# Patient Record
Sex: MALE | Race: OTHER | Employment: OTHER | URBAN - METROPOLITAN AREA
[De-identification: names, ages, dates, MRNs, and addresses within clinical notes are randomized per-mention and may not be internally consistent; named-entity substitution may affect disease eponyms.]

---

## 2017-02-08 PROBLEM — F33.2 SEVERE EPISODE OF RECURRENT MAJOR DEPRESSIVE DISORDER, WITHOUT PSYCHOTIC FEATURES (HCC): Chronic | Status: ACTIVE | Noted: 2017-02-08

## 2018-03-02 PROBLEM — S06.5XAA SDH (SUBDURAL HEMATOMA): Status: ACTIVE | Noted: 2018-03-02

## 2018-03-23 DIAGNOSIS — S06.5XAA SDH (SUBDURAL HEMATOMA): Primary | ICD-10-CM

## 2018-03-29 ENCOUNTER — HOSPITAL ENCOUNTER (OUTPATIENT)
Dept: CT IMAGING | Age: 63
Discharge: HOME | End: 2018-03-31
Payer: MEDICARE

## 2018-03-29 DIAGNOSIS — I62.00 SUBDURAL HEMORRHAGE (HCC): ICD-10-CM

## 2018-03-29 PROCEDURE — 70450 CT HEAD/BRAIN W/O DYE: CPT

## 2018-09-27 ENCOUNTER — APPOINTMENT (OUTPATIENT)
Dept: CT IMAGING | Age: 63
DRG: 638 | End: 2018-09-27
Payer: MEDICARE

## 2018-09-27 ENCOUNTER — HOSPITAL ENCOUNTER (INPATIENT)
Age: 63
LOS: 2 days | Discharge: HOME | DRG: 638 | End: 2018-09-30
Attending: EMERGENCY MEDICINE | Admitting: INTERNAL MEDICINE
Payer: MEDICARE

## 2018-09-27 ENCOUNTER — APPOINTMENT (OUTPATIENT)
Dept: GENERAL RADIOLOGY | Age: 63
DRG: 638 | End: 2018-09-27
Payer: MEDICARE

## 2018-09-27 DIAGNOSIS — E11.10 DIABETIC KETOACIDOSIS WITHOUT COMA ASSOCIATED WITH TYPE 2 DIABETES MELLITUS (HCC): Primary | ICD-10-CM

## 2018-09-27 DIAGNOSIS — N17.9 AKI (ACUTE KIDNEY INJURY) (HCC): ICD-10-CM

## 2018-09-27 DIAGNOSIS — E87.5 HYPERKALEMIA: ICD-10-CM

## 2018-09-27 DIAGNOSIS — R10.13 ABDOMINAL PAIN, EPIGASTRIC: ICD-10-CM

## 2018-09-27 DIAGNOSIS — R11.2 NON-INTRACTABLE VOMITING WITH NAUSEA, UNSPECIFIED VOMITING TYPE: ICD-10-CM

## 2018-09-27 DIAGNOSIS — K44.9 HIATAL HERNIA: ICD-10-CM

## 2018-09-27 DIAGNOSIS — E27.8 RIGHT ADRENAL MASS (HCC): ICD-10-CM

## 2018-09-27 LAB
ALBUMIN SERPL-MCNC: 4.1 G/DL (ref 3.5–5.2)
ALP BLD-CCNC: 101 U/L (ref 40–129)
ALT SERPL-CCNC: 24 U/L (ref 0–40)
AMPHETAMINE SCREEN, URINE: NOT DETECTED
ANION GAP SERPL CALCULATED.3IONS-SCNC: 23 MMOL/L (ref 7–16)
AST SERPL-CCNC: 13 U/L (ref 0–39)
BACTERIA: ABNORMAL /HPF
BARBITURATE SCREEN URINE: NOT DETECTED
BASOPHILS ABSOLUTE: 0.04 E9/L (ref 0–0.2)
BASOPHILS RELATIVE PERCENT: 0.2 % (ref 0–2)
BENZODIAZEPINE SCREEN, URINE: NOT DETECTED
BETA-HYDROXYBUTYRATE: 3.66 MMOL/L (ref 0.02–0.27)
BILIRUB SERPL-MCNC: 1.8 MG/DL (ref 0–1.2)
BILIRUBIN URINE: NEGATIVE
BLOOD, URINE: ABNORMAL
BUN BLDV-MCNC: 44 MG/DL (ref 8–23)
CALCIUM SERPL-MCNC: 9.6 MG/DL (ref 8.6–10.2)
CANNABINOID SCREEN URINE: NOT DETECTED
CHLORIDE BLD-SCNC: 78 MMOL/L (ref 98–107)
CHP ED QC CHECK: YES
CLARITY: CLEAR
CO2: 21 MMOL/L (ref 22–29)
COCAINE METABOLITE SCREEN URINE: NOT DETECTED
COLOR: YELLOW
CREAT SERPL-MCNC: 1.8 MG/DL (ref 0.7–1.2)
EOSINOPHILS ABSOLUTE: 0.02 E9/L (ref 0.05–0.5)
EOSINOPHILS RELATIVE PERCENT: 0.1 % (ref 0–6)
GFR AFRICAN AMERICAN: 47
GFR NON-AFRICAN AMERICAN: 39 ML/MIN/1.73
GLUCOSE BLD-MCNC: 834 MG/DL (ref 74–109)
GLUCOSE BLD-MCNC: NORMAL MG/DL
GLUCOSE URINE: >=1000 MG/DL
HCT VFR BLD CALC: 48.8 % (ref 37–54)
HEMOGLOBIN: 17.6 G/DL (ref 12.5–16.5)
IMMATURE GRANULOCYTES #: 0.14 E9/L
IMMATURE GRANULOCYTES %: 0.8 % (ref 0–5)
KETONES, URINE: 15 MG/DL
LACTIC ACID: 4.5 MMOL/L (ref 0.5–2.2)
LEUKOCYTE ESTERASE, URINE: NEGATIVE
LIPASE: 66 U/L (ref 13–60)
LYMPHOCYTES ABSOLUTE: 1.61 E9/L (ref 1.5–4)
LYMPHOCYTES RELATIVE PERCENT: 8.7 % (ref 20–42)
MAGNESIUM: 2.1 MG/DL (ref 1.6–2.6)
MCH RBC QN AUTO: 26.7 PG (ref 26–35)
MCHC RBC AUTO-ENTMCNC: 36.1 % (ref 32–34.5)
MCV RBC AUTO: 74.1 FL (ref 80–99.9)
METER GLUCOSE: >500 MG/DL (ref 70–110)
METHADONE SCREEN, URINE: NOT DETECTED
MONOCYTES ABSOLUTE: 0.79 E9/L (ref 0.1–0.95)
MONOCYTES RELATIVE PERCENT: 4.2 % (ref 2–12)
NEUTROPHILS ABSOLUTE: 16 E9/L (ref 1.8–7.3)
NEUTROPHILS RELATIVE PERCENT: 86 % (ref 43–80)
NITRITE, URINE: NEGATIVE
OPIATE SCREEN URINE: NOT DETECTED
OSMOLALITY: 325 MOSM/KG (ref 285–310)
PDW BLD-RTO: 13.1 FL (ref 11.5–15)
PH UA: 5.5 (ref 5–9)
PH VENOUS: 7.26 (ref 7.3–7.42)
PHENCYCLIDINE SCREEN URINE: NOT DETECTED
PHOSPHORUS: 4.9 MG/DL (ref 2.5–4.5)
PLATELET # BLD: 258 E9/L (ref 130–450)
PMV BLD AUTO: 10.8 FL (ref 7–12)
POTASSIUM SERPL-SCNC: 5.7 MMOL/L (ref 3.5–5)
PROPOXYPHENE SCREEN: NOT DETECTED
PROTEIN UA: 30 MG/DL
RBC # BLD: 6.59 E12/L (ref 3.8–5.8)
RBC UA: ABNORMAL /HPF (ref 0–2)
SODIUM BLD-SCNC: 122 MMOL/L (ref 132–146)
SPECIFIC GRAVITY UA: 1.01 (ref 1–1.03)
TOTAL PROTEIN: 7.2 G/DL (ref 6.4–8.3)
TROPONIN: <0.01 NG/ML (ref 0–0.03)
UROBILINOGEN, URINE: 0.2 E.U./DL
WBC # BLD: 18.6 E9/L (ref 4.5–11.5)
WBC UA: ABNORMAL /HPF (ref 0–5)

## 2018-09-27 PROCEDURE — 71045 X-RAY EXAM CHEST 1 VIEW: CPT

## 2018-09-27 PROCEDURE — G0480 DRUG TEST DEF 1-7 CLASSES: HCPCS

## 2018-09-27 PROCEDURE — 80048 BASIC METABOLIC PNL TOTAL CA: CPT

## 2018-09-27 PROCEDURE — 80307 DRUG TEST PRSMV CHEM ANLYZR: CPT

## 2018-09-27 PROCEDURE — 74177 CT ABD & PELVIS W/CONTRAST: CPT

## 2018-09-27 PROCEDURE — 84484 ASSAY OF TROPONIN QUANT: CPT

## 2018-09-27 PROCEDURE — 82962 GLUCOSE BLOOD TEST: CPT

## 2018-09-27 PROCEDURE — 87040 BLOOD CULTURE FOR BACTERIA: CPT

## 2018-09-27 PROCEDURE — 2580000003 HC RX 258: Performed by: EMERGENCY MEDICINE

## 2018-09-27 PROCEDURE — 2500000003 HC RX 250 WO HCPCS: Performed by: EMERGENCY MEDICINE

## 2018-09-27 PROCEDURE — 81001 URINALYSIS AUTO W/SCOPE: CPT

## 2018-09-27 PROCEDURE — 83690 ASSAY OF LIPASE: CPT

## 2018-09-27 PROCEDURE — 83930 ASSAY OF BLOOD OSMOLALITY: CPT

## 2018-09-27 PROCEDURE — 6370000000 HC RX 637 (ALT 250 FOR IP): Performed by: EMERGENCY MEDICINE

## 2018-09-27 PROCEDURE — C9113 INJ PANTOPRAZOLE SODIUM, VIA: HCPCS | Performed by: EMERGENCY MEDICINE

## 2018-09-27 PROCEDURE — 2580000003 HC RX 258: Performed by: RADIOLOGY

## 2018-09-27 PROCEDURE — 6360000004 HC RX CONTRAST MEDICATION: Performed by: RADIOLOGY

## 2018-09-27 PROCEDURE — 87186 SC STD MICRODIL/AGAR DIL: CPT

## 2018-09-27 PROCEDURE — 6360000002 HC RX W HCPCS: Performed by: EMERGENCY MEDICINE

## 2018-09-27 PROCEDURE — 80053 COMPREHEN METABOLIC PANEL: CPT

## 2018-09-27 PROCEDURE — 82010 KETONE BODYS QUAN: CPT

## 2018-09-27 PROCEDURE — 83735 ASSAY OF MAGNESIUM: CPT

## 2018-09-27 PROCEDURE — 83605 ASSAY OF LACTIC ACID: CPT

## 2018-09-27 PROCEDURE — 96374 THER/PROPH/DIAG INJ IV PUSH: CPT

## 2018-09-27 PROCEDURE — 96375 TX/PRO/DX INJ NEW DRUG ADDON: CPT

## 2018-09-27 PROCEDURE — 85025 COMPLETE CBC W/AUTO DIFF WBC: CPT

## 2018-09-27 PROCEDURE — 82800 BLOOD PH: CPT

## 2018-09-27 PROCEDURE — 84100 ASSAY OF PHOSPHORUS: CPT

## 2018-09-27 PROCEDURE — 87077 CULTURE AEROBIC IDENTIFY: CPT

## 2018-09-27 PROCEDURE — 99285 EMERGENCY DEPT VISIT HI MDM: CPT

## 2018-09-27 PROCEDURE — 2580000003 HC RX 258: Performed by: PHYSICIAN ASSISTANT

## 2018-09-27 PROCEDURE — 36415 COLL VENOUS BLD VENIPUNCTURE: CPT

## 2018-09-27 PROCEDURE — S0028 INJECTION, FAMOTIDINE, 20 MG: HCPCS | Performed by: EMERGENCY MEDICINE

## 2018-09-27 RX ORDER — PANTOPRAZOLE SODIUM 40 MG/10ML
80 INJECTION, POWDER, LYOPHILIZED, FOR SOLUTION INTRAVENOUS ONCE
Status: COMPLETED | OUTPATIENT
Start: 2018-09-27 | End: 2018-09-27

## 2018-09-27 RX ORDER — 0.9 % SODIUM CHLORIDE 0.9 %
1000 INTRAVENOUS SOLUTION INTRAVENOUS ONCE
Status: COMPLETED | OUTPATIENT
Start: 2018-09-27 | End: 2018-09-28

## 2018-09-27 RX ORDER — SODIUM CHLORIDE 0.9 % (FLUSH) 0.9 %
10 SYRINGE (ML) INJECTION PRN
Status: COMPLETED | OUTPATIENT
Start: 2018-09-27 | End: 2018-09-27

## 2018-09-27 RX ORDER — ONDANSETRON 2 MG/ML
4 INJECTION INTRAMUSCULAR; INTRAVENOUS ONCE
Status: COMPLETED | OUTPATIENT
Start: 2018-09-27 | End: 2018-09-27

## 2018-09-27 RX ORDER — DEXTROSE MONOHYDRATE 25 G/50ML
12.5 INJECTION, SOLUTION INTRAVENOUS PRN
Status: DISCONTINUED | OUTPATIENT
Start: 2018-09-27 | End: 2018-09-28 | Stop reason: SDUPTHER

## 2018-09-27 RX ORDER — DEXTROSE MONOHYDRATE 50 MG/ML
100 INJECTION, SOLUTION INTRAVENOUS PRN
Status: DISCONTINUED | OUTPATIENT
Start: 2018-09-27 | End: 2018-09-30 | Stop reason: HOSPADM

## 2018-09-27 RX ORDER — NICOTINE POLACRILEX 4 MG
15 LOZENGE BUCCAL PRN
Status: DISCONTINUED | OUTPATIENT
Start: 2018-09-27 | End: 2018-09-30 | Stop reason: HOSPADM

## 2018-09-27 RX ORDER — 0.9 % SODIUM CHLORIDE 0.9 %
1000 INTRAVENOUS SOLUTION INTRAVENOUS ONCE
Status: COMPLETED | OUTPATIENT
Start: 2018-09-27 | End: 2018-09-27

## 2018-09-27 RX ADMIN — PANTOPRAZOLE SODIUM 80 MG: 40 INJECTION, POWDER, FOR SOLUTION INTRAVENOUS at 22:32

## 2018-09-27 RX ADMIN — SODIUM CHLORIDE 1000 ML: 9 INJECTION, SOLUTION INTRAVENOUS at 22:29

## 2018-09-27 RX ADMIN — SODIUM CHLORIDE 1000 ML: 9 INJECTION, SOLUTION INTRAVENOUS at 19:53

## 2018-09-27 RX ADMIN — FAMOTIDINE 20 MG: 10 INJECTION, SOLUTION INTRAVENOUS at 19:54

## 2018-09-27 RX ADMIN — SODIUM CHLORIDE 0.1 UNITS/KG/HR: 9 INJECTION, SOLUTION INTRAVENOUS at 23:22

## 2018-09-27 RX ADMIN — SODIUM CHLORIDE 1000 ML: 9 INJECTION, SOLUTION INTRAVENOUS at 19:54

## 2018-09-27 RX ADMIN — IOPAMIDOL 110 ML: 755 INJECTION, SOLUTION INTRAVENOUS at 22:59

## 2018-09-27 RX ADMIN — Medication 10 ML: at 22:59

## 2018-09-27 RX ADMIN — ONDANSETRON 4 MG: 2 SOLUTION INTRAMUSCULAR; INTRAVENOUS at 19:54

## 2018-09-27 ASSESSMENT — ENCOUNTER SYMPTOMS
SINUS PRESSURE: 0
SHORTNESS OF BREATH: 0
VOMITING: 1
BACK PAIN: 0
RHINORRHEA: 0
CONSTIPATION: 0
NAUSEA: 1
SORE THROAT: 0
ABDOMINAL PAIN: 1
DIARRHEA: 0
COUGH: 0

## 2018-09-27 ASSESSMENT — PAIN DESCRIPTION - LOCATION: LOCATION: ABDOMEN

## 2018-09-27 ASSESSMENT — PAIN DESCRIPTION - DESCRIPTORS: DESCRIPTORS: BURNING

## 2018-09-27 ASSESSMENT — PAIN DESCRIPTION - ORIENTATION: ORIENTATION: LOWER;UPPER

## 2018-09-27 ASSESSMENT — PAIN DESCRIPTION - PAIN TYPE: TYPE: ACUTE PAIN

## 2018-09-27 ASSESSMENT — PAIN SCALES - GENERAL: PAINLEVEL_OUTOF10: 8

## 2018-09-27 ASSESSMENT — PAIN DESCRIPTION - ONSET: ONSET: ON-GOING

## 2018-09-27 ASSESSMENT — PAIN DESCRIPTION - FREQUENCY: FREQUENCY: INTERMITTENT

## 2018-09-27 NOTE — ED PROVIDER NOTES
Result Value Ref Range    Blood Culture, Routine (A)      Gram stain performed from blood culture bottle media  Gram positive cocci in pairs     Culture Blood #2   Result Value Ref Range    Culture, Blood 2 24 Hours- no growth    CBC Auto Differential   Result Value Ref Range    WBC 18.6 (H) 4.5 - 11.5 E9/L    RBC 6.59 (H) 3.80 - 5.80 E12/L    Hemoglobin 17.6 (H) 12.5 - 16.5 g/dL    Hematocrit 48.8 37.0 - 54.0 %    MCV 74.1 (L) 80.0 - 99.9 fL    MCH 26.7 26.0 - 35.0 pg    MCHC 36.1 (H) 32.0 - 34.5 %    RDW 13.1 11.5 - 15.0 fL    Platelets 788 761 - 398 E9/L    MPV 10.8 7.0 - 12.0 fL    Neutrophils % 86.0 (H) 43.0 - 80.0 %    Immature Granulocytes % 0.8 0.0 - 5.0 %    Lymphocytes % 8.7 (L) 20.0 - 42.0 %    Monocytes % 4.2 2.0 - 12.0 %    Eosinophils % 0.1 0.0 - 6.0 %    Basophils % 0.2 0.0 - 2.0 %    Neutrophils # 16.00 (H) 1.80 - 7.30 E9/L    Immature Granulocytes # 0.14 E9/L    Lymphocytes # 1.61 1.50 - 4.00 E9/L    Monocytes # 0.79 0.10 - 0.95 E9/L    Eosinophils # 0.02 (L) 0.05 - 0.50 E9/L    Basophils # 0.04 0.00 - 0.20 E9/L   Comprehensive Metabolic Panel   Result Value Ref Range    Sodium 122 (L) 132 - 146 mmol/L    Potassium 5.7 (H) 3.5 - 5.0 mmol/L    Chloride 78 (L) 98 - 107 mmol/L    CO2 21 (L) 22 - 29 mmol/L    Anion Gap 23 (H) 7 - 16 mmol/L    Glucose 834 (HH) 74 - 109 mg/dL    BUN 44 (H) 8 - 23 mg/dL    CREATININE 1.8 (H) 0.7 - 1.2 mg/dL    GFR Non-African American 39 >=60 mL/min/1.73    GFR African American 47     Calcium 9.6 8.6 - 10.2 mg/dL    Total Protein 7.2 6.4 - 8.3 g/dL    Alb 4.1 3.5 - 5.2 g/dL    Total Bilirubin 1.8 (H) 0.0 - 1.2 mg/dL    Alkaline Phosphatase 101 40 - 129 U/L    ALT 24 0 - 40 U/L    AST 13 0 - 39 U/L   Lipase   Result Value Ref Range    Lipase 66 (H) 13 - 60 U/L   Beta-Hydroxybutyrate   Result Value Ref Range    Beta-Hydroxybutyrate 3.66 (H) 0.02 - 0.27 mmol/L   Troponin   Result Value Ref Range    Troponin <0.01 0.00 - 0.03 ng/mL   Urinalysis with Microscopic   Result Cocaine Metabolite Screen, Urine NOT DETECTED Negative < 300 ng/mL    Opiate Scrn, Ur NOT DETECTED Negative < 300ng/mL    PCP Screen, Urine NOT DETECTED Negative < 25 ng/mL    Methadone Screen, Urine NOT DETECTED Negative <300 ng/mL    Propoxyphene Scrn, Ur NOT DETECTED Negative <300 ng/mL   GLUCOSE, RANDOM   Result Value Ref Range    Glucose 468 (H) 74 - 109 mg/dL   Basic Metabolic Panel   Result Value Ref Range    Sodium 138 132 - 146 mmol/L    Potassium 4.4 3.5 - 5.0 mmol/L    Chloride 104 98 - 107 mmol/L    CO2 22 22 - 29 mmol/L    Anion Gap 12 7 - 16 mmol/L    Glucose 230 (H) 74 - 109 mg/dL    BUN 31 (H) 8 - 23 mg/dL    CREATININE 1.4 (H) 0.7 - 1.2 mg/dL    GFR Non-African American 52 >=60 mL/min/1.73    GFR African American >60     Calcium 8.7 8.6 - 10.2 mg/dL   Phosphorus   Result Value Ref Range    Phosphorus 2.8 2.5 - 4.5 mg/dL   MAGNESIUM   Result Value Ref Range    Magnesium 2.5 1.6 - 2.6 mg/dL   Phosphorus   Result Value Ref Range    Phosphorus 4.9 (H) 2.5 - 4.5 mg/dL   URINE ELECTROLYTES   Result Value Ref Range    Sodium, Ur 41 Not Established mmol/L    Potassium, Ur 31.9 Not Established mmol/L    Chloride 33 Not Established mmol/L   Creatinine, Random Urine   Result Value Ref Range    Creatinine, Ur 57 40 - 278 mg/dL   PSA, Diagnostic   Result Value Ref Range    PSA 0.86 0.00 - 4.00 ng/mL   Cortisol   Result Value Ref Range    Cortisol 18.27 2.68 - 18.40 mcg/dL   Catecholamines free urine   Result Value Ref Range    Urine Total Volume RANDOM     Hours Collected RANDOM    Hemoglobin A1c   Result Value Ref Range    Hemoglobin A1C 13.6 (H) 4.0 - 5.6 %   Basic Metabolic Panel   Result Value Ref Range    Sodium 134 132 - 146 mmol/L    Potassium 4.4 3.5 - 5.0 mmol/L    Chloride 98 98 - 107 mmol/L    CO2 21 (L) 22 - 29 mmol/L    Anion Gap 15 7 - 16 mmol/L    Glucose 210 (H) 74 - 109 mg/dL    BUN 26 (H) 8 - 23 mg/dL    CREATININE 1.3 (H) 0.7 - 1.2 mg/dL    GFR Non-African American 56 >=60 mL/min/1.73    GFR Meter Glucose 324 (H) 70 - 110 mg/dL   POCT Glucose   Result Value Ref Range    Meter Glucose 282 (H) 70 - 110 mg/dL   POCT Glucose   Result Value Ref Range    Meter Glucose 211 (H) 70 - 110 mg/dL   POCT Glucose   Result Value Ref Range    Meter Glucose 205 (H) 70 - 110 mg/dL   POCT Glucose   Result Value Ref Range    Meter Glucose 207 (H) 70 - 110 mg/dL   POCT Glucose   Result Value Ref Range    Meter Glucose 217 (H) 70 - 110 mg/dL   POCT Glucose   Result Value Ref Range    Meter Glucose 168 (H) 70 - 110 mg/dL   POCT Glucose   Result Value Ref Range    Meter Glucose 248 (H) 70 - 110 mg/dL   POCT Glucose   Result Value Ref Range    Meter Glucose 233 (H) 70 - 110 mg/dL   POCT Glucose   Result Value Ref Range    Meter Glucose 213 (H) 70 - 110 mg/dL       RADIOLOGY:  CT ABDOMEN PELVIS W IV CONTRAST Additional Contrast? None   Final Result   1. Hiatal hernia. There is unequivocal circumferential thickening of the wall    of the lower esophagus. Reflux esophagitis? 2. There are multiple small bilateral renal cysts. No obstructive uropathy. The bladder is distended but otherwise normal.   3.  5.9 cm maximal diameter variably enhancing right suprarenal mass. No    normal right adrenal tissue is visible and is therefore assumed that this    lesion is of adrenal margin. It indents the upper IVC and is inseparable from    the caval wall on 4 contiguous axial images but the mass most likely does not    originate from the caval wall. Any adrenal mass over 4 cm in diameter is    generally considered suspicious and the morphology of this lesion is clearly    suspicious. The left adrenal is normal.   4.  Retroperitoneal lipomatosis. This report has been electronically signed by Adolfo Rinaldi MD.      XR CHEST PORTABLE   Final Result   No acute cardiopulmonary process.       MRI ABDOMEN W WO CONTRAST    (Results Pending)     ------------------------- NURSING NOTES AND VITALS REVIEWED ---------------------------  Date

## 2018-09-28 PROBLEM — E11.10 DIABETIC KETOACIDOSIS WITHOUT COMA ASSOCIATED WITH TYPE 2 DIABETES MELLITUS (HCC): Status: ACTIVE | Noted: 2018-09-28

## 2018-09-28 LAB
ACETAMINOPHEN LEVEL: <5 MCG/ML (ref 10–30)
ANION GAP SERPL CALCULATED.3IONS-SCNC: 12 MMOL/L (ref 7–16)
ANION GAP SERPL CALCULATED.3IONS-SCNC: 15 MMOL/L (ref 7–16)
ANION GAP SERPL CALCULATED.3IONS-SCNC: 18 MMOL/L (ref 7–16)
BUN BLDV-MCNC: 26 MG/DL (ref 8–23)
BUN BLDV-MCNC: 31 MG/DL (ref 8–23)
BUN BLDV-MCNC: 42 MG/DL (ref 8–23)
CALCIUM SERPL-MCNC: 8.2 MG/DL (ref 8.6–10.2)
CALCIUM SERPL-MCNC: 8.3 MG/DL (ref 8.6–10.2)
CALCIUM SERPL-MCNC: 8.7 MG/DL (ref 8.6–10.2)
CHLORIDE BLD-SCNC: 104 MMOL/L (ref 98–107)
CHLORIDE BLD-SCNC: 91 MMOL/L (ref 98–107)
CHLORIDE BLD-SCNC: 98 MMOL/L (ref 98–107)
CHLORIDE URINE RANDOM: 33 MMOL/L
CHP ED QC CHECK: YES
CHP ED QC CHECK: YES
CO2: 19 MMOL/L (ref 22–29)
CO2: 21 MMOL/L (ref 22–29)
CO2: 22 MMOL/L (ref 22–29)
CORTISOL TOTAL: 18.27 MCG/DL (ref 2.68–18.4)
CREAT SERPL-MCNC: 1.3 MG/DL (ref 0.7–1.2)
CREAT SERPL-MCNC: 1.4 MG/DL (ref 0.7–1.2)
CREAT SERPL-MCNC: 1.6 MG/DL (ref 0.7–1.2)
CREATININE URINE: 57 MG/DL (ref 40–278)
ETHANOL: <10 MG/DL (ref 0–0.08)
GFR AFRICAN AMERICAN: 53
GFR AFRICAN AMERICAN: >60
GFR AFRICAN AMERICAN: >60
GFR NON-AFRICAN AMERICAN: 44 ML/MIN/1.73
GFR NON-AFRICAN AMERICAN: 52 ML/MIN/1.73
GFR NON-AFRICAN AMERICAN: 56 ML/MIN/1.73
GLUCOSE BLD-MCNC: 210 MG/DL (ref 74–109)
GLUCOSE BLD-MCNC: 230 MG/DL (ref 74–109)
GLUCOSE BLD-MCNC: 468 MG/DL (ref 74–109)
GLUCOSE BLD-MCNC: 492 MG/DL
GLUCOSE BLD-MCNC: 644 MG/DL (ref 74–109)
GLUCOSE BLD-MCNC: NORMAL MG/DL
HBA1C MFR BLD: 13.6 % (ref 4–5.6)
LACTIC ACID: 3.6 MMOL/L (ref 0.5–2.2)
MAGNESIUM: 2.5 MG/DL (ref 1.6–2.6)
METER GLUCOSE: 168 MG/DL (ref 70–110)
METER GLUCOSE: 205 MG/DL (ref 70–110)
METER GLUCOSE: 207 MG/DL (ref 70–110)
METER GLUCOSE: 211 MG/DL (ref 70–110)
METER GLUCOSE: 217 MG/DL (ref 70–110)
METER GLUCOSE: 282 MG/DL (ref 70–110)
METER GLUCOSE: 324 MG/DL (ref 70–110)
METER GLUCOSE: 335 MG/DL (ref 70–110)
METER GLUCOSE: 492 MG/DL (ref 70–110)
METER GLUCOSE: >500 MG/DL (ref 70–110)
METER GLUCOSE: >500 MG/DL (ref 70–110)
PHOSPHORUS: 2.8 MG/DL (ref 2.5–4.5)
PHOSPHORUS: 4.9 MG/DL (ref 2.5–4.5)
POTASSIUM SERPL-SCNC: 4.4 MMOL/L (ref 3.5–5)
POTASSIUM SERPL-SCNC: 4.4 MMOL/L (ref 3.5–5)
POTASSIUM SERPL-SCNC: 5.9 MMOL/L (ref 3.5–5)
POTASSIUM, UR: 31.9 MMOL/L
PROSTATE SPECIFIC ANTIGEN: 0.86 NG/ML (ref 0–4)
SALICYLATE, SERUM: <0.3 MG/DL (ref 0–30)
SODIUM BLD-SCNC: 128 MMOL/L (ref 132–146)
SODIUM BLD-SCNC: 134 MMOL/L (ref 132–146)
SODIUM BLD-SCNC: 138 MMOL/L (ref 132–146)
SODIUM URINE: 41 MMOL/L
TRICYCLIC ANTIDEPRESSANTS SCREEN SERUM: NEGATIVE NG/ML

## 2018-09-28 PROCEDURE — 82384 ASSAY THREE CATECHOLAMINES: CPT

## 2018-09-28 PROCEDURE — 82436 ASSAY OF URINE CHLORIDE: CPT

## 2018-09-28 PROCEDURE — 84300 ASSAY OF URINE SODIUM: CPT

## 2018-09-28 PROCEDURE — 6360000002 HC RX W HCPCS: Performed by: INTERNAL MEDICINE

## 2018-09-28 PROCEDURE — 82947 ASSAY GLUCOSE BLOOD QUANT: CPT

## 2018-09-28 PROCEDURE — 84133 ASSAY OF URINE POTASSIUM: CPT

## 2018-09-28 PROCEDURE — 84153 ASSAY OF PSA TOTAL: CPT

## 2018-09-28 PROCEDURE — 82570 ASSAY OF URINE CREATININE: CPT

## 2018-09-28 PROCEDURE — 96372 THER/PROPH/DIAG INJ SC/IM: CPT

## 2018-09-28 PROCEDURE — 80048 BASIC METABOLIC PNL TOTAL CA: CPT

## 2018-09-28 PROCEDURE — 6370000000 HC RX 637 (ALT 250 FOR IP): Performed by: INTERNAL MEDICINE

## 2018-09-28 PROCEDURE — 2580000003 HC RX 258: Performed by: INTERNAL MEDICINE

## 2018-09-28 PROCEDURE — 83036 HEMOGLOBIN GLYCOSYLATED A1C: CPT

## 2018-09-28 PROCEDURE — 83735 ASSAY OF MAGNESIUM: CPT

## 2018-09-28 PROCEDURE — 83835 ASSAY OF METANEPHRINES: CPT

## 2018-09-28 PROCEDURE — 84100 ASSAY OF PHOSPHORUS: CPT

## 2018-09-28 PROCEDURE — 82024 ASSAY OF ACTH: CPT

## 2018-09-28 PROCEDURE — 82533 TOTAL CORTISOL: CPT

## 2018-09-28 PROCEDURE — 82088 ASSAY OF ALDOSTERONE: CPT

## 2018-09-28 PROCEDURE — 84244 ASSAY OF RENIN: CPT

## 2018-09-28 PROCEDURE — 36415 COLL VENOUS BLD VENIPUNCTURE: CPT

## 2018-09-28 PROCEDURE — 82962 GLUCOSE BLOOD TEST: CPT

## 2018-09-28 PROCEDURE — 2000000000 HC ICU R&B

## 2018-09-28 RX ORDER — SIMVASTATIN 40 MG
80 TABLET ORAL NIGHTLY
Status: DISCONTINUED | OUTPATIENT
Start: 2018-09-28 | End: 2018-09-30 | Stop reason: HOSPADM

## 2018-09-28 RX ORDER — SODIUM CHLORIDE 9 MG/ML
INJECTION, SOLUTION INTRAVENOUS CONTINUOUS
Status: DISCONTINUED | OUTPATIENT
Start: 2018-09-28 | End: 2018-09-28

## 2018-09-28 RX ORDER — POTASSIUM CHLORIDE 7.45 MG/ML
10 INJECTION INTRAVENOUS PRN
Status: DISCONTINUED | OUTPATIENT
Start: 2018-09-28 | End: 2018-09-28

## 2018-09-28 RX ORDER — LEVETIRACETAM 500 MG/1
500 TABLET ORAL 2 TIMES DAILY
Status: DISCONTINUED | OUTPATIENT
Start: 2018-09-28 | End: 2018-09-28

## 2018-09-28 RX ORDER — DULOXETIN HYDROCHLORIDE 30 MG/1
30 CAPSULE, DELAYED RELEASE ORAL 2 TIMES DAILY
Status: DISCONTINUED | OUTPATIENT
Start: 2018-09-28 | End: 2018-09-30 | Stop reason: HOSPADM

## 2018-09-28 RX ORDER — 0.9 % SODIUM CHLORIDE 0.9 %
15 INTRAVENOUS SOLUTION INTRAVENOUS ONCE
Status: DISCONTINUED | OUTPATIENT
Start: 2018-09-28 | End: 2018-09-28

## 2018-09-28 RX ORDER — DEXTROSE AND SODIUM CHLORIDE 5; .45 G/100ML; G/100ML
INJECTION, SOLUTION INTRAVENOUS CONTINUOUS PRN
Status: DISCONTINUED | OUTPATIENT
Start: 2018-09-28 | End: 2018-09-28

## 2018-09-28 RX ORDER — DEXTROSE MONOHYDRATE 25 G/50ML
12.5 INJECTION, SOLUTION INTRAVENOUS PRN
Status: DISCONTINUED | OUTPATIENT
Start: 2018-09-28 | End: 2018-09-30 | Stop reason: HOSPADM

## 2018-09-28 RX ORDER — PANTOPRAZOLE SODIUM 40 MG/1
40 TABLET, DELAYED RELEASE ORAL
Status: DISCONTINUED | OUTPATIENT
Start: 2018-09-28 | End: 2018-09-30 | Stop reason: HOSPADM

## 2018-09-28 RX ORDER — MAGNESIUM SULFATE 1 G/100ML
1 INJECTION INTRAVENOUS PRN
Status: DISCONTINUED | OUTPATIENT
Start: 2018-09-28 | End: 2018-09-28

## 2018-09-28 RX ADMIN — SIMVASTATIN 80 MG: 40 TABLET, FILM COATED ORAL at 20:50

## 2018-09-28 RX ADMIN — DEXTROSE AND SODIUM CHLORIDE: 5; 450 INJECTION, SOLUTION INTRAVENOUS at 10:11

## 2018-09-28 RX ADMIN — INSULIN HUMAN 15 UNITS: 100 INJECTION, SUSPENSION SUBCUTANEOUS at 20:52

## 2018-09-28 RX ADMIN — INSULIN LISPRO 1 UNITS: 100 INJECTION, SOLUTION INTRAVENOUS; SUBCUTANEOUS at 20:57

## 2018-09-28 RX ADMIN — DULOXETINE HYDROCHLORIDE 30 MG: 30 CAPSULE, DELAYED RELEASE ORAL at 10:12

## 2018-09-28 RX ADMIN — SODIUM CHLORIDE: 9 INJECTION, SOLUTION INTRAVENOUS at 03:00

## 2018-09-28 RX ADMIN — INSULIN LISPRO 4 UNITS: 100 INJECTION, SOLUTION INTRAVENOUS; SUBCUTANEOUS at 16:36

## 2018-09-28 RX ADMIN — INSULIN HUMAN 30 UNITS: 100 INJECTION, SUSPENSION SUBCUTANEOUS at 11:01

## 2018-09-28 RX ADMIN — PANTOPRAZOLE SODIUM 40 MG: 40 TABLET, DELAYED RELEASE ORAL at 10:56

## 2018-09-28 RX ADMIN — SODIUM CHLORIDE: 9 INJECTION, SOLUTION INTRAVENOUS at 07:23

## 2018-09-28 RX ADMIN — INSULIN LISPRO 4 UNITS: 100 INJECTION, SOLUTION INTRAVENOUS; SUBCUTANEOUS at 12:26

## 2018-09-28 RX ADMIN — ENOXAPARIN SODIUM 40 MG: 40 INJECTION SUBCUTANEOUS at 10:12

## 2018-09-28 RX ADMIN — DULOXETINE HYDROCHLORIDE 30 MG: 30 CAPSULE, DELAYED RELEASE ORAL at 20:51

## 2018-09-28 ASSESSMENT — PAIN SCALES - GENERAL
PAINLEVEL_OUTOF10: 0

## 2018-09-28 NOTE — CONSULTS
(36.7 °C) (Temporal)   Resp 16   Ht 5' 9\" (1.753 m)   Wt 196 lb 3.4 oz (89 kg)   SpO2 97%   BMI 28.98 kg/m²     General:  Awake, alert, oriented X 3. Well developed, well nourished, well groomed. No apparent distress. HEENT:  Normocephalic, atraumatic. Pupils equal, round. No scleral icterus. No conjunctival injection. Normal lips, teeth, and gums. No nasal discharge. Neck:  Supple, no masses. Heart:  RRR. Lungs:  No audible wheezing. Respirations symmetric and non-labored. Clear bilaterally. Abdomen:  Soft, nontender, no masses, no organomegaly, no peritoneal signs. Active bowel sounds. No rebound or guarding. No flank or CVA tenderness. Extremities:  No clubbing, cyanosis, or edema. Brisk peripheral pulses. Skin:  Warm and dry, no open lesions or rashes. Neuro:  Cranial nerves 2-12 intact, no focal motor or sensory deficits. Alert and oriented. Rectal: Deferred  Genitalia:  Deferred    LABS:    Lab Results       Component                Value               Date                       WBC                      18.6 (H)            09/27/2018                 HGB                      17.6 (H)            09/27/2018                 HCT                      48.8                09/27/2018                 MCV                      74.1 (L)            09/27/2018                 PLT                      258                 09/27/2018              Lab Results       Component                Value               Date                       BUN                      31 (H)              09/28/2018                 CREATININE               1.4 (H)             09/28/2018              No results found for: PSA      ASSESSMENT / PLAN:    Right adrenal mass  He was admitted with nausea, emesis, malaise from diabetic ketoacidosis. He had quit taking his insulin. His blood sugar is now controlled and he is feeling much better. He is to be transferred out of the medical intensive care unit soon.  He is voiding

## 2018-09-28 NOTE — H&P
Hospital Medicine History & Physical      PCP: Paco Oropeza MD    Date of Admission: 9/27/2018      Chief Complaint:  Nausea, vomiting      History Of Present Illness: This is a 63[facility]year-old [facility] patient with a history of diabetes mellitus,depression prior prior subdural hematoma, who presented to the ER with complaint of nausea, nonbloody vomiting, feeling unwell, epigastric tenderness. Reports that he has not been compliant with his insulin because he had decreased appetite for the past few days. He however denies fever, chills diarrhea, constipation, chest pain, palpitations, LOC,syncope, orthopnea, PND, leg swelling, cough. On arrival to the ER he was found to be in DKA with sodium of 122 potassium of 5.7 on iron gap of 23, found to have a lactic acidosis lactic level of 4.5, CLEMENTE, creatinine of 1.8, leukocytosis, erythrocytosis him a negative UA. His chest x-ray showed no acute pulmonary process. CT of his abdomen and pelvis showed hiatal hernia multiple small bilateral renal cysts of 5.9 cm  suprarenal mass. Past Medical History:          Diagnosis Date    Diabetes mellitus ([address]        Past Surgical History:      History reviewed. No pertinent surgical history. Medications Prior to Admission:      Prior to Admission medications    Medication Sig Start Date End Date Taking?  Authorizing Provider   levETIRAcetam (KEPPRA) 500 MG tablet Take 1 tablet by mouth 2 times daily for 14 days 3/3/18 3/17/18  Kristin Bonilla MD   DULoxetine (CYMBALTA) 30 MG extended release capsule Take 1 capsule by mouth 2 times daily 2/14/17   Debby Frausto MD   traZODone (DESYREL) 50 MG tablet Take 1 tablet by mouth nightly 2/14/17   Debby Frausto MD   insulin NPH (HUMULIN N;NOVOLIN N) 100 UNIT/ML injection vial Inject 15 Units into the skin nightly 2/14/17   Vinicius Miller DO   insulin NPH (HUMULIN N;NOVOLIN N) 100 UNIT/ML injection vial Inject 35 Units into the skin every morning 2/14/17   Alexia Jennings auscultation, bilaterally without Rales/Wheezes/Rhonchi. Cardiovascular: Regular rate and rhythm with normal S1/S2 without murmurs, rubs or gallops. Abdomen: Soft, non-tender, non-distended with normal bowel sounds. Musculoskeletal: No  edema bilaterally. Skin:  No rashes or lesions. Onychomycosis  Neurologic:  Cranial nerves: II-XII intact, grossly non-focal.  Psychiatric: Alert and oriented, thought content appropriate. Labs:     Recent Labs      09/27/18   1830   WBC  18.6*   HGB  17.6*   HCT  48.8   PLT  258     Recent Labs      09/27/18   1830  09/27/18   1945  09/27/18   2346   NA  122*   --   128*   K  5.7*   --   5.9*   CL  78*   --   91*   CO2  21*   --   19*   BUN  44*   --   42*   CREATININE  1.8*   --   1.6*   CALCIUM  9.6   --   8.3*   PHOS   --   4.9*   --      Recent Labs      09/27/18   1830   AST  13   ALT  24   BILITOT  1.8*   ALKPHOS  101     No results for input(s): INR in the last 72 hours. Recent Labs      09/27/18   1915   TROPONINI  <0.01       Urinalysis:      Lab Results   Component Value Date    NITRU Negative 09/27/2018    WBCUA NONE 09/27/2018    BACTERIA RARE 09/27/2018    RBCUA 0-1 09/27/2018    BLOODU TRACE 09/27/2018    SPECGRAV 1.010 09/27/2018    GLUCOSEU >=1000 09/27/2018         ASSESSMENT:    Active Hospital Problems    Diagnosis Date Noted    Diabetic ketoacidosis without coma associated with type 2 diabetes mellitus ([facility] [address] [E11.10] 09/28/2018   DKA: Management per ICU DKA protocol, IV normal saline, IV insulin will need counseling on need for medication compliance. CLEMENTE: Related to prerenal azotemia with a DKA. Follow-up trend, management per ICU  Right Suprarenal mass: This is an incidental finding will need to be followed up. Hyponatremia: Likely related to  DKA, management per ICU   hyperkalemia:  Likely related to  DKA, management per ICU  Leukocytosis: Unclear source of infection at this time.  Blood cultures  Lactic acidosis: Follow-up trend  Onychomycosis

## 2018-09-28 NOTE — PROGRESS NOTES
Nutrition Assessment    Type and Reason for Visit: Initial, Positive Nutrition Screen     Subjective Assessment: pt reports decreased appetite for a couple weeks PTA    Nutrition Recommendations: Continue current diet, Start ONS (Ensure high protein BID per d/w pt)    Malnutrition Assessment:  · Malnutrition Status: Insufficient data  · Findings of the 6 clinical characteristics of malnutrition (Minimum of 2 out of 6 clinical characteristics is required to make the diagnosis of moderate or severe Protein Calorie Malnutrition based on AND/ASPEN Guidelines):  1. Energy Intake-Less than or equal to 50%, greater than 7 days    2. Weight Loss-Unable to assess wt hx/ changes   3. Fat Loss-No significant subcutaneous fat loss  4. Muscle Loss-No significant muscle mass loss  5. Fluid Accumulation-No significant fluid accumulation  6.  Strength-Not measured    Nutrition Diagnosis:   · Problem: Inadequate oral intake  · Etiology: related to Alteration in GI function     Signs and symptoms:  as evidenced by Diet history of poor intake, Nausea, Vomiting    Nutrition Assessment:  · Nutrition-Focused Physical Findings: pt alert, RN at bedside, abd pain w/ N/V PTA resolved, DKA on admit, +I/O's, no edema      · Wound Type: None     · Current Nutrition Therapies:  · Oral Diet Orders: Carb Control 3 Carbs/Meal   · Oral Diet intake: 1-25% (observed tray)     · Anthropometric Measures:  · Ht: 5' 9\" (175.3 cm)   · Current Body Wt: 196 lb (88.9 kg) (9/28 bedscale)  · Usual Body Wt:  No EMR hx on file ~200lb per pt  · % Weight Change: +NS for wt loss. Subjective ~4lb (2%) wt loss over past few weeks.  Unable to confirm   · Ideal Body Wt: 160 lb (72.6 kg), % Ideal Body 123%  · BMI Classification: BMI 25.0 - 29.9 Overweight     · Comparative Standards (Estimated Nutrition Needs):  · Estimated Daily Total Kcal: 3720-3265 (MSJ REE 1695 x 1.1 SF)  · Estimated Daily Protein (g):     Nutrition Risk Level: Low    Nutrition Interventions: Continued Inpatient Monitoring, Coordination of Care, Education Initiated (supplement options for poor appetite discussed)    Nutrition Evaluation:   · Evaluation: Goals set   · Goals: Pt to consume >50% meals and ONS     · Monitoring: Weight, Comparative Standards, Pertinent Labs, Skin Integrity, Fluid Balance, Ascites/Edema, Meal Intake, Supplement Intake, Nausea or Vomiting    See Adult Nutrition Doc Flowsheet for more detail.      Electronically signed by Mary Betancourt RD, LD on 9/28/18 at 2:47 PM    Contact Number: Ext [phone] 2.1

## 2018-09-28 NOTE — PROGRESS NOTES
Pt refills and obtains prescriptions from the [facility] here in [place], however unable to find it in the list of pharmacies. [facility] pharmacy selected for now.

## 2018-09-29 ENCOUNTER — APPOINTMENT (OUTPATIENT)
Dept: MRI IMAGING | Age: 63
DRG: 638 | End: 2018-09-29
Payer: MEDICARE

## 2018-09-29 PROBLEM — E27.8 RIGHT ADRENAL MASS (HCC): Status: ACTIVE | Noted: 2018-09-29

## 2018-09-29 PROBLEM — E87.5 HYPERKALEMIA: Status: ACTIVE | Noted: 2018-09-29

## 2018-09-29 PROBLEM — E87.1 HYPONATREMIA: Status: ACTIVE | Noted: 2018-09-29

## 2018-09-29 PROBLEM — N17.9 AKI (ACUTE KIDNEY INJURY) (HCC): Status: ACTIVE | Noted: 2018-09-29

## 2018-09-29 LAB
ANION GAP SERPL CALCULATED.3IONS-SCNC: 19 MMOL/L (ref 7–16)
BASOPHILS ABSOLUTE: 0.05 E9/L (ref 0–0.2)
BASOPHILS RELATIVE PERCENT: 0.3 % (ref 0–2)
BETA-HYDROXYBUTYRATE: 0.66 MMOL/L (ref 0.02–0.27)
BUN BLDV-MCNC: 18 MG/DL (ref 8–23)
CALCIUM SERPL-MCNC: 8.6 MG/DL (ref 8.6–10.2)
CHLORIDE BLD-SCNC: 96 MMOL/L (ref 98–107)
CO2: 21 MMOL/L (ref 22–29)
CREAT SERPL-MCNC: 1.1 MG/DL (ref 0.7–1.2)
CREATININE URINE: 124 MG/DL (ref 40–278)
EOSINOPHILS ABSOLUTE: 0.2 E9/L (ref 0.05–0.5)
EOSINOPHILS RELATIVE PERCENT: 1.3 % (ref 0–6)
GFR AFRICAN AMERICAN: >60
GFR NON-AFRICAN AMERICAN: >60 ML/MIN/1.73
GLUCOSE BLD-MCNC: 215 MG/DL (ref 74–109)
HCT VFR BLD CALC: 42.9 % (ref 37–54)
HEMOGLOBIN: 15.4 G/DL (ref 12.5–16.5)
IMMATURE GRANULOCYTES #: 0.1 E9/L
IMMATURE GRANULOCYTES %: 0.7 % (ref 0–5)
LACTIC ACID: 1.3 MMOL/L (ref 0.5–2.2)
LYMPHOCYTES ABSOLUTE: 2.28 E9/L (ref 1.5–4)
LYMPHOCYTES RELATIVE PERCENT: 15 % (ref 20–42)
MCH RBC QN AUTO: 26.7 PG (ref 26–35)
MCHC RBC AUTO-ENTMCNC: 35.9 % (ref 32–34.5)
MCV RBC AUTO: 74.5 FL (ref 80–99.9)
METER GLUCOSE: 102 MG/DL (ref 70–110)
METER GLUCOSE: 113 MG/DL (ref 70–110)
METER GLUCOSE: 213 MG/DL (ref 70–110)
METER GLUCOSE: 233 MG/DL (ref 70–110)
METER GLUCOSE: 248 MG/DL (ref 70–110)
MICROALBUMIN UR-MCNC: 1163 MG/L
MICROALBUMIN/CREAT UR-RTO: 937.9 (ref 0–30)
MONOCYTES ABSOLUTE: 1.06 E9/L (ref 0.1–0.95)
MONOCYTES RELATIVE PERCENT: 7 % (ref 2–12)
NEUTROPHILS ABSOLUTE: 11.49 E9/L (ref 1.8–7.3)
NEUTROPHILS RELATIVE PERCENT: 75.7 % (ref 43–80)
PDW BLD-RTO: 12.9 FL (ref 11.5–15)
PLATELET # BLD: 194 E9/L (ref 130–450)
PMV BLD AUTO: 10.3 FL (ref 7–12)
POTASSIUM SERPL-SCNC: 4.3 MMOL/L (ref 3.5–5)
PROCALCITONIN: 0.1 NG/ML (ref 0–0.08)
RBC # BLD: 5.76 E12/L (ref 3.8–5.8)
SODIUM BLD-SCNC: 136 MMOL/L (ref 132–146)
WBC # BLD: 15.2 E9/L (ref 4.5–11.5)

## 2018-09-29 PROCEDURE — 74183 MRI ABD W/O CNTR FLWD CNTR: CPT

## 2018-09-29 PROCEDURE — 36415 COLL VENOUS BLD VENIPUNCTURE: CPT

## 2018-09-29 PROCEDURE — 2580000003 HC RX 258: Performed by: INTERNAL MEDICINE

## 2018-09-29 PROCEDURE — 6370000000 HC RX 637 (ALT 250 FOR IP): Performed by: INTERNAL MEDICINE

## 2018-09-29 PROCEDURE — A9577 INJ MULTIHANCE: HCPCS | Performed by: RADIOLOGY

## 2018-09-29 PROCEDURE — 1200000000 HC SEMI PRIVATE

## 2018-09-29 PROCEDURE — 84145 PROCALCITONIN (PCT): CPT

## 2018-09-29 PROCEDURE — 6360000004 HC RX CONTRAST MEDICATION: Performed by: RADIOLOGY

## 2018-09-29 PROCEDURE — 82962 GLUCOSE BLOOD TEST: CPT

## 2018-09-29 PROCEDURE — 87450 HC DIRECT STREP B ANTIGEN: CPT

## 2018-09-29 PROCEDURE — 6360000002 HC RX W HCPCS: Performed by: INTERNAL MEDICINE

## 2018-09-29 PROCEDURE — 82044 UR ALBUMIN SEMIQUANTITATIVE: CPT

## 2018-09-29 PROCEDURE — 80048 BASIC METABOLIC PNL TOTAL CA: CPT

## 2018-09-29 PROCEDURE — 85025 COMPLETE CBC W/AUTO DIFF WBC: CPT

## 2018-09-29 PROCEDURE — 82010 KETONE BODYS QUAN: CPT

## 2018-09-29 PROCEDURE — 82570 ASSAY OF URINE CREATININE: CPT

## 2018-09-29 PROCEDURE — 83605 ASSAY OF LACTIC ACID: CPT

## 2018-09-29 RX ORDER — HYDRALAZINE HYDROCHLORIDE 20 MG/ML
10 INJECTION INTRAMUSCULAR; INTRAVENOUS
Status: DISCONTINUED | OUTPATIENT
Start: 2018-09-29 | End: 2018-09-30 | Stop reason: HOSPADM

## 2018-09-29 RX ADMIN — DULOXETINE HYDROCHLORIDE 30 MG: 30 CAPSULE, DELAYED RELEASE ORAL at 21:52

## 2018-09-29 RX ADMIN — CEFTRIAXONE SODIUM 1 G: 1 INJECTION, POWDER, FOR SOLUTION INTRAMUSCULAR; INTRAVENOUS at 14:35

## 2018-09-29 RX ADMIN — SIMVASTATIN 80 MG: 40 TABLET, FILM COATED ORAL at 21:52

## 2018-09-29 RX ADMIN — ENOXAPARIN SODIUM 40 MG: 40 INJECTION SUBCUTANEOUS at 08:37

## 2018-09-29 RX ADMIN — INSULIN LISPRO 4 UNITS: 100 INJECTION, SOLUTION INTRAVENOUS; SUBCUTANEOUS at 11:47

## 2018-09-29 RX ADMIN — GADOBENATE DIMEGLUMINE 18 ML: 529 INJECTION, SOLUTION INTRAVENOUS at 11:29

## 2018-09-29 RX ADMIN — INSULIN LISPRO 4 UNITS: 100 INJECTION, SOLUTION INTRAVENOUS; SUBCUTANEOUS at 08:37

## 2018-09-29 RX ADMIN — PANTOPRAZOLE SODIUM 40 MG: 40 TABLET, DELAYED RELEASE ORAL at 06:28

## 2018-09-29 RX ADMIN — INSULIN HUMAN 35 UNITS: 100 INJECTION, SUSPENSION SUBCUTANEOUS at 08:39

## 2018-09-29 RX ADMIN — DULOXETINE HYDROCHLORIDE 30 MG: 30 CAPSULE, DELAYED RELEASE ORAL at 08:37

## 2018-09-29 ASSESSMENT — PAIN SCALES - GENERAL
PAINLEVEL_OUTOF10: 0

## 2018-09-29 NOTE — PROGRESS NOTES
[facility]   Department of Internal Medicine   Internal Medicine Residency  MICU Progress Note    Patient:  Trent Nichole 63 y.o. male   MRN: [id number]       Date of Service: 2018    Allergy: Patient has no known allergies. Subjective     Patient was seen and examined in AM. Patient states feels better. Denies fever, CP, SOB, chills, and abd pain, N/V. No complaints. 24 hour change: Stable overnight. No acute overnight issues reported. Objective     TEMPERATURE:  Current - Temp: 98.1 °F (36.7 °C); Max - Temp  Av °F (36.7 °C)  Min: 97.8 °F (36.6 °C)  Max: 98.2 °F (36.8 °C)  RESPIRATIONS RANGE: Resp  Av.3  Min: 12  Max: 18  PULSE RANGE: Pulse  Av.1  Min: 81  Max: 98  BLOOD PRESSURE RANGE:  Systolic (02FIR), NLK:491 , Min:124 , XRJ:383   ; Diastolic (24JTP), XG, Min:75, Max:105    PULSE OXIMETRY RANGE: SpO2  Av.3 %  Min: 96 %  Max: 97 %    I & O - 24hr:    Intake/Output Summary (Last 24 hours) at 18 1352  Last data filed at 18 0600   Gross per 24 hour   Intake              380 ml   Output              925 ml   Net             -545 ml     I/O last 3 completed shifts: In: 2382 [P.O.:1080; I.V.:1302]  Out: 1825 [Urine:1825] No intake/output data recorded. Weight change: 7 lb 12 oz (3.517 kg)    Physical Exam:  General Appearance:    Alert, cooperative, no acute distress. HEENT:    NC/AT, mucous membranes are moist   Neck:   Supple, no jugular venous distention. Resp:     CTAB, No wheezes, No rhonchi, no use of accessory muscles   Heart:    RRR, S1 and S2 normal, no murmur, rub or gallop.     Abdomen:     Soft, non-tender, non-distended with normal bowel sounds   Extremities:   Atraumatic, no cyanosis or edema   Pulses:   Radial and pedal pulses are intact bilaterally   Neurologic:   AAOx3, No focal motor deficit       Medications     Continuous Infusions:   dextrose       Scheduled Meds:   cefTRIAXone (ROCEPHIN) IV  1 g Intravenous Q24H    insulin NPH  17 Units Subcutaneous Nightly    [START ON 9/30/2018] insulin NPH  37 Units Subcutaneous QAM    enoxaparin  40 mg Subcutaneous Daily    DULoxetine  30 mg Oral BID    simvastatin  80 mg Oral Nightly    pantoprazole  40 mg Oral QAM AC    insulin lispro  0-12 Units Subcutaneous TID WC    insulin lispro  0-6 Units Subcutaneous Nightly     PRN Meds: hydrALAZINE, dextrose, glucose, glucagon (rDNA), dextrose  Nutrition:   Carb diet    Labs and Imaging Studies     CBC:   Recent Labs      09/27/18   1830  09/29/18   0515   WBC  18.6*  15.2*   HGB  17.6*  15.4   HCT  48.8  42.9   MCV  74.1*  74.5*   PLT  258  194       BMP:    Recent Labs      09/28/18   0900  09/28/18   1200  09/29/18   0515   NA  138  134  136   K  4.4  4.4  4.3   CL  104  98  96*   CO2  22  21*  21*   BUN  31*  26*  18   CREATININE  1.4*  1.3*  1.1   GLUCOSE  230*  210*  215*       LIVER PROFILE:   Recent Labs      09/27/18   1830   AST  13   ALT  24   LIPASE  66*   BILITOT  1.8*   ALKPHOS  101       Cardiac Enzymes:    Lab Results   Component Value Date    TROPONINI <0.01 09/27/2018       Notable Cultures:      Blood cultures   Blood Culture, Routine   Date Value Ref Range Status   09/27/2018 (A)  Preliminary    Gram stain performed from blood culture bottle media  Gram positive cocci in pairs       Respiratory cultures No results found for: RESPCULTURE No results found for: LABGRAM  Urine No results found for: LABURIN  Legionella No results found for: LABLEGI  C Diff PCR No results found for: CDIFPCR  Wound culture/abscess: No results for input(s): WNDABS in the last 72 hours. Tip culture:No results for input(s): CXCATHTIP in the last 72 hours. Antibiotic  Days  Day started   rocephin  9/29                           Oxygen:       Additional Respiratory  Assessments  Pulse: 90  Resp: 13  SpO2: 96 %     Nasal cannula L/min     Face mask %     Reservoirs mask %       ABG   Vent Settings     PH   Mode      PCO2   TV      PO2   RR      HCO3

## 2018-09-29 NOTE — PROGRESS NOTES
equal and round. No injections noted. Neck: Supple. No lesions, scars or masses. Trachea midline. Respiratory:  Normal respiratory effort. Clear to auscultation, bilaterally without Rales/Wheezes/Rhonchi. Cardiovascular: Regular rate and rhythm with normal S1/S2 without murmurs, rubs or gallops. Abdomen: Soft, non-tender, non-distended with normal bowel sounds. Musculoskeletal: No clubbing, cyanosis or edema bilaterally. Brisk capillary refill. 2+ lower extremity pulses (dorsalis pedis). Skin:  No rashes    Neurologic: awake, alert and following commands       Labs:   Recent Labs      09/27/18   1830  09/29/18   0515   WBC  18.6*  15.2*   HGB  17.6*  15.4   HCT  48.8  42.9   PLT  258  194     Recent Labs      09/27/18   1945   09/28/18   0530  09/28/18   0900  09/28/18   1200  09/29/18   0515   NA   --    < >   --   138  134  136   K   --    < >   --   4.4  4.4  4.3   CL   --    < >   --   104  98  96*   CO2   --    < >   --   22  21*  21*   BUN   --    < >   --   31*  26*  18   CREATININE   --    < >   --   1.4*  1.3*  1.1   CALCIUM   --    < >   --   8.7  8.2*  8.6   PHOS  4.9*   --   4.9*  2.8   --    --     < > = values in this interval not displayed. Recent Labs      09/27/18   1830   AST  13   ALT  24   BILITOT  1.8*   ALKPHOS  101     No results for input(s): INR in the last 72 hours. Recent Labs      09/27/18   1915   TROPONINI  <0.01       Imaging:  MRI ABDOMEN W WO CONTRAST   Final Result   Mass in the right adrenal gland, which does not meet the   criteria for an adenoma indeterminate for malignancy    ALERT:  THIS IS AN ABNORMAL REPORT               CT ABDOMEN PELVIS W IV CONTRAST Additional Contrast? None   Final Result   1. Hiatal hernia. There is unequivocal circumferential thickening of the wall    of the lower esophagus. Reflux esophagitis? 2. There are multiple small bilateral renal cysts. No obstructive uropathy.      The bladder is distended but otherwise normal.   3.  5.9 cm

## 2018-09-29 NOTE — PROGRESS NOTES
09/29/2018       Lab Results   Component Value Date    PSA 0.86 09/28/2018       No results for input(s): LABURIN in the last 72 hours. No results for input(s): BC in the last 72 hours. Recent Labs      09/27/18   2346   BLOODCULT2  24 Hours- no growth       PHYSICAL EXAMINATION:  Skin dry, without rashes  Respirations non-labored, intact  Abdomen soft, non-tender, non-distended  Alert and oriented x 3      ASSESSMENT AND PLAN:  1. 5.5 cm right adrenal lesion. MRI ordered per Dr. Kianna Mims and is to be performed today. Will discuss results with Leno Azevedo tomorrow. This lesion will need to be surgically removed during another admission in the future. Patient will need to be more healthy from a medical / endocrine standpoint to undergo major surgery. Continue ICU care.       Anette Brunner M.D.  9/29/2018  7:01 AM

## 2018-09-30 VITALS
HEART RATE: 82 BPM | TEMPERATURE: 98.2 F | OXYGEN SATURATION: 97 % | HEIGHT: 69 IN | DIASTOLIC BLOOD PRESSURE: 101 MMHG | BODY MASS INDEX: 29.29 KG/M2 | WEIGHT: 197.75 LBS | SYSTOLIC BLOOD PRESSURE: 157 MMHG | RESPIRATION RATE: 23 BRPM

## 2018-09-30 LAB
ANION GAP SERPL CALCULATED.3IONS-SCNC: 14 MMOL/L (ref 7–16)
BASOPHILS ABSOLUTE: 0.05 E9/L (ref 0–0.2)
BASOPHILS RELATIVE PERCENT: 0.5 % (ref 0–2)
BUN BLDV-MCNC: 16 MG/DL (ref 8–23)
CALCIUM SERPL-MCNC: 8.6 MG/DL (ref 8.6–10.2)
CHLORIDE BLD-SCNC: 96 MMOL/L (ref 98–107)
CO2: 22 MMOL/L (ref 22–29)
CREAT SERPL-MCNC: 1.1 MG/DL (ref 0.7–1.2)
EOSINOPHILS ABSOLUTE: 0.31 E9/L (ref 0.05–0.5)
EOSINOPHILS RELATIVE PERCENT: 2.9 % (ref 0–6)
GFR AFRICAN AMERICAN: >60
GFR NON-AFRICAN AMERICAN: >60 ML/MIN/1.73
GLUCOSE BLD-MCNC: 140 MG/DL (ref 74–109)
HCT VFR BLD CALC: 41.4 % (ref 37–54)
HEMOGLOBIN: 14.9 G/DL (ref 12.5–16.5)
IMMATURE GRANULOCYTES #: 0.07 E9/L
IMMATURE GRANULOCYTES %: 0.7 % (ref 0–5)
LYMPHOCYTES ABSOLUTE: 2.37 E9/L (ref 1.5–4)
LYMPHOCYTES RELATIVE PERCENT: 22.5 % (ref 20–42)
MCH RBC QN AUTO: 26.6 PG (ref 26–35)
MCHC RBC AUTO-ENTMCNC: 36 % (ref 32–34.5)
MCV RBC AUTO: 73.8 FL (ref 80–99.9)
METER GLUCOSE: 168 MG/DL (ref 70–110)
METER GLUCOSE: 187 MG/DL (ref 70–110)
MONOCYTES ABSOLUTE: 0.7 E9/L (ref 0.1–0.95)
MONOCYTES RELATIVE PERCENT: 6.7 % (ref 2–12)
NEUTROPHILS ABSOLUTE: 7.01 E9/L (ref 1.8–7.3)
NEUTROPHILS RELATIVE PERCENT: 66.7 % (ref 43–80)
PDW BLD-RTO: 12.7 FL (ref 11.5–15)
PLATELET # BLD: 187 E9/L (ref 130–450)
PMV BLD AUTO: 9.9 FL (ref 7–12)
POTASSIUM SERPL-SCNC: 4 MMOL/L (ref 3.5–5)
RBC # BLD: 5.61 E12/L (ref 3.8–5.8)
SODIUM BLD-SCNC: 132 MMOL/L (ref 132–146)
STREP PNEUMONIAE ANTIGEN, URINE: NORMAL
WBC # BLD: 10.5 E9/L (ref 4.5–11.5)

## 2018-09-30 PROCEDURE — 85025 COMPLETE CBC W/AUTO DIFF WBC: CPT

## 2018-09-30 PROCEDURE — 36415 COLL VENOUS BLD VENIPUNCTURE: CPT

## 2018-09-30 PROCEDURE — 6360000002 HC RX W HCPCS: Performed by: INTERNAL MEDICINE

## 2018-09-30 PROCEDURE — 80048 BASIC METABOLIC PNL TOTAL CA: CPT

## 2018-09-30 PROCEDURE — 87040 BLOOD CULTURE FOR BACTERIA: CPT

## 2018-09-30 PROCEDURE — 6370000000 HC RX 637 (ALT 250 FOR IP): Performed by: INTERNAL MEDICINE

## 2018-09-30 PROCEDURE — 82962 GLUCOSE BLOOD TEST: CPT

## 2018-09-30 RX ORDER — PANTOPRAZOLE SODIUM 40 MG/1
40 TABLET, DELAYED RELEASE ORAL
Qty: 30 TABLET | Refills: 0 | Status: ON HOLD | OUTPATIENT
Start: 2018-10-01 | End: 2019-03-15

## 2018-09-30 RX ADMIN — INSULIN LISPRO 1 UNITS: 100 INJECTION, SOLUTION INTRAVENOUS; SUBCUTANEOUS at 09:38

## 2018-09-30 RX ADMIN — INSULIN LISPRO 1 UNITS: 100 INJECTION, SOLUTION INTRAVENOUS; SUBCUTANEOUS at 12:37

## 2018-09-30 RX ADMIN — INSULIN HUMAN 37 UNITS: 100 INJECTION, SUSPENSION SUBCUTANEOUS at 09:10

## 2018-09-30 RX ADMIN — ENOXAPARIN SODIUM 40 MG: 40 INJECTION SUBCUTANEOUS at 09:10

## 2018-09-30 RX ADMIN — PANTOPRAZOLE SODIUM 40 MG: 40 TABLET, DELAYED RELEASE ORAL at 06:37

## 2018-09-30 RX ADMIN — DULOXETINE HYDROCHLORIDE 30 MG: 30 CAPSULE, DELAYED RELEASE ORAL at 09:09

## 2018-09-30 ASSESSMENT — PAIN SCALES - GENERAL
PAINLEVEL_OUTOF10: 0

## 2018-09-30 NOTE — PLAN OF CARE
Problem: Falls - Risk of:  Goal: Will remain free from falls  Will remain free from falls   Outcome: Met This Shift      Problem: Serum Glucose Level - Abnormal:  Goal: Ability to maintain appropriate glucose levels will improve  Ability to maintain appropriate glucose levels will improve   Outcome: Met This Shift      Problem: Sensory Perception - Impaired:  Goal: Ability to maintain a stable neurologic state will improve  Ability to maintain a stable neurologic state will improve   Outcome: Met This Shift

## 2018-09-30 NOTE — DISCHARGE SUMMARY
(CYMBALTA) 30 MG extended release capsule Take 1 capsule by mouth 2 times daily, Disp-60 capsule, R-0      traZODone (DESYREL) 50 MG tablet Take 1 tablet by mouth nightly, Disp-30 tablet, R-0      !! insulin NPH (HUMULIN N;NOVOLIN N) 100 UNIT/ML injection vial Inject 15 Units into the skin nightly, Disp-1 vial, R-3      Omega-3 Fatty Acids (FISH OIL) 1000 MG CAPS Take 2,000 mg by mouth nightly      clotrimazole (LOTRIMIN) 1 % external solution Apply topically 2 times daily Apply topically 2 times daily. , Topical, 2 TIMES DAILY, Until Discontinued, Historical Med      Dextrose, Diabetic Use, (GLUCOSE) 1 G CHEW Take 4 g by mouth as needed (Take one For low blood sugar recheck blood sugar in 15 min take another one if blood sugar is still low)      lisinopril (PRINIVIL;ZESTRIL) 20 MG tablet Take 20 mg by mouth 2 times daily      Multiple Vitamins-Minerals (THERAPEUTIC MULTIVITAMIN-MINERALS) tablet Take 1 tablet by mouth daily      simvastatin (ZOCOR) 80 MG tablet Take 80 mg by mouth nightly       !! - Potential duplicate medications found. Please discuss with provider. Time Spent on discharge is more than 31 min in the examination, evaluation, counseling and review of medications and discharge plan. Signed:    Сергей Ferrell DO   9/30/2018      Thank you Bridgett Sanderson MD for the opportunity to be involved in this patient's care. If you have any questions or concerns please feel free to contact me. NOTE: This report was transcribed using voice recognition software. Every effort was made to ensure accuracy; however, inadvertent computerized transcription errors may be present.

## 2018-09-30 NOTE — PROGRESS NOTES
PEEP      FIO2   FIO2        P/F          Lines:  Site  Day  Date inserted     TLC              PICC              Arterial line              Peripheral line              HD cath                 Imaging Studies:    CT Abd  1.  Hiatal hernia.  There is unequivocal circumferential thickening of the wall    of the lower esophagus.  Reflux esophagitis?    2. Gaudencio Venu are multiple small bilateral renal cysts.  No obstructive uropathy.     The bladder is distended but otherwise normal.   3.  5.9 cm maximal diameter variably enhancing right suprarenal mass.  No    normal right adrenal tissue is visible and is therefore assumed that this    lesion is of adrenal margin.  It indents the upper IVC and is inseparable from    the caval wall on 4 contiguous axial images but the mass most likely does not    originate from the caval wall.  Any adrenal mass over 4 cm in diameter is    generally considered suspicious and the morphology of this lesion is clearly    suspicious.  The left adrenal is normal.   4.  Retroperitoneal lipomatosis.       This report has been electronically signed by Samantha De La Fuente MD.           Resident's Assessment and Plan      Lucio Avila is a 63 y.o. male with PMHx of T2DM, HTN, MDD, subdural hematoma presented to ED due to abdominal pain and high blood sugar levels.     Diabetic ketoacidosis, severe hyperglycemia, translocational hyponatremia and elevated anion gap metabolic acidosis in the setting of insulin-dependent type 2 diabetes mellitus   2/2 non-compliance  Blood glucose 834 on presentation  Na 122 corrected 134, K 5.7, CO2 21 on presentation  HbA1c 13.6  Anion gap 23 on presentation, AG down to 14  on carb diet  Consult diabetic educator  Hypoglycemia protocol  Back on home dose insulin regimen, 37 NPH AM, 15 NPH nightly, MD S/S  -200, monitor     CLEMENTE, resolving  2/2 to Dehydration, poor oral intake   Baseline crea 1.0  Crea on admission 1.1  Good UOP   Hold lisinopril  FeNa 0.7%

## 2018-10-01 LAB
ADRENOCORTICOTROPIC HORMONE: 8 PG/ML (ref 7–69)
ALDOSTERONE: 3.7 NG/DL

## 2018-10-02 LAB
CATE U INT: NORMAL
CREATININE 24 HOUR URINE: NORMAL MG/D (ref 800–2100)
CREATININE URINE: 68 MG/DL
DOPAMINE (G CRT): 69 UG/G CRT (ref 0–250)
DOPAMINE 24 HOUR URINE: NORMAL UG/D (ref 77–324)
DOPAMINE, (UG/L): 47 UG/L
EPINEPHRINE (G CRT): 6 UG/G CRT (ref 0–20)
EPINEPHRINE 24 HOUR URINE: NORMAL UG/D (ref 1–7)
EPINEPHRINE, (UG/L): 4 UG/L
HOURS COLLECTED: NORMAL HR
METANEPHRINE INTREP URINE: NORMAL
METANEPHRINE UG/G CRE: 115 UG/G CRT (ref 0–300)
METANEPHRINE, UR-PER VOL: 78 UG/L
METANEPHRINES URINE: NORMAL UG/D (ref 62–207)
NOREPINEPH (G CRT): 24 UG/G CRT (ref 0–45)
NOREPINEPHRINE 24 HOUR URINE: NORMAL UG/D (ref 16–71)
NOREPINEPHRINE, (UG/L): 16 UG/L
NORMETANEPHRINE 24 HOUR URINE: NORMAL UG/D (ref 125–510)
NORMETANEPHRINE, (G/CRT): 244 UG/G CRT (ref 0–400)
NORMETANEPHRINES, NMOL/L: 166 UG/L
RENIN ACTIVITY: 12 NG/ML/HR
URINE TOTAL VOLUME: NORMAL ML

## 2018-10-03 LAB
BLOOD CULTURE, ROUTINE: ABNORMAL
BLOOD CULTURE, ROUTINE: ABNORMAL
CATECHOLAMINE INTERPRETATION, PLASMA: NORMAL
CULTURE, BLOOD 2: NORMAL
DOPAMINE PLASMA: <20 PG/ML (ref 0–20)
EPINEPHRINE PLASMA: 63 PG/ML (ref 10–200)
METANEPH/PLASMA INTERP: NORMAL
METANEPHRINE FREE PLASMA: 0.23 NMOL/L (ref 0–0.49)
NOREPINEPHRINE: 378 PG/ML (ref 80–520)
NORMETANEPHRINE FREE PLASMA: 0.57 NMOL/L (ref 0–0.89)
ORGANISM: ABNORMAL

## 2018-10-05 LAB — BLOOD CULTURE, ROUTINE: NORMAL

## 2018-12-30 ENCOUNTER — APPOINTMENT (OUTPATIENT)
Dept: CT IMAGING | Age: 63
End: 2018-12-30
Payer: MEDICARE

## 2018-12-30 ENCOUNTER — HOSPITAL ENCOUNTER (EMERGENCY)
Age: 63
Discharge: HOME | End: 2018-12-30
Attending: EMERGENCY MEDICINE
Payer: MEDICARE

## 2018-12-30 VITALS
BODY MASS INDEX: 29.62 KG/M2 | HEART RATE: 93 BPM | TEMPERATURE: 97.1 F | DIASTOLIC BLOOD PRESSURE: 101 MMHG | WEIGHT: 200 LBS | SYSTOLIC BLOOD PRESSURE: 127 MMHG | RESPIRATION RATE: 18 BRPM | HEIGHT: 69 IN | OXYGEN SATURATION: 96 %

## 2018-12-30 DIAGNOSIS — R73.9 HYPERGLYCEMIA: Primary | ICD-10-CM

## 2018-12-30 DIAGNOSIS — K42.9 UMBILICAL HERNIA WITHOUT OBSTRUCTION AND WITHOUT GANGRENE: ICD-10-CM

## 2018-12-30 DIAGNOSIS — F32.A DEPRESSION, UNSPECIFIED DEPRESSION TYPE: ICD-10-CM

## 2018-12-30 LAB
ACETAMINOPHEN LEVEL: <5 MCG/ML (ref 10–30)
ALBUMIN SERPL-MCNC: 4.2 G/DL (ref 3.5–5.2)
ALP BLD-CCNC: 77 U/L (ref 40–129)
ALT SERPL-CCNC: 13 U/L (ref 0–40)
AMPHETAMINE SCREEN, URINE: NOT DETECTED
ANION GAP SERPL CALCULATED.3IONS-SCNC: 13 MMOL/L (ref 7–16)
AST SERPL-CCNC: 10 U/L (ref 0–39)
BACTERIA: NORMAL /HPF
BARBITURATE SCREEN URINE: NOT DETECTED
BASOPHILS ABSOLUTE: 0.04 E9/L (ref 0–0.2)
BASOPHILS RELATIVE PERCENT: 0.4 % (ref 0–2)
BENZODIAZEPINE SCREEN, URINE: NOT DETECTED
BILIRUB SERPL-MCNC: 1.9 MG/DL (ref 0–1.2)
BILIRUBIN URINE: NEGATIVE
BLOOD, URINE: ABNORMAL
BUN BLDV-MCNC: 16 MG/DL (ref 8–23)
CALCIUM SERPL-MCNC: 9.4 MG/DL (ref 8.6–10.2)
CANNABINOID SCREEN URINE: NOT DETECTED
CHLORIDE BLD-SCNC: 93 MMOL/L (ref 98–107)
CLARITY: CLEAR
CO2: 26 MMOL/L (ref 22–29)
COCAINE METABOLITE SCREEN URINE: NOT DETECTED
COLOR: YELLOW
CREAT SERPL-MCNC: 1.4 MG/DL (ref 0.7–1.2)
EOSINOPHILS ABSOLUTE: 0.22 E9/L (ref 0.05–0.5)
EOSINOPHILS RELATIVE PERCENT: 2.3 % (ref 0–6)
ETHANOL: <10 MG/DL (ref 0–0.08)
GFR AFRICAN AMERICAN: >60
GFR NON-AFRICAN AMERICAN: 51 ML/MIN/1.73
GLUCOSE BLD-MCNC: 344 MG/DL (ref 74–99)
GLUCOSE URINE: >=1000 MG/DL
HCT VFR BLD CALC: 45.2 % (ref 37–54)
HEMOGLOBIN: 15.9 G/DL (ref 12.5–16.5)
IMMATURE GRANULOCYTES #: 0.02 E9/L
IMMATURE GRANULOCYTES %: 0.2 % (ref 0–5)
KETONES, URINE: NEGATIVE MG/DL
LACTIC ACID: 1.3 MMOL/L (ref 0.5–2.2)
LEUKOCYTE ESTERASE, URINE: NEGATIVE
LYMPHOCYTES ABSOLUTE: 2.42 E9/L (ref 1.5–4)
LYMPHOCYTES RELATIVE PERCENT: 25.6 % (ref 20–42)
MCH RBC QN AUTO: 26.6 PG (ref 26–35)
MCHC RBC AUTO-ENTMCNC: 35.2 % (ref 32–34.5)
MCV RBC AUTO: 75.7 FL (ref 80–99.9)
METHADONE SCREEN, URINE: NOT DETECTED
MONOCYTES ABSOLUTE: 0.63 E9/L (ref 0.1–0.95)
MONOCYTES RELATIVE PERCENT: 6.7 % (ref 2–12)
NEUTROPHILS ABSOLUTE: 6.14 E9/L (ref 1.8–7.3)
NEUTROPHILS RELATIVE PERCENT: 64.8 % (ref 43–80)
NITRITE, URINE: NEGATIVE
OPIATE SCREEN URINE: NOT DETECTED
PDW BLD-RTO: 12.4 FL (ref 11.5–15)
PH UA: 6 (ref 5–9)
PHENCYCLIDINE SCREEN URINE: NOT DETECTED
PLATELET # BLD: 279 E9/L (ref 130–450)
PMV BLD AUTO: 10.5 FL (ref 7–12)
POTASSIUM SERPL-SCNC: 4.3 MMOL/L (ref 3.5–5)
PROPOXYPHENE SCREEN: NOT DETECTED
PROTEIN UA: 100 MG/DL
RBC # BLD: 5.97 E12/L (ref 3.8–5.8)
RBC UA: NORMAL /HPF (ref 0–2)
SALICYLATE, SERUM: <0.3 MG/DL (ref 0–30)
SODIUM BLD-SCNC: 132 MMOL/L (ref 132–146)
SPECIFIC GRAVITY UA: 1.02 (ref 1–1.03)
TOTAL PROTEIN: 6.9 G/DL (ref 6.4–8.3)
TRICYCLIC ANTIDEPRESSANTS SCREEN SERUM: NEGATIVE NG/ML
UROBILINOGEN, URINE: 0.2 E.U./DL
WBC # BLD: 9.5 E9/L (ref 4.5–11.5)
WBC UA: NORMAL /HPF (ref 0–5)

## 2018-12-30 PROCEDURE — 80053 COMPREHEN METABOLIC PANEL: CPT

## 2018-12-30 PROCEDURE — 96374 THER/PROPH/DIAG INJ IV PUSH: CPT

## 2018-12-30 PROCEDURE — 36415 COLL VENOUS BLD VENIPUNCTURE: CPT

## 2018-12-30 PROCEDURE — 96375 TX/PRO/DX INJ NEW DRUG ADDON: CPT

## 2018-12-30 PROCEDURE — C9113 INJ PANTOPRAZOLE SODIUM, VIA: HCPCS | Performed by: EMERGENCY MEDICINE

## 2018-12-30 PROCEDURE — 6360000002 HC RX W HCPCS: Performed by: EMERGENCY MEDICINE

## 2018-12-30 PROCEDURE — 6370000000 HC RX 637 (ALT 250 FOR IP): Performed by: EMERGENCY MEDICINE

## 2018-12-30 PROCEDURE — 80307 DRUG TEST PRSMV CHEM ANLYZR: CPT

## 2018-12-30 PROCEDURE — G0480 DRUG TEST DEF 1-7 CLASSES: HCPCS

## 2018-12-30 PROCEDURE — 99285 EMERGENCY DEPT VISIT HI MDM: CPT

## 2018-12-30 PROCEDURE — 74176 CT ABD & PELVIS W/O CONTRAST: CPT

## 2018-12-30 PROCEDURE — 83605 ASSAY OF LACTIC ACID: CPT

## 2018-12-30 PROCEDURE — 85025 COMPLETE CBC W/AUTO DIFF WBC: CPT

## 2018-12-30 PROCEDURE — 81001 URINALYSIS AUTO W/SCOPE: CPT

## 2018-12-30 PROCEDURE — 2580000003 HC RX 258: Performed by: EMERGENCY MEDICINE

## 2018-12-30 RX ORDER — ONDANSETRON 2 MG/ML
4 INJECTION INTRAMUSCULAR; INTRAVENOUS ONCE
Status: COMPLETED | OUTPATIENT
Start: 2018-12-30 | End: 2018-12-30

## 2018-12-30 RX ORDER — CHLORPROMAZINE HYDROCHLORIDE 25 MG/1
25 TABLET, FILM COATED ORAL ONCE
Status: DISCONTINUED | OUTPATIENT
Start: 2018-12-30 | End: 2018-12-31 | Stop reason: HOSPADM

## 2018-12-30 RX ORDER — 0.9 % SODIUM CHLORIDE 0.9 %
1000 INTRAVENOUS SOLUTION INTRAVENOUS ONCE
Status: COMPLETED | OUTPATIENT
Start: 2018-12-30 | End: 2018-12-30

## 2018-12-30 RX ORDER — PANTOPRAZOLE SODIUM 40 MG/10ML
40 INJECTION, POWDER, LYOPHILIZED, FOR SOLUTION INTRAVENOUS ONCE
Status: COMPLETED | OUTPATIENT
Start: 2018-12-30 | End: 2018-12-30

## 2018-12-30 RX ADMIN — PANTOPRAZOLE SODIUM 40 MG: 40 INJECTION, POWDER, FOR SOLUTION INTRAVENOUS at 17:34

## 2018-12-30 RX ADMIN — ONDANSETRON 4 MG: 2 INJECTION INTRAMUSCULAR; INTRAVENOUS at 17:36

## 2018-12-30 RX ADMIN — LIDOCAINE HYDROCHLORIDE: 20 SOLUTION ORAL; TOPICAL at 17:37

## 2018-12-30 RX ADMIN — SODIUM CHLORIDE 1000 ML: 9 INJECTION, SOLUTION INTRAVENOUS at 17:33

## 2018-12-30 ASSESSMENT — PAIN DESCRIPTION - LOCATION: LOCATION: ABDOMEN

## 2018-12-30 ASSESSMENT — PAIN SCALES - GENERAL: PAINLEVEL_OUTOF10: 5

## 2018-12-30 ASSESSMENT — PATIENT HEALTH QUESTIONNAIRE - PHQ9: SUM OF ALL RESPONSES TO PHQ QUESTIONS 1-9: 7

## 2018-12-30 ASSESSMENT — PAIN DESCRIPTION - PAIN TYPE: TYPE: ACUTE PAIN

## 2019-01-04 ENCOUNTER — HOSPITAL ENCOUNTER (EMERGENCY)
Age: 64
Discharge: ANOTHER HOSPITAL | End: 2019-01-05
Attending: EMERGENCY MEDICINE
Payer: MEDICARE

## 2019-01-04 DIAGNOSIS — R45.851 SUICIDAL IDEATION: ICD-10-CM

## 2019-01-04 DIAGNOSIS — F32.2 SEVERE DEPRESSION (HCC): Primary | ICD-10-CM

## 2019-01-04 DIAGNOSIS — Z91.14 DRUG NONCOMPLIANCE: ICD-10-CM

## 2019-01-04 LAB
ALBUMIN SERPL-MCNC: 4 G/DL (ref 3.5–5.2)
ALP BLD-CCNC: 68 U/L (ref 40–129)
ALT SERPL-CCNC: 13 U/L (ref 0–40)
ANION GAP SERPL CALCULATED.3IONS-SCNC: 9 MMOL/L (ref 7–16)
AST SERPL-CCNC: 12 U/L (ref 0–39)
BASOPHILS ABSOLUTE: 0.06 E9/L (ref 0–0.2)
BASOPHILS RELATIVE PERCENT: 0.5 % (ref 0–2)
BETA-HYDROXYBUTYRATE: 0.24 MMOL/L (ref 0.02–0.27)
BILIRUB SERPL-MCNC: 1.5 MG/DL (ref 0–1.2)
BUN BLDV-MCNC: 21 MG/DL (ref 8–23)
CALCIUM SERPL-MCNC: 9.3 MG/DL (ref 8.6–10.2)
CHLORIDE BLD-SCNC: 99 MMOL/L (ref 98–107)
CHP ED QC CHECK: NORMAL
CO2: 26 MMOL/L (ref 22–29)
CREAT SERPL-MCNC: 1.2 MG/DL (ref 0.7–1.2)
EOSINOPHILS ABSOLUTE: 0.22 E9/L (ref 0.05–0.5)
EOSINOPHILS RELATIVE PERCENT: 1.9 % (ref 0–6)
GFR AFRICAN AMERICAN: >60
GFR NON-AFRICAN AMERICAN: >60 ML/MIN/1.73
GLUCOSE BLD-MCNC: 262 MG/DL (ref 74–99)
GLUCOSE BLD-MCNC: 287 MG/DL
HCT VFR BLD CALC: 41.3 % (ref 37–54)
HEMOGLOBIN: 14.8 G/DL (ref 12.5–16.5)
IMMATURE GRANULOCYTES #: 0.04 E9/L
IMMATURE GRANULOCYTES %: 0.3 % (ref 0–5)
LIPASE: 67 U/L (ref 13–60)
LYMPHOCYTES ABSOLUTE: 2.66 E9/L (ref 1.5–4)
LYMPHOCYTES RELATIVE PERCENT: 23.1 % (ref 20–42)
MCH RBC QN AUTO: 27.3 PG (ref 26–35)
MCHC RBC AUTO-ENTMCNC: 35.8 % (ref 32–34.5)
MCV RBC AUTO: 76.2 FL (ref 80–99.9)
METER GLUCOSE: 287 MG/DL (ref 74–99)
MONOCYTES ABSOLUTE: 0.74 E9/L (ref 0.1–0.95)
MONOCYTES RELATIVE PERCENT: 6.4 % (ref 2–12)
NEUTROPHILS ABSOLUTE: 7.81 E9/L (ref 1.8–7.3)
NEUTROPHILS RELATIVE PERCENT: 67.8 % (ref 43–80)
PDW BLD-RTO: 12.6 FL (ref 11.5–15)
PH VENOUS: 7.37 (ref 7.3–7.42)
PLATELET # BLD: 208 E9/L (ref 130–450)
PMV BLD AUTO: 11.3 FL (ref 7–12)
POTASSIUM SERPL-SCNC: 4.2 MMOL/L (ref 3.5–5)
RBC # BLD: 5.42 E12/L (ref 3.8–5.8)
SODIUM BLD-SCNC: 134 MMOL/L (ref 132–146)
TOTAL PROTEIN: 6.3 G/DL (ref 6.4–8.3)
WBC # BLD: 11.5 E9/L (ref 4.5–11.5)

## 2019-01-04 PROCEDURE — G0480 DRUG TEST DEF 1-7 CLASSES: HCPCS

## 2019-01-04 PROCEDURE — 99284 EMERGENCY DEPT VISIT MOD MDM: CPT

## 2019-01-04 PROCEDURE — 82962 GLUCOSE BLOOD TEST: CPT

## 2019-01-04 PROCEDURE — 2580000003 HC RX 258: Performed by: EMERGENCY MEDICINE

## 2019-01-04 PROCEDURE — 82800 BLOOD PH: CPT

## 2019-01-04 PROCEDURE — 83690 ASSAY OF LIPASE: CPT

## 2019-01-04 PROCEDURE — 80053 COMPREHEN METABOLIC PANEL: CPT

## 2019-01-04 PROCEDURE — 85025 COMPLETE CBC W/AUTO DIFF WBC: CPT

## 2019-01-04 PROCEDURE — 80307 DRUG TEST PRSMV CHEM ANLYZR: CPT

## 2019-01-04 PROCEDURE — 82010 KETONE BODYS QUAN: CPT

## 2019-01-04 PROCEDURE — 36415 COLL VENOUS BLD VENIPUNCTURE: CPT

## 2019-01-04 RX ORDER — SODIUM CHLORIDE 0.9 % (FLUSH) 0.9 %
10 SYRINGE (ML) INJECTION PRN
Status: DISCONTINUED | OUTPATIENT
Start: 2019-01-04 | End: 2019-01-05 | Stop reason: HOSPADM

## 2019-01-04 RX ORDER — 0.9 % SODIUM CHLORIDE 0.9 %
1000 INTRAVENOUS SOLUTION INTRAVENOUS ONCE
Status: COMPLETED | OUTPATIENT
Start: 2019-01-04 | End: 2019-01-05

## 2019-01-04 RX ADMIN — SODIUM CHLORIDE 1000 ML: 9 INJECTION, SOLUTION INTRAVENOUS at 21:27

## 2019-01-04 ASSESSMENT — ENCOUNTER SYMPTOMS
SHORTNESS OF BREATH: 0
DIARRHEA: 0
BACK PAIN: 0
VOMITING: 0
BLOOD IN STOOL: 0
COUGH: 0
ABDOMINAL PAIN: 0
WHEEZING: 0
CONSTIPATION: 0
NAUSEA: 0

## 2019-01-05 ENCOUNTER — HOSPITAL ENCOUNTER (OUTPATIENT)
Age: 64
Discharge: HOME | End: 2019-01-05
Payer: MEDICARE

## 2019-01-05 ENCOUNTER — HOSPITAL ENCOUNTER (INPATIENT)
Age: 64
LOS: 6 days | Discharge: HOME | DRG: 885 | End: 2019-01-11
Attending: PSYCHIATRY & NEUROLOGY | Admitting: PSYCHIATRY & NEUROLOGY
Payer: MEDICARE

## 2019-01-05 VITALS
HEART RATE: 77 BPM | SYSTOLIC BLOOD PRESSURE: 155 MMHG | OXYGEN SATURATION: 98 % | RESPIRATION RATE: 16 BRPM | HEIGHT: 69 IN | DIASTOLIC BLOOD PRESSURE: 96 MMHG | BODY MASS INDEX: 25.92 KG/M2 | WEIGHT: 175 LBS | TEMPERATURE: 98.2 F

## 2019-01-05 PROBLEM — I10 HTN (HYPERTENSION): Status: ACTIVE | Noted: 2019-01-05

## 2019-01-05 PROBLEM — F33.9 MAJOR DEPRESSION, RECURRENT (HCC): Status: ACTIVE | Noted: 2019-01-05

## 2019-01-05 PROBLEM — Z79.4 TYPE 2 DIABETES MELLITUS WITH COMPLICATION, WITH LONG-TERM CURRENT USE OF INSULIN (HCC): Status: ACTIVE | Noted: 2019-01-05

## 2019-01-05 PROBLEM — E11.8 TYPE 2 DIABETES MELLITUS WITH COMPLICATION, WITH LONG-TERM CURRENT USE OF INSULIN (HCC): Status: ACTIVE | Noted: 2019-01-05

## 2019-01-05 PROBLEM — E78.5 HLD (HYPERLIPIDEMIA): Status: ACTIVE | Noted: 2019-01-05

## 2019-01-05 LAB
ACETAMINOPHEN LEVEL: <5 MCG/ML (ref 10–30)
AMPHETAMINE SCREEN, URINE: NOT DETECTED
BACTERIA: NORMAL /HPF
BARBITURATE SCREEN URINE: NOT DETECTED
BENZODIAZEPINE SCREEN, URINE: NOT DETECTED
BILIRUBIN URINE: NEGATIVE
BLOOD, URINE: ABNORMAL
CANNABINOID SCREEN URINE: NOT DETECTED
CASTS: NORMAL /LPF
CLARITY: CLEAR
COCAINE METABOLITE SCREEN URINE: NOT DETECTED
COLOR: YELLOW
ETHANOL: <10 MG/DL (ref 0–0.08)
GLUCOSE URINE: >=1000 MG/DL
KETONES, URINE: 15 MG/DL
LEUKOCYTE ESTERASE, URINE: NEGATIVE
METER GLUCOSE: 219 MG/DL (ref 74–99)
METER GLUCOSE: 225 MG/DL (ref 74–99)
METER GLUCOSE: 238 MG/DL (ref 74–99)
METER GLUCOSE: 240 MG/DL (ref 74–99)
METHADONE SCREEN, URINE: NOT DETECTED
NITRITE, URINE: NEGATIVE
OPIATE SCREEN URINE: NOT DETECTED
PH UA: 6 (ref 5–9)
PHENCYCLIDINE SCREEN URINE: NOT DETECTED
PROPOXYPHENE SCREEN: NOT DETECTED
PROTEIN UA: 100 MG/DL
RBC UA: NORMAL /HPF (ref 0–2)
SALICYLATE, SERUM: <0.3 MG/DL (ref 0–30)
SPECIFIC GRAVITY UA: 1.02 (ref 1–1.03)
TRICYCLIC ANTIDEPRESSANTS SCREEN SERUM: NEGATIVE NG/ML
UROBILINOGEN, URINE: 0.2 E.U./DL
WBC UA: NORMAL /HPF (ref 0–5)

## 2019-01-05 PROCEDURE — 80307 DRUG TEST PRSMV CHEM ANLYZR: CPT

## 2019-01-05 PROCEDURE — A0428 BLS: HCPCS

## 2019-01-05 PROCEDURE — 6370000000 HC RX 637 (ALT 250 FOR IP): Performed by: CLINICAL NURSE SPECIALIST

## 2019-01-05 PROCEDURE — 99221 1ST HOSP IP/OBS SF/LOW 40: CPT | Performed by: NURSE PRACTITIONER

## 2019-01-05 PROCEDURE — 81001 URINALYSIS AUTO W/SCOPE: CPT

## 2019-01-05 PROCEDURE — 82962 GLUCOSE BLOOD TEST: CPT

## 2019-01-05 PROCEDURE — 6370000000 HC RX 637 (ALT 250 FOR IP): Performed by: NURSE PRACTITIONER

## 2019-01-05 PROCEDURE — A0425 GROUND MILEAGE: HCPCS

## 2019-01-05 PROCEDURE — 1240000000 HC EMOTIONAL WELLNESS R&B

## 2019-01-05 RX ORDER — DOCUSATE SODIUM 100 MG/1
100 CAPSULE, LIQUID FILLED ORAL DAILY
Status: DISCONTINUED | OUTPATIENT
Start: 2019-01-05 | End: 2019-01-11 | Stop reason: HOSPADM

## 2019-01-05 RX ORDER — DULOXETIN HYDROCHLORIDE 30 MG/1
30 CAPSULE, DELAYED RELEASE ORAL 2 TIMES DAILY
Status: DISCONTINUED | OUTPATIENT
Start: 2019-01-05 | End: 2019-01-07

## 2019-01-05 RX ORDER — BENZTROPINE MESYLATE 1 MG/ML
2 INJECTION INTRAMUSCULAR; INTRAVENOUS 2 TIMES DAILY PRN
Status: DISCONTINUED | OUTPATIENT
Start: 2019-01-05 | End: 2019-01-11 | Stop reason: HOSPADM

## 2019-01-05 RX ORDER — TRAZODONE HYDROCHLORIDE 50 MG/1
50 TABLET ORAL NIGHTLY
Status: DISCONTINUED | OUTPATIENT
Start: 2019-01-05 | End: 2019-01-11 | Stop reason: HOSPADM

## 2019-01-05 RX ORDER — DEXTROSE MONOHYDRATE 25 G/50ML
12.5 INJECTION, SOLUTION INTRAVENOUS PRN
Status: DISCONTINUED | OUTPATIENT
Start: 2019-01-05 | End: 2019-01-11 | Stop reason: HOSPADM

## 2019-01-05 RX ORDER — MAGNESIUM HYDROXIDE/ALUMINUM HYDROXICE/SIMETHICONE 120; 1200; 1200 MG/30ML; MG/30ML; MG/30ML
30 SUSPENSION ORAL PRN
Status: DISCONTINUED | OUTPATIENT
Start: 2019-01-05 | End: 2019-01-11 | Stop reason: HOSPADM

## 2019-01-05 RX ORDER — LORAZEPAM 0.5 MG/1
0.5 TABLET ORAL EVERY 8 HOURS PRN
Status: DISCONTINUED | OUTPATIENT
Start: 2019-01-05 | End: 2019-01-11 | Stop reason: HOSPADM

## 2019-01-05 RX ORDER — SIMVASTATIN 40 MG
80 TABLET ORAL NIGHTLY
Status: DISCONTINUED | OUTPATIENT
Start: 2019-01-05 | End: 2019-01-05

## 2019-01-05 RX ORDER — NICOTINE POLACRILEX 4 MG
15 LOZENGE BUCCAL PRN
Status: DISCONTINUED | OUTPATIENT
Start: 2019-01-05 | End: 2019-01-11 | Stop reason: HOSPADM

## 2019-01-05 RX ORDER — HALOPERIDOL 5 MG/ML
2 INJECTION INTRAMUSCULAR EVERY 4 HOURS PRN
Status: DISCONTINUED | OUTPATIENT
Start: 2019-01-05 | End: 2019-01-11 | Stop reason: HOSPADM

## 2019-01-05 RX ORDER — DEXTROSE MONOHYDRATE 50 MG/ML
100 INJECTION, SOLUTION INTRAVENOUS PRN
Status: DISCONTINUED | OUTPATIENT
Start: 2019-01-05 | End: 2019-01-11 | Stop reason: HOSPADM

## 2019-01-05 RX ORDER — ACETAMINOPHEN 325 MG/1
650 TABLET ORAL EVERY 4 HOURS PRN
Status: DISCONTINUED | OUTPATIENT
Start: 2019-01-05 | End: 2019-01-11 | Stop reason: HOSPADM

## 2019-01-05 RX ORDER — HYDROXYZINE PAMOATE 25 MG/1
25 CAPSULE ORAL 3 TIMES DAILY PRN
Status: DISCONTINUED | OUTPATIENT
Start: 2019-01-05 | End: 2019-01-11 | Stop reason: HOSPADM

## 2019-01-05 RX ORDER — SIMVASTATIN 40 MG
40 TABLET ORAL NIGHTLY
Status: DISCONTINUED | OUTPATIENT
Start: 2019-01-05 | End: 2019-01-06

## 2019-01-05 RX ORDER — LISINOPRIL 20 MG/1
20 TABLET ORAL 2 TIMES DAILY
Status: DISCONTINUED | OUTPATIENT
Start: 2019-01-05 | End: 2019-01-11 | Stop reason: HOSPADM

## 2019-01-05 RX ADMIN — INSULIN LISPRO 1 UNITS: 100 INJECTION, SOLUTION INTRAVENOUS; SUBCUTANEOUS at 21:16

## 2019-01-05 RX ADMIN — DULOXETINE HYDROCHLORIDE 30 MG: 30 CAPSULE, DELAYED RELEASE ORAL at 21:19

## 2019-01-05 RX ADMIN — SIMVASTATIN 40 MG: 40 TABLET, FILM COATED ORAL at 21:19

## 2019-01-05 RX ADMIN — INSULIN LISPRO 2 UNITS: 100 INJECTION, SOLUTION INTRAVENOUS; SUBCUTANEOUS at 17:22

## 2019-01-05 RX ADMIN — TRAZODONE HYDROCHLORIDE 50 MG: 50 TABLET ORAL at 21:19

## 2019-01-05 RX ADMIN — INSULIN HUMAN 15 UNITS: 100 INJECTION, SUSPENSION SUBCUTANEOUS at 22:06

## 2019-01-05 RX ADMIN — HYDROXYZINE PAMOATE 25 MG: 25 CAPSULE ORAL at 14:46

## 2019-01-05 RX ADMIN — DOCUSATE SODIUM 100 MG: 100 CAPSULE, LIQUID FILLED ORAL at 14:46

## 2019-01-05 RX ADMIN — LISINOPRIL 20 MG: 20 TABLET ORAL at 21:23

## 2019-01-05 ASSESSMENT — SLEEP AND FATIGUE QUESTIONNAIRES
SLEEP PATTERN: DIFFICULTY FALLING ASLEEP;INSOMNIA
DO YOU HAVE DIFFICULTY SLEEPING: YES
DIFFICULTY FALLING ASLEEP: YES
DIFFICULTY ARISING: NO
AVERAGE NUMBER OF SLEEP HOURS: 2
DO YOU USE A SLEEP AID: NO
RESTFUL SLEEP: NO
DIFFICULTY STAYING ASLEEP: YES

## 2019-01-05 ASSESSMENT — LIFESTYLE VARIABLES: HISTORY_ALCOHOL_USE: NO

## 2019-01-05 ASSESSMENT — PATIENT HEALTH QUESTIONNAIRE - PHQ9: SUM OF ALL RESPONSES TO PHQ QUESTIONS 1-9: 7

## 2019-01-05 ASSESSMENT — PAIN SCALES - GENERAL
PAINLEVEL_OUTOF10: 0
PAINLEVEL_OUTOF10: 0

## 2019-01-06 LAB
CHOLESTEROL, TOTAL: 163 MG/DL (ref 0–199)
HBA1C MFR BLD: 7.8 % (ref 4–5.6)
HDLC SERPL-MCNC: 36 MG/DL
LDL CHOLESTEROL CALCULATED: 110 MG/DL (ref 0–99)
METER GLUCOSE: 133 MG/DL (ref 74–99)
METER GLUCOSE: 146 MG/DL (ref 74–99)
METER GLUCOSE: 150 MG/DL (ref 74–99)
METER GLUCOSE: 217 MG/DL (ref 74–99)
TRIGL SERPL-MCNC: 87 MG/DL (ref 0–149)
VLDLC SERPL CALC-MCNC: 17 MG/DL

## 2019-01-06 PROCEDURE — 82962 GLUCOSE BLOOD TEST: CPT

## 2019-01-06 PROCEDURE — 80061 LIPID PANEL: CPT

## 2019-01-06 PROCEDURE — 36415 COLL VENOUS BLD VENIPUNCTURE: CPT

## 2019-01-06 PROCEDURE — 99231 SBSQ HOSP IP/OBS SF/LOW 25: CPT | Performed by: NURSE PRACTITIONER

## 2019-01-06 PROCEDURE — 1240000000 HC EMOTIONAL WELLNESS R&B

## 2019-01-06 PROCEDURE — 6370000000 HC RX 637 (ALT 250 FOR IP): Performed by: CLINICAL NURSE SPECIALIST

## 2019-01-06 PROCEDURE — 6370000000 HC RX 637 (ALT 250 FOR IP): Performed by: NURSE PRACTITIONER

## 2019-01-06 PROCEDURE — 83036 HEMOGLOBIN GLYCOSYLATED A1C: CPT

## 2019-01-06 RX ORDER — ATORVASTATIN CALCIUM 40 MG/1
40 TABLET, FILM COATED ORAL NIGHTLY
Status: DISCONTINUED | OUTPATIENT
Start: 2019-01-06 | End: 2019-01-11 | Stop reason: HOSPADM

## 2019-01-06 RX ADMIN — DOCUSATE SODIUM 100 MG: 100 CAPSULE, LIQUID FILLED ORAL at 09:44

## 2019-01-06 RX ADMIN — INSULIN HUMAN 37 UNITS: 100 INJECTION, SUSPENSION SUBCUTANEOUS at 09:40

## 2019-01-06 RX ADMIN — LISINOPRIL 20 MG: 20 TABLET ORAL at 09:44

## 2019-01-06 RX ADMIN — ATORVASTATIN CALCIUM 40 MG: 40 TABLET, FILM COATED ORAL at 20:14

## 2019-01-06 RX ADMIN — LISINOPRIL 20 MG: 20 TABLET ORAL at 20:14

## 2019-01-06 RX ADMIN — DULOXETINE HYDROCHLORIDE 30 MG: 30 CAPSULE, DELAYED RELEASE ORAL at 20:14

## 2019-01-06 RX ADMIN — INSULIN LISPRO 1 UNITS: 100 INJECTION, SOLUTION INTRAVENOUS; SUBCUTANEOUS at 09:34

## 2019-01-06 RX ADMIN — DULOXETINE HYDROCHLORIDE 30 MG: 30 CAPSULE, DELAYED RELEASE ORAL at 09:44

## 2019-01-06 RX ADMIN — TRAZODONE HYDROCHLORIDE 50 MG: 50 TABLET ORAL at 20:14

## 2019-01-06 RX ADMIN — INSULIN LISPRO 2 UNITS: 100 INJECTION, SOLUTION INTRAVENOUS; SUBCUTANEOUS at 12:52

## 2019-01-06 ASSESSMENT — SLEEP AND FATIGUE QUESTIONNAIRES
RESTFUL SLEEP: NO
DIFFICULTY ARISING: NO
DO YOU USE A SLEEP AID: YES
SLEEP PATTERN: DIFFICULTY FALLING ASLEEP
DIFFICULTY STAYING ASLEEP: YES

## 2019-01-06 ASSESSMENT — PAIN SCALES - GENERAL: PAINLEVEL_OUTOF10: 0

## 2019-01-07 LAB
METER GLUCOSE: 161 MG/DL (ref 74–99)
METER GLUCOSE: 164 MG/DL (ref 74–99)
METER GLUCOSE: 192 MG/DL (ref 74–99)
METER GLUCOSE: 93 MG/DL (ref 74–99)

## 2019-01-07 PROCEDURE — 82962 GLUCOSE BLOOD TEST: CPT

## 2019-01-07 PROCEDURE — 6370000000 HC RX 637 (ALT 250 FOR IP): Performed by: NURSE PRACTITIONER

## 2019-01-07 PROCEDURE — 97161 PT EVAL LOW COMPLEX 20 MIN: CPT | Performed by: PHYSICAL THERAPIST

## 2019-01-07 PROCEDURE — 6370000000 HC RX 637 (ALT 250 FOR IP): Performed by: CLINICAL NURSE SPECIALIST

## 2019-01-07 PROCEDURE — 97165 OT EVAL LOW COMPLEX 30 MIN: CPT

## 2019-01-07 PROCEDURE — 1240000000 HC EMOTIONAL WELLNESS R&B

## 2019-01-07 PROCEDURE — 99232 SBSQ HOSP IP/OBS MODERATE 35: CPT | Performed by: NURSE PRACTITIONER

## 2019-01-07 RX ORDER — DULOXETIN HYDROCHLORIDE 30 MG/1
30 CAPSULE, DELAYED RELEASE ORAL DAILY
Status: DISCONTINUED | OUTPATIENT
Start: 2019-01-08 | End: 2019-01-08

## 2019-01-07 RX ORDER — VENLAFAXINE 75 MG/1
37.5 TABLET ORAL
Status: DISCONTINUED | OUTPATIENT
Start: 2019-01-07 | End: 2019-01-09

## 2019-01-07 RX ORDER — ARIPIPRAZOLE 2 MG/1
2 TABLET ORAL NIGHTLY
Status: DISCONTINUED | OUTPATIENT
Start: 2019-01-07 | End: 2019-01-08

## 2019-01-07 RX ADMIN — DULOXETINE HYDROCHLORIDE 30 MG: 30 CAPSULE, DELAYED RELEASE ORAL at 10:40

## 2019-01-07 RX ADMIN — ATORVASTATIN CALCIUM 40 MG: 40 TABLET, FILM COATED ORAL at 21:43

## 2019-01-07 RX ADMIN — INSULIN LISPRO 1 UNITS: 100 INJECTION, SOLUTION INTRAVENOUS; SUBCUTANEOUS at 21:41

## 2019-01-07 RX ADMIN — INSULIN LISPRO 1 UNITS: 100 INJECTION, SOLUTION INTRAVENOUS; SUBCUTANEOUS at 17:50

## 2019-01-07 RX ADMIN — INSULIN HUMAN 15 UNITS: 100 INJECTION, SUSPENSION SUBCUTANEOUS at 21:40

## 2019-01-07 RX ADMIN — VENLAFAXINE 37.5 MG: 75 TABLET ORAL at 15:08

## 2019-01-07 RX ADMIN — LISINOPRIL 20 MG: 20 TABLET ORAL at 21:43

## 2019-01-07 RX ADMIN — DOCUSATE SODIUM 100 MG: 100 CAPSULE, LIQUID FILLED ORAL at 10:33

## 2019-01-07 RX ADMIN — TRAZODONE HYDROCHLORIDE 50 MG: 50 TABLET ORAL at 21:44

## 2019-01-07 RX ADMIN — LISINOPRIL 20 MG: 20 TABLET ORAL at 10:34

## 2019-01-07 RX ADMIN — ARIPIPRAZOLE 2 MG: 2 TABLET ORAL at 21:43

## 2019-01-07 RX ADMIN — INSULIN LISPRO 1 UNITS: 100 INJECTION, SOLUTION INTRAVENOUS; SUBCUTANEOUS at 13:08

## 2019-01-07 ASSESSMENT — PAIN SCALES - GENERAL: PAINLEVEL_OUTOF10: 0

## 2019-01-08 LAB
METER GLUCOSE: 113 MG/DL (ref 74–99)
METER GLUCOSE: 171 MG/DL (ref 74–99)
METER GLUCOSE: 190 MG/DL (ref 74–99)
METER GLUCOSE: 91 MG/DL (ref 74–99)

## 2019-01-08 PROCEDURE — 6370000000 HC RX 637 (ALT 250 FOR IP): Performed by: CLINICAL NURSE SPECIALIST

## 2019-01-08 PROCEDURE — 1240000000 HC EMOTIONAL WELLNESS R&B

## 2019-01-08 PROCEDURE — 6370000000 HC RX 637 (ALT 250 FOR IP): Performed by: NURSE PRACTITIONER

## 2019-01-08 PROCEDURE — 82962 GLUCOSE BLOOD TEST: CPT

## 2019-01-08 PROCEDURE — 99232 SBSQ HOSP IP/OBS MODERATE 35: CPT | Performed by: NURSE PRACTITIONER

## 2019-01-08 RX ORDER — ARIPIPRAZOLE 5 MG/1
5 TABLET ORAL NIGHTLY
Status: DISCONTINUED | OUTPATIENT
Start: 2019-01-08 | End: 2019-01-09

## 2019-01-08 RX ADMIN — VENLAFAXINE 37.5 MG: 75 TABLET ORAL at 08:55

## 2019-01-08 RX ADMIN — INSULIN LISPRO 1 UNITS: 100 INJECTION, SOLUTION INTRAVENOUS; SUBCUTANEOUS at 18:14

## 2019-01-08 RX ADMIN — INSULIN HUMAN 37 UNITS: 100 INJECTION, SUSPENSION SUBCUTANEOUS at 08:59

## 2019-01-08 RX ADMIN — ARIPIPRAZOLE 5 MG: 5 TABLET ORAL at 21:43

## 2019-01-08 RX ADMIN — ATORVASTATIN CALCIUM 40 MG: 40 TABLET, FILM COATED ORAL at 21:44

## 2019-01-08 RX ADMIN — INSULIN LISPRO 1 UNITS: 100 INJECTION, SOLUTION INTRAVENOUS; SUBCUTANEOUS at 12:46

## 2019-01-08 RX ADMIN — DULOXETINE HYDROCHLORIDE 30 MG: 30 CAPSULE, DELAYED RELEASE ORAL at 08:55

## 2019-01-08 RX ADMIN — TRAZODONE HYDROCHLORIDE 50 MG: 50 TABLET ORAL at 21:44

## 2019-01-08 RX ADMIN — LISINOPRIL 20 MG: 20 TABLET ORAL at 21:44

## 2019-01-08 RX ADMIN — DOCUSATE SODIUM 100 MG: 100 CAPSULE, LIQUID FILLED ORAL at 08:54

## 2019-01-08 RX ADMIN — LISINOPRIL 20 MG: 20 TABLET ORAL at 08:56

## 2019-01-08 ASSESSMENT — PAIN SCALES - GENERAL
PAINLEVEL_OUTOF10: 0
PAINLEVEL_OUTOF10: 0

## 2019-01-09 LAB
METER GLUCOSE: 192 MG/DL (ref 74–99)
METER GLUCOSE: 70 MG/DL (ref 74–99)
METER GLUCOSE: 77 MG/DL (ref 74–99)
METER GLUCOSE: 89 MG/DL (ref 74–99)

## 2019-01-09 PROCEDURE — 6370000000 HC RX 637 (ALT 250 FOR IP): Performed by: CLINICAL NURSE SPECIALIST

## 2019-01-09 PROCEDURE — 6370000000 HC RX 637 (ALT 250 FOR IP): Performed by: NURSE PRACTITIONER

## 2019-01-09 PROCEDURE — 82962 GLUCOSE BLOOD TEST: CPT

## 2019-01-09 PROCEDURE — 99232 SBSQ HOSP IP/OBS MODERATE 35: CPT | Performed by: NURSE PRACTITIONER

## 2019-01-09 PROCEDURE — 1240000000 HC EMOTIONAL WELLNESS R&B

## 2019-01-09 RX ORDER — VENLAFAXINE HYDROCHLORIDE 75 MG/1
75 CAPSULE, EXTENDED RELEASE ORAL
Status: DISCONTINUED | OUTPATIENT
Start: 2019-01-10 | End: 2019-01-11 | Stop reason: HOSPADM

## 2019-01-09 RX ORDER — ARIPIPRAZOLE 10 MG/1
10 TABLET ORAL NIGHTLY
Status: DISCONTINUED | OUTPATIENT
Start: 2019-01-09 | End: 2019-01-11 | Stop reason: HOSPADM

## 2019-01-09 RX ADMIN — VENLAFAXINE 37.5 MG: 75 TABLET ORAL at 08:47

## 2019-01-09 RX ADMIN — INSULIN LISPRO 1 UNITS: 100 INJECTION, SOLUTION INTRAVENOUS; SUBCUTANEOUS at 12:47

## 2019-01-09 RX ADMIN — DOCUSATE SODIUM 100 MG: 100 CAPSULE, LIQUID FILLED ORAL at 08:48

## 2019-01-09 RX ADMIN — INSULIN HUMAN 37 UNITS: 100 INJECTION, SUSPENSION SUBCUTANEOUS at 08:45

## 2019-01-09 RX ADMIN — LISINOPRIL 20 MG: 20 TABLET ORAL at 08:48

## 2019-01-09 ASSESSMENT — PAIN SCALES - GENERAL
PAINLEVEL_OUTOF10: 0
PAINLEVEL_OUTOF10: 0

## 2019-01-10 LAB
METER GLUCOSE: 114 MG/DL (ref 74–99)
METER GLUCOSE: 166 MG/DL (ref 74–99)
METER GLUCOSE: 173 MG/DL (ref 74–99)
METER GLUCOSE: 175 MG/DL (ref 74–99)

## 2019-01-10 PROCEDURE — 1240000000 HC EMOTIONAL WELLNESS R&B

## 2019-01-10 PROCEDURE — 99231 SBSQ HOSP IP/OBS SF/LOW 25: CPT | Performed by: PSYCHIATRY & NEUROLOGY

## 2019-01-10 PROCEDURE — 6370000000 HC RX 637 (ALT 250 FOR IP): Performed by: NURSE PRACTITIONER

## 2019-01-10 PROCEDURE — 82962 GLUCOSE BLOOD TEST: CPT

## 2019-01-10 PROCEDURE — 6370000000 HC RX 637 (ALT 250 FOR IP): Performed by: CLINICAL NURSE SPECIALIST

## 2019-01-10 RX ADMIN — INSULIN HUMAN 15 UNITS: 100 INJECTION, SUSPENSION SUBCUTANEOUS at 20:30

## 2019-01-10 RX ADMIN — DOCUSATE SODIUM 100 MG: 100 CAPSULE, LIQUID FILLED ORAL at 09:02

## 2019-01-10 RX ADMIN — LISINOPRIL 20 MG: 20 TABLET ORAL at 20:25

## 2019-01-10 RX ADMIN — INSULIN LISPRO 1 UNITS: 100 INJECTION, SOLUTION INTRAVENOUS; SUBCUTANEOUS at 20:37

## 2019-01-10 RX ADMIN — INSULIN LISPRO 1 UNITS: 100 INJECTION, SOLUTION INTRAVENOUS; SUBCUTANEOUS at 18:16

## 2019-01-10 RX ADMIN — ATORVASTATIN CALCIUM 40 MG: 40 TABLET, FILM COATED ORAL at 20:25

## 2019-01-10 RX ADMIN — INSULIN LISPRO 1 UNITS: 100 INJECTION, SOLUTION INTRAVENOUS; SUBCUTANEOUS at 13:11

## 2019-01-10 RX ADMIN — VENLAFAXINE HYDROCHLORIDE 75 MG: 75 CAPSULE, EXTENDED RELEASE ORAL at 09:02

## 2019-01-10 RX ADMIN — ARIPIPRAZOLE 10 MG: 10 TABLET ORAL at 20:49

## 2019-01-10 RX ADMIN — TRAZODONE HYDROCHLORIDE 50 MG: 50 TABLET ORAL at 20:25

## 2019-01-10 RX ADMIN — LISINOPRIL 20 MG: 20 TABLET ORAL at 09:02

## 2019-01-10 ASSESSMENT — PAIN SCALES - GENERAL
PAINLEVEL_OUTOF10: 0
PAINLEVEL_OUTOF10: 0

## 2019-01-11 VITALS
SYSTOLIC BLOOD PRESSURE: 109 MMHG | DIASTOLIC BLOOD PRESSURE: 66 MMHG | BODY MASS INDEX: 25.36 KG/M2 | TEMPERATURE: 97.3 F | OXYGEN SATURATION: 97 % | HEIGHT: 69 IN | HEART RATE: 86 BPM | RESPIRATION RATE: 16 BRPM | WEIGHT: 171.2 LBS

## 2019-01-11 LAB
METER GLUCOSE: 109 MG/DL (ref 74–99)
METER GLUCOSE: 210 MG/DL (ref 74–99)

## 2019-01-11 PROCEDURE — 6370000000 HC RX 637 (ALT 250 FOR IP): Performed by: NURSE PRACTITIONER

## 2019-01-11 PROCEDURE — 6370000000 HC RX 637 (ALT 250 FOR IP): Performed by: CLINICAL NURSE SPECIALIST

## 2019-01-11 PROCEDURE — 99238 HOSP IP/OBS DSCHRG MGMT 30/<: CPT | Performed by: NURSE PRACTITIONER

## 2019-01-11 PROCEDURE — 82962 GLUCOSE BLOOD TEST: CPT

## 2019-01-11 RX ORDER — PSEUDOEPHEDRINE HCL 30 MG
100 TABLET ORAL DAILY
Qty: 30 CAPSULE | Refills: 0 | Status: ON HOLD | OUTPATIENT
Start: 2019-01-12 | End: 2019-03-15

## 2019-01-11 RX ORDER — VENLAFAXINE HYDROCHLORIDE 75 MG/1
75 CAPSULE, EXTENDED RELEASE ORAL
Qty: 30 CAPSULE | Refills: 0 | Status: ON HOLD | OUTPATIENT
Start: 2019-01-12 | End: 2020-10-13 | Stop reason: HOSPADM

## 2019-01-11 RX ORDER — ARIPIPRAZOLE 10 MG/1
10 TABLET ORAL NIGHTLY
Qty: 30 TABLET | Refills: 0 | Status: ON HOLD | OUTPATIENT
Start: 2019-01-11 | End: 2019-02-22 | Stop reason: HOSPADM

## 2019-01-11 RX ORDER — SENNA PLUS 8.6 MG/1
1 TABLET ORAL NIGHTLY
Status: DISCONTINUED | OUTPATIENT
Start: 2019-01-11 | End: 2019-01-11 | Stop reason: HOSPADM

## 2019-01-11 RX ADMIN — DOCUSATE SODIUM 100 MG: 100 CAPSULE, LIQUID FILLED ORAL at 09:47

## 2019-01-11 RX ADMIN — VENLAFAXINE HYDROCHLORIDE 75 MG: 75 CAPSULE, EXTENDED RELEASE ORAL at 09:47

## 2019-01-11 RX ADMIN — LISINOPRIL 20 MG: 20 TABLET ORAL at 09:46

## 2019-01-11 ASSESSMENT — PAIN SCALES - GENERAL: PAINLEVEL_OUTOF10: 0

## 2019-02-06 ENCOUNTER — APPOINTMENT (OUTPATIENT)
Dept: GENERAL RADIOLOGY | Age: 64
DRG: 638 | End: 2019-02-06
Payer: MEDICARE

## 2019-02-06 ENCOUNTER — HOSPITAL ENCOUNTER (INPATIENT)
Age: 64
LOS: 5 days | Discharge: PSYCHIATRIC OR SUBSTANCE USE FACILITY | DRG: 638 | End: 2019-02-11
Attending: EMERGENCY MEDICINE | Admitting: FAMILY MEDICINE
Payer: MEDICARE

## 2019-02-06 DIAGNOSIS — N17.9 AKI (ACUTE KIDNEY INJURY) (HCC): ICD-10-CM

## 2019-02-06 DIAGNOSIS — E13.10 DIABETIC KETOACIDOSIS WITHOUT COMA ASSOCIATED WITH OTHER SPECIFIED DIABETES MELLITUS (HCC): Primary | ICD-10-CM

## 2019-02-06 DIAGNOSIS — E86.0 DEHYDRATION: ICD-10-CM

## 2019-02-06 PROBLEM — E11.10 DKA, TYPE 2, NOT AT GOAL (HCC): Status: ACTIVE | Noted: 2019-02-06

## 2019-02-06 LAB
ALBUMIN SERPL-MCNC: 3.6 G/DL (ref 3.5–5.2)
ALP BLD-CCNC: 104 U/L (ref 40–129)
ALT SERPL-CCNC: 20 U/L (ref 0–40)
ANION GAP SERPL CALCULATED.3IONS-SCNC: 23 MMOL/L (ref 7–16)
AST SERPL-CCNC: 13 U/L (ref 0–39)
BACTERIA: NORMAL /HPF
BASOPHILS ABSOLUTE: 0.02 E9/L (ref 0–0.2)
BASOPHILS RELATIVE PERCENT: 0.1 % (ref 0–2)
BETA-HYDROXYBUTYRATE: >4.5 MMOL/L (ref 0.02–0.27)
BILIRUB SERPL-MCNC: 1.5 MG/DL (ref 0–1.2)
BILIRUBIN URINE: NEGATIVE
BLOOD, URINE: ABNORMAL
BUN BLDV-MCNC: 56 MG/DL (ref 8–23)
CALCIUM SERPL-MCNC: 9.7 MG/DL (ref 8.6–10.2)
CASTS: NORMAL /LPF
CHLORIDE BLD-SCNC: 85 MMOL/L (ref 98–107)
CLARITY: CLEAR
CO2: 19 MMOL/L (ref 22–29)
COLOR: YELLOW
CREAT SERPL-MCNC: 1.7 MG/DL (ref 0.7–1.2)
EOSINOPHILS ABSOLUTE: 0 E9/L (ref 0.05–0.5)
EOSINOPHILS RELATIVE PERCENT: 0 % (ref 0–6)
GFR AFRICAN AMERICAN: 50
GFR NON-AFRICAN AMERICAN: 41 ML/MIN/1.73
GLUCOSE BLD-MCNC: 1029 MG/DL (ref 74–99)
GLUCOSE URINE: >=1000 MG/DL
HCT VFR BLD CALC: 44 % (ref 37–54)
HEMOGLOBIN: 15.7 G/DL (ref 12.5–16.5)
IMMATURE GRANULOCYTES #: 0.15 E9/L
IMMATURE GRANULOCYTES %: 0.7 % (ref 0–5)
KETONES, URINE: 40 MG/DL
LACTIC ACID: 3.4 MMOL/L (ref 0.5–2.2)
LEUKOCYTE ESTERASE, URINE: NEGATIVE
LIPASE: 35 U/L (ref 13–60)
LYMPHOCYTES ABSOLUTE: 0.73 E9/L (ref 1.5–4)
LYMPHOCYTES RELATIVE PERCENT: 3.6 % (ref 20–42)
MCH RBC QN AUTO: 26.7 PG (ref 26–35)
MCHC RBC AUTO-ENTMCNC: 35.7 % (ref 32–34.5)
MCV RBC AUTO: 74.7 FL (ref 80–99.9)
METER GLUCOSE: >500 MG/DL (ref 74–99)
MONOCYTES ABSOLUTE: 0.84 E9/L (ref 0.1–0.95)
MONOCYTES RELATIVE PERCENT: 4.2 % (ref 2–12)
NEUTROPHILS ABSOLUTE: 18.31 E9/L (ref 1.8–7.3)
NEUTROPHILS RELATIVE PERCENT: 91.4 % (ref 43–80)
NITRITE, URINE: NEGATIVE
PDW BLD-RTO: 12.8 FL (ref 11.5–15)
PH UA: 5.5 (ref 5–9)
PLATELET # BLD: 300 E9/L (ref 130–450)
PMV BLD AUTO: 12.3 FL (ref 7–12)
POTASSIUM SERPL-SCNC: 5.2 MMOL/L (ref 3.5–5)
PROTEIN UA: NEGATIVE MG/DL
RBC # BLD: 5.89 E12/L (ref 3.8–5.8)
RBC UA: NORMAL /HPF (ref 0–2)
SODIUM BLD-SCNC: 127 MMOL/L (ref 132–146)
SPECIFIC GRAVITY UA: <=1.005 (ref 1–1.03)
TOTAL PROTEIN: 5.8 G/DL (ref 6.4–8.3)
UROBILINOGEN, URINE: 0.2 E.U./DL
WBC # BLD: 20.1 E9/L (ref 4.5–11.5)
WBC UA: NORMAL /HPF (ref 0–5)

## 2019-02-06 PROCEDURE — 82962 GLUCOSE BLOOD TEST: CPT

## 2019-02-06 PROCEDURE — 85025 COMPLETE CBC W/AUTO DIFF WBC: CPT

## 2019-02-06 PROCEDURE — 6370000000 HC RX 637 (ALT 250 FOR IP): Performed by: EMERGENCY MEDICINE

## 2019-02-06 PROCEDURE — 81001 URINALYSIS AUTO W/SCOPE: CPT

## 2019-02-06 PROCEDURE — 83605 ASSAY OF LACTIC ACID: CPT

## 2019-02-06 PROCEDURE — 99291 CRITICAL CARE FIRST HOUR: CPT

## 2019-02-06 PROCEDURE — 87088 URINE BACTERIA CULTURE: CPT

## 2019-02-06 PROCEDURE — 2060000000 HC ICU INTERMEDIATE R&B

## 2019-02-06 PROCEDURE — 87040 BLOOD CULTURE FOR BACTERIA: CPT

## 2019-02-06 PROCEDURE — 6360000002 HC RX W HCPCS: Performed by: EMERGENCY MEDICINE

## 2019-02-06 PROCEDURE — 82010 KETONE BODYS QUAN: CPT

## 2019-02-06 PROCEDURE — 2580000003 HC RX 258: Performed by: EMERGENCY MEDICINE

## 2019-02-06 PROCEDURE — 80053 COMPREHEN METABOLIC PANEL: CPT

## 2019-02-06 PROCEDURE — 71045 X-RAY EXAM CHEST 1 VIEW: CPT

## 2019-02-06 PROCEDURE — 6360000002 HC RX W HCPCS

## 2019-02-06 PROCEDURE — 83690 ASSAY OF LIPASE: CPT

## 2019-02-06 PROCEDURE — 36415 COLL VENOUS BLD VENIPUNCTURE: CPT

## 2019-02-06 RX ORDER — SODIUM CHLORIDE 9 MG/ML
INJECTION, SOLUTION INTRAVENOUS CONTINUOUS
Status: DISCONTINUED | OUTPATIENT
Start: 2019-02-06 | End: 2019-02-07

## 2019-02-06 RX ORDER — 0.9 % SODIUM CHLORIDE 0.9 %
1000 INTRAVENOUS SOLUTION INTRAVENOUS ONCE
Status: DISCONTINUED | OUTPATIENT
Start: 2019-02-06 | End: 2019-02-06

## 2019-02-06 RX ORDER — POTASSIUM CHLORIDE 7.45 MG/ML
10 INJECTION INTRAVENOUS PRN
Status: DISCONTINUED | OUTPATIENT
Start: 2019-02-06 | End: 2019-02-07

## 2019-02-06 RX ORDER — 0.9 % SODIUM CHLORIDE 0.9 %
1000 INTRAVENOUS SOLUTION INTRAVENOUS ONCE
Status: COMPLETED | OUTPATIENT
Start: 2019-02-06 | End: 2019-02-07

## 2019-02-06 RX ORDER — SODIUM CHLORIDE 9 MG/ML
INJECTION, SOLUTION INTRAVENOUS CONTINUOUS
Status: DISCONTINUED | OUTPATIENT
Start: 2019-02-06 | End: 2019-02-06

## 2019-02-06 RX ORDER — LORAZEPAM 2 MG/ML
2 INJECTION INTRAMUSCULAR ONCE
Status: COMPLETED | OUTPATIENT
Start: 2019-02-06 | End: 2019-02-06

## 2019-02-06 RX ORDER — DEXTROSE MONOHYDRATE 25 G/50ML
12.5 INJECTION, SOLUTION INTRAVENOUS PRN
Status: DISCONTINUED | OUTPATIENT
Start: 2019-02-06 | End: 2019-02-11 | Stop reason: HOSPADM

## 2019-02-06 RX ORDER — ONDANSETRON 2 MG/ML
4 INJECTION INTRAMUSCULAR; INTRAVENOUS ONCE
Status: COMPLETED | OUTPATIENT
Start: 2019-02-06 | End: 2019-02-06

## 2019-02-06 RX ORDER — DEXTROSE AND SODIUM CHLORIDE 5; .45 G/100ML; G/100ML
INJECTION, SOLUTION INTRAVENOUS CONTINUOUS PRN
Status: DISCONTINUED | OUTPATIENT
Start: 2019-02-06 | End: 2019-02-07

## 2019-02-06 RX ORDER — ONDANSETRON 2 MG/ML
INJECTION INTRAMUSCULAR; INTRAVENOUS
Status: COMPLETED
Start: 2019-02-06 | End: 2019-02-06

## 2019-02-06 RX ORDER — MAGNESIUM SULFATE 1 G/100ML
1 INJECTION INTRAVENOUS PRN
Status: DISCONTINUED | OUTPATIENT
Start: 2019-02-06 | End: 2019-02-07

## 2019-02-06 RX ORDER — 0.9 % SODIUM CHLORIDE 0.9 %
15 INTRAVENOUS SOLUTION INTRAVENOUS ONCE
Status: COMPLETED | OUTPATIENT
Start: 2019-02-06 | End: 2019-02-07

## 2019-02-06 RX ADMIN — SODIUM CHLORIDE: 9 INJECTION, SOLUTION INTRAVENOUS at 22:10

## 2019-02-06 RX ADMIN — ONDANSETRON 4 MG: 2 INJECTION INTRAMUSCULAR; INTRAVENOUS at 23:09

## 2019-02-06 RX ADMIN — LORAZEPAM 2 MG: 2 INJECTION INTRAMUSCULAR; INTRAVENOUS at 23:50

## 2019-02-06 RX ADMIN — SODIUM CHLORIDE 1164 ML: 9 INJECTION, SOLUTION INTRAVENOUS at 23:09

## 2019-02-06 RX ADMIN — SODIUM CHLORIDE 0.1 UNITS/KG/HR: 9 INJECTION, SOLUTION INTRAVENOUS at 23:09

## 2019-02-06 RX ADMIN — SODIUM CHLORIDE 1000 ML: 9 INJECTION, SOLUTION INTRAVENOUS at 21:43

## 2019-02-06 RX ADMIN — ONDANSETRON HYDROCHLORIDE 4 MG: 2 SOLUTION INTRAMUSCULAR; INTRAVENOUS at 23:09

## 2019-02-06 ASSESSMENT — PAIN SCALES - GENERAL: PAINLEVEL_OUTOF10: 10

## 2019-02-06 ASSESSMENT — PAIN DESCRIPTION - FREQUENCY: FREQUENCY: INTERMITTENT

## 2019-02-06 ASSESSMENT — PAIN DESCRIPTION - LOCATION: LOCATION: ABDOMEN

## 2019-02-06 ASSESSMENT — PAIN DESCRIPTION - DESCRIPTORS: DESCRIPTORS: ACHING

## 2019-02-06 ASSESSMENT — PAIN DESCRIPTION - PAIN TYPE: TYPE: ACUTE PAIN

## 2019-02-06 ASSESSMENT — PAIN DESCRIPTION - PROGRESSION: CLINICAL_PROGRESSION: GRADUALLY WORSENING

## 2019-02-07 ENCOUNTER — APPOINTMENT (OUTPATIENT)
Dept: GENERAL RADIOLOGY | Age: 64
DRG: 638 | End: 2019-02-07
Payer: MEDICARE

## 2019-02-07 PROBLEM — E87.20 LACTIC ACIDOSIS: Status: ACTIVE | Noted: 2019-02-07

## 2019-02-07 PROBLEM — R10.84 GENERALIZED ABDOMINAL PAIN: Status: ACTIVE | Noted: 2019-02-07

## 2019-02-07 PROBLEM — R65.10 SIRS (SYSTEMIC INFLAMMATORY RESPONSE SYNDROME) (HCC): Status: ACTIVE | Noted: 2019-02-07

## 2019-02-07 PROBLEM — Z91.14 NON COMPLIANCE W MEDICATION REGIMEN: Status: ACTIVE | Noted: 2019-02-07

## 2019-02-07 PROBLEM — K59.00 CONSTIPATION: Status: ACTIVE | Noted: 2019-02-07

## 2019-02-07 PROBLEM — R53.81 PHYSICAL DECONDITIONING: Status: ACTIVE | Noted: 2019-02-07

## 2019-02-07 PROBLEM — Z91.148 NON COMPLIANCE W MEDICATION REGIMEN: Status: ACTIVE | Noted: 2019-02-07

## 2019-02-07 PROBLEM — E87.29 HIGH ANION GAP METABOLIC ACIDOSIS: Status: ACTIVE | Noted: 2019-02-07

## 2019-02-07 PROBLEM — R63.4 WEIGHT LOSS: Status: ACTIVE | Noted: 2019-02-07

## 2019-02-07 LAB
ANION GAP SERPL CALCULATED.3IONS-SCNC: 10 MMOL/L (ref 7–16)
ANION GAP SERPL CALCULATED.3IONS-SCNC: 16 MMOL/L (ref 7–16)
ANION GAP SERPL CALCULATED.3IONS-SCNC: 6 MMOL/L (ref 7–16)
ANION GAP SERPL CALCULATED.3IONS-SCNC: 7 MMOL/L (ref 7–16)
ANION GAP SERPL CALCULATED.3IONS-SCNC: 8 MMOL/L (ref 7–16)
ANION GAP SERPL CALCULATED.3IONS-SCNC: 9 MMOL/L (ref 7–16)
BASOPHILS ABSOLUTE: 0.03 E9/L (ref 0–0.2)
BASOPHILS RELATIVE PERCENT: 0.2 % (ref 0–2)
BETA-HYDROXYBUTYRATE: 1.2 MMOL/L (ref 0.02–0.27)
BUN BLDV-MCNC: 27 MG/DL (ref 8–23)
BUN BLDV-MCNC: 32 MG/DL (ref 8–23)
BUN BLDV-MCNC: 34 MG/DL (ref 8–23)
BUN BLDV-MCNC: 41 MG/DL (ref 8–23)
BUN BLDV-MCNC: 48 MG/DL (ref 8–23)
BUN BLDV-MCNC: 55 MG/DL (ref 8–23)
CALCIUM SERPL-MCNC: 7.8 MG/DL (ref 8.6–10.2)
CALCIUM SERPL-MCNC: 8.2 MG/DL (ref 8.6–10.2)
CALCIUM SERPL-MCNC: 8.5 MG/DL (ref 8.6–10.2)
CALCIUM SERPL-MCNC: 8.6 MG/DL (ref 8.6–10.2)
CALCIUM SERPL-MCNC: 8.8 MG/DL (ref 8.6–10.2)
CALCIUM SERPL-MCNC: 9.2 MG/DL (ref 8.6–10.2)
CHLORIDE BLD-SCNC: 101 MMOL/L (ref 98–107)
CHLORIDE BLD-SCNC: 104 MMOL/L (ref 98–107)
CHLORIDE BLD-SCNC: 107 MMOL/L (ref 98–107)
CHLORIDE BLD-SCNC: 108 MMOL/L (ref 98–107)
CHLORIDE BLD-SCNC: 108 MMOL/L (ref 98–107)
CHLORIDE BLD-SCNC: 99 MMOL/L (ref 98–107)
CO2: 23 MMOL/L (ref 22–29)
CO2: 25 MMOL/L (ref 22–29)
CO2: 26 MMOL/L (ref 22–29)
CO2: 26 MMOL/L (ref 22–29)
CO2: 27 MMOL/L (ref 22–29)
CO2: 29 MMOL/L (ref 22–29)
CREAT SERPL-MCNC: 1 MG/DL (ref 0.7–1.2)
CREAT SERPL-MCNC: 1.1 MG/DL (ref 0.7–1.2)
CREAT SERPL-MCNC: 1.1 MG/DL (ref 0.7–1.2)
CREAT SERPL-MCNC: 1.2 MG/DL (ref 0.7–1.2)
CREAT SERPL-MCNC: 1.4 MG/DL (ref 0.7–1.2)
CREAT SERPL-MCNC: 1.6 MG/DL (ref 0.7–1.2)
EOSINOPHILS ABSOLUTE: 0 E9/L (ref 0.05–0.5)
EOSINOPHILS RELATIVE PERCENT: 0 % (ref 0–6)
GFR AFRICAN AMERICAN: 53
GFR AFRICAN AMERICAN: 53
GFR AFRICAN AMERICAN: >60
GFR NON-AFRICAN AMERICAN: 44 ML/MIN/1.73
GFR NON-AFRICAN AMERICAN: 44 ML/MIN/1.73
GFR NON-AFRICAN AMERICAN: 51 ML/MIN/1.73
GFR NON-AFRICAN AMERICAN: >60 ML/MIN/1.73
GLUCOSE BLD-MCNC: 142 MG/DL (ref 74–99)
GLUCOSE BLD-MCNC: 154 MG/DL (ref 74–99)
GLUCOSE BLD-MCNC: 256 MG/DL (ref 74–99)
GLUCOSE BLD-MCNC: 358 MG/DL (ref 74–99)
GLUCOSE BLD-MCNC: 546 MG/DL (ref 74–99)
GLUCOSE BLD-MCNC: 688 MG/DL (ref 74–99)
GLUCOSE BLD-MCNC: >700 MG/DL (ref 74–99)
HCT VFR BLD CALC: 39 % (ref 37–54)
HEMOGLOBIN: 14.1 G/DL (ref 12.5–16.5)
IMMATURE GRANULOCYTES #: 0.1 E9/L
IMMATURE GRANULOCYTES %: 0.5 % (ref 0–5)
LACTIC ACID: 0.8 MMOL/L (ref 0.5–2.2)
LYMPHOCYTES ABSOLUTE: 1.55 E9/L (ref 1.5–4)
LYMPHOCYTES RELATIVE PERCENT: 8.2 % (ref 20–42)
MAGNESIUM: 1.9 MG/DL (ref 1.6–2.6)
MAGNESIUM: 2 MG/DL (ref 1.6–2.6)
MAGNESIUM: 2 MG/DL (ref 1.6–2.6)
MCH RBC QN AUTO: 26.9 PG (ref 26–35)
MCHC RBC AUTO-ENTMCNC: 36.2 % (ref 32–34.5)
MCV RBC AUTO: 74.4 FL (ref 80–99.9)
METER GLUCOSE: 137 MG/DL (ref 74–99)
METER GLUCOSE: 140 MG/DL (ref 74–99)
METER GLUCOSE: 145 MG/DL (ref 74–99)
METER GLUCOSE: 150 MG/DL (ref 74–99)
METER GLUCOSE: 157 MG/DL (ref 74–99)
METER GLUCOSE: 158 MG/DL (ref 74–99)
METER GLUCOSE: 158 MG/DL (ref 74–99)
METER GLUCOSE: 186 MG/DL (ref 74–99)
METER GLUCOSE: 204 MG/DL (ref 74–99)
METER GLUCOSE: 207 MG/DL (ref 74–99)
METER GLUCOSE: 233 MG/DL (ref 74–99)
METER GLUCOSE: 236 MG/DL (ref 74–99)
METER GLUCOSE: 295 MG/DL (ref 74–99)
METER GLUCOSE: 303 MG/DL (ref 74–99)
METER GLUCOSE: 356 MG/DL (ref 74–99)
METER GLUCOSE: 369 MG/DL (ref 74–99)
METER GLUCOSE: 411 MG/DL (ref 74–99)
METER GLUCOSE: >500 MG/DL (ref 74–99)
METER GLUCOSE: >500 MG/DL (ref 74–99)
MONOCYTES ABSOLUTE: 1.47 E9/L (ref 0.1–0.95)
MONOCYTES RELATIVE PERCENT: 7.7 % (ref 2–12)
NEUTROPHILS ABSOLUTE: 15.85 E9/L (ref 1.8–7.3)
NEUTROPHILS RELATIVE PERCENT: 83.4 % (ref 43–80)
PDW BLD-RTO: 12.6 FL (ref 11.5–15)
PHOSPHORUS: 2.2 MG/DL (ref 2.5–4.5)
PHOSPHORUS: 2.3 MG/DL (ref 2.5–4.5)
PHOSPHORUS: 2.4 MG/DL (ref 2.5–4.5)
PHOSPHORUS: 3 MG/DL (ref 2.5–4.5)
PHOSPHORUS: 3.3 MG/DL (ref 2.5–4.5)
PLATELET # BLD: 233 E9/L (ref 130–450)
PMV BLD AUTO: 11.4 FL (ref 7–12)
POC CHLORIDE: 102 MMOL/L (ref 100–108)
POC CREATININE: 1.6 MG/DL (ref 0.7–1.2)
POC POTASSIUM: 4 MMOL/L (ref 3.5–5)
POC SODIUM: 133 MMOL/L (ref 132–146)
POTASSIUM REFLEX MAGNESIUM: 4.8 MMOL/L (ref 3.5–5)
POTASSIUM SERPL-SCNC: 3.8 MMOL/L (ref 3.5–5)
POTASSIUM SERPL-SCNC: 4 MMOL/L (ref 3.5–5)
POTASSIUM SERPL-SCNC: 4.3 MMOL/L (ref 3.5–5)
POTASSIUM SERPL-SCNC: 4.3 MMOL/L (ref 3.5–5)
POTASSIUM SERPL-SCNC: 4.6 MMOL/L (ref 3.5–5)
PROCALCITONIN: 0.2 NG/ML (ref 0–0.08)
RBC # BLD: 5.24 E12/L (ref 3.8–5.8)
SODIUM BLD-SCNC: 136 MMOL/L (ref 132–146)
SODIUM BLD-SCNC: 138 MMOL/L (ref 132–146)
SODIUM BLD-SCNC: 139 MMOL/L (ref 132–146)
SODIUM BLD-SCNC: 142 MMOL/L (ref 132–146)
WBC # BLD: 19 E9/L (ref 4.5–11.5)

## 2019-02-07 PROCEDURE — 85025 COMPLETE CBC W/AUTO DIFF WBC: CPT

## 2019-02-07 PROCEDURE — 80048 BASIC METABOLIC PNL TOTAL CA: CPT

## 2019-02-07 PROCEDURE — 99221 1ST HOSP IP/OBS SF/LOW 40: CPT | Performed by: PSYCHIATRY & NEUROLOGY

## 2019-02-07 PROCEDURE — 2060000000 HC ICU INTERMEDIATE R&B

## 2019-02-07 PROCEDURE — 6360000002 HC RX W HCPCS: Performed by: EMERGENCY MEDICINE

## 2019-02-07 PROCEDURE — 82947 ASSAY GLUCOSE BLOOD QUANT: CPT

## 2019-02-07 PROCEDURE — 6370000000 HC RX 637 (ALT 250 FOR IP): Performed by: EMERGENCY MEDICINE

## 2019-02-07 PROCEDURE — 36415 COLL VENOUS BLD VENIPUNCTURE: CPT

## 2019-02-07 PROCEDURE — 84100 ASSAY OF PHOSPHORUS: CPT

## 2019-02-07 PROCEDURE — 84145 PROCALCITONIN (PCT): CPT

## 2019-02-07 PROCEDURE — 82962 GLUCOSE BLOOD TEST: CPT

## 2019-02-07 PROCEDURE — 36592 COLLECT BLOOD FROM PICC: CPT

## 2019-02-07 PROCEDURE — 6370000000 HC RX 637 (ALT 250 FOR IP): Performed by: FAMILY MEDICINE

## 2019-02-07 PROCEDURE — 84295 ASSAY OF SERUM SODIUM: CPT

## 2019-02-07 PROCEDURE — 02HV33Z INSERTION OF INFUSION DEVICE INTO SUPERIOR VENA CAVA, PERCUTANEOUS APPROACH: ICD-10-PCS | Performed by: EMERGENCY MEDICINE

## 2019-02-07 PROCEDURE — 6370000000 HC RX 637 (ALT 250 FOR IP): Performed by: HOSPITALIST

## 2019-02-07 PROCEDURE — 83735 ASSAY OF MAGNESIUM: CPT

## 2019-02-07 PROCEDURE — 2580000003 HC RX 258: Performed by: EMERGENCY MEDICINE

## 2019-02-07 PROCEDURE — 71045 X-RAY EXAM CHEST 1 VIEW: CPT

## 2019-02-07 PROCEDURE — 74019 RADEX ABDOMEN 2 VIEWS: CPT

## 2019-02-07 PROCEDURE — 83605 ASSAY OF LACTIC ACID: CPT

## 2019-02-07 PROCEDURE — 82565 ASSAY OF CREATININE: CPT

## 2019-02-07 PROCEDURE — 84132 ASSAY OF SERUM POTASSIUM: CPT

## 2019-02-07 PROCEDURE — 82010 KETONE BODYS QUAN: CPT

## 2019-02-07 PROCEDURE — 6360000002 HC RX W HCPCS: Performed by: FAMILY MEDICINE

## 2019-02-07 PROCEDURE — 2500000003 HC RX 250 WO HCPCS: Performed by: EMERGENCY MEDICINE

## 2019-02-07 PROCEDURE — 82435 ASSAY OF BLOOD CHLORIDE: CPT

## 2019-02-07 RX ORDER — PANTOPRAZOLE SODIUM 40 MG/1
40 TABLET, DELAYED RELEASE ORAL
Status: DISCONTINUED | OUTPATIENT
Start: 2019-02-07 | End: 2019-02-11 | Stop reason: HOSPADM

## 2019-02-07 RX ORDER — SODIUM CHLORIDE AND POTASSIUM CHLORIDE .9; .15 G/100ML; G/100ML
SOLUTION INTRAVENOUS CONTINUOUS
Status: DISCONTINUED | OUTPATIENT
Start: 2019-02-07 | End: 2019-02-07

## 2019-02-07 RX ORDER — TRAZODONE HYDROCHLORIDE 50 MG/1
50 TABLET ORAL NIGHTLY PRN
Status: DISCONTINUED | OUTPATIENT
Start: 2019-02-07 | End: 2019-02-11 | Stop reason: HOSPADM

## 2019-02-07 RX ORDER — HEPARIN SODIUM 10000 [USP'U]/ML
5000 INJECTION, SOLUTION INTRAVENOUS; SUBCUTANEOUS EVERY 8 HOURS
Status: DISCONTINUED | OUTPATIENT
Start: 2019-02-07 | End: 2019-02-11 | Stop reason: HOSPADM

## 2019-02-07 RX ORDER — ONDANSETRON 2 MG/ML
4 INJECTION INTRAMUSCULAR; INTRAVENOUS EVERY 6 HOURS PRN
Status: DISCONTINUED | OUTPATIENT
Start: 2019-02-07 | End: 2019-02-11 | Stop reason: HOSPADM

## 2019-02-07 RX ORDER — POTASSIUM CHLORIDE 7.45 MG/ML
10 INJECTION INTRAVENOUS PRN
Status: DISCONTINUED | OUTPATIENT
Start: 2019-02-07 | End: 2019-02-11 | Stop reason: HOSPADM

## 2019-02-07 RX ORDER — M-VIT,TX,IRON,MINS/CALC/FOLIC 27MG-0.4MG
1 TABLET ORAL DAILY
Status: DISCONTINUED | OUTPATIENT
Start: 2019-02-07 | End: 2019-02-11 | Stop reason: HOSPADM

## 2019-02-07 RX ORDER — VENLAFAXINE HYDROCHLORIDE 75 MG/1
75 CAPSULE, EXTENDED RELEASE ORAL
Status: DISCONTINUED | OUTPATIENT
Start: 2019-02-07 | End: 2019-02-11 | Stop reason: HOSPADM

## 2019-02-07 RX ORDER — POTASSIUM CHLORIDE 20 MEQ/1
40 TABLET, EXTENDED RELEASE ORAL PRN
Status: DISCONTINUED | OUTPATIENT
Start: 2019-02-07 | End: 2019-02-11 | Stop reason: HOSPADM

## 2019-02-07 RX ORDER — POTASSIUM CHLORIDE 20MEQ/15ML
40 LIQUID (ML) ORAL PRN
Status: DISCONTINUED | OUTPATIENT
Start: 2019-02-07 | End: 2019-02-11 | Stop reason: HOSPADM

## 2019-02-07 RX ORDER — POLYETHYLENE GLYCOL 3350 17 G/17G
17 POWDER, FOR SOLUTION ORAL DAILY
Status: DISCONTINUED | OUTPATIENT
Start: 2019-02-07 | End: 2019-02-11 | Stop reason: HOSPADM

## 2019-02-07 RX ORDER — ARIPIPRAZOLE 10 MG/1
10 TABLET ORAL NIGHTLY
Status: DISCONTINUED | OUTPATIENT
Start: 2019-02-07 | End: 2019-02-11 | Stop reason: HOSPADM

## 2019-02-07 RX ORDER — DOCUSATE SODIUM 100 MG/1
100 CAPSULE, LIQUID FILLED ORAL DAILY
Status: DISCONTINUED | OUTPATIENT
Start: 2019-02-07 | End: 2019-02-11 | Stop reason: HOSPADM

## 2019-02-07 RX ORDER — SIMVASTATIN 40 MG
80 TABLET ORAL NIGHTLY
Status: DISCONTINUED | OUTPATIENT
Start: 2019-02-07 | End: 2019-02-11 | Stop reason: HOSPADM

## 2019-02-07 RX ADMIN — MULTIPLE VITAMINS W/ MINERALS TAB 1 TABLET: TAB at 08:36

## 2019-02-07 RX ADMIN — POTASSIUM CHLORIDE 10 MEQ: 7.46 INJECTION, SOLUTION INTRAVENOUS at 02:52

## 2019-02-07 RX ADMIN — ARIPIPRAZOLE 10 MG: 10 TABLET ORAL at 20:55

## 2019-02-07 RX ADMIN — DEXTROSE AND SODIUM CHLORIDE: 5; 450 INJECTION, SOLUTION INTRAVENOUS at 18:10

## 2019-02-07 RX ADMIN — SODIUM CHLORIDE 1000 ML: 9 INJECTION, SOLUTION INTRAVENOUS at 04:14

## 2019-02-07 RX ADMIN — HEPARIN SODIUM 5000 UNITS: 10000 INJECTION INTRAVENOUS; SUBCUTANEOUS at 20:55

## 2019-02-07 RX ADMIN — SODIUM CHLORIDE: 9 INJECTION, SOLUTION INTRAVENOUS at 01:37

## 2019-02-07 RX ADMIN — INSULIN LISPRO 1 UNITS: 100 INJECTION, SOLUTION INTRAVENOUS; SUBCUTANEOUS at 21:26

## 2019-02-07 RX ADMIN — DOCUSATE SODIUM 100 MG: 100 CAPSULE, LIQUID FILLED ORAL at 08:36

## 2019-02-07 RX ADMIN — SIMVASTATIN 80 MG: 40 TABLET, FILM COATED ORAL at 20:55

## 2019-02-07 RX ADMIN — HEPARIN SODIUM 5000 UNITS: 10000 INJECTION INTRAVENOUS; SUBCUTANEOUS at 08:44

## 2019-02-07 RX ADMIN — DEXTROSE AND SODIUM CHLORIDE: 5; 450 INJECTION, SOLUTION INTRAVENOUS at 11:06

## 2019-02-07 RX ADMIN — SODIUM CHLORIDE 3.2 UNITS/HR: 9 INJECTION, SOLUTION INTRAVENOUS at 18:08

## 2019-02-07 RX ADMIN — POLYETHYLENE GLYCOL 3350 17 G: 17 POWDER, FOR SOLUTION ORAL at 08:36

## 2019-02-07 RX ADMIN — PANTOPRAZOLE SODIUM 40 MG: 40 TABLET, DELAYED RELEASE ORAL at 06:16

## 2019-02-07 RX ADMIN — HEPARIN SODIUM 5000 UNITS: 10000 INJECTION INTRAVENOUS; SUBCUTANEOUS at 15:06

## 2019-02-07 RX ADMIN — POTASSIUM CHLORIDE 10 MEQ: 7.46 INJECTION, SOLUTION INTRAVENOUS at 01:43

## 2019-02-07 RX ADMIN — SODIUM CHLORIDE AND POTASSIUM CHLORIDE: .9; .15 SOLUTION INTRAVENOUS at 06:25

## 2019-02-07 RX ADMIN — SODIUM PHOSPHATE, MONOBASIC, MONOHYDRATE 15 MMOL: 276; 142 INJECTION, SOLUTION INTRAVENOUS at 20:54

## 2019-02-07 RX ADMIN — VENLAFAXINE HYDROCHLORIDE 75 MG: 75 CAPSULE, EXTENDED RELEASE ORAL at 08:36

## 2019-02-07 RX ADMIN — SODIUM PHOSPHATE, MONOBASIC, MONOHYDRATE 10 MMOL: 276; 142 INJECTION, SOLUTION INTRAVENOUS at 13:22

## 2019-02-07 ASSESSMENT — PAIN SCALES - GENERAL
PAINLEVEL_OUTOF10: 0

## 2019-02-08 LAB
ANION GAP SERPL CALCULATED.3IONS-SCNC: 8 MMOL/L (ref 7–16)
BASOPHILS ABSOLUTE: 0.03 E9/L (ref 0–0.2)
BASOPHILS RELATIVE PERCENT: 0.2 % (ref 0–2)
BUN BLDV-MCNC: 21 MG/DL (ref 8–23)
CALCIUM SERPL-MCNC: 8.5 MG/DL (ref 8.6–10.2)
CHLORIDE BLD-SCNC: 105 MMOL/L (ref 98–107)
CO2: 24 MMOL/L (ref 22–29)
CREAT SERPL-MCNC: 1 MG/DL (ref 0.7–1.2)
EOSINOPHILS ABSOLUTE: 0.03 E9/L (ref 0.05–0.5)
EOSINOPHILS RELATIVE PERCENT: 0.2 % (ref 0–6)
GFR AFRICAN AMERICAN: >60
GFR NON-AFRICAN AMERICAN: >60 ML/MIN/1.73
GLUCOSE BLD-MCNC: 245 MG/DL (ref 74–99)
HCT VFR BLD CALC: 36.7 % (ref 37–54)
HEMOGLOBIN: 13 G/DL (ref 12.5–16.5)
IMMATURE GRANULOCYTES #: 0.09 E9/L
IMMATURE GRANULOCYTES %: 0.6 % (ref 0–5)
LYMPHOCYTES ABSOLUTE: 1.74 E9/L (ref 1.5–4)
LYMPHOCYTES RELATIVE PERCENT: 11.7 % (ref 20–42)
MAGNESIUM: 1.7 MG/DL (ref 1.6–2.6)
MCH RBC QN AUTO: 26.9 PG (ref 26–35)
MCHC RBC AUTO-ENTMCNC: 35.4 % (ref 32–34.5)
MCV RBC AUTO: 75.8 FL (ref 80–99.9)
METER GLUCOSE: 224 MG/DL (ref 74–99)
METER GLUCOSE: 239 MG/DL (ref 74–99)
METER GLUCOSE: 243 MG/DL (ref 74–99)
METER GLUCOSE: 245 MG/DL (ref 74–99)
METER GLUCOSE: 293 MG/DL (ref 74–99)
MONOCYTES ABSOLUTE: 0.81 E9/L (ref 0.1–0.95)
MONOCYTES RELATIVE PERCENT: 5.5 % (ref 2–12)
NEUTROPHILS ABSOLUTE: 12.11 E9/L (ref 1.8–7.3)
NEUTROPHILS RELATIVE PERCENT: 81.8 % (ref 43–80)
PDW BLD-RTO: 12.6 FL (ref 11.5–15)
PHOSPHORUS: 2.5 MG/DL (ref 2.5–4.5)
PLATELET # BLD: 207 E9/L (ref 130–450)
PMV BLD AUTO: 11.5 FL (ref 7–12)
POTASSIUM SERPL-SCNC: 4.2 MMOL/L (ref 3.5–5)
RBC # BLD: 4.84 E12/L (ref 3.8–5.8)
SODIUM BLD-SCNC: 137 MMOL/L (ref 132–146)
WBC # BLD: 14.8 E9/L (ref 4.5–11.5)

## 2019-02-08 PROCEDURE — 83735 ASSAY OF MAGNESIUM: CPT

## 2019-02-08 PROCEDURE — 6360000002 HC RX W HCPCS: Performed by: STUDENT IN AN ORGANIZED HEALTH CARE EDUCATION/TRAINING PROGRAM

## 2019-02-08 PROCEDURE — 84100 ASSAY OF PHOSPHORUS: CPT

## 2019-02-08 PROCEDURE — 36415 COLL VENOUS BLD VENIPUNCTURE: CPT

## 2019-02-08 PROCEDURE — 2060000000 HC ICU INTERMEDIATE R&B

## 2019-02-08 PROCEDURE — 6370000000 HC RX 637 (ALT 250 FOR IP): Performed by: STUDENT IN AN ORGANIZED HEALTH CARE EDUCATION/TRAINING PROGRAM

## 2019-02-08 PROCEDURE — 6370000000 HC RX 637 (ALT 250 FOR IP): Performed by: HOSPITALIST

## 2019-02-08 PROCEDURE — 85025 COMPLETE CBC W/AUTO DIFF WBC: CPT

## 2019-02-08 PROCEDURE — 36592 COLLECT BLOOD FROM PICC: CPT

## 2019-02-08 PROCEDURE — 82962 GLUCOSE BLOOD TEST: CPT

## 2019-02-08 PROCEDURE — 6370000000 HC RX 637 (ALT 250 FOR IP): Performed by: FAMILY MEDICINE

## 2019-02-08 PROCEDURE — 6360000002 HC RX W HCPCS: Performed by: FAMILY MEDICINE

## 2019-02-08 PROCEDURE — 80048 BASIC METABOLIC PNL TOTAL CA: CPT

## 2019-02-08 RX ORDER — TRAZODONE HYDROCHLORIDE 50 MG/1
50 TABLET ORAL NIGHTLY PRN
Status: CANCELLED | OUTPATIENT
Start: 2019-02-08

## 2019-02-08 RX ORDER — DEXTROSE MONOHYDRATE 25 G/50ML
12.5 INJECTION, SOLUTION INTRAVENOUS PRN
Status: DISCONTINUED | OUTPATIENT
Start: 2019-02-08 | End: 2019-02-11 | Stop reason: HOSPADM

## 2019-02-08 RX ORDER — HEPARIN SODIUM 10000 [USP'U]/ML
5000 INJECTION, SOLUTION INTRAVENOUS; SUBCUTANEOUS EVERY 8 HOURS
Status: CANCELLED | OUTPATIENT
Start: 2019-02-08

## 2019-02-08 RX ORDER — ARIPIPRAZOLE 10 MG/1
10 TABLET ORAL NIGHTLY
Status: CANCELLED | OUTPATIENT
Start: 2019-02-08

## 2019-02-08 RX ORDER — NICOTINE POLACRILEX 4 MG
15 LOZENGE BUCCAL PRN
Status: DISCONTINUED | OUTPATIENT
Start: 2019-02-08 | End: 2019-02-11 | Stop reason: HOSPADM

## 2019-02-08 RX ORDER — POTASSIUM CHLORIDE 7.45 MG/ML
10 INJECTION INTRAVENOUS PRN
Status: CANCELLED | OUTPATIENT
Start: 2019-02-08

## 2019-02-08 RX ORDER — SIMVASTATIN 40 MG
80 TABLET ORAL NIGHTLY
Status: CANCELLED | OUTPATIENT
Start: 2019-02-08

## 2019-02-08 RX ORDER — DEXTROSE MONOHYDRATE 50 MG/ML
100 INJECTION, SOLUTION INTRAVENOUS PRN
Status: CANCELLED | OUTPATIENT
Start: 2019-02-08

## 2019-02-08 RX ORDER — POTASSIUM CHLORIDE 20 MEQ/1
40 TABLET, EXTENDED RELEASE ORAL PRN
Status: CANCELLED | OUTPATIENT
Start: 2019-02-08

## 2019-02-08 RX ORDER — POTASSIUM CHLORIDE 20MEQ/15ML
40 LIQUID (ML) ORAL PRN
Status: CANCELLED | OUTPATIENT
Start: 2019-02-08

## 2019-02-08 RX ORDER — POLYETHYLENE GLYCOL 3350 17 G/17G
17 POWDER, FOR SOLUTION ORAL DAILY
Status: CANCELLED | OUTPATIENT
Start: 2019-02-09

## 2019-02-08 RX ORDER — VENLAFAXINE HYDROCHLORIDE 75 MG/1
75 CAPSULE, EXTENDED RELEASE ORAL
Status: CANCELLED | OUTPATIENT
Start: 2019-02-09

## 2019-02-08 RX ORDER — ONDANSETRON 2 MG/ML
4 INJECTION INTRAMUSCULAR; INTRAVENOUS EVERY 6 HOURS PRN
Status: CANCELLED | OUTPATIENT
Start: 2019-02-08

## 2019-02-08 RX ORDER — NICOTINE POLACRILEX 4 MG
15 LOZENGE BUCCAL PRN
Status: CANCELLED | OUTPATIENT
Start: 2019-02-08

## 2019-02-08 RX ORDER — M-VIT,TX,IRON,MINS/CALC/FOLIC 27MG-0.4MG
1 TABLET ORAL DAILY
Status: CANCELLED | OUTPATIENT
Start: 2019-02-09

## 2019-02-08 RX ORDER — DEXTROSE MONOHYDRATE 50 MG/ML
100 INJECTION, SOLUTION INTRAVENOUS PRN
Status: DISCONTINUED | OUTPATIENT
Start: 2019-02-08 | End: 2019-02-11 | Stop reason: HOSPADM

## 2019-02-08 RX ORDER — PANTOPRAZOLE SODIUM 40 MG/1
40 TABLET, DELAYED RELEASE ORAL
Status: CANCELLED | OUTPATIENT
Start: 2019-02-09

## 2019-02-08 RX ORDER — MAGNESIUM SULFATE IN WATER 40 MG/ML
2 INJECTION, SOLUTION INTRAVENOUS ONCE
Status: COMPLETED | OUTPATIENT
Start: 2019-02-08 | End: 2019-02-08

## 2019-02-08 RX ORDER — DOCUSATE SODIUM 100 MG/1
100 CAPSULE, LIQUID FILLED ORAL DAILY
Status: CANCELLED | OUTPATIENT
Start: 2019-02-09

## 2019-02-08 RX ADMIN — DOCUSATE SODIUM 100 MG: 100 CAPSULE, LIQUID FILLED ORAL at 08:41

## 2019-02-08 RX ADMIN — INSULIN LISPRO 6 UNITS: 100 INJECTION, SOLUTION INTRAVENOUS; SUBCUTANEOUS at 06:21

## 2019-02-08 RX ADMIN — INSULIN LISPRO 2 UNITS: 100 INJECTION, SOLUTION INTRAVENOUS; SUBCUTANEOUS at 21:16

## 2019-02-08 RX ADMIN — PANTOPRAZOLE SODIUM 40 MG: 40 TABLET, DELAYED RELEASE ORAL at 06:09

## 2019-02-08 RX ADMIN — HEPARIN SODIUM 5000 UNITS: 10000 INJECTION INTRAVENOUS; SUBCUTANEOUS at 21:15

## 2019-02-08 RX ADMIN — MAGNESIUM SULFATE HEPTAHYDRATE 2 G: 40 INJECTION, SOLUTION INTRAVENOUS at 11:11

## 2019-02-08 RX ADMIN — HEPARIN SODIUM 5000 UNITS: 10000 INJECTION INTRAVENOUS; SUBCUTANEOUS at 06:09

## 2019-02-08 RX ADMIN — POLYETHYLENE GLYCOL 3350 17 G: 17 POWDER, FOR SOLUTION ORAL at 08:41

## 2019-02-08 RX ADMIN — SIMVASTATIN 80 MG: 40 TABLET, FILM COATED ORAL at 21:15

## 2019-02-08 RX ADMIN — VENLAFAXINE HYDROCHLORIDE 75 MG: 75 CAPSULE, EXTENDED RELEASE ORAL at 08:41

## 2019-02-08 RX ADMIN — MULTIPLE VITAMINS W/ MINERALS TAB 1 TABLET: TAB at 08:41

## 2019-02-08 RX ADMIN — HEPARIN SODIUM 5000 UNITS: 10000 INJECTION INTRAVENOUS; SUBCUTANEOUS at 14:09

## 2019-02-08 RX ADMIN — INSULIN LISPRO 4 UNITS: 100 INJECTION, SOLUTION INTRAVENOUS; SUBCUTANEOUS at 19:26

## 2019-02-08 RX ADMIN — METFORMIN HYDROCHLORIDE 1000 MG: 1000 TABLET ORAL at 19:26

## 2019-02-08 RX ADMIN — ARIPIPRAZOLE 10 MG: 10 TABLET ORAL at 21:15

## 2019-02-08 RX ADMIN — INSULIN HUMAN 45 UNITS: 100 INJECTION, SUSPENSION SUBCUTANEOUS at 19:25

## 2019-02-08 RX ADMIN — INSULIN LISPRO 4 UNITS: 100 INJECTION, SOLUTION INTRAVENOUS; SUBCUTANEOUS at 11:11

## 2019-02-08 ASSESSMENT — PAIN SCALES - GENERAL
PAINLEVEL_OUTOF10: 0

## 2019-02-09 LAB
ANION GAP SERPL CALCULATED.3IONS-SCNC: 10 MMOL/L (ref 7–16)
BASOPHILS ABSOLUTE: 0.02 E9/L (ref 0–0.2)
BASOPHILS RELATIVE PERCENT: 0.2 % (ref 0–2)
BUN BLDV-MCNC: 16 MG/DL (ref 8–23)
CALCIUM SERPL-MCNC: 8.9 MG/DL (ref 8.6–10.2)
CHLORIDE BLD-SCNC: 102 MMOL/L (ref 98–107)
CO2: 26 MMOL/L (ref 22–29)
CREAT SERPL-MCNC: 1 MG/DL (ref 0.7–1.2)
EOSINOPHILS ABSOLUTE: 0.12 E9/L (ref 0.05–0.5)
EOSINOPHILS RELATIVE PERCENT: 1.3 % (ref 0–6)
GFR AFRICAN AMERICAN: >60
GFR NON-AFRICAN AMERICAN: >60 ML/MIN/1.73
GLUCOSE BLD-MCNC: 61 MG/DL (ref 74–99)
HCT VFR BLD CALC: 37 % (ref 37–54)
HEMOGLOBIN: 13.3 G/DL (ref 12.5–16.5)
IMMATURE GRANULOCYTES #: 0.03 E9/L
IMMATURE GRANULOCYTES %: 0.3 % (ref 0–5)
LYMPHOCYTES ABSOLUTE: 2.33 E9/L (ref 1.5–4)
LYMPHOCYTES RELATIVE PERCENT: 26 % (ref 20–42)
MAGNESIUM: 2 MG/DL (ref 1.6–2.6)
MCH RBC QN AUTO: 26.8 PG (ref 26–35)
MCHC RBC AUTO-ENTMCNC: 35.9 % (ref 32–34.5)
MCV RBC AUTO: 74.6 FL (ref 80–99.9)
METER GLUCOSE: 117 MG/DL (ref 74–99)
METER GLUCOSE: 124 MG/DL (ref 74–99)
METER GLUCOSE: 163 MG/DL (ref 74–99)
METER GLUCOSE: 190 MG/DL (ref 74–99)
METER GLUCOSE: 59 MG/DL (ref 74–99)
MONOCYTES ABSOLUTE: 0.62 E9/L (ref 0.1–0.95)
MONOCYTES RELATIVE PERCENT: 6.9 % (ref 2–12)
NEUTROPHILS ABSOLUTE: 5.83 E9/L (ref 1.8–7.3)
NEUTROPHILS RELATIVE PERCENT: 65.3 % (ref 43–80)
PDW BLD-RTO: 12.7 FL (ref 11.5–15)
PHOSPHORUS: 2.3 MG/DL (ref 2.5–4.5)
PLATELET # BLD: 220 E9/L (ref 130–450)
PMV BLD AUTO: 10.7 FL (ref 7–12)
POTASSIUM SERPL-SCNC: 3.3 MMOL/L (ref 3.5–5)
RBC # BLD: 4.96 E12/L (ref 3.8–5.8)
SODIUM BLD-SCNC: 138 MMOL/L (ref 132–146)
URINE CULTURE, ROUTINE: NORMAL
WBC # BLD: 9 E9/L (ref 4.5–11.5)

## 2019-02-09 PROCEDURE — 6370000000 HC RX 637 (ALT 250 FOR IP): Performed by: STUDENT IN AN ORGANIZED HEALTH CARE EDUCATION/TRAINING PROGRAM

## 2019-02-09 PROCEDURE — 84100 ASSAY OF PHOSPHORUS: CPT

## 2019-02-09 PROCEDURE — 2060000000 HC ICU INTERMEDIATE R&B

## 2019-02-09 PROCEDURE — 83735 ASSAY OF MAGNESIUM: CPT

## 2019-02-09 PROCEDURE — 85025 COMPLETE CBC W/AUTO DIFF WBC: CPT

## 2019-02-09 PROCEDURE — 6370000000 HC RX 637 (ALT 250 FOR IP): Performed by: FAMILY MEDICINE

## 2019-02-09 PROCEDURE — 36415 COLL VENOUS BLD VENIPUNCTURE: CPT

## 2019-02-09 PROCEDURE — 6360000002 HC RX W HCPCS: Performed by: FAMILY MEDICINE

## 2019-02-09 PROCEDURE — 80048 BASIC METABOLIC PNL TOTAL CA: CPT

## 2019-02-09 PROCEDURE — 82962 GLUCOSE BLOOD TEST: CPT

## 2019-02-09 RX ADMIN — POTASSIUM CHLORIDE 40 MEQ: 20 TABLET, EXTENDED RELEASE ORAL at 08:46

## 2019-02-09 RX ADMIN — PANTOPRAZOLE SODIUM 40 MG: 40 TABLET, DELAYED RELEASE ORAL at 06:58

## 2019-02-09 RX ADMIN — HEPARIN SODIUM 5000 UNITS: 10000 INJECTION INTRAVENOUS; SUBCUTANEOUS at 05:21

## 2019-02-09 RX ADMIN — MULTIPLE VITAMINS W/ MINERALS TAB 1 TABLET: TAB at 08:46

## 2019-02-09 RX ADMIN — HEPARIN SODIUM 5000 UNITS: 10000 INJECTION INTRAVENOUS; SUBCUTANEOUS at 21:18

## 2019-02-09 RX ADMIN — POLYETHYLENE GLYCOL 3350 17 G: 17 POWDER, FOR SOLUTION ORAL at 08:46

## 2019-02-09 RX ADMIN — ARIPIPRAZOLE 10 MG: 10 TABLET ORAL at 21:18

## 2019-02-09 RX ADMIN — VENLAFAXINE HYDROCHLORIDE 75 MG: 75 CAPSULE, EXTENDED RELEASE ORAL at 08:46

## 2019-02-09 RX ADMIN — DOCUSATE SODIUM 100 MG: 100 CAPSULE, LIQUID FILLED ORAL at 08:46

## 2019-02-09 RX ADMIN — METFORMIN HYDROCHLORIDE 1000 MG: 1000 TABLET ORAL at 08:46

## 2019-02-09 RX ADMIN — HEPARIN SODIUM 5000 UNITS: 10000 INJECTION INTRAVENOUS; SUBCUTANEOUS at 13:53

## 2019-02-09 ASSESSMENT — PAIN SCALES - GENERAL
PAINLEVEL_OUTOF10: 0

## 2019-02-10 LAB
ANION GAP SERPL CALCULATED.3IONS-SCNC: 13 MMOL/L (ref 7–16)
BASOPHILS ABSOLUTE: 0.03 E9/L (ref 0–0.2)
BASOPHILS RELATIVE PERCENT: 0.4 % (ref 0–2)
BUN BLDV-MCNC: 14 MG/DL (ref 8–23)
CALCIUM SERPL-MCNC: 8.7 MG/DL (ref 8.6–10.2)
CHLORIDE BLD-SCNC: 100 MMOL/L (ref 98–107)
CO2: 23 MMOL/L (ref 22–29)
CREAT SERPL-MCNC: 0.9 MG/DL (ref 0.7–1.2)
EOSINOPHILS ABSOLUTE: 0.15 E9/L (ref 0.05–0.5)
EOSINOPHILS RELATIVE PERCENT: 2.1 % (ref 0–6)
GFR AFRICAN AMERICAN: >60
GFR NON-AFRICAN AMERICAN: >60 ML/MIN/1.73
GLUCOSE BLD-MCNC: 143 MG/DL (ref 74–99)
HCT VFR BLD CALC: 36.9 % (ref 37–54)
HEMOGLOBIN: 13.1 G/DL (ref 12.5–16.5)
IMMATURE GRANULOCYTES #: 0.02 E9/L
IMMATURE GRANULOCYTES %: 0.3 % (ref 0–5)
LYMPHOCYTES ABSOLUTE: 1.98 E9/L (ref 1.5–4)
LYMPHOCYTES RELATIVE PERCENT: 28 % (ref 20–42)
MAGNESIUM: 1.8 MG/DL (ref 1.6–2.6)
MCH RBC QN AUTO: 26.8 PG (ref 26–35)
MCHC RBC AUTO-ENTMCNC: 35.5 % (ref 32–34.5)
MCV RBC AUTO: 75.6 FL (ref 80–99.9)
METER GLUCOSE: 118 MG/DL (ref 74–99)
METER GLUCOSE: 135 MG/DL (ref 74–99)
METER GLUCOSE: 197 MG/DL (ref 74–99)
METER GLUCOSE: 67 MG/DL (ref 74–99)
METER GLUCOSE: 98 MG/DL (ref 74–99)
MONOCYTES ABSOLUTE: 0.43 E9/L (ref 0.1–0.95)
MONOCYTES RELATIVE PERCENT: 6.1 % (ref 2–12)
NEUTROPHILS ABSOLUTE: 4.47 E9/L (ref 1.8–7.3)
NEUTROPHILS RELATIVE PERCENT: 63.1 % (ref 43–80)
PDW BLD-RTO: 12.6 FL (ref 11.5–15)
PHOSPHORUS: 2.6 MG/DL (ref 2.5–4.5)
PLATELET # BLD: 196 E9/L (ref 130–450)
PMV BLD AUTO: 10.8 FL (ref 7–12)
POTASSIUM SERPL-SCNC: 4.3 MMOL/L (ref 3.5–5)
RBC # BLD: 4.88 E12/L (ref 3.8–5.8)
SODIUM BLD-SCNC: 136 MMOL/L (ref 132–146)
WBC # BLD: 7.1 E9/L (ref 4.5–11.5)

## 2019-02-10 PROCEDURE — 80048 BASIC METABOLIC PNL TOTAL CA: CPT

## 2019-02-10 PROCEDURE — 36592 COLLECT BLOOD FROM PICC: CPT

## 2019-02-10 PROCEDURE — 2060000000 HC ICU INTERMEDIATE R&B

## 2019-02-10 PROCEDURE — 6370000000 HC RX 637 (ALT 250 FOR IP): Performed by: HOSPITALIST

## 2019-02-10 PROCEDURE — 6370000000 HC RX 637 (ALT 250 FOR IP): Performed by: STUDENT IN AN ORGANIZED HEALTH CARE EDUCATION/TRAINING PROGRAM

## 2019-02-10 PROCEDURE — 36415 COLL VENOUS BLD VENIPUNCTURE: CPT

## 2019-02-10 PROCEDURE — 84100 ASSAY OF PHOSPHORUS: CPT

## 2019-02-10 PROCEDURE — 6370000000 HC RX 637 (ALT 250 FOR IP): Performed by: FAMILY MEDICINE

## 2019-02-10 PROCEDURE — 6360000002 HC RX W HCPCS: Performed by: FAMILY MEDICINE

## 2019-02-10 PROCEDURE — 82962 GLUCOSE BLOOD TEST: CPT

## 2019-02-10 PROCEDURE — 85025 COMPLETE CBC W/AUTO DIFF WBC: CPT

## 2019-02-10 PROCEDURE — 83735 ASSAY OF MAGNESIUM: CPT

## 2019-02-10 RX ADMIN — MULTIPLE VITAMINS W/ MINERALS TAB 1 TABLET: TAB at 09:32

## 2019-02-10 RX ADMIN — METFORMIN HYDROCHLORIDE 1000 MG: 1000 TABLET ORAL at 09:32

## 2019-02-10 RX ADMIN — HEPARIN SODIUM 5000 UNITS: 10000 INJECTION INTRAVENOUS; SUBCUTANEOUS at 14:04

## 2019-02-10 RX ADMIN — PANTOPRAZOLE SODIUM 40 MG: 40 TABLET, DELAYED RELEASE ORAL at 07:09

## 2019-02-10 RX ADMIN — VENLAFAXINE HYDROCHLORIDE 75 MG: 75 CAPSULE, EXTENDED RELEASE ORAL at 09:32

## 2019-02-10 RX ADMIN — DOCUSATE SODIUM 100 MG: 100 CAPSULE, LIQUID FILLED ORAL at 09:32

## 2019-02-10 RX ADMIN — INSULIN LISPRO 2 UNITS: 100 INJECTION, SOLUTION INTRAVENOUS; SUBCUTANEOUS at 12:03

## 2019-02-10 RX ADMIN — INSULIN HUMAN 30 UNITS: 100 INJECTION, SUSPENSION SUBCUTANEOUS at 09:34

## 2019-02-10 RX ADMIN — HEPARIN SODIUM 5000 UNITS: 10000 INJECTION INTRAVENOUS; SUBCUTANEOUS at 06:24

## 2019-02-10 RX ADMIN — HEPARIN SODIUM 5000 UNITS: 10000 INJECTION INTRAVENOUS; SUBCUTANEOUS at 20:49

## 2019-02-10 RX ADMIN — ARIPIPRAZOLE 10 MG: 10 TABLET ORAL at 20:44

## 2019-02-10 RX ADMIN — SIMVASTATIN 80 MG: 40 TABLET, FILM COATED ORAL at 20:44

## 2019-02-10 RX ADMIN — POLYETHYLENE GLYCOL 3350 17 G: 17 POWDER, FOR SOLUTION ORAL at 09:32

## 2019-02-10 ASSESSMENT — PAIN SCALES - GENERAL
PAINLEVEL_OUTOF10: 0

## 2019-02-11 ENCOUNTER — HOSPITAL ENCOUNTER (INPATIENT)
Age: 64
LOS: 11 days | Discharge: HOME HEALTH | DRG: 885 | End: 2019-02-22
Attending: PSYCHIATRY & NEUROLOGY | Admitting: PSYCHIATRY & NEUROLOGY
Payer: MEDICARE

## 2019-02-11 VITALS
DIASTOLIC BLOOD PRESSURE: 82 MMHG | TEMPERATURE: 97.9 F | RESPIRATION RATE: 18 BRPM | BODY MASS INDEX: 23.54 KG/M2 | HEIGHT: 69 IN | SYSTOLIC BLOOD PRESSURE: 121 MMHG | OXYGEN SATURATION: 97 % | HEART RATE: 82 BPM | WEIGHT: 158.9 LBS

## 2019-02-11 DIAGNOSIS — N17.9 AKI (ACUTE KIDNEY INJURY) (HCC): Primary | ICD-10-CM

## 2019-02-11 LAB
ANION GAP SERPL CALCULATED.3IONS-SCNC: 9 MMOL/L (ref 7–16)
BLOOD CULTURE, ROUTINE: NORMAL
BUN BLDV-MCNC: 14 MG/DL (ref 8–23)
CALCIUM SERPL-MCNC: 8.6 MG/DL (ref 8.6–10.2)
CHLORIDE BLD-SCNC: 100 MMOL/L (ref 98–107)
CO2: 25 MMOL/L (ref 22–29)
CREAT SERPL-MCNC: 1 MG/DL (ref 0.7–1.2)
CULTURE, BLOOD 2: NORMAL
GFR AFRICAN AMERICAN: >60
GFR NON-AFRICAN AMERICAN: >60 ML/MIN/1.73
GLUCOSE BLD-MCNC: 125 MG/DL (ref 74–99)
MAGNESIUM: 1.7 MG/DL (ref 1.6–2.6)
METER GLUCOSE: 111 MG/DL (ref 74–99)
METER GLUCOSE: 124 MG/DL (ref 74–99)
METER GLUCOSE: 166 MG/DL (ref 74–99)
METER GLUCOSE: 226 MG/DL (ref 74–99)
PHOSPHORUS: 2.6 MG/DL (ref 2.5–4.5)
POTASSIUM SERPL-SCNC: 4.1 MMOL/L (ref 3.5–5)
SODIUM BLD-SCNC: 134 MMOL/L (ref 132–146)

## 2019-02-11 PROCEDURE — 84100 ASSAY OF PHOSPHORUS: CPT

## 2019-02-11 PROCEDURE — 6370000000 HC RX 637 (ALT 250 FOR IP): Performed by: HOSPITALIST

## 2019-02-11 PROCEDURE — 6360000002 HC RX W HCPCS: Performed by: STUDENT IN AN ORGANIZED HEALTH CARE EDUCATION/TRAINING PROGRAM

## 2019-02-11 PROCEDURE — 83735 ASSAY OF MAGNESIUM: CPT

## 2019-02-11 PROCEDURE — 1240000000 HC EMOTIONAL WELLNESS R&B

## 2019-02-11 PROCEDURE — 80048 BASIC METABOLIC PNL TOTAL CA: CPT

## 2019-02-11 PROCEDURE — 6370000000 HC RX 637 (ALT 250 FOR IP): Performed by: FAMILY MEDICINE

## 2019-02-11 PROCEDURE — 82962 GLUCOSE BLOOD TEST: CPT

## 2019-02-11 PROCEDURE — 6370000000 HC RX 637 (ALT 250 FOR IP): Performed by: PSYCHIATRY & NEUROLOGY

## 2019-02-11 PROCEDURE — 6370000000 HC RX 637 (ALT 250 FOR IP): Performed by: STUDENT IN AN ORGANIZED HEALTH CARE EDUCATION/TRAINING PROGRAM

## 2019-02-11 PROCEDURE — 6360000002 HC RX W HCPCS: Performed by: FAMILY MEDICINE

## 2019-02-11 PROCEDURE — 36415 COLL VENOUS BLD VENIPUNCTURE: CPT

## 2019-02-11 RX ORDER — POLYETHYLENE GLYCOL 3350 17 G/17G
17 POWDER, FOR SOLUTION ORAL DAILY
Status: DISCONTINUED | OUTPATIENT
Start: 2019-02-11 | End: 2019-02-22 | Stop reason: HOSPADM

## 2019-02-11 RX ORDER — NICOTINE POLACRILEX 4 MG
15 LOZENGE BUCCAL PRN
Status: DISCONTINUED | OUTPATIENT
Start: 2019-02-11 | End: 2019-02-22 | Stop reason: HOSPADM

## 2019-02-11 RX ORDER — PANTOPRAZOLE SODIUM 40 MG/1
40 TABLET, DELAYED RELEASE ORAL
Status: DISCONTINUED | OUTPATIENT
Start: 2019-02-12 | End: 2019-02-22 | Stop reason: HOSPADM

## 2019-02-11 RX ORDER — ARIPIPRAZOLE 10 MG/1
10 TABLET ORAL NIGHTLY
Status: DISCONTINUED | OUTPATIENT
Start: 2019-02-11 | End: 2019-02-20

## 2019-02-11 RX ORDER — MAGNESIUM HYDROXIDE/ALUMINUM HYDROXICE/SIMETHICONE 120; 1200; 1200 MG/30ML; MG/30ML; MG/30ML
30 SUSPENSION ORAL PRN
Status: DISCONTINUED | OUTPATIENT
Start: 2019-02-11 | End: 2019-02-22 | Stop reason: HOSPADM

## 2019-02-11 RX ORDER — MAGNESIUM SULFATE IN WATER 40 MG/ML
2 INJECTION, SOLUTION INTRAVENOUS ONCE
Status: COMPLETED | OUTPATIENT
Start: 2019-02-11 | End: 2019-02-11

## 2019-02-11 RX ORDER — POTASSIUM CHLORIDE 20 MEQ/1
40 TABLET, EXTENDED RELEASE ORAL PRN
Status: DISCONTINUED | OUTPATIENT
Start: 2019-02-11 | End: 2019-02-22 | Stop reason: HOSPADM

## 2019-02-11 RX ORDER — POTASSIUM CHLORIDE 7.45 MG/ML
10 INJECTION INTRAVENOUS PRN
Status: DISCONTINUED | OUTPATIENT
Start: 2019-02-11 | End: 2019-02-22 | Stop reason: HOSPADM

## 2019-02-11 RX ORDER — VENLAFAXINE HYDROCHLORIDE 75 MG/1
75 CAPSULE, EXTENDED RELEASE ORAL
Status: DISCONTINUED | OUTPATIENT
Start: 2019-02-12 | End: 2019-02-22 | Stop reason: HOSPADM

## 2019-02-11 RX ORDER — POTASSIUM CHLORIDE 1.5 G/1.77G
40 POWDER, FOR SOLUTION ORAL PRN
Status: DISCONTINUED | OUTPATIENT
Start: 2019-02-11 | End: 2019-02-22 | Stop reason: HOSPADM

## 2019-02-11 RX ORDER — DEXTROSE MONOHYDRATE 50 MG/ML
100 INJECTION, SOLUTION INTRAVENOUS PRN
Status: DISCONTINUED | OUTPATIENT
Start: 2019-02-11 | End: 2019-02-22 | Stop reason: HOSPADM

## 2019-02-11 RX ORDER — HALOPERIDOL 5 MG/ML
2 INJECTION INTRAMUSCULAR EVERY 4 HOURS PRN
Status: DISCONTINUED | OUTPATIENT
Start: 2019-02-11 | End: 2019-02-22 | Stop reason: HOSPADM

## 2019-02-11 RX ORDER — DOCUSATE SODIUM 100 MG/1
100 CAPSULE, LIQUID FILLED ORAL DAILY
Status: DISCONTINUED | OUTPATIENT
Start: 2019-02-11 | End: 2019-02-22 | Stop reason: HOSPADM

## 2019-02-11 RX ORDER — SIMVASTATIN 40 MG
80 TABLET ORAL NIGHTLY
Status: DISCONTINUED | OUTPATIENT
Start: 2019-02-11 | End: 2019-02-22 | Stop reason: HOSPADM

## 2019-02-11 RX ORDER — LORAZEPAM 0.5 MG/1
0.5 TABLET ORAL EVERY 8 HOURS PRN
Status: DISCONTINUED | OUTPATIENT
Start: 2019-02-11 | End: 2019-02-22 | Stop reason: HOSPADM

## 2019-02-11 RX ORDER — BENZTROPINE MESYLATE 1 MG/ML
2 INJECTION INTRAMUSCULAR; INTRAVENOUS 2 TIMES DAILY PRN
Status: DISCONTINUED | OUTPATIENT
Start: 2019-02-11 | End: 2019-02-22 | Stop reason: HOSPADM

## 2019-02-11 RX ORDER — TRAZODONE HYDROCHLORIDE 50 MG/1
50 TABLET ORAL NIGHTLY PRN
Status: DISCONTINUED | OUTPATIENT
Start: 2019-02-11 | End: 2019-02-22 | Stop reason: HOSPADM

## 2019-02-11 RX ORDER — ACETAMINOPHEN 325 MG/1
650 TABLET ORAL EVERY 4 HOURS PRN
Status: DISCONTINUED | OUTPATIENT
Start: 2019-02-11 | End: 2019-02-22 | Stop reason: HOSPADM

## 2019-02-11 RX ORDER — M-VIT,TX,IRON,MINS/CALC/FOLIC 27MG-0.4MG
1 TABLET ORAL DAILY
Status: DISCONTINUED | OUTPATIENT
Start: 2019-02-12 | End: 2019-02-22 | Stop reason: HOSPADM

## 2019-02-11 RX ORDER — ONDANSETRON 2 MG/ML
4 INJECTION INTRAMUSCULAR; INTRAVENOUS EVERY 6 HOURS PRN
Status: DISCONTINUED | OUTPATIENT
Start: 2019-02-11 | End: 2019-02-22 | Stop reason: HOSPADM

## 2019-02-11 RX ADMIN — INSULIN HUMAN 30 UNITS: 100 INJECTION, SUSPENSION SUBCUTANEOUS at 16:29

## 2019-02-11 RX ADMIN — METFORMIN HYDROCHLORIDE 1000 MG: 1000 TABLET ORAL at 08:13

## 2019-02-11 RX ADMIN — INSULIN LISPRO 2 UNITS: 100 INJECTION, SOLUTION INTRAVENOUS; SUBCUTANEOUS at 16:30

## 2019-02-11 RX ADMIN — HEPARIN SODIUM 5000 UNITS: 10000 INJECTION INTRAVENOUS; SUBCUTANEOUS at 14:03

## 2019-02-11 RX ADMIN — MAGNESIUM SULFATE HEPTAHYDRATE 2 G: 40 INJECTION, SOLUTION INTRAVENOUS at 08:12

## 2019-02-11 RX ADMIN — VENLAFAXINE HYDROCHLORIDE 75 MG: 75 CAPSULE, EXTENDED RELEASE ORAL at 08:13

## 2019-02-11 RX ADMIN — MULTIPLE VITAMINS W/ MINERALS TAB 1 TABLET: TAB at 08:13

## 2019-02-11 RX ADMIN — POLYETHYLENE GLYCOL 3350 17 G: 17 POWDER, FOR SOLUTION ORAL at 08:13

## 2019-02-11 RX ADMIN — INSULIN LISPRO 4 UNITS: 100 INJECTION, SOLUTION INTRAVENOUS; SUBCUTANEOUS at 11:31

## 2019-02-11 RX ADMIN — HEPARIN SODIUM 5000 UNITS: 10000 INJECTION INTRAVENOUS; SUBCUTANEOUS at 05:42

## 2019-02-11 RX ADMIN — METFORMIN HYDROCHLORIDE 1000 MG: 1000 TABLET ORAL at 16:29

## 2019-02-11 RX ADMIN — PANTOPRAZOLE SODIUM 40 MG: 40 TABLET, DELAYED RELEASE ORAL at 05:42

## 2019-02-11 RX ADMIN — DOCUSATE SODIUM 100 MG: 100 CAPSULE, LIQUID FILLED ORAL at 08:12

## 2019-02-11 ASSESSMENT — PAIN SCALES - GENERAL
PAINLEVEL_OUTOF10: 0

## 2019-02-12 LAB
EKG ATRIAL RATE: 110 BPM
EKG P AXIS: 79 DEGREES
EKG P-R INTERVAL: 136 MS
EKG Q-T INTERVAL: 318 MS
EKG QRS DURATION: 74 MS
EKG QTC CALCULATION (BAZETT): 430 MS
EKG R AXIS: 66 DEGREES
EKG T AXIS: 80 DEGREES
EKG VENTRICULAR RATE: 110 BPM
LITHIUM DOSE AMOUNT: ABNORMAL
LITHIUM LEVEL: <0.1 MMOL/L (ref 0.5–1.5)
METER GLUCOSE: 121 MG/DL (ref 74–99)
METER GLUCOSE: 122 MG/DL (ref 74–99)
METER GLUCOSE: 168 MG/DL (ref 74–99)
METER GLUCOSE: 83 MG/DL (ref 74–99)
VALPROIC ACID LEVEL: <3 MCG/ML (ref 50–100)

## 2019-02-12 PROCEDURE — 97530 THERAPEUTIC ACTIVITIES: CPT

## 2019-02-12 PROCEDURE — 97165 OT EVAL LOW COMPLEX 30 MIN: CPT

## 2019-02-12 PROCEDURE — 36415 COLL VENOUS BLD VENIPUNCTURE: CPT

## 2019-02-12 PROCEDURE — 6370000000 HC RX 637 (ALT 250 FOR IP): Performed by: NURSE PRACTITIONER

## 2019-02-12 PROCEDURE — 6370000000 HC RX 637 (ALT 250 FOR IP): Performed by: STUDENT IN AN ORGANIZED HEALTH CARE EDUCATION/TRAINING PROGRAM

## 2019-02-12 PROCEDURE — 80164 ASSAY DIPROPYLACETIC ACD TOT: CPT

## 2019-02-12 PROCEDURE — 1240000000 HC EMOTIONAL WELLNESS R&B

## 2019-02-12 PROCEDURE — 80178 ASSAY OF LITHIUM: CPT

## 2019-02-12 PROCEDURE — 99221 1ST HOSP IP/OBS SF/LOW 40: CPT | Performed by: NURSE PRACTITIONER

## 2019-02-12 PROCEDURE — 82962 GLUCOSE BLOOD TEST: CPT

## 2019-02-12 RX ORDER — VENLAFAXINE HYDROCHLORIDE 37.5 MG/1
37.5 CAPSULE, EXTENDED RELEASE ORAL
Status: DISCONTINUED | OUTPATIENT
Start: 2019-02-12 | End: 2019-02-22

## 2019-02-12 RX ADMIN — ARIPIPRAZOLE 10 MG: 10 TABLET ORAL at 20:40

## 2019-02-12 RX ADMIN — METFORMIN HYDROCHLORIDE 1000 MG: 1000 TABLET ORAL at 10:18

## 2019-02-12 RX ADMIN — INSULIN HUMAN 30 UNITS: 100 INJECTION, SUSPENSION SUBCUTANEOUS at 10:19

## 2019-02-12 RX ADMIN — SIMVASTATIN 80 MG: 40 TABLET, FILM COATED ORAL at 20:40

## 2019-02-12 RX ADMIN — DOCUSATE SODIUM 100 MG: 100 CAPSULE, LIQUID FILLED ORAL at 10:18

## 2019-02-12 RX ADMIN — MULTIPLE VITAMINS W/ MINERALS TAB 1 TABLET: TAB at 10:18

## 2019-02-12 RX ADMIN — METFORMIN HYDROCHLORIDE 1000 MG: 1000 TABLET ORAL at 18:10

## 2019-02-12 RX ADMIN — PANTOPRAZOLE SODIUM 40 MG: 40 TABLET, DELAYED RELEASE ORAL at 06:56

## 2019-02-12 RX ADMIN — INSULIN HUMAN 30 UNITS: 100 INJECTION, SUSPENSION SUBCUTANEOUS at 18:11

## 2019-02-12 RX ADMIN — VENLAFAXINE HYDROCHLORIDE 75 MG: 75 CAPSULE, EXTENDED RELEASE ORAL at 10:18

## 2019-02-12 RX ADMIN — VENLAFAXINE HYDROCHLORIDE 37.5 MG: 37.5 CAPSULE, EXTENDED RELEASE ORAL at 11:32

## 2019-02-12 RX ADMIN — INSULIN LISPRO 2 UNITS: 100 INJECTION, SOLUTION INTRAVENOUS; SUBCUTANEOUS at 13:05

## 2019-02-12 RX ADMIN — TRAZODONE HYDROCHLORIDE 50 MG: 50 TABLET ORAL at 20:41

## 2019-02-12 ASSESSMENT — PATIENT HEALTH QUESTIONNAIRE - PHQ9: SUM OF ALL RESPONSES TO PHQ QUESTIONS 1-9: 19

## 2019-02-12 ASSESSMENT — PAIN SCALES - GENERAL
PAINLEVEL_OUTOF10: 0
PAINLEVEL_OUTOF10: 0

## 2019-02-12 ASSESSMENT — SLEEP AND FATIGUE QUESTIONNAIRES: DO YOU HAVE DIFFICULTY SLEEPING: YES

## 2019-02-12 ASSESSMENT — LIFESTYLE VARIABLES: HISTORY_ALCOHOL_USE: NO

## 2019-02-13 LAB
METER GLUCOSE: 130 MG/DL (ref 74–99)
METER GLUCOSE: 137 MG/DL (ref 74–99)
METER GLUCOSE: 152 MG/DL (ref 74–99)
METER GLUCOSE: 198 MG/DL (ref 74–99)
METER GLUCOSE: 56 MG/DL (ref 74–99)

## 2019-02-13 PROCEDURE — 6370000000 HC RX 637 (ALT 250 FOR IP): Performed by: STUDENT IN AN ORGANIZED HEALTH CARE EDUCATION/TRAINING PROGRAM

## 2019-02-13 PROCEDURE — 99231 SBSQ HOSP IP/OBS SF/LOW 25: CPT | Performed by: NURSE PRACTITIONER

## 2019-02-13 PROCEDURE — 1240000000 HC EMOTIONAL WELLNESS R&B

## 2019-02-13 PROCEDURE — 82962 GLUCOSE BLOOD TEST: CPT

## 2019-02-13 PROCEDURE — 6370000000 HC RX 637 (ALT 250 FOR IP): Performed by: NURSE PRACTITIONER

## 2019-02-13 RX ADMIN — METFORMIN HYDROCHLORIDE 1000 MG: 1000 TABLET ORAL at 18:12

## 2019-02-13 RX ADMIN — INSULIN HUMAN 30 UNITS: 100 INJECTION, SUSPENSION SUBCUTANEOUS at 17:27

## 2019-02-13 RX ADMIN — VENLAFAXINE HYDROCHLORIDE 75 MG: 75 CAPSULE, EXTENDED RELEASE ORAL at 10:19

## 2019-02-13 RX ADMIN — DEXTROSE 15 G: 15 GEL ORAL at 08:15

## 2019-02-13 RX ADMIN — DOCUSATE SODIUM 100 MG: 100 CAPSULE, LIQUID FILLED ORAL at 08:17

## 2019-02-13 RX ADMIN — POLYETHYLENE GLYCOL 3350 17 G: 17 POWDER, FOR SOLUTION ORAL at 08:17

## 2019-02-13 RX ADMIN — VENLAFAXINE HYDROCHLORIDE 37.5 MG: 37.5 CAPSULE, EXTENDED RELEASE ORAL at 12:56

## 2019-02-13 RX ADMIN — SIMVASTATIN 80 MG: 40 TABLET, FILM COATED ORAL at 21:25

## 2019-02-13 RX ADMIN — PANTOPRAZOLE SODIUM 40 MG: 40 TABLET, DELAYED RELEASE ORAL at 06:08

## 2019-02-13 RX ADMIN — METFORMIN HYDROCHLORIDE 1000 MG: 1000 TABLET ORAL at 08:16

## 2019-02-13 RX ADMIN — INSULIN LISPRO 2 UNITS: 100 INJECTION, SOLUTION INTRAVENOUS; SUBCUTANEOUS at 13:03

## 2019-02-13 RX ADMIN — ARIPIPRAZOLE 10 MG: 10 TABLET ORAL at 21:25

## 2019-02-13 RX ADMIN — MULTIPLE VITAMINS W/ MINERALS TAB 1 TABLET: TAB at 08:17

## 2019-02-13 ASSESSMENT — PAIN SCALES - GENERAL: PAINLEVEL_OUTOF10: 0

## 2019-02-14 LAB
METER GLUCOSE: 156 MG/DL (ref 74–99)
METER GLUCOSE: 173 MG/DL (ref 74–99)
METER GLUCOSE: 71 MG/DL (ref 74–99)
METER GLUCOSE: 95 MG/DL (ref 74–99)

## 2019-02-14 PROCEDURE — 6370000000 HC RX 637 (ALT 250 FOR IP): Performed by: STUDENT IN AN ORGANIZED HEALTH CARE EDUCATION/TRAINING PROGRAM

## 2019-02-14 PROCEDURE — 1240000000 HC EMOTIONAL WELLNESS R&B

## 2019-02-14 PROCEDURE — 82962 GLUCOSE BLOOD TEST: CPT

## 2019-02-14 PROCEDURE — 99231 SBSQ HOSP IP/OBS SF/LOW 25: CPT | Performed by: NURSE PRACTITIONER

## 2019-02-14 PROCEDURE — 6370000000 HC RX 637 (ALT 250 FOR IP): Performed by: NURSE PRACTITIONER

## 2019-02-14 RX ADMIN — ARIPIPRAZOLE 10 MG: 10 TABLET ORAL at 20:44

## 2019-02-14 RX ADMIN — METFORMIN HYDROCHLORIDE 1000 MG: 1000 TABLET ORAL at 09:28

## 2019-02-14 RX ADMIN — SIMVASTATIN 80 MG: 40 TABLET, FILM COATED ORAL at 20:44

## 2019-02-14 RX ADMIN — VENLAFAXINE HYDROCHLORIDE 75 MG: 75 CAPSULE, EXTENDED RELEASE ORAL at 09:28

## 2019-02-14 RX ADMIN — PANTOPRAZOLE SODIUM 40 MG: 40 TABLET, DELAYED RELEASE ORAL at 06:54

## 2019-02-14 RX ADMIN — DOCUSATE SODIUM 100 MG: 100 CAPSULE, LIQUID FILLED ORAL at 09:28

## 2019-02-14 RX ADMIN — MULTIPLE VITAMINS W/ MINERALS TAB 1 TABLET: TAB at 09:28

## 2019-02-14 RX ADMIN — VENLAFAXINE HYDROCHLORIDE 37.5 MG: 37.5 CAPSULE, EXTENDED RELEASE ORAL at 12:57

## 2019-02-14 RX ADMIN — METFORMIN HYDROCHLORIDE 1000 MG: 1000 TABLET ORAL at 17:27

## 2019-02-14 RX ADMIN — INSULIN LISPRO 2 UNITS: 100 INJECTION, SOLUTION INTRAVENOUS; SUBCUTANEOUS at 12:56

## 2019-02-14 ASSESSMENT — PAIN SCALES - GENERAL
PAINLEVEL_OUTOF10: 0
PAINLEVEL_OUTOF10: 8

## 2019-02-15 LAB
METER GLUCOSE: 124 MG/DL (ref 74–99)
METER GLUCOSE: 148 MG/DL (ref 74–99)
METER GLUCOSE: 82 MG/DL (ref 74–99)
METER GLUCOSE: 98 MG/DL (ref 74–99)

## 2019-02-15 PROCEDURE — 99231 SBSQ HOSP IP/OBS SF/LOW 25: CPT | Performed by: NURSE PRACTITIONER

## 2019-02-15 PROCEDURE — 6370000000 HC RX 637 (ALT 250 FOR IP): Performed by: STUDENT IN AN ORGANIZED HEALTH CARE EDUCATION/TRAINING PROGRAM

## 2019-02-15 PROCEDURE — 82962 GLUCOSE BLOOD TEST: CPT

## 2019-02-15 PROCEDURE — 1240000000 HC EMOTIONAL WELLNESS R&B

## 2019-02-15 PROCEDURE — 6370000000 HC RX 637 (ALT 250 FOR IP): Performed by: NURSE PRACTITIONER

## 2019-02-15 RX ADMIN — PANTOPRAZOLE SODIUM 40 MG: 40 TABLET, DELAYED RELEASE ORAL at 05:57

## 2019-02-15 RX ADMIN — INSULIN LISPRO 2 UNITS: 100 INJECTION, SOLUTION INTRAVENOUS; SUBCUTANEOUS at 12:41

## 2019-02-15 RX ADMIN — ARIPIPRAZOLE 10 MG: 10 TABLET ORAL at 21:22

## 2019-02-15 RX ADMIN — VENLAFAXINE HYDROCHLORIDE 75 MG: 75 CAPSULE, EXTENDED RELEASE ORAL at 08:41

## 2019-02-15 RX ADMIN — SIMVASTATIN 80 MG: 40 TABLET, FILM COATED ORAL at 21:22

## 2019-02-15 RX ADMIN — DOCUSATE SODIUM 100 MG: 100 CAPSULE, LIQUID FILLED ORAL at 08:41

## 2019-02-15 RX ADMIN — MULTIPLE VITAMINS W/ MINERALS TAB 1 TABLET: TAB at 08:41

## 2019-02-15 RX ADMIN — VENLAFAXINE HYDROCHLORIDE 37.5 MG: 37.5 CAPSULE, EXTENDED RELEASE ORAL at 12:43

## 2019-02-15 RX ADMIN — METFORMIN HYDROCHLORIDE 1000 MG: 1000 TABLET ORAL at 08:41

## 2019-02-15 RX ADMIN — TRAZODONE HYDROCHLORIDE 50 MG: 50 TABLET ORAL at 21:22

## 2019-02-15 RX ADMIN — METFORMIN HYDROCHLORIDE 1000 MG: 1000 TABLET ORAL at 17:32

## 2019-02-15 ASSESSMENT — PAIN SCALES - GENERAL
PAINLEVEL_OUTOF10: 0

## 2019-02-16 LAB
METER GLUCOSE: 134 MG/DL (ref 74–99)
METER GLUCOSE: 136 MG/DL (ref 74–99)
METER GLUCOSE: 160 MG/DL (ref 74–99)
METER GLUCOSE: 192 MG/DL (ref 74–99)
METER GLUCOSE: 62 MG/DL (ref 74–99)

## 2019-02-16 PROCEDURE — 6370000000 HC RX 637 (ALT 250 FOR IP): Performed by: NURSE PRACTITIONER

## 2019-02-16 PROCEDURE — 1240000000 HC EMOTIONAL WELLNESS R&B

## 2019-02-16 PROCEDURE — 99231 SBSQ HOSP IP/OBS SF/LOW 25: CPT | Performed by: NURSE PRACTITIONER

## 2019-02-16 PROCEDURE — 6370000000 HC RX 637 (ALT 250 FOR IP): Performed by: STUDENT IN AN ORGANIZED HEALTH CARE EDUCATION/TRAINING PROGRAM

## 2019-02-16 PROCEDURE — 82962 GLUCOSE BLOOD TEST: CPT

## 2019-02-16 RX ADMIN — DOCUSATE SODIUM 100 MG: 100 CAPSULE, LIQUID FILLED ORAL at 08:54

## 2019-02-16 RX ADMIN — INSULIN LISPRO 2 UNITS: 100 INJECTION, SOLUTION INTRAVENOUS; SUBCUTANEOUS at 12:42

## 2019-02-16 RX ADMIN — POLYETHYLENE GLYCOL 3350 17 G: 17 POWDER, FOR SOLUTION ORAL at 08:55

## 2019-02-16 RX ADMIN — DEXTROSE 15 G: 15 GEL ORAL at 08:30

## 2019-02-16 RX ADMIN — PANTOPRAZOLE SODIUM 40 MG: 40 TABLET, DELAYED RELEASE ORAL at 08:54

## 2019-02-16 RX ADMIN — ARIPIPRAZOLE 10 MG: 10 TABLET ORAL at 20:30

## 2019-02-16 RX ADMIN — METFORMIN HYDROCHLORIDE 1000 MG: 1000 TABLET ORAL at 18:09

## 2019-02-16 RX ADMIN — SIMVASTATIN 80 MG: 40 TABLET, FILM COATED ORAL at 20:30

## 2019-02-16 RX ADMIN — TRAZODONE HYDROCHLORIDE 50 MG: 50 TABLET ORAL at 20:30

## 2019-02-16 RX ADMIN — METFORMIN HYDROCHLORIDE 1000 MG: 1000 TABLET ORAL at 08:55

## 2019-02-16 RX ADMIN — MULTIPLE VITAMINS W/ MINERALS TAB 1 TABLET: TAB at 08:54

## 2019-02-16 RX ADMIN — VENLAFAXINE HYDROCHLORIDE 75 MG: 75 CAPSULE, EXTENDED RELEASE ORAL at 08:55

## 2019-02-16 RX ADMIN — VENLAFAXINE HYDROCHLORIDE 37.5 MG: 37.5 CAPSULE, EXTENDED RELEASE ORAL at 12:41

## 2019-02-16 ASSESSMENT — PAIN SCALES - GENERAL
PAINLEVEL_OUTOF10: 0

## 2019-02-17 LAB
METER GLUCOSE: 124 MG/DL (ref 74–99)
METER GLUCOSE: 171 MG/DL (ref 74–99)
METER GLUCOSE: 172 MG/DL (ref 74–99)
METER GLUCOSE: 186 MG/DL (ref 74–99)

## 2019-02-17 PROCEDURE — 99231 SBSQ HOSP IP/OBS SF/LOW 25: CPT | Performed by: NURSE PRACTITIONER

## 2019-02-17 PROCEDURE — 6370000000 HC RX 637 (ALT 250 FOR IP): Performed by: NURSE PRACTITIONER

## 2019-02-17 PROCEDURE — 82962 GLUCOSE BLOOD TEST: CPT

## 2019-02-17 PROCEDURE — 1240000000 HC EMOTIONAL WELLNESS R&B

## 2019-02-17 PROCEDURE — 6370000000 HC RX 637 (ALT 250 FOR IP): Performed by: STUDENT IN AN ORGANIZED HEALTH CARE EDUCATION/TRAINING PROGRAM

## 2019-02-17 PROCEDURE — 6370000000 HC RX 637 (ALT 250 FOR IP): Performed by: PSYCHIATRY & NEUROLOGY

## 2019-02-17 RX ADMIN — VENLAFAXINE HYDROCHLORIDE 75 MG: 75 CAPSULE, EXTENDED RELEASE ORAL at 08:46

## 2019-02-17 RX ADMIN — PANTOPRAZOLE SODIUM 40 MG: 40 TABLET, DELAYED RELEASE ORAL at 08:52

## 2019-02-17 RX ADMIN — INSULIN LISPRO 2 UNITS: 100 INJECTION, SOLUTION INTRAVENOUS; SUBCUTANEOUS at 18:13

## 2019-02-17 RX ADMIN — METFORMIN HYDROCHLORIDE 1000 MG: 1000 TABLET ORAL at 18:13

## 2019-02-17 RX ADMIN — MAGNESIUM HYDROXIDE 30 ML: 400 SUSPENSION ORAL at 08:52

## 2019-02-17 RX ADMIN — INSULIN LISPRO 1 UNITS: 100 INJECTION, SOLUTION INTRAVENOUS; SUBCUTANEOUS at 20:46

## 2019-02-17 RX ADMIN — MULTIPLE VITAMINS W/ MINERALS TAB 1 TABLET: TAB at 08:46

## 2019-02-17 RX ADMIN — POLYETHYLENE GLYCOL 3350 17 G: 17 POWDER, FOR SOLUTION ORAL at 08:46

## 2019-02-17 RX ADMIN — DOCUSATE SODIUM 100 MG: 100 CAPSULE, LIQUID FILLED ORAL at 08:46

## 2019-02-17 RX ADMIN — INSULIN LISPRO 2 UNITS: 100 INJECTION, SOLUTION INTRAVENOUS; SUBCUTANEOUS at 13:36

## 2019-02-17 RX ADMIN — TRAZODONE HYDROCHLORIDE 50 MG: 50 TABLET ORAL at 20:24

## 2019-02-17 RX ADMIN — METFORMIN HYDROCHLORIDE 1000 MG: 1000 TABLET ORAL at 08:46

## 2019-02-17 RX ADMIN — SIMVASTATIN 80 MG: 40 TABLET, FILM COATED ORAL at 20:24

## 2019-02-17 RX ADMIN — VENLAFAXINE HYDROCHLORIDE 37.5 MG: 37.5 CAPSULE, EXTENDED RELEASE ORAL at 13:35

## 2019-02-17 RX ADMIN — ARIPIPRAZOLE 10 MG: 10 TABLET ORAL at 20:24

## 2019-02-17 ASSESSMENT — PAIN SCALES - GENERAL
PAINLEVEL_OUTOF10: 0
PAINLEVEL_OUTOF10: 0

## 2019-02-18 LAB
METER GLUCOSE: 101 MG/DL (ref 74–99)
METER GLUCOSE: 134 MG/DL (ref 74–99)
METER GLUCOSE: 134 MG/DL (ref 74–99)
METER GLUCOSE: 204 MG/DL (ref 74–99)

## 2019-02-18 PROCEDURE — 1240000000 HC EMOTIONAL WELLNESS R&B

## 2019-02-18 PROCEDURE — 6370000000 HC RX 637 (ALT 250 FOR IP): Performed by: NURSE PRACTITIONER

## 2019-02-18 PROCEDURE — 99231 SBSQ HOSP IP/OBS SF/LOW 25: CPT | Performed by: NURSE PRACTITIONER

## 2019-02-18 PROCEDURE — 6370000000 HC RX 637 (ALT 250 FOR IP): Performed by: STUDENT IN AN ORGANIZED HEALTH CARE EDUCATION/TRAINING PROGRAM

## 2019-02-18 PROCEDURE — 82962 GLUCOSE BLOOD TEST: CPT

## 2019-02-18 RX ADMIN — VENLAFAXINE HYDROCHLORIDE 75 MG: 75 CAPSULE, EXTENDED RELEASE ORAL at 09:03

## 2019-02-18 RX ADMIN — METFORMIN HYDROCHLORIDE 1000 MG: 1000 TABLET ORAL at 09:03

## 2019-02-18 RX ADMIN — SIMVASTATIN 80 MG: 40 TABLET, FILM COATED ORAL at 20:44

## 2019-02-18 RX ADMIN — PANTOPRAZOLE SODIUM 40 MG: 40 TABLET, DELAYED RELEASE ORAL at 09:02

## 2019-02-18 RX ADMIN — DOCUSATE SODIUM 100 MG: 100 CAPSULE, LIQUID FILLED ORAL at 09:02

## 2019-02-18 RX ADMIN — METFORMIN HYDROCHLORIDE 1000 MG: 1000 TABLET ORAL at 17:21

## 2019-02-18 RX ADMIN — ARIPIPRAZOLE 10 MG: 10 TABLET ORAL at 20:44

## 2019-02-18 RX ADMIN — MULTIPLE VITAMINS W/ MINERALS TAB 1 TABLET: TAB at 09:03

## 2019-02-18 RX ADMIN — TRAZODONE HYDROCHLORIDE 50 MG: 50 TABLET ORAL at 20:44

## 2019-02-18 RX ADMIN — VENLAFAXINE HYDROCHLORIDE 37.5 MG: 37.5 CAPSULE, EXTENDED RELEASE ORAL at 12:34

## 2019-02-18 RX ADMIN — INSULIN LISPRO 4 UNITS: 100 INJECTION, SOLUTION INTRAVENOUS; SUBCUTANEOUS at 12:34

## 2019-02-18 ASSESSMENT — PAIN SCALES - GENERAL
PAINLEVEL_OUTOF10: 0
PAINLEVEL_OUTOF10: 0

## 2019-02-19 PROBLEM — F01.50 DEMENTIA, VASCULAR (HCC): Status: ACTIVE | Noted: 2019-02-19

## 2019-02-19 LAB
ANION GAP SERPL CALCULATED.3IONS-SCNC: 9 MMOL/L (ref 7–16)
BUN BLDV-MCNC: 20 MG/DL (ref 8–23)
CALCIUM SERPL-MCNC: 9 MG/DL (ref 8.6–10.2)
CHLORIDE BLD-SCNC: 98 MMOL/L (ref 98–107)
CO2: 28 MMOL/L (ref 22–29)
CREAT SERPL-MCNC: 1.2 MG/DL (ref 0.7–1.2)
GFR AFRICAN AMERICAN: >60
GFR NON-AFRICAN AMERICAN: >60 ML/MIN/1.73
GLUCOSE BLD-MCNC: 104 MG/DL (ref 74–99)
METER GLUCOSE: 115 MG/DL (ref 74–99)
METER GLUCOSE: 136 MG/DL (ref 74–99)
METER GLUCOSE: 217 MG/DL (ref 74–99)
METER GLUCOSE: 99 MG/DL (ref 74–99)
POTASSIUM SERPL-SCNC: 5.2 MMOL/L (ref 3.5–5)
SODIUM BLD-SCNC: 135 MMOL/L (ref 132–146)

## 2019-02-19 PROCEDURE — 6370000000 HC RX 637 (ALT 250 FOR IP): Performed by: NURSE PRACTITIONER

## 2019-02-19 PROCEDURE — 6370000000 HC RX 637 (ALT 250 FOR IP): Performed by: STUDENT IN AN ORGANIZED HEALTH CARE EDUCATION/TRAINING PROGRAM

## 2019-02-19 PROCEDURE — 82962 GLUCOSE BLOOD TEST: CPT

## 2019-02-19 PROCEDURE — 36415 COLL VENOUS BLD VENIPUNCTURE: CPT

## 2019-02-19 PROCEDURE — 80048 BASIC METABOLIC PNL TOTAL CA: CPT

## 2019-02-19 PROCEDURE — 1240000000 HC EMOTIONAL WELLNESS R&B

## 2019-02-19 PROCEDURE — 99231 SBSQ HOSP IP/OBS SF/LOW 25: CPT | Performed by: NURSE PRACTITIONER

## 2019-02-19 RX ADMIN — POLYETHYLENE GLYCOL 3350 17 G: 17 POWDER, FOR SOLUTION ORAL at 08:49

## 2019-02-19 RX ADMIN — DOCUSATE SODIUM 100 MG: 100 CAPSULE, LIQUID FILLED ORAL at 08:48

## 2019-02-19 RX ADMIN — METFORMIN HYDROCHLORIDE 1000 MG: 1000 TABLET ORAL at 18:04

## 2019-02-19 RX ADMIN — METFORMIN HYDROCHLORIDE 1000 MG: 1000 TABLET ORAL at 08:48

## 2019-02-19 RX ADMIN — VENLAFAXINE HYDROCHLORIDE 37.5 MG: 37.5 CAPSULE, EXTENDED RELEASE ORAL at 12:06

## 2019-02-19 RX ADMIN — VENLAFAXINE HYDROCHLORIDE 75 MG: 75 CAPSULE, EXTENDED RELEASE ORAL at 08:48

## 2019-02-19 RX ADMIN — ARIPIPRAZOLE 10 MG: 10 TABLET ORAL at 20:53

## 2019-02-19 RX ADMIN — INSULIN LISPRO 4 UNITS: 100 INJECTION, SOLUTION INTRAVENOUS; SUBCUTANEOUS at 12:44

## 2019-02-19 RX ADMIN — PANTOPRAZOLE SODIUM 40 MG: 40 TABLET, DELAYED RELEASE ORAL at 06:40

## 2019-02-19 RX ADMIN — SIMVASTATIN 80 MG: 40 TABLET, FILM COATED ORAL at 20:53

## 2019-02-19 ASSESSMENT — PAIN SCALES - GENERAL
PAINLEVEL_OUTOF10: 0

## 2019-02-20 PROBLEM — E44.0 MODERATE PROTEIN-CALORIE MALNUTRITION (HCC): Chronic | Status: ACTIVE | Noted: 2019-02-20

## 2019-02-20 LAB
ANION GAP SERPL CALCULATED.3IONS-SCNC: 10 MMOL/L (ref 7–16)
BUN BLDV-MCNC: 21 MG/DL (ref 8–23)
CALCIUM SERPL-MCNC: 9.3 MG/DL (ref 8.6–10.2)
CHLORIDE BLD-SCNC: 96 MMOL/L (ref 98–107)
CO2: 26 MMOL/L (ref 22–29)
CREAT SERPL-MCNC: 1.3 MG/DL (ref 0.7–1.2)
GFR AFRICAN AMERICAN: >60
GFR NON-AFRICAN AMERICAN: 56 ML/MIN/1.73
GLUCOSE BLD-MCNC: 119 MG/DL (ref 74–99)
MAGNESIUM: 1.5 MG/DL (ref 1.6–2.6)
METER GLUCOSE: 129 MG/DL (ref 74–99)
METER GLUCOSE: 130 MG/DL (ref 74–99)
METER GLUCOSE: 148 MG/DL (ref 74–99)
METER GLUCOSE: 186 MG/DL (ref 74–99)
POTASSIUM SERPL-SCNC: 5.2 MMOL/L (ref 3.5–5)
SODIUM BLD-SCNC: 132 MMOL/L (ref 132–146)

## 2019-02-20 PROCEDURE — 80048 BASIC METABOLIC PNL TOTAL CA: CPT

## 2019-02-20 PROCEDURE — 83735 ASSAY OF MAGNESIUM: CPT

## 2019-02-20 PROCEDURE — 6370000000 HC RX 637 (ALT 250 FOR IP): Performed by: STUDENT IN AN ORGANIZED HEALTH CARE EDUCATION/TRAINING PROGRAM

## 2019-02-20 PROCEDURE — 99231 SBSQ HOSP IP/OBS SF/LOW 25: CPT | Performed by: NURSE PRACTITIONER

## 2019-02-20 PROCEDURE — 6370000000 HC RX 637 (ALT 250 FOR IP): Performed by: NURSE PRACTITIONER

## 2019-02-20 PROCEDURE — 36415 COLL VENOUS BLD VENIPUNCTURE: CPT

## 2019-02-20 PROCEDURE — 1240000000 HC EMOTIONAL WELLNESS R&B

## 2019-02-20 PROCEDURE — 82962 GLUCOSE BLOOD TEST: CPT

## 2019-02-20 RX ORDER — MIRTAZAPINE 15 MG/1
15 TABLET, FILM COATED ORAL NIGHTLY
Status: DISCONTINUED | OUTPATIENT
Start: 2019-02-20 | End: 2019-02-22 | Stop reason: HOSPADM

## 2019-02-20 RX ORDER — OLANZAPINE 5 MG/1
5 TABLET ORAL NIGHTLY
Status: DISCONTINUED | OUTPATIENT
Start: 2019-02-20 | End: 2019-02-22 | Stop reason: HOSPADM

## 2019-02-20 RX ADMIN — METFORMIN HYDROCHLORIDE 1000 MG: 1000 TABLET ORAL at 08:50

## 2019-02-20 RX ADMIN — VENLAFAXINE HYDROCHLORIDE 37.5 MG: 37.5 CAPSULE, EXTENDED RELEASE ORAL at 12:52

## 2019-02-20 RX ADMIN — INSULIN LISPRO 2 UNITS: 100 INJECTION, SOLUTION INTRAVENOUS; SUBCUTANEOUS at 13:05

## 2019-02-20 RX ADMIN — MULTIPLE VITAMINS W/ MINERALS TAB 1 TABLET: TAB at 08:50

## 2019-02-20 RX ADMIN — INSULIN LISPRO 1 UNITS: 100 INJECTION, SOLUTION INTRAVENOUS; SUBCUTANEOUS at 20:40

## 2019-02-20 RX ADMIN — VENLAFAXINE HYDROCHLORIDE 75 MG: 75 CAPSULE, EXTENDED RELEASE ORAL at 08:50

## 2019-02-20 RX ADMIN — SIMVASTATIN 80 MG: 40 TABLET, FILM COATED ORAL at 20:45

## 2019-02-20 RX ADMIN — METFORMIN HYDROCHLORIDE 1000 MG: 1000 TABLET ORAL at 18:26

## 2019-02-20 RX ADMIN — PANTOPRAZOLE SODIUM 40 MG: 40 TABLET, DELAYED RELEASE ORAL at 06:51

## 2019-02-20 RX ADMIN — OLANZAPINE 5 MG: 5 TABLET, FILM COATED ORAL at 20:45

## 2019-02-20 RX ADMIN — POLYETHYLENE GLYCOL 3350 17 G: 17 POWDER, FOR SOLUTION ORAL at 08:50

## 2019-02-20 RX ADMIN — MIRTAZAPINE 15 MG: 15 TABLET, FILM COATED ORAL at 20:45

## 2019-02-20 ASSESSMENT — PAIN SCALES - GENERAL
PAINLEVEL_OUTOF10: 0

## 2019-02-21 LAB
ANION GAP SERPL CALCULATED.3IONS-SCNC: 13 MMOL/L (ref 7–16)
BUN BLDV-MCNC: 24 MG/DL (ref 8–23)
CALCIUM SERPL-MCNC: 9.7 MG/DL (ref 8.6–10.2)
CHLORIDE BLD-SCNC: 93 MMOL/L (ref 98–107)
CO2: 26 MMOL/L (ref 22–29)
CREAT SERPL-MCNC: 1.6 MG/DL (ref 0.7–1.2)
GFR AFRICAN AMERICAN: 53
GFR NON-AFRICAN AMERICAN: 44 ML/MIN/1.73
GLUCOSE BLD-MCNC: 287 MG/DL (ref 74–99)
METER GLUCOSE: 106 MG/DL (ref 74–99)
METER GLUCOSE: 142 MG/DL (ref 74–99)
METER GLUCOSE: 179 MG/DL (ref 74–99)
METER GLUCOSE: 242 MG/DL (ref 74–99)
METER GLUCOSE: 94 MG/DL (ref 74–99)
POTASSIUM REFLEX MAGNESIUM: 4.5 MMOL/L (ref 3.5–5)
SODIUM BLD-SCNC: 132 MMOL/L (ref 132–146)

## 2019-02-21 PROCEDURE — 6370000000 HC RX 637 (ALT 250 FOR IP): Performed by: CLINICAL NURSE SPECIALIST

## 2019-02-21 PROCEDURE — 6370000000 HC RX 637 (ALT 250 FOR IP): Performed by: NURSE PRACTITIONER

## 2019-02-21 PROCEDURE — 80048 BASIC METABOLIC PNL TOTAL CA: CPT

## 2019-02-21 PROCEDURE — 82962 GLUCOSE BLOOD TEST: CPT

## 2019-02-21 PROCEDURE — 6370000000 HC RX 637 (ALT 250 FOR IP): Performed by: STUDENT IN AN ORGANIZED HEALTH CARE EDUCATION/TRAINING PROGRAM

## 2019-02-21 PROCEDURE — 1240000000 HC EMOTIONAL WELLNESS R&B

## 2019-02-21 PROCEDURE — 36415 COLL VENOUS BLD VENIPUNCTURE: CPT

## 2019-02-21 PROCEDURE — 99231 SBSQ HOSP IP/OBS SF/LOW 25: CPT | Performed by: NURSE PRACTITIONER

## 2019-02-21 RX ADMIN — PANTOPRAZOLE SODIUM 40 MG: 40 TABLET, DELAYED RELEASE ORAL at 06:53

## 2019-02-21 RX ADMIN — VENLAFAXINE HYDROCHLORIDE 37.5 MG: 37.5 CAPSULE, EXTENDED RELEASE ORAL at 12:22

## 2019-02-21 RX ADMIN — MULTIPLE VITAMINS W/ MINERALS TAB 1 TABLET: TAB at 09:48

## 2019-02-21 RX ADMIN — INSULIN LISPRO 1 UNITS: 100 INJECTION, SOLUTION INTRAVENOUS; SUBCUTANEOUS at 20:55

## 2019-02-21 RX ADMIN — OLANZAPINE 5 MG: 5 TABLET, FILM COATED ORAL at 20:58

## 2019-02-21 RX ADMIN — DOCUSATE SODIUM 100 MG: 100 CAPSULE, LIQUID FILLED ORAL at 09:47

## 2019-02-21 RX ADMIN — SIMVASTATIN 80 MG: 40 TABLET, FILM COATED ORAL at 20:58

## 2019-02-21 RX ADMIN — MIRTAZAPINE 15 MG: 15 TABLET, FILM COATED ORAL at 20:58

## 2019-02-21 RX ADMIN — METFORMIN HYDROCHLORIDE 1000 MG: 1000 TABLET ORAL at 09:48

## 2019-02-21 RX ADMIN — INSULIN LISPRO 4 UNITS: 100 INJECTION, SOLUTION INTRAVENOUS; SUBCUTANEOUS at 12:22

## 2019-02-21 RX ADMIN — INSULIN LISPRO 1 UNITS: 100 INJECTION, SOLUTION INTRAVENOUS; SUBCUTANEOUS at 17:26

## 2019-02-21 RX ADMIN — VENLAFAXINE HYDROCHLORIDE 75 MG: 75 CAPSULE, EXTENDED RELEASE ORAL at 09:48

## 2019-02-21 ASSESSMENT — PAIN SCALES - GENERAL
PAINLEVEL_OUTOF10: 0

## 2019-02-22 VITALS
DIASTOLIC BLOOD PRESSURE: 79 MMHG | BODY MASS INDEX: 23.75 KG/M2 | SYSTOLIC BLOOD PRESSURE: 113 MMHG | OXYGEN SATURATION: 97 % | HEART RATE: 110 BPM | HEIGHT: 68 IN | TEMPERATURE: 97.9 F | RESPIRATION RATE: 18 BRPM | WEIGHT: 156.7 LBS

## 2019-02-22 LAB
ANION GAP SERPL CALCULATED.3IONS-SCNC: 14 MMOL/L (ref 7–16)
BUN BLDV-MCNC: 27 MG/DL (ref 8–23)
CALCIUM SERPL-MCNC: 9.4 MG/DL (ref 8.6–10.2)
CHLORIDE BLD-SCNC: 94 MMOL/L (ref 98–107)
CO2: 27 MMOL/L (ref 22–29)
CREAT SERPL-MCNC: 1.6 MG/DL (ref 0.7–1.2)
GFR AFRICAN AMERICAN: 53
GFR NON-AFRICAN AMERICAN: 44 ML/MIN/1.73
GLUCOSE BLD-MCNC: 304 MG/DL (ref 74–99)
METER GLUCOSE: 144 MG/DL (ref 74–99)
METER GLUCOSE: 268 MG/DL (ref 74–99)
POTASSIUM SERPL-SCNC: 5.1 MMOL/L (ref 3.5–5)
SODIUM BLD-SCNC: 135 MMOL/L (ref 132–146)

## 2019-02-22 PROCEDURE — 6370000000 HC RX 637 (ALT 250 FOR IP): Performed by: CLINICAL NURSE SPECIALIST

## 2019-02-22 PROCEDURE — 36415 COLL VENOUS BLD VENIPUNCTURE: CPT

## 2019-02-22 PROCEDURE — 82962 GLUCOSE BLOOD TEST: CPT

## 2019-02-22 PROCEDURE — 6370000000 HC RX 637 (ALT 250 FOR IP): Performed by: STUDENT IN AN ORGANIZED HEALTH CARE EDUCATION/TRAINING PROGRAM

## 2019-02-22 PROCEDURE — 80048 BASIC METABOLIC PNL TOTAL CA: CPT

## 2019-02-22 PROCEDURE — 97530 THERAPEUTIC ACTIVITIES: CPT

## 2019-02-22 PROCEDURE — 99238 HOSP IP/OBS DSCHRG MGMT 30/<: CPT | Performed by: NURSE PRACTITIONER

## 2019-02-22 PROCEDURE — 2580000003 HC RX 258

## 2019-02-22 PROCEDURE — 2580000003 HC RX 258: Performed by: CLINICAL NURSE SPECIALIST

## 2019-02-22 RX ORDER — GLIPIZIDE 5 MG/1
5 TABLET ORAL 2 TIMES DAILY
Qty: 60 TABLET | Refills: 0 | Status: ON HOLD | OUTPATIENT
Start: 2019-02-22 | End: 2020-10-08

## 2019-02-22 RX ORDER — SODIUM CHLORIDE 9 MG/ML
INJECTION, SOLUTION INTRAVENOUS ONCE
Status: COMPLETED | OUTPATIENT
Start: 2019-02-22 | End: 2019-02-22

## 2019-02-22 RX ORDER — MIRTAZAPINE 15 MG/1
15 TABLET, FILM COATED ORAL NIGHTLY
Qty: 30 TABLET | Refills: 0 | Status: ON HOLD | OUTPATIENT
Start: 2019-02-22 | End: 2019-03-15

## 2019-02-22 RX ORDER — OLANZAPINE 5 MG/1
5 TABLET ORAL NIGHTLY
Qty: 30 TABLET | Refills: 0 | Status: ON HOLD | OUTPATIENT
Start: 2019-02-22 | End: 2019-03-15 | Stop reason: HOSPADM

## 2019-02-22 RX ORDER — SODIUM CHLORIDE 0.9 % (FLUSH) 0.9 %
SYRINGE (ML) INJECTION
Status: COMPLETED
Start: 2019-02-22 | End: 2019-02-22

## 2019-02-22 RX ADMIN — VENLAFAXINE HYDROCHLORIDE 75 MG: 75 CAPSULE, EXTENDED RELEASE ORAL at 08:56

## 2019-02-22 RX ADMIN — INSULIN LISPRO 1 UNITS: 100 INJECTION, SOLUTION INTRAVENOUS; SUBCUTANEOUS at 09:19

## 2019-02-22 RX ADMIN — SODIUM CHLORIDE 1000 ML: 9 INJECTION, SOLUTION INTRAVENOUS at 11:15

## 2019-02-22 RX ADMIN — INSULIN LISPRO 3 UNITS: 100 INJECTION, SOLUTION INTRAVENOUS; SUBCUTANEOUS at 13:26

## 2019-02-22 RX ADMIN — PANTOPRAZOLE SODIUM 40 MG: 40 TABLET, DELAYED RELEASE ORAL at 06:56

## 2019-02-22 RX ADMIN — MULTIPLE VITAMINS W/ MINERALS TAB 1 TABLET: TAB at 08:56

## 2019-02-22 RX ADMIN — Medication: at 11:58

## 2019-02-22 ASSESSMENT — PAIN SCALES - GENERAL
PAINLEVEL_OUTOF10: 0
PAINLEVEL_OUTOF10: 0

## 2019-03-14 ENCOUNTER — HOSPITAL ENCOUNTER (OUTPATIENT)
Age: 64
Setting detail: OBSERVATION
Discharge: HOME | End: 2019-03-15
Attending: EMERGENCY MEDICINE | Admitting: HOSPITALIST
Payer: MEDICARE

## 2019-03-14 ENCOUNTER — APPOINTMENT (OUTPATIENT)
Dept: CT IMAGING | Age: 64
End: 2019-03-14
Payer: MEDICARE

## 2019-03-14 DIAGNOSIS — G45.9 TIA (TRANSIENT ISCHEMIC ATTACK): Primary | ICD-10-CM

## 2019-03-14 LAB
ALBUMIN SERPL-MCNC: 3.1 G/DL (ref 3.5–5.2)
ALP BLD-CCNC: 63 U/L (ref 40–129)
ALT SERPL-CCNC: 27 U/L (ref 0–40)
ANION GAP SERPL CALCULATED.3IONS-SCNC: 9 MMOL/L (ref 7–16)
APTT: 29.9 SEC (ref 24.5–35.1)
AST SERPL-CCNC: 22 U/L (ref 0–39)
BETA-HYDROXYBUTYRATE: 0.09 MMOL/L (ref 0.02–0.27)
BILIRUB SERPL-MCNC: 0.2 MG/DL (ref 0–1.2)
BUN BLDV-MCNC: 15 MG/DL (ref 8–23)
CALCIUM SERPL-MCNC: 8.8 MG/DL (ref 8.6–10.2)
CHLORIDE BLD-SCNC: 102 MMOL/L (ref 98–107)
CHP ED QC CHECK: NORMAL
CO2: 28 MMOL/L (ref 22–29)
CREAT SERPL-MCNC: 0.9 MG/DL (ref 0.7–1.2)
GFR AFRICAN AMERICAN: >60
GFR NON-AFRICAN AMERICAN: >60 ML/MIN/1.73
GLUCOSE BLD-MCNC: 103 MG/DL
GLUCOSE BLD-MCNC: 110 MG/DL (ref 74–99)
HCT VFR BLD CALC: 33.6 % (ref 37–54)
HEMOGLOBIN: 11.7 G/DL (ref 12.5–16.5)
INR BLD: 1
MCH RBC QN AUTO: 26.8 PG (ref 26–35)
MCHC RBC AUTO-ENTMCNC: 34.8 % (ref 32–34.5)
MCV RBC AUTO: 77.1 FL (ref 80–99.9)
METER GLUCOSE: 103 MG/DL (ref 74–99)
PDW BLD-RTO: 13.8 FL (ref 11.5–15)
PLATELET # BLD: 299 E9/L (ref 130–450)
PMV BLD AUTO: 10 FL (ref 7–12)
POTASSIUM SERPL-SCNC: 4.8 MMOL/L (ref 3.5–5)
PROTHROMBIN TIME: 11.9 SEC (ref 9.3–12.4)
RBC # BLD: 4.36 E12/L (ref 3.8–5.8)
SODIUM BLD-SCNC: 139 MMOL/L (ref 132–146)
TOTAL PROTEIN: 5.8 G/DL (ref 6.4–8.3)
WBC # BLD: 7.4 E9/L (ref 4.5–11.5)

## 2019-03-14 PROCEDURE — 99285 EMERGENCY DEPT VISIT HI MDM: CPT

## 2019-03-14 PROCEDURE — G0378 HOSPITAL OBSERVATION PER HR: HCPCS

## 2019-03-14 PROCEDURE — 85610 PROTHROMBIN TIME: CPT

## 2019-03-14 PROCEDURE — 85027 COMPLETE CBC AUTOMATED: CPT

## 2019-03-14 PROCEDURE — 82962 GLUCOSE BLOOD TEST: CPT

## 2019-03-14 PROCEDURE — 96372 THER/PROPH/DIAG INJ SC/IM: CPT

## 2019-03-14 PROCEDURE — 2580000003 HC RX 258: Performed by: HOSPITALIST

## 2019-03-14 PROCEDURE — 6370000000 HC RX 637 (ALT 250 FOR IP): Performed by: HOSPITALIST

## 2019-03-14 PROCEDURE — 6360000002 HC RX W HCPCS: Performed by: HOSPITALIST

## 2019-03-14 PROCEDURE — 96374 THER/PROPH/DIAG INJ IV PUSH: CPT

## 2019-03-14 PROCEDURE — 70450 CT HEAD/BRAIN W/O DYE: CPT

## 2019-03-14 PROCEDURE — 82010 KETONE BODYS QUAN: CPT

## 2019-03-14 PROCEDURE — 80053 COMPREHEN METABOLIC PANEL: CPT

## 2019-03-14 PROCEDURE — 85730 THROMBOPLASTIN TIME PARTIAL: CPT

## 2019-03-14 PROCEDURE — 36415 COLL VENOUS BLD VENIPUNCTURE: CPT

## 2019-03-14 PROCEDURE — 6360000002 HC RX W HCPCS: Performed by: NURSE PRACTITIONER

## 2019-03-14 RX ORDER — LISINOPRIL 10 MG/1
20 TABLET ORAL 2 TIMES DAILY
Status: DISCONTINUED | OUTPATIENT
Start: 2019-03-14 | End: 2019-03-16 | Stop reason: HOSPADM

## 2019-03-14 RX ORDER — SIMVASTATIN 40 MG
80 TABLET ORAL NIGHTLY
Status: DISCONTINUED | OUTPATIENT
Start: 2019-03-14 | End: 2019-03-16 | Stop reason: HOSPADM

## 2019-03-14 RX ORDER — PANTOPRAZOLE SODIUM 40 MG/1
40 TABLET, DELAYED RELEASE ORAL
Status: DISCONTINUED | OUTPATIENT
Start: 2019-03-15 | End: 2019-03-16 | Stop reason: HOSPADM

## 2019-03-14 RX ORDER — SODIUM CHLORIDE 0.9 % (FLUSH) 0.9 %
10 SYRINGE (ML) INJECTION PRN
Status: DISCONTINUED | OUTPATIENT
Start: 2019-03-14 | End: 2019-03-16 | Stop reason: HOSPADM

## 2019-03-14 RX ORDER — VENLAFAXINE HYDROCHLORIDE 75 MG/1
75 CAPSULE, EXTENDED RELEASE ORAL
Status: DISCONTINUED | OUTPATIENT
Start: 2019-03-15 | End: 2019-03-16 | Stop reason: HOSPADM

## 2019-03-14 RX ORDER — M-VIT,TX,IRON,MINS/CALC/FOLIC 27MG-0.4MG
1 TABLET ORAL DAILY
Status: DISCONTINUED | OUTPATIENT
Start: 2019-03-14 | End: 2019-03-16 | Stop reason: HOSPADM

## 2019-03-14 RX ORDER — DOCUSATE SODIUM 100 MG/1
100 CAPSULE, LIQUID FILLED ORAL DAILY
Status: DISCONTINUED | OUTPATIENT
Start: 2019-03-14 | End: 2019-03-16 | Stop reason: HOSPADM

## 2019-03-14 RX ORDER — ONDANSETRON 2 MG/ML
4 INJECTION INTRAMUSCULAR; INTRAVENOUS EVERY 6 HOURS PRN
Status: DISCONTINUED | OUTPATIENT
Start: 2019-03-14 | End: 2019-03-16 | Stop reason: HOSPADM

## 2019-03-14 RX ORDER — DIPHENHYDRAMINE HYDROCHLORIDE 50 MG/ML
25 INJECTION INTRAMUSCULAR; INTRAVENOUS ONCE
Status: COMPLETED | OUTPATIENT
Start: 2019-03-14 | End: 2019-03-14

## 2019-03-14 RX ORDER — MIRTAZAPINE 15 MG/1
15 TABLET, FILM COATED ORAL NIGHTLY
Status: DISCONTINUED | OUTPATIENT
Start: 2019-03-14 | End: 2019-03-16 | Stop reason: HOSPADM

## 2019-03-14 RX ORDER — TRAZODONE HYDROCHLORIDE 50 MG/1
50 TABLET ORAL NIGHTLY
Status: DISCONTINUED | OUTPATIENT
Start: 2019-03-14 | End: 2019-03-16 | Stop reason: HOSPADM

## 2019-03-14 RX ORDER — SODIUM CHLORIDE 0.9 % (FLUSH) 0.9 %
10 SYRINGE (ML) INJECTION EVERY 12 HOURS SCHEDULED
Status: DISCONTINUED | OUTPATIENT
Start: 2019-03-14 | End: 2019-03-16 | Stop reason: HOSPADM

## 2019-03-14 RX ADMIN — Medication 10 ML: at 22:52

## 2019-03-14 RX ADMIN — LISINOPRIL 20 MG: 10 TABLET ORAL at 23:08

## 2019-03-14 RX ADMIN — TRAZODONE HYDROCHLORIDE 50 MG: 50 TABLET ORAL at 23:08

## 2019-03-14 RX ADMIN — DIPHENHYDRAMINE HYDROCHLORIDE 25 MG: 50 INJECTION, SOLUTION INTRAMUSCULAR; INTRAVENOUS at 18:06

## 2019-03-14 RX ADMIN — SIMVASTATIN 80 MG: 40 TABLET, FILM COATED ORAL at 23:08

## 2019-03-14 RX ADMIN — MIRTAZAPINE 15 MG: 15 TABLET, FILM COATED ORAL at 23:08

## 2019-03-14 RX ADMIN — ENOXAPARIN SODIUM 40 MG: 40 INJECTION SUBCUTANEOUS at 22:50

## 2019-03-14 ASSESSMENT — PAIN SCALES - GENERAL
PAINLEVEL_OUTOF10: 0
PAINLEVEL_OUTOF10: 0

## 2019-03-15 ENCOUNTER — APPOINTMENT (OUTPATIENT)
Dept: MRI IMAGING | Age: 64
End: 2019-03-15
Payer: MEDICARE

## 2019-03-15 VITALS
BODY MASS INDEX: 25.3 KG/M2 | DIASTOLIC BLOOD PRESSURE: 78 MMHG | OXYGEN SATURATION: 96 % | HEART RATE: 77 BPM | RESPIRATION RATE: 14 BRPM | TEMPERATURE: 97.9 F | HEIGHT: 69 IN | WEIGHT: 170.8 LBS | SYSTOLIC BLOOD PRESSURE: 116 MMHG

## 2019-03-15 LAB
ANION GAP SERPL CALCULATED.3IONS-SCNC: 7 MMOL/L (ref 7–16)
BUN BLDV-MCNC: 12 MG/DL (ref 8–23)
CALCIUM SERPL-MCNC: 8.7 MG/DL (ref 8.6–10.2)
CHLORIDE BLD-SCNC: 100 MMOL/L (ref 98–107)
CO2: 29 MMOL/L (ref 22–29)
CREAT SERPL-MCNC: 0.9 MG/DL (ref 0.7–1.2)
GFR AFRICAN AMERICAN: >60
GFR NON-AFRICAN AMERICAN: >60 ML/MIN/1.73
GLUCOSE BLD-MCNC: 60 MG/DL (ref 74–99)
HBA1C MFR BLD: 9.1 % (ref 4–5.6)
HCT VFR BLD CALC: 33.8 % (ref 37–54)
HEMOGLOBIN: 11.5 G/DL (ref 12.5–16.5)
MAGNESIUM: 1.8 MG/DL (ref 1.6–2.6)
MCH RBC QN AUTO: 26.3 PG (ref 26–35)
MCHC RBC AUTO-ENTMCNC: 34 % (ref 32–34.5)
MCV RBC AUTO: 77.2 FL (ref 80–99.9)
METER GLUCOSE: 178 MG/DL (ref 74–99)
METER GLUCOSE: 80 MG/DL (ref 74–99)
METER GLUCOSE: 92 MG/DL (ref 74–99)
PDW BLD-RTO: 13.5 FL (ref 11.5–15)
PHOSPHORUS: 2.5 MG/DL (ref 2.5–4.5)
PLATELET # BLD: 246 E9/L (ref 130–450)
PMV BLD AUTO: 9.6 FL (ref 7–12)
POTASSIUM SERPL-SCNC: 4.4 MMOL/L (ref 3.5–5)
RBC # BLD: 4.38 E12/L (ref 3.8–5.8)
SODIUM BLD-SCNC: 136 MMOL/L (ref 132–146)
WBC # BLD: 7.8 E9/L (ref 4.5–11.5)

## 2019-03-15 PROCEDURE — 70553 MRI BRAIN STEM W/O & W/DYE: CPT

## 2019-03-15 PROCEDURE — 6360000002 HC RX W HCPCS: Performed by: HOSPITALIST

## 2019-03-15 PROCEDURE — 84100 ASSAY OF PHOSPHORUS: CPT

## 2019-03-15 PROCEDURE — 99218 PR INITIAL OBSERVATION CARE/DAY 30 MINUTES: CPT | Performed by: PSYCHIATRY & NEUROLOGY

## 2019-03-15 PROCEDURE — 82962 GLUCOSE BLOOD TEST: CPT

## 2019-03-15 PROCEDURE — 99221 1ST HOSP IP/OBS SF/LOW 40: CPT | Performed by: PSYCHIATRY & NEUROLOGY

## 2019-03-15 PROCEDURE — 96372 THER/PROPH/DIAG INJ SC/IM: CPT

## 2019-03-15 PROCEDURE — 2580000003 HC RX 258: Performed by: HOSPITALIST

## 2019-03-15 PROCEDURE — 6370000000 HC RX 637 (ALT 250 FOR IP): Performed by: HOSPITALIST

## 2019-03-15 PROCEDURE — G0378 HOSPITAL OBSERVATION PER HR: HCPCS

## 2019-03-15 PROCEDURE — A9579 GAD-BASE MR CONTRAST NOS,1ML: HCPCS | Performed by: RADIOLOGY

## 2019-03-15 PROCEDURE — 83036 HEMOGLOBIN GLYCOSYLATED A1C: CPT

## 2019-03-15 PROCEDURE — 36415 COLL VENOUS BLD VENIPUNCTURE: CPT

## 2019-03-15 PROCEDURE — 6370000000 HC RX 637 (ALT 250 FOR IP): Performed by: PSYCHIATRY & NEUROLOGY

## 2019-03-15 PROCEDURE — 80048 BASIC METABOLIC PNL TOTAL CA: CPT

## 2019-03-15 PROCEDURE — 83735 ASSAY OF MAGNESIUM: CPT

## 2019-03-15 PROCEDURE — 6360000004 HC RX CONTRAST MEDICATION: Performed by: RADIOLOGY

## 2019-03-15 PROCEDURE — 85027 COMPLETE CBC AUTOMATED: CPT

## 2019-03-15 RX ORDER — LAMOTRIGINE 25 MG/1
25 TABLET ORAL NIGHTLY
Status: DISCONTINUED | OUTPATIENT
Start: 2019-03-15 | End: 2019-03-16 | Stop reason: HOSPADM

## 2019-03-15 RX ORDER — AMMONIUM LACTATE 12 G/100G
LOTION TOPICAL PRN
Status: ON HOLD | COMMUNITY
End: 2020-10-07

## 2019-03-15 RX ORDER — FLUTICASONE PROPIONATE 50 MCG
1 SPRAY, SUSPENSION (ML) NASAL DAILY
Status: DISCONTINUED | OUTPATIENT
Start: 2019-03-15 | End: 2019-03-16 | Stop reason: HOSPADM

## 2019-03-15 RX ORDER — AMMONIUM LACTATE 12 G/100G
LOTION TOPICAL PRN
Status: DISCONTINUED | OUTPATIENT
Start: 2019-03-15 | End: 2019-03-16 | Stop reason: HOSPADM

## 2019-03-15 RX ORDER — DEXTROSE MONOHYDRATE 25 G/50ML
12.5 INJECTION, SOLUTION INTRAVENOUS PRN
Status: DISCONTINUED | OUTPATIENT
Start: 2019-03-15 | End: 2019-03-16 | Stop reason: HOSPADM

## 2019-03-15 RX ORDER — FLUTICASONE PROPIONATE 50 MCG
1 SPRAY, SUSPENSION (ML) NASAL DAILY
Status: ON HOLD | COMMUNITY
End: 2020-08-21

## 2019-03-15 RX ORDER — DEXTROSE MONOHYDRATE 50 MG/ML
100 INJECTION, SOLUTION INTRAVENOUS PRN
Status: DISCONTINUED | OUTPATIENT
Start: 2019-03-15 | End: 2019-03-16 | Stop reason: HOSPADM

## 2019-03-15 RX ORDER — LAMOTRIGINE 25 MG/1
25 TABLET ORAL NIGHTLY
Status: ON HOLD | COMMUNITY
End: 2020-08-21 | Stop reason: ALTCHOICE

## 2019-03-15 RX ORDER — PANTOPRAZOLE SODIUM 40 MG/1
40 TABLET, DELAYED RELEASE ORAL
Status: DISCONTINUED | OUTPATIENT
Start: 2019-03-15 | End: 2019-03-15 | Stop reason: SDUPTHER

## 2019-03-15 RX ORDER — NICOTINE POLACRILEX 4 MG
15 LOZENGE BUCCAL PRN
Status: DISCONTINUED | OUTPATIENT
Start: 2019-03-15 | End: 2019-03-16 | Stop reason: HOSPADM

## 2019-03-15 RX ORDER — LANOLIN ALCOHOL/MO/W.PET/CERES
3 CREAM (GRAM) TOPICAL NIGHTLY PRN
Status: ON HOLD | COMMUNITY
End: 2020-08-21 | Stop reason: ALTCHOICE

## 2019-03-15 RX ORDER — OMEPRAZOLE 20 MG/1
40 CAPSULE, DELAYED RELEASE ORAL DAILY
Status: ON HOLD | COMMUNITY
End: 2020-12-09 | Stop reason: DRUGHIGH

## 2019-03-15 RX ORDER — ASPIRIN 81 MG/1
81 TABLET ORAL DAILY
Qty: 30 TABLET | Refills: 3 | Status: ON HOLD | OUTPATIENT
Start: 2019-03-15 | End: 2020-08-21

## 2019-03-15 RX ORDER — ASPIRIN 81 MG/1
81 TABLET ORAL DAILY
Status: DISCONTINUED | OUTPATIENT
Start: 2019-03-15 | End: 2019-03-16 | Stop reason: HOSPADM

## 2019-03-15 RX ORDER — LANOLIN ALCOHOL/MO/W.PET/CERES
3 CREAM (GRAM) TOPICAL NIGHTLY PRN
Status: DISCONTINUED | OUTPATIENT
Start: 2019-03-15 | End: 2019-03-16 | Stop reason: HOSPADM

## 2019-03-15 RX ADMIN — MULTIPLE VITAMINS W/ MINERALS TAB 1 TABLET: TAB at 10:24

## 2019-03-15 RX ADMIN — VENLAFAXINE HYDROCHLORIDE 75 MG: 75 CAPSULE, EXTENDED RELEASE ORAL at 10:24

## 2019-03-15 RX ADMIN — PANTOPRAZOLE SODIUM 40 MG: 40 TABLET, DELAYED RELEASE ORAL at 06:39

## 2019-03-15 RX ADMIN — FLUTICASONE PROPIONATE 1 SPRAY: 50 SPRAY, METERED NASAL at 10:24

## 2019-03-15 RX ADMIN — INSULIN HUMAN 25 UNITS: 100 INJECTION, SUSPENSION SUBCUTANEOUS at 18:03

## 2019-03-15 RX ADMIN — INSULIN HUMAN 25 UNITS: 100 INJECTION, SUSPENSION SUBCUTANEOUS at 11:13

## 2019-03-15 RX ADMIN — Medication 10 ML: at 10:25

## 2019-03-15 RX ADMIN — ENOXAPARIN SODIUM 40 MG: 40 INJECTION SUBCUTANEOUS at 10:23

## 2019-03-15 RX ADMIN — LISINOPRIL 20 MG: 10 TABLET ORAL at 10:24

## 2019-03-15 RX ADMIN — GADOTERIDOL 16 ML: 279.3 INJECTION, SOLUTION INTRAVENOUS at 08:37

## 2019-03-15 RX ADMIN — DOCUSATE SODIUM 100 MG: 100 CAPSULE, LIQUID FILLED ORAL at 10:24

## 2019-03-15 RX ADMIN — ASPIRIN 81 MG: 81 TABLET ORAL at 18:03

## 2019-03-15 RX ADMIN — INSULIN LISPRO 2 UNITS: 100 INJECTION, SOLUTION INTRAVENOUS; SUBCUTANEOUS at 11:13

## 2019-03-15 ASSESSMENT — ENCOUNTER SYMPTOMS
PHOTOPHOBIA: 0
SHORTNESS OF BREATH: 0
BACK PAIN: 0
ABDOMINAL PAIN: 0

## 2019-03-15 ASSESSMENT — PAIN SCALES - GENERAL: PAINLEVEL_OUTOF10: 0

## 2019-03-15 ASSESSMENT — VISUAL ACUITY: VA_NORMAL: 1

## 2020-05-04 ENCOUNTER — HOSPITAL ENCOUNTER (INPATIENT)
Age: 65
LOS: 2 days | Discharge: HOME | DRG: 388 | End: 2020-05-06
Attending: EMERGENCY MEDICINE | Admitting: FAMILY MEDICINE
Payer: MEDICARE

## 2020-05-04 ENCOUNTER — APPOINTMENT (OUTPATIENT)
Dept: CT IMAGING | Age: 65
DRG: 388 | End: 2020-05-04
Payer: MEDICARE

## 2020-05-04 ENCOUNTER — APPOINTMENT (OUTPATIENT)
Dept: GENERAL RADIOLOGY | Age: 65
DRG: 388 | End: 2020-05-04
Payer: MEDICARE

## 2020-05-04 PROBLEM — E10.10 DKA, TYPE 1, NOT AT GOAL (HCC): Status: ACTIVE | Noted: 2020-05-04

## 2020-05-04 PROBLEM — I16.0 HYPERTENSIVE URGENCY: Status: ACTIVE | Noted: 2020-05-04

## 2020-05-04 PROBLEM — K56.7 ILEUS (HCC): Status: ACTIVE | Noted: 2020-05-04

## 2020-05-04 PROBLEM — R06.6 HICCUPS: Status: ACTIVE | Noted: 2020-05-04

## 2020-05-04 PROBLEM — D35.01 ADRENAL ADENOMA, RIGHT: Status: ACTIVE | Noted: 2020-05-04

## 2020-05-04 LAB
ALBUMIN SERPL-MCNC: 3.8 G/DL (ref 3.5–5.2)
ALBUMIN SERPL-MCNC: 3.9 G/DL (ref 3.5–5.2)
ALP BLD-CCNC: 88 U/L (ref 40–129)
ALP BLD-CCNC: 92 U/L (ref 40–129)
ALT SERPL-CCNC: 30 U/L (ref 0–40)
ALT SERPL-CCNC: 31 U/L (ref 0–40)
ANION GAP SERPL CALCULATED.3IONS-SCNC: 16 MMOL/L (ref 7–16)
ANION GAP SERPL CALCULATED.3IONS-SCNC: 22 MMOL/L (ref 7–16)
ANION GAP SERPL CALCULATED.3IONS-SCNC: 24 MMOL/L (ref 7–16)
AST SERPL-CCNC: 15 U/L (ref 0–39)
AST SERPL-CCNC: 22 U/L (ref 0–39)
BACTERIA: NORMAL /HPF
BASOPHILS ABSOLUTE: 0.06 E9/L (ref 0–0.2)
BASOPHILS RELATIVE PERCENT: 0.4 % (ref 0–2)
BETA-HYDROXYBUTYRATE: >4.5 MMOL/L (ref 0.02–0.27)
BILIRUB SERPL-MCNC: 1.1 MG/DL (ref 0–1.2)
BILIRUB SERPL-MCNC: 1.1 MG/DL (ref 0–1.2)
BILIRUBIN URINE: NEGATIVE
BLOOD, URINE: ABNORMAL
BUN BLDV-MCNC: 30 MG/DL (ref 8–23)
BUN BLDV-MCNC: 32 MG/DL (ref 8–23)
BUN BLDV-MCNC: 33 MG/DL (ref 8–23)
CALCIUM SERPL-MCNC: 8.8 MG/DL (ref 8.6–10.2)
CALCIUM SERPL-MCNC: 8.9 MG/DL (ref 8.6–10.2)
CALCIUM SERPL-MCNC: 8.9 MG/DL (ref 8.6–10.2)
CHLORIDE BLD-SCNC: 103 MMOL/L (ref 98–107)
CHLORIDE BLD-SCNC: 94 MMOL/L (ref 98–107)
CHLORIDE BLD-SCNC: 98 MMOL/L (ref 98–107)
CHP ED QC CHECK: NORMAL
CLARITY: CLEAR
CO2: 16 MMOL/L (ref 22–29)
CO2: 16 MMOL/L (ref 22–29)
CO2: 17 MMOL/L (ref 22–29)
COLOR: YELLOW
CREAT SERPL-MCNC: 1.4 MG/DL (ref 0.7–1.2)
CREAT SERPL-MCNC: 1.5 MG/DL (ref 0.7–1.2)
CREAT SERPL-MCNC: 1.5 MG/DL (ref 0.7–1.2)
EOSINOPHILS ABSOLUTE: 0 E9/L (ref 0.05–0.5)
EOSINOPHILS RELATIVE PERCENT: 0 % (ref 0–6)
GFR AFRICAN AMERICAN: 44
GFR AFRICAN AMERICAN: 57
GFR AFRICAN AMERICAN: 57
GFR AFRICAN AMERICAN: >60
GFR NON-AFRICAN AMERICAN: 36 ML/MIN/1.73
GFR NON-AFRICAN AMERICAN: 47 ML/MIN/1.73
GFR NON-AFRICAN AMERICAN: 47 ML/MIN/1.73
GFR NON-AFRICAN AMERICAN: 51 ML/MIN/1.73
GLUCOSE BLD-MCNC: 233 MG/DL (ref 74–99)
GLUCOSE BLD-MCNC: 281 MG/DL
GLUCOSE BLD-MCNC: 358 MG/DL (ref 74–99)
GLUCOSE BLD-MCNC: 381 MG/DL (ref 74–99)
GLUCOSE BLD-MCNC: 384 MG/DL (ref 74–99)
GLUCOSE URINE: >=1000 MG/DL
HBA1C MFR BLD: 13.5 % (ref 4–5.6)
HCT VFR BLD CALC: 53.8 % (ref 37–54)
HEMOGLOBIN: 18.2 G/DL (ref 12.5–16.5)
IMMATURE GRANULOCYTES #: 0.12 E9/L
IMMATURE GRANULOCYTES %: 0.7 % (ref 0–5)
KETONES, URINE: 40 MG/DL
LACTIC ACID, SEPSIS: 2.8 MMOL/L (ref 0.5–1.9)
LACTIC ACID: 2.3 MMOL/L (ref 0.5–2.2)
LACTIC ACID: 4.6 MMOL/L (ref 0.5–2.2)
LEUKOCYTE ESTERASE, URINE: NEGATIVE
LIPASE: 44 U/L (ref 13–60)
LYMPHOCYTES ABSOLUTE: 1.42 E9/L (ref 1.5–4)
LYMPHOCYTES RELATIVE PERCENT: 8.4 % (ref 20–42)
MAGNESIUM: 2.3 MG/DL (ref 1.6–2.6)
MAGNESIUM: 2.3 MG/DL (ref 1.6–2.6)
MCH RBC QN AUTO: 23.9 PG (ref 26–35)
MCHC RBC AUTO-ENTMCNC: 33.8 % (ref 32–34.5)
MCV RBC AUTO: 70.5 FL (ref 80–99.9)
METER GLUCOSE: 199 MG/DL (ref 74–99)
METER GLUCOSE: 204 MG/DL (ref 74–99)
METER GLUCOSE: 243 MG/DL (ref 74–99)
METER GLUCOSE: 245 MG/DL (ref 74–99)
METER GLUCOSE: 281 MG/DL (ref 74–99)
METER GLUCOSE: 290 MG/DL (ref 74–99)
METER GLUCOSE: 312 MG/DL (ref 74–99)
MONOCYTES ABSOLUTE: 0.64 E9/L (ref 0.1–0.95)
MONOCYTES RELATIVE PERCENT: 3.8 % (ref 2–12)
NEUTROPHILS ABSOLUTE: 14.7 E9/L (ref 1.8–7.3)
NEUTROPHILS RELATIVE PERCENT: 86.7 % (ref 43–80)
NITRITE, URINE: NEGATIVE
PDW BLD-RTO: 19.5 FL (ref 11.5–15)
PERFORMED ON: ABNORMAL
PH UA: 5.5 (ref 5–9)
PH VENOUS: 7.34 (ref 7.35–7.45)
PHOSPHORUS: 3.2 MG/DL (ref 2.5–4.5)
PHOSPHORUS: 3.2 MG/DL (ref 2.5–4.5)
PLATELET # BLD: 327 E9/L (ref 130–450)
PMV BLD AUTO: 10.3 FL (ref 7–12)
POC CHLORIDE: 107 MMOL/L (ref 100–108)
POC CREATININE: 1.9 MG/DL (ref 0.7–1.2)
POC POTASSIUM: 5 MMOL/L (ref 3.5–5)
POC SODIUM: 134 MMOL/L (ref 132–146)
POTASSIUM REFLEX MAGNESIUM: 5.2 MMOL/L (ref 3.5–5)
POTASSIUM SERPL-SCNC: 4.8 MMOL/L (ref 3.5–5)
POTASSIUM SERPL-SCNC: 5.9 MMOL/L (ref 3.5–5)
PROTEIN UA: 30 MG/DL
RBC # BLD: 7.63 E12/L (ref 3.8–5.8)
RBC UA: NORMAL /HPF (ref 0–2)
REASON FOR REJECTION: NORMAL
REJECTED TEST: NORMAL
SODIUM BLD-SCNC: 134 MMOL/L (ref 132–146)
SODIUM BLD-SCNC: 136 MMOL/L (ref 132–146)
SODIUM BLD-SCNC: 136 MMOL/L (ref 132–146)
SPECIFIC GRAVITY UA: 1.02 (ref 1–1.03)
TOTAL PROTEIN: 6.7 G/DL (ref 6.4–8.3)
TOTAL PROTEIN: 6.8 G/DL (ref 6.4–8.3)
TROPONIN: 0.02 NG/ML (ref 0–0.03)
TROPONIN: <0.01 NG/ML (ref 0–0.03)
UROBILINOGEN, URINE: 0.2 E.U./DL
WBC # BLD: 16.9 E9/L (ref 4.5–11.5)
WBC UA: NORMAL /HPF (ref 0–5)

## 2020-05-04 PROCEDURE — 85025 COMPLETE CBC W/AUTO DIFF WBC: CPT

## 2020-05-04 PROCEDURE — 93005 ELECTROCARDIOGRAM TRACING: CPT | Performed by: STUDENT IN AN ORGANIZED HEALTH CARE EDUCATION/TRAINING PROGRAM

## 2020-05-04 PROCEDURE — 82010 KETONE BODYS QUAN: CPT

## 2020-05-04 PROCEDURE — 36415 COLL VENOUS BLD VENIPUNCTURE: CPT

## 2020-05-04 PROCEDURE — 96375 TX/PRO/DX INJ NEW DRUG ADDON: CPT

## 2020-05-04 PROCEDURE — 96376 TX/PRO/DX INJ SAME DRUG ADON: CPT

## 2020-05-04 PROCEDURE — 6360000002 HC RX W HCPCS: Performed by: INTERNAL MEDICINE

## 2020-05-04 PROCEDURE — 6360000002 HC RX W HCPCS: Performed by: STUDENT IN AN ORGANIZED HEALTH CARE EDUCATION/TRAINING PROGRAM

## 2020-05-04 PROCEDURE — 84295 ASSAY OF SERUM SODIUM: CPT

## 2020-05-04 PROCEDURE — 84132 ASSAY OF SERUM POTASSIUM: CPT

## 2020-05-04 PROCEDURE — 82962 GLUCOSE BLOOD TEST: CPT

## 2020-05-04 PROCEDURE — 71045 X-RAY EXAM CHEST 1 VIEW: CPT

## 2020-05-04 PROCEDURE — 83036 HEMOGLOBIN GLYCOSYLATED A1C: CPT

## 2020-05-04 PROCEDURE — 82800 BLOOD PH: CPT

## 2020-05-04 PROCEDURE — 6360000002 HC RX W HCPCS: Performed by: FAMILY MEDICINE

## 2020-05-04 PROCEDURE — 6360000004 HC RX CONTRAST MEDICATION: Performed by: RADIOLOGY

## 2020-05-04 PROCEDURE — 6370000000 HC RX 637 (ALT 250 FOR IP): Performed by: STUDENT IN AN ORGANIZED HEALTH CARE EDUCATION/TRAINING PROGRAM

## 2020-05-04 PROCEDURE — 99285 EMERGENCY DEPT VISIT HI MDM: CPT

## 2020-05-04 PROCEDURE — 81001 URINALYSIS AUTO W/SCOPE: CPT

## 2020-05-04 PROCEDURE — 2580000003 HC RX 258: Performed by: INTERNAL MEDICINE

## 2020-05-04 PROCEDURE — 83605 ASSAY OF LACTIC ACID: CPT

## 2020-05-04 PROCEDURE — 82435 ASSAY OF BLOOD CHLORIDE: CPT

## 2020-05-04 PROCEDURE — 96365 THER/PROPH/DIAG IV INF INIT: CPT

## 2020-05-04 PROCEDURE — 74177 CT ABD & PELVIS W/CONTRAST: CPT

## 2020-05-04 PROCEDURE — 2500000003 HC RX 250 WO HCPCS: Performed by: STUDENT IN AN ORGANIZED HEALTH CARE EDUCATION/TRAINING PROGRAM

## 2020-05-04 PROCEDURE — 2060000000 HC ICU INTERMEDIATE R&B

## 2020-05-04 PROCEDURE — 80048 BASIC METABOLIC PNL TOTAL CA: CPT

## 2020-05-04 PROCEDURE — 83835 ASSAY OF METANEPHRINES: CPT

## 2020-05-04 PROCEDURE — 84484 ASSAY OF TROPONIN QUANT: CPT

## 2020-05-04 PROCEDURE — 80053 COMPREHEN METABOLIC PANEL: CPT

## 2020-05-04 PROCEDURE — 83690 ASSAY OF LIPASE: CPT

## 2020-05-04 PROCEDURE — 2580000003 HC RX 258: Performed by: STUDENT IN AN ORGANIZED HEALTH CARE EDUCATION/TRAINING PROGRAM

## 2020-05-04 PROCEDURE — 87088 URINE BACTERIA CULTURE: CPT

## 2020-05-04 PROCEDURE — 83735 ASSAY OF MAGNESIUM: CPT

## 2020-05-04 PROCEDURE — 84100 ASSAY OF PHOSPHORUS: CPT

## 2020-05-04 PROCEDURE — C9113 INJ PANTOPRAZOLE SODIUM, VIA: HCPCS | Performed by: STUDENT IN AN ORGANIZED HEALTH CARE EDUCATION/TRAINING PROGRAM

## 2020-05-04 PROCEDURE — 94761 N-INVAS EAR/PLS OXIMETRY MLT: CPT

## 2020-05-04 PROCEDURE — 82565 ASSAY OF CREATININE: CPT

## 2020-05-04 PROCEDURE — 82947 ASSAY GLUCOSE BLOOD QUANT: CPT

## 2020-05-04 RX ORDER — DEXTROSE AND SODIUM CHLORIDE 5; .45 G/100ML; G/100ML
INJECTION, SOLUTION INTRAVENOUS CONTINUOUS PRN
Status: DISCONTINUED | OUTPATIENT
Start: 2020-05-04 | End: 2020-05-04 | Stop reason: SDUPTHER

## 2020-05-04 RX ORDER — ONDANSETRON 2 MG/ML
4 INJECTION INTRAMUSCULAR; INTRAVENOUS EVERY 6 HOURS PRN
Status: DISCONTINUED | OUTPATIENT
Start: 2020-05-04 | End: 2020-05-06 | Stop reason: HOSPADM

## 2020-05-04 RX ORDER — DEXTROSE AND SODIUM CHLORIDE 5; .45 G/100ML; G/100ML
INJECTION, SOLUTION INTRAVENOUS CONTINUOUS PRN
Status: DISCONTINUED | OUTPATIENT
Start: 2020-05-04 | End: 2020-05-05

## 2020-05-04 RX ORDER — DEXTROSE MONOHYDRATE 25 G/50ML
12.5 INJECTION, SOLUTION INTRAVENOUS PRN
Status: DISCONTINUED | OUTPATIENT
Start: 2020-05-04 | End: 2020-05-06 | Stop reason: HOSPADM

## 2020-05-04 RX ORDER — SODIUM CHLORIDE 9 MG/ML
INJECTION, SOLUTION INTRAVENOUS CONTINUOUS
Status: DISCONTINUED | OUTPATIENT
Start: 2020-05-04 | End: 2020-05-05

## 2020-05-04 RX ORDER — SODIUM CHLORIDE 450 MG/100ML
INJECTION, SOLUTION INTRAVENOUS CONTINUOUS
Status: DISCONTINUED | OUTPATIENT
Start: 2020-05-04 | End: 2020-05-04 | Stop reason: SDUPTHER

## 2020-05-04 RX ORDER — LANOLIN ALCOHOL/MO/W.PET/CERES
3 CREAM (GRAM) TOPICAL NIGHTLY PRN
Status: DISCONTINUED | OUTPATIENT
Start: 2020-05-04 | End: 2020-05-06 | Stop reason: HOSPADM

## 2020-05-04 RX ORDER — ONDANSETRON 2 MG/ML
4 INJECTION INTRAMUSCULAR; INTRAVENOUS ONCE
Status: COMPLETED | OUTPATIENT
Start: 2020-05-04 | End: 2020-05-04

## 2020-05-04 RX ORDER — METOCLOPRAMIDE HYDROCHLORIDE 5 MG/ML
10 INJECTION INTRAMUSCULAR; INTRAVENOUS ONCE
Status: COMPLETED | OUTPATIENT
Start: 2020-05-04 | End: 2020-05-04

## 2020-05-04 RX ORDER — PANTOPRAZOLE SODIUM 40 MG/10ML
40 INJECTION, POWDER, LYOPHILIZED, FOR SOLUTION INTRAVENOUS ONCE
Status: COMPLETED | OUTPATIENT
Start: 2020-05-04 | End: 2020-05-04

## 2020-05-04 RX ORDER — MAGNESIUM SULFATE 1 G/100ML
1 INJECTION INTRAVENOUS PRN
Status: DISCONTINUED | OUTPATIENT
Start: 2020-05-04 | End: 2020-05-04 | Stop reason: SDUPTHER

## 2020-05-04 RX ORDER — 0.9 % SODIUM CHLORIDE 0.9 %
1000 INTRAVENOUS SOLUTION INTRAVENOUS ONCE
Status: COMPLETED | OUTPATIENT
Start: 2020-05-04 | End: 2020-05-04

## 2020-05-04 RX ORDER — GLIPIZIDE 5 MG/1
5 TABLET ORAL 2 TIMES DAILY
Status: DISCONTINUED | OUTPATIENT
Start: 2020-05-05 | End: 2020-05-06 | Stop reason: HOSPADM

## 2020-05-04 RX ORDER — POTASSIUM CHLORIDE 7.45 MG/ML
10 INJECTION INTRAVENOUS PRN
Status: DISCONTINUED | OUTPATIENT
Start: 2020-05-04 | End: 2020-05-04 | Stop reason: SDUPTHER

## 2020-05-04 RX ORDER — MAGNESIUM SULFATE 1 G/100ML
1 INJECTION INTRAVENOUS PRN
Status: DISCONTINUED | OUTPATIENT
Start: 2020-05-04 | End: 2020-05-05

## 2020-05-04 RX ORDER — ATORVASTATIN CALCIUM 40 MG/1
40 TABLET, FILM COATED ORAL DAILY
Status: DISCONTINUED | OUTPATIENT
Start: 2020-05-05 | End: 2020-05-06 | Stop reason: HOSPADM

## 2020-05-04 RX ORDER — PANTOPRAZOLE SODIUM 40 MG/1
40 TABLET, DELAYED RELEASE ORAL
Status: DISCONTINUED | OUTPATIENT
Start: 2020-05-05 | End: 2020-05-06 | Stop reason: HOSPADM

## 2020-05-04 RX ORDER — DEXTROSE MONOHYDRATE 25 G/50ML
12.5 INJECTION, SOLUTION INTRAVENOUS PRN
Status: DISCONTINUED | OUTPATIENT
Start: 2020-05-04 | End: 2020-05-04 | Stop reason: SDUPTHER

## 2020-05-04 RX ORDER — 0.9 % SODIUM CHLORIDE 0.9 %
15 INTRAVENOUS SOLUTION INTRAVENOUS ONCE
Status: COMPLETED | OUTPATIENT
Start: 2020-05-04 | End: 2020-05-04

## 2020-05-04 RX ORDER — POTASSIUM CHLORIDE 7.45 MG/ML
10 INJECTION INTRAVENOUS PRN
Status: DISCONTINUED | OUTPATIENT
Start: 2020-05-04 | End: 2020-05-05

## 2020-05-04 RX ADMIN — POTASSIUM CHLORIDE 10 MEQ: 10 INJECTION, SOLUTION INTRAVENOUS at 23:08

## 2020-05-04 RX ADMIN — SODIUM CHLORIDE 1000 ML: 9 INJECTION, SOLUTION INTRAVENOUS at 13:27

## 2020-05-04 RX ADMIN — ONDANSETRON 4 MG: 2 INJECTION INTRAMUSCULAR; INTRAVENOUS at 11:59

## 2020-05-04 RX ADMIN — PANTOPRAZOLE SODIUM 40 MG: 40 INJECTION, POWDER, FOR SOLUTION INTRAVENOUS at 16:45

## 2020-05-04 RX ADMIN — SODIUM CHLORIDE 0.1 UNITS/KG/HR: 9 INJECTION, SOLUTION INTRAVENOUS at 18:06

## 2020-05-04 RX ADMIN — Medication 1 G: at 16:46

## 2020-05-04 RX ADMIN — METOCLOPRAMIDE 10 MG: 5 INJECTION, SOLUTION INTRAMUSCULAR; INTRAVENOUS at 20:45

## 2020-05-04 RX ADMIN — LIDOCAINE HYDROCHLORIDE: 20 SOLUTION ORAL; TOPICAL at 12:59

## 2020-05-04 RX ADMIN — POTASSIUM CHLORIDE 10 MEQ: 10 INJECTION, SOLUTION INTRAVENOUS at 22:09

## 2020-05-04 RX ADMIN — SODIUM CHLORIDE 1157 ML: 9 INJECTION, SOLUTION INTRAVENOUS at 20:44

## 2020-05-04 RX ADMIN — ENOXAPARIN SODIUM 40 MG: 40 INJECTION SUBCUTANEOUS at 20:45

## 2020-05-04 RX ADMIN — SODIUM CHLORIDE 1000 ML: 9 INJECTION, SOLUTION INTRAVENOUS at 16:42

## 2020-05-04 RX ADMIN — ONDANSETRON 4 MG: 2 INJECTION INTRAMUSCULAR; INTRAVENOUS at 15:43

## 2020-05-04 RX ADMIN — SODIUM CHLORIDE 1000 ML: 9 INJECTION, SOLUTION INTRAVENOUS at 12:00

## 2020-05-04 RX ADMIN — IOPAMIDOL 110 ML: 755 INJECTION, SOLUTION INTRAVENOUS at 17:03

## 2020-05-04 RX ADMIN — DEXTROSE AND SODIUM CHLORIDE: 5; 450 INJECTION, SOLUTION INTRAVENOUS at 20:43

## 2020-05-04 ASSESSMENT — PAIN DESCRIPTION - DESCRIPTORS: DESCRIPTORS: ACHING;SHOOTING

## 2020-05-04 ASSESSMENT — ENCOUNTER SYMPTOMS
DIARRHEA: 1
BLOOD IN STOOL: 0
NAUSEA: 1
RHINORRHEA: 0
COUGH: 0
BACK PAIN: 0
SHORTNESS OF BREATH: 0
VOMITING: 0
CONSTIPATION: 0
SORE THROAT: 0
ABDOMINAL PAIN: 1

## 2020-05-04 ASSESSMENT — PAIN DESCRIPTION - PAIN TYPE: TYPE: ACUTE PAIN

## 2020-05-04 ASSESSMENT — PAIN DESCRIPTION - LOCATION: LOCATION: ABDOMEN

## 2020-05-04 ASSESSMENT — PAIN SCALES - GENERAL
PAINLEVEL_OUTOF10: 0
PAINLEVEL_OUTOF10: 8

## 2020-05-04 NOTE — ED NOTES
Called over to CT scan. Will try and get him over asap.      Fantasma Caballero, VITALY  05/04/20 3230

## 2020-05-04 NOTE — ED NOTES
Pt still complaining of being nauseated and feels like he is going to vomit. Oral medications held.       Tremayne Adams RN  05/04/20 6611

## 2020-05-05 PROBLEM — K42.9 UMBILICAL HERNIA WITHOUT OBSTRUCTION AND WITHOUT GANGRENE: Status: ACTIVE | Noted: 2020-05-05

## 2020-05-05 LAB
ANION GAP SERPL CALCULATED.3IONS-SCNC: 10 MMOL/L (ref 7–16)
ANION GAP SERPL CALCULATED.3IONS-SCNC: 11 MMOL/L (ref 7–16)
ANION GAP SERPL CALCULATED.3IONS-SCNC: 12 MMOL/L (ref 7–16)
ANION GAP SERPL CALCULATED.3IONS-SCNC: 13 MMOL/L (ref 7–16)
ANION GAP SERPL CALCULATED.3IONS-SCNC: 14 MMOL/L (ref 7–16)
BUN BLDV-MCNC: 12 MG/DL (ref 8–23)
BUN BLDV-MCNC: 15 MG/DL (ref 8–23)
BUN BLDV-MCNC: 19 MG/DL (ref 8–23)
BUN BLDV-MCNC: 21 MG/DL (ref 8–23)
BUN BLDV-MCNC: 26 MG/DL (ref 8–23)
CALCIUM SERPL-MCNC: 8.4 MG/DL (ref 8.6–10.2)
CALCIUM SERPL-MCNC: 8.5 MG/DL (ref 8.6–10.2)
CALCIUM SERPL-MCNC: 8.6 MG/DL (ref 8.6–10.2)
CALCIUM SERPL-MCNC: 8.7 MG/DL (ref 8.6–10.2)
CALCIUM SERPL-MCNC: 8.8 MG/DL (ref 8.6–10.2)
CHLORIDE BLD-SCNC: 102 MMOL/L (ref 98–107)
CHLORIDE BLD-SCNC: 102 MMOL/L (ref 98–107)
CHLORIDE BLD-SCNC: 103 MMOL/L (ref 98–107)
CHLORIDE BLD-SCNC: 103 MMOL/L (ref 98–107)
CHLORIDE BLD-SCNC: 106 MMOL/L (ref 98–107)
CO2: 18 MMOL/L (ref 22–29)
CO2: 19 MMOL/L (ref 22–29)
CO2: 21 MMOL/L (ref 22–29)
CREAT SERPL-MCNC: 1.1 MG/DL (ref 0.7–1.2)
CREAT SERPL-MCNC: 1.1 MG/DL (ref 0.7–1.2)
CREAT SERPL-MCNC: 1.3 MG/DL (ref 0.7–1.2)
CREAT SERPL-MCNC: 1.4 MG/DL (ref 0.7–1.2)
CREAT SERPL-MCNC: 1.5 MG/DL (ref 0.7–1.2)
EKG ATRIAL RATE: 100 BPM
EKG P AXIS: 73 DEGREES
EKG P-R INTERVAL: 136 MS
EKG Q-T INTERVAL: 338 MS
EKG QRS DURATION: 76 MS
EKG QTC CALCULATION (BAZETT): 436 MS
EKG R AXIS: 39 DEGREES
EKG T AXIS: 66 DEGREES
EKG VENTRICULAR RATE: 100 BPM
GFR AFRICAN AMERICAN: 57
GFR AFRICAN AMERICAN: >60
GFR NON-AFRICAN AMERICAN: 47 ML/MIN/1.73
GFR NON-AFRICAN AMERICAN: 51 ML/MIN/1.73
GFR NON-AFRICAN AMERICAN: 56 ML/MIN/1.73
GFR NON-AFRICAN AMERICAN: >60 ML/MIN/1.73
GFR NON-AFRICAN AMERICAN: >60 ML/MIN/1.73
GLUCOSE BLD-MCNC: 134 MG/DL (ref 74–99)
GLUCOSE BLD-MCNC: 157 MG/DL (ref 74–99)
GLUCOSE BLD-MCNC: 162 MG/DL (ref 74–99)
GLUCOSE BLD-MCNC: 166 MG/DL (ref 74–99)
GLUCOSE BLD-MCNC: 199 MG/DL (ref 74–99)
MAGNESIUM: 1.7 MG/DL (ref 1.6–2.6)
MAGNESIUM: 1.7 MG/DL (ref 1.6–2.6)
MAGNESIUM: 2 MG/DL (ref 1.6–2.6)
MAGNESIUM: 2 MG/DL (ref 1.6–2.6)
MAGNESIUM: 2.1 MG/DL (ref 1.6–2.6)
METER GLUCOSE: 125 MG/DL (ref 74–99)
METER GLUCOSE: 130 MG/DL (ref 74–99)
METER GLUCOSE: 135 MG/DL (ref 74–99)
METER GLUCOSE: 142 MG/DL (ref 74–99)
METER GLUCOSE: 143 MG/DL (ref 74–99)
METER GLUCOSE: 143 MG/DL (ref 74–99)
METER GLUCOSE: 145 MG/DL (ref 74–99)
METER GLUCOSE: 153 MG/DL (ref 74–99)
METER GLUCOSE: 156 MG/DL (ref 74–99)
METER GLUCOSE: 157 MG/DL (ref 74–99)
METER GLUCOSE: 163 MG/DL (ref 74–99)
METER GLUCOSE: 170 MG/DL (ref 74–99)
METER GLUCOSE: 173 MG/DL (ref 74–99)
METER GLUCOSE: 176 MG/DL (ref 74–99)
METER GLUCOSE: 178 MG/DL (ref 74–99)
METER GLUCOSE: 180 MG/DL (ref 74–99)
METER GLUCOSE: 185 MG/DL (ref 74–99)
METER GLUCOSE: 188 MG/DL (ref 74–99)
METER GLUCOSE: 190 MG/DL (ref 74–99)
PHOSPHORUS: 2.1 MG/DL (ref 2.5–4.5)
PHOSPHORUS: 2.4 MG/DL (ref 2.5–4.5)
PHOSPHORUS: 2.4 MG/DL (ref 2.5–4.5)
PHOSPHORUS: 2.6 MG/DL (ref 2.5–4.5)
PHOSPHORUS: 3 MG/DL (ref 2.5–4.5)
POTASSIUM SERPL-SCNC: 4 MMOL/L (ref 3.5–5)
POTASSIUM SERPL-SCNC: 4.4 MMOL/L (ref 3.5–5)
POTASSIUM SERPL-SCNC: 4.6 MMOL/L (ref 3.5–5)
POTASSIUM SERPL-SCNC: 4.7 MMOL/L (ref 3.5–5)
POTASSIUM SERPL-SCNC: 5.2 MMOL/L (ref 3.5–5)
SODIUM BLD-SCNC: 131 MMOL/L (ref 132–146)
SODIUM BLD-SCNC: 133 MMOL/L (ref 132–146)
SODIUM BLD-SCNC: 134 MMOL/L (ref 132–146)
SODIUM BLD-SCNC: 136 MMOL/L (ref 132–146)
SODIUM BLD-SCNC: 138 MMOL/L (ref 132–146)

## 2020-05-05 PROCEDURE — 6360000002 HC RX W HCPCS: Performed by: INTERNAL MEDICINE

## 2020-05-05 PROCEDURE — 2580000003 HC RX 258: Performed by: INTERNAL MEDICINE

## 2020-05-05 PROCEDURE — 93010 ELECTROCARDIOGRAM REPORT: CPT | Performed by: INTERNAL MEDICINE

## 2020-05-05 PROCEDURE — 6370000000 HC RX 637 (ALT 250 FOR IP): Performed by: HOSPITALIST

## 2020-05-05 PROCEDURE — 82962 GLUCOSE BLOOD TEST: CPT

## 2020-05-05 PROCEDURE — 84100 ASSAY OF PHOSPHORUS: CPT

## 2020-05-05 PROCEDURE — 83735 ASSAY OF MAGNESIUM: CPT

## 2020-05-05 PROCEDURE — 2060000000 HC ICU INTERMEDIATE R&B

## 2020-05-05 PROCEDURE — 2500000003 HC RX 250 WO HCPCS: Performed by: INTERNAL MEDICINE

## 2020-05-05 PROCEDURE — 36415 COLL VENOUS BLD VENIPUNCTURE: CPT

## 2020-05-05 PROCEDURE — 80048 BASIC METABOLIC PNL TOTAL CA: CPT

## 2020-05-05 PROCEDURE — 6370000000 HC RX 637 (ALT 250 FOR IP): Performed by: FAMILY MEDICINE

## 2020-05-05 PROCEDURE — 99221 1ST HOSP IP/OBS SF/LOW 40: CPT | Performed by: SURGERY

## 2020-05-05 RX ADMIN — SODIUM PHOSPHATE, MONOBASIC, MONOHYDRATE 10 MMOL: 276; 142 INJECTION, SOLUTION INTRAVENOUS at 09:50

## 2020-05-05 RX ADMIN — POTASSIUM CHLORIDE 10 MEQ: 10 INJECTION, SOLUTION INTRAVENOUS at 14:10

## 2020-05-05 RX ADMIN — POTASSIUM CHLORIDE 10 MEQ: 10 INJECTION, SOLUTION INTRAVENOUS at 02:03

## 2020-05-05 RX ADMIN — GLIPIZIDE 5 MG: 5 TABLET ORAL at 08:55

## 2020-05-05 RX ADMIN — SODIUM PHOSPHATE, MONOBASIC, MONOHYDRATE 15 MMOL: 276; 142 INJECTION, SOLUTION INTRAVENOUS at 13:50

## 2020-05-05 RX ADMIN — DEXTROSE AND SODIUM CHLORIDE: 5; 450 INJECTION, SOLUTION INTRAVENOUS at 03:05

## 2020-05-05 RX ADMIN — POTASSIUM CHLORIDE 10 MEQ: 10 INJECTION, SOLUTION INTRAVENOUS at 15:07

## 2020-05-05 RX ADMIN — POTASSIUM CHLORIDE 10 MEQ: 10 INJECTION, SOLUTION INTRAVENOUS at 19:01

## 2020-05-05 RX ADMIN — POTASSIUM CHLORIDE 10 MEQ: 10 INJECTION, SOLUTION INTRAVENOUS at 13:06

## 2020-05-05 RX ADMIN — SODIUM PHOSPHATE, MONOBASIC, MONOHYDRATE 10 MMOL: 276; 142 INJECTION, SOLUTION INTRAVENOUS at 02:16

## 2020-05-05 RX ADMIN — ENOXAPARIN SODIUM 40 MG: 40 INJECTION SUBCUTANEOUS at 08:55

## 2020-05-05 RX ADMIN — INSULIN LISPRO 1 UNITS: 100 INJECTION, SOLUTION INTRAVENOUS; SUBCUTANEOUS at 19:57

## 2020-05-05 RX ADMIN — PANTOPRAZOLE SODIUM 40 MG: 40 TABLET, DELAYED RELEASE ORAL at 06:01

## 2020-05-05 RX ADMIN — DEXTROSE AND SODIUM CHLORIDE: 5; 450 INJECTION, SOLUTION INTRAVENOUS at 09:50

## 2020-05-05 RX ADMIN — ATORVASTATIN CALCIUM 40 MG: 40 TABLET, FILM COATED ORAL at 08:55

## 2020-05-05 RX ADMIN — GLIPIZIDE 5 MG: 5 TABLET ORAL at 19:57

## 2020-05-05 RX ADMIN — POTASSIUM CHLORIDE 10 MEQ: 10 INJECTION, SOLUTION INTRAVENOUS at 01:10

## 2020-05-05 RX ADMIN — POTASSIUM CHLORIDE 10 MEQ: 10 INJECTION, SOLUTION INTRAVENOUS at 07:23

## 2020-05-05 RX ADMIN — POTASSIUM CHLORIDE 10 MEQ: 10 INJECTION, SOLUTION INTRAVENOUS at 17:31

## 2020-05-05 RX ADMIN — POTASSIUM CHLORIDE 10 MEQ: 10 INJECTION, SOLUTION INTRAVENOUS at 06:17

## 2020-05-05 RX ADMIN — INSULIN LISPRO 2 UNITS: 100 INJECTION, SOLUTION INTRAVENOUS; SUBCUTANEOUS at 16:40

## 2020-05-05 ASSESSMENT — PAIN SCALES - GENERAL
PAINLEVEL_OUTOF10: 0

## 2020-05-05 NOTE — PROGRESS NOTES
Patient has  through insurance. Yary Ley called for update on case, [phone], and would like called with any updates for home discharge and needs.  Electronically signed by Richie Freeman RN on 5/5/2020 at 8:52 AM

## 2020-05-05 NOTE — H&P
Hospital Medicine History & Physical      PCP: Lisa Clark MD    Date of Admission: 5/4/2020    Date of Service: Pt seen/examined on 5/4/2020 and Admitted to Inpatient with expected LOS greater than two midnights due to medical therapy. Chief Complaint: Abdominal pain      History Of Present Illness:      60 y.o. male who presented to [facility] with past medical history of diabetes, hypertension, subdural hematoma, vascular dementia, TIA, constipation, moderate protein energy malnutrition, depression with suicide ideation and adrenal mass. Patient complains of 2-month history of abdominal pain, this is generalized in the abdomen, described as throbbing, moderate in intensity and constant, associated with diarrhea, vomiting on and off, decreased appetite, decreased urinary output and hiccups. He denies any cough or shortness of breath. No fever. No chest pain. He has periumbilical hernia that hurts at times. He reports episodes of noncompliance with his medications. Vital signs notable for blood pressure 178/104. Pulse 115, respiratory 20, labs showed white count of 16, hemoglobin 18.2, A1c 13, lactic acid level 4.6-> 2.8, potassium 5.9, creatinine 1.9-> 1.5, baseline 0.9, urinalysis positive for ketones and glucose, serum glucose, CO2 16 and anion gap 24. Chest x-ray is negative. CT scan of the abdomen and pelvis shows dilated fluid-filled small bowel loops, ileus?,  Enlarging right adrenal mass. EKG shows normal sinus rhythm rate of 100. He is being admitted for further management.     Past Medical History:          Diagnosis Date    Anxiety     Bronchitis     Cellulitis     Chronic sinusitis     Diabetes mellitus (HCC)     Diabetic retinopathy ([facility]     Fracture of left foot     High cholesterol     Hypercholesteremia     Hypercholesterolemia     Hypertension     Lumbago     Moderate mood disorder (HCC)     Third nerve palsy        Past Surgical History:  none    History

## 2020-05-05 NOTE — CONSULTS
GENERAL SURGERY  CONSULT NOTE            Date: 5/5/2020        Patient Name: Margot Henson     YOB: 1955      Age:  60 y.o. Consult by: Dr Alfredo Burgos  Consult to: Dr Sheridan Catchgreer  Reason:  Adrenal mass    Chief Complaint     Chief Complaint   Patient presents with    Abdominal Pain     x1 month, diffuse, NVD off and on      History Obtained From   patient    History of Present Illness   Margot Henson is a 60 y.o. male with HTN and DM II who presents for DKA (nausea, vomiting, diarrhea, and generalized abdominal pain for 2 weeks) which happens about once a month per patient. He knew about his adrenal mass since 12/2018 CT scan, and he had follow up CT at the [place] and was told not to be concerned, but on CT scan yesterday the mass appears slightly larger and heterogeneous, so surgery was consulted. Patient endorses decent blood pressure control on lasix, spironolactone, and metoprolol, this hasn't changed. He was diagnosed with DM and HTN many years ago. His last colonoscopy was >10 yrs ago and normal. He denies weight loss. He does endorse +family history of thyroid cancer in brother and breast cancer in sister, but no known genetic defects. Past Medical History     Past Medical History:   Diagnosis Date    Anxiety     Bronchitis     Cellulitis     Chronic sinusitis     Diabetes mellitus ([address]     Diabetic retinopathy ([address]     Fracture of left foot     High cholesterol     Hypercholesteremia     Hypercholesterolemia     Hypertension     Lumbago     Moderate mood disorder (HCC)     Third nerve palsy         Past Surgical History   History reviewed. No pertinent surgical history. Medications - Prior to Admission and Current Meds     Prior to Admission medications    Medication Sig Start Date End Date Taking?  Authorizing Provider   omeprazole (PRILOSEC) 20 MG delayed release capsule Take 40 mg by mouth daily   Yes Historical Provider, MD   insulin NPH (HUMULIN N;NOVOLIN N) 100 UNIT/ML injection vial Inject into the skin 2 times daily (before meals) Indications: 45 units in AM, 50 units PM   Yes Historical Provider, MD   metFORMIN (GLUCOPHAGE) 1000 MG tablet Take 1,000 mg by mouth 2 times daily (with meals)   Yes Historical Provider, MD   insulin aspart (NOVOLOG FLEXPEN) 100 UNIT/ML injection pen Inject into the skin 3 times daily (before meals) Indications: 6 units with breakfast, 3 units with lunch, 6 units with dinner   Yes Historical Provider, MD   melatonin 3 MG TABS tablet Take 3 mg by mouth nightly as needed   Yes Historical Provider, MD   glipiZIDE (GLUCOTROL) 5 MG tablet Take 1 tablet by mouth 2 times daily 2/22/19  Yes MIRA Loya - CNS   Dextrose, Diabetic Use, (GLUCOSE) 1 G CHEW Take 4 g by mouth as needed (Take one For low blood sugar recheck blood sugar in 15 min take another one if blood sugar is still low)   Yes Historical Provider, MD   lisinopril (PRINIVIL;ZESTRIL) 20 MG tablet Take 20 mg by mouth 2 times daily   Yes Historical Provider, MD   simvastatin (ZOCOR) 80 MG tablet Take 80 mg by mouth nightly   Yes Historical Provider, MD   lamoTRIgine (LAMICTAL) 25 MG tablet Take 25 mg by mouth nightly    Historical Provider, MD   ammonium lactate (LAC-HYDRIN) 12 % lotion Apply topically as needed for Dry Skin (Bilateral Feet) Apply topically as needed. Historical Provider, MD   fluticasone (FLONASE) 50 MCG/ACT nasal spray 1 spray by Each Nare route daily    Historical Provider, MD   aspirin 81 MG EC tablet Take 1 tablet by mouth daily 3/15/19   Saroj Tyson DO   venlafaxine (EFFEXOR XR) 75 MG extended release capsule Take 1 capsule by mouth daily (with breakfast) 1/12/19   MIRA Martinez - CNP   traZODone (DESYREL) 50 MG tablet Take 1 tablet by mouth nightly 2/14/17   Gabriella Wagner MD   clotrimazole (LOTRIMIN) 1 % external solution Apply topically 2 times daily Apply topically 2 times daily.     Historical Provider, MD        Inpatient:  insulin regular (HUMULIN R;NOVOLIN R) 100 Units in sodium chloride 0.9 % 100 mL infusion, Continuous  enoxaparin (LOVENOX) injection 40 mg, Daily  dextrose 50 % IV solution, PRN  potassium chloride 10 mEq/100 mL IVPB (Peripheral Line), PRN  magnesium sulfate 1 g in dextrose 5% 100 mL IVPB, PRN  sodium phosphate 10 mmol in dextrose 5 % 250 mL IVPB, PRN    Or  sodium phosphate 15 mmol in dextrose 5 % 250 mL IVPB, PRN    Or  sodium phosphate 20 mmol in dextrose 5 % 500 mL IVPB, PRN  0.9 % sodium chloride infusion, Continuous  dextrose 5 % and 0.45 % sodium chloride infusion, Continuous PRN  glipiZIDE (GLUCOTROL) tablet 5 mg, BID  melatonin tablet 3 mg, Nightly PRN  pantoprazole (PROTONIX) tablet 40 mg, QAM AC  atorvastatin (LIPITOR) tablet 40 mg, Daily  ondansetron (ZOFRAN) injection 4 mg, Q6H PRN        Allergies   Patient has no known allergies. Social History     Social History     Tobacco History     Smoking Status  Never Smoker    Smokeless Tobacco Use  Never Used          Alcohol History     Alcohol Use Status  No          Drug Use     Drug Use Status  No          Sexual Activity     Sexually Active  Not Asked                Family History   History reviewed. No pertinent family history. Review of Systems   Pertinent ROS listed in HPI, all others negative    Physical Exam   /84   Pulse 115   Temp 98.9 °F (37.2 °C) (Temporal)   Resp 20   Wt 187 lb 4.8 oz (85 kg)   SpO2 99%   BMI 27.66 kg/m²     GENERAL:  No acute distress. Alert and conversational.   HEAD:  Normocephalic, atraumatic. EYES:  No scleral icterus. Conjugate gaze. ENT/NECK:  Trachea midline, mucous membranes dry. LUNGS:  No cough. Nonlabored breathing on room air. CARDIOVASC:  Normal rate, no cyanosis, regular rhythm. ABDOMEN:  Soft, non-distended, non-tender. No guarding / rigidity / rebound. LIMBS:  No deformities, no edema. NEURO:  Face symmetric, moves all extremities  SKIN:  Warm, dry.      Labs    CBC  Recent Labs     05/04/20  1158 is normal.     Dilated fluid-filled small bowel loops likely ileus. Slightly increased size of Heterogeneous right adrenal mass. Both benign and malignant etiologies are considered including complex myelolipoma, pheochromocytoma or metastasis. Consider further assessment either by MRI or PET/CT scan. Xr Chest Portable    Result Date: 2020  Patient MRN:  [id number] : 1955 Age: 60 years Gender: Male Order Date:  2020 11:45 AM EXAM: XR CHEST PORTABLE INDICATION:  abdominal pain abdominal pain COMPARISON: 2019 FINDINGS:  Heart size is normal. There are no infiltrates or effusions. Normal chest       Assessment      Hospital Problems           Last Modified POA    Severe episode of recurrent major depressive disorder, without psychotic features ([address] (Chronic) 2020 Yes    CLEMENTE (acute kidney injury) ([address] 2020 Yes    Hyperkalemia 2020 Yes    HLD (hyperlipidemia) 2020 Yes    SIRS (systemic inflammatory response syndrome) ([address] 2020 Yes    Lactic acidosis 2020 Yes    High anion gap metabolic acidosis 9429 Yes    Non compliance w medication regimen 2020 Yes    Moderate protein-calorie malnutrition ([address] (Chronic) 2020 Yes    DKA, type 1, not at goal [facility]) 2020 Yes    Ileus ([address] 2020 Yes    Adrenal adenoma, right 2020 Yes    Hypertensive urgency 2020 Yes    Hiccups 2020 Yes          60 y.o. male with DKA and incidental adrenal mass, slightly enlarged since 1.5 years ago, now 6 x 4.9 x 4.4 cm    Plan   - recommend completing workup for possible functional tumor before considering surgery  - please call surgery again when completed, thank you for the consult    Plan was discussed with Dr. Shahid Vallejo.     Electronically signed by Silas Dye MD on 20 at 7:08 AM EDT

## 2020-05-06 VITALS
BODY MASS INDEX: 27.73 KG/M2 | SYSTOLIC BLOOD PRESSURE: 106 MMHG | WEIGHT: 187.8 LBS | OXYGEN SATURATION: 98 % | RESPIRATION RATE: 16 BRPM | TEMPERATURE: 98.5 F | HEART RATE: 85 BPM | DIASTOLIC BLOOD PRESSURE: 62 MMHG

## 2020-05-06 LAB
ANION GAP SERPL CALCULATED.3IONS-SCNC: 12 MMOL/L (ref 7–16)
BUN BLDV-MCNC: 11 MG/DL (ref 8–23)
CALCIUM SERPL-MCNC: 8.5 MG/DL (ref 8.6–10.2)
CHLORIDE BLD-SCNC: 100 MMOL/L (ref 98–107)
CO2: 18 MMOL/L (ref 22–29)
CREAT SERPL-MCNC: 1.1 MG/DL (ref 0.7–1.2)
GFR AFRICAN AMERICAN: >60
GFR NON-AFRICAN AMERICAN: >60 ML/MIN/1.73
GLUCOSE BLD-MCNC: 120 MG/DL (ref 74–99)
MAGNESIUM: 1.7 MG/DL (ref 1.6–2.6)
METER GLUCOSE: 114 MG/DL (ref 74–99)
METER GLUCOSE: 130 MG/DL (ref 74–99)
METER GLUCOSE: 179 MG/DL (ref 74–99)
PHOSPHORUS: 2.2 MG/DL (ref 2.5–4.5)
POTASSIUM SERPL-SCNC: 4.4 MMOL/L (ref 3.5–5)
SODIUM BLD-SCNC: 130 MMOL/L (ref 132–146)
URINE CULTURE, ROUTINE: NORMAL

## 2020-05-06 PROCEDURE — 36415 COLL VENOUS BLD VENIPUNCTURE: CPT

## 2020-05-06 PROCEDURE — 2580000003 HC RX 258: Performed by: INTERNAL MEDICINE

## 2020-05-06 PROCEDURE — 6370000000 HC RX 637 (ALT 250 FOR IP): Performed by: FAMILY MEDICINE

## 2020-05-06 PROCEDURE — 80048 BASIC METABOLIC PNL TOTAL CA: CPT

## 2020-05-06 PROCEDURE — 2500000003 HC RX 250 WO HCPCS: Performed by: INTERNAL MEDICINE

## 2020-05-06 PROCEDURE — 82962 GLUCOSE BLOOD TEST: CPT

## 2020-05-06 PROCEDURE — 84100 ASSAY OF PHOSPHORUS: CPT

## 2020-05-06 PROCEDURE — 83735 ASSAY OF MAGNESIUM: CPT

## 2020-05-06 PROCEDURE — 6370000000 HC RX 637 (ALT 250 FOR IP): Performed by: HOSPITALIST

## 2020-05-06 PROCEDURE — 6360000002 HC RX W HCPCS: Performed by: INTERNAL MEDICINE

## 2020-05-06 RX ADMIN — ENOXAPARIN SODIUM 40 MG: 40 INJECTION SUBCUTANEOUS at 07:55

## 2020-05-06 RX ADMIN — ATORVASTATIN CALCIUM 40 MG: 40 TABLET, FILM COATED ORAL at 07:55

## 2020-05-06 RX ADMIN — PANTOPRAZOLE SODIUM 40 MG: 40 TABLET, DELAYED RELEASE ORAL at 06:23

## 2020-05-06 RX ADMIN — GLIPIZIDE 5 MG: 5 TABLET ORAL at 07:55

## 2020-05-06 RX ADMIN — SODIUM PHOSPHATE, MONOBASIC, MONOHYDRATE 10 MMOL: 276; 142 INJECTION, SOLUTION INTRAVENOUS at 06:31

## 2020-05-06 RX ADMIN — INSULIN LISPRO 2 UNITS: 100 INJECTION, SOLUTION INTRAVENOUS; SUBCUTANEOUS at 11:20

## 2020-05-06 ASSESSMENT — PAIN SCALES - GENERAL
PAINLEVEL_OUTOF10: 0
PAINLEVEL_OUTOF10: 0

## 2020-05-08 LAB
METANEPH/PLASMA INTERP: NORMAL
METANEPHRINE FREE PLASMA: 0.18 NMOL/L (ref 0–0.49)
NORMETANEPHRINE FREE PLASMA: 0.6 NMOL/L (ref 0–0.89)

## 2020-07-01 ENCOUNTER — APPOINTMENT (OUTPATIENT)
Dept: GENERAL RADIOLOGY | Age: 65
End: 2020-07-01
Payer: MEDICARE

## 2020-07-01 ENCOUNTER — HOSPITAL ENCOUNTER (EMERGENCY)
Age: 65
Discharge: HOME | End: 2020-07-01
Attending: EMERGENCY MEDICINE
Payer: MEDICARE

## 2020-07-01 VITALS
SYSTOLIC BLOOD PRESSURE: 125 MMHG | HEIGHT: 65 IN | OXYGEN SATURATION: 97 % | TEMPERATURE: 97.3 F | RESPIRATION RATE: 16 BRPM | WEIGHT: 186 LBS | DIASTOLIC BLOOD PRESSURE: 70 MMHG | HEART RATE: 71 BPM | BODY MASS INDEX: 30.99 KG/M2

## 2020-07-01 LAB
ALBUMIN SERPL-MCNC: 3.9 G/DL (ref 3.5–5.2)
ALP BLD-CCNC: 98 U/L (ref 40–129)
ALT SERPL-CCNC: 31 U/L (ref 0–40)
ANION GAP SERPL CALCULATED.3IONS-SCNC: 10 MMOL/L (ref 7–16)
ANION GAP SERPL CALCULATED.3IONS-SCNC: 8 MMOL/L (ref 7–16)
AST SERPL-CCNC: 25 U/L (ref 0–39)
BACTERIA: NORMAL /HPF
BASOPHILS ABSOLUTE: 0.07 E9/L (ref 0–0.2)
BASOPHILS RELATIVE PERCENT: 0.8 % (ref 0–2)
BILIRUB SERPL-MCNC: 0.8 MG/DL (ref 0–1.2)
BILIRUBIN URINE: NEGATIVE
BLOOD, URINE: ABNORMAL
BUN BLDV-MCNC: 27 MG/DL (ref 8–23)
BUN BLDV-MCNC: 28 MG/DL (ref 8–23)
CALCIUM SERPL-MCNC: 8.8 MG/DL (ref 8.6–10.2)
CALCIUM SERPL-MCNC: 9.3 MG/DL (ref 8.6–10.2)
CHLORIDE BLD-SCNC: 89 MMOL/L (ref 98–107)
CHLORIDE BLD-SCNC: 98 MMOL/L (ref 98–107)
CLARITY: CLEAR
CO2: 24 MMOL/L (ref 22–29)
CO2: 25 MMOL/L (ref 22–29)
COLOR: YELLOW
CREAT SERPL-MCNC: 1.5 MG/DL (ref 0.7–1.2)
CREAT SERPL-MCNC: 1.6 MG/DL (ref 0.7–1.2)
EKG ATRIAL RATE: 81 BPM
EKG P AXIS: 74 DEGREES
EKG P-R INTERVAL: 154 MS
EKG Q-T INTERVAL: 352 MS
EKG QRS DURATION: 78 MS
EKG QTC CALCULATION (BAZETT): 408 MS
EKG R AXIS: 28 DEGREES
EKG T AXIS: 53 DEGREES
EKG VENTRICULAR RATE: 81 BPM
EOSINOPHILS ABSOLUTE: 0.27 E9/L (ref 0.05–0.5)
EOSINOPHILS RELATIVE PERCENT: 3.2 % (ref 0–6)
EPITHELIAL CELLS, UA: NORMAL /HPF
GFR AFRICAN AMERICAN: 53
GFR AFRICAN AMERICAN: 53
GFR AFRICAN AMERICAN: 57
GFR NON-AFRICAN AMERICAN: 44 ML/MIN/1.73
GFR NON-AFRICAN AMERICAN: 44 ML/MIN/1.73
GFR NON-AFRICAN AMERICAN: 47 ML/MIN/1.73
GLUCOSE BLD-MCNC: 427 MG/DL (ref 74–99)
GLUCOSE BLD-MCNC: 696 MG/DL (ref 74–99)
GLUCOSE BLD-MCNC: 701 MG/DL (ref 74–99)
GLUCOSE URINE: >=1000 MG/DL
HCT VFR BLD CALC: 42.8 % (ref 37–54)
HEMOGLOBIN: 14.8 G/DL (ref 12.5–16.5)
IMMATURE GRANULOCYTES #: 0.04 E9/L
IMMATURE GRANULOCYTES %: 0.5 % (ref 0–5)
KETONES, URINE: NEGATIVE MG/DL
LACTIC ACID: 1.1 MMOL/L (ref 0.5–2.2)
LEUKOCYTE ESTERASE, URINE: NEGATIVE
LIPASE: 107 U/L (ref 13–60)
LYMPHOCYTES ABSOLUTE: 2.87 E9/L (ref 1.5–4)
LYMPHOCYTES RELATIVE PERCENT: 33.5 % (ref 20–42)
MCH RBC QN AUTO: 24.9 PG (ref 26–35)
MCHC RBC AUTO-ENTMCNC: 34.6 % (ref 32–34.5)
MCV RBC AUTO: 72.1 FL (ref 80–99.9)
METER GLUCOSE: 282 MG/DL (ref 74–99)
METER GLUCOSE: 376 MG/DL (ref 74–99)
METER GLUCOSE: >500 MG/DL (ref 74–99)
MONOCYTES ABSOLUTE: 0.7 E9/L (ref 0.1–0.95)
MONOCYTES RELATIVE PERCENT: 8.2 % (ref 2–12)
NEUTROPHILS ABSOLUTE: 4.62 E9/L (ref 1.8–7.3)
NEUTROPHILS RELATIVE PERCENT: 53.8 % (ref 43–80)
NITRITE, URINE: NEGATIVE
PDW BLD-RTO: 14.5 FL (ref 11.5–15)
PERFORMED ON: ABNORMAL
PH UA: 7 (ref 5–9)
PLATELET # BLD: 177 E9/L (ref 130–450)
PMV BLD AUTO: 10.9 FL (ref 7–12)
POC CHLORIDE: 91 MMOL/L (ref 100–108)
POC CREATININE: 1.6 MG/DL (ref 0.7–1.2)
POC POTASSIUM: 5 MMOL/L (ref 3.5–5)
POC SODIUM: 125 MMOL/L (ref 132–146)
POTASSIUM SERPL-SCNC: 4.6 MMOL/L (ref 3.5–5)
POTASSIUM SERPL-SCNC: 5.1 MMOL/L (ref 3.5–5)
PROTEIN UA: NEGATIVE MG/DL
RBC # BLD: 5.94 E12/L (ref 3.8–5.8)
RBC UA: NORMAL /HPF (ref 0–2)
SODIUM BLD-SCNC: 124 MMOL/L (ref 132–146)
SODIUM BLD-SCNC: 130 MMOL/L (ref 132–146)
SPECIFIC GRAVITY UA: <=1.005 (ref 1–1.03)
TOTAL PROTEIN: 6.4 G/DL (ref 6.4–8.3)
TROPONIN: <0.01 NG/ML (ref 0–0.03)
UROBILINOGEN, URINE: 0.2 E.U./DL
WBC # BLD: 8.6 E9/L (ref 4.5–11.5)
WBC UA: NORMAL /HPF (ref 0–5)

## 2020-07-01 PROCEDURE — 84132 ASSAY OF SERUM POTASSIUM: CPT

## 2020-07-01 PROCEDURE — 80053 COMPREHEN METABOLIC PANEL: CPT

## 2020-07-01 PROCEDURE — 71045 X-RAY EXAM CHEST 1 VIEW: CPT

## 2020-07-01 PROCEDURE — 96375 TX/PRO/DX INJ NEW DRUG ADDON: CPT

## 2020-07-01 PROCEDURE — 84295 ASSAY OF SERUM SODIUM: CPT

## 2020-07-01 PROCEDURE — 81001 URINALYSIS AUTO W/SCOPE: CPT

## 2020-07-01 PROCEDURE — 2580000003 HC RX 258: Performed by: EMERGENCY MEDICINE

## 2020-07-01 PROCEDURE — 82962 GLUCOSE BLOOD TEST: CPT

## 2020-07-01 PROCEDURE — 6370000000 HC RX 637 (ALT 250 FOR IP): Performed by: EMERGENCY MEDICINE

## 2020-07-01 PROCEDURE — 83605 ASSAY OF LACTIC ACID: CPT

## 2020-07-01 PROCEDURE — 96374 THER/PROPH/DIAG INJ IV PUSH: CPT

## 2020-07-01 PROCEDURE — 80048 BASIC METABOLIC PNL TOTAL CA: CPT

## 2020-07-01 PROCEDURE — 82947 ASSAY GLUCOSE BLOOD QUANT: CPT

## 2020-07-01 PROCEDURE — 93005 ELECTROCARDIOGRAM TRACING: CPT | Performed by: EMERGENCY MEDICINE

## 2020-07-01 PROCEDURE — 96376 TX/PRO/DX INJ SAME DRUG ADON: CPT

## 2020-07-01 PROCEDURE — 6360000002 HC RX W HCPCS: Performed by: PHYSICIAN ASSISTANT

## 2020-07-01 PROCEDURE — 93010 ELECTROCARDIOGRAM REPORT: CPT | Performed by: INTERNAL MEDICINE

## 2020-07-01 PROCEDURE — 85025 COMPLETE CBC W/AUTO DIFF WBC: CPT

## 2020-07-01 PROCEDURE — 99285 EMERGENCY DEPT VISIT HI MDM: CPT

## 2020-07-01 PROCEDURE — 82565 ASSAY OF CREATININE: CPT

## 2020-07-01 PROCEDURE — 84484 ASSAY OF TROPONIN QUANT: CPT

## 2020-07-01 PROCEDURE — 82435 ASSAY OF BLOOD CHLORIDE: CPT

## 2020-07-01 PROCEDURE — 83690 ASSAY OF LIPASE: CPT

## 2020-07-01 RX ORDER — 0.9 % SODIUM CHLORIDE 0.9 %
1000 INTRAVENOUS SOLUTION INTRAVENOUS ONCE
Status: COMPLETED | OUTPATIENT
Start: 2020-07-01 | End: 2020-07-01

## 2020-07-01 RX ORDER — ONDANSETRON 2 MG/ML
4 INJECTION INTRAMUSCULAR; INTRAVENOUS ONCE
Status: COMPLETED | OUTPATIENT
Start: 2020-07-01 | End: 2020-07-01

## 2020-07-01 RX ORDER — 0.9 % SODIUM CHLORIDE 0.9 %
500 INTRAVENOUS SOLUTION INTRAVENOUS ONCE
Status: COMPLETED | OUTPATIENT
Start: 2020-07-01 | End: 2020-07-01

## 2020-07-01 RX ADMIN — SODIUM CHLORIDE 1000 ML: 9 INJECTION, SOLUTION INTRAVENOUS at 03:27

## 2020-07-01 RX ADMIN — ONDANSETRON HYDROCHLORIDE 4 MG: 2 SOLUTION INTRAMUSCULAR; INTRAVENOUS at 01:40

## 2020-07-01 RX ADMIN — INSULIN HUMAN 9 UNITS: 100 INJECTION, SOLUTION PARENTERAL at 02:40

## 2020-07-01 RX ADMIN — INSULIN HUMAN 4 UNITS: 100 INJECTION, SOLUTION PARENTERAL at 05:45

## 2020-07-01 RX ADMIN — SODIUM CHLORIDE 500 ML: 9 INJECTION, SOLUTION INTRAVENOUS at 05:43

## 2020-07-01 RX ADMIN — SODIUM CHLORIDE 1000 ML: 9 INJECTION, SOLUTION INTRAVENOUS at 01:32

## 2020-07-01 ASSESSMENT — PAIN DESCRIPTION - LOCATION: LOCATION: ABDOMEN

## 2020-07-01 ASSESSMENT — PAIN DESCRIPTION - ORIENTATION: ORIENTATION: OTHER (COMMENT)

## 2020-07-01 ASSESSMENT — PAIN DESCRIPTION - PAIN TYPE: TYPE: ACUTE PAIN

## 2020-07-01 NOTE — ED NOTES
Pt alert oriented awakens easily alert oriented skin warm dry resp easy states feels better bgl [address], RN  07/01/20 8473

## 2020-07-01 NOTE — ED NOTES
Pt alert oriented skin warm dry rep easy pt states feeling better  bgl [address] [facility], [address] [facility]  07/01/20 9418

## 2020-07-01 NOTE — ED PROVIDER NOTES
murmurs, gallops, or rubs. 2+ distal pulses  Chest: no chest wall tenderness  Abdomen: Soft. Non tender. Noted umbilical hernia non distended. +BS. No rebound, guarding, or rigidity. No pulsatile masses appreciated. Musculoskeletal: Moves all extremities x 4. Warm and well perfused, no clubbing, cyanosis, or edema. Capillary refill <3 seconds  Skin: warm and dry. No rashes. Neurologic: GCS 15, CN 2-12 grossly intact, no focal deficits, symmetric strength 5/5 in the upper and lower extremities bilaterally  Psych: Normal Affect    -------------------------------------------------- RESULTS -------------------------------------------------  I have personally reviewed all laboratory and imaging results for this patient. Results are listed below.      LABS:  Results for orders placed or performed during the hospital encounter of 07/01/20   CBC auto differential   Result Value Ref Range    WBC 8.6 4.5 - 11.5 E9/L    RBC 5.94 (H) 3.80 - 5.80 E12/L    Hemoglobin 14.8 12.5 - 16.5 g/dL    Hematocrit 42.8 37.0 - 54.0 %    MCV 72.1 (L) 80.0 - 99.9 fL    MCH 24.9 (L) 26.0 - 35.0 pg    MCHC 34.6 (H) 32.0 - 34.5 %    RDW 14.5 11.5 - 15.0 fL    Platelets 011 578 - 748 E9/L    MPV 10.9 7.0 - 12.0 fL    Neutrophils % 53.8 43.0 - 80.0 %    Immature Granulocytes % 0.5 0.0 - 5.0 %    Lymphocytes % 33.5 20.0 - 42.0 %    Monocytes % 8.2 2.0 - 12.0 %    Eosinophils % 3.2 0.0 - 6.0 %    Basophils % 0.8 0.0 - 2.0 %    Neutrophils Absolute 4.62 1.80 - 7.30 E9/L    Immature Granulocytes # 0.04 E9/L    Lymphocytes Absolute 2.87 1.50 - 4.00 E9/L    Monocytes Absolute 0.70 0.10 - 0.95 E9/L    Eosinophils Absolute 0.27 0.05 - 0.50 E9/L    Basophils Absolute 0.07 0.00 - 0.20 E9/L   Comprehensive Metabolic Panel   Result Value Ref Range    Sodium 124 (L) 132 - 146 mmol/L    Potassium 5.1 (H) 3.5 - 5.0 mmol/L    Chloride 89 (L) 98 - 107 mmol/L    CO2 25 22 - 29 mmol/L    Anion Gap 10 7 - 16 mmol/L    Glucose 701 (HH) 74 - 99 mg/dL    BUN 28 (H) 8 - 23 mg/dL    CREATININE 1.6 (H) 0.7 - 1.2 mg/dL    GFR Non-African American 44 >=60 mL/min/1.73    GFR African American 53     Calcium 9.3 8.6 - 10.2 mg/dL    Total Protein 6.4 6.4 - 8.3 g/dL    Alb 3.9 3.5 - 5.2 g/dL    Total Bilirubin 0.8 0.0 - 1.2 mg/dL    Alkaline Phosphatase 98 40 - 129 U/L    ALT 31 0 - 40 U/L    AST 25 0 - 39 U/L   Lipase   Result Value Ref Range    Lipase 107 (H) 13 - 60 U/L   Lactic Acid, Plasma   Result Value Ref Range    Lactic Acid 1.1 0.5 - 2.2 mmol/L   Urinalysis   Result Value Ref Range    Color, UA Yellow Straw/Yellow    Clarity, UA Clear Clear    Glucose, Ur >=1000 (A) Negative mg/dL    Bilirubin Urine Negative Negative    Ketones, Urine Negative Negative mg/dL    Specific Gravity, UA <=1.005 1.005 - 1.030    Blood, Urine TRACE-INTACT Negative    pH, UA 7.0 5.0 - 9.0    Protein, UA Negative Negative mg/dL    Urobilinogen, Urine 0.2 <2.0 E.U./dL    Nitrite, Urine Negative Negative    Leukocyte Esterase, Urine Negative Negative   Troponin   Result Value Ref Range    Troponin <0.01 0.00 - 0.03 ng/mL   Microscopic Urinalysis   Result Value Ref Range    WBC, UA NONE 0 - 5 /HPF    RBC, UA 0-1 0 - 2 /HPF    Epithelial Cells, UA RARE /HPF    Bacteria, UA NONE SEEN None Seen /HPF   Basic Metabolic Panel   Result Value Ref Range    Sodium 130 (L) 132 - 146 mmol/L    Potassium 4.6 3.5 - 5.0 mmol/L    Chloride 98 98 - 107 mmol/L    CO2 24 22 - 29 mmol/L    Anion Gap 8 7 - 16 mmol/L    Glucose 427 (H) 74 - 99 mg/dL    BUN 27 (H) 8 - 23 mg/dL    CREATININE 1.5 (H) 0.7 - 1.2 mg/dL    GFR Non-African American 47 >=60 mL/min/1.73    GFR African American 57     Calcium 8.8 8.6 - 10.2 mg/dL   POCT Glucose   Result Value Ref Range    Meter Glucose >500 (H) 74 - 99 mg/dL   POCT Venous   Result Value Ref Range    POC Sodium 125 (L) 132 - 146 mmol/L    POC Potassium 5.0 3.5 - 5.0 mmol/L    POC Chloride 91 (L) 100 - 108 mmol/L    POC Glucose 696 (HH) 74 - 99 mg/dl    POC Creatinine 1.6 (H) 0.7 - 1.2 mg/dL    GFR Non- 44 >=60 mL/min/1.73    GFR  53     Performed on SEE BELOW    POCT Glucose   Result Value Ref Range    Meter Glucose 376 (H) 74 - 99 mg/dL       RADIOLOGY:  Interpreted by Radiologist.  XR CHEST PORTABLE   Final Result   No acute cardiopulmonary process. EKG:  This EKG is signed and interpreted by me. Rate: 81  Rhythm: Sinus  Interpretation: no acute changes  Comparison: stable as compared to patient's most recent EKG        ------------------------- NURSING NOTES AND VITALS REVIEWED ---------------------------   The nursing notes within the ED encounter and vital signs as below have been reviewed by myself. /82   Pulse 76   Temp 97.9 °F (36.6 °C) (Temporal)   Resp 18   Ht 5' 5\" (1.651 m)   Wt 186 lb (84.4 kg)   SpO2 97%   BMI 30.95 kg/m²   Oxygen Saturation Interpretation: Normal    The patients available past medical records and past encounters were reviewed. ------------------------------ ED COURSE/MEDICAL DECISION MAKING----------------------  Medications   insulin regular (HUMULIN R;NOVOLIN R) injection 4 Units (has no administration in time range)   0.9 % sodium chloride bolus (has no administration in time range)   0.9 % sodium chloride bolus (0 mLs Intravenous Stopped 7/1/20 0417)   ondansetron (ZOFRAN) injection 4 mg (4 mg Intravenous Given 7/1/20 0140)   insulin regular (HUMULIN R;NOVOLIN R) injection 9 Units (9 Units Intravenous Given 7/1/20 0240)   0.9 % sodium chloride bolus (0 mLs Intravenous Stopped 7/1/20 0417)             Medical Decision Making:        Re-Evaluations:             Patient reevaluated and medicated. Patient made aware of findings. Patient not in DKA. Patient given IV fluids as well as insulin. Patient resting comfortably. Patient made aware plan. Patient reporting no chest pain no difficulty breathing no abdominal pain. Vital signs are stable. Rechecked and made aware of findings.   Patient will be remedicated and given IV fluids. Patient is not in DKA. Patient to be discharged. Abdomen is soft and nontender patient reporting no chest pain or difficulty breathing. Consultations:                 Critical Care: This patient's ED course included: a personal history and physicial eaxmination    This patient has been closely monitored during their ED course. Counseling: The emergency provider has spoken with the patient and discussed todays results, in addition to providing specific details for the plan of care and counseling regarding the diagnosis and prognosis. Questions are answered at this time and they are agreeable with the plan.       --------------------------------- IMPRESSION AND DISPOSITION ---------------------------------    IMPRESSION  1. Hyperglycemia    2. Nausea        DISPOSITION  Disposition: To be discharged home  Patient condition is stable        NOTE: This report was transcribed using voice recognition software.  Every effort was made to ensure accuracy; however, inadvertent computerized transcription errors may be present          Zachary Alva MD  07/01/20 [address], [place]  07/01/20 4165

## 2020-07-01 NOTE — ED NOTES
Bed: 13  Expected date:   Expected time:   Means of arrival:   Comments:  triage     Szuy Posey, VITALY  07/01/20 8632

## 2020-08-20 PROCEDURE — 96372 THER/PROPH/DIAG INJ SC/IM: CPT

## 2020-08-20 PROCEDURE — 99285 EMERGENCY DEPT VISIT HI MDM: CPT

## 2020-08-20 ASSESSMENT — PAIN SCALES - GENERAL: PAINLEVEL_OUTOF10: 5

## 2020-08-21 ENCOUNTER — APPOINTMENT (OUTPATIENT)
Dept: GENERAL RADIOLOGY | Age: 65
End: 2020-08-21
Payer: MEDICARE

## 2020-08-21 ENCOUNTER — APPOINTMENT (OUTPATIENT)
Dept: CT IMAGING | Age: 65
End: 2020-08-21
Payer: MEDICARE

## 2020-08-21 ENCOUNTER — HOSPITAL ENCOUNTER (OUTPATIENT)
Age: 65
Setting detail: OBSERVATION
Discharge: HOME HEALTH | End: 2020-08-24
Attending: EMERGENCY MEDICINE | Admitting: FAMILY MEDICINE
Payer: MEDICARE

## 2020-08-21 PROBLEM — K21.9 GERD (GASTROESOPHAGEAL REFLUX DISEASE): Status: ACTIVE | Noted: 2020-08-21

## 2020-08-21 PROBLEM — N18.9 ACUTE KIDNEY INJURY SUPERIMPOSED ON CKD (HCC): Status: ACTIVE | Noted: 2018-09-29

## 2020-08-21 PROBLEM — I95.9 HYPOTENSION: Status: ACTIVE | Noted: 2020-08-21

## 2020-08-21 PROBLEM — F32.A DEPRESSION: Status: ACTIVE | Noted: 2020-08-21

## 2020-08-21 PROBLEM — E11.65 UNCONTROLLED TYPE 2 DIABETES MELLITUS WITH HYPERGLYCEMIA (HCC): Status: ACTIVE | Noted: 2019-02-06

## 2020-08-21 LAB
ALBUMIN SERPL-MCNC: 4.3 G/DL (ref 3.5–5.2)
ALP BLD-CCNC: 71 U/L (ref 40–129)
ALT SERPL-CCNC: 18 U/L (ref 0–40)
ANION GAP SERPL CALCULATED.3IONS-SCNC: 11 MMOL/L (ref 7–16)
ANION GAP SERPL CALCULATED.3IONS-SCNC: 15 MMOL/L (ref 7–16)
AST SERPL-CCNC: 17 U/L (ref 0–39)
BASOPHILS ABSOLUTE: 0.07 E9/L (ref 0–0.2)
BASOPHILS RELATIVE PERCENT: 0.5 % (ref 0–2)
BETA-HYDROXYBUTYRATE: 0.23 MMOL/L (ref 0.02–0.27)
BILIRUB SERPL-MCNC: 1 MG/DL (ref 0–1.2)
BUN BLDV-MCNC: 41 MG/DL (ref 8–23)
BUN BLDV-MCNC: 48 MG/DL (ref 8–23)
CALCIUM SERPL-MCNC: 8.8 MG/DL (ref 8.6–10.2)
CALCIUM SERPL-MCNC: 9.8 MG/DL (ref 8.6–10.2)
CHLORIDE BLD-SCNC: 103 MMOL/L (ref 98–107)
CHLORIDE BLD-SCNC: 97 MMOL/L (ref 98–107)
CHP ED QC CHECK: YES
CO2: 22 MMOL/L (ref 22–29)
CO2: 25 MMOL/L (ref 22–29)
CREAT SERPL-MCNC: 1.7 MG/DL (ref 0.7–1.2)
CREAT SERPL-MCNC: 2.6 MG/DL (ref 0.7–1.2)
EKG ATRIAL RATE: 90 BPM
EKG P AXIS: 79 DEGREES
EKG P-R INTERVAL: 142 MS
EKG Q-T INTERVAL: 336 MS
EKG QRS DURATION: 72 MS
EKG QTC CALCULATION (BAZETT): 411 MS
EKG R AXIS: 47 DEGREES
EKG T AXIS: 69 DEGREES
EKG VENTRICULAR RATE: 90 BPM
EOSINOPHILS ABSOLUTE: 0.25 E9/L (ref 0.05–0.5)
EOSINOPHILS RELATIVE PERCENT: 1.9 % (ref 0–6)
GFR AFRICAN AMERICAN: 30
GFR AFRICAN AMERICAN: 50
GFR NON-AFRICAN AMERICAN: 25 ML/MIN/1.73
GFR NON-AFRICAN AMERICAN: 41 ML/MIN/1.73
GLUCOSE BLD-MCNC: 285 MG/DL (ref 74–99)
GLUCOSE BLD-MCNC: 288 MG/DL
GLUCOSE BLD-MCNC: 78 MG/DL (ref 74–99)
HBA1C MFR BLD: 11.1 % (ref 4–5.6)
HCT VFR BLD CALC: 45 % (ref 37–54)
HEMOGLOBIN: 15.4 G/DL (ref 12.5–16.5)
IMMATURE GRANULOCYTES #: 0.03 E9/L
IMMATURE GRANULOCYTES %: 0.2 % (ref 0–5)
LACTIC ACID: 1.6 MMOL/L (ref 0.5–2.2)
LIPASE: 82 U/L (ref 13–60)
LYMPHOCYTES ABSOLUTE: 1.74 E9/L (ref 1.5–4)
LYMPHOCYTES RELATIVE PERCENT: 13.1 % (ref 20–42)
MAGNESIUM: 1.9 MG/DL (ref 1.6–2.6)
MCH RBC QN AUTO: 25.5 PG (ref 26–35)
MCHC RBC AUTO-ENTMCNC: 34.2 % (ref 32–34.5)
MCV RBC AUTO: 74.4 FL (ref 80–99.9)
METER GLUCOSE: 270 MG/DL (ref 74–99)
METER GLUCOSE: 288 MG/DL (ref 74–99)
METER GLUCOSE: 393 MG/DL (ref 74–99)
METER GLUCOSE: 81 MG/DL (ref 74–99)
METER GLUCOSE: 96 MG/DL (ref 74–99)
MONOCYTES ABSOLUTE: 0.99 E9/L (ref 0.1–0.95)
MONOCYTES RELATIVE PERCENT: 7.4 % (ref 2–12)
NEUTROPHILS ABSOLUTE: 10.25 E9/L (ref 1.8–7.3)
NEUTROPHILS RELATIVE PERCENT: 76.9 % (ref 43–80)
PDW BLD-RTO: 14.6 FL (ref 11.5–15)
PH VENOUS: 7.4 (ref 7.35–7.45)
PLATELET # BLD: 244 E9/L (ref 130–450)
PMV BLD AUTO: 10.7 FL (ref 7–12)
POTASSIUM SERPL-SCNC: 4.4 MMOL/L (ref 3.5–5)
POTASSIUM SERPL-SCNC: 5 MMOL/L (ref 3.5–5)
RBC # BLD: 6.05 E12/L (ref 3.8–5.8)
SODIUM BLD-SCNC: 136 MMOL/L (ref 132–146)
SODIUM BLD-SCNC: 137 MMOL/L (ref 132–146)
TOTAL PROTEIN: 6.8 G/DL (ref 6.4–8.3)
TROPONIN: 0.02 NG/ML (ref 0–0.03)
WBC # BLD: 13.3 E9/L (ref 4.5–11.5)

## 2020-08-21 PROCEDURE — 36415 COLL VENOUS BLD VENIPUNCTURE: CPT

## 2020-08-21 PROCEDURE — 83605 ASSAY OF LACTIC ACID: CPT

## 2020-08-21 PROCEDURE — 80053 COMPREHEN METABOLIC PANEL: CPT

## 2020-08-21 PROCEDURE — 96372 THER/PROPH/DIAG INJ SC/IM: CPT

## 2020-08-21 PROCEDURE — 2580000003 HC RX 258: Performed by: FAMILY MEDICINE

## 2020-08-21 PROCEDURE — 80048 BASIC METABOLIC PNL TOTAL CA: CPT

## 2020-08-21 PROCEDURE — 83036 HEMOGLOBIN GLYCOSYLATED A1C: CPT

## 2020-08-21 PROCEDURE — 82800 BLOOD PH: CPT

## 2020-08-21 PROCEDURE — 84484 ASSAY OF TROPONIN QUANT: CPT

## 2020-08-21 PROCEDURE — 96361 HYDRATE IV INFUSION ADD-ON: CPT

## 2020-08-21 PROCEDURE — G0378 HOSPITAL OBSERVATION PER HR: HCPCS

## 2020-08-21 PROCEDURE — 83690 ASSAY OF LIPASE: CPT

## 2020-08-21 PROCEDURE — 82010 KETONE BODYS QUAN: CPT

## 2020-08-21 PROCEDURE — 6360000002 HC RX W HCPCS: Performed by: FAMILY MEDICINE

## 2020-08-21 PROCEDURE — 6370000000 HC RX 637 (ALT 250 FOR IP): Performed by: FAMILY MEDICINE

## 2020-08-21 PROCEDURE — 93005 ELECTROCARDIOGRAM TRACING: CPT | Performed by: NURSE PRACTITIONER

## 2020-08-21 PROCEDURE — 74176 CT ABD & PELVIS W/O CONTRAST: CPT

## 2020-08-21 PROCEDURE — 85025 COMPLETE CBC W/AUTO DIFF WBC: CPT

## 2020-08-21 PROCEDURE — 82962 GLUCOSE BLOOD TEST: CPT

## 2020-08-21 PROCEDURE — 71046 X-RAY EXAM CHEST 2 VIEWS: CPT

## 2020-08-21 PROCEDURE — 6370000000 HC RX 637 (ALT 250 FOR IP): Performed by: NURSE PRACTITIONER

## 2020-08-21 PROCEDURE — 83735 ASSAY OF MAGNESIUM: CPT

## 2020-08-21 PROCEDURE — 93010 ELECTROCARDIOGRAM REPORT: CPT | Performed by: INTERNAL MEDICINE

## 2020-08-21 PROCEDURE — 80175 DRUG SCREEN QUAN LAMOTRIGINE: CPT

## 2020-08-21 PROCEDURE — 2500000003 HC RX 250 WO HCPCS: Performed by: NURSE PRACTITIONER

## 2020-08-21 RX ORDER — LANOLIN ALCOHOL/MO/W.PET/CERES
3 CREAM (GRAM) TOPICAL NIGHTLY PRN
Status: CANCELLED | OUTPATIENT
Start: 2020-08-21

## 2020-08-21 RX ORDER — DOCUSATE SODIUM 100 MG/1
100 CAPSULE, LIQUID FILLED ORAL DAILY
Status: DISCONTINUED | OUTPATIENT
Start: 2020-08-21 | End: 2020-08-24 | Stop reason: HOSPADM

## 2020-08-21 RX ORDER — HEPARIN SODIUM 10000 [USP'U]/ML
5000 INJECTION, SOLUTION INTRAVENOUS; SUBCUTANEOUS EVERY 8 HOURS
Status: DISCONTINUED | OUTPATIENT
Start: 2020-08-21 | End: 2020-08-24 | Stop reason: HOSPADM

## 2020-08-21 RX ORDER — DEXTROSE MONOHYDRATE 25 G/50ML
12.5 INJECTION, SOLUTION INTRAVENOUS PRN
Status: CANCELLED | OUTPATIENT
Start: 2020-08-21

## 2020-08-21 RX ORDER — POLYETHYLENE GLYCOL 3350 17 G/17G
17 POWDER, FOR SOLUTION ORAL DAILY
Status: DISCONTINUED | OUTPATIENT
Start: 2020-08-21 | End: 2020-08-24 | Stop reason: HOSPADM

## 2020-08-21 RX ORDER — NICOTINE POLACRILEX 4 MG
15 LOZENGE BUCCAL PRN
Status: DISCONTINUED | OUTPATIENT
Start: 2020-08-21 | End: 2020-08-24 | Stop reason: HOSPADM

## 2020-08-21 RX ORDER — FLUTICASONE PROPIONATE 50 MCG
1 SPRAY, SUSPENSION (ML) NASAL DAILY
Status: DISCONTINUED | OUTPATIENT
Start: 2020-08-21 | End: 2020-08-24 | Stop reason: HOSPADM

## 2020-08-21 RX ORDER — ONDANSETRON 2 MG/ML
4 INJECTION INTRAMUSCULAR; INTRAVENOUS EVERY 6 HOURS PRN
Status: CANCELLED | OUTPATIENT
Start: 2020-08-21

## 2020-08-21 RX ORDER — SIMVASTATIN 10 MG
80 TABLET ORAL NIGHTLY
Status: CANCELLED | OUTPATIENT
Start: 2020-08-21

## 2020-08-21 RX ORDER — SODIUM CHLORIDE 0.9 % (FLUSH) 0.9 %
10 SYRINGE (ML) INJECTION PRN
Status: DISCONTINUED | OUTPATIENT
Start: 2020-08-21 | End: 2020-08-24 | Stop reason: HOSPADM

## 2020-08-21 RX ORDER — SODIUM CHLORIDE 0.9 % (FLUSH) 0.9 %
10 SYRINGE (ML) INJECTION PRN
Status: CANCELLED | OUTPATIENT
Start: 2020-08-21

## 2020-08-21 RX ORDER — LAMOTRIGINE 25 MG/1
25 TABLET ORAL NIGHTLY
Status: DISCONTINUED | OUTPATIENT
Start: 2020-08-21 | End: 2020-08-24 | Stop reason: HOSPADM

## 2020-08-21 RX ORDER — PANTOPRAZOLE SODIUM 40 MG/1
40 TABLET, DELAYED RELEASE ORAL
Status: DISCONTINUED | OUTPATIENT
Start: 2020-08-21 | End: 2020-08-24 | Stop reason: HOSPADM

## 2020-08-21 RX ORDER — HYDRALAZINE HYDROCHLORIDE 20 MG/ML
10 INJECTION INTRAMUSCULAR; INTRAVENOUS EVERY 6 HOURS PRN
Status: CANCELLED | OUTPATIENT
Start: 2020-08-21

## 2020-08-21 RX ORDER — SODIUM CHLORIDE 9 MG/ML
INJECTION, SOLUTION INTRAVENOUS CONTINUOUS
Status: CANCELLED | OUTPATIENT
Start: 2020-08-21

## 2020-08-21 RX ORDER — CHLORPROMAZINE HYDROCHLORIDE 25 MG/ML
25 INJECTION INTRAMUSCULAR ONCE
Status: COMPLETED | OUTPATIENT
Start: 2020-08-21 | End: 2020-08-21

## 2020-08-21 RX ORDER — PROMETHAZINE HYDROCHLORIDE 25 MG/1
12.5 TABLET ORAL EVERY 6 HOURS PRN
Status: DISCONTINUED | OUTPATIENT
Start: 2020-08-21 | End: 2020-08-24 | Stop reason: HOSPADM

## 2020-08-21 RX ORDER — NICOTINE POLACRILEX 4 MG
15 LOZENGE BUCCAL PRN
Status: CANCELLED | OUTPATIENT
Start: 2020-08-21

## 2020-08-21 RX ORDER — PROMETHAZINE HYDROCHLORIDE 25 MG/1
12.5 TABLET ORAL EVERY 6 HOURS PRN
Status: CANCELLED | OUTPATIENT
Start: 2020-08-21

## 2020-08-21 RX ORDER — LAMOTRIGINE 25 MG/1
25 TABLET ORAL NIGHTLY
Status: CANCELLED | OUTPATIENT
Start: 2020-08-21

## 2020-08-21 RX ORDER — DEXTROSE MONOHYDRATE 25 G/50ML
12.5 INJECTION, SOLUTION INTRAVENOUS PRN
Status: DISCONTINUED | OUTPATIENT
Start: 2020-08-21 | End: 2020-08-24 | Stop reason: HOSPADM

## 2020-08-21 RX ORDER — SODIUM CHLORIDE 0.9 % (FLUSH) 0.9 %
10 SYRINGE (ML) INJECTION EVERY 12 HOURS SCHEDULED
Status: DISCONTINUED | OUTPATIENT
Start: 2020-08-21 | End: 2020-08-24 | Stop reason: HOSPADM

## 2020-08-21 RX ORDER — ACETAMINOPHEN 650 MG/1
650 SUPPOSITORY RECTAL EVERY 6 HOURS PRN
Status: CANCELLED | OUTPATIENT
Start: 2020-08-21

## 2020-08-21 RX ORDER — OMEPRAZOLE 20 MG/1
40 CAPSULE, DELAYED RELEASE ORAL DAILY
Status: CANCELLED | OUTPATIENT
Start: 2020-08-21

## 2020-08-21 RX ORDER — SODIUM CHLORIDE 9 MG/ML
INJECTION, SOLUTION INTRAVENOUS CONTINUOUS
Status: DISCONTINUED | OUTPATIENT
Start: 2020-08-21 | End: 2020-08-24 | Stop reason: HOSPADM

## 2020-08-21 RX ORDER — POLYETHYLENE GLYCOL 3350 17 G/17G
17 POWDER, FOR SOLUTION ORAL DAILY PRN
Status: CANCELLED | OUTPATIENT
Start: 2020-08-21

## 2020-08-21 RX ORDER — TRAZODONE HYDROCHLORIDE 50 MG/1
50 TABLET ORAL NIGHTLY
Status: DISCONTINUED | OUTPATIENT
Start: 2020-08-21 | End: 2020-08-24 | Stop reason: HOSPADM

## 2020-08-21 RX ORDER — TRAZODONE HYDROCHLORIDE 50 MG/1
50 TABLET ORAL NIGHTLY
Status: CANCELLED | OUTPATIENT
Start: 2020-08-21

## 2020-08-21 RX ORDER — SODIUM CHLORIDE 0.9 % (FLUSH) 0.9 %
10 SYRINGE (ML) INJECTION EVERY 12 HOURS SCHEDULED
Status: CANCELLED | OUTPATIENT
Start: 2020-08-21

## 2020-08-21 RX ORDER — FLUTICASONE PROPIONATE 50 MCG
1 SPRAY, SUSPENSION (ML) NASAL DAILY
Status: CANCELLED | OUTPATIENT
Start: 2020-08-21

## 2020-08-21 RX ORDER — ONDANSETRON 2 MG/ML
4 INJECTION INTRAMUSCULAR; INTRAVENOUS EVERY 6 HOURS PRN
Status: DISCONTINUED | OUTPATIENT
Start: 2020-08-21 | End: 2020-08-24 | Stop reason: HOSPADM

## 2020-08-21 RX ORDER — 0.9 % SODIUM CHLORIDE 0.9 %
1000 INTRAVENOUS SOLUTION INTRAVENOUS ONCE
Status: COMPLETED | OUTPATIENT
Start: 2020-08-21 | End: 2020-08-21

## 2020-08-21 RX ORDER — ACETAMINOPHEN 650 MG/1
650 SUPPOSITORY RECTAL EVERY 6 HOURS PRN
Status: DISCONTINUED | OUTPATIENT
Start: 2020-08-21 | End: 2020-08-24 | Stop reason: HOSPADM

## 2020-08-21 RX ORDER — VENLAFAXINE HYDROCHLORIDE 75 MG/1
75 CAPSULE, EXTENDED RELEASE ORAL
Status: DISCONTINUED | OUTPATIENT
Start: 2020-08-21 | End: 2020-08-24 | Stop reason: HOSPADM

## 2020-08-21 RX ORDER — ACETAMINOPHEN 325 MG/1
650 TABLET ORAL EVERY 6 HOURS PRN
Status: DISCONTINUED | OUTPATIENT
Start: 2020-08-21 | End: 2020-08-24 | Stop reason: HOSPADM

## 2020-08-21 RX ORDER — ASPIRIN 81 MG/1
81 TABLET ORAL DAILY
Status: CANCELLED | OUTPATIENT
Start: 2020-08-21

## 2020-08-21 RX ORDER — VENLAFAXINE HYDROCHLORIDE 75 MG/1
75 CAPSULE, EXTENDED RELEASE ORAL
Status: CANCELLED | OUTPATIENT
Start: 2020-08-21

## 2020-08-21 RX ORDER — DEXTROSE MONOHYDRATE 50 MG/ML
100 INJECTION, SOLUTION INTRAVENOUS PRN
Status: CANCELLED | OUTPATIENT
Start: 2020-08-21

## 2020-08-21 RX ORDER — ACETAMINOPHEN 325 MG/1
650 TABLET ORAL EVERY 6 HOURS PRN
Status: CANCELLED | OUTPATIENT
Start: 2020-08-21

## 2020-08-21 RX ORDER — DEXTROSE MONOHYDRATE 50 MG/ML
100 INJECTION, SOLUTION INTRAVENOUS PRN
Status: DISCONTINUED | OUTPATIENT
Start: 2020-08-21 | End: 2020-08-24 | Stop reason: HOSPADM

## 2020-08-21 RX ORDER — ATORVASTATIN CALCIUM 40 MG/1
40 TABLET, FILM COATED ORAL DAILY
Status: DISCONTINUED | OUTPATIENT
Start: 2020-08-21 | End: 2020-08-24 | Stop reason: HOSPADM

## 2020-08-21 RX ADMIN — SODIUM CHLORIDE: 9 INJECTION, SOLUTION INTRAVENOUS at 07:45

## 2020-08-21 RX ADMIN — LIDOCAINE HYDROCHLORIDE: 20 SOLUTION ORAL; TOPICAL at 02:40

## 2020-08-21 RX ADMIN — TRAZODONE HYDROCHLORIDE 50 MG: 50 TABLET ORAL at 21:01

## 2020-08-21 RX ADMIN — SODIUM CHLORIDE: 9 INJECTION, SOLUTION INTRAVENOUS at 17:20

## 2020-08-21 RX ADMIN — VENLAFAXINE HYDROCHLORIDE 75 MG: 75 CAPSULE, EXTENDED RELEASE ORAL at 08:30

## 2020-08-21 RX ADMIN — DOCUSATE SODIUM 100 MG: 100 CAPSULE, LIQUID FILLED ORAL at 08:29

## 2020-08-21 RX ADMIN — CHLORPROMAZINE HYDROCHLORIDE 25 MG: 25 INJECTION INTRAMUSCULAR at 02:38

## 2020-08-21 RX ADMIN — POLYETHYLENE GLYCOL 3350 17 G: 17 POWDER, FOR SOLUTION ORAL at 08:30

## 2020-08-21 RX ADMIN — HEPARIN SODIUM 5000 UNITS: 10000 INJECTION INTRAVENOUS; SUBCUTANEOUS at 16:27

## 2020-08-21 RX ADMIN — HEPARIN SODIUM 5000 UNITS: 10000 INJECTION INTRAVENOUS; SUBCUTANEOUS at 08:31

## 2020-08-21 RX ADMIN — HEPARIN SODIUM 5000 UNITS: 10000 INJECTION INTRAVENOUS; SUBCUTANEOUS at 23:45

## 2020-08-21 RX ADMIN — LAMOTRIGINE 25 MG: 25 TABLET ORAL at 21:01

## 2020-08-21 RX ADMIN — INSULIN LISPRO 10 UNITS: 100 INJECTION, SOLUTION INTRAVENOUS; SUBCUTANEOUS at 11:16

## 2020-08-21 RX ADMIN — SODIUM CHLORIDE, PRESERVATIVE FREE 10 ML: 5 INJECTION INTRAVENOUS at 07:45

## 2020-08-21 RX ADMIN — SODIUM CHLORIDE 1000 ML: 9 INJECTION, SOLUTION INTRAVENOUS at 04:17

## 2020-08-21 RX ADMIN — PANTOPRAZOLE SODIUM 40 MG: 40 TABLET, DELAYED RELEASE ORAL at 08:30

## 2020-08-21 RX ADMIN — DESMOPRESSIN ACETATE 40 MG: 0.2 TABLET ORAL at 08:30

## 2020-08-21 RX ADMIN — INSULIN LISPRO 6 UNITS: 100 INJECTION, SOLUTION INTRAVENOUS; SUBCUTANEOUS at 08:30

## 2020-08-21 ASSESSMENT — PAIN SCALES - GENERAL
PAINLEVEL_OUTOF10: 0
PAINLEVEL_OUTOF10: 0

## 2020-08-21 ASSESSMENT — PAIN DESCRIPTION - PROGRESSION: CLINICAL_PROGRESSION: RESOLVED

## 2020-08-21 NOTE — CONSULTS
Oral, QAM AC  atorvastatin (LIPITOR) tablet 40 mg, 40 mg, Oral, Daily  traZODone (DESYREL) tablet 50 mg, 50 mg, Oral, Nightly  venlafaxine (EFFEXOR XR) extended release capsule 75 mg, 75 mg, Oral, Daily with breakfast  0.9 % sodium chloride infusion, , Intravenous, Continuous  sodium chloride flush 0.9 % injection 10 mL, 10 mL, Intravenous, 2 times per day  sodium chloride flush 0.9 % injection 10 mL, 10 mL, Intravenous, PRN  acetaminophen (TYLENOL) tablet 650 mg, 650 mg, Oral, Q6H PRN **OR** acetaminophen (TYLENOL) suppository 650 mg, 650 mg, Rectal, Q6H PRN  promethazine (PHENERGAN) tablet 12.5 mg, 12.5 mg, Oral, Q6H PRN **OR** ondansetron (ZOFRAN) injection 4 mg, 4 mg, Intravenous, Q6H PRN  heparin (porcine) injection 5,000 Units, 5,000 Units, Subcutaneous, Q8H  polyethylene glycol (GLYCOLAX) packet 17 g, 17 g, Oral, Daily  docusate sodium (COLACE) capsule 100 mg, 100 mg, Oral, Daily  insulin lispro (HUMALOG) injection vial 0-10 Units, 0-10 Units, Subcutaneous, 4x Daily AC & HS  glucose (GLUTOSE) 40 % oral gel 15 g, 15 g, Oral, PRN  dextrose 50 % IV solution, 12.5 g, Intravenous, PRN  glucagon (rDNA) injection 1 mg, 1 mg, Intramuscular, PRN  dextrose 5 % solution, 100 mL/hr, Intravenous, PRN  Allergies:  Patient has no known allergies. Social History:    TOBACCO:   reports that he has never smoked. He has never used smokeless tobacco.  ETOH:   reports no history of alcohol use. Family History:   No family history on file.   REVIEW OF SYSTEMS:    CONSTITUTIONAL:  negative for  fevers and chills  EYES:  negative for  double vision and blurred vision  HEENT:  negative for  hearing loss and earaches  RESPIRATORY:  negative for  dry cough and dyspnea  CARDIOVASCULAR:  negative for  chest pain, palpitations  GASTROINTESTINAL:  negative for nausea, vomiting and diarrhea  GENITOURINARY:  negative for frequency and urinary incontinence  INTEGUMENT/BREAST:  negative for rash and skin lesion(s)  HEMATOLOGIC/LYMPHATIC: negative for easy bruising and lymphadenopathy  ALLERGIC/IMMUNOLOGIC:  negative for recurrent infections and urticaria  ENDOCRINE:  negative for tremor and weight changes  MUSCULOSKELETAL:  negative for  myalgias and joint swelling  NEUROLOGICAL:  negative for headaches and dizziness    PHYSICAL EXAM:      Vitals:    VITALS:  /74   Pulse 100   Temp 97 °F (36.1 °C) (Temporal)   Resp 18   Ht 5' 9\" (1.753 m)   Wt 180 lb (81.6 kg)   SpO2 96%   BMI 26.58 kg/m²   24HR INTAKE/OUTPUT:    Intake/Output Summary (Last 24 hours) at 8/21/2020 1317  Last data filed at 8/21/2020 0843  Gross per 24 hour   Intake 240 ml   Output --   Net 240 ml       Constitutional:  Alert and oriented, in no distress  HEENT:  Normocephalic, PERRL  Respiratory:  CTA, on room air  Cardiovascular/Edema:  RRR, S1/S2  Gastrointestinal:  Soft, nondistended, nontender, bowel sounds active   Neurologic:  Nonfocal, [place]  Skin:  Warm, dry, no lesions  Other:  No edema    DATA:    CBC with Differential:    Lab Results   Component Value Date    WBC 13.3 08/21/2020    RBC 6.05 08/21/2020    HGB 15.4 08/21/2020    HCT 45.0 08/21/2020     08/21/2020    MCV 74.4 08/21/2020    MCH 25.5 08/21/2020    MCHC 34.2 08/21/2020    RDW 14.6 08/21/2020    LYMPHOPCT 13.1 08/21/2020    MONOPCT 7.4 08/21/2020    BASOPCT 0.5 08/21/2020    MONOSABS 0.99 08/21/2020    LYMPHSABS 1.74 08/21/2020    EOSABS 0.25 08/21/2020    BASOSABS 0.07 08/21/2020     CMP:    Lab Results   Component Value Date     08/21/2020    K 5.0 08/21/2020    K 5.2 05/04/2020    CL 97 08/21/2020    CO2 25 08/21/2020    BUN 41 08/21/2020    CREATININE 2.6 08/21/2020    GFRAA 30 08/21/2020    LABGLOM 25 08/21/2020    GLUCOSE 288 08/21/2020    PROT 6.8 08/21/2020    LABALBU 4.3 08/21/2020    CALCIUM 9.8 08/21/2020    BILITOT 1.0 08/21/2020    ALKPHOS 71 08/21/2020    AST 17 08/21/2020    ALT 18 08/21/2020     Magnesium:    Lab Results   Component Value Date    MG 1.9 08/21/2020 documented in 2018. To obtain urine PCR and microalbumin creatinine ratio. 3. Right adrenal mass, probably adrenal adenoma, work-up in the past unrevealing. 4. Abdominal pain, probably due to constipation, CT abdomen pelvis with no acute findings  5. Hypotension likely 2/2 hypovolemia, improved with IVF   6. Type II DM with insulin dependence, poorly controlled, A1C 11.1, on SSI   7. HTN, holding medications due to hypotension  8. HLD, on atorvastatin  9. Right adrenal mass 4.4 cm     Plan:    · Continue IVF, normal saline @ 100 mL/hr  · Maintain strict I&Os  · Obtain retroperitoneal US  · Obtain protein/creatinine and microalbumin creatinine ratios  · Repeat CBC tomorrow am  · Obtain random free metanephrine levels  · Continue to monitor renal function, BMP daily      Thank you very much Dr. Fatimah Husain for allowing us to participate in the care of Mr. Shavon Brower.         Electronically signed by MIRA Correa CNP on 8/21/2020 at 1:14 PM

## 2020-08-21 NOTE — CARE COORDINATION
8/21/2020 social work transition of care planning  Pt is from home with family and had been independent. Pt sees a pcp and receives meds from the [place] [facility]. Explained sw role in transition of care planning. Pt plan is home, son will transport at discharge.    Electronically signed by INDY Pascual on 8/21/2020 at 10:15 AM

## 2020-08-21 NOTE — ED NOTES
Patient states his pain and hiccups are improved. Patient remined of need for urine specimen. Urinal/specimen cup at bedside. Call light in reach.       Venida Halsted, RN  08/21/20 3420

## 2020-08-21 NOTE — PROGRESS NOTES
Patient is not able to remember his home medications. He gets his medications from the [address] [facility]. [place]. Her stated that he has a list of his medications at home and he will have his daughter bring them up. This information was passed on in report.

## 2020-08-21 NOTE — H&P
Hospital Medicine History & Physical      PCP: Nahid Franz MD    Date of Admission: 8/21/2020    Date of Service: Pt seen/examined on 8/21/2020 and  Placed in Observation. Chief Complaint: Abdominal pain      History Of Present Illness:      60 y.o. male who presented to [facility] with past medical history of diabetes, constipation, moderate protein energy malnutrition, nasal obstruction, subdural hematoma 2 years ago, TIA, dementia, depression with suicidal ideation, adrenal mass and noncompliance. Patient has been having abdominal pain since yesterday evening. He describes this as burning in the periumbilical and epigastric areas, moderate to severe in intensity, constant, associated with nausea but no vomiting, he has not had any bowel movements in 2 weeks, appetite has been markedly diminished with reduction in urinary output. He has also noticed swelling around his periumbilical area which is worse when he gets hiccups. He is passing gas but in small amounts. No leg swelling. He is thirsty and asking for water. His hiccups did improve with chlorpromazine. Vital signs notable for blood pressure 81/59. Pulse 103. Labs showed creatinine of 2.6, baseline usually 1.1-1.5. Glucose 285, troponin 0 0.07 and white count 13.3. Chest x-ray is negative. CT scan of the abdomen showed stable 4.4 cm right adrenal lesion, fluid in the lower esophagus suggestive of reflux, DJD of the spine and moderate stool in the colon. EKG shows sinus rhythm rate of 90. He is being admitted for further management.     Past Medical History:          Diagnosis Date    Anxiety     Bronchitis     Cellulitis     Chronic sinusitis     Diabetes mellitus (HCC)     Diabetic retinopathy ([facility] [address]     Fracture of left foot     High cholesterol     Hypercholesteremia     Hypercholesterolemia     Hypertension     Lumbago     Moderate mood disorder (HCC)     Third nerve palsy        Past Surgical History:      No past surgical history on file. Medications Prior to Admission:      Prior to Admission medications    Medication Sig Start Date End Date Taking? Authorizing Provider   lamoTRIgine (LAMICTAL) 25 MG tablet Take 25 mg by mouth nightly    Historical Provider, MD   omeprazole (PRILOSEC) 20 MG delayed release capsule Take 40 mg by mouth daily    Historical Provider, MD   insulin NPH (HUMULIN N;NOVOLIN N) 100 UNIT/ML injection vial Inject into the skin 2 times daily (before meals) Indications: 45 units in AM, 50 units PM    Historical Provider, MD   metFORMIN (GLUCOPHAGE) 1000 MG tablet Take 1,000 mg by mouth 2 times daily (with meals)    Historical Provider, MD   insulin aspart (NOVOLOG FLEXPEN) 100 UNIT/ML injection pen Inject into the skin 3 times daily (before meals) Indications: 6 units with breakfast, 3 units with lunch, 6 units with dinner    Historical Provider, MD   melatonin 3 MG TABS tablet Take 3 mg by mouth nightly as needed    Historical Provider, MD   ammonium lactate (LAC-HYDRIN) 12 % lotion Apply topically as needed for Dry Skin (Bilateral Feet) Apply topically as needed. Historical Provider, MD   fluticasone (FLONASE) 50 MCG/ACT nasal spray 1 spray by Each Nare route daily    Historical Provider, MD   aspirin 81 MG EC tablet Take 1 tablet by mouth daily 3/15/19   Caleb Lomax DO   glipiZIDE (GLUCOTROL) 5 MG tablet Take 1 tablet by mouth 2 times daily 2/22/19   MIRA Guo - CNS   venlafaxine (EFFEXOR XR) 75 MG extended release capsule Take 1 capsule by mouth daily (with breakfast) 1/12/19   MIRA Prieto - CNP   traZODone (DESYREL) 50 MG tablet Take 1 tablet by mouth nightly 2/14/17   Miya Joyce MD   clotrimazole (LOTRIMIN) 1 % external solution Apply topically 2 times daily Apply topically 2 times daily.     Historical Provider, MD   Dextrose, Diabetic Use, (GLUCOSE) 1 G CHEW Take 4 g by mouth as needed (Take one For low blood sugar recheck blood sugar in 15 min take another one if blood sugar is still low)    Historical Provider, MD   lisinopril (PRINIVIL;ZESTRIL) 20 MG tablet Take 20 mg by mouth 2 times daily    Historical Provider, MD   simvastatin (ZOCOR) 80 MG tablet Take 80 mg by mouth nightly    Historical Provider, MD       Allergies:  Patient has no known allergies. Social History:      The patient currently lives at home. TOBACCO:   reports that he has never smoked. He has never used smokeless tobacco.  ETOH:   reports no history of alcohol use. Family History:     Reviewed in detail Positive as follows: Noncontributory    No family history on file. REVIEW OF SYSTEMS:   Pertinent positives as noted in the HPI. All other systems reviewed and negative. PHYSICAL EXAM:    /69   Pulse 95   Temp 97.3 °F (36.3 °C) (Infrared)   Resp 22   SpO2 97%     General appearance: Middle-age male, mild painful distress, appears stated age and cooperative. Afebrile. HEENT:  Normal cephalic, atraumatic without obvious deformity. Pupils equal, round, and reactive to light. Extra ocular muscles intact. Conjunctivae/corneas clear. Very dry oral cavity  Neck: Supple, with full range of motion. No jugular venous distention. Trachea midline. Respiratory:  Normal respiratory effort. Clear to auscultation, bilaterally without Rales/Wheezes/Rhonchi. Cardiovascular:  Regular rate and rhythm with normal S1/S2 without murmurs, rubs or gallops. Abdomen: Soft, non-tender, mild fullness in the suprapubic area, umbilical hernia, diminished bowel sounds. Musculoskeletal:  No clubbing, cyanosis or edema bilaterally. Full range of motion without deformity. Skin: Skin color, texture, turgor normal.  No rashes or lesions.   Neurologic:  Cranial nerves: II-XII intact, grossly non-focal.  Psychiatric:  Alert and oriented, thought content appropriate, normal insight  Capillary Refill: Brisk,< 3 seconds   Peripheral Pulses: +2 palpable, equal bilaterally       Labs:     Recent

## 2020-08-21 NOTE — ED PROVIDER NOTES
ED Attending  CC: No     HPI:  8/21/20, Time: 1:27 AM EDT         Mariluz Salamanca is a 60 y.o. male presenting to the ED for abdominal pain epigastric and periumbilical pain, beginning earlier today. The complaint has been persistent, moderate in severity, and worsened by nothing. Complains of acidic feeling in the abdomen and abdominal pain over the umbilical area radiating to the midepigastric. Patient states he checked his blood sugar today and it was 323 he then took 60 units of Lantus prior to arrival.  Upon arrival patient does experience the hiccups he states he gets these at times. Patient denies nausea vomiting diarrhea. Denies chest pain and shortness of breath. Patient denies burning or pain upon urination but states that his urine has been brown in color the past couple days. ROS:   Pertinent positives and negatives are stated within HPI, all other systems reviewed and are negative.  --------------------------------------------- PAST HISTORY ---------------------------------------------  Past Medical History:  has a past medical history of Anxiety, Bronchitis, Cellulitis, Chronic sinusitis, Diabetes mellitus ([facility] [address] Diabetic retinopathy ([facility] [address] Fracture of left foot, High cholesterol, Hypercholesteremia, Hypercholesterolemia, Hypertension, Lumbago, Moderate mood disorder ([facility] [address] and Third nerve palsy. Past Surgical History:  has a past surgical history that includes eye surgery. Social History:  reports that he has never smoked. He has never used smokeless tobacco. He reports that he does not drink alcohol or use drugs. Family History: family history is not on file. The patients home medications have been reviewed. Allergies: Patient has no known allergies.     ---------------------------------------------------PHYSICAL EXAM--------------------------------------    Constitutional/General: Alert and oriented x3, well appearing, non toxic in NAD  Head: Normocephalic and atraumatic  Eyes: PERRL, EOMI  Mouth: Oropharynx clear, handling secretions, no trismus  Neck: Supple, full ROM, non tender to palpation in the midline, no stridor, no crepitus, no meningeal signs  Pulmonary: Lungs clear to auscultation bilaterally, no wheezes, rales, or rhonchi. Not in respiratory distress  Cardiovascular:  Regular rate. Regular rhythm. No murmurs, gallops, or rubs. 2+ distal pulses  Chest: no chest wall tenderness  Abdomen: Soft. Flat mildly tender around the umbilicus. non distended. +BS. No rebound, guarding, or rigidity. No pulsatile masses appreciated. Musculoskeletal: Moves all extremities x 4. Warm and well perfused, no clubbing, cyanosis, or edema. Capillary refill <3 seconds  Skin: warm and dry. No rashes. Neurologic: GCS 15, CN 2-12 grossly intact, no focal deficits, symmetric strength 5/5 in the upper and lower extremities bilaterally  Psych: Normal Affect    -------------------------------------------------- RESULTS -------------------------------------------------  I have personally reviewed all laboratory and imaging results for this patient. Results are listed below.      LABS:  Results for orders placed or performed during the hospital encounter of 08/21/20   Troponin   Result Value Ref Range    Troponin 0.02 0.00 - 0.03 ng/mL   CBC Auto Differential   Result Value Ref Range    WBC 13.3 (H) 4.5 - 11.5 E9/L    RBC 6.05 (H) 3.80 - 5.80 E12/L    Hemoglobin 15.4 12.5 - 16.5 g/dL    Hematocrit 45.0 37.0 - 54.0 %    MCV 74.4 (L) 80.0 - 99.9 fL    MCH 25.5 (L) 26.0 - 35.0 pg    MCHC 34.2 32.0 - 34.5 %    RDW 14.6 11.5 - 15.0 fL    Platelets 330 417 - 054 E9/L    MPV 10.7 7.0 - 12.0 fL    Neutrophils % 76.9 43.0 - 80.0 %    Immature Granulocytes % 0.2 0.0 - 5.0 %    Lymphocytes % 13.1 (L) 20.0 - 42.0 %    Monocytes % 7.4 2.0 - 12.0 %    Eosinophils % 1.9 0.0 - 6.0 %    Basophils % 0.5 0.0 - 2.0 %    Neutrophils Absolute 10.25 (H) 1.80 - 7.30 E9/L    Immature Granulocytes # 0.03 E9/L Lymphocytes Absolute 1.74 1.50 - 4.00 E9/L    Monocytes Absolute 0.99 (H) 0.10 - 0.95 E9/L    Eosinophils Absolute 0.25 0.05 - 0.50 E9/L    Basophils Absolute 0.07 0.00 - 0.20 E9/L   Comprehensive Metabolic Panel   Result Value Ref Range    Sodium 137 132 - 146 mmol/L    Potassium 5.0 3.5 - 5.0 mmol/L    Chloride 97 (L) 98 - 107 mmol/L    CO2 25 22 - 29 mmol/L    Anion Gap 15 7 - 16 mmol/L    Glucose 285 (H) 74 - 99 mg/dL    BUN 41 (H) 8 - 23 mg/dL    CREATININE 2.6 (H) 0.7 - 1.2 mg/dL    GFR Non-African American 25 >=60 mL/min/1.73    GFR African American 30     Calcium 9.8 8.6 - 10.2 mg/dL    Total Protein 6.8 6.4 - 8.3 g/dL    Alb 4.3 3.5 - 5.2 g/dL    Total Bilirubin 1.0 0.0 - 1.2 mg/dL    Alkaline Phosphatase 71 40 - 129 U/L    ALT 18 0 - 40 U/L    AST 17 0 - 39 U/L   Beta-Hydroxybutyrate   Result Value Ref Range    Beta-Hydroxybutyrate 0.23 0.02 - 0.27 mmol/L   Magnesium   Result Value Ref Range    Magnesium 1.9 1.6 - 2.6 mg/dL   Lactic Acid, Plasma   Result Value Ref Range    Lactic Acid 1.6 0.5 - 2.2 mmol/L   Lipase   Result Value Ref Range    Lipase 82 (H) 13 - 60 U/L   PH, VENOUS   Result Value Ref Range    pH, Facundo 7.40 7.35 - 7.45   Hemoglobin A1C   Result Value Ref Range    Hemoglobin A1C 11.1 (H) 4.0 - 5.6 %   POCT Glucose   Result Value Ref Range    Glucose 288 mg/dL    QC OK?  yes    POCT Glucose   Result Value Ref Range    Meter Glucose 288 (H) 74 - 99 mg/dL   POCT Glucose   Result Value Ref Range    Meter Glucose 270 (H) 74 - 99 mg/dL   POCT Glucose   Result Value Ref Range    Meter Glucose 393 (H) 74 - 99 mg/dL   EKG 12 Lead   Result Value Ref Range    Ventricular Rate 90 BPM    Atrial Rate 90 BPM    P-R Interval 142 ms    QRS Duration 72 ms    Q-T Interval 336 ms    QTc Calculation (Bazett) 411 ms    P Axis 79 degrees    R Axis 47 degrees    T Axis 69 degrees       RADIOLOGY:  Interpreted by Radiologist.  CT ABDOMEN PELVIS WO CONTRAST Additional Contrast? None   Final Result   1. 4.4 cm heterogenous right adrenal lesion appears similar to the previous    exam.    2. Mild fluid distention of the distal esophagus possibly representing    gastroesophageal reflux. 3. Stable small periumbilical hernia containing fat. 4. Remaining findings as described above. This report has been electronically signed by Sera Cardoso DO. XR CHEST (2 VW)   Final Result   No acute cardiopulmonary process. EKG Interpretation  Interpreted by emergency department physician    Rhythm: normal sinus   Rate: normal  Axis: normal  Conduction: normal  ST Segments: no acute change  T Waves: no acute change    Clinical Impression: no acute changes  Comparison to prior EKG: stable as compared to patient's most recent EKG      ------------------------- NURSING NOTES AND VITALS REVIEWED ---------------------------   The nursing notes within the ED encounter and vital signs as below have been reviewed by myself. /74   Pulse 100   Temp 97 °F (36.1 °C) (Temporal)   Resp 18   Ht 5' 9\" (1.753 m)   Wt 180 lb (81.6 kg)   SpO2 96%   BMI 26.58 kg/m²   Oxygen Saturation Interpretation: Normal    The patients available past medical records and past encounters were reviewed.         ------------------------------ ED COURSE/MEDICAL DECISION MAKING----------------------  Medications   fluticasone (FLONASE) 50 MCG/ACT nasal spray 1 spray (1 spray Each Nostril Not Given 8/21/20 0818)   lamoTRIgine (LAMICTAL) tablet 25 mg (has no administration in time range)   pantoprazole (PROTONIX) tablet 40 mg (40 mg Oral Given 8/21/20 0830)   atorvastatin (LIPITOR) tablet 40 mg (40 mg Oral Given 8/21/20 0830)   traZODone (DESYREL) tablet 50 mg (has no administration in time range)   venlafaxine (EFFEXOR XR) extended release capsule 75 mg (75 mg Oral Given 8/21/20 0830)   0.9 % sodium chloride infusion ( Intravenous New Bag 8/21/20 0745)   sodium chloride flush 0.9 % injection 10 mL (10 mLs Intravenous Given 8/21/20 4860) sodium chloride flush 0.9 % injection 10 mL (has no administration in time range)   acetaminophen (TYLENOL) tablet 650 mg (has no administration in time range)     Or   acetaminophen (TYLENOL) suppository 650 mg (has no administration in time range)   promethazine (PHENERGAN) tablet 12.5 mg (has no administration in time range)     Or   ondansetron (ZOFRAN) injection 4 mg (has no administration in time range)   heparin (porcine) injection 5,000 Units (5,000 Units Subcutaneous Given 8/21/20 0831)   polyethylene glycol (GLYCOLAX) packet 17 g (17 g Oral Given 8/21/20 0830)   docusate sodium (COLACE) capsule 100 mg (100 mg Oral Given 8/21/20 0829)   insulin lispro (HUMALOG) injection vial 0-10 Units (10 Units Subcutaneous Given 8/21/20 1116)   glucose (GLUTOSE) 40 % oral gel 15 g (has no administration in time range)   dextrose 50 % IV solution (has no administration in time range)   glucagon (rDNA) injection 1 mg (has no administration in time range)   dextrose 5 % solution (has no administration in time range)   chlorproMAZINE (THORAZINE) injection 25 mg (25 mg Intramuscular Given 8/21/20 0238)   aluminum & magnesium hydroxide-simethicone (MAALOX) 30 mL, lidocaine viscous hcl (XYLOCAINE) 5 mL (GI COCKTAIL) ( Oral Given 8/21/20 0240)   0.9 % sodium chloride bolus (1,000 mLs Intravenous New Bag 8/21/20 0417)             Medical Decision Making:    Exam by Dr. Ky Coughlin will proceed with CT abdomen pelvis to evaluate for any obstructive uropathy due to the acute renal failure noted on laboratory evaluation. He was given dose of Thorazine for the hiccups. Re-Evaluations:             Re-evaluation. Patients symptoms are improving      Consultations:                 Critical Care:          This patient's ED course included: a personal history and physicial examination, re-evaluation prior to disposition, multiple bedside re-evaluations and a personal history and physicial eaxmination    This patient has remained hemodynamically stable and improved during their ED course. Counseling: The emergency provider has spoken with the patient and discussed todays results, in addition to providing specific details for the plan of care and counseling regarding the diagnosis and prognosis. Questions are answered at this time and they are agreeable with the plan.       --------------------------------- IMPRESSION AND DISPOSITION ---------------------------------    IMPRESSION  1. Acute renal failure, unspecified acute renal failure type ([facility] [address]        DISPOSITION  Disposition: Admit to telemetry  Patient condition is stable        NOTE: This report was transcribed using voice recognition software.  Every effort was made to ensure accuracy; however, inadvertent computerized transcription errors may be present         MIRA Verduzco - CNP  08/21/20 9030

## 2020-08-22 ENCOUNTER — APPOINTMENT (OUTPATIENT)
Dept: ULTRASOUND IMAGING | Age: 65
End: 2020-08-22
Payer: MEDICARE

## 2020-08-22 LAB
ALBUMIN SERPL-MCNC: 3.3 G/DL (ref 3.5–5.2)
ANION GAP SERPL CALCULATED.3IONS-SCNC: 10 MMOL/L (ref 7–16)
BASOPHILS ABSOLUTE: 0.06 E9/L (ref 0–0.2)
BASOPHILS RELATIVE PERCENT: 0.8 % (ref 0–2)
BUN BLDV-MCNC: 36 MG/DL (ref 8–23)
CALCIUM SERPL-MCNC: 8.9 MG/DL (ref 8.6–10.2)
CHLORIDE BLD-SCNC: 106 MMOL/L (ref 98–107)
CHLORIDE URINE RANDOM: 75 MMOL/L
CO2: 23 MMOL/L (ref 22–29)
CREAT SERPL-MCNC: 1.5 MG/DL (ref 0.7–1.2)
CREATININE URINE: 102 MG/DL (ref 40–278)
EOSINOPHILS ABSOLUTE: 0.33 E9/L (ref 0.05–0.5)
EOSINOPHILS RELATIVE PERCENT: 4.3 % (ref 0–6)
GFR AFRICAN AMERICAN: 57
GFR NON-AFRICAN AMERICAN: 47 ML/MIN/1.73
GLUCOSE BLD-MCNC: 62 MG/DL (ref 74–99)
HCT VFR BLD CALC: 36.3 % (ref 37–54)
HEMOGLOBIN: 12.5 G/DL (ref 12.5–16.5)
IMMATURE GRANULOCYTES #: 0.01 E9/L
IMMATURE GRANULOCYTES %: 0.1 % (ref 0–5)
LYMPHOCYTES ABSOLUTE: 2.05 E9/L (ref 1.5–4)
LYMPHOCYTES RELATIVE PERCENT: 26.6 % (ref 20–42)
MCH RBC QN AUTO: 25.6 PG (ref 26–35)
MCHC RBC AUTO-ENTMCNC: 34.4 % (ref 32–34.5)
MCV RBC AUTO: 74.4 FL (ref 80–99.9)
METER GLUCOSE: 122 MG/DL (ref 74–99)
METER GLUCOSE: 167 MG/DL (ref 74–99)
METER GLUCOSE: 167 MG/DL (ref 74–99)
MICROALBUMIN UR-MCNC: 62.1 MG/L
MICROALBUMIN/CREAT UR-RTO: 60.9 (ref 0–30)
MONOCYTES ABSOLUTE: 0.45 E9/L (ref 0.1–0.95)
MONOCYTES RELATIVE PERCENT: 5.8 % (ref 2–12)
NEUTROPHILS ABSOLUTE: 4.82 E9/L (ref 1.8–7.3)
NEUTROPHILS RELATIVE PERCENT: 62.4 % (ref 43–80)
PDW BLD-RTO: 14.6 FL (ref 11.5–15)
PHOSPHORUS: 2.4 MG/DL (ref 2.5–4.5)
PLATELET # BLD: 166 E9/L (ref 130–450)
PMV BLD AUTO: 10.3 FL (ref 7–12)
POTASSIUM SERPL-SCNC: 4.4 MMOL/L (ref 3.5–5)
POTASSIUM, UR: 34.2 MMOL/L
PROTEIN PROTEIN: 13 MG/DL (ref 0–12)
PROTEIN/CREAT RATIO: 0.1
PROTEIN/CREAT RATIO: 0.1 (ref 0–0.2)
RBC # BLD: 4.88 E12/L (ref 3.8–5.8)
SODIUM BLD-SCNC: 139 MMOL/L (ref 132–146)
SODIUM URINE: 76 MMOL/L
UREA NITROGEN, UR: 772 MG/DL (ref 800–1666)
WBC # BLD: 7.7 E9/L (ref 4.5–11.5)

## 2020-08-22 PROCEDURE — 82962 GLUCOSE BLOOD TEST: CPT

## 2020-08-22 PROCEDURE — G0378 HOSPITAL OBSERVATION PER HR: HCPCS

## 2020-08-22 PROCEDURE — 85025 COMPLETE CBC W/AUTO DIFF WBC: CPT

## 2020-08-22 PROCEDURE — 83835 ASSAY OF METANEPHRINES: CPT

## 2020-08-22 PROCEDURE — 84156 ASSAY OF PROTEIN URINE: CPT

## 2020-08-22 PROCEDURE — 96372 THER/PROPH/DIAG INJ SC/IM: CPT

## 2020-08-22 PROCEDURE — 82436 ASSAY OF URINE CHLORIDE: CPT

## 2020-08-22 PROCEDURE — 96361 HYDRATE IV INFUSION ADD-ON: CPT

## 2020-08-22 PROCEDURE — 36415 COLL VENOUS BLD VENIPUNCTURE: CPT

## 2020-08-22 PROCEDURE — 84133 ASSAY OF URINE POTASSIUM: CPT

## 2020-08-22 PROCEDURE — 6370000000 HC RX 637 (ALT 250 FOR IP): Performed by: FAMILY MEDICINE

## 2020-08-22 PROCEDURE — 6360000002 HC RX W HCPCS: Performed by: FAMILY MEDICINE

## 2020-08-22 PROCEDURE — 84300 ASSAY OF URINE SODIUM: CPT

## 2020-08-22 PROCEDURE — 82570 ASSAY OF URINE CREATININE: CPT

## 2020-08-22 PROCEDURE — 84540 ASSAY OF URINE/UREA-N: CPT

## 2020-08-22 PROCEDURE — 76770 US EXAM ABDO BACK WALL COMP: CPT

## 2020-08-22 PROCEDURE — 82044 UR ALBUMIN SEMIQUANTITATIVE: CPT

## 2020-08-22 PROCEDURE — 80069 RENAL FUNCTION PANEL: CPT

## 2020-08-22 RX ADMIN — DOCUSATE SODIUM 100 MG: 100 CAPSULE, LIQUID FILLED ORAL at 10:03

## 2020-08-22 RX ADMIN — FLUTICASONE PROPIONATE 1 SPRAY: 50 SPRAY, METERED NASAL at 10:03

## 2020-08-22 RX ADMIN — HEPARIN SODIUM 5000 UNITS: 10000 INJECTION INTRAVENOUS; SUBCUTANEOUS at 10:03

## 2020-08-22 RX ADMIN — LAMOTRIGINE 25 MG: 25 TABLET ORAL at 20:25

## 2020-08-22 RX ADMIN — VENLAFAXINE HYDROCHLORIDE 75 MG: 75 CAPSULE, EXTENDED RELEASE ORAL at 10:03

## 2020-08-22 RX ADMIN — POLYETHYLENE GLYCOL 3350 17 G: 17 POWDER, FOR SOLUTION ORAL at 10:03

## 2020-08-22 RX ADMIN — TRAZODONE HYDROCHLORIDE 50 MG: 50 TABLET ORAL at 20:25

## 2020-08-22 RX ADMIN — HEPARIN SODIUM 5000 UNITS: 10000 INJECTION INTRAVENOUS; SUBCUTANEOUS at 23:53

## 2020-08-22 RX ADMIN — PANTOPRAZOLE SODIUM 40 MG: 40 TABLET, DELAYED RELEASE ORAL at 10:03

## 2020-08-22 RX ADMIN — DESMOPRESSIN ACETATE 40 MG: 0.2 TABLET ORAL at 10:03

## 2020-08-22 RX ADMIN — HEPARIN SODIUM 5000 UNITS: 10000 INJECTION INTRAVENOUS; SUBCUTANEOUS at 16:39

## 2020-08-22 ASSESSMENT — PAIN SCALES - GENERAL
PAINLEVEL_OUTOF10: 0

## 2020-08-22 NOTE — PROGRESS NOTES
Pt confused during the night. I instructed the pt several times to void in the urinal so we can measure output as well as obtain the ordered specimens. Pt did not follow directions and voided twice in the bathroom and was incontinent 1 time. Will continue to monitor and try to have pt void to obtain urine specimens.

## 2020-08-22 NOTE — PROGRESS NOTES
Hospital Medicine Progress Note      Date of Admission: 8/21/2020  Hospital day # 0    Course: Follow-up on CLEMENTE, CKD, renal mass, abdominal pain, constipation, diabetes mellitus    Subjective    Patient refers no complaints at time of evaluation  Clinically improving. Feeling better. Stable overnight. No other overnight issues reported. Exam:    /79   Pulse 90   Temp 97 °F (36.1 °C) (Temporal)   Resp 18   Ht 5' 9\" (1.753 m)   Wt 179 lb 7 oz (81.4 kg)   SpO2 96%   BMI 26.50 kg/m²     General appearance: No apparent distress, appears stated age and cooperative. HEENT: Pupils equal, round, and reactive to light. Conjunctivae/corneas clear. Neck: Supple. No jugular venous distention. Trachea midline. Respiratory:  Normal respiratory effort. Clear to auscultation, bilaterally without Rales/Wheezes/Rhonchi. Cardiovascular: Regular rate and rhythm with normal S1/S2 without murmurs, rubs or gallops. Abdomen: Soft, non-tender, non-distended with normal bowel sounds. Musculoskeletal: No clubbing, cyanosis or edema bilaterally. Brisk capillary refill. 2+ lower extremity pulses (dorsalis pedis).    Skin:  No rashes    Neurologic: awake, alert and following commands     Medications:  Reviewed    Infusion Medications    sodium chloride 100 mL/hr at 08/21/20 1720    dextrose       Scheduled Medications    fluticasone  1 spray Each Nostril Daily    lamoTRIgine  25 mg Oral Nightly    pantoprazole  40 mg Oral QAM AC    atorvastatin  40 mg Oral Daily    traZODone  50 mg Oral Nightly    venlafaxine  75 mg Oral Daily with breakfast    sodium chloride flush  10 mL Intravenous 2 times per day    heparin (porcine)  5,000 Units Subcutaneous Q8H    polyethylene glycol  17 g Oral Daily    docusate sodium  100 mg Oral Daily    insulin lispro  0-10 Units Subcutaneous 4x Daily AC & HS     PRN Meds: sodium chloride flush, acetaminophen **OR** acetaminophen, promethazine **OR** ondansetron, glucose, dextrose, glucagon (rDNA), dextrose    I/O    Intake/Output Summary (Last 24 hours) at 8/22/2020 1008  Last data filed at 8/22/2020 0617  Gross per 24 hour   Intake 3054 ml   Output --   Net 3054 ml       Labs:   Recent Labs     08/21/20  0040 08/22/20  0653   WBC 13.3* 7.7   HGB 15.4 12.5   HCT 45.0 36.3*    166       Recent Labs     08/21/20  0040 08/21/20  1327 08/22/20  0653    136 139   K 5.0 4.4 4.4   CL 97* 103 106   CO2 25 22 23   BUN 41* 48* 36*   CREATININE 2.6* 1.7* 1.5*   CALCIUM 9.8 8.8 8.9   PHOS  --   --  2.4*       Recent Labs     08/21/20  0040   PROT 6.8   ALKPHOS 71   ALT 18   AST 17   BILITOT 1.0   LIPASE 82*       No results for input(s): INR in the last 72 hours. Recent Labs     08/21/20 0040   TROPONINI 0.02       Other labs:  Lab Results   Component Value Date    CHOL 163 01/06/2019    TRIG 87 01/06/2019    HDL 36 01/06/2019    LDLCALC 110 (H) 01/06/2019    TSH 1.640 02/07/2017    PSA 0.86 09/28/2018    INR 1.0 03/14/2019    LABA1C 11.1 (H) 08/21/2020       Radiology:  Imaging studies reviewed today.     ASSESSMENT:    CLEMENTE  CKD I-II  Dehydration  Hypertension  Diabetes mellitus with hyperglycemia  Hypertension  Hyperlipidemia  Right adrenal mass  Constipation  GERD  Dementia      PLAN:    Follow BUN/creatinine  Follow electrolytes  Continue IV fluids  Creatinine is trending down  Follow I&O  A1c 11.1  TSH 1.64  Nephrology following  Renal ultrasound normal findings 8/22/2020  Monitor blood pressure and adjust medications if needed        Diet: DIET RENAL;  Code Status: Full Code    PT/OT Eval Status: [] Ordered [] Evaluation noted [x] Not applicable    DVT Prophylaxis: [x]Lovenox []Heparin []PCD [] [address] []Encouraged ambulation []N/A    Likely disposition when able:  [x]Home [] Home with [facility] [] SNF/LIDIA [] Acute Rehab [] LTAC []Other    +++++++++++++++++++++++++++++++++++++++++++++++++  June Stovall MD, Hospitalist  +++++++++++++++++++++++++++++++++++++++++++++++++  NOTE: This report was transcribed using voice recognition software.  Every effort was made to ensure accuracy; however, inadvertent computerized transcription errors may be present.

## 2020-08-22 NOTE — PROGRESS NOTES
Department of Internal Medicine  Nephrology Consult Note    Events reviewed. SUBJECTIVE:  We are following Mr. Matti Byrnes for CLEMENTE on CKD. He reports no complaints.     PHYSICAL EXAM:      Vitals:    VITALS:  /79   Pulse 90   Temp 97 °F (36.1 °C) (Temporal)   Resp 18   Ht 5' 9\" (1.753 m)   Wt 179 lb 7 oz (81.4 kg)   SpO2 96%   BMI 26.50 kg/m²   24HR INTAKE/OUTPUT:      Intake/Output Summary (Last 24 hours) at 8/22/2020 1220  Last data filed at 8/22/2020 0800  Gross per 24 hour   Intake 3174 ml   Output 900 ml   Net 2274 ml       Constitutional:  Alert and oriented, in no distress  HEENT:  Normocephalic, PERRL  Respiratory:  CTA, on room air  Cardiovascular/Edema:  RRR, S1/S2  Gastrointestinal:  Soft, nondistended, nontender, bowel sounds active   Neurologic:  Nonfocal, RICHMOND  Skin:  Warm, dry, no lesions  Other:  No edema    Scheduled Meds:   fluticasone  1 spray Each Nostril Daily    lamoTRIgine  25 mg Oral Nightly    pantoprazole  40 mg Oral QAM AC    atorvastatin  40 mg Oral Daily    traZODone  50 mg Oral Nightly    venlafaxine  75 mg Oral Daily with breakfast    sodium chloride flush  10 mL Intravenous 2 times per day    heparin (porcine)  5,000 Units Subcutaneous Q8H    polyethylene glycol  17 g Oral Daily    docusate sodium  100 mg Oral Daily    insulin lispro  0-10 Units Subcutaneous 4x Daily AC & HS     Continuous Infusions:   sodium chloride 100 mL/hr at 08/21/20 1720    dextrose       PRN Meds:.sodium chloride flush, acetaminophen **OR** acetaminophen, promethazine **OR** ondansetron, glucose, dextrose, glucagon (rDNA), dextrose    DATA:    CBC with Differential:    Lab Results   Component Value Date    WBC 7.7 08/22/2020    RBC 4.88 08/22/2020    HGB 12.5 08/22/2020    HCT 36.3 08/22/2020     08/22/2020    MCV 74.4 08/22/2020    MCH 25.6 08/22/2020    MCHC 34.4 08/22/2020    RDW 14.6 08/22/2020    LYMPHOPCT 26.6 08/22/2020    MONOPCT 5.8 08/22/2020    BASOPCT 0.8 08/22/2020    MONOSABS 0.45 08/22/2020    LYMPHSABS 2.05 08/22/2020    EOSABS 0.33 08/22/2020    BASOSABS 0.06 08/22/2020     CMP:    Lab Results   Component Value Date     08/22/2020    K 4.4 08/22/2020    K 5.2 05/04/2020     08/22/2020    CO2 23 08/22/2020    BUN 36 08/22/2020    CREATININE 1.5 08/22/2020    GFRAA 57 08/22/2020    LABGLOM 47 08/22/2020    GLUCOSE 62 08/22/2020    PROT 6.8 08/21/2020    LABALBU 3.3 08/22/2020    CALCIUM 8.9 08/22/2020    BILITOT 1.0 08/21/2020    ALKPHOS 71 08/21/2020    AST 17 08/21/2020    ALT 18 08/21/2020     Magnesium:    Lab Results   Component Value Date    MG 1.9 08/21/2020     Phosphorus:    Lab Results   Component Value Date    PHOS 2.4 08/22/2020     Radiology Review:      CT abdomen pelvis  8/21/2020   1. 4.4 cm heterogenous right adrenal lesion appears similar to the previous    exam.    2. Mild fluid distention of the distal esophagus possibly representing    gastroesophageal reflux. 3. Stable small periumbilical hernia containing fat. 4. Remaining findings as described above. CXR 8/21/2020   No acute cardiopulmonary process. Retroperitoneal US 8/21/2020        NORMAL RENAL SONOGRAM         Both kidneys have cortical cysts seen    in the right kidney has a cyst measuring 1.2 x 0.9 x 0.8 cm seen in    the lower pole. The left kidney has a cyst measuring 2.0 x 1.9 x 2 cm    seen in the upper pole. There is a cyst measuring 1.2 x 1.4 x 1.2 cm    seen in the upper pole          BRIEF SUMMARY OF INITIAL CONSULT:    Briefly, Mr. Festus Luu is a 60year old male with a PMH of IDDM with diabetic retinopathy, HTN, HLD, subdural hematoma (2018), TIA, dementia, anxiety, depression and noncompliance who presented to the ER on 8/20/2020 with complaints of periumbilical abdominal pain that started that day. He reported that he had very little PO intake for the past few days and that he had been constipated and oliguric.  He denied vomiting or diarrhea. He also reported that his urine was brown in color. He was found to be hypotensive and mildly tachycardic. Labs obtained were significant for WBC 13.3, BUN 41, Cr 2.6,  Lipase 82 and Hgb A1C 11.1. A CT of the abdomen and pelvis showed a 4.4 cm heterogenous right adrenal lesion. CXR was negative. He was given 1L of normal saline as well as a GI cocktail and thorazine prior to being admitted for further evaluation and treatment. His baseline creatinine appears to be around 1.1 mg/dL and was up to 2.6 on admission, reason for this consultation. Her medications prior to admission included lisinopril 20 mg twice daily. IMPRESSION/RECOMMENDATIONS:      1. CLEMENTE stage II, volume responsive prerenal CLEMENTE due to intravascular volume depletion, poor PO intake, in the setting of ACE inhibition. Renal function improved, creatinine 1.7 mg/dL today in response to IVF administration. To continue with IVF. 2. CKD stage I-II, probably diabetic nephropathy, baseline creatinine 1.1 mg/dL, with some microalbuminuria documented in 2018. To obtain urine PCR and microalbumin creatinine ratio. 3. Right adrenal mass, probably adrenal adenoma, work-up in the past unrevealing. 4. Abdominal pain, probably due to constipation, CT abdomen pelvis with no acute findings  5. Hypotension likely 2/2 hypovolemia, improved with IVF   6. Type II DM with insulin dependence, poorly controlled, A1C 11.1, on SSI   7. HTN, holding medications due to hypotension  8. HLD, on atorvastatin  9.  Right adrenal mass 4.4 cm     Plan:    · Decrease normal saline infusion to 75 mL/hr  · Maintain strict I&Os  · Pending protein/creatinine and microalbumin creatinine ratios  · Pending random free metanephrine levels  · Continue to monitor renal function      Electronically signed by MIRA Kelly CNP on 8/22/2020 at 12:20 PM

## 2020-08-22 NOTE — PLAN OF CARE
Problem: Falls - Risk of:  Goal: Will remain free from falls  Description: Will remain free from falls  Outcome: Met This Shift     Problem: Confusion - Acute:  Goal: Mental status will be restored to baseline  Description: Mental status will be restored to baseline  Outcome: Ongoing     Problem: Injury - Risk of, Physical Injury:  Goal: Will remain free from falls  Description: Will remain free from falls  Outcome: Met This Shift

## 2020-08-23 PROBLEM — E86.0 DEHYDRATION: Status: ACTIVE | Noted: 2020-08-23

## 2020-08-23 LAB
ALBUMIN SERPL-MCNC: 3.6 G/DL (ref 3.5–5.2)
ANION GAP SERPL CALCULATED.3IONS-SCNC: 10 MMOL/L (ref 7–16)
BUN BLDV-MCNC: 18 MG/DL (ref 8–23)
CALCIUM SERPL-MCNC: 9.2 MG/DL (ref 8.6–10.2)
CHLORIDE BLD-SCNC: 104 MMOL/L (ref 98–107)
CO2: 25 MMOL/L (ref 22–29)
CREAT SERPL-MCNC: 1.2 MG/DL (ref 0.7–1.2)
GFR AFRICAN AMERICAN: >60
GFR NON-AFRICAN AMERICAN: >60 ML/MIN/1.73
GLUCOSE BLD-MCNC: 132 MG/DL (ref 74–99)
METER GLUCOSE: 132 MG/DL (ref 74–99)
METER GLUCOSE: 168 MG/DL (ref 74–99)
METER GLUCOSE: 244 MG/DL (ref 74–99)
PHOSPHORUS: 2.3 MG/DL (ref 2.5–4.5)
POTASSIUM SERPL-SCNC: 4.5 MMOL/L (ref 3.5–5)
SODIUM BLD-SCNC: 139 MMOL/L (ref 132–146)

## 2020-08-23 PROCEDURE — 36415 COLL VENOUS BLD VENIPUNCTURE: CPT

## 2020-08-23 PROCEDURE — G0378 HOSPITAL OBSERVATION PER HR: HCPCS

## 2020-08-23 PROCEDURE — 96366 THER/PROPH/DIAG IV INF ADDON: CPT

## 2020-08-23 PROCEDURE — 96365 THER/PROPH/DIAG IV INF INIT: CPT

## 2020-08-23 PROCEDURE — 80069 RENAL FUNCTION PANEL: CPT

## 2020-08-23 PROCEDURE — 2580000003 HC RX 258: Performed by: INTERNAL MEDICINE

## 2020-08-23 PROCEDURE — 2500000003 HC RX 250 WO HCPCS: Performed by: INTERNAL MEDICINE

## 2020-08-23 PROCEDURE — 82962 GLUCOSE BLOOD TEST: CPT

## 2020-08-23 PROCEDURE — 6360000002 HC RX W HCPCS: Performed by: FAMILY MEDICINE

## 2020-08-23 PROCEDURE — 96372 THER/PROPH/DIAG INJ SC/IM: CPT

## 2020-08-23 PROCEDURE — 6370000000 HC RX 637 (ALT 250 FOR IP): Performed by: FAMILY MEDICINE

## 2020-08-23 RX ADMIN — LAMOTRIGINE 25 MG: 25 TABLET ORAL at 21:18

## 2020-08-23 RX ADMIN — DOCUSATE SODIUM 100 MG: 100 CAPSULE, LIQUID FILLED ORAL at 08:06

## 2020-08-23 RX ADMIN — TRAZODONE HYDROCHLORIDE 50 MG: 50 TABLET ORAL at 21:18

## 2020-08-23 RX ADMIN — HEPARIN SODIUM 5000 UNITS: 10000 INJECTION INTRAVENOUS; SUBCUTANEOUS at 08:07

## 2020-08-23 RX ADMIN — SODIUM PHOSPHATE, MONOBASIC, MONOHYDRATE 15 MMOL: 276; 142 INJECTION, SOLUTION INTRAVENOUS at 11:58

## 2020-08-23 RX ADMIN — INSULIN LISPRO 4 UNITS: 100 INJECTION, SOLUTION INTRAVENOUS; SUBCUTANEOUS at 17:04

## 2020-08-23 RX ADMIN — HEPARIN SODIUM 5000 UNITS: 10000 INJECTION INTRAVENOUS; SUBCUTANEOUS at 23:46

## 2020-08-23 RX ADMIN — HEPARIN SODIUM 5000 UNITS: 10000 INJECTION INTRAVENOUS; SUBCUTANEOUS at 17:04

## 2020-08-23 RX ADMIN — DESMOPRESSIN ACETATE 40 MG: 0.2 TABLET ORAL at 08:06

## 2020-08-23 RX ADMIN — VENLAFAXINE HYDROCHLORIDE 75 MG: 75 CAPSULE, EXTENDED RELEASE ORAL at 08:06

## 2020-08-23 RX ADMIN — FLUTICASONE PROPIONATE 1 SPRAY: 50 SPRAY, METERED NASAL at 08:06

## 2020-08-23 RX ADMIN — POLYETHYLENE GLYCOL 3350 17 G: 17 POWDER, FOR SOLUTION ORAL at 08:06

## 2020-08-23 RX ADMIN — PANTOPRAZOLE SODIUM 40 MG: 40 TABLET, DELAYED RELEASE ORAL at 08:06

## 2020-08-23 ASSESSMENT — PAIN SCALES - GENERAL
PAINLEVEL_OUTOF10: 0

## 2020-08-23 NOTE — PROGRESS NOTES
Hospital Medicine Progress Note      Date of Admission: 8/21/2020  Hospital day # 0  Course: Follow-up on CLEMENTE, CKD, renal mass, abdominal pain, constipation, diabetes mellitus    He initially presented on 8/21 with burning in the periumbilical and epigastric area associated with nausea and no bowel movements for a couple of weeks  He was initially admitted with acute kidney injury superimposed on CKD due to dehydration  He was also hypotensive secondary to hypovolemia  Subjective  Patient refers no complaints at time of evaluation  Clinically improving. Feeling better. Stable overnight. No other overnight issues reported. Interview with patient and staff indicate that his cognition is improved  He is oriented to place and year  He offers no new symptoms at this time and states that he feels well  Labs demonstrate normalization of renal function  Exam:    /79   Pulse 78   Temp 98 °F (36.7 °C) (Temporal)   Resp 18   Ht 5' 9\" (1.753 m)   Wt 179 lb 7 oz (81.4 kg)   SpO2 97%   BMI 26.50 kg/m²     General appearance: No apparent distress, appears stated age and cooperative. HEENT: Pupils equal, round, and reactive to light. Conjunctivae/corneas clear. Neck: Supple. No jugular venous distention. Trachea midline. Respiratory:  Normal respiratory effort. Clear to auscultation, bilaterally without Rales/Wheezes/Rhonchi. Cardiovascular: Regular rate and rhythm with normal S1/S2 without murmurs, rubs or gallops. Abdomen: Soft, non-tender, non-distended with normal bowel sounds. Musculoskeletal: No clubbing, cyanosis or edema bilaterally. Brisk capillary refill. 2+ lower extremity pulses (dorsalis pedis).    Skin:  No rashes    Neurologic: awake, alert and following commands oriented to place somewhat to time, conversant and interactive    Medications:  Reviewed    Infusion Medications    sodium chloride 75 mL/hr at 08/22/20 1300    dextrose       Scheduled Medications    fluticasone  1 spray Each Nostril Daily    lamoTRIgine  25 mg Oral Nightly    pantoprazole  40 mg Oral QAM AC    atorvastatin  40 mg Oral Daily    traZODone  50 mg Oral Nightly    venlafaxine  75 mg Oral Daily with breakfast    sodium chloride flush  10 mL Intravenous 2 times per day    heparin (porcine)  5,000 Units Subcutaneous Q8H    polyethylene glycol  17 g Oral Daily    docusate sodium  100 mg Oral Daily    insulin lispro  0-10 Units Subcutaneous 4x Daily AC & HS     PRN Meds: sodium chloride flush, acetaminophen **OR** acetaminophen, promethazine **OR** ondansetron, glucose, dextrose, glucagon (rDNA), dextrose    I/O    Intake/Output Summary (Last 24 hours) at 8/23/2020 0833  Last data filed at 8/23/2020 0615  Gross per 24 hour   Intake 1723 ml   Output 1400 ml   Net 323 ml       Labs:   Recent Labs     08/21/20  0040 08/22/20  0653   WBC 13.3* 7.7   HGB 15.4 12.5   HCT 45.0 36.3*    166       Recent Labs     08/21/20  1327 08/22/20  0653 08/23/20  0620    139 139   K 4.4 4.4 4.5    106 104   CO2 22 23 25   BUN 48* 36* 18   CREATININE 1.7* 1.5* 1.2   CALCIUM 8.8 8.9 9.2   PHOS  --  2.4* 2.3*       Recent Labs     08/21/20  0040   PROT 6.8   ALKPHOS 71   ALT 18   AST 17   BILITOT 1.0   LIPASE 82*       No results for input(s): INR in the last 72 hours. Recent Labs     08/21/20  0040   TROPONINI 0.02       Other labs:  Lab Results   Component Value Date    CHOL 163 01/06/2019    TRIG 87 01/06/2019    HDL 36 01/06/2019    LDLCALC 110 (H) 01/06/2019    TSH 1.640 02/07/2017    PSA 0.86 09/28/2018    INR 1.0 03/14/2019    LABA1C 11.1 (H) 08/21/2020       Radiology:  Imaging studies reviewed today.     ASSESSMENT:  CLEMENTE-resolved  CKD I-II  Dehydration-resolved  Hypertension  Diabetes mellitus with hyperglycemia  Hypertension  Hyperlipidemia  Right adrenal mass  Constipation  GERD  Dementia-exacerbated but back to baseline      PLAN:  Follow BUN/creatinine  Follow electrolytes  Continue IV fluids  Creatinine is trending down  Follow I&O  A1c 11.1  TSH 1.64  Nephrology following  Renal ultrasound normal findings 8/22/2020  Monitor blood pressure and adjust medications if needed  Consideration for possible discharge today he appears to be improved        Diet: DIET RENAL;  Code Status: Full Code    PT/OT Eval Status: [] Ordered [] Evaluation noted [x] Not applicable    DVT Prophylaxis: [x]Lovenox []Heparin []PCD [] [address] []Encouraged ambulation []N/A    Likely disposition when able:  [x]Home [] Home with [facility] [] SNF/LIDIA [] Acute Rehab [] LTAC []Other    +++++++++++++++++++++++++++++++++++++++++++++++++  Tomas Johnson MD, Hospitalist  +++++++++++++++++++++++++++++++++++++++++++++++++  NOTE: This report was transcribed using voice recognition software.  Every effort was made to ensure accuracy; however, inadvertent computerized transcription errors may be present.

## 2020-08-23 NOTE — PROGRESS NOTES
Department of Internal Medicine  Nephrology Consult Note    Events reviewed. SUBJECTIVE:  We are following Mr. Marylu Reynaga for CLEMENTE on CKD. He reports no complaints.     PHYSICAL EXAM:      Vitals:    VITALS:  /79   Pulse 78   Temp 98 °F (36.7 °C) (Temporal)   Resp 18   Ht 5' 9\" (1.753 m)   Wt 179 lb 7 oz (81.4 kg)   SpO2 97%   BMI 26.50 kg/m²   24HR INTAKE/OUTPUT:      Intake/Output Summary (Last 24 hours) at 8/23/2020 1108  Last data filed at 8/23/2020 0615  Gross per 24 hour   Intake 1723 ml   Output 1400 ml   Net 323 ml       Constitutional:  Alert and oriented, in no distress  HEENT:  Normocephalic, PERRL  Respiratory:  CTA, on room air  Cardiovascular/Edema:  RRR, S1/S2  Gastrointestinal:  Soft, nondistended, nontender, bowel sounds active   Neurologic:  Nonfocal, RICHMOND  Skin:  Warm, dry, no lesions  Other:  No edema    Scheduled Meds:   sodium phosphate IVPB  15 mmol Intravenous Once    fluticasone  1 spray Each Nostril Daily    lamoTRIgine  25 mg Oral Nightly    pantoprazole  40 mg Oral QAM AC    atorvastatin  40 mg Oral Daily    traZODone  50 mg Oral Nightly    venlafaxine  75 mg Oral Daily with breakfast    sodium chloride flush  10 mL Intravenous 2 times per day    heparin (porcine)  5,000 Units Subcutaneous Q8H    polyethylene glycol  17 g Oral Daily    docusate sodium  100 mg Oral Daily    insulin lispro  0-10 Units Subcutaneous 4x Daily AC & HS     Continuous Infusions:   sodium chloride 75 mL/hr at 08/22/20 1300    dextrose       PRN Meds:.sodium chloride flush, acetaminophen **OR** acetaminophen, promethazine **OR** ondansetron, glucose, dextrose, glucagon (rDNA), dextrose    DATA:    CBC with Differential:    Lab Results   Component Value Date    WBC 7.7 08/22/2020    RBC 4.88 08/22/2020    HGB 12.5 08/22/2020    HCT 36.3 08/22/2020     08/22/2020    MCV 74.4 08/22/2020    MCH 25.6 08/22/2020    MCHC 34.4 08/22/2020    RDW 14.6 08/22/2020    LYMPHOPCT 26.6 08/22/2020    MONOPCT 5.8 08/22/2020    BASOPCT 0.8 08/22/2020    MONOSABS 0.45 08/22/2020    LYMPHSABS 2.05 08/22/2020    EOSABS 0.33 08/22/2020    BASOSABS 0.06 08/22/2020     CMP:    Lab Results   Component Value Date     08/23/2020    K 4.5 08/23/2020    K 5.2 05/04/2020     08/23/2020    CO2 25 08/23/2020    BUN 18 08/23/2020    CREATININE 1.2 08/23/2020    GFRAA >60 08/23/2020    LABGLOM >60 08/23/2020    GLUCOSE 132 08/23/2020    PROT 6.8 08/21/2020    LABALBU 3.6 08/23/2020    CALCIUM 9.2 08/23/2020    BILITOT 1.0 08/21/2020    ALKPHOS 71 08/21/2020    AST 17 08/21/2020    ALT 18 08/21/2020     Magnesium:    Lab Results   Component Value Date    MG 1.9 08/21/2020     Phosphorus:    Lab Results   Component Value Date    PHOS 2.3 08/23/2020       Urine albumin/creatinine: 61 mg/grams  Urine protein/creatinine: 0.1 g/grams    Radiology Review:      CT abdomen pelvis  8/21/2020   1. 4.4 cm heterogenous right adrenal lesion appears similar to the previous    exam.    2. Mild fluid distention of the distal esophagus possibly representing    gastroesophageal reflux. 3. Stable small periumbilical hernia containing fat. 4. Remaining findings as described above. CXR 8/21/2020   No acute cardiopulmonary process. Retroperitoneal US 8/21/2020        NORMAL RENAL SONOGRAM         Both kidneys have cortical cysts seen    in the right kidney has a cyst measuring 1.2 x 0.9 x 0.8 cm seen in    the lower pole. The left kidney has a cyst measuring 2.0 x 1.9 x 2 cm    seen in the upper pole. There is a cyst measuring 1.2 x 1.4 x 1.2 cm    seen in the upper pole          BRIEF SUMMARY OF INITIAL CONSULT:    Briefly, Mr. Eliza Patricio is a 60year old male with a PMH of IDDM with diabetic retinopathy, HTN, HLD, subdural hematoma (2018), TIA, dementia, anxiety, depression and noncompliance who presented to the ER on 8/20/2020 with complaints of periumbilical abdominal pain that started that day.  He

## 2020-08-24 VITALS
HEIGHT: 69 IN | DIASTOLIC BLOOD PRESSURE: 68 MMHG | OXYGEN SATURATION: 97 % | RESPIRATION RATE: 18 BRPM | HEART RATE: 75 BPM | TEMPERATURE: 97.7 F | BODY MASS INDEX: 26.58 KG/M2 | SYSTOLIC BLOOD PRESSURE: 110 MMHG | WEIGHT: 179.44 LBS

## 2020-08-24 LAB
ALBUMIN SERPL-MCNC: 3.6 G/DL (ref 3.5–5.2)
ANION GAP SERPL CALCULATED.3IONS-SCNC: 11 MMOL/L (ref 7–16)
BUN BLDV-MCNC: 15 MG/DL (ref 8–23)
CALCIUM SERPL-MCNC: 9.3 MG/DL (ref 8.6–10.2)
CHLORIDE BLD-SCNC: 101 MMOL/L (ref 98–107)
CO2: 25 MMOL/L (ref 22–29)
CREAT SERPL-MCNC: 1.2 MG/DL (ref 0.7–1.2)
GFR AFRICAN AMERICAN: >60
GFR NON-AFRICAN AMERICAN: >60 ML/MIN/1.73
GLUCOSE BLD-MCNC: 176 MG/DL (ref 74–99)
LAMOTRIGINE LEVEL: <0.9 UG/ML (ref 2.5–15)
METER GLUCOSE: 192 MG/DL (ref 74–99)
PHOSPHORUS: 3.1 MG/DL (ref 2.5–4.5)
POTASSIUM SERPL-SCNC: 4.3 MMOL/L (ref 3.5–5)
SODIUM BLD-SCNC: 137 MMOL/L (ref 132–146)

## 2020-08-24 PROCEDURE — 2580000003 HC RX 258: Performed by: FAMILY MEDICINE

## 2020-08-24 PROCEDURE — 6360000002 HC RX W HCPCS: Performed by: FAMILY MEDICINE

## 2020-08-24 PROCEDURE — 6370000000 HC RX 637 (ALT 250 FOR IP): Performed by: FAMILY MEDICINE

## 2020-08-24 PROCEDURE — 36415 COLL VENOUS BLD VENIPUNCTURE: CPT

## 2020-08-24 PROCEDURE — 96372 THER/PROPH/DIAG INJ SC/IM: CPT

## 2020-08-24 PROCEDURE — 80069 RENAL FUNCTION PANEL: CPT

## 2020-08-24 PROCEDURE — G0378 HOSPITAL OBSERVATION PER HR: HCPCS

## 2020-08-24 PROCEDURE — 82962 GLUCOSE BLOOD TEST: CPT

## 2020-08-24 RX ADMIN — PANTOPRAZOLE SODIUM 40 MG: 40 TABLET, DELAYED RELEASE ORAL at 08:15

## 2020-08-24 RX ADMIN — INSULIN LISPRO 2 UNITS: 100 INJECTION, SOLUTION INTRAVENOUS; SUBCUTANEOUS at 08:16

## 2020-08-24 RX ADMIN — SODIUM CHLORIDE, PRESERVATIVE FREE 10 ML: 5 INJECTION INTRAVENOUS at 08:15

## 2020-08-24 RX ADMIN — DESMOPRESSIN ACETATE 40 MG: 0.2 TABLET ORAL at 08:15

## 2020-08-24 RX ADMIN — DOCUSATE SODIUM 100 MG: 100 CAPSULE, LIQUID FILLED ORAL at 09:00

## 2020-08-24 RX ADMIN — FLUTICASONE PROPIONATE 1 SPRAY: 50 SPRAY, METERED NASAL at 08:15

## 2020-08-24 RX ADMIN — HEPARIN SODIUM 5000 UNITS: 10000 INJECTION INTRAVENOUS; SUBCUTANEOUS at 08:16

## 2020-08-24 RX ADMIN — VENLAFAXINE HYDROCHLORIDE 75 MG: 75 CAPSULE, EXTENDED RELEASE ORAL at 08:15

## 2020-08-24 RX ADMIN — POLYETHYLENE GLYCOL 3350 17 G: 17 POWDER, FOR SOLUTION ORAL at 08:15

## 2020-08-24 ASSESSMENT — PAIN SCALES - GENERAL
PAINLEVEL_OUTOF10: 0
PAINLEVEL_OUTOF10: 0

## 2020-08-24 NOTE — PLAN OF CARE
Problem: Falls - Risk of:  Goal: Will remain free from falls  Description: Will remain free from falls  Outcome: Ongoing     Problem: Confusion - Acute:  Goal: Absence of continued neurological deterioration signs and symptoms  Description: Absence of continued neurological deterioration signs and symptoms  Outcome: Ongoing  Goal: Mental status will be restored to baseline  Description: Mental status will be restored to baseline  Outcome: Ongoing     Problem: Injury - Risk of, Physical Injury:  Goal: Will remain free from falls  Description: Will remain free from falls  Outcome: Ongoing

## 2020-08-24 NOTE — DISCHARGE SUMMARY
leukocytosis with WBC of 13.3, BUN 41, creatinine 2.6; hemoglobin A1c at 11.1%. CT abdomen/pelvis showed a 4.4 cm echogenic right adrenal lesion otherwise no other intra-abdominal abnormality. Patient was admitted for CLEMENTE on CKD secondary to prerenal azotemia resulting from limited oral intake; his hypotension also due to hypovolemia. He was started on IV hydration with gradually improved his renal function; constipation was managed with bowel regimen. Patient was evaluated by nephrologist who agreed with IV hydration for management of CLEMENTE; work-up for adrenal mass which is suspected to be adenoma was started which include random free metanephrine levels. She was discharged home in good condition to follow-up with nephrologist to complete work-up of adrenal lesion. Exam:     /68   Pulse 75   Temp 97.7 °F (36.5 °C) (Temporal)   Resp 18   Ht 5' 9\" (1.753 m)   Wt 179 lb 7 oz (81.4 kg)   SpO2 97%   BMI 26.50 kg/m²     General appearance: Sitting comfortably in bed. No apparent distress. Respiratory:  Normal respiratory effort. Clear to auscultation. Cardiovascular: Normal S1/S2. Regular rate and rhythm   Abdomen: Soft, non-tender, non-distended with normal bowel sounds. Musculoskeletal: No clubbing, cyanosis or edema bilaterally. Full range of motion without deformity. Skin: Skin color, texture, turgor normal.  No rashes or lesions. Neurologic:  No focal deficit. Psychiatric: Alert and oriented, thought content appropriate, normal insight      Consults:     IP CONSULT TO HOSPITALIST  IP CONSULT TO NEPHROLOGY      Disposition: Home    Condition at Discharge: Stable    Discharge Instructions/Follow-up:  Nephrologist. PCP    Code Status:  Prior    Activity: activity as tolerated    Diet: diabetic diet    Labs:  For convenience and continuity at follow-up the following most recent labs are provided:      CBC:    Lab Results   Component Value Date    WBC 7.7 08/22/2020    HGB 12.5 08/22/2020 STOP taking these medications       lamoTRIgine (LAMICTAL) 25 MG tablet Comments:   Reason for Stopping:         fluticasone (FLONASE) 50 MCG/ACT nasal spray Comments:   Reason for Stopping:         aspirin 81 MG EC tablet Comments:   Reason for Stopping:         simvastatin (ZOCOR) 80 MG tablet Comments:   Reason for Stopping:               Time Spent on discharge is more than 30 minutes in the examination, evaluation, counseling and review of medications and discharge plan. Signed:    Jenna Salmon MD   8/24/2020      Thank you Reena Evans MD for the opportunity to be involved in this patient's care. If you have any questions or concerns please feel free to contact me at [phone].

## 2020-08-24 NOTE — CARE COORDINATION
8/24/2020 social work transition of care planning  Sw followed up with pt. Plan remains for home, pt stated that he is calling to find a ride for today.   Electronically signed by INDY Garcia on 8/24/2020 at 12:27 PM

## 2020-08-24 NOTE — PROGRESS NOTES
few days and that he had been constipated and oliguric. He denied vomiting or diarrhea. He also reported that his urine was brown in color. He was found to be hypotensive and mildly tachycardic. Labs obtained were significant for WBC 13.3, BUN 41, Cr 2.6,  Lipase 82 and Hgb A1C 11.1. A CT of the abdomen and pelvis showed a 4.4 cm heterogenous right adrenal lesion. CXR was negative. He was given 1L of normal saline as well as a GI cocktail and thorazine prior to being admitted for further evaluation and treatment. His baseline creatinine appears to be around 1.1 mg/dL and was up to 2.6 on admission, reason for this consultation. Her medications prior to admission included lisinopril 20 mg twice daily. Problems resolved:    · Abdominal pain, probably due to constipation, CT abdomen pelvis with no acute findings  · Hypotension likely 2/2 hypovolemia, improved with IVF       IMPRESSION/RECOMMENDATIONS:      1. CLEMENTE stage II, volume responsive prerenal CLEMENTE due to intravascular volume depletion, poor PO intake, in the setting of ACE inhibition. Resolved, creatinine down to 1.2 mg deciliter. 2. CKD stage I-II, probably diabetic nephropathy, baseline creatinine 1.1 mg/dL, with some microalbuminuria     3. Right adrenal mass 4.4 cm, probably adrenal adenoma, work-up in the past unrevealing.   4. HTN, holding medications due to hypotension      Plan:    · Pending random free metanephrine levels, follow as an outpatient  · Okay to discharge from renal point of view  · Follow-up with me in 4 weeks      Electronically signed by Etienne Villarreal MD on 8/24/2020 at 11:05 AM

## 2020-08-26 LAB
METANEPH/PLASMA INTERP: NORMAL
METANEPHRINE FREE PLASMA: 0.18 NMOL/L (ref 0–0.49)
NORMETANEPHRINE FREE PLASMA: 0.18 NMOL/L (ref 0–0.89)

## 2020-09-22 PROBLEM — E86.0 DEHYDRATION: Status: RESOLVED | Noted: 2020-08-23 | Resolved: 2020-09-22

## 2020-10-06 ENCOUNTER — HOSPITAL ENCOUNTER (INPATIENT)
Age: 65
LOS: 6 days | Discharge: OTHER INSTITUTION | DRG: 885 | End: 2020-10-13
Attending: EMERGENCY MEDICINE | Admitting: PSYCHIATRY & NEUROLOGY
Payer: OTHER GOVERNMENT

## 2020-10-06 LAB
ACETAMINOPHEN LEVEL: <5 MCG/ML (ref 10–30)
ALBUMIN SERPL-MCNC: 4.2 G/DL (ref 3.5–5.2)
ALP BLD-CCNC: 77 U/L (ref 40–129)
ALT SERPL-CCNC: 17 U/L (ref 0–40)
ANION GAP SERPL CALCULATED.3IONS-SCNC: 13 MMOL/L (ref 7–16)
AST SERPL-CCNC: 14 U/L (ref 0–39)
BASOPHILS ABSOLUTE: 0.05 E9/L (ref 0–0.2)
BASOPHILS RELATIVE PERCENT: 0.7 % (ref 0–2)
BETA-HYDROXYBUTYRATE: 0.88 MMOL/L (ref 0.02–0.27)
BILIRUB SERPL-MCNC: 1.3 MG/DL (ref 0–1.2)
BUN BLDV-MCNC: 17 MG/DL (ref 8–23)
CALCIUM SERPL-MCNC: 9.5 MG/DL (ref 8.6–10.2)
CHLORIDE BLD-SCNC: 97 MMOL/L (ref 98–107)
CHP ED QC CHECK: NORMAL
CO2: 25 MMOL/L (ref 22–29)
CREAT SERPL-MCNC: 1.5 MG/DL (ref 0.7–1.2)
EOSINOPHILS ABSOLUTE: 0.11 E9/L (ref 0.05–0.5)
EOSINOPHILS RELATIVE PERCENT: 1.6 % (ref 0–6)
ETHANOL: <10 MG/DL (ref 0–0.08)
GFR AFRICAN AMERICAN: 57
GFR NON-AFRICAN AMERICAN: 47 ML/MIN/1.73
GLUCOSE BLD-MCNC: 300 MG/DL (ref 74–99)
GLUCOSE BLD-MCNC: 306 MG/DL
HCT VFR BLD CALC: 40.7 % (ref 37–54)
HEMOGLOBIN: 14 G/DL (ref 12.5–16.5)
IMMATURE GRANULOCYTES #: 0.02 E9/L
IMMATURE GRANULOCYTES %: 0.3 % (ref 0–5)
LYMPHOCYTES ABSOLUTE: 2.1 E9/L (ref 1.5–4)
LYMPHOCYTES RELATIVE PERCENT: 31.3 % (ref 20–42)
MCH RBC QN AUTO: 25.6 PG (ref 26–35)
MCHC RBC AUTO-ENTMCNC: 34.4 % (ref 32–34.5)
MCV RBC AUTO: 74.5 FL (ref 80–99.9)
METER GLUCOSE: 189 MG/DL (ref 74–99)
METER GLUCOSE: 306 MG/DL (ref 74–99)
METER GLUCOSE: 325 MG/DL (ref 74–99)
MONOCYTES ABSOLUTE: 0.47 E9/L (ref 0.1–0.95)
MONOCYTES RELATIVE PERCENT: 7 % (ref 2–12)
NEUTROPHILS ABSOLUTE: 3.96 E9/L (ref 1.8–7.3)
NEUTROPHILS RELATIVE PERCENT: 59.1 % (ref 43–80)
PDW BLD-RTO: 14.7 FL (ref 11.5–15)
PLATELET # BLD: 192 E9/L (ref 130–450)
PMV BLD AUTO: 10.4 FL (ref 7–12)
POTASSIUM SERPL-SCNC: 4.2 MMOL/L (ref 3.5–5)
RBC # BLD: 5.46 E12/L (ref 3.8–5.8)
SALICYLATE, SERUM: <0.3 MG/DL (ref 0–30)
SARS-COV-2, NAAT: NOT DETECTED
SODIUM BLD-SCNC: 135 MMOL/L (ref 132–146)
TOTAL PROTEIN: 6.7 G/DL (ref 6.4–8.3)
TRICYCLIC ANTIDEPRESSANTS SCREEN SERUM: NEGATIVE NG/ML
TSH SERPL DL<=0.05 MIU/L-ACNC: 0.67 UIU/ML (ref 0.27–4.2)
WBC # BLD: 6.7 E9/L (ref 4.5–11.5)

## 2020-10-06 PROCEDURE — 80053 COMPREHEN METABOLIC PANEL: CPT

## 2020-10-06 PROCEDURE — 6370000000 HC RX 637 (ALT 250 FOR IP): Performed by: EMERGENCY MEDICINE

## 2020-10-06 PROCEDURE — 82010 KETONE BODYS QUAN: CPT

## 2020-10-06 PROCEDURE — 82962 GLUCOSE BLOOD TEST: CPT

## 2020-10-06 PROCEDURE — 85025 COMPLETE CBC W/AUTO DIFF WBC: CPT

## 2020-10-06 PROCEDURE — U0002 COVID-19 LAB TEST NON-CDC: HCPCS

## 2020-10-06 PROCEDURE — 99285 EMERGENCY DEPT VISIT HI MDM: CPT

## 2020-10-06 PROCEDURE — G0480 DRUG TEST DEF 1-7 CLASSES: HCPCS

## 2020-10-06 PROCEDURE — 2500000003 HC RX 250 WO HCPCS: Performed by: NURSE PRACTITIONER

## 2020-10-06 PROCEDURE — 84443 ASSAY THYROID STIM HORMONE: CPT

## 2020-10-06 PROCEDURE — 80307 DRUG TEST PRSMV CHEM ANLYZR: CPT

## 2020-10-06 PROCEDURE — 93005 ELECTROCARDIOGRAM TRACING: CPT | Performed by: NURSE PRACTITIONER

## 2020-10-06 RX ORDER — LORAZEPAM 1 MG/1
2 TABLET ORAL
Status: ACTIVE | OUTPATIENT
Start: 2020-10-06 | End: 2020-10-06

## 2020-10-06 RX ORDER — CHLORPROMAZINE HYDROCHLORIDE 25 MG/ML
25 INJECTION INTRAMUSCULAR ONCE
Status: COMPLETED | OUTPATIENT
Start: 2020-10-06 | End: 2020-10-06

## 2020-10-06 RX ADMIN — INSULIN HUMAN 10 UNITS: 100 INJECTION, SOLUTION PARENTERAL at 20:01

## 2020-10-06 RX ADMIN — METFORMIN HYDROCHLORIDE 1000 MG: 1000 TABLET ORAL at 20:02

## 2020-10-06 RX ADMIN — CHLORPROMAZINE HYDROCHLORIDE 25 MG: 25 INJECTION INTRAMUSCULAR at 17:23

## 2020-10-06 NOTE — ED NOTES
Bed: [facility]  Expected date:   Expected time:   Means of arrival:   Comments:  jono Leger RN  10/06/20 5450

## 2020-10-06 NOTE — ED NOTES
Emergency Department [facility] AN AFFILIATE OF [facility] Biopsychosocial Assessment Note    Chief Complaint:     Patient is a 60year old, male presenting to ED via pink slip by [facility] due to suicidal threats to suffocate himself with a pillow. Patient reportedly called his son and expressed these thoughts. [facility] were contacted and an involuntary hold was implemented. Patient reports increased depression & suicidal thoughts that have been on-going for \" a while\". Patient states \"I've been going through a lot,\" but patient is not able to elaborate. Patient endorses SI. Patient denies HI.    MSE: Patient is alert & oriented x 4. Patient mood is depressed/irritable, congruent affect. Patient thought process/speech are clear. Patient denies A/V hallucinations. Clinical Summary/History:     Patient has a mental health hx of depression, not actively in mental health treatment - off psychiatric medications for 1+ year; and patient last psychiatric hospitalization was 2/11/19 on [address] [place] [address] 10. Patient reports poor sleep, poor appetite, & on-going feelings of hopelessness/helplessness. Patient denies any issues with anxiety, racing thoughts, or paranoia. Patient endorses feelings of anhedonia. Patient denies any hx of suicide attempts or self injurious behaviors. Patient denies drug/alcohol use. Gender  [x] Male [] Female [] Transgender  [] Other    Sexual Orientation    [x] Heterosexual [] Homosexual [] Bisexual [] Other    Suicidal Behavioral: CSSR-S Complete. [x] Reports:    [x] Past [x] Present   [] Denies    Homicidal/ Violent Behavior  [] Reports:   [] Past [] Present   [x] Denies     Hallucinations/Delusions   [] Reports:   [x] Denies     Substance Use/Alcohol Use/Addiction: SBIRT Screen Complete.    [] Reports:   [x] Denies     Trauma History  [] Reports:  [x] Denies     Collateral Information:       Level of Care/Disposition Plan  [] Home:   [] Outpatient Provider:   [] Crisis Unit:   [x] Inpatient Psychiatric Unit:  [] Other:     Patient was pink slipped by the [place]. SW will pursue inpatient admission for safety/stabilization, once patient is medically cleared.        LAURA Collins, LSW  10/06/20 8971

## 2020-10-06 NOTE — ED NOTES
Patient agitated, intermittently cursing, requesting to leave. Patient refusing to follow any of the patient procedures. Patient questioning where his son is, because he is unable to drive home. Patient expressed not knowing what he did wrong.      Muriel Le, MSW, LSW  10/06/20 0202

## 2020-10-06 NOTE — ED PROVIDER NOTES
ED Attending  CC: Sia       Department of Emergency Medicine   ED  Provider Note  Admit Date/RoomTime: 10/6/2020  4:25 PM  ED Room: [address] [place] [address]  Chief Complaint   Suicidal (pts son chrystal Cole [address] and told them pt was si plan to place pillow over head [facility] notfied police and brought pt to ed. Pt denies si/ hi/ hallucinations. not taking meds  pink slipped, )    History of Present Illness   Source of history provided by:  patient and EMS personnel. History/Exam Limitations: patient uncooperative. Mariam Weaver is a 60 y.o. old male who has a past medical history of:   Past Medical History:   Diagnosis Date    Anxiety     Bronchitis     Cellulitis     Chronic sinusitis     Diabetes mellitus ([address]     Diabetic retinopathy ([address]     Fracture of left foot     High cholesterol     Hypercholesteremia     Hypercholesterolemia     Hypertension     Lumbago     Moderate mood disorder ([address]     Third nerve palsy     presents to the emergency department by ambulance, for psychiatric evaluation/medical clearance. He comes pink slipped by by a psychiatric nurse practitioner from the [facility] after being contacted by the patient's son. The pink slip says Nitesh Babin spoke with his father today who told him that he put a pillow over his head to try to suffocate himself because he does not want to live like this. The son said his dad also said he was unable to do it and that he is looking for another way to do it. The son says he has no guns in the house. The son states that dad is a diabetic and he has not been taking his insulin or his psych medications, the son feel his blood sugar is probably dangerously out-of-control too. \" He has a prior history of moderate mood disorder and anxiety of which the problem is long-standing. His reported drug use history: denies. He  is currently in counseling with the [facility]. There has been no history of recent trauma.   Patient will not answer when asked about suicidal or homicidal ideation. Quality:      Hopelessness: Will not answer. Terror:  No.     Confusion:   No.     Hallucinations: Will not answer. .     Able to care for self: No.  Able to control self: No.    ROS    Pertinent positives and negatives are stated within HPI, all other systems reviewed and are negative. Past Surgical History:  has a past surgical history that includes eye surgery. Social History:  reports that he has never smoked. He has never used smokeless tobacco. He reports that he does not drink alcohol or use drugs. Family History: family history is not on file. Allergies: Patient has no known allergies. Physical Exam           ED Triage Vitals [10/06/20 1643]   BP Temp Temp Source Pulse Resp SpO2 Height Weight   97/79 97.9 °F (36.6 °C) Infrared 111 18 97 % -- --      Oxygen Saturation Interpretation: Normal.    General Appearance:  well-appearing, street clothes, fair grooming, fair hygiene and pacing in room. Constitutional:   Level of Consciousness: Awake and alert. ETOH: No.          Distress: mild. Cooperativeness: uncooperative. Eyes:  PERRL, EOMI, no discharge or conjunctival injection. Ears:  External ears without lesions. Throat:  Pharynx without injection, exudate, or tonsillar hypertrophy. Airway patient. Neck:  Normal ROM. Supple. Respiratory:  Clear to auscultation and breath sounds equal.  CV:  Tachycardic rate and rhythm, normal heart sounds, without pathological murmurs, ectopy, gallops, or rubs. GI:  Abdomen Soft, nontender, good bowel sounds. No firm or pulsatile mass. Integument:  Normal turgor. Warm, dry, without visible rash, unless noted elsewhere. Lymphatics: No lymphangitis or adenopathy noted. Neurological:  Oriented. Motor functions intact. Psychiatric:        Thought Process:       Coherent:  Yes. Delusions / Paranoia: no evidence of delusions and no evidence of paranoia.         Flight of ideas:  No.         Rambling conversation:  Yes. Affect: anxious and labile. Suicidal ideation:  Will not answer. Homicidal ideation:  Will not answer. Perceptions: Will not answer. Insight: Will not answer. Judgement: Will not answer.     Lab / Imaging Results   (All laboratory and radiology results have been personally reviewed by myself)  Labs:  Results for orders placed or performed during the hospital encounter of 10/06/20   Comprehensive Metabolic Panel   Result Value Ref Range    Sodium 135 132 - 146 mmol/L    Potassium 4.2 3.5 - 5.0 mmol/L    Chloride 97 (L) 98 - 107 mmol/L    CO2 25 22 - 29 mmol/L    Anion Gap 13 7 - 16 mmol/L    Glucose 300 (H) 74 - 99 mg/dL    BUN 17 8 - 23 mg/dL    CREATININE 1.5 (H) 0.7 - 1.2 mg/dL    GFR Non-African American 47 >=60 mL/min/1.73    GFR African American 57     Calcium 9.5 8.6 - 10.2 mg/dL    Total Protein 6.7 6.4 - 8.3 g/dL    Alb 4.2 3.5 - 5.2 g/dL    Total Bilirubin 1.3 (H) 0.0 - 1.2 mg/dL    Alkaline Phosphatase 77 40 - 129 U/L    ALT 17 0 - 40 U/L    AST 14 0 - 39 U/L   CBC Auto Differential   Result Value Ref Range    WBC 6.7 4.5 - 11.5 E9/L    RBC 5.46 3.80 - 5.80 E12/L    Hemoglobin 14.0 12.5 - 16.5 g/dL    Hematocrit 40.7 37.0 - 54.0 %    MCV 74.5 (L) 80.0 - 99.9 fL    MCH 25.6 (L) 26.0 - 35.0 pg    MCHC 34.4 32.0 - 34.5 %    RDW 14.7 11.5 - 15.0 fL    Platelets 893 662 - 565 E9/L    MPV 10.4 7.0 - 12.0 fL    Neutrophils % 59.1 43.0 - 80.0 %    Immature Granulocytes % 0.3 0.0 - 5.0 %    Lymphocytes % 31.3 20.0 - 42.0 %    Monocytes % 7.0 2.0 - 12.0 %    Eosinophils % 1.6 0.0 - 6.0 %    Basophils % 0.7 0.0 - 2.0 %    Neutrophils Absolute 3.96 1.80 - 7.30 E9/L    Immature Granulocytes # 0.02 E9/L    Lymphocytes Absolute 2.10 1.50 - 4.00 E9/L    Monocytes Absolute 0.47 0.10 - 0.95 E9/L    Eosinophils Absolute 0.11 0.05 - 0.50 E9/L    Basophils Absolute 0.05 0.00 - 0.20 E9/L   Serum Drug Screen   Result Value Ref Range    Ethanol Lvl <10 mg/dL Acetaminophen Level <5.0 (L) 10.0 - 95.7 mcg/mL    Salicylate, Serum <3.8 0.0 - 30.0 mg/dL    TCA Scrn NEGATIVE Cutoff:300 ng/mL   TSH without Reflex   Result Value Ref Range    TSH 0.674 0.270 - 4.200 uIU/mL   Beta-Hydroxybutyrate   Result Value Ref Range    Beta-Hydroxybutyrate 0.88 (H) 0.02 - 0.27 mmol/L   COVID-19   Result Value Ref Range    SARS-CoV-2, NAAT Not Detected Not Detected   POCT glucose   Result Value Ref Range    Glucose 306 mg/dL    QC OK? k    POCT Glucose   Result Value Ref Range    Meter Glucose 306 (H) 74 - 99 mg/dL     Imaging: All Radiology results interpreted by Radiologist unless otherwise noted. No orders to display     EKG #1:  Interpreted by emergency department physician unless otherwise noted. Time:  1835    Rate: 84  Rhythm: Sinus. Interpretation: normal EKG, normal sinus rhythm, QTc 449  ED Course / Medical Decision Making     Medications   LORazepam (ATIVAN) tablet 2 mg (has no administration in time range)   insulin regular (HUMULIN R;NOVOLIN R) injection 10 Units (has no administration in time range)   metFORMIN (GLUCOPHAGE) tablet 1,000 mg (has no administration in time range)   chlorproMAZINE (THORAZINE) injection 25 mg (25 mg Intramuscular Given 10/6/20 1723)        Re-examination:  10/6/20       Time: 1910   No change in physical exam, patient eating. Consult(s):   IP CONSULT TO SOCIAL WORK    Procedure(s):   none    MDM:   Patient pink slipped prior to arrival. Patient evaluated by Dr. Amna Barney as well. Labs and diagnostics as resulted above. Patient is not in DKA. He was given his metformin and insulin. His creatinine is at baseline therefore not requiring medicine admission. Patient will be medically cleared once his blood sugar is under 200. Admission arrangements as made by the . Counseling:     The emergency provider has spoken with the patient and discussed todays results, in addition to providing specific details for the plan of care and counseling regarding the diagnosis and prognosis. Questions are answered at this time and they are agreeable with the plan. Assessment      1. Depression with suicidal ideation      Plan   Admit to mental health unit - medically cleared for admission  Patient condition is stable    New Medications     New Prescriptions    No medications on file     Electronically signed by MIRA Arias CNP   DD: 10/6/20  **This report was transcribed using voice recognition software. Every effort was made to ensure accuracy; however, inadvertent computerized transcription errors may be present.   END OF ED PROVIDER NOTE       IMRA Arias CNP  10/06/20 1947

## 2020-10-06 NOTE — ED NOTES
Pt given thorazine for his hiccups at this time cont to refuse to change clothes or do blood work      Elmira Bauer RN  10/06/20 3870

## 2020-10-07 PROBLEM — F33.9 DEPRESSION, MAJOR, RECURRENT (HCC): Status: ACTIVE | Noted: 2020-10-07

## 2020-10-07 LAB
AMPHETAMINE SCREEN, URINE: NOT DETECTED
BACTERIA: ABNORMAL /HPF
BARBITURATE SCREEN URINE: NOT DETECTED
BENZODIAZEPINE SCREEN, URINE: NOT DETECTED
BILIRUBIN URINE: NEGATIVE
BLOOD, URINE: NEGATIVE
CANNABINOID SCREEN URINE: NOT DETECTED
CLARITY: CLEAR
COCAINE METABOLITE SCREEN URINE: NOT DETECTED
COLOR: YELLOW
EKG ATRIAL RATE: 84 BPM
EKG P AXIS: 74 DEGREES
EKG P-R INTERVAL: 132 MS
EKG Q-T INTERVAL: 380 MS
EKG QRS DURATION: 74 MS
EKG QTC CALCULATION (BAZETT): 449 MS
EKG R AXIS: 39 DEGREES
EKG T AXIS: 67 DEGREES
EKG VENTRICULAR RATE: 84 BPM
FENTANYL SCREEN, URINE: NOT DETECTED
GLUCOSE URINE: >=1000 MG/DL
KETONES, URINE: 15 MG/DL
LEUKOCYTE ESTERASE, URINE: NEGATIVE
Lab: NORMAL
METER GLUCOSE: 214 MG/DL (ref 74–99)
METER GLUCOSE: 326 MG/DL (ref 74–99)
METHADONE SCREEN, URINE: NOT DETECTED
NITRITE, URINE: NEGATIVE
OPIATE SCREEN URINE: NOT DETECTED
OXYCODONE URINE: NOT DETECTED
PH UA: 6 (ref 5–9)
PHENCYCLIDINE SCREEN URINE: NOT DETECTED
PROTEIN UA: 30 MG/DL
RBC UA: ABNORMAL /HPF (ref 0–2)
SPECIFIC GRAVITY UA: 1.02 (ref 1–1.03)
UROBILINOGEN, URINE: 1 E.U./DL
WBC UA: ABNORMAL /HPF (ref 0–5)

## 2020-10-07 PROCEDURE — 93010 ELECTROCARDIOGRAM REPORT: CPT | Performed by: INTERNAL MEDICINE

## 2020-10-07 PROCEDURE — 82962 GLUCOSE BLOOD TEST: CPT

## 2020-10-07 PROCEDURE — 6370000000 HC RX 637 (ALT 250 FOR IP): Performed by: HOSPITALIST

## 2020-10-07 PROCEDURE — 80307 DRUG TEST PRSMV CHEM ANLYZR: CPT

## 2020-10-07 PROCEDURE — 1240000000 HC EMOTIONAL WELLNESS R&B

## 2020-10-07 PROCEDURE — 6370000000 HC RX 637 (ALT 250 FOR IP): Performed by: PSYCHIATRY & NEUROLOGY

## 2020-10-07 PROCEDURE — 81001 URINALYSIS AUTO W/SCOPE: CPT

## 2020-10-07 RX ORDER — MAGNESIUM HYDROXIDE/ALUMINUM HYDROXICE/SIMETHICONE 120; 1200; 1200 MG/30ML; MG/30ML; MG/30ML
30 SUSPENSION ORAL PRN
Status: DISCONTINUED | OUTPATIENT
Start: 2020-10-07 | End: 2020-10-13 | Stop reason: HOSPADM

## 2020-10-07 RX ORDER — LISINOPRIL 20 MG/1
20 TABLET ORAL DAILY
Status: DISCONTINUED | OUTPATIENT
Start: 2020-10-07 | End: 2020-10-07

## 2020-10-07 RX ORDER — HALOPERIDOL 2 MG/1
3 TABLET ORAL EVERY 6 HOURS PRN
Status: DISCONTINUED | OUTPATIENT
Start: 2020-10-07 | End: 2020-10-13 | Stop reason: HOSPADM

## 2020-10-07 RX ORDER — PANTOPRAZOLE SODIUM 40 MG/1
40 TABLET, DELAYED RELEASE ORAL
Status: DISCONTINUED | OUTPATIENT
Start: 2020-10-08 | End: 2020-10-13 | Stop reason: HOSPADM

## 2020-10-07 RX ORDER — ACETAMINOPHEN 325 MG/1
650 TABLET ORAL EVERY 4 HOURS PRN
Status: DISCONTINUED | OUTPATIENT
Start: 2020-10-07 | End: 2020-10-13 | Stop reason: HOSPADM

## 2020-10-07 RX ORDER — INSULIN GLARGINE 100 [IU]/ML
60 INJECTION, SOLUTION SUBCUTANEOUS NIGHTLY
Status: DISCONTINUED | OUTPATIENT
Start: 2020-10-07 | End: 2020-10-13 | Stop reason: HOSPADM

## 2020-10-07 RX ORDER — VENLAFAXINE 75 MG/1
75 TABLET ORAL DAILY
Status: DISCONTINUED | OUTPATIENT
Start: 2020-10-07 | End: 2020-10-09 | Stop reason: DRUGHIGH

## 2020-10-07 RX ORDER — NICOTINE POLACRILEX 4 MG
15 LOZENGE BUCCAL PRN
Status: DISCONTINUED | OUTPATIENT
Start: 2020-10-07 | End: 2020-10-13 | Stop reason: HOSPADM

## 2020-10-07 RX ORDER — DEXTROSE MONOHYDRATE 50 MG/ML
100 INJECTION, SOLUTION INTRAVENOUS PRN
Status: DISCONTINUED | OUTPATIENT
Start: 2020-10-07 | End: 2020-10-13 | Stop reason: HOSPADM

## 2020-10-07 RX ORDER — GLIPIZIDE 5 MG/1
5 TABLET ORAL
Status: DISCONTINUED | OUTPATIENT
Start: 2020-10-07 | End: 2020-10-07

## 2020-10-07 RX ORDER — TRAZODONE HYDROCHLORIDE 50 MG/1
25 TABLET ORAL NIGHTLY PRN
Status: DISCONTINUED | OUTPATIENT
Start: 2020-10-07 | End: 2020-10-13 | Stop reason: HOSPADM

## 2020-10-07 RX ORDER — HALOPERIDOL 5 MG/ML
3 INJECTION INTRAMUSCULAR EVERY 6 HOURS PRN
Status: DISCONTINUED | OUTPATIENT
Start: 2020-10-07 | End: 2020-10-13 | Stop reason: HOSPADM

## 2020-10-07 RX ORDER — DEXTROSE MONOHYDRATE 25 G/50ML
12.5 INJECTION, SOLUTION INTRAVENOUS PRN
Status: DISCONTINUED | OUTPATIENT
Start: 2020-10-07 | End: 2020-10-13 | Stop reason: HOSPADM

## 2020-10-07 RX ADMIN — VENLAFAXINE HYDROCHLORIDE 75 MG: 75 TABLET ORAL at 20:39

## 2020-10-07 RX ADMIN — INSULIN LISPRO 12 UNITS: 100 INJECTION, SOLUTION INTRAVENOUS; SUBCUTANEOUS at 18:00

## 2020-10-07 RX ADMIN — TRAZODONE HYDROCHLORIDE 25 MG: 50 TABLET ORAL at 20:40

## 2020-10-07 RX ADMIN — INSULIN GLARGINE 60 UNITS: 100 INJECTION, SOLUTION SUBCUTANEOUS at 20:40

## 2020-10-07 RX ADMIN — INSULIN LISPRO 6 UNITS: 100 INJECTION, SOLUTION INTRAVENOUS; SUBCUTANEOUS at 20:41

## 2020-10-07 ASSESSMENT — PAIN SCALES - GENERAL
PAINLEVEL_OUTOF10: 0
PAINLEVEL_OUTOF10: 0

## 2020-10-07 ASSESSMENT — PAIN - FUNCTIONAL ASSESSMENT: PAIN_FUNCTIONAL_ASSESSMENT: 0-10

## 2020-10-07 ASSESSMENT — SLEEP AND FATIGUE QUESTIONNAIRES
DIFFICULTY ARISING: NO
AVERAGE NUMBER OF SLEEP HOURS: 5
SLEEP PATTERN: DIFFICULTY FALLING ASLEEP
DIFFICULTY FALLING ASLEEP: YES
DO YOU HAVE DIFFICULTY SLEEPING: YES
RESTFUL SLEEP: NO
DIFFICULTY STAYING ASLEEP: YES
DO YOU USE A SLEEP AID: NO

## 2020-10-07 ASSESSMENT — PATIENT HEALTH QUESTIONNAIRE - PHQ9
SUM OF ALL RESPONSES TO PHQ QUESTIONS 1-9: 16
SUM OF ALL RESPONSES TO PHQ QUESTIONS 1-9: 18

## 2020-10-07 ASSESSMENT — LIFESTYLE VARIABLES
HISTORY_ALCOHOL_USE: NO
HISTORY_ALCOHOL_USE: NO

## 2020-10-07 NOTE — BH NOTE
Pt admission has been completed. Pt was cooperative. Pt appears to be depressed and seems to be overloaded by his life stressors. Pt notes that he \"has things to do, and has money concerns\". Pt has been observed making numerous statements on how he is overloaded with things in life. \"I just want to get out of here\" pr pt. Pt noted to this RN that this is his fourth admission, though not sure if all were here. Moderate encouragement was needed at times as pt seemed to be overloaded by simple requests to sign needed paperwork. Pt can have lack of focus at times. Pt verbalized his house is \"all run down\", but did not go into specifics.

## 2020-10-07 NOTE — ED NOTES
Rowan at [address] [facility] intake, bed control, returned this writer call, informed that they do not have a bed available at this time. Informed that it is ok to accept Pt at our facility for inpatient admission.   Adama Qureshi would like Pt's referral to be faxed to her anyways at [address] [facility] [place] LSW  10/07/20 4212

## 2020-10-07 NOTE — BH NOTE
Pt denies suicidal or homicidal ideations. Pt denies hallucinations. Pt denies hallucinations. Pt cooperative, without distress. Will follow and monitor.

## 2020-10-07 NOTE — CONSULTS
except as noted above. Negative except for as listed above. PHYSICAL EXAM:    Vitals:    Vitals:    10/07/20 1302   BP: 116/68   Pulse: 97   Resp: 16   Temp: 97.1 °F (36.2 °C)   SpO2: 97%       No intake or output data in the 24 hours ending 10/07/20 1815    CONSTITUTIONAL:  awake, alert, cooperative, no apparent distress, and appears stated age  NECK:  Supple, symmetrical, trachea midline, no adenopathy, thyroid symmetric, not enlarged and no tenderness, skin normal  LUNGS:  No increased work of breathing, good air exchange, clear to auscultation bilaterally, no crackles or wheezing  CARDIOVASCULAR:  Normal apical impulse, regular rate and rhythm, normal S1 and S2, no S3 or S4, and no murmur noted  ABDOMEN:  No scars, normal bowel sounds, soft, non-distended, non-tender, no masses palpated, no hepatosplenomegally  MUSCULOSKELETAL:  full range of motion noted  SKIN:  normal skin color, texture, turgor      DATA:    Old records have not been requested    IMPRESSION/RECOMMENDATIONS:     1. Insulin-dependent diabetes mellitus-Lantus 60 units at night, diabetic diet, sliding scale high dose to monitor blood glucose levels  2. Hypertension-on patient's home list he is taking lisinopril, but patient states he has been diet controlled for a while will monitor and if need to add antihypertensive will do so  3. GERDS- prontonix daily, patient does admit to having daily acid reflux so we will continue his PPI  4.  Chronic depression with suicidal thoughts- continues to psychiatry we will continue monitoring till stabilization      Patient Active Problem List   Diagnosis    Severe episode of recurrent major depressive disorder, without psychotic features ([address]    Suicidal ideations    SDH (subdural hematoma) (HCC)    Traumatic subdural hemorrhage with loss of consciousness of 30 minutes or less ([address]    Diabetic acidosis without coma ([address]    Acute kidney injury superimposed on CKD ([address]    Hyponatremia    Right adrenal mass (HCC)    Hyperkalemia    Major depression, recurrent (HCC)    Type 2 diabetes mellitus with complication, with long-term current use of insulin (HCC)    HLD (hyperlipidemia)    HTN (hypertension)    Uncontrolled type 2 diabetes mellitus with hyperglycemia (HCC)    SIRS (systemic inflammatory response syndrome) (HCC)    Weight loss    Generalized abdominal pain    Lactic acidosis    Constipation    High anion gap metabolic acidosis    Non compliance w medication regimen    Physical deconditioning    Dementia, vascular (HCC)    Moderate protein-calorie malnutrition (HCC)    TIA (transient ischemic attack)    DKA, type 1, not at goal [facility])    Ileus ([facility]    Adrenal adenoma, right    Hypertensive urgency    Hiccups    Umbilical hernia without obstruction and without gangrene    GERD (gastroesophageal reflux disease)    Depression    Hypotension    Depression, major, recurrent ([facility]     We thank you, for allowing, [facility], the opportunity to participate in the care of your patient!     Electronically signed by Misael Jacobo MD on 10/7/2020 at [place] [id number]

## 2020-10-07 NOTE — CARE COORDINATION
Sw attempted to contact pt's sister, Gloria Acosta, for collateral once again. Gloria Acosta did not answer. Sw left VM.

## 2020-10-07 NOTE — ED NOTES
SW left message for intake bed control at [facility] to return call regarding referral of this Pt.      Emely Sr, MSW, LSW  10/07/20 2659

## 2020-10-07 NOTE — CARE COORDINATION
Pt was given the option of staying at [facility] for treatment or utilizing his VA benefits and transferring to [facility]. Pt elects to use his insurance and remain here. Jax Barr at the [place] was notified of the same, she will fax over the transfer waiver form. Her number is [phone].

## 2020-10-07 NOTE — CARE COORDINATION
Biopsychosocial Assessment Note    Social work met with patient to complete the biopsychosocial assessment and CSSR-S. Mental Status Exam: Pt alert and oriented x 4. Pt was friendly and cooperative throughout assessment. Affect congruent. Pt's thought process and speech appear to be clear. Chief Complaint: \"pink slip by [facility] due to suicidal threats to suffocate himself with a pillow. Patient reportedly called his son and expressed these thoughts. [facility] were contacted and an involuntary hold was implemented. Patient reports increased depression & suicidal thoughts that have been on-going for \" a while\". Patient states \"I've been going through a lot,\" but patient is not able to elaborate. \"    Patient Report: Pt states previous admissions to psychiatric facilities, but states that his last admission was \"a while ago\". Pt admits to an increase in depression, but minimizes. Pt denies suicide history. Pt currently denying SI/ HI/ Hallucinations/ Delusions. Pt denies substance use. Pt exposed to traumatic events during time in the service ([id number]). Gender  [x] Male [] Female [] Transgender  [] Other    Sexual Orientation    [x] Heterosexual [] Homosexual [] Bisexual [] Other    Suicidal Ideation  [] Reports [x] Denies    Homicidal Ideation  [] Reports [x] Denies      Hallucinations/Delusions (Specify type)  [] Reports [x] Denies     Substance Use/Alcohol Use/Addiction  [] Reports [x] Denies     Trauma History  [] Reports [x] Denies     Collateral Contact (CHRISTIAN signed)  Name: Delisa Bañuelos  Relationship: Sister  Number: [phone]     Collateral Information: Left VM    Access to Weapons: Needs verified    Follow up provider:  Kwan Gallego for discharge (where they live can they return): Home to apartment where he resides alone

## 2020-10-08 LAB
CHOLESTEROL, TOTAL: 114 MG/DL (ref 0–199)
HDLC SERPL-MCNC: 41 MG/DL
LDL CHOLESTEROL CALCULATED: 59 MG/DL (ref 0–99)
METER GLUCOSE: 110 MG/DL (ref 74–99)
METER GLUCOSE: 128 MG/DL (ref 74–99)
METER GLUCOSE: 148 MG/DL (ref 74–99)
METER GLUCOSE: 89 MG/DL (ref 74–99)
TRIGL SERPL-MCNC: 71 MG/DL (ref 0–149)
VLDLC SERPL CALC-MCNC: 14 MG/DL

## 2020-10-08 PROCEDURE — 82962 GLUCOSE BLOOD TEST: CPT

## 2020-10-08 PROCEDURE — 6370000000 HC RX 637 (ALT 250 FOR IP): Performed by: HOSPITALIST

## 2020-10-08 PROCEDURE — 36415 COLL VENOUS BLD VENIPUNCTURE: CPT

## 2020-10-08 PROCEDURE — 99222 1ST HOSP IP/OBS MODERATE 55: CPT | Performed by: NURSE PRACTITIONER

## 2020-10-08 PROCEDURE — 80061 LIPID PANEL: CPT

## 2020-10-08 PROCEDURE — 1240000000 HC EMOTIONAL WELLNESS R&B

## 2020-10-08 RX ORDER — DOCUSATE SODIUM 100 MG/1
100 CAPSULE, LIQUID FILLED ORAL 2 TIMES DAILY
COMMUNITY

## 2020-10-08 RX ORDER — ROSUVASTATIN CALCIUM 10 MG/1
10 TABLET, COATED ORAL DAILY
COMMUNITY

## 2020-10-08 RX ORDER — M-VIT,TX,IRON,MINS/CALC/FOLIC 27MG-0.4MG
1 TABLET ORAL EVERY OTHER DAY
Status: ON HOLD | COMMUNITY
End: 2020-12-09 | Stop reason: DRUGHIGH

## 2020-10-08 RX ORDER — OLANZAPINE 5 MG/1
5 TABLET ORAL NIGHTLY
Status: ON HOLD | COMMUNITY
End: 2020-10-13 | Stop reason: HOSPADM

## 2020-10-08 RX ORDER — INSULIN GLARGINE 100 [IU]/ML
60 INJECTION, SOLUTION SUBCUTANEOUS 2 TIMES DAILY
Status: ON HOLD | COMMUNITY
End: 2020-10-13 | Stop reason: SDUPTHER

## 2020-10-08 RX ORDER — GLIMEPIRIDE 4 MG/1
4 TABLET ORAL
COMMUNITY

## 2020-10-08 RX ORDER — LAMOTRIGINE 25 MG/1
25 TABLET ORAL NIGHTLY
COMMUNITY

## 2020-10-08 RX ADMIN — INSULIN LISPRO 3 UNITS: 100 INJECTION, SOLUTION INTRAVENOUS; SUBCUTANEOUS at 11:50

## 2020-10-08 RX ADMIN — INSULIN GLARGINE 60 UNITS: 100 INJECTION, SOLUTION SUBCUTANEOUS at 21:21

## 2020-10-08 RX ADMIN — VENLAFAXINE HYDROCHLORIDE 75 MG: 75 TABLET ORAL at 09:02

## 2020-10-08 RX ADMIN — PANTOPRAZOLE SODIUM 40 MG: 40 TABLET, DELAYED RELEASE ORAL at 06:16

## 2020-10-08 ASSESSMENT — PAIN SCALES - GENERAL
PAINLEVEL_OUTOF10: 0
PAINLEVEL_OUTOF10: 0

## 2020-10-08 NOTE — PROGRESS NOTES
Group Therapy Note    Date: 10/8/2020  Start Time: 2:00  End Time:  2:45  Number of Participants: 9    Type of Group: Psychotherapy    Wellness Binder Information  Module Name:    Session Number:      Patient's Goal:  Gain isight into  nterpersonal relationships by interacting with others.     Notes:  Pt was able to express strong feelings regaring coping with depression and anxiety    Status After Intervention:  Improved    Participation Level: Interactive    Participation Quality: Attentive and Sharing      Speech:  Normal      Thought Process/Content: Logical      Affective Functioning: Congruent      Mood: anxious and depressed      Level of consciousness:  Oriented x4 and Attentive      Response to Learning: Able to verbalize/acknowledge new learning      Endings: None Reported    Modes of Intervention: Support and Socialization      Discipline Responsible: /Counselor      Signature:  FREDERIC Petit

## 2020-10-08 NOTE — PLAN OF CARE
Problem: Altered Mood, Depressive Behavior:  Goal: Able to verbalize acceptance of life and situations over which he or she has no control  Description: Able to verbalize acceptance of life and situations over which he or she has no control  Outcome: Ongoing     Problem: Altered Mood, Depressive Behavior:  Goal: Able to verbalize and/or display a decrease in depressive symptoms  Description: Able to verbalize and/or display a decrease in depressive symptoms  Outcome: Ongoing     Patient has mostly been in his room. When out on the unit, he keeps to himself. Presents with a sad affect/depressed mood. He states that he just wants to \"go home\". Rates his depression 5/10. Reports being anxious about \"different things\", but would not elaborate. Patient was overheard talking on the phone with his son-his tone was irritable and upset. Denies suicidal/homicidal ideations and hallucinations at this time. Purposeful rounding continued.

## 2020-10-08 NOTE — H&P
Department of Psychiatry  History and Physical - Adult     CHIEF COMPLAINT:  \" I started to have a lot of stressors. \"    Patient was seen after discussing with the treatment team and reviewing the chart    CIRCUMSTANCES OF ADMISSION: Patient presented to the ED [facility] to suffocate himself with a pillow    HISTORY OF PRESENT ILLNESS:      The patient is a 60 y.o. male with significant past history of diabetes mellitus, hypercholesteremia, hypertension, dementia, and depression presented to the ED after [facility] to suffocate himself with a pillow. According the chart patient made suicidal threats to his son who called the [facility] who then went on a pink slip the patient stating that patient threatened to put a pillow over his head and try to suffocate himself because he does not want to live like this. \"  According the son patient also said he was unable to complete that and was looking for another way to do it. According to patient's son his father has not been taking his insulin or psychiatric medications. In the ED patient's urine drug screen was negative his blood alcohol level is negative he was medically cleared admitted [facility] psychiatric unit for further psychiatric assessment stabilization treatment. The chart patient became agitated intermittently swearing requesting to leave according to chart patient seem to not understand the circumstances of  his hospitalization. Upon assessment today patient states that he started feeling suicidal because his had a lot of stress in his life. He states he has transportation issues because he does not drive and is dependent on others for transportation. He also states that he has trouble paying his bills. His thoughts seem delayed. He seems confused at times although he is alert oriented to time place and person.   Patient notes that he has had suicidal ideations \"for a dinner  [DISCONTINUED] ammonium lactate (LAC-HYDRIN) 12 % lotion, Apply topically as needed for Dry Skin (Bilateral Feet) Apply topically as needed. glipiZIDE (GLUCOTROL) 5 MG tablet, Take 1 tablet by mouth 2 times daily  venlafaxine (EFFEXOR XR) 75 MG extended release capsule, Take 1 capsule by mouth daily (with breakfast)  traZODone (DESYREL) 50 MG tablet, Take 1 tablet by mouth nightly  clotrimazole (LOTRIMIN) 1 % external solution, Apply topically 2 times daily Apply topically 2 times daily. lisinopril (PRINIVIL;ZESTRIL) 20 MG tablet, Take 20 mg by mouth 2 times daily  [DISCONTINUED] Dextrose, Diabetic Use, (GLUCOSE) 1 G CHEW, Take 4 g by mouth as needed (Take one For low blood sugar recheck blood sugar in 15 min take another one if blood sugar is still low)    Past Surgical History:        Procedure Laterality Date    EYE SURGERY         Allergies:   Patient has no known allergies. Family History  History reviewed. No pertinent family history. EXAMINATION:    REVIEW OF SYSTEMS:    ROS:  [x] All negative/unchanged except if checked.  Explain positive(checked items) below:  [] Constitutional  [] Eyes  [] Ear/Nose/Mouth/Throat  [] Respiratory  [] CV  [] GI  []   [] Musculoskeletal  [] Skin/Breast  [] Neurological  [] Endocrine  [] Heme/Lymph  [] Allergic/Immunologic    Explanation:     Vitals:  /70   Pulse 83   Temp 98.9 °F (37.2 °C) (Temporal)   Resp 16   Ht 5' 8\" (1.727 m)   Wt 170 lb (77.1 kg)   SpO2 98%   BMI 25.85 kg/m²      Physical Examination:   Head: x  Atraumatic: x normocephalic  Skin and Mucosa        Moist x  Dry   Pale  x Normal   Neck:  Thyroid  Palpable   x  Not palpable   venus distention   adenopathy   Chest: x Clear   Rhonchi     Wheezing   CV:  xS1   xS2    xNo murmer   Abdomen:  x  Soft    Tender    Viceromegaly   Extremities:  x No Edema     Edema     Cranial Nerves Examination:   CN II:   xPupils are reactive to light  Pupils are non reactive to light  CN III, IV, VI:  xNo eye deviation    No diplopia or ptosis   CN V:    xFacial Sensation is intact     Facial Sensation is not intact   CN IIIV:   x Hearing is normal to rubbing fingers   CN IX, X:     xNormal gag reflex and phonation   CN XI:   xShoulder shrug and neck rotation is normal  CNXII:    xTongue is midline no deviation or atrophy    Mental Status Examination:    Level of consciousness:  within normal limits   Appearance:  well-appearing  Behavior/Motor:  no abnormalities noted  Attitude toward examiner:  cooperative  Speech:  slow and increased latency   Mood: depressed and sad  Affect:  mood congruent, blunted and flat  Thought processes:  linear   Thought content: Devoid of any auditory visualizations delusions or perceptual normalities.   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 features  Vascular dementia          LABS: REVIEWED TODAY:  Recent Labs     10/06/20  1821   WBC 6.7   HGB 14.0        Recent Labs     10/06/20  1651 10/06/20  1821   NA  --  135   K  --  4.2   CL  --  97*   CO2  --  25   BUN  --  17   CREATININE  --  1.5*   GLUCOSE 306 300*     Recent Labs     10/06/20  1821   BILITOT 1.3*   ALKPHOS 77   AST 14   ALT 17     Lab Results   Component Value Date    LABAMPH NOT DETECTED 10/07/2020    BARBSCNU NOT DETECTED 10/07/2020    LABBENZ NOT DETECTED 10/07/2020    LABMETH NOT DETECTED 10/07/2020    OPIATESCREENURINE NOT DETECTED 10/07/2020    PHENCYCLIDINESCREENURINE NOT DETECTED 10/07/2020    ETOH <10 10/06/2020     Lab Results   Component Value Date    TSH 0.674 10/06/2020     Lab Results   Component Value Date    LITHIUM <0.10 (L) 02/12/2019     Lab Results   Component Value Date    VALPROATE <3 (L) 02/12/2019     Lab Results   Component Value Date    LITHIUM <0.10 02/12/2019    VALPROATE <3 02/12/2019         Radiology No results found. TREATMENT PLAN:    Risk Management: Based on the diagnosis and assessment biopsychosocial treatment model was presented to the patient and was given the opportunity to ask any question. The patient was agreeable to the plan and all the patient's questions were answered to the patient's satisfaction. I discussed with the patient the risk, benefit, alternative and common side effects for the proposed medication treatment. The patient is consenting to this treatment. Collateral Information:  Will obtain collateral information from the family or friends. Will obtain medical records as appropriate from out patient providers  Will consult the hospitalist for a physical exam to rule out any co-morbid physical condition. Home medication Reconciled   Will need an accurate medication list from the [place]  Patient will need a [place] test  Transferred to [address] once a bed is available    New Medications started during this admission :        Prn Haldol 5mg and Vistaril 50mg q6hr for extreme agitation. Trazodone as ordered for insomnia  Vistaril as ordered for anxiety      Psychotherapy:   Encourage participation in milieu and group therapy  Individual therapy as needed        Behavioral Services  Medicare Certification      Admission Day 1  I certify that this patient's inpatient psychiatric hospital admission is medically necessary for:     (1) treatment which could reasonably be expected to improve this patient's condition, or     (2) diagnostic study or its equivalent.        Electronically signed by MIRA Chu CNP on 74/2/7221 at 11:40 AM

## 2020-10-08 NOTE — CARE COORDINATION
Social Work Note:  Intern spoke with pt's son. Pt's son wanted to look into getting a home health aid and have pt stay with son for a while after D/C. Intern spoke with pt, who is unsure and states he would like to speak with his son before SW does anything further. Pt also states he wants to go back home.    Note by LAURA Otoole Student

## 2020-10-09 LAB
METER GLUCOSE: 163 MG/DL (ref 74–99)
METER GLUCOSE: 164 MG/DL (ref 74–99)
METER GLUCOSE: 168 MG/DL (ref 74–99)
METER GLUCOSE: 75 MG/DL (ref 74–99)

## 2020-10-09 PROCEDURE — 1240000000 HC EMOTIONAL WELLNESS R&B

## 2020-10-09 PROCEDURE — 6370000000 HC RX 637 (ALT 250 FOR IP): Performed by: HOSPITALIST

## 2020-10-09 PROCEDURE — 6370000000 HC RX 637 (ALT 250 FOR IP): Performed by: NURSE PRACTITIONER

## 2020-10-09 PROCEDURE — 82962 GLUCOSE BLOOD TEST: CPT

## 2020-10-09 PROCEDURE — 99231 SBSQ HOSP IP/OBS SF/LOW 25: CPT | Performed by: NURSE PRACTITIONER

## 2020-10-09 RX ORDER — OLANZAPINE 5 MG/1
5 TABLET ORAL NIGHTLY
Status: DISCONTINUED | OUTPATIENT
Start: 2020-10-09 | End: 2020-10-11

## 2020-10-09 RX ORDER — VENLAFAXINE HYDROCHLORIDE 150 MG/1
150 CAPSULE, EXTENDED RELEASE ORAL
Status: DISCONTINUED | OUTPATIENT
Start: 2020-10-10 | End: 2020-10-09

## 2020-10-09 RX ORDER — LAMOTRIGINE 25 MG/1
25 TABLET ORAL NIGHTLY
Status: DISCONTINUED | OUTPATIENT
Start: 2020-10-09 | End: 2020-10-13 | Stop reason: HOSPADM

## 2020-10-09 RX ORDER — VENLAFAXINE HYDROCHLORIDE 75 MG/1
75 CAPSULE, EXTENDED RELEASE ORAL
Status: DISCONTINUED | OUTPATIENT
Start: 2020-10-10 | End: 2020-10-13 | Stop reason: HOSPADM

## 2020-10-09 RX ADMIN — INSULIN LISPRO 3 UNITS: 100 INJECTION, SOLUTION INTRAVENOUS; SUBCUTANEOUS at 16:39

## 2020-10-09 RX ADMIN — PANTOPRAZOLE SODIUM 40 MG: 40 TABLET, DELAYED RELEASE ORAL at 06:24

## 2020-10-09 RX ADMIN — VENLAFAXINE HYDROCHLORIDE 75 MG: 75 TABLET ORAL at 09:26

## 2020-10-09 RX ADMIN — INSULIN GLARGINE 60 UNITS: 100 INJECTION, SOLUTION SUBCUTANEOUS at 21:14

## 2020-10-09 RX ADMIN — LAMOTRIGINE 25 MG: 25 TABLET ORAL at 21:14

## 2020-10-09 RX ADMIN — INSULIN LISPRO 3 UNITS: 100 INJECTION, SOLUTION INTRAVENOUS; SUBCUTANEOUS at 13:02

## 2020-10-09 RX ADMIN — INSULIN LISPRO 2 UNITS: 100 INJECTION, SOLUTION INTRAVENOUS; SUBCUTANEOUS at 21:14

## 2020-10-09 RX ADMIN — OLANZAPINE 5 MG: 5 TABLET, FILM COATED ORAL at 21:14

## 2020-10-09 ASSESSMENT — PAIN SCALES - GENERAL
PAINLEVEL_OUTOF10: 0

## 2020-10-09 NOTE — PLAN OF CARE
Problem: Altered Mood, Depressive Behavior:  Goal: Ability to disclose and discuss suicidal ideas will improve  Description: Ability to disclose and discuss suicidal ideas will improve  Outcome: Met This Shift     Problem: Depressive Behavior With or Without Suicide Precautions:  Goal: Absence of self-harm  Description: Absence of self-harm  Outcome: Met This Shift     Problem: Altered Mood, Depressive Behavior:  Goal: Able to verbalize acceptance of life and situations over which he or she has no control  Description: Able to verbalize acceptance of life and situations over which he or she has no control  Outcome: Ongoing     Problem: Altered Mood, Depressive Behavior:  Goal: Able to verbalize and/or display a decrease in depressive symptoms  Description: Able to verbalize and/or display a decrease in depressive symptoms  Outcome: Ongoing     Patient denies SI/HI/Hallucinations. Patient appears flat, sad, and anxious. Patient blunt and evasive during conversation, eye contact poor. Patient isolative and withdrawn to his room. Patient easily distractible during conversation. Patient complaining of low energy level and poor appetite. Patient anxious to be released from the hospital and return home. Patient taking prescribed medications, eating provided meals, and attending groups.

## 2020-10-09 NOTE — GROUP NOTE
Group Therapy Note    Date: 10/9/2020    Group Start Time: 1000  Group End Time: 2610  Group Topic: Psychoeducation    [facility] [address]        Group Therapy Note    Attendees: 8         Notes: Active and engaged during discussion on building new habits. Able to identify skill to apply today. Status After Intervention:  Improved    Participation Level:  Active Listener and Interactive    Participation Quality: Appropriate, Attentive and Sharing      Speech:  normal      Thought Process/Content: Logical      Affective Functioning: Congruent      Mood: euthymic      Level of consciousness:  Alert and Attentive      Response to Learning: Progressing to goal      Endings: None Reported    Modes of Intervention: Education, Support, Socialization and Exploration      Discipline Responsible: Psychoeducational Specialist      Signature:  Jun Contreras [place]

## 2020-10-09 NOTE — PROGRESS NOTES
Patient has been isolative to his room so far this shift. Patient denies all and denies any anxiety/depression. Patient has poor eye contact and is flat and blunt. Patient is pleasant, calm, and cooperative. Patient is encouraged to come out of his room for snack and patient politely declines. Patient is encouraged to continue to work towards discharge goal by complying with medications, attending groups and to seek staff if feelings are overwhelming. Environmental rounds completed per unit policy to maintain safety of everyone on the unit. Staff will offer support and interventions as requested or required.

## 2020-10-09 NOTE — PROGRESS NOTES
BEHAVIORAL HEALTH FOLLOW-UP NOTE     10/9/2020     Patient was seen and examined in person, Chart reviewed   Patient's case discussed with staff/team    Chief Complaint: \" I am doing all right. \"  Interim History: Patient seen in his room he appears very anxious guarded and paranoid. He offers little conversation to states he is doing okay. \"  He is hesitant in answering questions. He is not command of his room not socializing with peers. Very guarded and isolative. Denies SI/HI intent or plan but endorses depression states that he has [place] lot on my mind. \"  Not offering much conversation. Appetite:   [x] Normal/Unchanged  [] Increased  [] Decreased      Sleep:       [x] Normal/Unchanged  [] Fair       [] Poor              Energy:    [x] Normal/Unchanged  [] Increased  [] Decreased        SI [] Present  [x] Absent    HI  []Present  [x] Absent     Aggression:  [] yes  [x] no    Patient is [x] able  [] unable to CONTRACT FOR SAFETY     PAST MEDICAL/PSYCHIATRIC HISTORY:   Past Medical History:   Diagnosis Date    Anxiety     Bronchitis     Cellulitis     Chronic sinusitis     Depression     [facility] [address]     Diabetic retinopathy [facility] [address]     Fracture of left foot     High cholesterol     Hypercholesteremia     Hypercholesterolemia     Hypertension     Lumbago     Moderate mood disorder ([facility]     Third nerve palsy        FAMILY/SOCIAL HISTORY:  History reviewed. No pertinent family history.   Social History     Socioeconomic History    Marital status:      Spouse name: Not on file    Number of children: Not on file    Years of education: Not on file    Highest education level: Not on file   Occupational History    Not on file   Social Needs    Financial resource strain: Not on file    Food insecurity     Worry: Not on file     Inability: Not on file    Transportation needs     Medical: Not on file     Non-medical: Not on file   Tobacco Use    Smoking status: Never Smoker    Smokeless tobacco: Never Used   Substance and Sexual Activity    Alcohol use: No    Drug use: No    Sexual activity: Not Currently   Lifestyle    Physical activity     Days per week: Not on file     Minutes per session: Not on file    Stress: Not on file   Relationships    Social connections     Talks on phone: Not on file     Gets together: Not on file     Attends Gnosticism service: Not on file     Active member of club or organization: Not on file     Attends meetings of clubs or organizations: Not on file     Relationship status: Not on file    Intimate partner violence     Fear of current or ex partner: Not on file     Emotionally abused: Not on file     Physically abused: Not on file     Forced sexual activity: Not on file   Other Topics Concern    Not on file   Social History Narrative    Not on file           ROS:  [x] All negative/unchanged except if checked.  Explain positive(checked items) below:  [] Constitutional  [] Eyes  [] Ear/Nose/Mouth/Throat  [] Respiratory  [] CV  [] GI  []   [] Musculoskeletal  [] Skin/Breast  [] Neurological  [] Endocrine  [] Heme/Lymph  [] Allergic/Immunologic    Explanation:     MEDICATIONS:    Current Facility-Administered Medications:     [START ON 10/10/2020] venlafaxine (EFFEXOR XR) extended release capsule 75 mg, 75 mg, Oral, Daily with breakfast, MIRA Bauer - CNP    lamoTRIgine (LAMICTAL) tablet 25 mg, 25 mg, Oral, Nightly, MIRA Morin - CNP    OLANZapine (ZYPREXA) tablet 5 mg, 5 mg, Oral, Nightly, Kim Fowler APRN - CNP    acetaminophen (TYLENOL) tablet 650 mg, 650 mg, Oral, Q4H PRN, Salma Domingo MD    haloperidol lactate (HALDOL) injection 3 mg, 3 mg, Intramuscular, Q6H PRN **OR** haloperidol (HALDOL) tablet 3 mg, 3 mg, Oral, Q6H PRN, Salma Domingo MD    traZODone (DESYREL) tablet 25 mg, 25 mg, Oral, Nightly PRN, Salma Domingo MD, 25 mg at 10/07/20 2040    magnesium hydroxide (MILK OF MAGNESIA) 400 MG/5ML suspension 30 mL, 30 mL, Oral, Daily PRN, Federico Le MD    aluminum & magnesium hydroxide-simethicone (MAALOX) 200-200-20 MG/5ML suspension 30 mL, 30 mL, Oral, PRN, Federico eL MD    insulin lispro (HUMALOG) injection vial 0-18 Units, 0-18 Units, Subcutaneous, TID WC, Jaye Bragg MD, 3 Units at 10/09/20 1302    insulin lispro (HUMALOG) injection vial 0-9 Units, 0-9 Units, Subcutaneous, Nightly, Jaye Bragg MD, 6 Units at 10/07/20 2041    venlafaxine [facility]) tablet 75 mg, 75 mg, Oral, Daily, Jaye Bragg MD, 75 mg at 10/09/20 0926    glucose (GLUTOSE) 40 % oral gel 15 g, 15 g, Oral, PRN, Jaye Bragg MD    dextrose 50 % IV solution, 12.5 g, Intravenous, PRN, Jaye Bragg MD    glucagon (rDNA) injection 1 mg, 1 mg, Intramuscular, PRN, Jaye Bragg MD    dextrose 5 % solution, 100 mL/hr, Intravenous, PRN, Jaye Bragg MD    insulin glargine (LANTUS) injection vial 60 Units, 60 Units, Subcutaneous, Nightly, Jaye Bragg MD, 60 Units at 10/08/20 2121    pantoprazole (PROTONIX) tablet 40 mg, 40 mg, Oral, QAM AC, Jaye Bragg MD, 40 mg at 10/09/20 7819      Examination:  BP 98/62   Pulse 90   Temp 98.7 °F (37.1 °C) (Temporal)   Resp 17   Ht 5' 8\" (1.727 m)   Wt 170 lb (77.1 kg)   SpO2 99%   BMI 25.85 kg/m²   Gait - steady  Medication side effects(SE): Denies    Mental Status Examination:    Level of consciousness:  within normal limits   Appearance:  fair grooming and fair hygiene  Behavior/Motor:  no abnormalities noted  Attitude toward examiner:  cooperative  Speech:  slow, increased latency and monotone   Mood: euthymic  Affect: Mood incongruent flat and blunted appears anxious  Thought processes:  linear   Thought content: Reports having a lot of his mind denies SI/HI intent or plan denies auditory visualizations appears guarded and paranoid  Cognition:  oriented to person, place, and time   Concentration poor  Insight impaired  Judgement impaired    ASSESSMENT:   Patient symptoms are:  [] Well controlled  [] Improving  [] Worsening  [x] No change      Diagnosis:   Principal Problem:    Depression, major, recurrent ([facility] [address]  Active Problems:    Dementia, vascular ([facility] [address]  Resolved Problems:    * No resolved hospital problems. *      LABS:    Recent Labs     10/06/20  1821   WBC 6.7   HGB 14.0        Recent Labs     10/06/20  1651 10/06/20  1821   NA  --  135   K  --  4.2   CL  --  97*   CO2  --  25   BUN  --  17   CREATININE  --  1.5*   GLUCOSE 306 300*     Recent Labs     10/06/20  1821   BILITOT 1.3*   ALKPHOS 77   AST 14   ALT 17     Lab Results   Component Value Date    LABAMPH NOT DETECTED 10/07/2020    BARBSCNU NOT DETECTED 10/07/2020    LABBENZ NOT DETECTED 10/07/2020    LABMETH NOT DETECTED 10/07/2020    OPIATESCREENURINE NOT DETECTED 10/07/2020    PHENCYCLIDINESCREENURINE NOT DETECTED 10/07/2020    ETOH <10 10/06/2020     Lab Results   Component Value Date    TSH 0.674 10/06/2020     Lab Results   Component Value Date    LITHIUM <0.10 (L) 02/12/2019     Lab Results   Component Value Date    VALPROATE <3 (L) 02/12/2019       Treatment Plan:  Reviewed current Medications with the patient. Risks, benefits, side effects, drug-to-drug interactions and alternatives to treatment were discussed. Collateral information:   CD evaluation  Encourage patient to attend group and other milieu activities.   Discharge planning discussed with the patient and treatment team.    Patient's home meds from the [facility] were confirmed continue on  Lamictal 25 mg at bedtime  Zyprexa 5 mg at bedtime  Decrease Effexor XR 75 mg daily    PSYCHOTHERAPY/COUNSELING:  [x] Therapeutic interview  [x] Supportive  [] CBT  [] Ongoing  [] Other    [x] Patient continues to need, on a daily basis, active treatment furnished directly by or requiring the supervision of inpatient psychiatric personnel      Anticipated Length of stay: 5 to 7 days            Electronically signed by MIRA Guillaume CNP on 63/3/1503 at 3:41 PM

## 2020-10-09 NOTE — PROGRESS NOTES
[facility]  Day 3 Interdisciplinary Treatment Plan NOTE    Review Date & Time: 10/9/20 0830    Patient was in treatment team    Admission Type:   Admission Type: Involuntary    Reason for admission:  Reason for Admission: \"my son called the [facility], and I mentioned to them that I have been deprressed\" pr pt.   Estimated Length of Stay Update:  3-5 days  Estimated Discharge Date Update: 10/11/20    PATIENT STRENGTHS:  Patient Strengths Strengths: Communication, Connection to output provider, Positive Support, Social Skills, Motivated  Patient Strengths and Limitations:Limitations: Multiple barriers to leisure interests  Addictive Behavior:Addictive Behavior  In the past 3 months, have you felt or has someone told you that you have a problem with:  : None  Do you have a history of Chemical Use?: No  Do you have a history of Alcohol Use?: No  Do you have a history of Street Drug Abuse?: No  Histroy of Prescripton Drug Abuse?: No  Medical Problems:  Past Medical History:   Diagnosis Date    Anxiety     Bronchitis     Cellulitis     Chronic sinusitis     Depression     [facility] [address] [facility]     Diabetic retinopathy ([facility] [address] [facility]     Fracture of left foot     High cholesterol     Hypercholesteremia     Hypercholesterolemia     Hypertension     Lumbago     Moderate mood disorder ([facility] [address] [facility]     Third nerve palsy        Risk:  Fall RiskTotal: 67  Michael Scale Michael Scale Score: 22  BVC Total: 0  Change in scores No. Changes to plan of Care No    Status EXAM:   Status and Exam  Normal: No  Facial Expression: Avoids Gaze, Flat, Sad, Worried  Affect: Blunt  Level of Consciousness: Alert  Mood:Normal: No  Mood: Anxious, Sad  Motor Activity:Normal: No  Motor Activity: Decreased  Interview Behavior: Cooperative, Evasive  Preception: [place] to Person, [place] to Time, [place] to Place, [place] to Situation  Attention:Normal: No  Attention: Distractible  Thought Processes: Other(See comment)(Delayed)  Thought Content:Normal: No  Thought Content: Preoccupations  Hallucinations: None  Delusions: No  Memory:Normal: No  Memory: Poor Recent  Insight and Judgment: No  Insight and Judgment: Poor Judgment, Poor Insight  Present Suicidal Ideation: No  Present Homicidal Ideation: No    Daily Assessment Last Entry:   Daily Sleep (WDL): Within Defined Limits         Patient Currently in Pain: Denies  Daily Nutrition (WDL): Exceptions to WDL  Appetite Change: Decreased  Barriers to Nutrition: Blood sugar control, Cognitive impairment  Level of Assistance: Independent/Self    Patient Monitoring:  Frequency of Checks: 4 times per hour, close    Psychiatric Symptoms:   Depression Symptoms  Depression Symptoms: Isolative, Loss of interest, Change in energy level, Impaired concentration  Anxiety Symptoms  Anxiety Symptoms: Generalized  Charley Symptoms  Charley Symptoms: No problems reported or observed. Psychosis Symptoms  Delusion Type: No problems reported or observed. Suicide Risk CSSR-S:  1) Within the past month, have you wished you were dead or wished you could go to sleep and not wake up? : Yes  2) Have you actually had any thoughts of killing yourself? : Yes  3) Have you been thinking about how you might kill yourself? : No  5) Have you started to work out or worked out the details of how to kill yourself? Do you intend to carry out this plan? : No  6) Have you ever done anything, started to do anything, or prepared to do anything to end your life?: No  Change in Result No Change in Plan of care NO      EDUCATION:   Learner Progress Toward Treatment Goals: Reviewed results and recommendations of this team and Reviewed goals and plan of care    Method: Small group    Outcome: Verbalized understanding    PATIENT GOALS: None at this time    PLAN/TREATMENT RECOMMENDATIONS UPDATE: Take prescribed medications, attend/participate in groups. Continue to provide emotional support to patient.     GOALS UPDATE:   Time frame for Short-Term Goals: Prior to discharge      Kalie Melo RN

## 2020-10-10 LAB
METER GLUCOSE: 110 MG/DL (ref 74–99)
METER GLUCOSE: 129 MG/DL (ref 74–99)
METER GLUCOSE: 178 MG/DL (ref 74–99)
METER GLUCOSE: 71 MG/DL (ref 74–99)

## 2020-10-10 PROCEDURE — 6370000000 HC RX 637 (ALT 250 FOR IP): Performed by: NURSE PRACTITIONER

## 2020-10-10 PROCEDURE — 6370000000 HC RX 637 (ALT 250 FOR IP): Performed by: HOSPITALIST

## 2020-10-10 PROCEDURE — 1240000000 HC EMOTIONAL WELLNESS R&B

## 2020-10-10 PROCEDURE — 82962 GLUCOSE BLOOD TEST: CPT

## 2020-10-10 PROCEDURE — 99231 SBSQ HOSP IP/OBS SF/LOW 25: CPT | Performed by: NURSE PRACTITIONER

## 2020-10-10 RX ADMIN — OLANZAPINE 5 MG: 5 TABLET, FILM COATED ORAL at 20:51

## 2020-10-10 RX ADMIN — PANTOPRAZOLE SODIUM 40 MG: 40 TABLET, DELAYED RELEASE ORAL at 06:49

## 2020-10-10 RX ADMIN — LAMOTRIGINE 25 MG: 25 TABLET ORAL at 20:51

## 2020-10-10 RX ADMIN — INSULIN GLARGINE 60 UNITS: 100 INJECTION, SOLUTION SUBCUTANEOUS at 20:54

## 2020-10-10 RX ADMIN — VENLAFAXINE HYDROCHLORIDE 75 MG: 75 CAPSULE, EXTENDED RELEASE ORAL at 08:47

## 2020-10-10 RX ADMIN — INSULIN LISPRO 3 UNITS: 100 INJECTION, SOLUTION INTRAVENOUS; SUBCUTANEOUS at 12:58

## 2020-10-10 ASSESSMENT — PAIN SCALES - GENERAL
PAINLEVEL_OUTOF10: 0
PAINLEVEL_OUTOF10: 0

## 2020-10-10 NOTE — PLAN OF CARE
Problem: Altered Mood, Depressive Behavior:  Goal: Able to verbalize acceptance of life and situations over which he or she has no control  Description: Able to verbalize acceptance of life and situations over which he or she has no control  Outcome: Ongoing  Goal: Able to verbalize and/or display a decrease in depressive symptoms  Description: Able to verbalize and/or display a decrease in depressive symptoms  Outcome: Ongoing  Goal: Ability to disclose and discuss suicidal ideas will improve  Description: Ability to disclose and discuss suicidal ideas will improve  Outcome: Ongoing  Goal: Able to verbalize support systems  Description: Able to verbalize support systems  Outcome: Ongoing     Problem: Depressive Behavior With or Without Suicide Precautions:  Goal: Able to verbalize acceptance of life and situations over which he or she has no control  Description: Able to verbalize acceptance of life and situations over which he or she has no control  Outcome: Ongoing  Goal: Able to verbalize and/or display a decrease in depressive symptoms  Description: Able to verbalize and/or display a decrease in depressive symptoms  Outcome: Ongoing  Goal: Ability to disclose and discuss suicidal ideas will improve  Description: Ability to disclose and discuss suicidal ideas will improve  Outcome: Ongoing  Goal: Able to verbalize support systems  Description: Able to verbalize support systems  Outcome: Ongoing  Goal: Absence of self-harm  Description: Absence of self-harm  Outcome: Ongoing  Goal: Patient specific goal  Description: Patient specific goal  Outcome: Ongoing

## 2020-10-10 NOTE — PLAN OF CARE
Problem: Altered Mood, Depressive Behavior:  Goal: Able to verbalize and/or display a decrease in depressive symptoms  Description: Able to verbalize and/or display a decrease in depressive symptoms  10/9/2020 2153 by Alejandra Hayes RN  Outcome: Met This Shift     Problem: Altered Mood, Depressive Behavior:  Goal: Ability to disclose and discuss suicidal ideas will improve  Description: Ability to disclose and discuss suicidal ideas will improve  10/9/2020 2153 by Alejandra Hayes RN  Outcome: Met This Shift     Problem: Depressive Behavior With or Without Suicide Precautions:  Goal: Able to verbalize acceptance of life and situations over which he or she has no control  Description: Able to verbalize acceptance of life and situations over which he or she has no control  Outcome: Not Met This Shift

## 2020-10-10 NOTE — PROGRESS NOTES
BEHAVIORAL HEALTH FOLLOW-UP NOTE     10/10/2020     Patient was seen and examined in person, Chart reviewed   Patient's case discussed with staff/team    Chief Complaint: \" I am doing all right. \"  Interim History: Patient seen in his room he appears very anxious guarded and paranoid. He offers little conversation to states he is doing okay. \"  He is hesitant in answering questions. He is not command of his room not socializing with peers. Very guarded and isolative. Denies SI/HI intent or plan but endorses depression states that he has [place] lot on my mind. \"  Not offering much conversation. Appetite:   [x] Normal/Unchanged  [] Increased  [] Decreased      Sleep:       [x] Normal/Unchanged  [] Fair       [] Poor              Energy:    [x] Normal/Unchanged  [] Increased  [] Decreased        SI [] Present  [x] Absent    HI  []Present  [x] Absent     Aggression:  [] yes  [x] no    Patient is [x] able  [] unable to CONTRACT FOR SAFETY     PAST MEDICAL/PSYCHIATRIC HISTORY:   Past Medical History:   Diagnosis Date    Anxiety     Bronchitis     Cellulitis     Chronic sinusitis     Depression     [facility] [facility]     Diabetic retinopathy [facility] [facility]     Fracture of left foot     High cholesterol     Hypercholesteremia     Hypercholesterolemia     Hypertension     Lumbago     Moderate mood disorder ([facility] [address] [facility]     Third nerve palsy        FAMILY/SOCIAL HISTORY:  History reviewed. No pertinent family history.   Social History     Socioeconomic History    Marital status:      Spouse name: Not on file    Number of children: Not on file    Years of education: Not on file    Highest education level: Not on file   Occupational History    Not on file   Social Needs    Financial resource strain: Not on file    Food insecurity     Worry: Not on file     Inability: Not on file    Transportation needs     Medical: Not on file     Non-medical: Not on file   Tobacco Use    Smoking status: Never Smoker    hydroxide (MILK OF MAGNESIA) 400 MG/5ML suspension 30 mL, 30 mL, Oral, Daily PRN, Deshaun Delarosa MD    aluminum & magnesium hydroxide-simethicone (MAALOX) 200-200-20 MG/5ML suspension 30 mL, 30 mL, Oral, PRN, Deshaun Delarosa MD    insulin lispro (HUMALOG) injection vial 0-18 Units, 0-18 Units, Subcutaneous, TID WC, Parisa Rosario MD, 3 Units at 10/10/20 1258    insulin lispro (HUMALOG) injection vial 0-9 Units, 0-9 Units, Subcutaneous, Nightly, Parisa Rosario MD, 2 Units at 10/09/20 2114    glucose (GLUTOSE) 40 % oral gel 15 g, 15 g, Oral, PRN, Parisa Rosario MD    dextrose 50 % IV solution, 12.5 g, Intravenous, PRN, Parisa Rosario MD    glucagon (rDNA) injection 1 mg, 1 mg, Intramuscular, PRN, Parisa Rosario MD    dextrose 5 % solution, 100 mL/hr, Intravenous, PRN, Parisa Rosario MD    insulin glargine (LANTUS) injection vial 60 Units, 60 Units, Subcutaneous, Nightly, Parisa Rosario MD, 60 Units at 10/09/20 2114    pantoprazole (PROTONIX) tablet 40 mg, 40 mg, Oral, QAM AC, Parisa Rosario MD, 40 mg at 10/10/20 7221      Examination:  BP 98/67   Pulse 93   Temp 97.5 °F (36.4 °C) (Temporal)   Resp 16   Ht 5' 8\" (1.727 m)   Wt 170 lb (77.1 kg)   SpO2 98%   BMI 25.85 kg/m²   Gait - steady  Medication side effects(SE): Denies    Mental Status Examination:    Level of consciousness:  within normal limits   Appearance:  fair grooming and fair hygiene  Behavior/Motor:  no abnormalities noted  Attitude toward examiner:  cooperative  Speech:  slow, increased latency and monotone   Mood: euthymic  Affect: Mood incongruent flat and blunted appears anxious  Thought processes:  linear   Thought content: Reports having a lot of his mind denies SI/HI intent or plan denies auditory visualizations appears guarded and paranoid  Cognition:  oriented to person, place, and time   Concentration poor  Insight impaired  Judgement impaired    ASSESSMENT:   Patient symptoms are:  [] Well controlled  [] Improving  [] Worsening  [x] No change      Diagnosis:   Principal Problem:    Depression, major, recurrent (HCC)  Active Problems:    Dementia, vascular ([facility] [address]  Resolved Problems:    * No resolved hospital problems. *      LABS:    No results for input(s): WBC, HGB, PLT in the last 72 hours. No results for input(s): NA, K, CL, CO2, BUN, CREATININE, GLUCOSE in the last 72 hours. No results for input(s): BILITOT, ALKPHOS, AST, ALT in the last 72 hours. Lab Results   Component Value Date    LABAMPH NOT DETECTED 10/07/2020    BARBSCNU NOT DETECTED 10/07/2020    LABBENZ NOT DETECTED 10/07/2020    LABMETH NOT DETECTED 10/07/2020    OPIATESCREENURINE NOT DETECTED 10/07/2020    PHENCYCLIDINESCREENURINE NOT DETECTED 10/07/2020    ETOH <10 10/06/2020     Lab Results   Component Value Date    TSH 0.674 10/06/2020     Lab Results   Component Value Date    LITHIUM <0.10 (L) 02/12/2019     Lab Results   Component Value Date    VALPROATE <3 (L) 02/12/2019       Treatment Plan:  Reviewed current Medications with the patient. Risks, benefits, side effects, drug-to-drug interactions and alternatives to treatment were discussed. Collateral information:   CD evaluation  Encourage patient to attend group and other milieu activities.   Discharge planning discussed with the patient and treatment team.    Patient's home meds from the [facility] were confirmed continue on  Lamictal 25 mg at bedtime  Zyprexa 5 mg at bedtime  Decrease Effexor XR 75 mg daily    PSYCHOTHERAPY/COUNSELING:  [x] Therapeutic interview  [x] Supportive  [] CBT  [] Ongoing  [] Other    [x] Patient continues to need, on a daily basis, active treatment furnished directly by or requiring the supervision of inpatient psychiatric personnel      Anticipated Length of stay: 5 to 7 days            Electronically signed by MIRA Jarvis CNP on 87/63/9789 at 1:08 PM

## 2020-10-10 NOTE — PROGRESS NOTES
Patient is flat, sad, and depressed, isolates to his room, was encouraged to attend groups and join milieu. Patient responses are delayed, patient is compliant with medications and appetite is improving. Patient denies thoughts to harm self or others, denies hallucinations.  Emotional support given

## 2020-10-10 NOTE — PROGRESS NOTES
Patient has been isolative to his room so far this shift. Patient denies all and denies any anxiety/depression. Patient states that \"I'm ok. \"  Patient has poor eye contact and his affect is blunt and flat. Patient is encouraged to continue to work towards discharge goal by complying with medications, attending groups and to seek staff if feelings are overwhelming. Environmental rounds completed per unit policy to maintain safety of everyone on the unit. Staff will offer support and interventions as requested or required.

## 2020-10-10 NOTE — PROGRESS NOTES
Nutrition Assessment     Type and Reason for Visit: Initial, Consult    Nutrition Recommendations/Plan: Continue current diet, Start Glucerna TID     Nutrition Assessment:  Pt nutritionally at risk w/ decreased PO intake 2/2 major depression w/ SI. Will provide ONS and follow per consult    Malnutrition Assessment:  Malnutrition Status: At risk for malnutrition    Nutrition Related Findings: pt alert, abd WDL, no noted edema, fluids WNL      Current Nutrition Therapies:    DIET CARB CONTROL; Anthropometric Measures:  · Height: 5' 8\" (172.7 cm)  · Current Body Wt: 170 lb (77.1 kg)(stated)   · BMI: 25.9    Nutrition Diagnosis:   No nutrition diagnosis at this time    Nutrition Interventions:   Food and/or Nutrient Delivery:  Continue Current Diet, Start Oral Nutrition Supplement(Glucerna TID)  Nutrition Education/Counseling:  Education not indicated   Coordination of Nutrition Care:  Continued Inpatient Monitoring    Goals:  Consume >75% meals/ONS       Nutrition Monitoring and Evaluation:   Food/Nutrient Intake Outcomes:  Food and Nutrient Intake, Supplement Intake  Physical Signs/Symptoms Outcomes:  Biochemical Data, GI Status, Fluid Status or Edema, Nutrition Focused Physical Findings, Skin, Weight     Discharge Planning:     Too soon to determine     Electronically signed by Konstantin Estrada, MS, RD, LD on 10/10/20 at 9:31 AM EDT    Contact: [phone]

## 2020-10-11 LAB
METER GLUCOSE: 104 MG/DL (ref 74–99)
METER GLUCOSE: 112 MG/DL (ref 74–99)
METER GLUCOSE: 160 MG/DL (ref 74–99)
METER GLUCOSE: 240 MG/DL (ref 74–99)
METER GLUCOSE: 57 MG/DL (ref 74–99)

## 2020-10-11 PROCEDURE — 6370000000 HC RX 637 (ALT 250 FOR IP): Performed by: HOSPITALIST

## 2020-10-11 PROCEDURE — 99231 SBSQ HOSP IP/OBS SF/LOW 25: CPT | Performed by: NURSE PRACTITIONER

## 2020-10-11 PROCEDURE — 82962 GLUCOSE BLOOD TEST: CPT

## 2020-10-11 PROCEDURE — 1240000000 HC EMOTIONAL WELLNESS R&B

## 2020-10-11 PROCEDURE — 6370000000 HC RX 637 (ALT 250 FOR IP): Performed by: NURSE PRACTITIONER

## 2020-10-11 RX ORDER — OLANZAPINE 2.5 MG/1
2.5 TABLET ORAL DAILY
Status: DISCONTINUED | OUTPATIENT
Start: 2020-10-11 | End: 2020-10-11

## 2020-10-11 RX ADMIN — INSULIN LISPRO 6 UNITS: 100 INJECTION, SOLUTION INTRAVENOUS; SUBCUTANEOUS at 13:09

## 2020-10-11 RX ADMIN — LAMOTRIGINE 25 MG: 25 TABLET ORAL at 20:52

## 2020-10-11 RX ADMIN — VENLAFAXINE HYDROCHLORIDE 75 MG: 75 CAPSULE, EXTENDED RELEASE ORAL at 09:39

## 2020-10-11 RX ADMIN — Medication 15 G: at 08:43

## 2020-10-11 RX ADMIN — OLANZAPINE 7.5 MG: 5 TABLET, FILM COATED ORAL at 20:51

## 2020-10-11 RX ADMIN — PANTOPRAZOLE SODIUM 40 MG: 40 TABLET, DELAYED RELEASE ORAL at 09:39

## 2020-10-11 RX ADMIN — INSULIN LISPRO 3 UNITS: 100 INJECTION, SOLUTION INTRAVENOUS; SUBCUTANEOUS at 16:37

## 2020-10-11 ASSESSMENT — PAIN SCALES - GENERAL
PAINLEVEL_OUTOF10: 0
PAINLEVEL_OUTOF10: 0

## 2020-10-11 NOTE — PLAN OF CARE
Pt denies SI/HI and hallucinations When asked about anxiety and depression pt stated I feel better today. Pt did not offer much conversation during assessment. Pt is flat, anxious and guarded. Pt is out and about unit and socializes with select peers. Pt eating provided meals. Pt taking prescribed medication. Will continue to monitor pt and provided support as needed.           Problem: Altered Mood, Depressive Behavior:  Goal: Able to verbalize and/or display a decrease in depressive symptoms  Description: Able to verbalize and/or display a decrease in depressive symptoms  Outcome: Met This Shift     Problem: Depressive Behavior With or Without Suicide Precautions:  Goal: Ability to disclose and discuss suicidal ideas will improve  Description: Ability to disclose and discuss suicidal ideas will improve  Outcome: Met This Shift     Problem: Depressive Behavior With or Without Suicide Precautions:  Goal: Absence of self-harm  Description: Absence of self-harm  Outcome: Met This Shift

## 2020-10-11 NOTE — PROGRESS NOTES
Pt in bed resting with eyes closed. No prn's administered at this time. No signs or symptoms of discomfort or distress noted. Will continue to monitor and observe.  Q 15 minute checks maintained per unit

## 2020-10-11 NOTE — PROGRESS NOTES
BEHAVIORAL HEALTH FOLLOW-UP NOTE     10/11/2020     Patient was seen and examined in person, Chart reviewed   Patient's case discussed with staff/team    Chief Complaint: \" I am doing all right, I am trying to enjoy the football game. \"    Interim History: Patient up on the unit still appears very guarded and paranoid. He states he is trying to enjoy the football game. He offers little conversation to states he is doing okay. \"  He is hesitant in answering questions. He denies auditory visualizations but he appears internally stimulated. Staff reports he was staring at the wall for over 4 hours in his room yesterday. Although he is out of his room today watching TV he is still not socializing with peers. Very guarded and isolative. Denies SI/HI intent or plan but endorses depression states that he has [place] lot on my mind. \"  Not offering much conversation. Appetite:   [x] Normal/Unchanged  [] Increased  [] Decreased      Sleep:       [x] Normal/Unchanged  [] Fair       [] Poor              Energy:    [x] Normal/Unchanged  [] Increased  [] Decreased        SI [] Present  [x] Absent    HI  []Present  [x] Absent     Aggression:  [] yes  [x] no    Patient is [x] able  [] unable to CONTRACT FOR SAFETY     PAST MEDICAL/PSYCHIATRIC HISTORY:   Past Medical History:   Diagnosis Date    Anxiety     Bronchitis     Cellulitis     Chronic sinusitis     Depression     Diabetes mellitus ([address]     Diabetic retinopathy ([address]     Fracture of left foot     High cholesterol     Hypercholesteremia     Hypercholesterolemia     Hypertension     Lumbago     Moderate mood disorder ([address]     Third nerve palsy        FAMILY/SOCIAL HISTORY:  History reviewed. No pertinent family history.   Social History     Socioeconomic History    Marital status:      Spouse name: Not on file    Number of children: Not on file    Years of education: Not on file    Highest education level: Not on file   Occupational History  Not on file   Social Needs    Financial resource strain: Not on file    Food insecurity     Worry: Not on file     Inability: Not on file    Transportation needs     Medical: Not on file     Non-medical: Not on file   Tobacco Use    Smoking status: Never Smoker    Smokeless tobacco: Never Used   Substance and Sexual Activity    Alcohol use: No    Drug use: No    Sexual activity: Not Currently   Lifestyle    Physical activity     Days per week: Not on file     Minutes per session: Not on file    Stress: Not on file   Relationships    Social connections     Talks on phone: Not on file     Gets together: Not on file     Attends Synagogue service: Not on file     Active member of club or organization: Not on file     Attends meetings of clubs or organizations: Not on file     Relationship status: Not on file    Intimate partner violence     Fear of current or ex partner: Not on file     Emotionally abused: Not on file     Physically abused: Not on file     Forced sexual activity: Not on file   Other Topics Concern    Not on file   Social History Narrative    Not on file           ROS:  [x] All negative/unchanged except if checked.  Explain positive(checked items) below:  [] Constitutional  [] Eyes  [] Ear/Nose/Mouth/Throat  [] Respiratory  [] CV  [] GI  []   [] Musculoskeletal  [] Skin/Breast  [] Neurological  [] Endocrine  [] Heme/Lymph  [] Allergic/Immunologic    Explanation:     MEDICATIONS:    Current Facility-Administered Medications:     OLANZapine (ZYPREXA) tablet 2.5 mg, 2.5 mg, Oral, Daily, MIRA Morin CNP    lamoTRIgine (LAMICTAL) tablet 25 mg, 25 mg, Oral, Nightly, MIRA Villalobos CNP, 25 mg at 10/10/20 2051    OLANZapine (ZYPREXA) tablet 5 mg, 5 mg, Oral, Nightly, MIRA Villalobos CNP, 5 mg at 10/10/20 2051    venlafaxine (EFFEXOR XR) extended release capsule 75 mg, 75 mg, Oral, Daily with breakfast, MIRA Ferreira CNP, 75 mg at 10/11/20 0939   flat and blunted appears anxious  Thought processes:  linear   Thought content: Reports having a lot of his mind denies SI/HI intent or plan denies auditory visualizations appears guarded and paranoid  Cognition:  oriented to person, place, and time   Concentration poor  Insight impaired  Judgement impaired    ASSESSMENT:   Patient symptoms are:  [] Well controlled  [] Improving  [] Worsening  [x] No change      Diagnosis:   Principal Problem:    Depression, major, recurrent ([address]  Active Problems:    Dementia, vascular ([address]  Resolved Problems:    * No resolved hospital problems. *      LABS:    No results for input(s): WBC, HGB, PLT in the last 72 hours. No results for input(s): NA, K, CL, CO2, BUN, CREATININE, GLUCOSE in the last 72 hours. No results for input(s): BILITOT, ALKPHOS, AST, ALT in the last 72 hours. Lab Results   Component Value Date    LABAMPH NOT DETECTED 10/07/2020    BARBSCNU NOT DETECTED 10/07/2020    LABBENZ NOT DETECTED 10/07/2020    LABMETH NOT DETECTED 10/07/2020    OPIATESCREENURINE NOT DETECTED 10/07/2020    PHENCYCLIDINESCREENURINE NOT DETECTED 10/07/2020    ETOH <10 10/06/2020     Lab Results   Component Value Date    TSH 0.674 10/06/2020     Lab Results   Component Value Date    LITHIUM <0.10 (L) 02/12/2019     Lab Results   Component Value Date    VALPROATE <3 (L) 02/12/2019       Treatment Plan:  Reviewed current Medications with the patient. Risks, benefits, side effects, drug-to-drug interactions and alternatives to treatment were discussed. Collateral information:   CD evaluation  Encourage patient to attend group and other milieu activities.   Discharge planning discussed with the patient and treatment team.    Patient's home meds from the [address] were confirmed continue on  Lamictal 25 mg at bedtime  Increase Zyprexa 7.5 mg at bedtime  Decrease Effexor XR 75 mg daily    PSYCHOTHERAPY/COUNSELING:  [x] Therapeutic interview  [x] Supportive  [] CBT  [] Ongoing  [] Other    [x] Patient continues to need, on a daily basis, active treatment furnished directly by or requiring the supervision of inpatient psychiatric personnel      Anticipated Length of stay: 5 to 7 days            Electronically signed by MIRA Hilliard CNP on 26/69/0692 at 5:15 PM

## 2020-10-12 LAB
METER GLUCOSE: 145 MG/DL (ref 74–99)
METER GLUCOSE: 186 MG/DL (ref 74–99)
METER GLUCOSE: 202 MG/DL (ref 74–99)
METER GLUCOSE: 219 MG/DL (ref 74–99)
METER GLUCOSE: 220 MG/DL (ref 74–99)

## 2020-10-12 PROCEDURE — 1240000000 HC EMOTIONAL WELLNESS R&B

## 2020-10-12 PROCEDURE — 82962 GLUCOSE BLOOD TEST: CPT

## 2020-10-12 PROCEDURE — 99231 SBSQ HOSP IP/OBS SF/LOW 25: CPT | Performed by: NURSE PRACTITIONER

## 2020-10-12 PROCEDURE — 6370000000 HC RX 637 (ALT 250 FOR IP): Performed by: NURSE PRACTITIONER

## 2020-10-12 PROCEDURE — 6370000000 HC RX 637 (ALT 250 FOR IP): Performed by: HOSPITALIST

## 2020-10-12 RX ADMIN — INSULIN LISPRO 6 UNITS: 100 INJECTION, SOLUTION INTRAVENOUS; SUBCUTANEOUS at 17:22

## 2020-10-12 RX ADMIN — INSULIN GLARGINE 60 UNITS: 100 INJECTION, SOLUTION SUBCUTANEOUS at 21:19

## 2020-10-12 RX ADMIN — LAMOTRIGINE 25 MG: 25 TABLET ORAL at 20:56

## 2020-10-12 RX ADMIN — INSULIN LISPRO 3 UNITS: 100 INJECTION, SOLUTION INTRAVENOUS; SUBCUTANEOUS at 21:19

## 2020-10-12 RX ADMIN — VENLAFAXINE HYDROCHLORIDE 75 MG: 75 CAPSULE, EXTENDED RELEASE ORAL at 09:41

## 2020-10-12 RX ADMIN — INSULIN LISPRO 6 UNITS: 100 INJECTION, SOLUTION INTRAVENOUS; SUBCUTANEOUS at 12:55

## 2020-10-12 RX ADMIN — OLANZAPINE 7.5 MG: 5 TABLET, FILM COATED ORAL at 20:56

## 2020-10-12 RX ADMIN — PANTOPRAZOLE SODIUM 40 MG: 40 TABLET, DELAYED RELEASE ORAL at 06:39

## 2020-10-12 RX ADMIN — INSULIN LISPRO 3 UNITS: 100 INJECTION, SOLUTION INTRAVENOUS; SUBCUTANEOUS at 09:41

## 2020-10-12 ASSESSMENT — PAIN SCALES - GENERAL
PAINLEVEL_OUTOF10: 0

## 2020-10-12 NOTE — PLAN OF CARE
Problem: Altered Mood, Depressive Behavior:  Goal: Able to verbalize and/or display a decrease in depressive symptoms  Description: Able to verbalize and/or display a decrease in depressive symptoms  10/11/2020 2125 by Marie Rose RN  Outcome: Met This Shift     Problem: Altered Mood, Depressive Behavior:  Goal: Ability to disclose and discuss suicidal ideas will improve  Description: Ability to disclose and discuss suicidal ideas will improve  Outcome: Met This Shift     Problem: Altered Mood, Depressive Behavior:  Goal: Able to verbalize acceptance of life and situations over which he or she has no control  Description: Able to verbalize acceptance of life and situations over which he or she has no control  Outcome: Not Met This Shift

## 2020-10-12 NOTE — PLAN OF CARE
Pt denied SI/HI & AV hallucinations. Pt is soft spoken, cooperative & blunted upon interview. Pt did not really report anxiety, only depression which he did not rate. Pt is med compliant and attending groups. Will continue to monitor.    Problem: Altered Mood, Depressive Behavior:  Goal: Ability to disclose and discuss suicidal ideas will improve  Description: Ability to disclose and discuss suicidal ideas will improve  10/12/2020 1032 by Ariane Donahue RN  Outcome: Met This Shift     Problem: Altered Mood, Depressive Behavior:  Goal: Able to verbalize support systems  Description: Able to verbalize support systems  Outcome: Met This Shift

## 2020-10-12 NOTE — PROGRESS NOTES
Group Therapy Note  Patient attended goals group and stated daily goal as to cont. to get better. Group Therapy Note    Date: 10/12/2020  Start Time:10:00  End Time:  10:30  Number of Participants: 11    Type of Group: Psychoeducation    Wellness Binder Information  Module Name: Self-care  Session Number:  NA    Patient's Goal: To id positive ways to take care of oneself to improve wellness. Notes: Attended group and was able to participate. Status After Intervention:  Unchanged    Participation Level:  Active Listener    Participation Quality: Attentive      Speech:  hesitant      Thought Process/Content: Linear      Affective Functioning: Flat      Mood: depressed      Level of consciousness:  Alert      Response to Learning: Progressing to goal      Endings: None Reported    Modes of Intervention: Education      Discipline Responsible: Psychoeducational Specialist      Signature:  INDY Fraser

## 2020-10-12 NOTE — PLAN OF CARE
Pt resting in bed with eyes closed, no observed abnormalities. Close observations continue.    Problem: Depressive Behavior With or Without Suicide Precautions:  Goal: Absence of self-harm  Description: Absence of self-harm  Outcome: Met This Shift

## 2020-10-12 NOTE — PROGRESS NOTES
BEHAVIORAL HEALTH FOLLOW-UP NOTE     10/12/2020     Patient was seen and examined in person, Chart reviewed   Patient's case discussed with staff/team    Chief Complaint: \" I am ready to go home. \"    Interim History: Patient seen in his room. He appears less guarded. He states he is feeling better and is ready to go home. He denies SI/HI intent or plan denies auditory visualizations and been observed on the milieu more attending groups social slight peers. He states he is eating well and sleeping well no neurovegetative signs of depression. He appears less paranoid. More relaxed. Appetite:   [x] Normal/Unchanged  [] Increased  [] Decreased      Sleep:       [x] Normal/Unchanged  [] Fair       [] Poor              Energy:    [x] Normal/Unchanged  [] Increased  [] Decreased        SI [] Present  [x] Absent    HI  []Present  [x] Absent     Aggression:  [] yes  [x] no    Patient is [x] able  [] unable to CONTRACT FOR SAFETY     PAST MEDICAL/PSYCHIATRIC HISTORY:   Past Medical History:   Diagnosis Date    Anxiety     Bronchitis     Cellulitis     Chronic sinusitis     Depression     [facility]     Diabetic retinopathy ([facility] [facility]     Fracture of left foot     High cholesterol     Hypercholesteremia     Hypercholesterolemia     Hypertension     Lumbago     Moderate mood disorder ([facility]     Third nerve palsy        FAMILY/SOCIAL HISTORY:  History reviewed. No pertinent family history.   Social History     Socioeconomic History    Marital status:      Spouse name: Not on file    Number of children: Not on file    Years of education: Not on file    Highest education level: Not on file   Occupational History    Not on file   Social Needs    Financial resource strain: Not on file    Food insecurity     Worry: Not on file     Inability: Not on file    Transportation needs     Medical: Not on file     Non-medical: Not on file   Tobacco Use    Smoking status: Never Smoker    Smokeless tobacco: Never Used   Substance and Sexual Activity    Alcohol use: No    Drug use: No    Sexual activity: Not Currently   Lifestyle    Physical activity     Days per week: Not on file     Minutes per session: Not on file    Stress: Not on file   Relationships    Social connections     Talks on phone: Not on file     Gets together: Not on file     Attends Scientologist service: Not on file     Active member of club or organization: Not on file     Attends meetings of clubs or organizations: Not on file     Relationship status: Not on file    Intimate partner violence     Fear of current or ex partner: Not on file     Emotionally abused: Not on file     Physically abused: Not on file     Forced sexual activity: Not on file   Other Topics Concern    Not on file   Social History Narrative    Not on file           ROS:  [x] All negative/unchanged except if checked.  Explain positive(checked items) below:  [] Constitutional  [] Eyes  [] Ear/Nose/Mouth/Throat  [] Respiratory  [] CV  [] GI  []   [] Musculoskeletal  [] Skin/Breast  [] Neurological  [] Endocrine  [] Heme/Lymph  [] Allergic/Immunologic    Explanation:     MEDICATIONS:    Current Facility-Administered Medications:     OLANZapine (ZYPREXA) tablet 7.5 mg, 7.5 mg, Oral, Nightly, Sergio Laurel Dellick, APRN - CNP, 7.5 mg at 10/11/20 2051    lamoTRIgine (LAMICTAL) tablet 25 mg, 25 mg, Oral, Nightly, Sergio Laurel Dellick, APRN - CNP, 25 mg at 10/11/20 2052    venlafaxine (EFFEXOR XR) extended release capsule 75 mg, 75 mg, Oral, Daily with breakfast, Sergio Laurel DellicInterfolio, APRN - CNP, 75 mg at 10/12/20 0941    acetaminophen (TYLENOL) tablet 650 mg, 650 mg, Oral, Q4H PRN, Leeanna Whittington MD    haloperidol lactate (HALDOL) injection 3 mg, 3 mg, Intramuscular, Q6H PRN **OR** haloperidol (HALDOL) tablet 3 mg, 3 mg, Oral, Q6H PRN, Leeanna Whittington MD    traZODone (DESYREL) tablet 25 mg, 25 mg, Oral, Nightly PRN, Leeanna Whittington MD, 25 mg at 10/07/20 2040   magnesium hydroxide (MILK OF MAGNESIA) 400 MG/5ML suspension 30 mL, 30 mL, Oral, Daily PRN, Terri Greer MD    aluminum & magnesium hydroxide-simethicone (MAALOX) 200-200-20 MG/5ML suspension 30 mL, 30 mL, Oral, PRN, Terri Greer MD    insulin lispro (HUMALOG) injection vial 0-18 Units, 0-18 Units, Subcutaneous, TID WC, Tracy Fitzpatrick MD, 6 Units at 10/12/20 1255    insulin lispro (HUMALOG) injection vial 0-9 Units, 0-9 Units, Subcutaneous, Nightly, Tracy Fitzpatrick MD, 2 Units at 10/09/20 2114    glucose (GLUTOSE) 40 % oral gel 15 g, 15 g, Oral, PRN, Tracy Fitzpatrick MD, 15 g at 10/11/20 0843    dextrose 50 % IV solution, 12.5 g, Intravenous, PRN, Tracy Fitzpatrick MD    glucagon (rDNA) injection 1 mg, 1 mg, Intramuscular, PRN, Tracy Fitzpatrick MD    dextrose 5 % solution, 100 mL/hr, Intravenous, PRN, Tracy Fitzpatrick MD    insulin glargine (LANTUS) injection vial 60 Units, 60 Units, Subcutaneous, Nightly, Tracy Fitzpatrick MD, 60 Units at 10/10/20 2054    pantoprazole (PROTONIX) tablet 40 mg, 40 mg, Oral, QAM AC, Tracy Fitzpatrick MD, 40 mg at 10/12/20 8131      Examination:  /78   Pulse 78   Temp 97.5 °F (36.4 °C) (Temporal)   Resp 16   Ht 5' 8\" (1.727 m)   Wt 170 lb (77.1 kg)   SpO2 98%   BMI 25.85 kg/m²   Gait - steady  Medication side effects(SE): Denies    Mental Status Examination:    Level of consciousness:  within normal limits   Appearance:  fair grooming and fair hygiene  Behavior/Motor:  no abnormalities noted  Attitude toward examiner:  cooperative  Speech:  slow, increased latency and monotone   Mood: euthymic  Affect: Still flat but brightening  Thought processes:  linear   Thought content: Denies SI/HI intent or plan denies auditory visualizations a delusions or other perceptual abnormalities  Cognition:  oriented to person, place, and time   Concentration poor  Insight impaired  Judgement impaired    ASSESSMENT:   Patient symptoms are:  [] Well controlled  [] Improving  [] Worsening  [x] No change      Diagnosis:   Principal Problem:    Depression, major, recurrent (HCC)  Active Problems:    Dementia, vascular ([facility]  Resolved Problems:    * No resolved hospital problems. *      LABS:    No results for input(s): WBC, HGB, PLT in the last 72 hours. No results for input(s): NA, K, CL, CO2, BUN, CREATININE, GLUCOSE in the last 72 hours. No results for input(s): BILITOT, ALKPHOS, AST, ALT in the last 72 hours. Lab Results   Component Value Date    LABAMPH NOT DETECTED 10/07/2020    BARBSCNU NOT DETECTED 10/07/2020    LABBENZ NOT DETECTED 10/07/2020    LABMETH NOT DETECTED 10/07/2020    OPIATESCREENURINE NOT DETECTED 10/07/2020    PHENCYCLIDINESCREENURINE NOT DETECTED 10/07/2020    ETOH <10 10/06/2020     Lab Results   Component Value Date    TSH 0.674 10/06/2020     Lab Results   Component Value Date    LITHIUM <0.10 (L) 02/12/2019     Lab Results   Component Value Date    VALPROATE <3 (L) 02/12/2019       Treatment Plan:  Reviewed current Medications with the patient. Risks, benefits, side effects, drug-to-drug interactions and alternatives to treatment were discussed. Collateral information:   CD evaluation  Encourage patient to attend group and other milieu activities.   Discharge planning discussed with the patient and treatment team.    Patient's home meds from the [place] were confirmed continue on  Lamictal 25 mg at bedtime  Continue Zyprexa 7.5 mg at bedtime  Decrease Effexor XR 75 mg daily    PSYCHOTHERAPY/COUNSELING:  [x] Therapeutic interview  [x] Supportive  [] CBT  [] Ongoing  [] Other    [x] Patient continues to need, on a daily basis, active treatment furnished directly by or requiring the supervision of inpatient psychiatric personnel      Anticipated Length of stay: 5 to 7 days            Electronically signed by MIRA Coe CNP on 04/86/8862 at 3:44 PM

## 2020-10-13 VITALS
BODY MASS INDEX: 25.76 KG/M2 | RESPIRATION RATE: 16 BRPM | OXYGEN SATURATION: 99 % | HEART RATE: 74 BPM | TEMPERATURE: 97.6 F | HEIGHT: 68 IN | WEIGHT: 170 LBS | SYSTOLIC BLOOD PRESSURE: 144 MMHG | DIASTOLIC BLOOD PRESSURE: 89 MMHG

## 2020-10-13 LAB
METER GLUCOSE: 110 MG/DL (ref 74–99)
METER GLUCOSE: 161 MG/DL (ref 74–99)
METER GLUCOSE: 184 MG/DL (ref 74–99)

## 2020-10-13 PROCEDURE — 6370000000 HC RX 637 (ALT 250 FOR IP): Performed by: HOSPITALIST

## 2020-10-13 PROCEDURE — 99238 HOSP IP/OBS DSCHRG MGMT 30/<: CPT | Performed by: NURSE PRACTITIONER

## 2020-10-13 PROCEDURE — 82962 GLUCOSE BLOOD TEST: CPT

## 2020-10-13 PROCEDURE — 6370000000 HC RX 637 (ALT 250 FOR IP): Performed by: NURSE PRACTITIONER

## 2020-10-13 RX ORDER — VENLAFAXINE HYDROCHLORIDE 75 MG/1
75 CAPSULE, EXTENDED RELEASE ORAL
Qty: 30 CAPSULE | Refills: 0 | Status: SHIPPED | OUTPATIENT
Start: 2020-10-14 | End: 2021-12-30

## 2020-10-13 RX ORDER — OLANZAPINE 7.5 MG/1
7.5 TABLET ORAL NIGHTLY
Qty: 30 TABLET | Refills: 0 | Status: SHIPPED | OUTPATIENT
Start: 2020-10-13 | End: 2021-12-30

## 2020-10-13 RX ORDER — INSULIN GLARGINE 100 [IU]/ML
60 INJECTION, SOLUTION SUBCUTANEOUS NIGHTLY
Qty: 1 VIAL | Refills: 3 | Status: SHIPPED
Start: 2020-10-13

## 2020-10-13 RX ADMIN — INSULIN LISPRO 3 UNITS: 100 INJECTION, SOLUTION INTRAVENOUS; SUBCUTANEOUS at 16:41

## 2020-10-13 RX ADMIN — VENLAFAXINE HYDROCHLORIDE 75 MG: 75 CAPSULE, EXTENDED RELEASE ORAL at 09:44

## 2020-10-13 RX ADMIN — INSULIN LISPRO 3 UNITS: 100 INJECTION, SOLUTION INTRAVENOUS; SUBCUTANEOUS at 12:55

## 2020-10-13 RX ADMIN — PANTOPRAZOLE SODIUM 40 MG: 40 TABLET, DELAYED RELEASE ORAL at 06:50

## 2020-10-13 ASSESSMENT — PAIN SCALES - GENERAL: PAINLEVEL_OUTOF10: 0

## 2020-10-13 NOTE — PROGRESS NOTES
Message sent via [employer] regarding medical med rec needing completed.

## 2020-10-13 NOTE — PROGRESS NOTES
Patient has been out on the unit, pleasant, calm, and cooperative. Patient appears brightened and is walking around the unit, smiling. Patient has been approaching people as if to start a conversation, but just ends up standing there and not saying anything. Patient denies all and denies any depression/anxiety. Patient accepted medications without issue. Patient is encouraged to continue to work towards discharge goal by complying with medications, attending groups and to seek staff if feelings are overwhelming. Environmental rounds completed per unit policy to maintain safety of everyone on the unit. Staff will offer support and interventions as requested or required.

## 2020-10-13 NOTE — GROUP NOTE
Group Therapy Note    Date: 10/12/2020    Group Start Time: 2000  Group End Time: 2130  Group Topic: Relaxation    [facility] 2    Radha Grossman RN        Group Therapy Note    Attendees: 1/8         Signature:  Radha Grossman RN

## 2020-10-13 NOTE — CARE COORDINATION
spoke with patient son Sujata Damian who was  Informed patient would step down to the crisis unit, Patient son was in agreement and he would bring patient some clothes, Walter De Leon report patient will live with  him for  a few weeks upon discharged until  Patient is stable

## 2020-10-13 NOTE — PROGRESS NOTES
CLINICAL PHARMACY NOTE: MEDS TO [address] [place] [address] Drive Select Patient?: No  Total # of Prescriptions Filled: 2   The following medications were delivered to the patient:  · Venlafaxine ER 75 mg  · Olanzapine 7.5 mg  Total # of Interventions Completed: 3  Time Spent (min): 15    Additional Documentation:

## 2020-10-13 NOTE — CARE COORDINATION
In order to ensure appropriate transition and discharge planning is in place, the following documents have been transmitted to the crisis unit , as the new outpatient provider:     The d/c diagnosis was transmitted to the next care provider   The reason for hospitalization was transmitted to the next care provider   The d/c medications (dosage and indication) were transmitted to the next care provider    The continuing care plan was transmitted to the next care provider

## 2020-10-13 NOTE — PROGRESS NOTES
Hospitalist Progress Note      SYNOPSIS: Patient admitted on 10/6/2020 for medical management consultation      SUBJECTIVE:    Patient seen and examined  Records reviewed. Patient initially seen by Dr. Suzy Mack on 10/7/2020    Stable overnight. No other overnight issues reported. Temp (24hrs), Av.7 °F (36.5 °C), Min:97.6 °F (36.4 °C), Max:97.7 °F (36.5 °C)    DIET: DIET CARB CONTROL; Dietary Nutrition Supplements: Diabetic Oral Supplement  CODE: Full Code  No intake or output data in the 24 hours ending 10/13/20 8658    OBJECTIVE:    BP (!) 144/89   Pulse 74   Temp 97.6 °F (36.4 °C) (Temporal)   Resp 16   Ht 5' 8\" (1.727 m)   Wt 170 lb (77.1 kg)   SpO2 99%   BMI 25.85 kg/m²     General appearance: No apparent distress, appears stated age and cooperative. HEENT:  Conjunctivae/corneas clear. Neck: Supple. No jugular venous distention. Respiratory: Clear to auscultation bilaterally, normal respiratory effort  Cardiovascular: Regular rate rhythm, normal S1-S2  Abdomen: Soft, nontender, nondistended  Musculoskeletal: No clubbing, cyanosis, no bilateral lower extremity edema. Brisk capillary refill. Skin:  No rashes  on visible skin  Neurologic: awake, alert and following commands     ASSESSMENT:    Principal Problem:    Depression, major, recurrent ([address]  Active Problems:    Dementia, vascular ([address]  Resolved Problems:    * No resolved hospital problems. *    Diabetes mellitus, insulin-dependent  GERD  Chronic depression  Hypertension  Vascular dementia  TIA         PLAN:    Patient's blood sugar history is been reviewed  Okay to discharge per medical standpoint. Med reconciliation completed    DISPOSITION: Per primary.     Medications:  REVIEWED DAILY    Infusion Medications    dextrose       Scheduled Medications    OLANZapine  7.5 mg Oral Nightly    lamoTRIgine  25 mg Oral Nightly    venlafaxine  75 mg Oral Daily with breakfast    insulin lispro  0-18 Units Subcutaneous TID WC    insulin lispro  0-9 Units Subcutaneous Nightly    insulin glargine  60 Units Subcutaneous Nightly    pantoprazole  40 mg Oral QAM AC     PRN Meds: acetaminophen, haloperidol lactate **OR** haloperidol, traZODone, magnesium hydroxide, aluminum & magnesium hydroxide-simethicone, glucose, dextrose, glucagon (rDNA), dextrose    Labs:     No results for input(s): WBC, HGB, HCT, PLT in the last 72 hours. No results for input(s): NA, K, CL, CO2, BUN, CREATININE, CALCIUM, PHOS in the last 72 hours. Invalid input(s): MAGNES    No results for input(s): PROT, ALB, ALKPHOS, ALT, AST, BILITOT, AMYLASE, LIPASE in the last 72 hours. No results for input(s): INR in the last 72 hours. No results for input(s): Zettie Binet in the last 72 hours. Chronic labs:    Lab Results   Component Value Date    CHOL 114 10/08/2020    TRIG 71 10/08/2020    HDL 41 10/08/2020    LDLCALC 59 10/08/2020    TSH 0.674 10/06/2020    PSA 0.86 09/28/2018    INR 1.0 03/14/2019    LABA1C 11.1 (H) 08/21/2020       Radiology: REVIEWED DAILY    +++++++++++++++++++++++++++++++++++++++++++++++++  Clint [address]  +++++++++++++++++++++++++++++++++++++++++++++++++  NOTE: This report was transcribed using voice recognition software. Every effort was made to ensure accuracy; however, inadvertent computerized transcription errors may be present.

## 2020-10-13 NOTE — PROGRESS NOTES
Spiritual Support Group Note    Number of Participants in Group:                        Time:     Goal: Relief from isolation and loneliness             Analilia Sharing             Self-understanding and gain insight              Acceptance and belonging            Recognize they are not alone                Socialization             Empowerment       Encouragement    Topic:  [x] Spiritual Wellness and Self Care                  [] Hope                     [] Connecting with Divine/Others        [] Thankfulness and Gratitude               []  Meaningfulness and Purpose               [] Forgiveness               [] Peace               [] Connect to [employer]      [] Other    Participation Level:   [] Active Listener   [x] Minimal   [] Monopolizing   [] Interactive   [] No Participation   []  Other:     Attention:   [] Alert   [x] Distractible   [] Drowsy   [] Poor   [] Other:    Manner:   [] Cooperative   [] Suspicious   [x] Withdrawn   [x] Guarded   [] Irritable   [] Inhospitable   [] Other:     Others Comments from Group:

## 2020-10-13 NOTE — SUICIDE SAFETY PLAN
SAFETY PLAN    A suicide Safety Plan is a document that supports someone when they are having thoughts of suicide. Warning Signs that indicate a suicidal crisis may be developing: What (situations, thoughts, feelings, body sensations, behaviors, etc.) do you experience that lets you know you are beginning to think about suicide? 1. Feel anxious/worried  2. Restless    Internal Coping Strategies:  What things can I do (relaxation techniques, hobbies, physical activities, etc.) to take my mind off my problems without contacting another person? 1. Listen to music  2. Dazey/meditate   3. Stay positive    People and social settings that provide distraction: Who can I call or where can I go to distract me? 1. Name: Daniel Love. (son)  2. Name: Family members   3. Place: Car ride              People whom I can ask for help: Who can I call when I need help - for example, friends, family, clergy, someone else? 1. Name: Daniel Love. (son)                  Professionals or [facility] agencies I can contact during a crisis: Who can I call for help - for example, my doctor, my psychiatrist, my psychologist, a mental health provider, a suicide hotline? 1. [facility]      3. Suicide Prevention Lifeline: 8-695-249-TALK (8195)      Making the environment safe: How can I make my environment (house/apartment/living space) safer? For example, can I remove guns, medications, and other items? 1.  Remove old meds from home

## 2020-10-13 NOTE — PROGRESS NOTES
Group Therapy Note    Patient attended goals group and stated daily goal as to get better. Group Therapy Note    Date: 10/13/2020  Start Time:10:00  End Time:  10:30  Number of Participants: 11    Type of Group: Psychoeducation    Wellness Binder Information  Module Name: Coping Skills  Session Number: NA    Patient's Goal: To id positive coping skills to use on a daily basis. Notes: attended group and was able to participate. Status After Intervention:  Unchanged    Participation Level:  Active Listener    Participation Quality: Attentive      Speech:  hesitant      Thought Process/Content: Linear      Affective Functioning: Flat      Mood: depressed      Level of consciousness:  Alert      Response to Learning: Progressing to goal      Endings: None Reported    Modes of Intervention: Education      Discipline Responsible: Psychoeducational Specialist      Signature:  INDY Perez

## 2020-10-13 NOTE — CARE COORDINATION
Nurse informed  that patient had medications  at home and patient reported he had no one to bring medication. Uzair hernandez called patient son and he agreed to bring medications @ 4:15pm to the hospital. Daniel Love was informed patient  Has a $4  copay  Chauncey Challenger agreed  To take patient to the crisis unit.

## 2020-10-13 NOTE — PROGRESS NOTES
[facility] Interdisciplinary Treatment Plan Note     Review Date & Time: 10/13/20 0900    Patient was in treatment team.    Admission Type:   Admission Type: Involuntary    Reason for admission:  Reason for Admission: \"my son called the [facility], and I mentioned to them that I have been deprressed\" pr pt.     Estimated Length of Stay Update:  7-10 days   Estimated Discharge Date Update: 10/13/20    PATIENT STRENGTHS:  Patient Strengths:Strengths: Communication, Connection to output provider, Positive Support, Social Skills, Motivated  Patient Strengths and Limitations:Limitations: Multiple barriers to leisure interests  Addictive Behavior:Addictive Behavior  In the past 3 months, have you felt or has someone told you that you have a problem with:  : None  Do you have a history of Chemical Use?: No  Do you have a history of Alcohol Use?: No  Do you have a history of Street Drug Abuse?: No  Histroy of Prescripton Drug Abuse?: No  Medical Problems:   Past Medical History:   Diagnosis Date    Anxiety     Bronchitis     Cellulitis     Chronic sinusitis     Depression     [facility]     Di[facility]opathy [facility]     Fracture of left foot     High cholesterol     Hypercholesteremia     Hypercholesterolemia     Hypertension     Lumbago     Moderate mood disorder ([facility]     Third nerve palsy        Risk:  Fall RiskTotal: 71  Michael Scale Michael Scale Score: 21  BVC Total: 0  Change in scores No. Changes to plan of Care No    Status EXAM:   Status and Exam  Normal: No  Facial Expression: Flat, Worried  Affect: Blunt  Level of Consciousness: Alert  Mood:Normal: No  Mood: Anxious, Sad  Motor Activity:Normal: Yes  Motor Activity: Decreased  Interview Behavior: Cooperative, Evasive  Preception: [place] to Person, [place] to Time, [place] to Place, [place] to Situation  Attention:Normal: No  Attention: Distractible  Thought Processes: Blocking  Thought Content:Normal: No  Thought Content: Preoccupations  Hallucinations: None  Delusions: No  Memory:Normal: No  Memory: Poor Recent  Insight and Judgment: No  Insight and Judgment: Poor Insight  Present Suicidal Ideation: No  Present Homicidal Ideation: No    Daily Assessment Last Entry:   Daily Sleep (WDL): Exceptions to WDL         Patient Currently in Pain: Denies  Daily Nutrition (WDL): Within Defined Limits  Appetite Change: Decreased  Barriers to Nutrition: Blood sugar control, Cognitive impairment  Level of Assistance: Independent/Self    Patient Monitoring:  Frequency of Checks: 4 times per hour, close    Psychiatric Symptoms:   Depression Symptoms  Depression Symptoms: Isolative, Change in energy level, Impaired concentration, Sleep disturbance  Anxiety Symptoms  Anxiety Symptoms: Generalized  Charley Symptoms  Charley Symptoms: No problems reported or observed. Psychosis Symptoms  Delusion Type: No problems reported or observed. Suicide Risk CSSR-S:  1) Within the past month, have you wished you were dead or wished you could go to sleep and not wake up? : Yes  2) Have you actually had any thoughts of killing yourself? : Yes  3) Have you been thinking about how you might kill yourself? : No  5) Have you started to work out or worked out the details of how to kill yourself? Do you intend to carry out this plan? : No  6) Have you ever done anything, started to do anything, or prepared to do anything to end your life?: No  Change in Result No Change in Plan of care No    EDUCATION:   Learner Progress Toward Treatment Goals: Reviewed results and recommendations of this team and Reviewed goals and plan of care    Method: Small group    Outcome: Verbalized understanding    PATIENT GOALS: \"To get better\"    PLAN/TREATMENT RECOMMENDATIONS UPDATE: Take prescribed medications, attend/participate in groups. Continue to provide emotional support to patient.     GOALS UPDATE:  Time frame for Short-Term Goals: 1-3 days      Cinda Aparicio RN

## 2020-10-13 NOTE — PROGRESS NOTES
[facility]  Discharge Note    Pt discharged with followings belongings:   Dentures: None  Vision - Corrective Lenses: Glasses  Hearing Aid: None  Jewelry: None  Body Piercings Removed: N/A  Clothing: Footwear, Jacket / coat, Pants, Shirt  Were All Patient Medications Collected?: Not Applicable  Other Valuables: None   Valuables sent home with patient. Valuables retrieved from safe, Security envelope number: N/A. Patient education on aftercare instructions: Yes. Nurse to nurse called to Acebhavan[employer] BAUMAN Keith. Patient verbalize understanding of AVS:  Yes.     Status EXAM upon discharge:  Status and Exam  Normal: No  Facial Expression: Flat, Worried  Affect: Blunt  Level of Consciousness: Alert  Mood:Normal: No  Mood: Anxious, Sad  Motor Activity:Normal: Yes  Motor Activity: Decreased  Interview Behavior: Cooperative, Evasive  Preception: [place] to Person, [place]la Oh[place] to Time, [place] to Place, [place] to Situation  Attention:Normal: No  Attention: Distractible  Thought Processes: Blocking  Thought Content:Normal: No  Thought Content: Preoccupations  Hallucinations: None  Delusions: No  Memory:Normal: No  Memory: Poor Recent  Insight and Judgment: No  Insight and Judgment: Poor Insight  Present Suicidal Ideation: No  Present Homicidal Ideation: No      Metabolic Screening:    Lab Results   Component Value Date    LABA1C 11.1 (H) 08/21/2020       Lab Results   Component Value Date    CHOL 114 10/08/2020    CHOL 163 01/06/2019     Lab Results   Component Value Date    TRIG 71 10/08/2020    TRIG 87 01/06/2019     Lab Results   Component Value Date    HDL 41 10/08/2020    HDL 36 01/06/2019     No components found for: [facility]  Lab Results   Component Value Date    LABVLDL 14 10/08/2020    LABVLDL 17 01/06/2019       Alejandrina Giang RN

## 2020-10-13 NOTE — PLAN OF CARE
Problem: Altered Mood, Depressive Behavior:  Goal: Able to verbalize and/or display a decrease in depressive symptoms  Description: Able to verbalize and/or display a decrease in depressive symptoms  Outcome: Met This Shift     Problem: Altered Mood, Depressive Behavior:  Goal: Ability to disclose and discuss suicidal ideas will improve  Description: Ability to disclose and discuss suicidal ideas will improve  10/12/2020 2218 by Rosa Tavarez RN  Outcome: Met This Shift     Problem: Depressive Behavior With or Without Suicide Precautions:  Goal: Absence of self-harm  Description: Absence of self-harm  Outcome: Met This Shift

## 2020-10-13 NOTE — PROGRESS NOTES
Nurse to nurse called to Toni Ridley at the Crisis Unit. Patient information disclosed, all questions answered.

## 2020-10-13 NOTE — PLAN OF CARE
Problem: Altered Mood, Depressive Behavior:  Goal: Ability to disclose and discuss suicidal ideas will improve  Description: Ability to disclose and discuss suicidal ideas will improve  10/13/2020 1127 by Solomon Mckee RN  Outcome: Met This Shift  10/12/2020 2218 by Cyril Nogueira RN  Outcome: Met This Shift     Problem: Altered Mood, Depressive Behavior:  Goal: Able to verbalize support systems  Description: Able to verbalize support systems  Outcome: Met This Shift     Problem: Depressive Behavior With or Without Suicide Precautions:  Goal: Absence of self-harm  Description: Absence of self-harm  10/13/2020 1127 by Solomon Mckee RN  Outcome: Met This Shift  10/12/2020 2218 by Cyril Nogueira RN  Outcome: Met This Shift     Problem: Altered Mood, Depressive Behavior:  Goal: Able to verbalize and/or display a decrease in depressive symptoms  Description: Able to verbalize and/or display a decrease in depressive symptoms  10/13/2020 1127 by Solomon Mckee RN  Outcome: Ongoing  10/12/2020 2218 by Cyril Nogueira RN  Outcome: Met This Shift     Patient denies SI/HI/Hallucinations, anxiety or depression. Patient calm and cooperative, but can be blunt and evasive during conversation. Patient reports that he feels better since admission and is eager for discharge. Patient isolative to his room at times, keeps to himself when out on the unit. Patient taking prescribed medications, eating provided meals, and attending groups.

## 2020-10-13 NOTE — PROGRESS NOTES
Group Therapy Note    Date: 10/13/2020  Start Time: 1:45  End Time:  2:30  Number of Participants:9    Type of Group: Psychotherapy    Wellness Binder Information  Module Name:   Session Number:      Patient's Goal:   Gain insight into interpersonal relationships by interacting with others. Notes:  Pt was  able to express strong feelings regarding loosing controll of their life due to mental illness Pt was given support and feed back.     Status After Intervention:  Improved    Participation Level: Interactive    Participation Quality: Attentive and Sharing      Speech:  [place]      Thought Process/Content: Logical      Affective Functioning: Congruent      Mood: anxious and depressed      Level of consciousness:  Oriented x4 and Attentive      Response to Learning: Able to verbalize/acknowledge new learning      Endings: None Reported    Modes of Intervention: Support and Socialization      Discipline Responsible: /Counselor      Signature:  FREDERIC Roth

## 2020-10-15 NOTE — DISCHARGE SUMMARY
DISCHARGE SUMMARY      Patient ID:  Escobar Figueroa  [id number]  60 y.o.  1955    Admit date: 10/6/2020    Discharge date and time: 10/13/2020    Admitting Physician: Leeanna Whittington MD     Discharge Physician: Dr Heron Rodriguez MD    Admission Diagnoses: Depression, major, recurrent ([facility] [F33.9]    Admission Condition: poor    Discharged Condition: stable    Admission Circumstance: Patient presented to the ED after being pink slipped by the [place] for suicidal threats to suffocate himself with a pillow      PAST MEDICAL/PSYCHIATRIC HISTORY:   Past Medical History:   Diagnosis Date    Anxiety     Bronchitis     Cellulitis     Chronic sinusitis     Depression     Diabetes mellitus ([facility]     Diabetic retinopathy ([facility]     Fracture of left foot     High cholesterol     Hypercholesteremia     Hypercholesterolemia     Hypertension     Lumbago     Moderate mood disorder ([facility]     Third nerve palsy        FAMILY/SOCIAL HISTORY:  History reviewed. No pertinent family history.   Social History     Socioeconomic History    Marital status:      Spouse name: Not on file    Number of children: Not on file    Years of education: Not on file    Highest education level: Not on file   Occupational History    Not on file   Social Needs    Financial resource strain: Not on file    Food insecurity     Worry: Not on file     Inability: Not on file    Transportation needs     Medical: Not on file     Non-medical: Not on file   Tobacco Use    Smoking status: Never Smoker    Smokeless tobacco: Never Used   Substance and Sexual Activity    Alcohol use: No    Drug use: No    Sexual activity: Not Currently   Lifestyle    Physical activity     Days per week: Not on file     Minutes per session: Not on file    Stress: Not on file   Relationships    Social connections     Talks on phone: Not on file     Gets together: Not on file     Attends Religion service: Not on file     Active member of club or organization: Not on file     Attends meetings of clubs or organizations: Not on file     Relationship status: Not on file    Intimate partner violence     Fear of current or ex partner: Not on file     Emotionally abused: Not on file     Physically abused: Not on file     Forced sexual activity: Not on file   Other Topics Concern    Not on file   Social History Narrative    Not on file       MEDICATIONS:  No current facility-administered medications for this encounter. Current Outpatient Medications:     OLANZapine (ZYPREXA) 7.5 MG tablet, Take 1 tablet by mouth nightly, Disp: 30 tablet, Rfl: 0    venlafaxine (EFFEXOR XR) 75 MG extended release capsule, Take 1 capsule by mouth daily (with breakfast), Disp: 30 capsule, Rfl: 0    insulin glargine (LANTUS) 100 UNIT/ML injection vial, Inject 60 Units into the skin nightly, Disp: 1 vial, Rfl: 3    glimepiride (AMARYL) 4 MG tablet, Take 4 mg by mouth every morning (before breakfast), Disp: , Rfl:     glucose 4 g chewable tablet, Take 12 g by mouth as needed for Low blood sugar Chew and swallow 3 tablets by mouth as needed for low sugar.  Check blood sugar in 15 minutes and repeat dose if still low., Disp: , Rfl:     lamoTRIgine (LAMICTAL) 25 MG tablet, Take 25 mg by mouth nightly, Disp: , Rfl:     Multiple Vitamins-Minerals (THERAPEUTIC MULTIVITAMIN-MINERALS) tablet, Take 1 tablet by mouth every other day, Disp: , Rfl:     rosuvastatin (CRESTOR) 10 MG tablet, Take 10 mg by mouth daily, Disp: , Rfl:     docusate sodium (COLACE) 100 MG capsule, Take 100 mg by mouth 2 times daily, Disp: , Rfl:     omeprazole (PRILOSEC) 20 MG delayed release capsule, Take 40 mg by mouth daily, Disp: , Rfl:     Examination:  BP (!) 144/89   Pulse 74   Temp 97.6 °F (36.4 °C) (Temporal)   Resp 16   Ht 5' 8\" (1.727 m)   Wt 170 lb (77.1 kg)   SpO2 99%   BMI 25.85 kg/m²   Gait - steady    HOSPITAL COURSE[de-identified]  Patient is admitted to the unit on 10/6-20 is close to monitor for suicidal ideations. He was evaluated and was treated with Effexor XR 75 mg daily, olanzapine 7.5 mg at bedtime, and continue Lamictal 25 mg at bedtime. Medical insulin significant patient continue to improve on the floor. He is are coming out of his room he was attending groups and socializing with peers. He never made a suicidal statements or suicidal gestures while the unit. His mood was brightening. Social work obtain confirmation patient son who was able voicing concerns that he had. Treatment team felt the patient to the maximum benefit from hospitalization. He was up with outpatient mental health agency for outpatient follow-up services. The time of discharge patient not showed impulsive behavior. He was eating well sleeping well there are no neurovegetative signs or symptoms of depression. Denied any auditory visualizations there were no overt or covert signs psychosis. His appreciate help that he received here. This patient no longer needs criteria for inpatient hospitalization. No AVH or paranoid thoughts  No hopeless or worthless feeling  No active SI/HI  Appetite:  [x] Normal  [] Increased  [] Decreased    Sleep:       [x] Normal  [] Fair       [] Poor            Energy:    [x] Normal  [] Increased  [] Decreased     SI [] Present  [x] Absent  HI  []Present  [x] Absent   Aggression:  [] yes  [x] no  Patient is [x] able  [] unable to CONTRACT FOR SAFETY   Medication side effects(SE):  [x] None(Psych. Meds.) [] Other      Mental Status Examination on discharge:    Level of consciousness:  within normal limits   Appearance:  well-appearing  Behavior/Motor:  no abnormalities noted  Attitude toward examiner:  attentive and good eye contact  Speech:  spontaneous, normal rate and normal volume   Mood: \" I am feeling a lot better. \"  Affect: Appropriate and pleasant  Thought processes: Linear without flight of ideas or loose associations  Thought content: Devoid of any auditory visualizations delusions or perceptual normalities denies SI/HI intent or plan  Cognition:  oriented to person, place, and time   Concentration intact  Memory intact  Insight good   Judgement fair   Fund of Knowledge adequate      ASSESSMENT:  Patient symptoms are:  [x] Well controlled  [x] Improving  [] Worsening  [] No change    Reason for more than one antipsychotic:  [x] N/A  [] 3 Failed Monotherapy attempts (Drugs tried:)  [] Crossover to a new antipsychotic  [] Taper to Monotherapy from Polypharmacy  [] Augmentation of clozapine therapy due to treatment resistance to single therapy    Diagnosis:  Principal Problem:    Depression, major, recurrent ([facility]  Active Problems:    Dementia, vascular ([facility]  Resolved Problems:    * No resolved hospital problems. *      LABS:    No results for input(s): WBC, HGB, PLT in the last 72 hours. No results for input(s): NA, K, CL, CO2, BUN, CREATININE, GLUCOSE in the last 72 hours. No results for input(s): BILITOT, ALKPHOS, AST, ALT in the last 72 hours. Lab Results   Component Value Date    LABAMPH NOT DETECTED 10/07/2020    BARBSCNU NOT DETECTED 10/07/2020    LABBENZ NOT DETECTED 10/07/2020    LABMETH NOT DETECTED 10/07/2020    OPIATESCREENURINE NOT DETECTED 10/07/2020    PHENCYCLIDINESCREENURINE NOT DETECTED 10/07/2020    ETOH <10 10/06/2020     Lab Results   Component Value Date    TSH 0.674 10/06/2020     Lab Results   Component Value Date    LITHIUM <0.10 (L) 02/12/2019     Lab Results   Component Value Date    VALPROATE <3 (L) 02/12/2019       RISK ASSESSMENT AT DISCHARGE: Low risk for suicide and homicide. Treatment Plan:  Reviewed current Medications with the patient. Education provided on the complaince with treatment. Risks, benefits, side effects, drug-to-drug interactions and alternatives to treatment were discussed. Encourage patient to attend outpatient follow up appointment and therapy.     Patient was advised to call the outpatient provider, visit the nearest ED or call 911 if symptoms are not manageable. Patient's family member was contacted prior to the discharge.          Medication List      CHANGE how you take these medications    insulin glargine 100 UNIT/ML injection vial  Commonly known as:  LANTUS  Inject 60 Units into the skin nightly  What changed:  when to take this     OLANZapine 7.5 MG tablet  Commonly known as:  ZYPREXA  Take 1 tablet by mouth nightly  What changed:    · medication strength  · how much to take     venlafaxine 75 MG extended release capsule  Commonly known as:  EFFEXOR XR  Take 1 capsule by mouth daily (with breakfast)  What changed:  how much to take        CONTINUE taking these medications    docusate sodium 100 MG capsule  Commonly known as:  COLACE     glimepiride 4 MG tablet  Commonly known as:  AMARYL     glucose 4 g chewable tablet     lamoTRIgine 25 MG tablet  Commonly known as:  LAMICTAL     omeprazole 20 MG delayed release capsule  Commonly known as:  PRILOSEC     rosuvastatin 10 MG tablet  Commonly known as:  CRESTOR     therapeutic multivitamin-minerals tablet        STOP taking these medications    clotrimazole 1 % external solution  Commonly known as:  LOTRIMIN     glipiZIDE 5 MG tablet  Commonly known as:  GLUCOTROL     insulin  UNIT/ML injection vial  Commonly known as:  HUMULIN N;NOVOLIN N     lisinopril 20 MG tablet  Commonly known as:  PRINIVIL;ZESTRIL     metFORMIN 1000 MG tablet  Commonly known as:  GLUCOPHAGE     NovoLOG FlexPen 100 UNIT/ML injection pen  Generic drug:  insulin aspart     traZODone 50 MG tablet  Commonly known as:  DESYREL           Where to Get Your Medications      These medications were sent to [facility] [address] [address]    Phone:  [phone]   · OLANZapine 7.5 MG tablet  · venlafaxine 75 MG extended release capsule     Information about where to get these medications is not yet available    Ask your nurse or doctor about these medications  · insulin glargine 100 UNIT/ML injection vial     Patient's consumes been compliant with all medications outpatient follow-up appointments    Patient is discharged to the crisis center in stable condition      TIME SPEND - [facility]ne[facility] 1841, DISCHARGE SUMMARY, MEDICATION RECONCILIATION AND FOLLOW UP CARE     Signed:  Niels Aranda  99/29/4700  08:34 AM

## 2020-11-18 ENCOUNTER — APPOINTMENT (OUTPATIENT)
Dept: GENERAL RADIOLOGY | Age: 65
DRG: 522 | End: 2020-11-18
Payer: MEDICARE

## 2020-11-18 ENCOUNTER — ANESTHESIA EVENT (OUTPATIENT)
Dept: OPERATING ROOM | Age: 65
DRG: 522 | End: 2020-11-18
Payer: MEDICARE

## 2020-11-18 ENCOUNTER — APPOINTMENT (OUTPATIENT)
Dept: CT IMAGING | Age: 65
DRG: 522 | End: 2020-11-18
Payer: MEDICARE

## 2020-11-18 ENCOUNTER — HOSPITAL ENCOUNTER (INPATIENT)
Age: 65
LOS: 4 days | Discharge: NURSING, REHAB OR LONG-TERM CARE | DRG: 522 | End: 2020-11-22
Attending: EMERGENCY MEDICINE | Admitting: ORTHOPAEDIC SURGERY
Payer: MEDICARE

## 2020-11-18 PROBLEM — S72.001A CLOSED RIGHT HIP FRACTURE, INITIAL ENCOUNTER (HCC): Status: ACTIVE | Noted: 2020-11-18

## 2020-11-18 LAB
ABO/RH: NORMAL
ALBUMIN SERPL-MCNC: 3.5 G/DL (ref 3.5–5.2)
ALP BLD-CCNC: 85 U/L (ref 40–129)
ALT SERPL-CCNC: 12 U/L (ref 0–40)
ANION GAP SERPL CALCULATED.3IONS-SCNC: 9 MMOL/L (ref 7–16)
ANTIBODY SCREEN: NORMAL
APTT: 27.1 SEC (ref 24.5–35.1)
AST SERPL-CCNC: 15 U/L (ref 0–39)
BASOPHILS ABSOLUTE: 0.06 E9/L (ref 0–0.2)
BASOPHILS RELATIVE PERCENT: 0.6 % (ref 0–2)
BILIRUB SERPL-MCNC: 1.4 MG/DL (ref 0–1.2)
BUN BLDV-MCNC: 35 MG/DL (ref 8–23)
CALCIUM SERPL-MCNC: 9.5 MG/DL (ref 8.6–10.2)
CHLORIDE BLD-SCNC: 96 MMOL/L (ref 98–107)
CHP ED QC CHECK: YES
CO2: 28 MMOL/L (ref 22–29)
CREAT SERPL-MCNC: 1.4 MG/DL (ref 0.7–1.2)
EOSINOPHILS ABSOLUTE: 0.24 E9/L (ref 0.05–0.5)
EOSINOPHILS RELATIVE PERCENT: 2.2 % (ref 0–6)
GFR AFRICAN AMERICAN: >60
GFR NON-AFRICAN AMERICAN: 51 ML/MIN/1.73
GLUCOSE BLD-MCNC: 154 MG/DL
GLUCOSE BLD-MCNC: 159 MG/DL (ref 74–99)
HBA1C MFR BLD: 9 % (ref 4–5.6)
HCT VFR BLD CALC: 39.9 % (ref 37–54)
HEMOGLOBIN: 13.9 G/DL (ref 12.5–16.5)
IMMATURE GRANULOCYTES #: 0.06 E9/L
IMMATURE GRANULOCYTES %: 0.6 % (ref 0–5)
INR BLD: 1.1
LYMPHOCYTES ABSOLUTE: 1.62 E9/L (ref 1.5–4)
LYMPHOCYTES RELATIVE PERCENT: 14.9 % (ref 20–42)
MAGNESIUM: 2 MG/DL (ref 1.6–2.6)
MCH RBC QN AUTO: 25.7 PG (ref 26–35)
MCHC RBC AUTO-ENTMCNC: 34.8 % (ref 32–34.5)
MCV RBC AUTO: 73.8 FL (ref 80–99.9)
METER GLUCOSE: 143 MG/DL (ref 74–99)
METER GLUCOSE: 154 MG/DL (ref 74–99)
MONOCYTES ABSOLUTE: 0.84 E9/L (ref 0.1–0.95)
MONOCYTES RELATIVE PERCENT: 7.7 % (ref 2–12)
NEUTROPHILS ABSOLUTE: 8.08 E9/L (ref 1.8–7.3)
NEUTROPHILS RELATIVE PERCENT: 74 % (ref 43–80)
PDW BLD-RTO: 13.9 FL (ref 11.5–15)
PLATELET # BLD: 203 E9/L (ref 130–450)
PMV BLD AUTO: 10.6 FL (ref 7–12)
POTASSIUM SERPL-SCNC: 3.9 MMOL/L (ref 3.5–5)
PROTHROMBIN TIME: 11.9 SEC (ref 9.3–12.4)
RBC # BLD: 5.41 E12/L (ref 3.8–5.8)
SODIUM BLD-SCNC: 133 MMOL/L (ref 132–146)
TOTAL PROTEIN: 6.3 G/DL (ref 6.4–8.3)
TROPONIN: <0.01 NG/ML (ref 0–0.03)
WBC # BLD: 10.9 E9/L (ref 4.5–11.5)

## 2020-11-18 PROCEDURE — 99285 EMERGENCY DEPT VISIT HI MDM: CPT

## 2020-11-18 PROCEDURE — 80053 COMPREHEN METABOLIC PANEL: CPT

## 2020-11-18 PROCEDURE — 85610 PROTHROMBIN TIME: CPT

## 2020-11-18 PROCEDURE — 86900 BLOOD TYPING SEROLOGIC ABO: CPT

## 2020-11-18 PROCEDURE — 82962 GLUCOSE BLOOD TEST: CPT

## 2020-11-18 PROCEDURE — 70450 CT HEAD/BRAIN W/O DYE: CPT

## 2020-11-18 PROCEDURE — 96374 THER/PROPH/DIAG INJ IV PUSH: CPT

## 2020-11-18 PROCEDURE — 93005 ELECTROCARDIOGRAM TRACING: CPT | Performed by: NURSE PRACTITIONER

## 2020-11-18 PROCEDURE — 36415 COLL VENOUS BLD VENIPUNCTURE: CPT

## 2020-11-18 PROCEDURE — 85025 COMPLETE CBC W/AUTO DIFF WBC: CPT

## 2020-11-18 PROCEDURE — 72125 CT NECK SPINE W/O DYE: CPT

## 2020-11-18 PROCEDURE — 6360000002 HC RX W HCPCS: Performed by: ORTHOPAEDIC SURGERY

## 2020-11-18 PROCEDURE — 86850 RBC ANTIBODY SCREEN: CPT

## 2020-11-18 PROCEDURE — 84484 ASSAY OF TROPONIN QUANT: CPT

## 2020-11-18 PROCEDURE — 83735 ASSAY OF MAGNESIUM: CPT

## 2020-11-18 PROCEDURE — 6370000000 HC RX 637 (ALT 250 FOR IP): Performed by: HOSPITALIST

## 2020-11-18 PROCEDURE — 1200000000 HC SEMI PRIVATE

## 2020-11-18 PROCEDURE — 86901 BLOOD TYPING SEROLOGIC RH(D): CPT

## 2020-11-18 PROCEDURE — 6360000002 HC RX W HCPCS: Performed by: NURSE PRACTITIONER

## 2020-11-18 PROCEDURE — 73502 X-RAY EXAM HIP UNI 2-3 VIEWS: CPT

## 2020-11-18 PROCEDURE — 83036 HEMOGLOBIN GLYCOSYLATED A1C: CPT

## 2020-11-18 PROCEDURE — 85730 THROMBOPLASTIN TIME PARTIAL: CPT

## 2020-11-18 PROCEDURE — 73552 X-RAY EXAM OF FEMUR 2/>: CPT

## 2020-11-18 PROCEDURE — 2580000003 HC RX 258: Performed by: ORTHOPAEDIC SURGERY

## 2020-11-18 PROCEDURE — 71046 X-RAY EXAM CHEST 2 VIEWS: CPT

## 2020-11-18 RX ORDER — SODIUM CHLORIDE 0.9 % (FLUSH) 0.9 %
10 SYRINGE (ML) INJECTION PRN
Status: DISCONTINUED | OUTPATIENT
Start: 2020-11-18 | End: 2020-11-19 | Stop reason: SDUPTHER

## 2020-11-18 RX ORDER — ONDANSETRON 2 MG/ML
4 INJECTION INTRAMUSCULAR; INTRAVENOUS EVERY 6 HOURS PRN
Status: DISCONTINUED | OUTPATIENT
Start: 2020-11-18 | End: 2020-11-19 | Stop reason: SDUPTHER

## 2020-11-18 RX ORDER — ACETAMINOPHEN 325 MG/1
650 TABLET ORAL EVERY 4 HOURS PRN
Status: DISCONTINUED | OUTPATIENT
Start: 2020-11-18 | End: 2020-11-19 | Stop reason: SDUPTHER

## 2020-11-18 RX ORDER — POLYETHYLENE GLYCOL 3350 17 G/17G
17 POWDER, FOR SOLUTION ORAL DAILY PRN
Status: DISCONTINUED | OUTPATIENT
Start: 2020-11-18 | End: 2020-11-22 | Stop reason: HOSPADM

## 2020-11-18 RX ORDER — PROMETHAZINE HYDROCHLORIDE 25 MG/1
12.5 TABLET ORAL EVERY 6 HOURS PRN
Status: DISCONTINUED | OUTPATIENT
Start: 2020-11-18 | End: 2020-11-19 | Stop reason: SDUPTHER

## 2020-11-18 RX ORDER — SODIUM CHLORIDE 0.9 % (FLUSH) 0.9 %
10 SYRINGE (ML) INJECTION EVERY 12 HOURS SCHEDULED
Status: DISCONTINUED | OUTPATIENT
Start: 2020-11-18 | End: 2020-11-19 | Stop reason: SDUPTHER

## 2020-11-18 RX ORDER — MORPHINE SULFATE 4 MG/ML
4 INJECTION, SOLUTION INTRAMUSCULAR; INTRAVENOUS ONCE
Status: COMPLETED | OUTPATIENT
Start: 2020-11-18 | End: 2020-11-18

## 2020-11-18 RX ORDER — M-VIT,TX,IRON,MINS/CALC/FOLIC 27MG-0.4MG
1 TABLET ORAL EVERY OTHER DAY
Status: DISCONTINUED | OUTPATIENT
Start: 2020-11-19 | End: 2020-11-22 | Stop reason: HOSPADM

## 2020-11-18 RX ORDER — PANTOPRAZOLE SODIUM 40 MG/1
40 TABLET, DELAYED RELEASE ORAL
Status: DISCONTINUED | OUTPATIENT
Start: 2020-11-19 | End: 2020-11-22 | Stop reason: HOSPADM

## 2020-11-18 RX ORDER — DEXTROSE MONOHYDRATE 25 G/50ML
12.5 INJECTION, SOLUTION INTRAVENOUS PRN
Status: DISCONTINUED | OUTPATIENT
Start: 2020-11-18 | End: 2020-11-22 | Stop reason: HOSPADM

## 2020-11-18 RX ORDER — MORPHINE SULFATE 4 MG/ML
4 INJECTION, SOLUTION INTRAMUSCULAR; INTRAVENOUS
Status: DISCONTINUED | OUTPATIENT
Start: 2020-11-18 | End: 2020-11-22 | Stop reason: HOSPADM

## 2020-11-18 RX ORDER — MORPHINE SULFATE 4 MG/ML
4 INJECTION, SOLUTION INTRAMUSCULAR; INTRAVENOUS ONCE
Status: DISCONTINUED | OUTPATIENT
Start: 2020-11-18 | End: 2020-11-22 | Stop reason: HOSPADM

## 2020-11-18 RX ORDER — LAMOTRIGINE 25 MG/1
25 TABLET ORAL NIGHTLY
Status: DISCONTINUED | OUTPATIENT
Start: 2020-11-18 | End: 2020-11-22 | Stop reason: HOSPADM

## 2020-11-18 RX ORDER — OXYCODONE HYDROCHLORIDE 5 MG/1
5 TABLET ORAL EVERY 4 HOURS PRN
Status: DISCONTINUED | OUTPATIENT
Start: 2020-11-18 | End: 2020-11-22 | Stop reason: HOSPADM

## 2020-11-18 RX ORDER — DEXTROSE MONOHYDRATE 50 MG/ML
100 INJECTION, SOLUTION INTRAVENOUS PRN
Status: DISCONTINUED | OUTPATIENT
Start: 2020-11-18 | End: 2020-11-22 | Stop reason: HOSPADM

## 2020-11-18 RX ORDER — NICOTINE POLACRILEX 4 MG
15 LOZENGE BUCCAL PRN
Status: DISCONTINUED | OUTPATIENT
Start: 2020-11-18 | End: 2020-11-22 | Stop reason: HOSPADM

## 2020-11-18 RX ORDER — ROSUVASTATIN CALCIUM 10 MG/1
10 TABLET, COATED ORAL DAILY
Status: DISCONTINUED | OUTPATIENT
Start: 2020-11-19 | End: 2020-11-22 | Stop reason: HOSPADM

## 2020-11-18 RX ORDER — INSULIN GLARGINE 100 [IU]/ML
60 INJECTION, SOLUTION SUBCUTANEOUS NIGHTLY
Status: DISCONTINUED | OUTPATIENT
Start: 2020-11-19 | End: 2020-11-20

## 2020-11-18 RX ORDER — MORPHINE SULFATE 2 MG/ML
2 INJECTION, SOLUTION INTRAMUSCULAR; INTRAVENOUS
Status: DISCONTINUED | OUTPATIENT
Start: 2020-11-18 | End: 2020-11-22 | Stop reason: HOSPADM

## 2020-11-18 RX ORDER — OXYCODONE HYDROCHLORIDE 10 MG/1
10 TABLET ORAL EVERY 4 HOURS PRN
Status: DISCONTINUED | OUTPATIENT
Start: 2020-11-18 | End: 2020-11-22 | Stop reason: HOSPADM

## 2020-11-18 RX ADMIN — MORPHINE SULFATE 4 MG: 4 INJECTION, SOLUTION INTRAMUSCULAR; INTRAVENOUS at 22:39

## 2020-11-18 RX ADMIN — INSULIN LISPRO 1 UNITS: 100 INJECTION, SOLUTION INTRAVENOUS; SUBCUTANEOUS at 22:41

## 2020-11-18 RX ADMIN — MORPHINE SULFATE 4 MG: 4 INJECTION, SOLUTION INTRAMUSCULAR; INTRAVENOUS at 15:16

## 2020-11-18 RX ADMIN — Medication 10 ML: at 23:04

## 2020-11-18 ASSESSMENT — PAIN DESCRIPTION - PROGRESSION: CLINICAL_PROGRESSION: NOT CHANGED

## 2020-11-18 ASSESSMENT — PAIN SCALES - GENERAL
PAINLEVEL_OUTOF10: 6
PAINLEVEL_OUTOF10: 10
PAINLEVEL_OUTOF10: 10

## 2020-11-18 ASSESSMENT — PAIN DESCRIPTION - LOCATION
LOCATION: HIP
LOCATION: HIP

## 2020-11-18 ASSESSMENT — PAIN DESCRIPTION - PAIN TYPE
TYPE: ACUTE PAIN
TYPE: ACUTE PAIN

## 2020-11-18 ASSESSMENT — PAIN DESCRIPTION - FREQUENCY: FREQUENCY: CONTINUOUS

## 2020-11-18 ASSESSMENT — PAIN - FUNCTIONAL ASSESSMENT: PAIN_FUNCTIONAL_ASSESSMENT: PREVENTS OR INTERFERES WITH MANY ACTIVE NOT PASSIVE ACTIVITIES

## 2020-11-18 ASSESSMENT — PAIN DESCRIPTION - DESCRIPTORS: DESCRIPTORS: ACHING;SHARP;SHOOTING

## 2020-11-18 ASSESSMENT — PAIN DESCRIPTION - ONSET: ONSET: ON-GOING

## 2020-11-18 ASSESSMENT — PAIN DESCRIPTION - ORIENTATION
ORIENTATION: RIGHT
ORIENTATION: RIGHT

## 2020-11-18 NOTE — CONSULTS
MD [place]   glimepiride (AMARYL) 4 MG tablet Take 4 mg by mouth every morning (before breakfast)    Historical Provider, MD   glucose 4 g chewable tablet Take 12 g by mouth as needed for Low blood sugar Chew and swallow 3 tablets by mouth as needed for low sugar. Check blood sugar in 15 minutes and repeat dose if still low. Historical Provider, MD   lamoTRIgine (LAMICTAL) 25 MG tablet Take 25 mg by mouth nightly    Historical Provider, MD   Multiple Vitamins-Minerals (THERAPEUTIC MULTIVITAMIN-MINERALS) tablet Take 1 tablet by mouth every other day    Historical Provider, MD   rosuvastatin (CRESTOR) 10 MG tablet Take 10 mg by mouth daily    Historical Provider, MD   docusate sodium (COLACE) 100 MG capsule Take 100 mg by mouth 2 times daily    Historical Provider, MD   omeprazole (PRILOSEC) 20 MG delayed release capsule Take 40 mg by mouth daily    Historical Provider, MD       Allergies:  Patient has no known allergies. Social History:     TOBACCO:   reports that he has never smoked. He has never used smokeless tobacco.  ETOH:   reports no history of alcohol use. Family History:     Reviewed in detail with patient. Denies any    REVIEW OF SYSTEMS:   Pertinent positives as noted in the HPI. All other systems reviewed and negative. PHYSICAL EXAM:  /65   Pulse 94   Temp 98 °F (36.7 °C) (Infrared)   Resp 20   Ht 5' 8\" (1.727 m)   Wt 170 lb (77.1 kg)   SpO2 97%   BMI 25.85 kg/m²   General appearance: Lying comfortably in bed. No apparent distress. HEENT: Normal cephalic, atraumatic without obvious deformity. Conjunctivae/corneas clear. Neck: Supple, with full range of motion. No jugular venous distention. Trachea midline. Respiratory:  Normal respiratory effort. Clear to auscultation bilaterally  Cardiovascular: Normal S1/S2. Regular rhythm and rate   Abdomen: Soft, non-tender, non-distended with normal bowel sounds. Musculoskeletal: No clubbing, cyanosis or edema bilaterally.   Skin: Skin color, texture, turgor normal.  No rashes or lesions. Neurologic:  No focal deficit. Psychiatric: Alert and oriented, thought content appropriate, normal insight    CXR:    Xr Chest (2 Vw)    Result Date: 11/18/2020  EXAMINATION: TWO XRAY VIEWS OF THE CHEST 11/18/2020 4:07 pm COMPARISON: 08/21/2020 HISTORY: ORDERING SYSTEM PROVIDED HISTORY: pre op TECHNOLOGIST PROVIDED HISTORY: Reason for exam:->pre op What reading provider will be dictating this exam?->CRC FINDINGS: The lungs are without acute focal process. There is no effusion or pneumothorax. The cardiomediastinal silhouette is without acute process. The osseous structures are without acute process. No acute process. Xr Femur Right (min 2 Views)    Result Date: 11/18/2020  EXAMINATION: 4 XRAY VIEWS OF THE RIGHT FEMUR; ONE XRAY VIEW OF THE PELVIS AND TWO XRAY VIEWS RIGHT HIP 11/18/2020 4:07 pm COMPARISON: None. HISTORY: ORDERING SYSTEM PROVIDED HISTORY: Pain TECHNOLOGIST PROVIDED HISTORY: Reason for exam:->Pain What reading provider will be dictating this exam?->CRC FINDINGS: There is a transcervical fracture through the right femoral neck. The femoral head maintains articulation with the acetabulum. The remainder of the right femur is intact. The pelvic bones are intact, without fracture or focal lesion. The sacroiliac joints and the pubic symphysis are unremarkable. Severe degenerative changes are seen in the right knee. Right femoral neck fracture. Ct Head Wo Contrast    Result Date: 11/18/2020  EXAMINATION: CT OF THE HEAD WITHOUT CONTRAST  11/18/2020 4:08 pm TECHNIQUE: CT of the head was performed without the administration of intravenous contrast. Dose modulation, iterative reconstruction, and/or weight based adjustment of the mA/kV was utilized to reduce the radiation dose to as low as reasonably achievable. COMPARISON: None.  HISTORY: ORDERING SYSTEM PROVIDED HISTORY: Trauma TECHNOLOGIST PROVIDED HISTORY: Has a \"code stroke\" or \"stroke alert\" been called? ->No Reason for exam:->Trauma What reading provider will be dictating this exam?->CRC FINDINGS: BRAIN/VENTRICLES: There is no acute intracranial hemorrhage, mass effect or midline shift. No abnormal extra-axial fluid collection. The gray-white differentiation is maintained without evidence of an acute infarct. There is no evidence of hydrocephalus. Scattered foci of low attenuation involving the periventricular white matter compatible with microvascular ischemic changes. ORBITS: The visualized portion of the orbits demonstrate no acute abnormality. SINUSES: Note is made of a complex mucous retention cyst within the right sphenoid sinus measuring 1.8 x 1.8 cm. This was present on the patient's previous studies. Ebony Rosie SOFT TISSUES/SKULL:  No acute abnormality of the visualized skull or soft tissues. 1. There is no acute intracranial abnormality. Specifically, there is no intracranial hemorrhage. 2. Atrophy and periventricular leukomalacia, 3. Chronic mucous retention cyst seen within the right sphenoid sinus. Ct Cervical Spine Wo Contrast    Result Date: 11/18/2020  EXAMINATION: CT OF THE CERVICAL SPINE WITHOUT CONTRAST 11/18/2020 4:08 pm TECHNIQUE: CT of the cervical spine was performed without the administration of intravenous contrast. Multiplanar reformatted images are provided for review. Dose modulation, iterative reconstruction, and/or weight based adjustment of the mA/kV was utilized to reduce the radiation dose to as low as reasonably achievable. COMPARISON: None. HISTORY: ORDERING SYSTEM PROVIDED HISTORY: Trauma TECHNOLOGIST PROVIDED HISTORY: Reason for exam:->Trauma What reading provider will be dictating this exam?->CRC FINDINGS: The ring of C1 is intact as is the dense. There is no compression fracture of the cervical spine. No jumped or perched facet is noted. Multilevel degenerative disc and degenerative joint disease is noted. The prevertebral soft tissues are unremarkable. The airway is widely patent. Images through the lung apices are negative for a pneumothorax. 1. There is no acute compression fracture or subluxation of the cervical spine. 2. Multilevel degenerative disc and degenerative joint disease. Xr Hip 2-3 Vw W Pelvis Right    Result Date: 11/18/2020  EXAMINATION: 4 XRAY VIEWS OF THE RIGHT FEMUR; ONE XRAY VIEW OF THE PELVIS AND TWO XRAY VIEWS RIGHT HIP 11/18/2020 4:07 pm COMPARISON: None. HISTORY: ORDERING SYSTEM PROVIDED HISTORY: Pain TECHNOLOGIST PROVIDED HISTORY: Reason for exam:->Pain What reading provider will be dictating this exam?->CRC FINDINGS: There is a transcervical fracture through the right femoral neck. The femoral head maintains articulation with the acetabulum. The remainder of the right femur is intact. The pelvic bones are intact, without fracture or focal lesion. The sacroiliac joints and the pubic symphysis are unremarkable. Severe degenerative changes are seen in the right knee. Right femoral neck fracture.       EKG:  I have reviewed the EKG with the following interpretation:  Sinus rhythm, heart rate 85 bpm.  No acute ST/T wave abnormality    Labs:     Recent Labs     11/18/20  1443   WBC 10.9   HGB 13.9   HCT 39.9        Recent Labs     11/18/20  1443      K 3.9   CL 96*   CO2 28   BUN 35*   CREATININE 1.4*   CALCIUM 9.5     Recent Labs     11/18/20  1443   AST 15   ALT 12   BILITOT 1.4*   ALKPHOS 85     Recent Labs     11/18/20  1443   INR 1.1     Recent Labs     11/18/20  1443   TROPONINI <0.01       Urinalysis:    Lab Results   Component Value Date    NITRU Negative 10/07/2020    WBCUA 0-1 10/07/2020    BACTERIA RARE 10/07/2020    RBCUA 0-1 10/07/2020    BLOODU Negative 10/07/2020    SPECGRAV 1.025 10/07/2020    GLUCOSEU >=1000 10/07/2020             Active Hospital Problems    Diagnosis Date Noted    Closed right hip fracture, initial encounter ([facility] [S72.001A] 11/18/2020     ASSESSMENT:  Right femoral neck fracture - secondary to below  Mechanical fall  Type 2 diabetes mellitus - insulin-dependent  Diabetic Neuropathy   Hypertension  Stage II CKD - stable  GERD  Depression  Vascular Dementia    PLAN:  PRN analgesia without over sedating  Surgical management per orthopedic surgeon  Resume home medications  Check hemoglobin A1c  Basal and corrective bolus insulin regimen  Hypoglycemic precautions  PT eval postoperatively    Thank you for the consultation, will follow up as needed    Mary Coronado MD 11/18/2020 6:00 PM

## 2020-11-18 NOTE — ED PROVIDER NOTES
ED Attending  CC: No       [facility]:  11/18/20, Time: 2:23 PM STEFF Busch is a 63 y.o. male presenting to the ED for fall and right hip pain, beginning last night ago. The complaint has been constant, moderate in severity, and worsened by nothing. States he had a mechanical fall last evening fell onto the right hip area. His son did help him off the floor and into bed. He states he continues to have pain to the right hip. He states he thinks he may have had a low episode of blood sugar last evening which prompted the fall. He does have visible shortening and external rotation of the right lower extremity. Denies any head or neck pain. Denies any loss of conscious. Is on no anticoagulation. ROS:   Pertinent positives and negatives are stated within HPI, all other systems reviewed and are negative.  --------------------------------------------- PAST HISTORY ---------------------------------------------  Past Medical History:  has a past medical history of Anxiety, Bronchitis, Cellulitis, Chronic sinusitis, Depression, Diabetes mellitus ([facility] Diabetic retinopathy ([facility] [address] [facility] Fracture of left foot, High cholesterol, Hypercholesteremia, Hypercholesterolemia, Hypertension, Lumbago, Moderate mood disorder ([facility] [address] [facility] and Third nerve palsy. Past Surgical History:  has a past surgical history that includes eye surgery. Social History:  reports that he has never smoked. He has never used smokeless tobacco. He reports that he does not drink alcohol or use drugs. Family History: family history is not on file. The patients home medications have been reviewed. Allergies: Patient has no known allergies.     ---------------------------------------------------PHYSICAL EXAM--------------------------------------    Constitutional/General: Alert and oriented x3, well appearing, non toxic in NAD  Head: Normocephalic and atraumatic  Eyes: PERRL, EOMI  Mouth: Oropharynx clear, handling secretions, no trismus  Neck: Supple, full ROM, non tender to palpation in the midline, no stridor, no crepitus, no meningeal signs  Pulmonary: Lungs clear to auscultation bilaterally, no wheezes, rales, or rhonchi. Not in respiratory distress  Cardiovascular:  Regular rate. Regular rhythm. No murmurs, gallops, or rubs. 2+ distal pulses  Chest: no chest wall tenderness  Abdomen: Soft. Non tender. Non distended. +BS. No rebound, guarding, or rigidity. No pulsatile masses appreciated. Musculoskeletal: Moves all extremities x 3. Warm and well perfused, no clubbing, cyanosis, or edema. Capillary refill <3 seconds, has tenderness on palpation of the right lateral hip area. There is visible shortening and external rotation of the right lower extremity. Pedal pulse are palpable 2+ capillary refill less than 3 seconds. Range of motion deferred at this time. Skin: warm and dry. No rashes. Neurologic: GCS 15, CN 2-12 grossly intact, no focal deficits, symmetric strength 5/5 in the upper and lower extremities bilaterally  Psych: Normal Affect    -------------------------------------------------- RESULTS -------------------------------------------------  I have personally reviewed all laboratory and imaging results for this patient. Results are listed below.      LABS:  Results for orders placed or performed during the hospital encounter of 11/18/20   Troponin   Result Value Ref Range    Troponin <0.01 0.00 - 0.03 ng/mL   CBC Auto Differential   Result Value Ref Range    WBC 10.9 4.5 - 11.5 E9/L    RBC 5.41 3.80 - 5.80 E12/L    Hemoglobin 13.9 12.5 - 16.5 g/dL    Hematocrit 39.9 37.0 - 54.0 %    MCV 73.8 (L) 80.0 - 99.9 fL    MCH 25.7 (L) 26.0 - 35.0 pg    MCHC 34.8 (H) 32.0 - 34.5 %    RDW 13.9 11.5 - 15.0 fL    Platelets 690 208 - 917 E9/L    MPV 10.6 7.0 - 12.0 fL    Neutrophils % 74.0 43.0 - 80.0 %    Immature Granulocytes % 0.6 0.0 - 5.0 %    Lymphocytes % 14.9 (L) 20.0 - 42.0 %    Monocytes % 7.7 2.0 - 12.0 %    Eosinophils % 2.2 0.0 - 6.0 %    Basophils % 0.6 0.0 - 2.0 %    Neutrophils Absolute 8.08 (H) 1.80 - 7.30 E9/L    Immature Granulocytes # 0.06 E9/L    Lymphocytes Absolute 1.62 1.50 - 4.00 E9/L    Monocytes Absolute 0.84 0.10 - 0.95 E9/L    Eosinophils Absolute 0.24 0.05 - 0.50 E9/L    Basophils Absolute 0.06 0.00 - 0.20 E9/L   Comprehensive Metabolic Panel   Result Value Ref Range    Sodium 133 132 - 146 mmol/L    Potassium 3.9 3.5 - 5.0 mmol/L    Chloride 96 (L) 98 - 107 mmol/L    CO2 28 22 - 29 mmol/L    Anion Gap 9 7 - 16 mmol/L    Glucose 159 (H) 74 - 99 mg/dL    BUN 35 (H) 8 - 23 mg/dL    CREATININE 1.4 (H) 0.7 - 1.2 mg/dL    GFR Non-African American 51 >=60 mL/min/1.73    GFR African American >60     Calcium 9.5 8.6 - 10.2 mg/dL    Total Protein 6.3 (L) 6.4 - 8.3 g/dL    Alb 3.5 3.5 - 5.2 g/dL    Total Bilirubin 1.4 (H) 0.0 - 1.2 mg/dL    Alkaline Phosphatase 85 40 - 129 U/L    ALT 12 0 - 40 U/L    AST 15 0 - 39 U/L   Protime-INR   Result Value Ref Range    Protime 11.9 9.3 - 12.4 sec    INR 1.1    APTT   Result Value Ref Range    aPTT 27.1 24.5 - 35.1 sec   Magnesium   Result Value Ref Range    Magnesium 2.0 1.6 - 2.6 mg/dL   POCT Glucose   Result Value Ref Range    Glucose 154 mg/dL    QC OK? yes    POCT Glucose   Result Value Ref Range    Meter Glucose 154 (H) 74 - 99 mg/dL   EKG 12 Lead   Result Value Ref Range    Ventricular Rate 85 BPM    Atrial Rate 85 BPM    P-R Interval 144 ms    QRS Duration 76 ms    Q-T Interval 368 ms    QTc Calculation (Bazett) 437 ms    P Axis 75 degrees    R Axis 25 degrees    T Axis 43 degrees   TYPE AND SCREEN   Result Value Ref Range    ABO/Rh A POS     Antibody Screen NEG        RADIOLOGY:  Interpreted by Radiologist.  CT HEAD WO CONTRAST   Final Result   1. There is no acute intracranial abnormality. Specifically, there is no   intracranial hemorrhage. 2. Atrophy and periventricular leukomalacia,   3. Chronic mucous retention cyst seen within the right sphenoid sinus.       CT CERVICAL SPINE WO CONTRAST   Final Result   1. There is no acute compression fracture or subluxation of the cervical   spine. 2. Multilevel degenerative disc and degenerative joint disease. XR CHEST (2 VW)   Final Result   No acute process. XR FEMUR RIGHT (MIN 2 VIEWS)   Final Result   Right femoral neck fracture. XR HIP 2-3 VW W PELVIS RIGHT   Final Result   Right femoral neck fracture. EKG Interpretation  Interpreted by emergency department physician    Rhythm: normal sinus   Rate: normal  Axis: normal  Conduction: normal  ST Segments: no acute change  T Waves: no acute change    Clinical Impression: no acute changes  Comparison to prior EKG: stable as compared to patient's most recent EKG      ------------------------- NURSING NOTES AND VITALS REVIEWED ---------------------------   The nursing notes within the ED encounter and vital signs as below have been reviewed by myself. /65   Pulse 94   Temp 98 °F (36.7 °C) (Infrared)   Resp 20   Ht 5' 8\" (1.727 m)   Wt 170 lb (77.1 kg)   SpO2 97%   BMI 25.85 kg/m²   Oxygen Saturation Interpretation: Normal    The patients available past medical records and past encounters were reviewed.         ------------------------------ ED COURSE/MEDICAL DECISION MAKING----------------------  Medications   morphine sulfate (PF) injection 4 mg (has no administration in time range)   tranexamic acid (CYKLOKAPRON) 1,000 mg in dextrose 5 % 100 mL IVPB (has no administration in time range)   morphine sulfate (PF) injection 4 mg (4 mg Intravenous Given 20 1516)             Medical Decision Makin- exam by Dr. Kamini Giang, will continue to monitor and re-evaluate   1625- patient was updated on abnormal findings of hte right hip fracture and the plan for admit, I did attempt to contact the patients son, but no answer   36- Dr. Cornelius South with sound was called, requested to check with ortho if they will admit   1710-Dr. Shawn Perez and  [id number] [facility] orthopedic resident at bedside update on patient's abnormal findings they will admit the patient under their service to a medical bed with a diagnosis of right femoral neck fracture with consult to [facility] physician group for medical management. Re-Evaluations:             Re-evaluation. Patients symptoms show no change      Consultations:                 Critical Care: This patient's ED course included: a personal history and physicial examination, re-evaluation prior to disposition, multiple bedside re-evaluations and a personal history and physicial eaxmination    This patient has remained hemodynamically stable and remained unchanged during their ED course. Counseling: The emergency provider has spoken with the patient and discussed todays results, in addition to providing specific details for the plan of care and counseling regarding the diagnosis and prognosis. Questions are answered at this time and they are agreeable with the plan.       --------------------------------- IMPRESSION AND DISPOSITION ---------------------------------    IMPRESSION  1. Closed fracture of neck of right femur, initial encounter ([facility]        DISPOSITION  Disposition: Admit to med/surg floor  Patient condition is stable        NOTE: This report was transcribed using voice recognition software.  Every effort was made to ensure accuracy; however, inadvertent computerized transcription errors may be present         MIRA Brady - CNP  11/18/20 [facility] MIRA - ALESSANDRA  11/18/20 1921

## 2020-11-18 NOTE — H&P
Department of Orthopedic Surgery  Resident H&P Note          CHIEF COMPLAINT: Right hip pain    HISTORY OF PRESENT ILLNESS:                The patient is a 63 y.o. male who presents with right hip pain after a fall from standing height yesterday evening. Patient states he was at home feeling weak while walking and subsequently fell as a result. He fell onto his right side and experienced immediate pain. He was unable to tolerate ambulation as a result of his injury. His son was able to assist him into bed last evening and he decided to sleep with hopes that the pain would subside. He woke up this morning and continued to have pain in the right hip which prompted him to come to the emergency room. Imaging was performed in the ER today and orthopedics was consulted for further evaluation management. Currently, patient is admitting to significant pain in his right groin and thigh. He denies numbness/tingling/paresthesias. Denies pain elsewhere. Patient has a medical history significant for diabetes for which she attributes to feeling overall weakness yesterday. He states he does have trouble with controlling his diabetic condition. Patient is a community ambulator at baseline without assistive device. Patient denies oral anticoagulation use at baseline. Patient is currently not employed as he is on disability for his medical conditions.     Past Medical History:        Diagnosis Date    Anxiety     Bronchitis     Cellulitis     Chronic sinusitis     Depression     Diabetes mellitus (HCC)     Diabetic retinopathy (HCC)     Fracture of left foot     High cholesterol     Hypercholesteremia     Hypercholesterolemia     Hypertension     Lumbago     Moderate mood disorder (HCC)     Third nerve palsy      Past Surgical History:        Procedure Laterality Date    EYE SURGERY       Current Medications:   Current Facility-Administered Medications: morphine sulfate (PF) injection 4 mg, 4 mg, Intravenous, Once  tranexamic acid (CYKLOKAPRON) 1,000 mg in dextrose 5 % 100 mL IVPB, 1,000 mg, Intravenous, Once  Allergies:  Patient has no known allergies. Social History:   TOBACCO:   reports that he has never smoked. He has never used smokeless tobacco.  ETOH:   reports no history of alcohol use. DRUGS:   reports no history of drug use. ACTIVITIES OF DAILY LIVING:    OCCUPATION:    Family History:   No family history on file. REVIEW OF SYSTEMS:  CONSTITUTIONAL:  negative for  fevers, chills  EYES:  negative for blurred vision, visual disturbance  HEENT:  negative for  hearing loss, voice change  RESPIRATORY:  negative for  dyspnea, wheezing  CARDIOVASCULAR:  negative for  chest pain, palpitations  GASTROINTESTINAL:  negative for nausea, vomiting  GENITOURINARY:  negative for frequency, urinary incontinence  HEMATOLOGIC/LYMPHATIC:  negative for bleeding and petechiae  MUSCULOSKELETAL:  positive for right hip pain   NEUROLOGICAL:  negative for headaches, dizziness  BEHAVIOR/PSYCH:  negative for increased agitation and anxiety    PHYSICAL EXAM:    VITALS:  /65   Pulse 94   Temp 98 °F (36.7 °C) (Infrared)   Resp 20   Ht 5' 8\" (1.727 m)   Wt 170 lb (77.1 kg)   SpO2 97%   BMI 25.85 kg/m²   CONSTITUTIONAL:  Awake, alert, answers questions appropriately  EYES:  Lids and lashes normal, pupils equal, round and reactive to light, extra ocular muscles intact  HENT:  Normocephalic, without obvious abnormality, atraumatic, neck supple, symmetric  LUNGS:  No increased work of breathing  CARDIOVASCULAR:  Brisk vascular capillary refill to all extremities  ABDOMEN:  Non-tender, Non-distended  NEUROLOGIC:  Awake, alert, oriented to name, place and time. Cranial nerves II-XII are grossly intact.   MUSCULOSKELETAL:  Right lower Extremity:   Right leg is shortened and externally rotated  Tenderness palpation about the right groin and anterior thigh  Skin is intact circumferentially over the proximal right hip  Compartments soft and compressible  +PF/DF/EHL  +2/4 DP & PT pulses, Brisk Cap refill, Toes warm and perfused  Distal sensation grossly intact to Peroneals, Sural, Saphenous, and tibial nrs  · Positive logroll  · Patient nontender about the knee, ankle, or foot distally        DATA:    CBC:   Lab Results   Component Value Date    WBC 10.9 11/18/2020    RBC 5.41 11/18/2020    HGB 13.9 11/18/2020    HCT 39.9 11/18/2020    MCV 73.8 11/18/2020    MCH 25.7 11/18/2020    MCHC 34.8 11/18/2020    RDW 13.9 11/18/2020     11/18/2020    MPV 10.6 11/18/2020     PT/INR:    Lab Results   Component Value Date    PROTIME 11.9 11/18/2020    INR 1.1 11/18/2020     Radiology Review:    · X-ray right hip/femur: Displaced transcervical femoral neck fracture. The right femur is externally rotated and shortened. No other fractures or dislocations noted.     IMPRESSION:  · Right femoral neck fracture    PLAN:  · NPO after midnight  · Orthopedic admission  · Treatment consent  · Hold anticoagulation  · Medical optimization  · Plan for right hip hemiarthroplasty tomorrow, 11/19  · Preoperative labs and imaging  · Pain control  · Ice as needed  · Discussed with Dr. Ольга Suarez

## 2020-11-19 ENCOUNTER — APPOINTMENT (OUTPATIENT)
Dept: GENERAL RADIOLOGY | Age: 65
DRG: 522 | End: 2020-11-19
Payer: MEDICARE

## 2020-11-19 ENCOUNTER — ANESTHESIA (OUTPATIENT)
Dept: OPERATING ROOM | Age: 65
DRG: 522 | End: 2020-11-19
Payer: MEDICARE

## 2020-11-19 VITALS — OXYGEN SATURATION: 98 % | DIASTOLIC BLOOD PRESSURE: 66 MMHG | TEMPERATURE: 96.8 F | SYSTOLIC BLOOD PRESSURE: 113 MMHG

## 2020-11-19 LAB
ANION GAP SERPL CALCULATED.3IONS-SCNC: 7 MMOL/L (ref 7–16)
BUN BLDV-MCNC: 32 MG/DL (ref 8–23)
CALCIUM SERPL-MCNC: 9.3 MG/DL (ref 8.6–10.2)
CHLORIDE BLD-SCNC: 100 MMOL/L (ref 98–107)
CO2: 30 MMOL/L (ref 22–29)
CREAT SERPL-MCNC: 1.3 MG/DL (ref 0.7–1.2)
EKG ATRIAL RATE: 85 BPM
EKG P AXIS: 75 DEGREES
EKG P-R INTERVAL: 144 MS
EKG Q-T INTERVAL: 368 MS
EKG QRS DURATION: 76 MS
EKG QTC CALCULATION (BAZETT): 437 MS
EKG R AXIS: 25 DEGREES
EKG T AXIS: 43 DEGREES
EKG VENTRICULAR RATE: 85 BPM
GFR AFRICAN AMERICAN: >60
GFR NON-AFRICAN AMERICAN: 56 ML/MIN/1.73
GLUCOSE BLD-MCNC: 83 MG/DL (ref 74–99)
HCT VFR BLD CALC: 37.4 % (ref 37–54)
HEMOGLOBIN: 13.1 G/DL (ref 12.5–16.5)
MCH RBC QN AUTO: 25.8 PG (ref 26–35)
MCHC RBC AUTO-ENTMCNC: 35 % (ref 32–34.5)
MCV RBC AUTO: 73.6 FL (ref 80–99.9)
METER GLUCOSE: 101 MG/DL (ref 74–99)
METER GLUCOSE: 104 MG/DL (ref 74–99)
METER GLUCOSE: 208 MG/DL (ref 74–99)
PDW BLD-RTO: 13.6 FL (ref 11.5–15)
PLATELET # BLD: 207 E9/L (ref 130–450)
PMV BLD AUTO: 10.9 FL (ref 7–12)
POTASSIUM REFLEX MAGNESIUM: 4.2 MMOL/L (ref 3.5–5)
RBC # BLD: 5.08 E12/L (ref 3.8–5.8)
SODIUM BLD-SCNC: 137 MMOL/L (ref 132–146)
WBC # BLD: 10.4 E9/L (ref 4.5–11.5)

## 2020-11-19 PROCEDURE — 6360000002 HC RX W HCPCS

## 2020-11-19 PROCEDURE — 0SRR0JA REPLACEMENT OF RIGHT HIP JOINT, FEMORAL SURFACE WITH SYNTHETIC SUBSTITUTE, UNCEMENTED, OPEN APPROACH: ICD-10-PCS | Performed by: ORTHOPAEDIC SURGERY

## 2020-11-19 PROCEDURE — 7100000001 HC PACU RECOVERY - ADDTL 15 MIN: Performed by: ORTHOPAEDIC SURGERY

## 2020-11-19 PROCEDURE — 3700000000 HC ANESTHESIA ATTENDED CARE: Performed by: ORTHOPAEDIC SURGERY

## 2020-11-19 PROCEDURE — 6370000000 HC RX 637 (ALT 250 FOR IP): Performed by: HOSPITALIST

## 2020-11-19 PROCEDURE — 6370000000 HC RX 637 (ALT 250 FOR IP): Performed by: ORTHOPAEDIC SURGERY

## 2020-11-19 PROCEDURE — 3600000005 HC SURGERY LEVEL 5 BASE: Performed by: ORTHOPAEDIC SURGERY

## 2020-11-19 PROCEDURE — 2500000003 HC RX 250 WO HCPCS: Performed by: ORTHOPAEDIC SURGERY

## 2020-11-19 PROCEDURE — 2580000003 HC RX 258: Performed by: ORTHOPAEDIC SURGERY

## 2020-11-19 PROCEDURE — 3600000015 HC SURGERY LEVEL 5 ADDTL 15MIN: Performed by: ORTHOPAEDIC SURGERY

## 2020-11-19 PROCEDURE — 85027 COMPLETE CBC AUTOMATED: CPT

## 2020-11-19 PROCEDURE — 99223 1ST HOSP IP/OBS HIGH 75: CPT | Performed by: ORTHOPAEDIC SURGERY

## 2020-11-19 PROCEDURE — U0003 INFECTIOUS AGENT DETECTION BY NUCLEIC ACID (DNA OR RNA); SEVERE ACUTE RESPIRATORY SYNDROME CORONAVIRUS 2 (SARS-COV-2) (CORONAVIRUS DISEASE [COVID-19]), AMPLIFIED PROBE TECHNIQUE, MAKING USE OF HIGH THROUGHPUT TECHNOLOGIES AS DESCRIBED BY CMS-2020-01-R: HCPCS

## 2020-11-19 PROCEDURE — 88305 TISSUE EXAM BY PATHOLOGIST: CPT

## 2020-11-19 PROCEDURE — 6360000002 HC RX W HCPCS: Performed by: ORTHOPAEDIC SURGERY

## 2020-11-19 PROCEDURE — 3700000001 HC ADD 15 MINUTES (ANESTHESIA): Performed by: ORTHOPAEDIC SURGERY

## 2020-11-19 PROCEDURE — 73502 X-RAY EXAM HIP UNI 2-3 VIEWS: CPT

## 2020-11-19 PROCEDURE — 72170 X-RAY EXAM OF PELVIS: CPT

## 2020-11-19 PROCEDURE — C1776 JOINT DEVICE (IMPLANTABLE): HCPCS | Performed by: ORTHOPAEDIC SURGERY

## 2020-11-19 PROCEDURE — 80048 BASIC METABOLIC PNL TOTAL CA: CPT

## 2020-11-19 PROCEDURE — 2500000003 HC RX 250 WO HCPCS

## 2020-11-19 PROCEDURE — 27236 TREAT THIGH FRACTURE: CPT | Performed by: ORTHOPAEDIC SURGERY

## 2020-11-19 PROCEDURE — 82962 GLUCOSE BLOOD TEST: CPT

## 2020-11-19 PROCEDURE — 7100000000 HC PACU RECOVERY - FIRST 15 MIN: Performed by: ORTHOPAEDIC SURGERY

## 2020-11-19 PROCEDURE — 88311 DECALCIFY TISSUE: CPT

## 2020-11-19 PROCEDURE — 2580000003 HC RX 258

## 2020-11-19 PROCEDURE — 93010 ELECTROCARDIOGRAM REPORT: CPT | Performed by: INTERNAL MEDICINE

## 2020-11-19 PROCEDURE — 2709999900 HC NON-CHARGEABLE SUPPLY: Performed by: ORTHOPAEDIC SURGERY

## 2020-11-19 PROCEDURE — 1200000000 HC SEMI PRIVATE

## 2020-11-19 PROCEDURE — 36415 COLL VENOUS BLD VENIPUNCTURE: CPT

## 2020-11-19 DEVICE — STEM FEM SZ 4 L125MM NK L35MM +44MM OFFSET 127DEG HIP CO: Type: IMPLANTABLE DEVICE | Site: HIP | Status: FUNCTIONAL

## 2020-11-19 DEVICE — HEAD FEM OD50MM ID26MM HIP UNIV SYS UHR: Type: IMPLANTABLE DEVICE | Site: HIP | Status: FUNCTIONAL

## 2020-11-19 DEVICE — HEAD FEM DIA26MM -3MM OFFSET HIP VIT POLYETH NK V40 TAPR: Type: IMPLANTABLE DEVICE | Site: HIP | Status: FUNCTIONAL

## 2020-11-19 RX ORDER — MORPHINE SULFATE 2 MG/ML
2 INJECTION, SOLUTION INTRAMUSCULAR; INTRAVENOUS
Status: DISCONTINUED | OUTPATIENT
Start: 2020-11-19 | End: 2020-11-19 | Stop reason: SDUPTHER

## 2020-11-19 RX ORDER — SODIUM CHLORIDE 0.9 % (FLUSH) 0.9 %
10 SYRINGE (ML) INJECTION EVERY 12 HOURS SCHEDULED
Status: DISCONTINUED | OUTPATIENT
Start: 2020-11-19 | End: 2020-11-22 | Stop reason: HOSPADM

## 2020-11-19 RX ORDER — NEOSTIGMINE METHYLSULFATE 1 MG/ML
INJECTION, SOLUTION INTRAVENOUS PRN
Status: DISCONTINUED | OUTPATIENT
Start: 2020-11-19 | End: 2020-11-19 | Stop reason: SDUPTHER

## 2020-11-19 RX ORDER — PROPOFOL 10 MG/ML
INJECTION, EMULSION INTRAVENOUS PRN
Status: DISCONTINUED | OUTPATIENT
Start: 2020-11-19 | End: 2020-11-19 | Stop reason: SDUPTHER

## 2020-11-19 RX ORDER — OXYCODONE HYDROCHLORIDE 5 MG/1
5 TABLET ORAL EVERY 4 HOURS PRN
Status: DISCONTINUED | OUTPATIENT
Start: 2020-11-19 | End: 2020-11-19 | Stop reason: SDUPTHER

## 2020-11-19 RX ORDER — SENNA AND DOCUSATE SODIUM 50; 8.6 MG/1; MG/1
1 TABLET, FILM COATED ORAL 2 TIMES DAILY
Status: DISCONTINUED | OUTPATIENT
Start: 2020-11-19 | End: 2020-11-22 | Stop reason: HOSPADM

## 2020-11-19 RX ORDER — ONDANSETRON 2 MG/ML
4 INJECTION INTRAMUSCULAR; INTRAVENOUS
Status: DISCONTINUED | OUTPATIENT
Start: 2020-11-19 | End: 2020-11-19 | Stop reason: HOSPADM

## 2020-11-19 RX ORDER — LIDOCAINE HYDROCHLORIDE 20 MG/ML
INJECTION, SOLUTION INTRAVENOUS PRN
Status: DISCONTINUED | OUTPATIENT
Start: 2020-11-19 | End: 2020-11-19 | Stop reason: SDUPTHER

## 2020-11-19 RX ORDER — ASPIRIN 81 MG/1
81 TABLET ORAL 2 TIMES DAILY
Qty: 56 TABLET | Refills: 0 | Status: SHIPPED | OUTPATIENT
Start: 2020-11-19 | End: 2021-01-11

## 2020-11-19 RX ORDER — ONDANSETRON 2 MG/ML
4 INJECTION INTRAMUSCULAR; INTRAVENOUS EVERY 6 HOURS PRN
Status: DISCONTINUED | OUTPATIENT
Start: 2020-11-19 | End: 2020-11-22 | Stop reason: HOSPADM

## 2020-11-19 RX ORDER — PROMETHAZINE HYDROCHLORIDE 25 MG/1
12.5 TABLET ORAL EVERY 6 HOURS PRN
Status: DISCONTINUED | OUTPATIENT
Start: 2020-11-19 | End: 2020-11-22 | Stop reason: HOSPADM

## 2020-11-19 RX ORDER — SODIUM CHLORIDE 9 MG/ML
INJECTION, SOLUTION INTRAVENOUS CONTINUOUS
Status: ACTIVE | OUTPATIENT
Start: 2020-11-19 | End: 2020-11-20

## 2020-11-19 RX ORDER — PHENYLEPHRINE HCL IN 0.9% NACL 1 MG/10 ML
SYRINGE (ML) INTRAVENOUS PRN
Status: DISCONTINUED | OUTPATIENT
Start: 2020-11-19 | End: 2020-11-19 | Stop reason: SDUPTHER

## 2020-11-19 RX ORDER — ONDANSETRON 2 MG/ML
INJECTION INTRAMUSCULAR; INTRAVENOUS PRN
Status: DISCONTINUED | OUTPATIENT
Start: 2020-11-19 | End: 2020-11-19 | Stop reason: SDUPTHER

## 2020-11-19 RX ORDER — OXYCODONE HYDROCHLORIDE 10 MG/1
10 TABLET ORAL EVERY 4 HOURS PRN
Status: DISCONTINUED | OUTPATIENT
Start: 2020-11-19 | End: 2020-11-19 | Stop reason: SDUPTHER

## 2020-11-19 RX ORDER — SODIUM CHLORIDE 0.9 % (FLUSH) 0.9 %
10 SYRINGE (ML) INJECTION PRN
Status: DISCONTINUED | OUTPATIENT
Start: 2020-11-19 | End: 2020-11-22 | Stop reason: HOSPADM

## 2020-11-19 RX ORDER — ASPIRIN 81 MG/1
81 TABLET ORAL 2 TIMES DAILY
Status: DISCONTINUED | OUTPATIENT
Start: 2020-11-19 | End: 2020-11-20

## 2020-11-19 RX ORDER — ACETAMINOPHEN 325 MG/1
650 TABLET ORAL EVERY 4 HOURS PRN
Status: DISCONTINUED | OUTPATIENT
Start: 2020-11-19 | End: 2020-11-22 | Stop reason: HOSPADM

## 2020-11-19 RX ORDER — DEXAMETHASONE SODIUM PHOSPHATE 10 MG/ML
INJECTION INTRAMUSCULAR; INTRAVENOUS PRN
Status: DISCONTINUED | OUTPATIENT
Start: 2020-11-19 | End: 2020-11-19 | Stop reason: SDUPTHER

## 2020-11-19 RX ORDER — ROCURONIUM BROMIDE 10 MG/ML
INJECTION, SOLUTION INTRAVENOUS PRN
Status: DISCONTINUED | OUTPATIENT
Start: 2020-11-19 | End: 2020-11-19 | Stop reason: SDUPTHER

## 2020-11-19 RX ORDER — VANCOMYCIN HYDROCHLORIDE 1 G/20ML
INJECTION, POWDER, LYOPHILIZED, FOR SOLUTION INTRAVENOUS PRN
Status: DISCONTINUED | OUTPATIENT
Start: 2020-11-19 | End: 2020-11-19 | Stop reason: ALTCHOICE

## 2020-11-19 RX ORDER — OXYCODONE HYDROCHLORIDE 5 MG/1
5 TABLET ORAL EVERY 6 HOURS PRN
Qty: 28 TABLET | Refills: 0 | Status: SHIPPED | OUTPATIENT
Start: 2020-11-19 | End: 2020-11-26

## 2020-11-19 RX ORDER — CEFAZOLIN SODIUM 1 G/3ML
INJECTION, POWDER, FOR SOLUTION INTRAMUSCULAR; INTRAVENOUS PRN
Status: DISCONTINUED | OUTPATIENT
Start: 2020-11-19 | End: 2020-11-19 | Stop reason: SDUPTHER

## 2020-11-19 RX ORDER — MORPHINE SULFATE 4 MG/ML
4 INJECTION, SOLUTION INTRAMUSCULAR; INTRAVENOUS
Status: DISCONTINUED | OUTPATIENT
Start: 2020-11-19 | End: 2020-11-19 | Stop reason: SDUPTHER

## 2020-11-19 RX ORDER — SODIUM CHLORIDE 9 MG/ML
INJECTION, SOLUTION INTRAVENOUS CONTINUOUS PRN
Status: DISCONTINUED | OUTPATIENT
Start: 2020-11-19 | End: 2020-11-19 | Stop reason: SDUPTHER

## 2020-11-19 RX ORDER — PROMETHAZINE HYDROCHLORIDE 25 MG/ML
6.25 INJECTION, SOLUTION INTRAMUSCULAR; INTRAVENOUS
Status: DISCONTINUED | OUTPATIENT
Start: 2020-11-19 | End: 2020-11-19 | Stop reason: HOSPADM

## 2020-11-19 RX ORDER — GLYCOPYRROLATE 1 MG/5 ML
SYRINGE (ML) INTRAVENOUS PRN
Status: DISCONTINUED | OUTPATIENT
Start: 2020-11-19 | End: 2020-11-19 | Stop reason: SDUPTHER

## 2020-11-19 RX ADMIN — TRANEXAMIC ACID 1000 MG: 1 INJECTION, SOLUTION INTRAVENOUS at 15:20

## 2020-11-19 RX ADMIN — TRANEXAMIC ACID 1 G: 100 INJECTION, SOLUTION INTRAVENOUS at 11:52

## 2020-11-19 RX ADMIN — MORPHINE SULFATE 4 MG: 4 INJECTION, SOLUTION INTRAMUSCULAR; INTRAVENOUS at 20:28

## 2020-11-19 RX ADMIN — Medication 0.6 MG: at 13:05

## 2020-11-19 RX ADMIN — SODIUM CHLORIDE: 9 INJECTION, SOLUTION INTRAVENOUS at 11:50

## 2020-11-19 RX ADMIN — LIDOCAINE HYDROCHLORIDE 100 MG: 20 INJECTION, SOLUTION INTRAVENOUS at 11:55

## 2020-11-19 RX ADMIN — INSULIN LISPRO 1 UNITS: 100 INJECTION, SOLUTION INTRAVENOUS; SUBCUTANEOUS at 20:36

## 2020-11-19 RX ADMIN — ASPIRIN 81 MG: 81 TABLET, COATED ORAL at 20:28

## 2020-11-19 RX ADMIN — OXYCODONE HYDROCHLORIDE 10 MG: 10 TABLET ORAL at 18:23

## 2020-11-19 RX ADMIN — Medication 100 MCG: at 12:08

## 2020-11-19 RX ADMIN — SODIUM CHLORIDE: 9 INJECTION, SOLUTION INTRAVENOUS at 16:30

## 2020-11-19 RX ADMIN — ONDANSETRON HYDROCHLORIDE 4 MG: 2 INJECTION, SOLUTION INTRAMUSCULAR; INTRAVENOUS at 13:05

## 2020-11-19 RX ADMIN — Medication 100 MCG: at 12:46

## 2020-11-19 RX ADMIN — CEFAZOLIN 2 MG: 1 INJECTION, POWDER, FOR SOLUTION INTRAMUSCULAR; INTRAVENOUS at 11:57

## 2020-11-19 RX ADMIN — ROCURONIUM BROMIDE 50 MG: 10 INJECTION, SOLUTION INTRAVENOUS at 11:55

## 2020-11-19 RX ADMIN — Medication 2 G: at 20:28

## 2020-11-19 RX ADMIN — DEXAMETHASONE SODIUM PHOSPHATE 10 MG: 10 INJECTION INTRAMUSCULAR; INTRAVENOUS at 11:56

## 2020-11-19 RX ADMIN — HYDROMORPHONE HYDROCHLORIDE 1 MG: 1 INJECTION, SOLUTION INTRAMUSCULAR; INTRAVENOUS; SUBCUTANEOUS at 14:23

## 2020-11-19 RX ADMIN — MORPHINE SULFATE 4 MG: 4 INJECTION, SOLUTION INTRAMUSCULAR; INTRAVENOUS at 05:46

## 2020-11-19 RX ADMIN — Medication 2 G: at 11:30

## 2020-11-19 RX ADMIN — Medication 10 ML: at 05:46

## 2020-11-19 RX ADMIN — SODIUM CHLORIDE: 9 INJECTION, SOLUTION INTRAVENOUS at 13:11

## 2020-11-19 RX ADMIN — Medication 200 MCG: at 12:49

## 2020-11-19 RX ADMIN — Medication 3 MG: at 13:05

## 2020-11-19 RX ADMIN — PROPOFOL 100 MG: 10 INJECTION, EMULSION INTRAVENOUS at 11:55

## 2020-11-19 RX ADMIN — SENNOSIDES AND DOCUSATE SODIUM 1 TABLET: 8.6; 5 TABLET ORAL at 20:28

## 2020-11-19 RX ADMIN — INSULIN GLARGINE 60 UNITS: 100 INJECTION, SOLUTION SUBCUTANEOUS at 20:35

## 2020-11-19 ASSESSMENT — PULMONARY FUNCTION TESTS
PIF_VALUE: 20
PIF_VALUE: 18
PIF_VALUE: 19
PIF_VALUE: 1
PIF_VALUE: 23
PIF_VALUE: 15
PIF_VALUE: 1
PIF_VALUE: 23
PIF_VALUE: 18
PIF_VALUE: 24
PIF_VALUE: 19
PIF_VALUE: 20
PIF_VALUE: 19
PIF_VALUE: 20
PIF_VALUE: 18
PIF_VALUE: 19
PIF_VALUE: 19
PIF_VALUE: 18
PIF_VALUE: 24
PIF_VALUE: 19
PIF_VALUE: 15
PIF_VALUE: 18
PIF_VALUE: 15
PIF_VALUE: 19
PIF_VALUE: 20
PIF_VALUE: 20
PIF_VALUE: 14
PIF_VALUE: 0
PIF_VALUE: 5
PIF_VALUE: 16
PIF_VALUE: 20
PIF_VALUE: 18
PIF_VALUE: 15
PIF_VALUE: 1
PIF_VALUE: 7
PIF_VALUE: 19
PIF_VALUE: 18
PIF_VALUE: 20
PIF_VALUE: 19
PIF_VALUE: 19
PIF_VALUE: 15
PIF_VALUE: 15
PIF_VALUE: 16
PIF_VALUE: 19
PIF_VALUE: 16
PIF_VALUE: 16
PIF_VALUE: 20
PIF_VALUE: 13
PIF_VALUE: 1
PIF_VALUE: 16
PIF_VALUE: 16
PIF_VALUE: 21
PIF_VALUE: 3
PIF_VALUE: 19
PIF_VALUE: 17
PIF_VALUE: 19
PIF_VALUE: 19
PIF_VALUE: 15
PIF_VALUE: 17
PIF_VALUE: 19
PIF_VALUE: 15
PIF_VALUE: 13
PIF_VALUE: 19
PIF_VALUE: 18
PIF_VALUE: 19
PIF_VALUE: 19
PIF_VALUE: 1
PIF_VALUE: 20
PIF_VALUE: 15
PIF_VALUE: 17
PIF_VALUE: 18
PIF_VALUE: 13
PIF_VALUE: 17
PIF_VALUE: 19
PIF_VALUE: 16
PIF_VALUE: 13
PIF_VALUE: 19
PIF_VALUE: 19

## 2020-11-19 ASSESSMENT — PAIN DESCRIPTION - PAIN TYPE
TYPE: SURGICAL PAIN
TYPE: SURGICAL PAIN
TYPE: ACUTE PAIN
TYPE: SURGICAL PAIN

## 2020-11-19 ASSESSMENT — PAIN DESCRIPTION - ORIENTATION
ORIENTATION: RIGHT

## 2020-11-19 ASSESSMENT — PAIN DESCRIPTION - PROGRESSION
CLINICAL_PROGRESSION: NOT CHANGED
CLINICAL_PROGRESSION: NOT CHANGED

## 2020-11-19 ASSESSMENT — PAIN SCALES - GENERAL
PAINLEVEL_OUTOF10: 7
PAINLEVEL_OUTOF10: 7
PAINLEVEL_OUTOF10: 8
PAINLEVEL_OUTOF10: 9
PAINLEVEL_OUTOF10: 3
PAINLEVEL_OUTOF10: 0
PAINLEVEL_OUTOF10: 0
PAINLEVEL_OUTOF10: 7
PAINLEVEL_OUTOF10: 0

## 2020-11-19 ASSESSMENT — PAIN - FUNCTIONAL ASSESSMENT
PAIN_FUNCTIONAL_ASSESSMENT: PREVENTS OR INTERFERES SOME ACTIVE ACTIVITIES AND ADLS
PAIN_FUNCTIONAL_ASSESSMENT: PREVENTS OR INTERFERES SOME ACTIVE ACTIVITIES AND ADLS

## 2020-11-19 ASSESSMENT — PAIN DESCRIPTION - FREQUENCY
FREQUENCY: CONTINUOUS

## 2020-11-19 ASSESSMENT — PAIN DESCRIPTION - LOCATION
LOCATION: HIP

## 2020-11-19 ASSESSMENT — PAIN DESCRIPTION - DESCRIPTORS
DESCRIPTORS: ACHING;BURNING;CONSTANT
DESCRIPTORS: ACHING;CONSTANT;DISCOMFORT
DESCRIPTORS: ACHING;SHARP;SHOOTING
DESCRIPTORS: ACHING

## 2020-11-19 ASSESSMENT — PAIN DESCRIPTION - ONSET
ONSET: ON-GOING
ONSET: ON-GOING

## 2020-11-19 NOTE — PROGRESS NOTES
Hospitalist Progress Note      PCP: Daniela Connor MD    Date of Admission: 11/18/2020    Chief Complaint:     Hospital Course: This is 63 y.o. male with PMH of insulin-dependent diabetes mellitus, hypertension, GERD, depression who presented to the emergency department with worsening severe right hip pain after sustaining a mechanically fall at home. On arrival to the emergency department, CT head, cervical spine were negative for acute pathology but x-ray of the right hip showed right femoral neck fracture. Subjective: Pt was seen and examined at bedside. No acute event overnight; complaining of right pain. Denies any CP, SOB or palpitation. Medications:  Reviewed    Infusion Medications    dextrose       Scheduled Medications    morphine  4 mg Intravenous Once    rosuvastatin  10 mg Oral Daily    pantoprazole  40 mg Oral QAM AC    therapeutic multivitamin-minerals  1 tablet Oral Every Other Day    lamoTRIgine  25 mg Oral Nightly    insulin glargine  60 Units Subcutaneous Nightly    insulin lispro  0-6 Units Subcutaneous TID WC    insulin lispro  0-3 Units Subcutaneous Nightly    sodium chloride flush  10 mL Intravenous 2 times per day    ceFAZolin  2 g Intravenous On Call to OR    tranexamic acid (CYCLOKAPRON) IVPB  1,000 mg Intravenous Once     PRN Meds: glucose, dextrose, glucagon (rDNA), dextrose, sodium chloride flush, polyethylene glycol, promethazine **OR** ondansetron, acetaminophen, oxyCODONE **OR** oxyCODONE, morphine **OR** morphine      Intake/Output Summary (Last 24 hours) at 11/19/2020 0952  Last data filed at 11/19/2020 0631  Gross per 24 hour   Intake --   Output 1000 ml   Net -1000 ml       Exam:    /67   Pulse 81   Temp 97.2 °F (36.2 °C) (Temporal)   Resp 18   Ht 5' 8\" (1.727 m)   Wt 170 lb (77.1 kg)   SpO2 95%   BMI 25.85 kg/m²     General appearance: Lying comfortably in bed. No apparent distress.   Respiratory: Clear to auscultation bilaterally. Cardiovascular: Normal S1/S2. Regular rhythm and rate. Abdomen: Soft, non-tender, non-distended with normal bowel sounds. Musculoskeletal: No clubbing, cyanosis or edema bilaterally. Skin: Skin color, texture, turgor normal.  No rashes or lesions. Neurologic:  No focal deficit. Psychiatric: Alert and oriented, thought content appropriate, normal insight  Peripheral Pulses: +2 palpable, equal bilaterally       Labs:   Recent Labs     11/18/20  1443 11/19/20  0641   WBC 10.9 10.4   HGB 13.9 13.1   HCT 39.9 37.4    207     Recent Labs     11/18/20  1443 11/19/20  0641    137   K 3.9 4.2   CL 96* 100   CO2 28 30*   BUN 35* 32*   CREATININE 1.4* 1.3*   CALCIUM 9.5 9.3     Recent Labs     11/18/20  1443   AST 15   ALT 12   BILITOT 1.4*   ALKPHOS 85     Recent Labs     11/18/20  1443   INR 1.1     Recent Labs     11/18/20  1443   TROPONINI <0.01       Radiology:  CT HEAD WO CONTRAST   Final Result   1. There is no acute intracranial abnormality. Specifically, there is no   intracranial hemorrhage. 2. Atrophy and periventricular leukomalacia,   3. Chronic mucous retention cyst seen within the right sphenoid sinus. CT CERVICAL SPINE WO CONTRAST   Final Result   1. There is no acute compression fracture or subluxation of the cervical   spine. 2. Multilevel degenerative disc and degenerative joint disease. XR CHEST (2 VW)   Final Result   No acute process. XR FEMUR RIGHT (MIN 2 VIEWS)   Final Result   Right femoral neck fracture. XR HIP 2-3 VW W PELVIS RIGHT   Final Result   Right femoral neck fracture.                 Active Hospital Problems    Diagnosis Date Noted    Closed right hip fracture, initial encounter [facility]) [S72.001A] 11/18/2020       Assessment  Right femoral neck fracture - secondary to below  Mechanical fall  Type 2 diabetes mellitus - insulin-dependent  Diabetic Neuropathy   Hypertension  Stage II CKD - stable  GERD  Depression  Vascular Dementia      Plan:  Surgical management per orthopedic surgeon  PRN analgesia without over sedating  On Lamictal  Basal and corrective bolus insulin regimen  Hypoglycemic precautions  PT eval postoperatively      DVT Prophylaxis: SCDs  Diet: Diet NPO, After Midnight  Code Status: Full Code    PT/OT Eval Status:     Dispo - TBD    Raymond Henriquez MD 11/19/2020 9:52 AM

## 2020-11-19 NOTE — PLAN OF CARE
Problem: Falls - Risk of:  Goal: Will remain free from falls  Description: Will remain free from falls  Outcome: Met This Shift     Problem: Skin Integrity:  Goal: Will show no infection signs and symptoms  Description: Will show no infection signs and symptoms  Outcome: Met This Shift     Problem: Pain:  Goal: Pain level will decrease  Description: Pain level will decrease  Outcome: Met This Shift

## 2020-11-19 NOTE — ANESTHESIA POSTPROCEDURE EVALUATION
Department of Anesthesiology  Postprocedure Note    Patient: Escobar Figueroa  MRN: [id number]  YOB: 1955  Date of evaluation: 11/19/2020  Time:  5:54 PM     Procedure Summary     Date:  11/19/20 Room / Location:  [facility] OR 08 / [facility]    Anesthesia Start:  1150 Anesthesia Stop:  9361    Procedure:  HIP HEMIARTHROPLASTY (Right ) Diagnosis:  (FRACTURE)    Surgeon:  Krishan Cuevas MD Responsible Provider:  Cyndy Mcdonough DO    Anesthesia Type:  general ASA Status:  3          Anesthesia Type: general    Orlando Phase I: Orlando Score: 10    [place] Phase II:      Last vitals: Reviewed and per EMR flowsheets.        Anesthesia Post Evaluation    Patient location during evaluation: PACU  Patient participation: complete - patient participated  Level of consciousness: awake and alert  Airway patency: patent  Nausea & Vomiting: no nausea and no vomiting  Complications: no  Cardiovascular status: blood pressure returned to baseline  Respiratory status: acceptable  Hydration status: euvolemic

## 2020-11-19 NOTE — PROGRESS NOTES
Med rec was not able to be done d/t patient not knowing his home medications. He states he does take lantus 60 U nightly but does not know his other medications. He does not have someone that could bring in a list of his medications. Pharmacy on file, he stated that the pharmacy would be the best place to contact regarding his medications.  Pharmacy not currently open, will notify next shift regarding this

## 2020-11-19 NOTE — PROGRESS NOTES
Physical Therapy    Date: 2020       Patient Name: Emaline Patch  : 1955      MRN: [id number]    PT order received. Chart has been reviewed. PT evaluation will be on hold due to planned OR this date 20. Will continue to follow and complete evaluation at later time.      Guera Boyer, PT

## 2020-11-19 NOTE — OP NOTE
the operative table, and placed under the appropriate amount of sedation under the care of the anesthesia team. The patient was placed into a lateral decubitus position. All bony prominences and neurovascular structures were well padded and protected. The operative site was then prepped and draped in a standard sterile fashion. An incision was made over the lateral trochanteric region and carried down to the level of the fascia linwood maintaining appropriate hemostasis with electrocautery. A small rent was placed in the fascia linwood. Martini scissors were used to extend the fascia linwood incision in line with its fibers to the length of the skin incision. A Charnley retractor was then placed in the anterior and posterior margin of the tensor fascia linwood incision, taking care not to violate any neurovascular structures. The anterior one-third of the gluteus medius tendon was identified. In line with its fibers, it was reflected using electrocautery off its trochanteric insertion. The gluteus medius and minimus tendons were reflected anteriorly down to the level of the hip capsule. The hip capsule was T'd up to the acetabulum and around the posterior medial and inferior aspects of the hip. The fracture was visualized. An oscillating saw was then used to make the femoral neck cut at a 45-degree angle, 1 cm proximal to the lesser trochanter. After this was done, a corkscrew was then placed into the femoral head. The femoral head was levered out of the acetabulum, and taken for sizing. At this point, the acetabulum was then inspected, removing all bony fragments and interposed tissue. A Woody retractor was placed under the femoral neck cut. A box osteotome was introduced into the femoral neck which was removed. Then a T-handle canal finder was then introduced and removed. Broaching began at a size 0 and was carried up sequentially to an appropriately sized broach, where we found appropriate stability, axially and rotationally. The broach was then removed. The corresponding sized femoral component was then impacted in the appropriate position, which was found to have appropriate stable fit. An appropriately sized femoral head trial was then placed. The hip was reduced, taken through a range of motion, and was found to be appropriately stable. The hip was then dislocated. The femoral head trial component was then removed. The bipolar component was impacted onto the femoral stem into the appropriate position. The hip was then reduced, taken through a range of motion, and was found to be appropriately stable. Copious irrigation was performed of the joint. Platelet-rich plasma gel was then injected into the joint. Also, #5 Ethibond was used to reapproximate the gluteus medius tendon to its trochanteric insertion. Copious irrigation was performed once more. The tensor fascia linwood was then closed with an #0 Vicryl. Copious irrigation was performed once more. #0 and 2-0 Vicryl were used to close the subcutaneous layer. Staples were used for the skin. A sterile layered dressing was placed over the wound. The patient was awakened from anesthesia, transferred to a hospital bed, and taken to the PACU in stable condition. Disposition: The patient was taken to PACU in stable condition. Once stable, he will be transferred to the floor. Orders have been provided to begin physical therapy, weight bear as tolerated Right lower extremity. Patient received a dose of Ancef preoperatively. We will continue this for 24 hours postoperatively for infection prophylaxis. The patient will also be started on  Lovenox while in the hospital and transition to aspirin orally as an outpatient for DVT prophylaxis.     Electronically signed by Patrice Husain MD on 11/19/2020 at 12:49 PM

## 2020-11-19 NOTE — PROGRESS NOTES
OT BEDSIDE TREATMENT NOTE      Date:2020  Patient Name: Jem Longoria  MRN: [id number]  : 1955  Room: 3865/1894-Z     OT order received & appreciated, chart has been reviewed. OT evaluation will be on hold due to planned OR this date 20. Will continue to follow and complete evaluation at later time. Thank you,  Vesna Ken, OTR/L   License #  [id number]

## 2020-11-19 NOTE — H&P
Department of Orthopedic Surgery  Resident H&P Note          CHIEF COMPLAINT: Right hip pain    HISTORY OF PRESENT ILLNESS:                The patient is a 63 y.o. male who presents with right hip pain after a fall from standing height yesterday evening. Patient states he was at home feeling weak while walking and subsequently fell as a result. He fell onto his right side and experienced immediate pain. He was unable to tolerate ambulation as a result of his injury. His son was able to assist him into bed last evening and he decided to sleep with hopes that the pain would subside. He woke up this morning and continued to have pain in the right hip which prompted him to come to the emergency room. Imaging was performed in the ER today and orthopedics was consulted for further evaluation management. Currently, patient is admitting to significant pain in his right groin and thigh. He denies numbness/tingling/paresthesias. Denies pain elsewhere. Patient has a medical history significant for diabetes for which she attributes to feeling overall weakness yesterday. He states he does have trouble with controlling his diabetic condition. Patient is a community ambulator at baseline without assistive device. Patient denies oral anticoagulation use at baseline. Patient is currently not employed as he is on disability for his medical conditions.     Past Medical History:        Diagnosis Date    Anxiety     Bronchitis     Cellulitis     Chronic sinusitis     Depression     Diabetes mellitus (HCC)     Diabetic retinopathy (HCC)     Fracture of left foot     High cholesterol     Hypercholesteremia     Hypercholesterolemia     Hypertension     Lumbago     Moderate mood disorder (HCC)     Third nerve palsy      Past Surgical History:        Procedure Laterality Date    EYE SURGERY       Current Medications:   Current Facility-Administered Medications: morphine sulfate (PF) injection 4 mg, 4 mg, Intravenous, Once  rosuvastatin (CRESTOR) tablet 10 mg, 10 mg, Oral, Daily  pantoprazole (PROTONIX) tablet 40 mg, 40 mg, Oral, QAM AC  therapeutic multivitamin-minerals 1 tablet, 1 tablet, Oral, Every Other Day  lamoTRIgine (LAMICTAL) tablet 25 mg, 25 mg, Oral, Nightly  insulin glargine (LANTUS) injection vial 60 Units, 60 Units, Subcutaneous, Nightly  glucose (GLUTOSE) 40 % oral gel 15 g, 15 g, Oral, PRN  dextrose 50 % IV solution, 12.5 g, Intravenous, PRN  glucagon (rDNA) injection 1 mg, 1 mg, Intramuscular, PRN  dextrose 5 % solution, 100 mL/hr, Intravenous, PRN  insulin lispro (HUMALOG) injection vial 0-6 Units, 0-6 Units, Subcutaneous, TID WC  insulin lispro (HUMALOG) injection vial 0-3 Units, 0-3 Units, Subcutaneous, Nightly  sodium chloride flush 0.9 % injection 10 mL, 10 mL, Intravenous, 2 times per day  sodium chloride flush 0.9 % injection 10 mL, 10 mL, Intravenous, PRN  polyethylene glycol (GLYCOLAX) packet 17 g, 17 g, Oral, Daily PRN  promethazine (PHENERGAN) tablet 12.5 mg, 12.5 mg, Oral, Q6H PRN **OR** ondansetron (ZOFRAN) injection 4 mg, 4 mg, Intravenous, Q6H PRN  acetaminophen (TYLENOL) tablet 650 mg, 650 mg, Oral, Q4H PRN  oxyCODONE (ROXICODONE) immediate release tablet 5 mg, 5 mg, Oral, Q4H PRN **OR** oxyCODONE HCl (OXY-IR) immediate release tablet 10 mg, 10 mg, Oral, Q4H PRN  morphine (PF) injection 2 mg, 2 mg, Intravenous, Q2H PRN **OR** morphine sulfate (PF) injection 4 mg, 4 mg, Intravenous, Q2H PRN  ceFAZolin (ANCEF) 2 g in sterile water 20 mL IV syringe, 2 g, Intravenous, On Call to OR  tranexamic acid (CYKLOKAPRON) 1,000 mg in dextrose 5 % 100 mL IVPB, 1,000 mg, Intravenous, Once  Allergies:  Patient has no known allergies. Social History:   TOBACCO:   reports that he has never smoked. He has never used smokeless tobacco.  ETOH:   reports no history of alcohol use. DRUGS:   reports no history of drug use.   ACTIVITIES OF DAILY LIVING:    OCCUPATION:    Family History:   No family history on file. REVIEW OF SYSTEMS:  CONSTITUTIONAL:  negative for  fevers, chills  EYES:  negative for blurred vision, visual disturbance  HEENT:  negative for  hearing loss, voice change  RESPIRATORY:  negative for  dyspnea, wheezing  CARDIOVASCULAR:  negative for  chest pain, palpitations  GASTROINTESTINAL:  negative for nausea, vomiting  GENITOURINARY:  negative for frequency, urinary incontinence  HEMATOLOGIC/LYMPHATIC:  negative for bleeding and petechiae  MUSCULOSKELETAL:  positive for right hip pain   NEUROLOGICAL:  negative for headaches, dizziness  BEHAVIOR/PSYCH:  negative for increased agitation and anxiety    PHYSICAL EXAM:    VITALS:  /67   Pulse 81   Temp 97.2 °F (36.2 °C) (Temporal)   Resp 18   Ht 5' 8\" (1.727 m)   Wt 170 lb (77.1 kg)   SpO2 95%   BMI 25.85 kg/m²   CONSTITUTIONAL:  Awake, alert, answers questions appropriately  EYES:  Lids and lashes normal, pupils equal, round and reactive to light, extra ocular muscles intact  HENT:  Normocephalic, without obvious abnormality, atraumatic, neck supple, symmetric  LUNGS:  No increased work of breathing  CARDIOVASCULAR:  Brisk vascular capillary refill to all extremities  ABDOMEN:  Non-tender, Non-distended  NEUROLOGIC:  Awake, alert, oriented to name, place and time. Cranial nerves II-XII are grossly intact.   MUSCULOSKELETAL:  Right lower Extremity:   Right leg is shortened and externally rotated  Tenderness palpation about the right groin and anterior thigh  Skin is intact circumferentially over the proximal right hip  Compartments soft and compressible  +PF/DF/EHL  +2/4 DP & PT pulses, Brisk Cap refill, Toes warm and perfused  Distal sensation grossly intact to Peroneals, Sural, Saphenous, and tibial nrs  · Positive logroll  · Patient nontender about the knee, ankle, or foot distally        DATA:    CBC:   Lab Results   Component Value Date    WBC 10.4 11/19/2020    RBC 5.08 11/19/2020    HGB 13.1 11/19/2020    HCT 37.4 11/19/2020    MCV 73.6 11/19/2020    MCH 25.8 11/19/2020    MCHC 35.0 11/19/2020    RDW 13.6 11/19/2020     11/19/2020    MPV 10.9 11/19/2020     PT/INR:    Lab Results   Component Value Date    PROTIME 11.9 11/18/2020    INR 1.1 11/18/2020     Radiology Review:    · X-ray right hip/femur: Displaced transcervical femoral neck fracture. The right femur is externally rotated and shortened. No other fractures or dislocations noted. IMPRESSION:  · Right femoral neck fracture    PLAN:  · NPO after midnight  · Orthopedic admission  · Treatment consent  · Hold anticoagulation  · Medical optimization  · Plan for right hip hemiarthroplasty tomorrow, 11/19  · Preoperative labs and imaging  · Pain control  · Ice as needed  · Discussed with Dr. Carly Holden    I have seen and evaluated the patient and agree with the above assessment on today's visit. I have performed the key components of the history and physical examination and concur completely with the findings and plans as documented. Agree with ROS, examination, FMH, PMH, PSH, SocHx, and allergies as above. Patient 2 days status post fall onto right hip. Unable to ambulate afterwards. Found to have a right femoral neck fracture displaced. Patient does have diabetes. He does not leave the house much but does walk without assistance. Talked him in detail about right hip hemiarthroplasty for femoral neck fracture. I explained the surgery in detail. I explained the risks and complications of surgery with the patient including but not limited to death from anesthesia, possible neurovascular damage, possible infection,  Possible wound healing issues, possible perioperative fracture, leg length discrepancy, implant failure, possible need for further surgery, etc.  Patient understood this, asked appropriate questions and decided to go forward with the procedure. I was asked to take care of the patient due to availability from Dr. Carly Holden.         Physical Examination:   General appearance: alert, well appearing, and in no distress,  normal appearing weight. No visible signs of trauma   Mental status: alert, oriented to person, place, and time, normal mood, behavior, speech, dress, motor activity, and thought processes  Abdomen: soft, nondistended  Resp:   resp easy and unlabored, no audible wheezes note, normal symmetrical expansion of both hemithoraces  Cardiac: distal pulses palpable, skin and extremities well perfused  Neurological: alert, oriented X3, normal speech, no focal findings or movement disorder noted, motor and sensory grossly normal bilaterally, normal muscle tone, no tremors  HEENT: normochephalic atraumatic, external ears and eyes normal, sclera normal, neck supple, no nasal discharge. Extremities:   peripheral pulses normal, no edema, redness or tenderness in the calves   Skin: normal coloration, no rashes or open wounds, no suspicious skin lesions noted  Psych: Affect euthymic   Musculoskeletal:   Extremity:  Right hip   Skin intact over hip   Compartments soft and compressible   Leg externally rotated   Calves soft and NT   2/4 DP and PT pulses   Sensation intact   Wiggles toes   Fires L4 L5 and S1   Pain with log roll and heel strike          ELECTRONICALLY signed by:    Oxana Wang MD  11/19/20   This is been dictated utilizing voice recognition software. All efforts have been made to make the note accurate although inadvertent errors may be present.

## 2020-11-19 NOTE — ANESTHESIA PRE PROCEDURE
[facility], [address] [place]  Phone: [phone]  Fax [phone]    Physical Therapy Daily Treatment Note  Date:  2018    Patient Name:  La Cadet    :  1981  MRN: [id number]  Restrictions/Precautions:    Medical/Treatment Diagnosis Information:  Diagnosis: M48.219 (ICD-10-CM) - Instability of right ankle joint  Treatment Diagnosis: M25.371 (ICD-10-CM) - Instability of right ankle joint  Insurance/Certification information:  PT Insurance Information: 10/4/18 ANTHEM - $50/25 - $5000DED - $0CP - 20PT - 80/20% - NEEDS AUTH  Physician Information:  Referring Practitioner: Damaris Lai of care signed (Y/N):     Date of Patient follow up with Physician:     G-Code (if applicable):      Date G-Code Applied:  10-       Progress Note: []  Yes  [x]  No  Next due by: Visit #10       Latex Allergy:  [x]NO      []YES  Preferred Language for Healthcare:   [x]English       []other:    Visit # Insurance Allowable Requires auth   7 30    []no        [x]yes:       Pain level:  1/10     SUBJECTIVE:  Improved overall- better balance and awareness of gait deviations.  Walking a lot at the [employer] game over the weekend with no issues in general.    OBJECTIVE:    Observation:   Test measurements:      RESTRICTIONS/PRECAUTIONS: hypermobility noted, boot; wean to brace and shoe for activity    Exercises/Interventions:     Therapeutic Ex Sets/sec Reps Notes   Clamshell- heels elevated OVL H5/ 2 15 hep   Towel scrunch w/ lift   hep   Toe yoga H5  hep   Wobble board: 12,3,6,9,     Nose taps at wall for [employer]  2x10 reps Incline stretches H30 5 reps    [employer] on R      Standing HR w/ ADD  W/ BTB amol/ inv bias 3 10   15 reps ea + to HEP   + to HEP   Standing glute kick/ fire hydrants 30\" 2 B    Bike/Elliptical 5'  With brace   Steamboats with 2' step- BVL: A/P 30\" 3 ea    SB bridges with alt leg lifts H5 2x10 reps    SB bridges tablet 10 mg  10 mg Oral Daily Mary Coronado MD        pantoprazole (PROTONIX) tablet 40 mg  40 mg Oral QAM AC Mary Coronado MD        therapeutic multivitamin-minerals 1 tablet  1 tablet Oral Every Other Day Mary Pollard MD        lamoTRIgine (LAMICTAL) tablet 25 mg  25 mg Oral Nightly Mary KELY Coronado MD        insulin glargine (LANTUS) injection vial 60 Units  60 Units Subcutaneous Nightly Mary KELY Coronado MD        glucose (GLUTOSE) 40 % oral gel 15 g  15 g Oral PRN Mary Coronado MD        dextrose 50 % IV solution  12.5 g Intravenous PRN Mary Coronado MD        glucagon (rDNA) injection 1 mg  1 mg Intramuscular PRN Mary Coronado MD        dextrose 5 % solution  100 mL/hr Intravenous PRN Mary Coronado MD        insulin lispro (HUMALOG) injection vial 0-6 Units  0-6 Units Subcutaneous TID WC Mary Coronado MD        insulin lispro (HUMALOG) injection vial 0-3 Units  0-3 Units Subcutaneous Nightly Mary KELY Coronado MD   1 Units at 11/18/20 2241    sodium chloride flush 0.9 % injection 10 mL  10 mL Intravenous 2 times per day Lakeisha Rubin DO   10 mL at 11/19/20 0546    sodium chloride flush 0.9 % injection 10 mL  10 mL Intravenous PRN Lakeisha Rubin DO        polyethylene glycol (GLYCOLAX) packet 17 g  17 g Oral Daily PRN Lakeisha Rubin DO        promethazine (PHENERGAN) tablet 12.5 mg  12.5 mg Oral Q6H PRN Lakeisha Rubin DO        Or    ondansetron [facility]) injection 4 mg  4 mg Intravenous Q6H PRN Lakeisha Rubin DO        acetaminophen (TYLENOL) tablet 650 mg  650 mg Oral Q4H PRN Lakeisha Rubin DO        oxyCODONE (ROXICODONE) immediate release tablet 5 mg  5 mg Oral Q4H PRN Lakeisha Rubin DO        Or    oxyCODONE HCl (OXY-IR) immediate release tablet 10 mg  10 mg Oral Q4H PRN Lakeisha Rubin DO        morphine (PF) injection 2 mg  2 mg Intravenous Q2H PRN Lakeisha Rubin DO        Or    morphine sulfate (PF) injection 4 mg  4 mg with curls H5 2x10 reps    Lateral plank with clams H30 3 reps each side    Side stepping with BVL at ankles  2 laps    RDL 3# 2x10 reps    Monster walks with BVL  2 lapsManual Intervention                                        NMR re-education      glute re-ed sequence- tania 5 H5 5 ea hep   Prone over pillow- glute set  - SLR ext  -glute kick    SLS  -double YTB H ADD  -GTB ext   H5   10  2 x 10    SLS/ Tandem stance  H15 3    D1/ D2 ankle motion   Perturbations in neutral, PF,DF    2      NMR: HR with inv/ amol bias  H10:10 5' ea        Therapeutic Exercise and NMR EXR  [x] (49288) Provided verbal/tactile cueing for activities related to strengthening, flexibility, endurance, ROM for improvements in LE, proximal hip, and core control with self care, mobility, lifting, ambulation. [x] (72779) Provided verbal/tactile cueing for activities related to improving balance, coordination, kinesthetic sense, posture, motor skill, proprioception  to assist with LE, proximal hip, and core control in self care, mobility, lifting, ambulation and eccentric single leg control.      NMR and Therapeutic Activities:    [x] (45194 or 82800) Provided verbal/tactile cueing for activities related to improving balance, coordination, kinesthetic sense, posture, motor skill, proprioception and motor activation to allow for proper function of core, proximal hip and LE with self care and ADLs  [x] (55405) Gait Re-education- Provided training and instruction to the patient for proper LE, core and proximal hip recruitment and positioning and eccentric body weight control with ambulation re-education including up and down stairs     Home Exercise Program:    [x] (37739) Reviewed/Progressed HEP activities related to strengthening, flexibility, endurance, ROM of core, proximal hip and LE for functional self-care, mobility, lifting and ambulation/stair navigation   [] (23216)Reviewed/Progressed HEP activities related to improving balance, Intravenous Q2H PRN Gal Barrier, DO   4 mg at 11/19/20 0546    ceFAZolin (ANCEF) 2 g in sterile water 20 mL IV syringe  2 g Intravenous On Call to Ji 97, DO        tranexamic acid (CYKLOKAPRON) 1,000 mg in dextrose 5 % 100 mL IVPB  1,000 mg Intravenous Once Gal Barrier, DO           Allergies:  No Known Allergies    Problem List:    Patient Active Problem List   Diagnosis Code    Severe episode of recurrent major depressive disorder, without psychotic features ([facility] F33.2    Suicidal ideations R45.851    SDH (subdural hematoma) ([facility] S06.5X9A    Traumatic subdural hemorrhage with loss of consciousness of 30 minutes or less ([facility] S06. 8X1A    Diabetic acidosis without coma (HCC) E11.10    Acute kidney injury superimposed on CKD (HCC) N17.9, N18.9    Hyponatremia E87.1    Right adrenal mass (HCC) E27.8    Hyperkalemia E87.5    Major depression, recurrent (HCC) F33.9    Type 2 diabetes mellitus with complication, with long-term current use of insulin (HCC) E11.8, Z79.4    HLD (hyperlipidemia) E78.5    HTN (hypertension) I10    Uncontrolled type 2 diabetes mellitus with hyperglycemia (HCC) E11.65    SIRS (systemic inflammatory response syndrome) ([facility]) R65.10    Weight loss R63.4    Generalized abdominal pain R10.84    Lactic acidosis E87.2    Constipation K59.00    High anion gap metabolic acidosis K26.7    Non compliance w medication regimen Z91.14    Physical deconditioning R53.81    Dementia, vascular (HCC) F01.50    Moderate protein-calorie malnutrition (HCC) E44.0    TIA (transient ischemic attack) G45.9    DKA, type 1, not at goal ([facility] E10.10    Ileus (HCC) K56.7    Adrenal adenoma, right D35.01    Hypertensive urgency I16.0    Hiccups B88.2    Umbilical hernia without obstruction and without gangrene K42.9    GERD (gastroesophageal reflux disease) K21.9    Depression F32.9    Hypotension I95.9    Depression, major, recurrent (HCC) F33.9    Closed right hip coordination, kinesthetic sense, posture, motor skill, proprioception of core, proximal hip and LE for self care, mobility, lifting, and ambulation/stair navigation      Manual Treatments:  PROM / STM / Oscillations-Mobs:  G-I, II, III, IV (PA's, Inf., Post.)  [x] (41683) Provided manual therapy to mobilize LE, proximal hip and/or LS spine soft tissue/joints for the purpose of modulating pain, promoting relaxation,  increasing ROM, reducing/eliminating soft tissue swelling/inflammation/restriction, improving soft tissue extensibility and allowing for proper ROM for normal function with self care, mobility, lifting and ambulation. Modalities:  Declined     Charges:  Timed Code Treatment Minutes: 50   Total Treatment Minutes: 80 (ATC)     [x] EVAL (LOW) 27811 (typically 20 minutes face-to-face)  [] EVAL (MOD) 69589 (typically 30 minutes face-to-face)  [] EVAL (HIGH) 51384 (typically 45 minutes face-to-face)  [] RE-EVAL     [x] ZM(37436) x  2   [] IONTO  [x] NMR (84794) x  1   [] VASO  [x] Manual (83327) x       [] Other:  [] TA x       [] Mech Traction (71194)  [] ES(attended) (33792)      [x] ES (un) (11224): NMR    GOALS:  Patient stated goal: return to work     Therapist goals for Patient:   Short Term Goals: To be achieved in: 2 weeks  1. Independent in HEP and progression per patient tolerance, in order to prevent re-injury. 2. Patient will have a decrease in pain to facilitate improvement in movement, function, and ADLs as indicated by Functional Deficits.     Long Term Goals: To be achieved in: 8 weeks  1. Disability index score of 20% or less for the LEFS to assist with reaching prior level of function. 2. Patient will demonstrate increased AROM to [facility] to allow for proper joint functioning as indicated by patients Functional Deficits.    3. Patient will demonstrate an increase in Strength to good proximal hip strength and control, within 5lb HHD in LE to allow for proper functional mobility as indicated fracture, initial encounter ([facility] S72.001A       Past Medical History:        Diagnosis Date    Anxiety     Bronchitis     Cellulitis     Chronic sinusitis     Depression     Diabetes mellitus (HCC)     Diabetic retinopathy ([facility]     Fracture of left foot     High cholesterol     Hypercholesteremia     Hypercholesterolemia     Hypertension     Lumbago     Moderate mood disorder (HCC)     Third nerve palsy        Past Surgical History:        Procedure Laterality Date    EYE SURGERY         Social History:    Social History     Tobacco Use    Smoking status: Never Smoker    Smokeless tobacco: Never Used   Substance Use Topics    Alcohol use: No                                Counseling given: Not Answered      Vital Signs (Current):   Vitals:    11/18/20 2230 11/18/20 2321 11/19/20 0339 11/19/20 0745   BP: 110/71 125/69 117/64 103/67   Pulse: 79 80 77 81   Resp: 18 18 18 18   Temp: 36.9 °C (98.4 °F) 36.7 °C (98.1 °F) 37.1 °C (98.7 °F) 36.2 °C (97.2 °F)   TempSrc: Temporal Temporal Temporal Temporal   SpO2: 98% 97% 97% 95%   Weight:       Height:                                                  BP Readings from Last 3 Encounters:   11/19/20 103/67   08/24/20 110/68   07/01/20 125/70       NPO Status: Time of last liquid consumption: 2359                        Time of last solid consumption: 2359                        Date of last liquid consumption: 11/18/20                        Date of last solid food consumption: 11/18/20    BMI:   Wt Readings from Last 3 Encounters:   11/18/20 170 lb (77.1 kg)   08/22/20 179 lb 7 oz (81.4 kg)   07/01/20 186 lb (84.4 kg)     Body mass index is 25.85 kg/m².     CBC:   Lab Results   Component Value Date    WBC 10.4 11/19/2020    RBC 5.08 11/19/2020    HGB 13.1 11/19/2020    HCT 37.4 11/19/2020    MCV 73.6 11/19/2020    RDW 13.6 11/19/2020     11/19/2020       CMP:   Lab Results   Component Value Date     11/19/2020    K 4.2 11/19/2020     2020    CO2 30 2020    BUN 32 2020    CREATININE 1.3 2020    GFRAA >60 2020    LABGLOM 56 2020    GLUCOSE 83 2020    PROT 6.3 2020    CALCIUM 9.3 2020    BILITOT 1.4 2020    ALKPHOS 85 2020    AST 15 2020    ALT 12 2020       POC Tests: No results for input(s): POCGLU, POCNA, POCK, POCCL, POCBUN, POCHEMO, POCHCT in the last 72 hours.     Coags:   Lab Results   Component Value Date    PROTIME 11.9 2020    INR 1.1 2020    APTT 27.1 2020       HCG (If Applicable): No results found for: PREGTESTUR, PREGSERUM, HCG, HCGQUANT     ABGs: No results found for: PHART, PO2ART, PHG9WAN, UQB9PWH, BEART, F2OPGUZE     Type & Screen (If Applicable):  No results found for: LABABO, LABRH    Drug/Infectious Status (If Applicable):  No results found for: HIV, HEPCAB    COVID-19 Screening (If Applicable):   Lab Results   Component Value Date    COVID19 Not Detected 10/06/2020     Ek2020  5:54 PM - Desean, Mhy Incoming Ekg Results From [facility]     Component  Value  Ref Range & Units  Status  Collected  Lab    Ventricular Rate  85  BPM  Incomplete  2020  2:55 PM  [facility]M    Atrial Rate  85  BPM  Incomplete  2020  2:55 PM  [facility]    P-R Interval  144  ms  Incomplete  2020  2:55 PM  [facility]    QRS Duration  76  ms  Incomplete  2020  2:55 PM  [facility]    Q-T Interval  368  ms  Incomplete  2020  2:55 PM  [facility]    QTc Calculation (Bazett)  437  ms  Incomplete  2020  2:55 PM  [facility]    P Axis  75  degrees  Incomplete  2020  2:55 PM  [facility]    R Axis  25  degrees  Incomplete  2020  2:55 PM  [facility]    T Axis  43  degrees  Incomplete  2020  2:55 PM  [facility]M    Testing Performed By     Lab - Abbreviation  Name  Director  Address  Valid Date Range    360-[facility]  [facility] MUSE  Unknown  Unknown  16 0721-Present    Narrative & Impression     Normal sinus rhythm  Normal ECG  When compared with ECG of 06-OCT-2020 18:35,  No significant change was found         Anesthesia Evaluation  Patient summary reviewed and Nursing notes reviewed  Airway: Mallampati: III  TM distance: >3 FB   Neck ROM: full  Mouth opening: < 3 FB Dental:    (+) upper dentures and lower dentures      Pulmonary: breath sounds clear to auscultation            Patient did not smoke on day of surgery. Cardiovascular:  Exercise tolerance: good (>4 METS),   (+) hypertension:, hyperlipidemia      ECG reviewed  Rhythm: regular  Rate: normal           Beta Blocker:  Not on Beta Blocker         Neuro/Psych:   (+) depression/anxiety  (pt states just your normal depression )            GI/Hepatic/Renal:   (+) GERD: poorly controlled,           Endo/Other:    (+) Diabetes (not always the best controlled at home per the patient )Type II DM, using insulin, . Abdominal:           Vascular:                                        Anesthesia Plan      general     ASA 3       Induction: intravenous. BIS  MIPS: Postoperative opioids intended, Prophylactic antiemetics administered and Postoperative trial extubation. Anesthetic plan and risks discussed with patient. Use of blood products discussed with patient whom consented to blood products. Plan discussed with CRNA and attending.                   Bianca Escobar RN   11/19/2020

## 2020-11-20 PROBLEM — Z96.641 HISTORY OF RIGHT HIP HEMIARTHROPLASTY: Status: ACTIVE | Noted: 2020-11-20

## 2020-11-20 LAB
ANION GAP SERPL CALCULATED.3IONS-SCNC: 4 MMOL/L (ref 7–16)
BUN BLDV-MCNC: 32 MG/DL (ref 8–23)
CALCIUM SERPL-MCNC: 8.2 MG/DL (ref 8.6–10.2)
CHLORIDE BLD-SCNC: 102 MMOL/L (ref 98–107)
CO2: 28 MMOL/L (ref 22–29)
CREAT SERPL-MCNC: 1.5 MG/DL (ref 0.7–1.2)
GFR AFRICAN AMERICAN: 57
GFR NON-AFRICAN AMERICAN: 47 ML/MIN/1.73
GLUCOSE BLD-MCNC: 256 MG/DL (ref 74–99)
HCT VFR BLD CALC: 32.5 % (ref 37–54)
HEMOGLOBIN: 11.4 G/DL (ref 12.5–16.5)
MCH RBC QN AUTO: 25.9 PG (ref 26–35)
MCHC RBC AUTO-ENTMCNC: 35.1 % (ref 32–34.5)
MCV RBC AUTO: 73.9 FL (ref 80–99.9)
METER GLUCOSE: 222 MG/DL (ref 74–99)
METER GLUCOSE: 235 MG/DL (ref 74–99)
METER GLUCOSE: 243 MG/DL (ref 74–99)
METER GLUCOSE: 264 MG/DL (ref 74–99)
PDW BLD-RTO: 13.7 FL (ref 11.5–15)
PLATELET # BLD: 189 E9/L (ref 130–450)
PMV BLD AUTO: 10.9 FL (ref 7–12)
POTASSIUM REFLEX MAGNESIUM: 5.2 MMOL/L (ref 3.5–5)
RBC # BLD: 4.4 E12/L (ref 3.8–5.8)
SARS-COV-2: NOT DETECTED
SODIUM BLD-SCNC: 134 MMOL/L (ref 132–146)
SOURCE: NORMAL
WBC # BLD: 14.2 E9/L (ref 4.5–11.5)

## 2020-11-20 PROCEDURE — 82962 GLUCOSE BLOOD TEST: CPT

## 2020-11-20 PROCEDURE — 36415 COLL VENOUS BLD VENIPUNCTURE: CPT

## 2020-11-20 PROCEDURE — 6370000000 HC RX 637 (ALT 250 FOR IP): Performed by: ORTHOPAEDIC SURGERY

## 2020-11-20 PROCEDURE — 97166 OT EVAL MOD COMPLEX 45 MIN: CPT

## 2020-11-20 PROCEDURE — 97530 THERAPEUTIC ACTIVITIES: CPT

## 2020-11-20 PROCEDURE — 2580000003 HC RX 258: Performed by: ORTHOPAEDIC SURGERY

## 2020-11-20 PROCEDURE — 6370000000 HC RX 637 (ALT 250 FOR IP): Performed by: HOSPITALIST

## 2020-11-20 PROCEDURE — 80048 BASIC METABOLIC PNL TOTAL CA: CPT

## 2020-11-20 PROCEDURE — 1200000000 HC SEMI PRIVATE

## 2020-11-20 PROCEDURE — 85027 COMPLETE CBC AUTOMATED: CPT

## 2020-11-20 PROCEDURE — 6360000002 HC RX W HCPCS: Performed by: ORTHOPAEDIC SURGERY

## 2020-11-20 PROCEDURE — 97162 PT EVAL MOD COMPLEX 30 MIN: CPT

## 2020-11-20 RX ORDER — INSULIN GLARGINE 100 [IU]/ML
70 INJECTION, SOLUTION SUBCUTANEOUS NIGHTLY
Status: DISCONTINUED | OUTPATIENT
Start: 2020-11-21 | End: 2020-11-22 | Stop reason: HOSPADM

## 2020-11-20 RX ORDER — INSULIN GLARGINE 100 [IU]/ML
10 INJECTION, SOLUTION SUBCUTANEOUS ONCE
Status: COMPLETED | OUTPATIENT
Start: 2020-11-20 | End: 2020-11-20

## 2020-11-20 RX ADMIN — INSULIN LISPRO 1 UNITS: 100 INJECTION, SOLUTION INTRAVENOUS; SUBCUTANEOUS at 21:23

## 2020-11-20 RX ADMIN — SODIUM CHLORIDE: 9 INJECTION, SOLUTION INTRAVENOUS at 09:55

## 2020-11-20 RX ADMIN — OXYCODONE HYDROCHLORIDE 10 MG: 10 TABLET ORAL at 20:59

## 2020-11-20 RX ADMIN — MORPHINE SULFATE 4 MG: 4 INJECTION, SOLUTION INTRAMUSCULAR; INTRAVENOUS at 14:56

## 2020-11-20 RX ADMIN — PANTOPRAZOLE SODIUM 40 MG: 40 TABLET, DELAYED RELEASE ORAL at 08:46

## 2020-11-20 RX ADMIN — LAMOTRIGINE 25 MG: 25 TABLET ORAL at 20:59

## 2020-11-20 RX ADMIN — SENNOSIDES AND DOCUSATE SODIUM 1 TABLET: 8.6; 5 TABLET ORAL at 08:46

## 2020-11-20 RX ADMIN — SENNOSIDES AND DOCUSATE SODIUM 1 TABLET: 8.6; 5 TABLET ORAL at 20:59

## 2020-11-20 RX ADMIN — INSULIN LISPRO 5 UNITS: 100 INJECTION, SOLUTION INTRAVENOUS; SUBCUTANEOUS at 16:56

## 2020-11-20 RX ADMIN — INSULIN GLARGINE 10 UNITS: 100 INJECTION, SOLUTION SUBCUTANEOUS at 21:29

## 2020-11-20 RX ADMIN — INSULIN GLARGINE 60 UNITS: 100 INJECTION, SOLUTION SUBCUTANEOUS at 21:37

## 2020-11-20 RX ADMIN — ROSUVASTATIN 10 MG: 10 TABLET, FILM COATED ORAL at 08:46

## 2020-11-20 RX ADMIN — OXYCODONE HYDROCHLORIDE 10 MG: 10 TABLET ORAL at 08:46

## 2020-11-20 RX ADMIN — ENOXAPARIN SODIUM 40 MG: 40 INJECTION SUBCUTANEOUS at 14:48

## 2020-11-20 RX ADMIN — INSULIN LISPRO 3 UNITS: 100 INJECTION, SOLUTION INTRAVENOUS; SUBCUTANEOUS at 11:37

## 2020-11-20 RX ADMIN — Medication 2 G: at 04:56

## 2020-11-20 RX ADMIN — INSULIN LISPRO 2 UNITS: 100 INJECTION, SOLUTION INTRAVENOUS; SUBCUTANEOUS at 08:48

## 2020-11-20 RX ADMIN — SODIUM CHLORIDE, PRESERVATIVE FREE 10 ML: 5 INJECTION INTRAVENOUS at 21:00

## 2020-11-20 RX ADMIN — INSULIN LISPRO 2 UNITS: 100 INJECTION, SOLUTION INTRAVENOUS; SUBCUTANEOUS at 16:54

## 2020-11-20 ASSESSMENT — PAIN DESCRIPTION - PAIN TYPE
TYPE: SURGICAL PAIN

## 2020-11-20 ASSESSMENT — PAIN SCALES - GENERAL
PAINLEVEL_OUTOF10: 0
PAINLEVEL_OUTOF10: 0
PAINLEVEL_OUTOF10: 7
PAINLEVEL_OUTOF10: 9
PAINLEVEL_OUTOF10: 5
PAINLEVEL_OUTOF10: 8
PAINLEVEL_OUTOF10: 9

## 2020-11-20 ASSESSMENT — PAIN DESCRIPTION - PROGRESSION
CLINICAL_PROGRESSION: NOT CHANGED
CLINICAL_PROGRESSION: NOT CHANGED

## 2020-11-20 ASSESSMENT — PAIN DESCRIPTION - ORIENTATION
ORIENTATION: RIGHT

## 2020-11-20 ASSESSMENT — PAIN DESCRIPTION - ONSET
ONSET: ON-GOING

## 2020-11-20 ASSESSMENT — PAIN DESCRIPTION - DESCRIPTORS
DESCRIPTORS: ACHING;CONSTANT;DISCOMFORT
DESCRIPTORS: ACHING;CONSTANT;DISCOMFORT
DESCRIPTORS: ACHING;DISCOMFORT;SORE

## 2020-11-20 ASSESSMENT — PAIN DESCRIPTION - FREQUENCY
FREQUENCY: CONTINUOUS

## 2020-11-20 ASSESSMENT — PAIN DESCRIPTION - LOCATION
LOCATION: HIP

## 2020-11-20 NOTE — PROGRESS NOTES
Department of Orthopedic Surgery  Resident Progress Note    Patient seen and examined, resting in bed. Pain controlled. No new complaints. Denies chest pain, shortness of breath, dizziness/lightheadedness. VITALS:  /74   Pulse 82   Temp 97.4 °F (36.3 °C)   Resp 16   Ht 5' 8\" (1.727 m)   Wt 170 lb (77.1 kg)   SpO2 95%   BMI 25.85 kg/m²     General: alert and oriented to person, place and time, well-developed and well-nourished, in no acute distress    MUSCULOSKELETAL:   right lower extremity:  · Dressing C/D/I  · Compartments soft and compressible  · +PF/DF/EHL  · +2/4 DP & PT pulses, Brisk Cap refill, Toes warm and perfused  · Distal sensation grossly intact to Peroneals, Sural, Saphenous, and tibial nrs    CBC:   Lab Results   Component Value Date    WBC 14.2 11/20/2020    HGB 11.4 11/20/2020    HCT 32.5 11/20/2020     11/20/2020     PT/INR:    Lab Results   Component Value Date    PROTIME 11.9 11/18/2020    INR 1.1 11/18/2020         ASSESSMENT  · S/P Right hip hemiarthroplasty for displaced femoral neck fracture on 11/19    PLAN      · Continue physical therapy and protocol: WBAT - RLE with anterolateral hip precautions  · 24 hour abx coverage  · Deep venous thrombosis prophylaxis - lovenox, early mobilization  · PT/OT  · Pain Control: IV and PO  · Monitor H&H. Hgb 11.4 today  · D/C Planning.   Appreciate PT/OT, SW, medicine recommendations  · Discuss with attending

## 2020-11-20 NOTE — PROGRESS NOTES
Physical Therapy  Physical Therapy Initial Assessment     Name: Mariam Weaver  : 1955  MRN: [id number]    Referring Provider:      Date of Service: 2020    Evaluating PT:  Fermin Santiago PT   Room #:  7111/2684-C  Diagnosis: Closed right hip fracture, initial encounter [facility]) [S72.001A]  Closed right hip fracture, initial encounter ([facility] [place] Cabrera     PMHx/PSHx:   has a past medical history of Anxiety, Bronchitis, Cellulitis, Chronic sinusitis, Depression, Diabetes mellitus ([facility] Diabetic retinopathy ([facility] Fracture of left foot, High cholesterol, Hypercholesteremia, Hypercholesterolemia, Hypertension, Lumbago, Moderate mood disorder ([facility] and Third nerve palsy. has a past surgical history that includes eye surgery and hip surgery (Right, 2020). Procedure/Surgery:  Right hip hemiarthroplasty for displaced femoral neck fracture,   Precautions:  Falls, Anterior hip precautions, WBAT RLE  Equipment Needs:  Wheeled Walker      SUBJECTIVE:    Patient lives alone  in a ranch home  with 3 steps to enter without Rail  Bed is on 1 floor and bath is on 1 floor. Patient ambulated independently  PTA. Equipment owned: None,      OBJECTIVE:   Initial Evaluation  Date: 20 Treatment Short Term/ Long Term   Goals   AM-PAC 6 Clicks      Was pt agreeable to Eval/treatment? yes     Does pt have pain? Yes RLE     Bed Mobility  Rolling: Min  Supine to sit: Min  Sit to supine: Min  Scooting: Min  Rolling: Ind  Supine to sit: Ind  Sit to supine: Ind  Scooting: Ind   Transfers Sit to stand: Min with fww  Stand to sit: Min  Stand pivot: Min with fww cues for sequencing. Sit to stand: Mod Ind  Stand to sit: Mod Ind  Stand pivot: Mod Ind   Ambulation    25 feet with fww Min cues for sequencing.    150 feet with fww Mod Ind   Stair negotiation: ascended and descended  NT  3 steps with 1 rail Mod Ind   ROM BUE:  See OT eval  BLE:  wfl     Strength BUE: See OT eval   RLE:  3+/5  LLE:   4/5  4/5 Balance Sitting EOB:  Ind  Dynamic Standing:  Min with fww with cues for sequencing. Sitting EOB:  Ind  Dynamic Standing: Mod Ind     Patient is Alert & Oriented x person, place, time and situation and follows directions   Sensation:  Pt denies numbness and tingling to extremities  Edema:  none  Therapeutic Exercises:  AROM for LLE. Patient education  Patient educated on role of Physical Therapy, risks of immobility, safety and plan of care, hip precautions. Patient response to education:   Pt verbalized understanding Pt demonstrated skill Pt requires further education in this area   yes yes yes     ASSESSMENT:    Comments:  Patient was seen this date for PT evaluation. . Patient was agreeable to intervention. Results of the functional assessment are noted above. Upon entering the room patient was found supine in bed. Assisted to EOB adhering to precautions. Sat EOB x 10 minutes to increase dynamic sitting balance and activity tolerance. Gait then completed int he room with cues for sequencing. Patient would benefit from continued skilled Physical Therapy to improve functional independence and quality of life. At end of session, patient in chair with  call light and phone within reach,  all lines and tubes intact, nursing notified. This patient can benefit from the continuation of skilled PT  to maximize functional level and return to PLOF.      Treatment:  Patient practiced and was instructed in the following treatment:    · Bed mobility training - pt given verbal and tactile cues to facilitate proper sequencing and safety during rolling and supine>sit as well as provided with physical assistance to complete task    · Assistive device training - pt educated on using [employer] during gait, [employer] approximation/negotiation, and hand placement during sit<>stand to [employer]  · STS and transfer training - educated on hand/foot placement, safety, and sequencing during STS and pivot transfers using assistive device  · Gait training - verbal cues for [employer] approximation/negotiation, upright posture, and safety during 90 and 180 degree turns during gait       Pt's/ family goals   1. Home when ready. Patient and or family understand(s) diagnosis, prognosis, and plan of care. yes    PLAN OF CARE:    PT care will be provided in accordance with the objectives noted above. Exercises and functional mobility practice will be used as well as appropriate assistive devices or modalities to obtain goals. Patient and family education will also be administered as needed. Current Treatment Recommendations     [x] Strengthening     [] ROM   [x] Balance Training   [x] Endurance Training   [x] Transfer Training   [x] Gait Training   [x] Stair Training   [] Positioning   [] Safety and Education Training   [] Patient/Caregiver Education   [] HEP  [] Other     Frequency of treatments: 2-5x/week x 1-2 weeks. Time in  0900  Time out  0930    Total Treatment Time  30 minutes     Evaluation Time includes thorough review of current medical information, gathering information on past medical history/social history and prior level of function, completion of standardized testing/informal observation of tasks, assessment of data and education on plan of care and goals.     CPT codes:  [] Low Complexity PT evaluation 82308  [x] Moderate Complexity PT evaluation 72834  [] High Complexity PT evaluation 05690  [] PT Re-evaluation 87927   [] Gait training 80277 - minutes  [] Manual therapy 58990 - minutes  [x] Therapeutic activities 80268 25 minutes  [] Therapeutic exercises 98183 - minutes  [] Neuromuscular reeducation 53015 - minutes       [facility], [address] [facility]

## 2020-11-20 NOTE — PROGRESS NOTES
OCCUPATIONAL THERAPY INITIAL EVALUATION      Date:2020  Patient Name: Escobar Figueroa  MRN: [id number]  : 1955  Room: [address] [place] [address]    Evaluating OT: Elissa Dominguez OTR/L #[id number]          Referring physician:    Raymond Guo, DO        AM-PAC Daily Activity Raw Score:   Recommended Adaptive Equipment: ww, Elevated commode with HR, AE for LE dressing and bathing prn     Diagnosis: closed R hip fracture s/p fall   Surgery: s/p R [facility]    Pertinent Medical History: anxiety, DM, HTN, depression       Precautions:  Falls, WBAT RLE, anterior lateral precautions,      Home Living: Pt lives alone- son available to assist in a one story home  with 3 step(s) to enter and no rail(s); bed/bath on main floor   Bathroom setup: walk in shower with HR and elevated commode   Equipment owned: none  Prior Level of Function:   Independent with ADLs ,  Independent   with IADLs; using no AD  for ambulation.    Driving: yes                           Medication Management sekf  Occupation: disability  Darnell Belt he has been down with loss of appetite lately    Pain Level: 1010 resting pain with min pain after mobility  Cognition: A&O: 3/3; Follows multi step directions with flat affect and increased response time   Memory:  good   Sequencing:  Fair_+   Problem solving:  Fair+   Judgement/safety:  Good-     Functional Assessment:   Initial Eval Status  Date: 20 Treatment Status  Date: Short Term Goals=LTG  Treatment frequency: PRN 2-4 x/week   Feeding Independent     Grooming Setup sitting  Mod I with ww standing    UB Dressing setup  Independent   LB Dressing Max A unable to don L sock  Mod I with AE prn    Bathing Simulated mod A   Mod I with AE and DME prn    Toileting Mod A   Mod I with www   Bed Mobility  Supine to sit: min A    Sit to supine: n/t  Supine to sit: mod I    Sit to supine: mod I    Functional Transfers Sit to stand: min A with ww  Stand to sit:  Min A with ww  Stand pivot: min A with ww  Mod I with ww   Functional Mobility Min A short distance in room with ww  Mod I with ww   Balance Sitting:     Static:  SBA    Dynamic:SBA  Standing: min A      Activity Tolerance Fair+ limited by pain O2 WFL on room air  good   Visual/  Perceptual Glasses: yes WFL         Safety Fair+                 Good with safety with ADL completion     Hand dominance: R   UE ROM: RUE:  WFL  LUE:  WFL  Strength: RUE:  4-/5 LUE:  4-/5   Strength: R fair - WFL   L fair-WFL   Fine Motor Coordination: R WFL  L WFL    Hearing: WFL  Sensation:  No c/o numbness or tingling  Tone:  WFL  Edema: none noted                            Comments: Nursing approval to work with patient given. Upon arrival, patient supine and agreeable to evaluation. OT evaluation performed with  education on benefits of mobility and safety with ADL completion. Patient cooperative and motivated. At end of session, patient sitting up in chair and  with call light and phone within reach, all lines and tubes intact. Nursing notified. OT treatment: OT for functional assessment of  ADL, Functional Transfer/Mobility Training, Equipment Needs, Energy Conservation Techniques, Pt/Family Education, Ther Ex- deep breathing for edema and pain control., OT role and POC reviewed. Patient  demo good- understanding of anterolateral hip precautions and safety with ADL completion. Skilled occupational therapy services provided include instruction/training on safety and adapted techniques for completion of therapeutic activities, ADLs/IADLs, and therapeutic exercise deep breathing for edema and pain control. Therapist educated pt on anterolateral hip precautions, walker safety and intro to AE . Skilled monitoring of O2 sats, HR, and pt response throughout treatment. OT treatment provided this date includes:  ·  ADL-  Instruction/training on safety and adapted techniques for completion of ADLs: Pt.  Demonstrates fair understanding of precautions & techniques  ·  Functional Mobility-  Instruction/training on safety and improved independence with bed mobility, /functional transfers using ww   ·  Sitting EOB 5 minutes to improve dynamic sitting balance and activity tolerance during ADLs. ·  Activity tolerance- Instruction/training on energy conservation/work simplification for completion of ADLs:. Pt. Demo fair+ tolerance due to pain  this day   ·  Cognitive retraining -  Cues for safety/technique during bed mobility, sitting balance/seated ADLs, no cues for sequencing, problem solving WFL  ·  Skilled positioning/alignment-  Proper Positioning/Alignment for supportive sitting for ADL completion  ·  Skilled monitoring of O2 sats-  refer to activity tolerance reporting               Patient would  benefit from continued skilled OT to increase functional independence and quality of life.     Eval Complexity: mod    Assessment of current deficits   Functional mobility [x]  ADLs [x] Strength [x]  Cognition []  Functional transfers  [x] IADLs [x] Safety Awareness [x]  Endurance [x]  Fine Motor Coordination [] Balance [x] Vision/perception [] Sensation []   Gross Motor Coordination [] ROM [] Delirium []                  Motor Control []    Plan of Care: OT 1-3x/week for 5-7 days PRN   Instruction/training on adapted ADL techniques and AE recommendations to increase functional independence within precautions  Training on energy conservation strategies/techniques to improve independence/tolerance for self-care routine  Functional transfer/mobility training/DME recommendations for increased independence, safety, and fall prevention  Patient/Family education to increase follow through with safety techniques and functional independence  Recommendation of environmental modifications for increased safety with functional transfers/mobility and ADLs    Rehab Potential: Good for established goals    Patient / Family Goal: decrease pain - home with assist     Evaluation time includes thorough review of current medical information, gathering information on past medical & social history & PLOF, completion of standardized testing, informal observation of tasks, consultation with other medical professions/disciplines, assessment of data & development of POC/goals. Patient and/or family were instructed diagnosis, prognosis/goals and plan of care. yes Demonstrated good- understanding. Min Units   OT Eval Low 82892     OT Eval Medium 43047 X    OT Eval High [id number]     OT Re-Eval [id number]     Therapeutic Ex [id number]     Therapeutic Activities 88852 15    ADL/Self Care 23593     Orthotic Management 12032     Neuro Re-Ed 98588     Non-Billable Time              Time In: 9:00           Time Out: 9:30               [] Malnutrition indicators have been identified and nursing has been notified to ensure a dietitian consult is ordered. mod OT Evaluation + 15 treatment minutes    Yas Beatty.  [address], [address]

## 2020-11-20 NOTE — PROGRESS NOTES
kg)   SpO2 95%   BMI 25.85 kg/m²     General appearance: Lying comfortably in bed. No apparent distress. Respiratory: Clear to auscultation bilaterally. Cardiovascular: Normal S1/S2. Regular rhythm and rate. Abdomen: Soft, non-tender, non-distended with normal bowel sounds. Musculoskeletal: No clubbing, cyanosis or edema bilaterally. Skin: Skin color, texture, turgor normal.  No rashes or lesions. Neurologic:  No focal deficit. Psychiatric: Alert and oriented, thought content appropriate, normal insight  Peripheral Pulses: +2 palpable, equal bilaterally       Labs:   Recent Labs     11/18/20  1443 11/19/20  0641 11/20/20  0501   WBC 10.9 10.4 14.2*   HGB 13.9 13.1 11.4*   HCT 39.9 37.4 32.5*    207 189     Recent Labs     11/18/20  1443 11/19/20  0641 11/20/20  0501    137 134   K 3.9 4.2 5.2*   CL 96* 100 102   CO2 28 30* 28   BUN 35* 32* 32*   CREATININE 1.4* 1.3* 1.5*   CALCIUM 9.5 9.3 8.2*     Recent Labs     11/18/20  1443   AST 15   ALT 12   BILITOT 1.4*   ALKPHOS 85     Recent Labs     11/18/20  1443   INR 1.1     Recent Labs     11/18/20  1443   TROPONINI <0.01       Radiology:  XR HIP RIGHT (2-3 VIEWS)   Final Result   Anatomic alignment, status post right hip surgery. Prosthesis in anatomic   alignment. XR PELVIS (1-2 VIEWS)   Final Result   Anatomic alignment post right hip surgery. CT HEAD WO CONTRAST   Final Result   1. There is no acute intracranial abnormality. Specifically, there is no   intracranial hemorrhage. 2. Atrophy and periventricular leukomalacia,   3. Chronic mucous retention cyst seen within the right sphenoid sinus. CT CERVICAL SPINE WO CONTRAST   Final Result   1. There is no acute compression fracture or subluxation of the cervical   spine. 2. Multilevel degenerative disc and degenerative joint disease. XR CHEST (2 VW)   Final Result   No acute process. XR FEMUR RIGHT (MIN 2 VIEWS)   Final Result   Right femoral neck fracture. XR HIP 2-3 VW W PELVIS RIGHT   Final Result   Right femoral neck fracture. Active Hospital Problems    Diagnosis Date Noted    Closed right hip fracture, initial encounter [facility]) [S72.001A] 11/18/2020       Assessment  Right femoral neck fracture - secondary to below, s/p right hemiarthroplasty on 11/19  Mechanical fall  Type 2 diabetes mellitus - insulin-dependent  Diabetic Neuropathy   Hypertension  Stage II CKD - stable  GERD  Depression  Vascular Dementia      Plan:  PRN analgesia without over sedating  On Lamictal  Gentle IV hydration  Basal and corrective bolus insulin regimen; add prandial dose of humalog  Hypoglycemic precautions  PT as tolerated   Discharge planning; patient is stable for discharge from IM POV  Will sign off.  Pls call for any question    DVT Prophylaxis: SCDs  Diet: DIET CARB CONTROL;  Code Status: Full Code    PT/OT Eval Status:     Dispo - Home versus [address] [place], [place] [address]nisha Jackson MD 11/20/2020 8:39 AM

## 2020-11-20 NOTE — CARE COORDINATION
11/20/2020 social work transition of care planning  Pt is from home alone and had been independent. Pt sees a pcp and uses pharmacy at the [place]. Explained sw role in transition of care planning. Pt plan is home, will call son for ride at discharge. Pt has no preference for dme or hhc. Sw made referral to [facility] for PT/OT and [facility] for w/w. Will need orders in epic.   Electronically signed by INDY Diane on 11/20/2020 at 1:56 PM

## 2020-11-21 LAB
ANION GAP SERPL CALCULATED.3IONS-SCNC: 10 MMOL/L (ref 7–16)
BUN BLDV-MCNC: 21 MG/DL (ref 8–23)
CALCIUM SERPL-MCNC: 8.6 MG/DL (ref 8.6–10.2)
CHLORIDE BLD-SCNC: 105 MMOL/L (ref 98–107)
CO2: 24 MMOL/L (ref 22–29)
CREAT SERPL-MCNC: 1.1 MG/DL (ref 0.7–1.2)
GFR AFRICAN AMERICAN: >60
GFR NON-AFRICAN AMERICAN: >60 ML/MIN/1.73
GLUCOSE BLD-MCNC: 140 MG/DL (ref 74–99)
HCT VFR BLD CALC: 30.6 % (ref 37–54)
HEMOGLOBIN: 10.7 G/DL (ref 12.5–16.5)
MCH RBC QN AUTO: 25.8 PG (ref 26–35)
MCHC RBC AUTO-ENTMCNC: 35 % (ref 32–34.5)
MCV RBC AUTO: 73.9 FL (ref 80–99.9)
METER GLUCOSE: 160 MG/DL (ref 74–99)
METER GLUCOSE: 192 MG/DL (ref 74–99)
METER GLUCOSE: 198 MG/DL (ref 74–99)
METER GLUCOSE: 216 MG/DL (ref 74–99)
PDW BLD-RTO: 13.9 FL (ref 11.5–15)
PLATELET # BLD: 158 E9/L (ref 130–450)
PMV BLD AUTO: 10.7 FL (ref 7–12)
POTASSIUM REFLEX MAGNESIUM: 4.3 MMOL/L (ref 3.5–5)
RBC # BLD: 4.14 E12/L (ref 3.8–5.8)
SODIUM BLD-SCNC: 139 MMOL/L (ref 132–146)
WBC # BLD: 9.1 E9/L (ref 4.5–11.5)

## 2020-11-21 PROCEDURE — 85027 COMPLETE CBC AUTOMATED: CPT

## 2020-11-21 PROCEDURE — 82962 GLUCOSE BLOOD TEST: CPT

## 2020-11-21 PROCEDURE — 80048 BASIC METABOLIC PNL TOTAL CA: CPT

## 2020-11-21 PROCEDURE — 2580000003 HC RX 258: Performed by: ORTHOPAEDIC SURGERY

## 2020-11-21 PROCEDURE — 36415 COLL VENOUS BLD VENIPUNCTURE: CPT

## 2020-11-21 PROCEDURE — 6370000000 HC RX 637 (ALT 250 FOR IP): Performed by: HOSPITALIST

## 2020-11-21 PROCEDURE — 6370000000 HC RX 637 (ALT 250 FOR IP): Performed by: ORTHOPAEDIC SURGERY

## 2020-11-21 PROCEDURE — 6360000002 HC RX W HCPCS: Performed by: ORTHOPAEDIC SURGERY

## 2020-11-21 PROCEDURE — 1200000000 HC SEMI PRIVATE

## 2020-11-21 PROCEDURE — 97161 PT EVAL LOW COMPLEX 20 MIN: CPT

## 2020-11-21 RX ADMIN — INSULIN LISPRO 1 UNITS: 100 INJECTION, SOLUTION INTRAVENOUS; SUBCUTANEOUS at 20:01

## 2020-11-21 RX ADMIN — SODIUM CHLORIDE, PRESERVATIVE FREE 10 ML: 5 INJECTION INTRAVENOUS at 09:07

## 2020-11-21 RX ADMIN — LAMOTRIGINE 25 MG: 25 TABLET ORAL at 19:59

## 2020-11-21 RX ADMIN — OXYCODONE HYDROCHLORIDE 10 MG: 10 TABLET ORAL at 09:07

## 2020-11-21 RX ADMIN — INSULIN GLARGINE 70 UNITS: 100 INJECTION, SOLUTION SUBCUTANEOUS at 20:00

## 2020-11-21 RX ADMIN — INSULIN LISPRO 1 UNITS: 100 INJECTION, SOLUTION INTRAVENOUS; SUBCUTANEOUS at 08:30

## 2020-11-21 RX ADMIN — SENNOSIDES AND DOCUSATE SODIUM 1 TABLET: 8.6; 5 TABLET ORAL at 09:06

## 2020-11-21 RX ADMIN — INSULIN LISPRO 5 UNITS: 100 INJECTION, SOLUTION INTRAVENOUS; SUBCUTANEOUS at 09:08

## 2020-11-21 RX ADMIN — ROSUVASTATIN 10 MG: 10 TABLET, FILM COATED ORAL at 09:06

## 2020-11-21 RX ADMIN — INSULIN LISPRO 1 UNITS: 100 INJECTION, SOLUTION INTRAVENOUS; SUBCUTANEOUS at 12:01

## 2020-11-21 RX ADMIN — SODIUM CHLORIDE, PRESERVATIVE FREE 10 ML: 5 INJECTION INTRAVENOUS at 19:59

## 2020-11-21 RX ADMIN — ENOXAPARIN SODIUM 40 MG: 40 INJECTION SUBCUTANEOUS at 09:07

## 2020-11-21 RX ADMIN — SENNOSIDES AND DOCUSATE SODIUM 1 TABLET: 8.6; 5 TABLET ORAL at 19:59

## 2020-11-21 RX ADMIN — PANTOPRAZOLE SODIUM 40 MG: 40 TABLET, DELAYED RELEASE ORAL at 09:07

## 2020-11-21 RX ADMIN — INSULIN LISPRO 5 UNITS: 100 INJECTION, SOLUTION INTRAVENOUS; SUBCUTANEOUS at 16:45

## 2020-11-21 RX ADMIN — INSULIN LISPRO 5 UNITS: 100 INJECTION, SOLUTION INTRAVENOUS; SUBCUTANEOUS at 11:58

## 2020-11-21 RX ADMIN — INSULIN LISPRO 1 UNITS: 100 INJECTION, SOLUTION INTRAVENOUS; SUBCUTANEOUS at 16:48

## 2020-11-21 RX ADMIN — OXYCODONE HYDROCHLORIDE 10 MG: 10 TABLET ORAL at 19:59

## 2020-11-21 RX ADMIN — MULTIPLE VITAMINS W/ MINERALS TAB 1 TABLET: TAB at 09:06

## 2020-11-21 ASSESSMENT — PAIN DESCRIPTION - ONSET: ONSET: ON-GOING

## 2020-11-21 ASSESSMENT — PAIN SCALES - GENERAL
PAINLEVEL_OUTOF10: 10
PAINLEVEL_OUTOF10: 10
PAINLEVEL_OUTOF10: 5
PAINLEVEL_OUTOF10: 0
PAINLEVEL_OUTOF10: 6
PAINLEVEL_OUTOF10: 0

## 2020-11-21 ASSESSMENT — PAIN DESCRIPTION - DESCRIPTORS: DESCRIPTORS: ACHING;DISCOMFORT;SORE

## 2020-11-21 ASSESSMENT — PAIN DESCRIPTION - PROGRESSION: CLINICAL_PROGRESSION: NOT CHANGED

## 2020-11-21 ASSESSMENT — PAIN - FUNCTIONAL ASSESSMENT: PAIN_FUNCTIONAL_ASSESSMENT: PREVENTS OR INTERFERES SOME ACTIVE ACTIVITIES AND ADLS

## 2020-11-21 ASSESSMENT — PAIN DESCRIPTION - PAIN TYPE: TYPE: SURGICAL PAIN

## 2020-11-21 ASSESSMENT — PAIN DESCRIPTION - LOCATION: LOCATION: HIP

## 2020-11-21 ASSESSMENT — PAIN DESCRIPTION - FREQUENCY: FREQUENCY: CONTINUOUS

## 2020-11-21 ASSESSMENT — PAIN DESCRIPTION - ORIENTATION: ORIENTATION: RIGHT

## 2020-11-21 NOTE — PROGRESS NOTES
Department of Orthopedic Surgery  Resident Progress Note    Patient seen and examined, resting in bed. Pain controlled. No new complaints. Denies chest pain, shortness of breath, dizziness/lightheadedness. Ambulated 25 feet with fww yesterday. VITALS:  /68   Pulse 83   Temp 98.7 °F (37.1 °C) (Temporal)   Resp 16   Ht 5' 8\" (1.727 m)   Wt 170 lb (77.1 kg)   SpO2 98%   BMI 25.85 kg/m²     General: alert and oriented to person, place and time, well-developed and well-nourished, in no acute distress    MUSCULOSKELETAL:   right lower extremity:  · Dressing C/D/I  · Compartments soft and compressible  · +PF/DF/EHL  · +2/4 DP & PT pulses, Brisk Cap refill, Toes warm and perfused  · Distal sensation grossly intact to Peroneals, Sural, Saphenous, and tibial nrs    CBC:   Lab Results   Component Value Date    WBC 14.2 11/20/2020    HGB 11.4 11/20/2020    HCT 32.5 11/20/2020     11/20/2020     PT/INR:    Lab Results   Component Value Date    PROTIME 11.9 11/18/2020    INR 1.1 11/18/2020         ASSESSMENT  · S/P Right hip hemiarthroplasty for displaced femoral neck fracture on 11/19    PLAN      · Continue physical therapy and protocol: WBAT - RLE with anterolateral hip precautions  · 24 hour abx coverage completed  · Deep venous thrombosis prophylaxis - lovenox, early mobilization  · PT/OT  · Pain Control: IV and PO  · Monitor H&H  · D/C Planning. Appreciate PT/OT, SW, medicine recommendations  · Likely discharge later today or tomorrow if patient does well with therapy and home arrangements in order  · Discuss with attending      Agree with above. Doing well postoperatively from hemiarthroplasty.

## 2020-11-21 NOTE — PROGRESS NOTES
negotiation: ascended and descended  NT  3 steps with 1 rail Mod Ind   ROM BUE:  See OT eval  BLE:  wfl     Strength BUE: See OT eval   RLE:  3+/5  LLE:   4/5 4/5 4/5   Balance Sitting EOB:  Ind  Dynamic Standing:  Min with fww with cues for sequencing. Sitting indep  Standing SBA with [address] Sitting EOB:  Ind  Dynamic Standing: Mod Ind     Patient is Alert & Oriented x person, place, time and situation and follows directions   Sensation:  Pt denies numbness and tingling to extremities  Edema:  None    Therapeutic Exercises:  RLE LAQs and marching x 10    Patient education  Patient educated on PT POC and sequencing    Patient response to education:   Pt verbalized understanding Pt demonstrated skill Pt requires further education in this area   yes yes yes     ASSESSMENT:    Comments:  Patient was seen this date for PT session  Pt c/o stiffness in RLE and required light assist to bring RLE to bedside during transfer. Completed pre gait stretching and AROM to RLE. Light lift assist needed to stand from bedside and extended time to acclimate to RLE weight bearing. Ambulation completed with very slow, step to pattern. Pt demonstrating externally rotated R foot needing cueing to correct. Continuous verbal cues needed during ambulation. Seated in chair to end session.     Treatment:  Patient practiced and was instructed in the following treatment:    · Bed mobility training - pt given verbal and tactile cues to facilitate proper sequencing and safety during rolling and supine>sit as well as provided with physical assistance to complete task    · STS and pivot transfer training - pt educated on proper hand and foot placement, safety and sequencing, and use of proper technique to safely complete sit<>stand and pivot transfers with hands on assistance to complete task safely    · Gait training- pt was given verbal and tactile cues to facilitate correct pattern to maintain precautions and maximize efficiency during ambulation as well as provided with physical assistance to complete task.      PLAN OF CARE:    Making good progress, continue PT POC    Time in  0915  Time out  0930    Total Treatment Time  15 minutes     CPT codes:  [] Low Complexity PT evaluation 92422  [] Moderate Complexity PT evaluation 48769  [] High Complexity PT evaluation 25048  [] PT Re-evaluation 05957   [] Gait training 98494 - minutes  [] Manual therapy 33487 - minutes  [x] Therapeutic activities 60397 15 minutes  [] Therapeutic exercises 70666 - minutes  [] Neuromuscular reeducation 53480 - minutes       Bernardo Acuna, PT, DPT  [id number]

## 2020-11-22 VITALS
HEART RATE: 80 BPM | SYSTOLIC BLOOD PRESSURE: 119 MMHG | RESPIRATION RATE: 16 BRPM | DIASTOLIC BLOOD PRESSURE: 70 MMHG | HEIGHT: 68 IN | TEMPERATURE: 97.1 F | OXYGEN SATURATION: 98 % | WEIGHT: 170 LBS | BODY MASS INDEX: 25.76 KG/M2

## 2020-11-22 LAB
ANION GAP SERPL CALCULATED.3IONS-SCNC: 8 MMOL/L (ref 7–16)
BUN BLDV-MCNC: 14 MG/DL (ref 8–23)
CALCIUM SERPL-MCNC: 8.6 MG/DL (ref 8.6–10.2)
CHLORIDE BLD-SCNC: 103 MMOL/L (ref 98–107)
CO2: 26 MMOL/L (ref 22–29)
CREAT SERPL-MCNC: 1 MG/DL (ref 0.7–1.2)
GFR AFRICAN AMERICAN: >60
GFR NON-AFRICAN AMERICAN: >60 ML/MIN/1.73
GLUCOSE BLD-MCNC: 141 MG/DL (ref 74–99)
HCT VFR BLD CALC: 30.4 % (ref 37–54)
HEMOGLOBIN: 10.6 G/DL (ref 12.5–16.5)
MCH RBC QN AUTO: 25.7 PG (ref 26–35)
MCHC RBC AUTO-ENTMCNC: 34.9 % (ref 32–34.5)
MCV RBC AUTO: 73.6 FL (ref 80–99.9)
METER GLUCOSE: 135 MG/DL (ref 74–99)
METER GLUCOSE: 196 MG/DL (ref 74–99)
PDW BLD-RTO: 13.9 FL (ref 11.5–15)
PLATELET # BLD: 181 E9/L (ref 130–450)
PMV BLD AUTO: 10.6 FL (ref 7–12)
POTASSIUM REFLEX MAGNESIUM: 4.2 MMOL/L (ref 3.5–5)
RBC # BLD: 4.13 E12/L (ref 3.8–5.8)
SODIUM BLD-SCNC: 137 MMOL/L (ref 132–146)
WBC # BLD: 8.1 E9/L (ref 4.5–11.5)

## 2020-11-22 PROCEDURE — 6360000002 HC RX W HCPCS: Performed by: ORTHOPAEDIC SURGERY

## 2020-11-22 PROCEDURE — 80048 BASIC METABOLIC PNL TOTAL CA: CPT

## 2020-11-22 PROCEDURE — 36415 COLL VENOUS BLD VENIPUNCTURE: CPT

## 2020-11-22 PROCEDURE — 82962 GLUCOSE BLOOD TEST: CPT

## 2020-11-22 PROCEDURE — 85027 COMPLETE CBC AUTOMATED: CPT

## 2020-11-22 PROCEDURE — 6370000000 HC RX 637 (ALT 250 FOR IP): Performed by: HOSPITALIST

## 2020-11-22 PROCEDURE — 6370000000 HC RX 637 (ALT 250 FOR IP): Performed by: ORTHOPAEDIC SURGERY

## 2020-11-22 PROCEDURE — 2580000003 HC RX 258: Performed by: ORTHOPAEDIC SURGERY

## 2020-11-22 RX ADMIN — ENOXAPARIN SODIUM 40 MG: 40 INJECTION SUBCUTANEOUS at 06:42

## 2020-11-22 RX ADMIN — ROSUVASTATIN 10 MG: 10 TABLET, FILM COATED ORAL at 06:41

## 2020-11-22 RX ADMIN — OXYCODONE HYDROCHLORIDE 10 MG: 10 TABLET ORAL at 06:42

## 2020-11-22 RX ADMIN — SODIUM CHLORIDE, PRESERVATIVE FREE 10 ML: 5 INJECTION INTRAVENOUS at 06:43

## 2020-11-22 RX ADMIN — INSULIN LISPRO 5 UNITS: 100 INJECTION, SOLUTION INTRAVENOUS; SUBCUTANEOUS at 11:56

## 2020-11-22 RX ADMIN — PANTOPRAZOLE SODIUM 40 MG: 40 TABLET, DELAYED RELEASE ORAL at 06:42

## 2020-11-22 RX ADMIN — OXYCODONE HYDROCHLORIDE 10 MG: 10 TABLET ORAL at 13:07

## 2020-11-22 RX ADMIN — SENNOSIDES AND DOCUSATE SODIUM 1 TABLET: 8.6; 5 TABLET ORAL at 06:43

## 2020-11-22 RX ADMIN — INSULIN LISPRO 1 UNITS: 100 INJECTION, SOLUTION INTRAVENOUS; SUBCUTANEOUS at 11:55

## 2020-11-22 ASSESSMENT — PAIN DESCRIPTION - PROGRESSION
CLINICAL_PROGRESSION: NOT CHANGED
CLINICAL_PROGRESSION: NOT CHANGED

## 2020-11-22 ASSESSMENT — PAIN DESCRIPTION - PAIN TYPE
TYPE: SURGICAL PAIN
TYPE: SURGICAL PAIN

## 2020-11-22 ASSESSMENT — PAIN SCALES - GENERAL
PAINLEVEL_OUTOF10: 0
PAINLEVEL_OUTOF10: 5
PAINLEVEL_OUTOF10: 3
PAINLEVEL_OUTOF10: 7
PAINLEVEL_OUTOF10: 8

## 2020-11-22 ASSESSMENT — PAIN DESCRIPTION - ONSET
ONSET: ON-GOING
ONSET: ON-GOING

## 2020-11-22 ASSESSMENT — PAIN DESCRIPTION - LOCATION
LOCATION: HIP
LOCATION: HIP

## 2020-11-22 ASSESSMENT — PAIN DESCRIPTION - FREQUENCY
FREQUENCY: CONTINUOUS
FREQUENCY: CONTINUOUS

## 2020-11-22 ASSESSMENT — PAIN DESCRIPTION - ORIENTATION
ORIENTATION: RIGHT
ORIENTATION: RIGHT

## 2020-11-22 ASSESSMENT — PAIN DESCRIPTION - DESCRIPTORS
DESCRIPTORS: ACHING;DISCOMFORT;SORE
DESCRIPTORS: ACHING;DISCOMFORT;SORE

## 2020-11-22 NOTE — PROGRESS NOTES
Department of Orthopedic Surgery  Resident Progress Note    Patient seen and examined, resting in bed. Pain controlled. No new complaints. Denies chest pain, shortness of breath, dizziness/lightheadedness. Patient states he's ready to go home today    VITALS:  /79   Pulse 83   Temp 98 °F (36.7 °C) (Temporal)   Resp 16   Ht 5' 8\" (1.727 m)   Wt 170 lb (77.1 kg)   SpO2 98%   BMI 25.85 kg/m²     General: alert and oriented to person, place and time, well-developed and well-nourished, in no acute distress    MUSCULOSKELETAL:   right lower extremity:  · Dressing C/D/I  · Compartments soft and compressible  · +PF/DF/EHL  · +2/4 DP & PT pulses, Brisk Cap refill, Toes warm and perfused  · Distal sensation grossly intact to Peroneals, Sural, Saphenous, and tibial nrs    CBC:   Lab Results   Component Value Date    WBC 8.1 11/22/2020    HGB 10.6 11/22/2020    HCT 30.4 11/22/2020     11/22/2020     PT/INR:    Lab Results   Component Value Date    PROTIME 11.9 11/18/2020    INR 1.1 11/18/2020         ASSESSMENT  · S/P Right hip hemiarthroplasty for displaced femoral neck fracture on 11/19    PLAN      · Continue physical therapy and protocol: WBAT - RLE with anterolateral hip precautions  · 24 hour abx coverage  · Deep venous thrombosis prophylaxis - lovenox, early mobilization  · PT/OT  · Pain Control: IV and PO  · Monitor H&H. Hgb 10.6 today  · D/C Planning. Appreciate PT/OT, SW, medicine recommendations  · Patient expressed concern about going home and being able to take care of himself appropriately. He would like to speak with case management about other options for discharge.   · [id number] [address] for discharge when arrangements have been coordinated with case management  · Follow up in office with Dr. Carly Holden  · Discuss with attending

## 2020-11-22 NOTE — CARE COORDINATION
Discharge order noted. Patient is POD#3 Right hip hemiarthroplasty for displaced femoral neck fracture. Plan was home with [facility]. Per RN, Patient expressed concern about going home and being able to take care of himself appropriately without any help. I called and spoke to the patient due to working remotely and he said that he would like to go to [facility] because he lives alone and feels he may need help at home. I asked him about his son and he said his son does not live with him and is not able to stay with him because he works. The patient said he does not have a preference for a facility, maybe somewhere close to his home on the [place] [address] side of [facility]. Referral made to Kati at [address] [place] [address] and message left to make referral with [facility] [place]. Await acceptance from one of theses facilities. Cayetano Dan RN, CM      Per [facility] from [place], patient has been accepted and can discharge there today without waiting for precert. Patient notified and is agreeable. Transportation set up via [employer] for 1 pm today. RN, liaison and patient all notified. Last negative Covid-19 test was on Thursday November 19. Hens completed in the system.   Cayetano Dan RN, CM

## 2020-11-22 NOTE — DISCHARGE INSTR - COC
Continuity of Care Form    Patient Name: Young Byrne   :  1955  MRN:  [id number]    Admit date:  2020  Discharge date:  2020    Code Status Order: Full Code   Advance Directives:   [facility] Documentation     Date/Time Healthcare Directive Type of Healthcare Directive Copy in [facility] [address] Agent's Name Healthcare Agent's Phone Number    20 0030  No, patient does not have an advance directive for healthcare treatment -- -- -- -- --    20  No, patient does not have an advance directive for healthcare treatment -- -- -- -- --          Admitting Physician:  Adin Johnson DO  PCP: Larry Ha MD    Discharging Nurse: Chaz Sheth Unit/Room#: 4815/3256-P  Discharging Unit Phone Number: [phone]    Emergency Contact:   Extended Emergency Contact Information  Primary Emergency Contact: [address] [place] [address] Phone: [phone]  Relation: Child    Past Surgical History:  Past Surgical History:   Procedure Laterality Date    EYE SURGERY      HIP SURGERY Right 2020    HIP HEMIARTHROPLASTY performed by Kristeen Claude, MD at [address] [place] [address]       Immunization History: There is no immunization history for the selected administration types on file for this patient. Active Problems:  Patient Active Problem List   Diagnosis Code    Severe episode of recurrent major depressive disorder, without psychotic features ([address] F33.2    Suicidal ideations R45.851    SDH (subdural hematoma) ([facility]) S06.5X9A    Traumatic subdural hemorrhage with loss of consciousness of 30 minutes or less ([address] S06. 8X1A    Diabetic acidosis without coma ([facility]) E11.10    Acute kidney injury superimposed on CKD ([address] N17.9, N18.9    Hyponatremia E87.1    Right adrenal mass ([facility]) E27.8    Hyperkalemia E87.5    Major depression, recurrent ([facility]) F33.9    Type 2 diabetes mellitus with complication, 11/18/20 170 lb (77.1 kg)     Mental Status:  oriented, alert, thought processes intact and able to concentrate and follow conversation    IV Access:  - None    Nursing Mobility/ADLs:  Walking   Assisted  Transfer  Assisted  Bathing  Independent  Dressing  Assisted  Toileting  Independent  Feeding  Independent  Med [address]  Med Delivery   whole    Wound Care Documentation and Therapy:        Elimination:  Continence:   · Bowel: yes  · Bladder:Yes      Urinary Catheter: None   Colostomy/Ileostomy/Ileal Conduit: {YES / LA:54928}       Date of Last BM: ***    Intake/Output Summary (Last 24 hours) at 11/22/2020 1221  Last data filed at 11/22/2020 1130  Gross per 24 hour   Intake 240 ml   Output --   Net 240 ml     No intake/output data recorded. Safety Concerns:     None    Impairments/Disabilities:      None    Nutrition Therapy:  Current Nutrition Therapy:   - Oral Diet:  General    Routes of Feeding: Oral  Liquids: {Slp liquid thickness:65126}  Daily Fluid Restriction: no  Last Modified Barium Swallow with Video (Video Swallowing Test): not done    Treatments at the Time of Hospital Discharge:   Respiratory Treatments: ***  Oxygen Therapy:  is not on home oxygen therapy.   Ventilator:    - No ventilator support    Rehab Therapies: {THERAPEUTIC INTERVENTION:5698021209}  Weight Bearing Status/Restrictions: No weight bearing restirctions  Other Medical Equipment (for information only, NOT a DME order):  {EQUIPMENT:215111830}  Other Treatments: ***    Patient's personal belongings (please select all that are sent with patient):  Jolene HAIDER SIGNATURE:  {Esignature:[id number]}    CASE MANAGEMENT/SOCIAL WORK SECTION    Inpatient Status Date: ***    Readmission Risk Assessment Score:  Readmission Risk              Risk of Unplanned Readmission:        22           Discharging to Facility/ Agency   · Name:   · Address:  · Phone:  · Fax:    Dialysis Facility (if applicable)   · Name:  · Address:  · Dialysis Schedule:  · Phone:  · Fax:    / signature: {Esignature:[id number]}    PHYSICIAN SECTION    Prognosis: {Prognosis:6498214668}    Condition at Discharge: [address]Ray[address] Villafana Patient Condition:700086077}    Rehab Potential (if transferring to Rehab): {Prognosis:6946103127}    Recommended Labs or Other Treatments After Discharge: ***    Physician Certification: I certify the above information and transfer of Kusum Begum  is necessary for the continuing treatment of the diagnosis listed and that he requires {Admit to Appropriate Level of Care:51112} for {GREATER/LESS:170594275} 30 days.      Update Admission H&P: {CHP DME Changes in FQJU}    PHYSICIAN SIGNATURE: Kaushik Hdez RN

## 2020-11-23 NOTE — DISCHARGE SUMMARY
Physician Discharge Summary     Patient ID:  Olya Castillo  [id number]  3 y.o.  1955    Admit date: 11/18/2020    Discharge date and time: 11/22/2020  1:10 PM     Admitting Physician: Jamel Worley MD    Discharge Physician: Jamel Worley MD    Admission Diagnoses: Closed right hip fracture, initial encounter [facility]) [S72.001A]  Closed right hip fracture, initial encounter [facility]) [S72.001A]    Discharge Diagnoses: s/p right hip Hemiarthroplasty    Admission Condition: stable    Discharged Condition: stable    Hospital Course: The patient was admitted from the emergency department after orthopedics was consulted from evaluation and management of rightFemoral neck fracture. After examination, review of radiologic studies, and appropriate pre-operative risk assessment, it was recommended by Jamel Worley MD to undergo hemiarthroplasty of the right hip. The patient underwent an uneventful course of right hip hemiarthroplasty by Jamel Worley MD on 11/19. The patient was subsequently taken to the PACU and the floor in stable condition. The patient was continued on antibiotics for 24 hours post-operatively as they received a dose of antibiotics pre-operatively as well. The patient was started on DVT prophylaxis and physical therapy with instructions to be WBAT. Blood counts were followed daily.  was consulted for d/c planning. On POD 3, after the patient had made appropriate gains in regaining independent function with physical therapy, the patient was discharged to rehab facility in stable condition. Treatments: s/p right darren Hip arthroplasty    Disposition: The patient was provided instructions to continue DVT prophylaxis as instructed and to continue daily dry sterile dressing changes until wound is dry. The pt was allowed to shower on POD 4. The patient was to follow up with Jamel Worley MD in 2 weeks, and to call the office for an appointment.  suture (s) were to be removed on POD 14.       Medication List      START taking these medications    aspirin EC 81 MG EC tablet  Take 1 tablet by mouth 2 times daily for 28 days     oxyCODONE 5 MG immediate release tablet  Commonly known as:  Roxicodone  Take 1 tablet by mouth every 6 hours as needed for Pain for up to 7 days. Intended supply: 7 days.  Take lowest dose possible to manage pain        CONTINUE taking these medications    docusate sodium 100 MG capsule  Commonly known as:  COLACE     glimepiride 4 MG tablet  Commonly known as:  AMARYL     glucose 4 g chewable tablet     insulin glargine 100 UNIT/ML injection vial  Commonly known as:  LANTUS  Inject 60 Units into the skin nightly     lamoTRIgine 25 MG tablet  Commonly known as:  LAMICTAL     OLANZapine 7.5 MG tablet  Commonly known as:  ZYPREXA  Take 1 tablet by mouth nightly     omeprazole 20 MG delayed release capsule  Commonly known as:  PRILOSEC     rosuvastatin 10 MG tablet  Commonly known as:  CRESTOR     therapeutic multivitamin-minerals tablet     venlafaxine 75 MG extended release capsule  Commonly known as:  EFFEXOR XR  Take 1 capsule by mouth daily (with breakfast)           Where to Get Your Medications      You can get these medications from any pharmacy    Bring a paper prescription for each of these medications  · aspirin EC 81 MG EC tablet  · oxyCODONE 5 MG immediate release tablet           Signed:  Emmy Ogden  11/22/2020  7:38 PM

## 2020-12-07 ENCOUNTER — APPOINTMENT (OUTPATIENT)
Dept: GENERAL RADIOLOGY | Age: 65
DRG: 535 | End: 2020-12-07
Payer: MEDICARE

## 2020-12-07 ENCOUNTER — HOSPITAL ENCOUNTER (INPATIENT)
Age: 65
LOS: 1 days | Discharge: ANOTHER HOSPITAL | DRG: 535 | End: 2020-12-08
Attending: EMERGENCY MEDICINE | Admitting: INTERNAL MEDICINE
Payer: MEDICARE

## 2020-12-07 PROBLEM — S72.002A CLOSED LEFT HIP FRACTURE, INITIAL ENCOUNTER (HCC): Status: ACTIVE | Noted: 2020-12-07

## 2020-12-07 LAB
ADENOVIRUS BY PCR: NOT DETECTED
AMORPHOUS: PRESENT
ANION GAP SERPL CALCULATED.3IONS-SCNC: 9 MMOL/L (ref 7–16)
BACTERIA: NORMAL /HPF
BASOPHILS ABSOLUTE: 0.04 E9/L (ref 0–0.2)
BASOPHILS RELATIVE PERCENT: 0.4 % (ref 0–2)
BILIRUBIN URINE: NEGATIVE
BLOOD, URINE: NEGATIVE
BORDETELLA PARAPERTUSSIS BY PCR: NOT DETECTED
BORDETELLA PERTUSSIS BY PCR: NOT DETECTED
BUN BLDV-MCNC: 20 MG/DL (ref 8–23)
CALCIUM SERPL-MCNC: 8.7 MG/DL (ref 8.6–10.2)
CHLAMYDOPHILIA PNEUMONIAE BY PCR: NOT DETECTED
CHLORIDE BLD-SCNC: 98 MMOL/L (ref 98–107)
CLARITY: CLEAR
CO2: 24 MMOL/L (ref 22–29)
COLOR: ABNORMAL
CORONAVIRUS 229E BY PCR: NOT DETECTED
CORONAVIRUS HKU1 BY PCR: NOT DETECTED
CORONAVIRUS NL63 BY PCR: NOT DETECTED
CORONAVIRUS OC43 BY PCR: NOT DETECTED
CREAT SERPL-MCNC: 1.2 MG/DL (ref 0.7–1.2)
EOSINOPHILS ABSOLUTE: 0.02 E9/L (ref 0.05–0.5)
EOSINOPHILS RELATIVE PERCENT: 0.2 % (ref 0–6)
GFR AFRICAN AMERICAN: >60
GFR NON-AFRICAN AMERICAN: >60 ML/MIN/1.73
GLUCOSE BLD-MCNC: 73 MG/DL (ref 74–99)
GLUCOSE URINE: NEGATIVE MG/DL
HCT VFR BLD CALC: 29.6 % (ref 37–54)
HEMOGLOBIN: 10.1 G/DL (ref 12.5–16.5)
HUMAN METAPNEUMOVIRUS BY PCR: NOT DETECTED
HUMAN RHINOVIRUS/ENTEROVIRUS BY PCR: NOT DETECTED
IMMATURE GRANULOCYTES #: 0.03 E9/L
IMMATURE GRANULOCYTES %: 0.3 % (ref 0–5)
INFLUENZA A BY PCR: NOT DETECTED
INFLUENZA B BY PCR: NOT DETECTED
KETONES, URINE: ABNORMAL MG/DL
LEUKOCYTE ESTERASE, URINE: NEGATIVE
LYMPHOCYTES ABSOLUTE: 1.21 E9/L (ref 1.5–4)
LYMPHOCYTES RELATIVE PERCENT: 13.5 % (ref 20–42)
MAGNESIUM: 1.9 MG/DL (ref 1.6–2.6)
MCH RBC QN AUTO: 24.8 PG (ref 26–35)
MCHC RBC AUTO-ENTMCNC: 34.1 % (ref 32–34.5)
MCV RBC AUTO: 72.7 FL (ref 80–99.9)
MONOCYTES ABSOLUTE: 1.04 E9/L (ref 0.1–0.95)
MONOCYTES RELATIVE PERCENT: 11.6 % (ref 2–12)
MYCOPLASMA PNEUMONIAE BY PCR: NOT DETECTED
NEUTROPHILS ABSOLUTE: 6.64 E9/L (ref 1.8–7.3)
NEUTROPHILS RELATIVE PERCENT: 74 % (ref 43–80)
NITRITE, URINE: NEGATIVE
PARAINFLUENZA VIRUS 1 BY PCR: NOT DETECTED
PARAINFLUENZA VIRUS 2 BY PCR: NOT DETECTED
PARAINFLUENZA VIRUS 3 BY PCR: NOT DETECTED
PARAINFLUENZA VIRUS 4 BY PCR: NOT DETECTED
PDW BLD-RTO: 14.3 FL (ref 11.5–15)
PH UA: 6.5 (ref 5–9)
PLATELET # BLD: 307 E9/L (ref 130–450)
PMV BLD AUTO: 9.8 FL (ref 7–12)
POTASSIUM SERPL-SCNC: 4.4 MMOL/L (ref 3.5–5)
PROTEIN UA: 30 MG/DL
RBC # BLD: 4.07 E12/L (ref 3.8–5.8)
RBC UA: NORMAL /HPF (ref 0–2)
RESPIRATORY SYNCYTIAL VIRUS BY PCR: NOT DETECTED
SARS-COV-2, PCR: DETECTED
SODIUM BLD-SCNC: 131 MMOL/L (ref 132–146)
SPECIFIC GRAVITY UA: 1.02 (ref 1–1.03)
UROBILINOGEN, URINE: 4 E.U./DL
WBC # BLD: 9 E9/L (ref 4.5–11.5)
WBC UA: NORMAL /HPF (ref 0–5)

## 2020-12-07 PROCEDURE — 73521 X-RAY EXAM HIPS BI 2 VIEWS: CPT

## 2020-12-07 PROCEDURE — 83735 ASSAY OF MAGNESIUM: CPT

## 2020-12-07 PROCEDURE — 71045 X-RAY EXAM CHEST 1 VIEW: CPT

## 2020-12-07 PROCEDURE — 1200000000 HC SEMI PRIVATE

## 2020-12-07 PROCEDURE — 6370000000 HC RX 637 (ALT 250 FOR IP): Performed by: EMERGENCY MEDICINE

## 2020-12-07 PROCEDURE — 51702 INSERT TEMP BLADDER CATH: CPT

## 2020-12-07 PROCEDURE — 81001 URINALYSIS AUTO W/SCOPE: CPT

## 2020-12-07 PROCEDURE — 0202U NFCT DS 22 TRGT SARS-COV-2: CPT

## 2020-12-07 PROCEDURE — 96374 THER/PROPH/DIAG INJ IV PUSH: CPT

## 2020-12-07 PROCEDURE — 6360000002 HC RX W HCPCS: Performed by: STUDENT IN AN ORGANIZED HEALTH CARE EDUCATION/TRAINING PROGRAM

## 2020-12-07 PROCEDURE — 80048 BASIC METABOLIC PNL TOTAL CA: CPT

## 2020-12-07 PROCEDURE — 73552 X-RAY EXAM OF FEMUR 2/>: CPT

## 2020-12-07 PROCEDURE — 85025 COMPLETE CBC W/AUTO DIFF WBC: CPT

## 2020-12-07 PROCEDURE — 99284 EMERGENCY DEPT VISIT MOD MDM: CPT

## 2020-12-07 RX ORDER — ACETAMINOPHEN 500 MG
1000 TABLET ORAL ONCE
Status: COMPLETED | OUTPATIENT
Start: 2020-12-07 | End: 2020-12-07

## 2020-12-07 RX ORDER — FENTANYL CITRATE 50 UG/ML
50 INJECTION, SOLUTION INTRAMUSCULAR; INTRAVENOUS ONCE
Status: COMPLETED | OUTPATIENT
Start: 2020-12-07 | End: 2020-12-07

## 2020-12-07 RX ADMIN — ACETAMINOPHEN 1000 MG: 500 TABLET ORAL at 20:11

## 2020-12-07 RX ADMIN — FENTANYL CITRATE 50 MCG: 50 INJECTION, SOLUTION INTRAMUSCULAR; INTRAVENOUS at 17:58

## 2020-12-07 ASSESSMENT — PAIN DESCRIPTION - DESCRIPTORS: DESCRIPTORS: ACHING

## 2020-12-07 ASSESSMENT — PAIN DESCRIPTION - ORIENTATION: ORIENTATION: LEFT

## 2020-12-07 ASSESSMENT — PAIN SCALES - GENERAL
PAINLEVEL_OUTOF10: 2
PAINLEVEL_OUTOF10: 5
PAINLEVEL_OUTOF10: 5

## 2020-12-07 ASSESSMENT — PAIN DESCRIPTION - LOCATION: LOCATION: HIP

## 2020-12-07 ASSESSMENT — PAIN DESCRIPTION - FREQUENCY: FREQUENCY: INTERMITTENT

## 2020-12-07 ASSESSMENT — PAIN DESCRIPTION - PAIN TYPE: TYPE: ACUTE PAIN

## 2020-12-07 NOTE — ED NOTES
Pt to room via [facility] from [address] [place] [address]. Had fall on Friday.  Xray shows fx left hip, has recent rt hip fracture with repair     Lei Webster RN  12/07/20 0619

## 2020-12-07 NOTE — ED NOTES
Bed: 25  Expected date:   Expected time:   Means of arrival:   Comments:  georgia Andres RN  12/07/20 [id number]

## 2020-12-07 NOTE — PROGRESS NOTES
Dr. Santiago Carrier requested for patient to be transferred to [facility] the [address] @ [phone]. Patient Diagnosis: Left Hip Fracture.

## 2020-12-08 ENCOUNTER — HOSPITAL ENCOUNTER (INPATIENT)
Age: 65
LOS: 5 days | Discharge: HOME | DRG: 521 | End: 2020-12-13
Attending: FAMILY MEDICINE | Admitting: INTERNAL MEDICINE
Payer: MEDICARE

## 2020-12-08 ENCOUNTER — APPOINTMENT (OUTPATIENT)
Dept: GENERAL RADIOLOGY | Age: 65
DRG: 521 | End: 2020-12-08
Attending: FAMILY MEDICINE
Payer: MEDICARE

## 2020-12-08 VITALS
TEMPERATURE: 99.8 F | DIASTOLIC BLOOD PRESSURE: 62 MMHG | HEIGHT: 68 IN | HEART RATE: 84 BPM | RESPIRATION RATE: 16 BRPM | SYSTOLIC BLOOD PRESSURE: 110 MMHG | OXYGEN SATURATION: 98 % | BODY MASS INDEX: 25.81 KG/M2 | WEIGHT: 170.3 LBS

## 2020-12-08 LAB — METER GLUCOSE: 125 MG/DL (ref 74–99)

## 2020-12-08 PROCEDURE — 99222 1ST HOSP IP/OBS MODERATE 55: CPT | Performed by: ORTHOPAEDIC SURGERY

## 2020-12-08 PROCEDURE — 71045 X-RAY EXAM CHEST 1 VIEW: CPT

## 2020-12-08 PROCEDURE — 6360000002 HC RX W HCPCS: Performed by: EMERGENCY MEDICINE

## 2020-12-08 PROCEDURE — 82962 GLUCOSE BLOOD TEST: CPT

## 2020-12-08 PROCEDURE — 6370000000 HC RX 637 (ALT 250 FOR IP): Performed by: EMERGENCY MEDICINE

## 2020-12-08 PROCEDURE — 1200000000 HC SEMI PRIVATE

## 2020-12-08 PROCEDURE — 6370000000 HC RX 637 (ALT 250 FOR IP): Performed by: INTERNAL MEDICINE

## 2020-12-08 RX ORDER — MORPHINE SULFATE 2 MG/ML
2 INJECTION, SOLUTION INTRAMUSCULAR; INTRAVENOUS EVERY 4 HOURS PRN
Status: DISCONTINUED | OUTPATIENT
Start: 2020-12-08 | End: 2020-12-09

## 2020-12-08 RX ORDER — LAMOTRIGINE 25 MG/1
25 TABLET ORAL NIGHTLY
Status: DISCONTINUED | OUTPATIENT
Start: 2020-12-08 | End: 2020-12-13 | Stop reason: HOSPADM

## 2020-12-08 RX ORDER — DEXTROSE MONOHYDRATE 25 G/50ML
12.5 INJECTION, SOLUTION INTRAVENOUS PRN
Status: DISCONTINUED | OUTPATIENT
Start: 2020-12-08 | End: 2020-12-13 | Stop reason: HOSPADM

## 2020-12-08 RX ORDER — ACETAMINOPHEN 325 MG/1
650 TABLET ORAL EVERY 6 HOURS PRN
Status: DISCONTINUED | OUTPATIENT
Start: 2020-12-08 | End: 2020-12-08 | Stop reason: HOSPADM

## 2020-12-08 RX ORDER — OLANZAPINE 5 MG/1
7.5 TABLET ORAL NIGHTLY
Status: DISCONTINUED | OUTPATIENT
Start: 2020-12-08 | End: 2020-12-13 | Stop reason: HOSPADM

## 2020-12-08 RX ORDER — ACETAMINOPHEN 325 MG/1
650 TABLET ORAL EVERY 4 HOURS PRN
Status: DISCONTINUED | OUTPATIENT
Start: 2020-12-08 | End: 2020-12-13 | Stop reason: HOSPADM

## 2020-12-08 RX ORDER — PANTOPRAZOLE SODIUM 40 MG/1
40 TABLET, DELAYED RELEASE ORAL
Status: DISCONTINUED | OUTPATIENT
Start: 2020-12-09 | End: 2020-12-13 | Stop reason: HOSPADM

## 2020-12-08 RX ORDER — ONDANSETRON 2 MG/ML
4 INJECTION INTRAMUSCULAR; INTRAVENOUS EVERY 6 HOURS PRN
Status: DISCONTINUED | OUTPATIENT
Start: 2020-12-08 | End: 2020-12-13 | Stop reason: HOSPADM

## 2020-12-08 RX ORDER — INSULIN GLARGINE 100 [IU]/ML
60 INJECTION, SOLUTION SUBCUTANEOUS NIGHTLY
Status: DISCONTINUED | OUTPATIENT
Start: 2020-12-08 | End: 2020-12-11

## 2020-12-08 RX ORDER — VENLAFAXINE HYDROCHLORIDE 75 MG/1
75 CAPSULE, EXTENDED RELEASE ORAL
Status: DISCONTINUED | OUTPATIENT
Start: 2020-12-09 | End: 2020-12-13 | Stop reason: HOSPADM

## 2020-12-08 RX ORDER — OXYCODONE HYDROCHLORIDE AND ACETAMINOPHEN 5; 325 MG/1; MG/1
1 TABLET ORAL EVERY 6 HOURS PRN
Status: DISCONTINUED | OUTPATIENT
Start: 2020-12-08 | End: 2020-12-09

## 2020-12-08 RX ORDER — DOCUSATE SODIUM 100 MG/1
100 CAPSULE, LIQUID FILLED ORAL 2 TIMES DAILY
Status: DISCONTINUED | OUTPATIENT
Start: 2020-12-08 | End: 2020-12-13 | Stop reason: HOSPADM

## 2020-12-08 RX ORDER — MORPHINE SULFATE 2 MG/ML
2 INJECTION, SOLUTION INTRAMUSCULAR; INTRAVENOUS EVERY 4 HOURS PRN
Status: DISCONTINUED | OUTPATIENT
Start: 2020-12-08 | End: 2020-12-08 | Stop reason: HOSPADM

## 2020-12-08 RX ORDER — ROSUVASTATIN CALCIUM 10 MG/1
10 TABLET, COATED ORAL DAILY
Status: DISCONTINUED | OUTPATIENT
Start: 2020-12-08 | End: 2020-12-13 | Stop reason: HOSPADM

## 2020-12-08 RX ORDER — GLIMEPIRIDE 4 MG/1
4 TABLET ORAL
Status: DISCONTINUED | OUTPATIENT
Start: 2020-12-09 | End: 2020-12-13 | Stop reason: HOSPADM

## 2020-12-08 RX ORDER — DEXTROSE MONOHYDRATE 50 MG/ML
100 INJECTION, SOLUTION INTRAVENOUS PRN
Status: DISCONTINUED | OUTPATIENT
Start: 2020-12-08 | End: 2020-12-13 | Stop reason: HOSPADM

## 2020-12-08 RX ORDER — M-VIT,TX,IRON,MINS/CALC/FOLIC 27MG-0.4MG
1 TABLET ORAL EVERY OTHER DAY
Status: DISCONTINUED | OUTPATIENT
Start: 2020-12-09 | End: 2020-12-13 | Stop reason: HOSPADM

## 2020-12-08 RX ORDER — NICOTINE POLACRILEX 4 MG
15 LOZENGE BUCCAL PRN
Status: DISCONTINUED | OUTPATIENT
Start: 2020-12-08 | End: 2020-12-13 | Stop reason: HOSPADM

## 2020-12-08 RX ORDER — ASPIRIN 81 MG/1
81 TABLET ORAL 2 TIMES DAILY
Status: DISCONTINUED | OUTPATIENT
Start: 2020-12-08 | End: 2020-12-10

## 2020-12-08 RX ADMIN — ACETAMINOPHEN 650 MG: 325 TABLET ORAL at 22:24

## 2020-12-08 RX ADMIN — MORPHINE SULFATE 2 MG: 2 INJECTION, SOLUTION INTRAMUSCULAR; INTRAVENOUS at 02:58

## 2020-12-08 RX ADMIN — ROSUVASTATIN 10 MG: 10 TABLET, FILM COATED ORAL at 22:00

## 2020-12-08 RX ADMIN — MORPHINE SULFATE 2 MG: 2 INJECTION, SOLUTION INTRAMUSCULAR; INTRAVENOUS at 18:30

## 2020-12-08 RX ADMIN — INSULIN GLARGINE 60 UNITS: 100 INJECTION, SOLUTION SUBCUTANEOUS at 22:18

## 2020-12-08 RX ADMIN — ASPIRIN 81 MG: 81 TABLET, COATED ORAL at 22:00

## 2020-12-08 RX ADMIN — LAMOTRIGINE 25 MG: 25 TABLET ORAL at 22:00

## 2020-12-08 RX ADMIN — ACETAMINOPHEN 650 MG: 325 TABLET ORAL at 02:57

## 2020-12-08 RX ADMIN — OLANZAPINE 7.5 MG: 5 TABLET, FILM COATED ORAL at 22:00

## 2020-12-08 RX ADMIN — MORPHINE SULFATE 2 MG: 2 INJECTION, SOLUTION INTRAMUSCULAR; INTRAVENOUS at 14:10

## 2020-12-08 RX ADMIN — DOCUSATE SODIUM 100 MG: 100 CAPSULE ORAL at 22:00

## 2020-12-08 ASSESSMENT — PAIN SCALES - GENERAL
PAINLEVEL_OUTOF10: 0
PAINLEVEL_OUTOF10: 8
PAINLEVEL_OUTOF10: 7
PAINLEVEL_OUTOF10: 9

## 2020-12-08 ASSESSMENT — PAIN DESCRIPTION - PAIN TYPE: TYPE: ACUTE PAIN

## 2020-12-08 ASSESSMENT — PAIN DESCRIPTION - LOCATION
LOCATION: HIP
LOCATION: HIP

## 2020-12-08 ASSESSMENT — PAIN DESCRIPTION - DESCRIPTORS: DESCRIPTORS: ACHING

## 2020-12-08 ASSESSMENT — PAIN DESCRIPTION - ORIENTATION
ORIENTATION: LEFT
ORIENTATION: LEFT

## 2020-12-08 ASSESSMENT — PAIN DESCRIPTION - FREQUENCY: FREQUENCY: INTERMITTENT

## 2020-12-08 NOTE — ED NOTES
Call from [facility], PAS ETA at [phone] to transfer to  [facility] Josafat BARKER RN  12/08/20 5271

## 2020-12-08 NOTE — ED NOTES
Assume care of pt at this time, VSS except for temp of 100.3 attending notified at this time, pt free of immediate distress.  Will continue to monitor     Curtis Jenkins RN  12/07/20 8124

## 2020-12-08 NOTE — ED NOTES
Pt unable to provide urine at this time, requesting for something to drink.  Attending notified     Sundeep Fiore RN  12/07/20 2024

## 2020-12-08 NOTE — ED NOTES
sandwich box provided to pt at this time, pt understands he is to be NPO after midnight     Wade You RN  12/07/20 7107

## 2020-12-08 NOTE — ED PROVIDER NOTES
HPI:     Alla Rios is a 63 y.o. male presenting to the ED for left hip pain, beginning 3 days ago. The complaint has been persistent, moderate in severity, and worsened by movement. Patient states that he recently fractured his right hip and that he was at rehab center status post surgery. States that he fell last Friday on his left hip and has been having pain ever since. States that he is unable to bear weight secondary to the pain. Denies any headache, dizziness, fever, chest pain, cough, nausea, vomiting, abdominal pain, diarrhea, constipation, urinary symptoms. Review of Systems:   Please see HPI above. All bolded are positive. All un-bolded are negative.     Constitutional Symptoms: fever, chills, fatigue, generalized weakness, diaphoresis, increase in thirst, loss of appetite  Eyes: vision change   Ears, Nose, Mouth, Throat: hearing loss, nasal congestion, sores in the mouth  Cardiovascular: chest pain, chest heaviness, palpitations  Respiratory: shortness of breath, wheezing, coughing  Gastrointestinal: abdominal pain, nausea, vomiting, diarrhea, constipation, melena, hematochezia, hematemesis  Genitourinary: dysuria, hematuria, increased frequency  Musculoskeletal: Left hip pain lower extremity edema, myalgias, arthralgias, back pain  Integumentary: rashes, itching   Neurological: headache, lightheadedness, dizziness, confusion, syncope, numbness, tingling, focal weakness  Psychiatric: depression, suicidal ideation, anxiety  Endocrine: unintentional weight change  Hematologic/Lymphatic: lymphadenopathy, easy bruising, easy bleeding   Allergic/Immunologic: recurrent infections            --------------------------------------------- PAST HISTORY ---------------------------------------------  Past Medical History:  has a past medical history of Anxiety, Bronchitis, Cellulitis, Chronic sinusitis, Depression, Diabetes mellitus ([facility] Diabetic retinopathy ([facility] Fracture of left foot, High cholesterol, Hypercholesteremia, Hypercholesterolemia, Hypertension, Lumbago, Moderate mood disorder ([address] and Third nerve palsy. Past Surgical History:  has a past surgical history that includes eye surgery and hip surgery (Right, 11/19/2020). Social History:  reports that he has never smoked. He has never used smokeless tobacco. He reports that he does not drink alcohol or use drugs. Family History: family history is not on file. The patients home medications have been reviewed. Allergies: Patient has no known allergies.     -------------------------------------------------- RESULTS -------------------------------------------------  All laboratory and radiology results have been personally reviewed by myself   LABS:  Results for orders placed or performed during the hospital encounter of 12/07/20   Respiratory Panel, Molecular, with COVID-19 (Restricted: peds pts or suitable admitted adults)    Specimen: Nasopharyngeal   Result Value Ref Range    Adenovirus by PCR Not Detected Not Detected    Bordetella parapertussis by PCR Not Detected Not Detected    Bordetella pertussis by PCR Not Detected Not Detected    Chlamydophilia pneumoniae by PCR Not Detected Not Detected    Coronavirus 229E by PCR Not Detected Not Detected    Coronavirus HKU1 by PCR Not Detected Not Detected    Coronavirus NL63 by PCR Not Detected Not Detected    Coronavirus OC43 by PCR Not Detected Not Detected    SARS-CoV-2, PCR DETECTED (A) Not Detected    Human Metapneumovirus by PCR Not Detected Not Detected    Human Rhinovirus/Enterovirus by PCR Not Detected Not Detected    Influenza A by PCR Not Detected Not Detected    Influenza B by PCR Not Detected Not Detected    Mycoplasma pneumoniae by PCR Not Detected Not Detected    Parainfluenza Virus 1 by PCR Not Detected Not Detected    Parainfluenza Virus 2 by PCR Not Detected Not Detected    Parainfluenza Virus 3 by PCR Not Detected Not Detected    Parainfluenza Virus 4 by PCR Not Detected Not Detected    Respiratory Syncytial Virus by PCR Not Detected Not Detected   CBC auto differential   Result Value Ref Range    WBC 9.0 4.5 - 11.5 E9/L    RBC 4.07 3.80 - 5.80 E12/L    Hemoglobin 10.1 (L) 12.5 - 16.5 g/dL    Hematocrit 29.6 (L) 37.0 - 54.0 %    MCV 72.7 (L) 80.0 - 99.9 fL    MCH 24.8 (L) 26.0 - 35.0 pg    MCHC 34.1 32.0 - 34.5 %    RDW 14.3 11.5 - 15.0 fL    Platelets 700 550 - 664 E9/L    MPV 9.8 7.0 - 12.0 fL    Neutrophils % 74.0 43.0 - 80.0 %    Immature Granulocytes % 0.3 0.0 - 5.0 %    Lymphocytes % 13.5 (L) 20.0 - 42.0 %    Monocytes % 11.6 2.0 - 12.0 %    Eosinophils % 0.2 0.0 - 6.0 %    Basophils % 0.4 0.0 - 2.0 %    Neutrophils Absolute 6.64 1.80 - 7.30 E9/L    Immature Granulocytes # 0.03 E9/L    Lymphocytes Absolute 1.21 (L) 1.50 - 4.00 E9/L    Monocytes Absolute 1.04 (H) 0.10 - 0.95 E9/L    Eosinophils Absolute 0.02 (L) 0.05 - 0.50 E9/L    Basophils Absolute 0.04 0.00 - 0.20 S1/V   Basic metabolic panel   Result Value Ref Range    Sodium 131 (L) 132 - 146 mmol/L    Potassium 4.4 3.5 - 5.0 mmol/L    Chloride 98 98 - 107 mmol/L    CO2 24 22 - 29 mmol/L    Anion Gap 9 7 - 16 mmol/L    Glucose 73 (L) 74 - 99 mg/dL    BUN 20 8 - 23 mg/dL    CREATININE 1.2 0.7 - 1.2 mg/dL    GFR Non-African American >60 >=60 mL/min/1.73    GFR African American >60     Calcium 8.7 8.6 - 10.2 mg/dL   Magnesium   Result Value Ref Range    Magnesium 1.9 1.6 - 2.6 mg/dL   URINALYSIS   Result Value Ref Range    Color, UA DARK YELLOW (A) Straw/Yellow    Clarity, UA Clear Clear    Glucose, Ur Negative Negative mg/dL    Bilirubin Urine Negative Negative    Ketones, Urine TRACE (A) Negative mg/dL    Specific Gravity, UA 1.020 1.005 - 1.030    Blood, Urine Negative Negative    pH, UA 6.5 5.0 - 9.0    Protein, UA 30 (A) Negative mg/dL    Urobilinogen, Urine 4.0 (A) <2.0 E.U./dL    Nitrite, Urine Negative Negative    Leukocyte Esterase, Urine Negative Negative   Microscopic Urinalysis   Result Value Ref Range WBC, UA NONE 0 - 5 /HPF    RBC, UA NONE 0 - 2 /HPF    Bacteria, UA NONE SEEN None Seen /HPF    Amorphous, UA PRESENT        RADIOLOGY:  Interpreted by Radiologist.  XR CHEST PORTABLE   Final Result   Atherosclerotic disease. No additional evidence of active cardiopulmonary   pathology. XR HIP BILATERAL W AP PELVIS (2 VIEWS)   Final Result   1. Displaced left femoral neck fracture. 2. The right hip replacement appears intact without evidence of dislocation   or loosening. XR FEMUR LEFT (MIN 2 VIEWS)   Final Result   Displaced left femoral neck fracture.          ------------------------- NURSING NOTES AND VITALS REVIEWED ---------------------------   The nursing notes within the ED encounter and vital signs as below have been reviewed. BP 96/70   Pulse 98   Temp 98.4 °F (36.9 °C) (Oral)   Resp 16   Ht 5' 8\" (1.727 m)   Wt 170 lb 4.8 oz (77.2 kg)   SpO2 95%   BMI 25.89 kg/m²   Oxygen Saturation Interpretation: Normal      ---------------------------------------------------PHYSICAL EXAM--------------------------------------      Constitutional/General: Alert and oriented x3, well appearing, non toxic in NAD  Head: Normocephalic and atraumatic  Eyes: PERRL, EOMI  Mouth: Oropharynx clear, handling secretions, no trismus  Neck: Supple, full ROM,   Pulmonary: Lungs clear to auscultation bilaterally, no wheezes, rales, or rhonchi. Not in respiratory distress  Cardiovascular:  Regular rate and rhythm, no murmurs, gallops, or rubs. 2+ distal pulses  Abdomen: Soft, non tender, non distended,   Extremities: Left hip is shortened and externally rotated. Reduced range of motion secondary to tenderness. DP and PT pulses are easily palpable.   Compartments are soft and nontender  Skin: warm and dry without rash  Neurologic: GCS 15,  Psych: Normal Affect      ------------------------------ ED COURSE/MEDICAL DECISION MAKING----------------------  Medications   fentaNYL (SUBLIMAZE) injection 50 mcg (50 mcg Intravenous Given 12/7/20 1758)   acetaminophen (TYLENOL) tablet 1,000 mg (1,000 mg Oral Given 12/7/20 2011)         ED COURSE:       Medical Decision Making:    Patient presented to the ER with chief complaint of left hip pain after fall last Friday. X-ray does confirm that he has a left hip fracture. Did speak to Dr. Ben Parson who repaired his other hip who is agreeable to perform an ORIF at [facility].  Did initially call and speak to Dr. Debrah Cabot who agreed to admit the patient. While patient was still in the emergency department his COVID-19 swab came back and he is positive for COVID-19 though asymptomatic at this time. Did call and update Dr. Debrah Cabot but she is still agreeable to transfer at this time. All questions have been answered. Counseling: The emergency provider has spoken with the patient and discussed todays results, in addition to providing specific details for the plan of care and counseling regarding the diagnosis and prognosis. Questions are answered at this time and they are agreeable with the plan.      --------------------------------- IMPRESSION AND DISPOSITION ---------------------------------    IMPRESSION  1. Closed fracture of left hip, initial encounter ([facility]    2. COVID-19        DISPOSITION  Disposition: Transfer to [facility] to [facility] floor  Patient condition is stable      NOTE: This report was transcribed using voice recognition software.  Every effort was made to ensure accuracy; however, inadvertent computerized transcription errors may be present       Say Rae DO  Resident  12/08/20 0020

## 2020-12-08 NOTE — PROGRESS NOTES
Called the [address] @ [phone] for update on bed assignment. Spoke with Ja Lambert, no bed, discharge dependent. Will call over to Main and call back.

## 2020-12-09 ENCOUNTER — APPOINTMENT (OUTPATIENT)
Dept: MRI IMAGING | Age: 65
DRG: 521 | End: 2020-12-09
Attending: FAMILY MEDICINE
Payer: MEDICARE

## 2020-12-09 ENCOUNTER — ANESTHESIA EVENT (OUTPATIENT)
Dept: INPATIENT UNIT | Age: 65
DRG: 521 | End: 2020-12-09
Payer: MEDICARE

## 2020-12-09 ENCOUNTER — APPOINTMENT (OUTPATIENT)
Dept: CT IMAGING | Age: 65
DRG: 521 | End: 2020-12-09
Attending: FAMILY MEDICINE
Payer: MEDICARE

## 2020-12-09 ENCOUNTER — ANESTHESIA (OUTPATIENT)
Dept: INPATIENT UNIT | Age: 65
DRG: 521 | End: 2020-12-09
Payer: MEDICARE

## 2020-12-09 LAB
ALBUMIN SERPL-MCNC: 3 G/DL (ref 3.5–5.2)
ALP BLD-CCNC: 110 U/L (ref 40–129)
ALT SERPL-CCNC: 21 U/L (ref 0–40)
ANION GAP SERPL CALCULATED.3IONS-SCNC: 15 MMOL/L (ref 7–16)
APTT: 32.7 SEC (ref 24.5–35.1)
AST SERPL-CCNC: 29 U/L (ref 0–39)
B.E.: -1.1 MMOL/L (ref -3–3)
B.E.: -2.3 MMOL/L (ref -3–3)
BACTERIA: ABNORMAL /HPF
BASOPHILS ABSOLUTE: 0.02 E9/L (ref 0–0.2)
BASOPHILS RELATIVE PERCENT: 0.2 % (ref 0–2)
BILIRUB SERPL-MCNC: 0.6 MG/DL (ref 0–1.2)
BILIRUBIN URINE: ABNORMAL
BLOOD, URINE: ABNORMAL
BUN BLDV-MCNC: 25 MG/DL (ref 8–23)
CALCIUM SERPL-MCNC: 8.5 MG/DL (ref 8.6–10.2)
CHLORIDE BLD-SCNC: 97 MMOL/L (ref 98–107)
CLARITY: CLEAR
CO2: 21 MMOL/L (ref 22–29)
COHB: 0.1 % (ref 0–1.5)
COHB: 0.7 % (ref 0–1.5)
COLOR: YELLOW
CREAT SERPL-MCNC: 1.5 MG/DL (ref 0.7–1.2)
CRITICAL: ABNORMAL
CRITICAL: ABNORMAL
DATE ANALYZED: ABNORMAL
DATE ANALYZED: ABNORMAL
DATE OF COLLECTION: ABNORMAL
DATE OF COLLECTION: ABNORMAL
EOSINOPHILS ABSOLUTE: 0 E9/L (ref 0.05–0.5)
EOSINOPHILS RELATIVE PERCENT: 0 % (ref 0–6)
GFR AFRICAN AMERICAN: 57
GFR NON-AFRICAN AMERICAN: 47 ML/MIN/1.73
GLUCOSE BLD-MCNC: 108 MG/DL (ref 74–99)
GLUCOSE URINE: NEGATIVE MG/DL
HCO3: 21.1 MMOL/L (ref 22–26)
HCO3: 22.2 MMOL/L (ref 22–26)
HCT VFR BLD CALC: 28.9 % (ref 37–54)
HEMOGLOBIN: 10 G/DL (ref 12.5–16.5)
HHB: 2.8 % (ref 0–5)
HHB: 3.9 % (ref 0–5)
HYALINE CASTS: ABNORMAL /LPF (ref 0–2)
IMMATURE GRANULOCYTES #: 0.06 E9/L
IMMATURE GRANULOCYTES %: 0.6 % (ref 0–5)
KETONES, URINE: NEGATIVE MG/DL
LAB: ABNORMAL
LAB: ABNORMAL
LACTIC ACID: 1 MMOL/L (ref 0.5–2.2)
LEUKOCYTE ESTERASE, URINE: NEGATIVE
LYMPHOCYTES ABSOLUTE: 0.72 E9/L (ref 1.5–4)
LYMPHOCYTES RELATIVE PERCENT: 7.1 % (ref 20–42)
Lab: ABNORMAL
Lab: ABNORMAL
MAGNESIUM: 1.9 MG/DL (ref 1.6–2.6)
MCH RBC QN AUTO: 24.9 PG (ref 26–35)
MCHC RBC AUTO-ENTMCNC: 34.6 % (ref 32–34.5)
MCV RBC AUTO: 71.9 FL (ref 80–99.9)
METER GLUCOSE: 101 MG/DL (ref 74–99)
METER GLUCOSE: 110 MG/DL (ref 74–99)
METER GLUCOSE: 122 MG/DL (ref 74–99)
METER GLUCOSE: 141 MG/DL (ref 74–99)
METER GLUCOSE: 76 MG/DL (ref 74–99)
METER GLUCOSE: 83 MG/DL (ref 74–99)
METHB: 0.3 % (ref 0–1.5)
METHB: 0.4 % (ref 0–1.5)
MODE: ABNORMAL
MODE: ABNORMAL
MONOCYTES ABSOLUTE: 1.25 E9/L (ref 0.1–0.95)
MONOCYTES RELATIVE PERCENT: 12.4 % (ref 2–12)
NEUTROPHILS ABSOLUTE: 8.05 E9/L (ref 1.8–7.3)
NEUTROPHILS RELATIVE PERCENT: 79.7 % (ref 43–80)
NITRITE, URINE: NEGATIVE
O2 CONTENT: 15.5 ML/DL
O2 CONTENT: 15.6 ML/DL
O2 SATURATION: 96.1 % (ref 92–98.5)
O2 SATURATION: 97.2 % (ref 92–98.5)
O2HB: 95.1 % (ref 94–97)
O2HB: 96.7 % (ref 94–97)
OPERATOR ID: 1023
OPERATOR ID: 2260
PATIENT TEMP: 37 C
PATIENT TEMP: 37 C
PCO2: 31.8 MMHG (ref 35–45)
PCO2: 32.2 MMHG (ref 35–45)
PDW BLD-RTO: 14.6 FL (ref 11.5–15)
PH BLOOD GAS: 7.44 (ref 7.35–7.45)
PH BLOOD GAS: 7.46 (ref 7.35–7.45)
PH UA: 5 (ref 5–9)
PHOSPHORUS: 4.3 MG/DL (ref 2.5–4.5)
PLATELET # BLD: 299 E9/L (ref 130–450)
PMV BLD AUTO: 10.5 FL (ref 7–12)
PO2: 82.6 MMHG (ref 75–100)
PO2: 95.7 MMHG (ref 75–100)
POTASSIUM SERPL-SCNC: 4.22 MMOL/L (ref 3.5–5)
POTASSIUM SERPL-SCNC: 4.4 MMOL/L (ref 3.5–5)
PROTEIN UA: 30 MG/DL
RBC # BLD: 4.02 E12/L (ref 3.8–5.8)
RBC UA: ABNORMAL /HPF (ref 0–2)
REASON FOR REJECTION: NORMAL
REJECTED TEST: NORMAL
SODIUM BLD-SCNC: 133 MMOL/L (ref 132–146)
SOURCE, BLOOD GAS: ABNORMAL
SOURCE, BLOOD GAS: ABNORMAL
SPECIFIC GRAVITY UA: >=1.03 (ref 1–1.03)
THB: 11.3 G/DL (ref 11.5–16.5)
THB: 11.6 G/DL (ref 11.5–16.5)
TIME ANALYZED: 2225
TIME ANALYZED: 25
TOTAL PROTEIN: 6.2 G/DL (ref 6.4–8.3)
TROPONIN: <0.01 NG/ML (ref 0–0.03)
TSH SERPL DL<=0.05 MIU/L-ACNC: 0.71 UIU/ML (ref 0.27–4.2)
UROBILINOGEN, URINE: 1 E.U./DL
WBC # BLD: 10.1 E9/L (ref 4.5–11.5)
WBC UA: ABNORMAL /HPF (ref 0–5)

## 2020-12-09 PROCEDURE — 85025 COMPLETE CBC W/AUTO DIFF WBC: CPT

## 2020-12-09 PROCEDURE — 83605 ASSAY OF LACTIC ACID: CPT

## 2020-12-09 PROCEDURE — 87088 URINE BACTERIA CULTURE: CPT

## 2020-12-09 PROCEDURE — 2060000000 HC ICU INTERMEDIATE R&B

## 2020-12-09 PROCEDURE — 84132 ASSAY OF SERUM POTASSIUM: CPT

## 2020-12-09 PROCEDURE — 6360000002 HC RX W HCPCS: Performed by: INTERNAL MEDICINE

## 2020-12-09 PROCEDURE — 36415 COLL VENOUS BLD VENIPUNCTURE: CPT

## 2020-12-09 PROCEDURE — 83735 ASSAY OF MAGNESIUM: CPT

## 2020-12-09 PROCEDURE — 85730 THROMBOPLASTIN TIME PARTIAL: CPT

## 2020-12-09 PROCEDURE — 6370000000 HC RX 637 (ALT 250 FOR IP): Performed by: INTERNAL MEDICINE

## 2020-12-09 PROCEDURE — 80175 DRUG SCREEN QUAN LAMOTRIGINE: CPT

## 2020-12-09 PROCEDURE — 70450 CT HEAD/BRAIN W/O DYE: CPT

## 2020-12-09 PROCEDURE — 84100 ASSAY OF PHOSPHORUS: CPT

## 2020-12-09 PROCEDURE — 36600 WITHDRAWAL OF ARTERIAL BLOOD: CPT

## 2020-12-09 PROCEDURE — 93005 ELECTROCARDIOGRAM TRACING: CPT | Performed by: FAMILY MEDICINE

## 2020-12-09 PROCEDURE — 80053 COMPREHEN METABOLIC PANEL: CPT

## 2020-12-09 PROCEDURE — 6360000002 HC RX W HCPCS

## 2020-12-09 PROCEDURE — 82962 GLUCOSE BLOOD TEST: CPT

## 2020-12-09 PROCEDURE — 82805 BLOOD GASES W/O2 SATURATION: CPT

## 2020-12-09 PROCEDURE — 84484 ASSAY OF TROPONIN QUANT: CPT

## 2020-12-09 PROCEDURE — 2700000000 HC OXYGEN THERAPY PER DAY

## 2020-12-09 PROCEDURE — 87040 BLOOD CULTURE FOR BACTERIA: CPT

## 2020-12-09 PROCEDURE — 84443 ASSAY THYROID STIM HORMONE: CPT

## 2020-12-09 PROCEDURE — 70551 MRI BRAIN STEM W/O DYE: CPT

## 2020-12-09 PROCEDURE — 81001 URINALYSIS AUTO W/SCOPE: CPT

## 2020-12-09 PROCEDURE — 2580000003 HC RX 258: Performed by: INTERNAL MEDICINE

## 2020-12-09 RX ORDER — NALOXONE HYDROCHLORIDE 0.4 MG/ML
INJECTION, SOLUTION INTRAMUSCULAR; INTRAVENOUS; SUBCUTANEOUS
Status: COMPLETED
Start: 2020-12-09 | End: 2020-12-09

## 2020-12-09 RX ORDER — DEXAMETHASONE SODIUM PHOSPHATE 4 MG/ML
4 INJECTION, SOLUTION INTRA-ARTICULAR; INTRALESIONAL; INTRAMUSCULAR; INTRAVENOUS; SOFT TISSUE EVERY 12 HOURS
Status: DISCONTINUED | OUTPATIENT
Start: 2020-12-09 | End: 2020-12-13 | Stop reason: HOSPADM

## 2020-12-09 RX ORDER — OMEPRAZOLE 40 MG/1
40 CAPSULE, DELAYED RELEASE ORAL DAILY
COMMUNITY
Start: 2020-07-28

## 2020-12-09 RX ORDER — DOCUSATE SODIUM 100 MG/1
100 CAPSULE, LIQUID FILLED ORAL 2 TIMES DAILY
COMMUNITY
Start: 2020-09-28 | End: 2020-12-30

## 2020-12-09 RX ORDER — 0.9 % SODIUM CHLORIDE 0.9 %
500 INTRAVENOUS SOLUTION INTRAVENOUS ONCE
Status: COMPLETED | OUTPATIENT
Start: 2020-12-09 | End: 2020-12-09

## 2020-12-09 RX ADMIN — NALXONE HYDROCHLORIDE 0.4 MG: 0.4 INJECTION INTRAMUSCULAR; INTRAVENOUS; SUBCUTANEOUS at 00:15

## 2020-12-09 RX ADMIN — DEXAMETHASONE SODIUM PHOSPHATE 4 MG: 4 INJECTION, SOLUTION INTRA-ARTICULAR; INTRALESIONAL; INTRAMUSCULAR; INTRAVENOUS; SOFT TISSUE at 06:00

## 2020-12-09 RX ADMIN — PANTOPRAZOLE SODIUM 40 MG: 40 TABLET, DELAYED RELEASE ORAL at 06:23

## 2020-12-09 RX ADMIN — DEXAMETHASONE SODIUM PHOSPHATE 4 MG: 4 INJECTION, SOLUTION INTRA-ARTICULAR; INTRALESIONAL; INTRAMUSCULAR; INTRAVENOUS; SOFT TISSUE at 20:07

## 2020-12-09 RX ADMIN — SODIUM CHLORIDE 500 ML: 9 INJECTION, SOLUTION INTRAVENOUS at 00:45

## 2020-12-09 NOTE — CARE COORDINATION
Pt admitted from [address] [place] [address] from a fall and fx left hip. Pt is COVID+. Spoke with Dorothea(Kennard) pt can return, no precert needed, no COVID test needed. Pt currently on po decadron. Called pt in room, no answer. called son Brandyn Irizarry, he is unsure of where pt will go at discharge, son wants to speak to [address] [place] [address] about what happened. Currently anticipate discharge to [address] [place] [address], will follow up with pt and family. Envelope and ambulance form in soft chart. Brooke Burris \[facility] [place] [facility]\""

## 2020-12-09 NOTE — SIGNIFICANT EVENT
.  Rapid Response Team Note  Date of event: 12/9/2020   Time of event: Sholmo Zapata 63y.o. year old male   YOB: 1955   Admit date:  12/8/2020   Location: 80/0-A   Witnessed? : [x]Yes  [] No  Monitored? : [x]Yes  [] No  Code status: [x] Full  [] DNR-CCA  []DNR-CC  ______________________________________________________________________  Reason for RRT:    [] RR < 8     [] RR > 28   [] SpO2 <90%   [] HR < 40 bpm   [] HR > 130 bpm  [] SBP < 90 mmHg    [] SpO2 <90%   [] LOC   [] Seizures    [] Significant Bleeding Event    [x] Other: AMS    Subjective:   CTSP regarding the above event, on arrival, the patient was quite somnolent and difficult to arouse, Vital signs were however stable. He received 0.4 mg of Narcan and shortly after woke up startled. He was A and O x3, able to move all extremities and without evidence of focal neurologic deficits. ABG was drawn and was unremarkable. Stat labs including CMP, CBC, TSH, ammonia, B12, Lamictal levels were drawn. Also grossly unremarkable. The patient does have a history of prior CVA with seizures, he also had a recent fall with questionable head trauma and no recent head imaging done. We will place a consult to neurology, order for EEG to R/O seizures, obtain CT head without contrast.  We attempted to reach out to family without success, the nurse sent out a page to attending physician.     Objective:   Vital signs: Temp: 99/BP: 109/58/RR: 10 /HR: 98, SPO2 97% on 2 L oxygen by nasal cannula  Initial Condition:  Conscious   [x] Yes  [] No     Breathing [x] Yes  [] No     Pulse  [x] Yes  [] No    Airway:   [x] Open/ Clear     Intervention: [x] None  [] Pooled secretions     [] Suctioned  [] Stridor      [] Intubation    Lungs:   [x] Symmetrical chest rise/ CTABL Intervention: [x] None  [] Use of accessory muscles    [] NIV (CPAP/BiPAP)  [] Cyanosis      [] Nasal Oxygen/Mask  [] Wheezing       [x] ABG             [] CXR  [] Other: Circulation:   Rhythm:  [x] Sinus [] Other:   Intervention: [] None            [] IV Access  [] Peripheral              [] Central            [] EKG            [] Cardioversion            [] Defibrillation     Capillary Refill:  [] > 2 seconds [] < 2 seconds    Neurologic:   [] NIHSS      [] Pupillary Response: 3 mm and sluggishly responsive   Response to pain:   [x] Yes  [] No  Follow commands:  [x] Yes  [] No  Facial asymmetry:  [] Yes  [x] No  Motor strength:  [x] Equal  [] Focal deficit: None observed  Diagnostic Test:  Blood Sugar:  141 mg/dL      ABG:      Lab Results   Component Value Date    PH 7.439 12/09/2020    PCO2 31.8 12/09/2020    PO2 82.6 12/09/2020    HCO3 21.1 12/09/2020    O2SAT 96.1 12/09/2020     Medication(s) Given:  [x]  Narcan (Naloxone)   []  Romazicon (Flumazenil)    []  Breathing Treatment    []  Steroids (Prednisone, Solu-Medrol)  []  Adenosine  [] Cardiac Medicines:     Infusion(s):   [x] Normal Saline    Amount: 500 cc/hr      [] Lactate Ringers      [] Other:     Imaging:   [] CXR:   [] Normal         [] Pneumothorax         [] Pulmonary Edema  [] Infiltrate          [x] CT Head  [x] Normal          [] ICB          [] [facility]          [] Other:    Laboratory Tests:     CBC with Differential:    Lab Results   Component Value Date    WBC 9.0 12/07/2020    RBC 4.07 12/07/2020    HGB 10.1 12/07/2020    HCT 29.6 12/07/2020     12/07/2020    MCV 72.7 12/07/2020    MCH 24.8 12/07/2020    MCHC 34.1 12/07/2020    RDW 14.3 12/07/2020    LYMPHOPCT 13.5 12/07/2020    MONOPCT 11.6 12/07/2020    BASOPCT 0.4 12/07/2020    MONOSABS 1.04 12/07/2020    LYMPHSABS 1.21 12/07/2020    EOSABS 0.02 12/07/2020    BASOSABS 0.04 12/07/2020     CMP:    Lab Results   Component Value Date     12/07/2020    K 4.22 12/09/2020    K 4.2 11/22/2020    CL 98 12/07/2020    CO2 24 12/07/2020    BUN 20 12/07/2020    CREATININE 1.2 12/07/2020    GFRAA >60 12/07/2020    LABGLOM >60 12/07/2020    GLUCOSE 73 12/07/2020    PROT 6.3 11/18/2020    LABALBU 3.5 11/18/2020    CALCIUM 8.7 12/07/2020    BILITOT 1.4 11/18/2020    ALKPHOS 85 11/18/2020    AST 15 11/18/2020    ALT 12 11/18/2020     Magnesium:    Lab Results   Component Value Date    MG 1.9 12/07/2020     Phosphorus:    Lab Results   Component Value Date    PHOS 3.1 08/24/2020     TSH:    Lab Results   Component Value Date    TSH 0.674 10/06/2020     VITAMIN B12: No components found for: B12      Teams Assessment and Plan:  1. Acute encephalopathy, etiology not entirely clear. Concerns for possible partial seizures VS sepsis. · Work-up so far with ABG, CT head without contrast unremarkable. · Follow-up pending labs including CMP, CBC, B12, TSH, ammonia, Lamictal level, UA, urine culture, blood cultures. · Consult to neurology, for EEG in the morning  · Hold off on sedating medication for now  2. Acute hypoxic respiratory failure likely 2/2 Covid pneumonia-stable on 2 L of oxygen  ? In light of patient's stable clinical picture, the decision was made to keep patient on the floor and monitor vital signs. Patient's nurse instructed to follow-up with attending physician : Dr. Jeff Contreras.   ?    ?  Disposition:  [x] No transfer   [] Transfer to monitor floor  [] Transfer to: [] MICU [] NICU [] CCU [] SICU    Patients family updated: [] Yes  [x] No   Discussed with:  [] Critical Care Intensivist: Oneyda Chahal      [] Primary Care Provider: Sara Montalvo MD      [] Other:    Kris Bush MD PGY-3  12/9/2020 1:07 AM  Attending Anastacio Young MD

## 2020-12-09 NOTE — PLAN OF CARE
Problem: Airway Clearance - Ineffective  Goal: Achieve or maintain patent airway  Outcome: Met This Shift     Problem: Gas Exchange - Impaired  Goal: Absence of hypoxia  Outcome: Met This Shift     Problem: Gas Exchange - Impaired  Goal: Promote optimal lung function  Outcome: Met This Shift     Problem: Breathing Pattern - Ineffective  Goal: Ability to achieve and maintain a regular respiratory rate  Outcome: Met This Shift     Problem: Body Temperature -  Risk of, Imbalanced  Goal: Ability to maintain a body temperature within defined limits  Outcome: Met This Shift     Problem:  Body Temperature -  Risk of, Imbalanced  Goal: Will regain or maintain usual level of consciousness  Outcome: Met This Shift     Problem: Falls - Risk of:  Goal: Will remain free from falls  Description: Will remain free from falls  Outcome: Met This Shift     Problem: Falls - Risk of:  Goal: Absence of physical injury  Description: Absence of physical injury  Outcome: Met This Shift     Problem: Skin Integrity:  Goal: Will show no infection signs and symptoms  Description: Will show no infection signs and symptoms  Outcome: Met This Shift     Problem: Skin Integrity:  Goal: Absence of new skin breakdown  Description: Absence of new skin breakdown  Outcome: Met This Shift

## 2020-12-09 NOTE — CONSULTS
Leroy Rivera is a 63 y.o. male    Neurology was consulted for history of SDH, prior stroke and seizure    Initially febrile now resolved with intermittent hypotension noted on O2 via nasal cannula    Exam deferred due to COVID-19 to reduce exposure and preserve PPE. History obtained from chart and patient's nurse. Past Medical History:     Past Medical History:   Diagnosis Date    Anxiety     Bronchitis     Cellulitis     Chronic sinusitis     Depression     Diabetes mellitus ([address]     Diabetic retinopathy ([address]     Fracture of left foot     High cholesterol     Hypercholesteremia     Hypercholesterolemia     Hypertension     Lumbago     Moderate mood disorder (HCC)     Third nerve palsy        Past Surgical History:     Past Surgical History:   Procedure Laterality Date    EYE SURGERY      HIP SURGERY Right 11/19/2020    [place] [facility] [place]       Allergies:     Patient has no known allergies. Medications:     Prior to Admission medications    Medication Sig Start Date End Date Taking?  Authorizing Provider   docusate sodium (COLACE) 100 MG capsule Take 100 mg by mouth 2 times daily 9/28/20  Yes Historical Provider, MD   glucose 4 g chewable tablet Take 4 g by mouth as needed 7/28/20  Yes Historical Provider, MD   Multiple Vitamins-Minerals (MULTIVITAMIN ADULT PO) Take 1 tablet by mouth daily 1/7/20  Yes Historical Provider, MD   omeprazole (PRILOSEC) 40 MG delayed release capsule Take 40 mg by mouth daily 7/28/20  Yes Historical Provider, MD   insulin glargine (LANTUS) 100 UNIT/ML injection vial Inject 60 Units into the skin nightly 10/13/20  Yes Candis Gallagher MD   aspirin EC 81 MG EC tablet Take 1 tablet by mouth 2 times daily for 28 days 11/19/20 12/17/20  Donato Torres DO   OLANZapine (ZYPREXA) 7.5 MG tablet Take 1 tablet by mouth nightly 10/13/20 40/68/04  Roman Fowler APRN - CNP   venlafaxine (EFFEXOR XR) 75 MG extended release capsule Take 1 capsule by mouth daily (with breakfast) 10/14/20 43/57/84  Erin Fowler, APRN - CNP   glimepiride (AMARYL) 4 MG tablet Take 4 mg by mouth every morning (before breakfast)    Historical Provider, MD   glucose 4 g chewable tablet Take 12 g by mouth as needed for Low blood sugar Chew and swallow 3 tablets by mouth as needed for low sugar. Check blood sugar in 15 minutes and repeat dose if still low. Historical Provider, MD   lamoTRIgine (LAMICTAL) 25 MG tablet Take 25 mg by mouth nightly    Historical Provider, MD   rosuvastatin (CRESTOR) 10 MG tablet Take 10 mg by mouth daily    Historical Provider, MD   docusate sodium (COLACE) 100 MG capsule Take 100 mg by mouth 2 times daily    Historical Provider, MD       Social History:     Denies ETOH, tobacco, or illicit drugs    Review of Systems:     No chest pain or palpitations  No SOB  No vertigo, lightheadedness or loss of consciousness  No falls, tripping or stumbling  No incontinence of bowels or bladder  No itching or bruising appreciated  No numbness, tingling or focal arm/leg weakness    ROS is otherwise negative     Family History:     No family history on file. History of Present Illness:     Patient presented [facility] on 12/7 with complaint of left hip pain that began 3 days prior to arrival.  Patient's hip pain has been persistent, moderate in severity and worsened by movement. Patient recently fractured his right hip and has been in rehab center post surgery. Patient states he fell last Friday on his left hip and has been having pain ever since. Patient is unable to bear weight secondary to pain. Since admission, x-ray confirmed left hip fracture and is to have an L ORIF completed so patient was transferred here for further work-up. Patient has since come back with COVID-19 although asymptomatic at this time. On 12/9 around 1 AM patient was rapid responsed after he was found somnolent and difficult to arouse.   Patient had stable vitals at the time, alert and oriented x3 and able to move all extremities at the time of RRT. Patient noted to have no focal deficits. ABGs were also unremarkable. Patient has a history of prior stroke and seizures as well as a fall with questionable head trauma. Neurology was consulted for further evaluation. CT head completed around 12:45 AM on 12/9 showed no acute intracranial abnormality. EEG was also ordered to rule out stroke. Called and spoke to patient's nurse--- patient has been alert, responsive, oriented x4 for her today. Patient has had no additional events today of seizure activity, decreased LOC, unresponsiveness, etc.  Patient appears depressed and asked her earlier to let him die. Per report from staff during RRT, patient was unresponsive but still able to breathe.       Objective:     /71   Pulse 96   Temp 97.4 °F (36.3 °C) (Temporal)   Resp 18   SpO2 98%     General appearance: alert, appears stated age, cooperative and no distress    Mental Status: alert, oriented x4, thought content appropriate    Appropriate attention/concentration    Speech/Language: appropriate     Cranial Nerves: CN 2-12 not examined d/t COVID 19--- Per RN assessment  I: smell    II: visual acuity     II: visual fields    II: pupils PERRL   III,VII: ptosis    III,IV,VI: extraocular muscles   will track RN around the room   V: mastication    V: facial light touch sensation   appears normal   V,VII: corneal reflex     VII: facial muscle function - upper  Normal   VII: facial muscle function - lower  appears normal   VIII: hearing Normal   IX: soft palate elevation     IX,X: gag reflex    XI: trapezius strength     XI: sternocleidomastoid strength    XI: neck extension strength     XII: tongue strength       Motor:  Moves all extremities    except limited ROM due to pain to LLE    Sensory:  Deferred    Coordination:   Deferred    Gait:  Unable to test    DTR:   Deferred due to COVID-19  Right Brachioradialis reflex NOT DONE  Left Brachioradialis reflex NOT DONE  Right Biceps reflex NOT DONE  Left Biceps reflex NOT DONE  Right Triceps reflex NOT DONE  Left Triceps reflex NOT DONE  Right Quadriceps reflex NOT DONE  Left Quadriceps reflex NOT DONE  Right Achilles reflex NOT DONE  Left Achilles reflex NOT DONE    Laboratory/Radiology:     CBC:   Lab Results   Component Value Date    WBC 10.1 12/09/2020    RBC 4.02 12/09/2020    HGB 10.0 12/09/2020    HCT 28.9 12/09/2020    MCV 71.9 12/09/2020    MCH 24.9 12/09/2020    MCHC 34.6 12/09/2020    RDW 14.6 12/09/2020     12/09/2020    MPV 10.5 12/09/2020     CMP:    Lab Results   Component Value Date     12/09/2020    K 4.4 12/09/2020    K 4.2 11/22/2020    CL 97 12/09/2020    CO2 21 12/09/2020    BUN 25 12/09/2020    CREATININE 1.5 12/09/2020    GFRAA 57 12/09/2020    LABGLOM 47 12/09/2020    GLUCOSE 108 12/09/2020    PROT 6.2 12/09/2020    LABALBU 3.0 12/09/2020    CALCIUM 8.5 12/09/2020    BILITOT 0.6 12/09/2020    ALKPHOS 110 12/09/2020    AST 29 12/09/2020    ALT 21 12/09/2020     U/A:    Lab Results   Component Value Date    COLORU Yellow 12/09/2020    PROTEINU 30 12/09/2020    PHUR 5.0 12/09/2020    LABCAST FEW 02/06/2019    WBCUA 0-1 12/09/2020    RBCUA 1-3 12/09/2020    BACTERIA FEW 12/09/2020    CLARITYU Clear 12/09/2020    SPECGRAV >=1.030 12/09/2020    LEUKOCYTESUR Negative 12/09/2020    UROBILINOGEN 1.0 12/09/2020    BILIRUBINUR SMALL 12/09/2020    BLOODU TRACE-INTACT 12/09/2020    GLUCOSEU Negative 12/09/2020    AMORPHOUS PRESENT 12/07/2020     HgBA1c:    Lab Results   Component Value Date    LABA1C 9.0 11/18/2020     FLP:    Lab Results   Component Value Date    TRIG 71 10/08/2020    HDL 41 10/08/2020    LDLCALC 59 10/08/2020    LABVLDL 14 10/08/2020     TSH:    Lab Results   Component Value Date    TSH 0.708 12/09/2020     CT HEAD WO CONTRAST   Final Result   No acute intracranial abnormality.       XR CHEST PORTABLE   Final Result   No change from prior exam.  Atherosclerotic disease. No additional evidence   of active cardiopulmonary pathology. I independently reviewed the labs and imaging studies today    Assessment:     Patient presented status post fall found to have hip fracture   Around 1 AM this morning patient found to be somnolent and hard to arouse   CT head unrevealing   UA suspicious for UTI--- management per PCP, CLEMENTE and anemia noted.    EEG pending    Plan:     Continue supportive care  Neurochecks every 4 hours  EEG pending ordered by PCP   No antiplatelets/anticoagulation until cleared by Ortho  Continue statin  Seizure precautions  SCDs    MIRA Fletcher NP-C  12:18 PM  12/9/2020

## 2020-12-09 NOTE — PROGRESS NOTES
Hospitalist Progress Note      SYNOPSIS: Patient admitted on 2020 for presents to an outside hospital with left hip pain. He said he was getting rehabilitation done for his right hip fracture when he had a mechanical fall and landed on his left hip. Since that time the pain is so severe 10 out of 10 in severity that he cannot ambulate. He presented to the emergency room complaining of such. He denies any head injury, chest pain, palpitations. He states he simply tripped and fell. No syncope no seizures. In the emergency room he was screened for Covid and was positive. SUBJECTIVE:    Patient seen and examined  Records reviewed. RRT noted. Was somnolent and difficult to arouse. Improved with Narcan. CT head unremarkable. Labs reviewed are also fairly unremarkable    Stable overnight. No other overnight issues reported. Temp (24hrs), Av.7 °F (37.6 °C), Min:98.2 °F (36.8 °C), Max:102 °F (38.9 °C)    DIET: Diet NPO, After Midnight  CODE: Full Code    Intake/Output Summary (Last 24 hours) at 2020 0739  Last data filed at 2020 0452  Gross per 24 hour   Intake --   Output 200 ml   Net -200 ml       OBJECTIVE:    BP (!) 107/58   Pulse 90   Temp 98.2 °F (36.8 °C) (Axillary)   Resp 21   SpO2 99%     General appearance: No apparent distress, appears stated age and cooperative. HEENT:  Conjunctivae/corneas clear. Neck: Supple. No jugular venous distention. Respiratory: Clear to auscultation bilaterally, normal respiratory effort  Cardiovascular: Regular rate rhythm, normal S1-S2  Abdomen: Soft, nontender, nondistended  Musculoskeletal: No clubbing, cyanosis, no bilateral lower extremity edema. Brisk capillary refill. Left lower extremity externally rotated and short.   Skin:  No rashes  on visible skin  Neurologic: awake, alert and following commands     ASSESSMENT:    Closed left hip fracture  Recent right hip fracture  Hypertension  Diabetes  Hyperlipidemia  Acute encephalopathy-RRT 12/9/2020       PLAN:    Plans for surgical intervention by Ortho. Remains n.p.o.  Neurology consult placed for RRT last night-not hypoglycemia. CT head was unremarkable. ?  Decadron related  Continue sliding scale insulin with Lantus  We will request RN to repeat medication reconciliation. DISPOSITION: Pending further course. Likely rehab. PT OT when able    Medications:  REVIEWED DAILY    Infusion Medications    dextrose       Scheduled Medications    dexamethasone  4 mg Intravenous Q12H    tranexamic acid (CYCLOKAPRON) IVPB  1,000 mg Intravenous Once    aspirin EC  81 mg Oral BID    docusate sodium  100 mg Oral BID    [Held by provider] glimepiride  4 mg Oral QAM AC    insulin lispro  0-12 Units Subcutaneous TID WC    insulin lispro  0-6 Units Subcutaneous Nightly    insulin glargine  60 Units Subcutaneous Nightly    lamoTRIgine  25 mg Oral Nightly    therapeutic multivitamin-minerals  1 tablet Oral Every Other Day    OLANZapine  7.5 mg Oral Nightly    pantoprazole  40 mg Oral QAM AC    rosuvastatin  10 mg Oral Daily    venlafaxine  75 mg Oral Daily with breakfast     PRN Meds: acetaminophen, ondansetron, glucose, dextrose, glucagon (rDNA), dextrose    Labs:     Recent Labs     12/07/20  1742 12/09/20  0036   WBC 9.0 10.1   HGB 10.1* 10.0*   HCT 29.6* 28.9*    299       Recent Labs     12/07/20  1742 12/09/20  0025 12/09/20  0036   *  --  133   K 4.4 4.22 4.4   CL 98  --  97*   CO2 24  --  21*   BUN 20  --  25*   CREATININE 1.2  --  1.5*   CALCIUM 8.7  --  8.5*   PHOS  --   --  4.3       Recent Labs     12/09/20  0036   PROT 6.2*   ALKPHOS 110   ALT 21   AST 29   BILITOT 0.6       No results for input(s): INR in the last 72 hours. No results for input(s): Bronwyn Belts in the last 72 hours.     Chronic labs:    Lab Results   Component Value Date    CHOL 114 10/08/2020    TRIG 71 10/08/2020    HDL 41 10/08/2020    LDLCALC 59 10/08/2020    TSH 0.708 12/09/2020    PSA 0.86 09/28/2018    INR 1.1 11/18/2020    LABA1C 9.0 (H) 11/18/2020       Radiology: REVIEWED DAILY    +++++++++++++++++++++++++++++++++++++++++++++++++  [address]  +++++++++++++++++++++++++++++++++++++++++++++++++  NOTE: This report was transcribed using voice recognition software. Every effort was made to ensure accuracy; however, inadvertent computerized transcription errors may be present.

## 2020-12-09 NOTE — CONSULTS
Department of Orthopedic Surgery  Resident Consult Note    Reason for Consult: Left hip pain    HISTORY OF PRESENT ILLNESS:       Patient is a 63 y.o. male who presents with complaint of left hip pain. Patient is a transfer from [facility] emergency department. He had sustained a mechanical fall and noted immediate pain to the left hip following. He was not able to ambulate on that extremity following the injury. Patient received imaging of the pelvis and bilateral hips at [facility] and was found to have a left femoral neck fracture. He was also found to be positive for Covid and was transferred to [facility] for further evaluation and treatment. Patient is known to Dr. Ariana Barragan for a recent history of right hip hemiarthroplasty following a fracture. Patient was sleeping in his hospital bed when examined today. He opened his eyes in response to questioning but did not actively answer any questions regarding his history or his injury.     Past Medical History:        Diagnosis Date    Anxiety     Bronchitis     Cellulitis     Chronic sinusitis     Depression     Diabetes mellitus (HCC)     Diabetic retinopathy (HCC)     Fracture of left foot     High cholesterol     Hypercholesteremia     Hypercholesterolemia     Hypertension     Lumbago     Moderate mood disorder (HCC)     Third nerve palsy      Past Surgical History:        Procedure Laterality Date    EYE SURGERY      HIP SURGERY Right 11/19/2020    HIP HEMIARTHROPLASTY performed by Marija Varela MD at [facility] OR     Current Medications:   Current Facility-Administered Medications: tranexamic acid (CYKLOKAPRON) 1,000 mg in dextrose 5 % 100 mL IVPB, 1,000 mg, Intravenous, Once  oxyCODONE-acetaminophen (PERCOCET) 5-325 MG per tablet 1 tablet, 1 tablet, Oral, Q6H PRN  aspirin EC tablet 81 mg, 81 mg, Oral, BID  docusate sodium (COLACE) capsule 100 mg, 100 mg, Oral, BID  [START ON 12/9/2020] glimepiride (AMARYL) tablet 4 mg, 4 mg, Oral, QAM AC  [START ON 12/9/2020] insulin lispro (HUMALOG) injection vial 0-12 Units, 0-12 Units, Subcutaneous, TID WC  insulin lispro (HUMALOG) injection vial 0-6 Units, 0-6 Units, Subcutaneous, Nightly  insulin glargine (LANTUS) injection vial 60 Units, 60 Units, Subcutaneous, Nightly  lamoTRIgine (LAMICTAL) tablet 25 mg, 25 mg, Oral, Nightly  [START ON 12/9/2020] therapeutic multivitamin-minerals 1 tablet, 1 tablet, Oral, Every Other Day  OLANZapine (ZYPREXA) tablet 7.5 mg, 7.5 mg, Oral, Nightly  [START ON 12/9/2020] pantoprazole (PROTONIX) tablet 40 mg, 40 mg, Oral, QAM AC  rosuvastatin (CRESTOR) tablet 10 mg, 10 mg, Oral, Daily  [START ON 12/9/2020] venlafaxine (EFFEXOR XR) extended release capsule 75 mg, 75 mg, Oral, Daily with breakfast  acetaminophen (TYLENOL) tablet 650 mg, 650 mg, Oral, Q4H PRN  morphine (PF) injection 2 mg, 2 mg, Intravenous, Q4H PRN  ondansetron (ZOFRAN) injection 4 mg, 4 mg, Intravenous, Q6H PRN  glucose (GLUTOSE) 40 % oral gel 15 g, 15 g, Oral, PRN  dextrose 50 % IV solution, 12.5 g, Intravenous, PRN  glucagon (rDNA) injection 1 mg, 1 mg, Intramuscular, PRN  dextrose 5 % solution, 100 mL/hr, Intravenous, PRN  Allergies:  Patient has no known allergies. Social History:   TOBACCO:   reports that he has never smoked. He has never used smokeless tobacco.  ETOH:   reports no history of alcohol use. DRUGS:   reports no history of drug use. ACTIVITIES OF DAILY LIVING:    OCCUPATION:    Family History:   No family history on file.     REVIEW OF SYSTEMS:  CONSTITUTIONAL:  negative for  fevers, chills  EYES:  negative for blurred vision, visual disturbance  HEENT:  negative for  hearing loss, voice change  RESPIRATORY:  negative for  dyspnea, wheezing  CARDIOVASCULAR:  negative for  chest pain, palpitations  GASTROINTESTINAL:  negative for nausea, vomiting  GENITOURINARY:  negative for frequency, urinary incontinence  HEMATOLOGIC/LYMPHATIC:  negative for bleeding and petechiae  MUSCULOSKELETAL:  See HPI  NEUROLOGICAL:  negative for headaches, dizziness  BEHAVIOR/PSYCH:  negative for increased agitation and anxiety    PHYSICAL EXAM:    VITALS:  /66   Pulse 121   Temp 101 °F (38.3 °C) (Temporal)   Resp 20   SpO2 95%   CONSTITUTIONAL: Resting, no verbal responses to questions  MUSCULOSKELETAL:  Left lower Extremity:  · Left lower extremity held in an externally rotated position  · Pain apparent with logroll of the extremity  · Dorsalis pedis and posterior tibial pulses are intact +2/4  · Motor function and sensory testing was not conducted as patient did not participate in the examination  · Compartments soft and compressible    Secondary Exam:   · bilateralUE: No obvious signs of trauma. -TTP to fingers, hand, wrist, forearm, elbow, humerus, shoulder or clavicle. -- Patient able to flex/extend fingers, wrist, elbow and shoulder with active and passive ROM without pain, +2/4 Radial pulse, cap refill <3sec, +AIN/PIN/Radial/Ulnar/Median N, distal sensation grossly intact to C4-T1 dermatomes, compartments soft and compressible. · rightLE: No obvious signs of trauma. -TTP to foot, ankle, leg, knee, thigh, hip.-- Patient able to flex/extend toes, ankle, knee and hip with active and passive ROM without pain,+2/4 DP & PT pulses, cap refill <3sec, +5/5 PF/DF/EHL, distal sensation grossly intact to L4-S1 dermatomes, compartments soft and compressible. DATA:    CBC:   Lab Results   Component Value Date    WBC 9.0 12/07/2020    RBC 4.07 12/07/2020    HGB 10.1 12/07/2020    HCT 29.6 12/07/2020    MCV 72.7 12/07/2020    MCH 24.8 12/07/2020    MCHC 34.1 12/07/2020    RDW 14.3 12/07/2020     12/07/2020    MPV 9.8 12/07/2020     PT/INR:    Lab Results   Component Value Date    PROTIME 11.9 11/18/2020    INR 1.1 11/18/2020       Radiology Review:  X-ray imaging of the bilateral hips and pelvis were obtained. There is a left femoral neck fracture, complete and displaced. There is hardware noted related to history of right hip hemiarthroplasty. The hardware is well fixed with no evidence of loosening. X-ray of the femur reveals no further fractures or dislocations distally about the knee. Degenerative changes noted at the knee with osteophyte formation predominantly in the patellofemoral joint.     IMPRESSION:  · Left, closed, femoral neck fracture with complete displacement    PLAN:  · Nonweightbearing to left lower extremity  · NPO pending surgical intervention  · Pain control PO/IV  · Medical optimization  · Plan for surgical intervention with left hip hemiarthroplasty on 12/9/2020  · Discussed with Dr. Raymond Duarte

## 2020-12-09 NOTE — H&P
Hospital Medicine History & Physical      PCP: Shania Birmnigham MD    Date of Admission: 12/8/2020    Date of Service: Pt seen/examined on 12/8/2020 and Admitted to Inpatient with expected LOS greater than two midnights due to medical therapy. Chief Complaint: Left hip pain      History Of Present Illness:    63 y.o. male who presented to [address] [place] [address] with past medical history of a recent right hip fracture, hypertension, diabetes, hyperlipidemia who presents to an outside hospital with left hip pain. He said he was getting rehabilitation done for his right hip fracture when he had a mechanical fall and landed on his left hip. Since that time the pain is so severe 10 out of 10 in severity that he cannot ambulate. He presented to the emergency room complaining of such. He denies any head injury, chest pain, palpitations. He states he simply tripped and fell. No syncope no seizures. In the emergency room he was screened for Covid and was positive. Past Medical History:          Diagnosis Date    Anxiety     Bronchitis     Cellulitis     Chronic sinusitis     Depression     Diabetes mellitus (HCC)     Diabetic retinopathy (HCC)     Fracture of left foot     High cholesterol     Hypercholesteremia     Hypercholesterolemia     Hypertension     Lumbago     Moderate mood disorder (HCC)     Third nerve palsy        Past Surgical History:          Procedure Laterality Date    EYE SURGERY      HIP SURGERY Right 11/19/2020    HIP HEMIARTHROPLASTY performed by Nicolas Plasencia MD at [facility] OR       Medications Prior to Admission:      Prior to Admission medications    Medication Sig Start Date End Date Taking?  Authorizing Provider   insulin glargine (LANTUS) 100 UNIT/ML injection vial Inject 60 Units into the skin nightly 10/13/20  Yes Delvis Giles MD   aspirin EC 81 MG EC tablet Take 1 tablet by mouth 2 times daily for 28 days 11/19/20 12/17/20  Monika Shaver DO OLANZapine (ZYPREXA) 7.5 MG tablet Take 1 tablet by mouth nightly 10/13/20 72/43/81  MIRA Grewal CNP   venlafaxine (EFFEXOR XR) 75 MG extended release capsule Take 1 capsule by mouth daily (with breakfast) 10/14/20 37/87/81  MIRA Grewal CNP   glimepiride (AMARYL) 4 MG tablet Take 4 mg by mouth every morning (before breakfast)    Historical Provider, MD   glucose 4 g chewable tablet Take 12 g by mouth as needed for Low blood sugar Chew and swallow 3 tablets by mouth as needed for low sugar. Check blood sugar in 15 minutes and repeat dose if still low. Historical Provider, MD   lamoTRIgine (LAMICTAL) 25 MG tablet Take 25 mg by mouth nightly    Historical Provider, MD   Multiple Vitamins-Minerals (THERAPEUTIC MULTIVITAMIN-MINERALS) tablet Take 1 tablet by mouth every other day    Historical Provider, MD   rosuvastatin (CRESTOR) 10 MG tablet Take 10 mg by mouth daily    Historical Provider, MD   docusate sodium (COLACE) 100 MG capsule Take 100 mg by mouth 2 times daily    Historical Provider, MD   omeprazole (PRILOSEC) 20 MG delayed release capsule Take 40 mg by mouth daily    Historical Provider, MD       Allergies:  Patient has no known allergies. Social History:      The patient currently lives independently although he was at rehab when he fell    TOBACCO:   reports that he has never smoked. He has never used smokeless tobacco.  ETOH:   reports no history of alcohol use. Family History:     Reviewed in detail and negative for DM, CAD, Cancer, CVA. Positive as follows:    No family history on file. REVIEW OF SYSTEMS:   Pertinent positives as noted in the HPI. All other systems reviewed and negative. PHYSICAL EXAM:    /66   Pulse 121   Temp 101 °F (38.3 °C) (Temporal)   Resp 20   SpO2 95%     General appearance:  No apparent distress, appears stated age and cooperative. HEENT:  Normal cephalic, atraumatic without obvious deformity.  Pupils equal, round, and reactive to light. Extra ocular muscles intact. Conjunctivae/corneas clear. Neck: Supple, with full range of motion. No jugular venous distention. Trachea midline. Respiratory:  Normal respiratory effort. Clear to auscultation, bilaterally without Rales/Wheezes/Rhonchi. Cardiovascular:  Regular rate and rhythm with normal S1/S2 without murmurs, rubs or gallops. Abdomen: Soft, non-tender, non-distended with normal bowel sounds. Musculoskeletal: Movement of the left hip cause significant pain skin: Skin color, texture, turgor normal.  No rashes or lesions. Neurologic:  Neurovascularly intact without any focal sensory/motor deficits. Cranial nerves: II-XII intact, grossly non-focal.  Psychiatric:  Alert and oriented, thought content appropriate, normal insight  Capillary Refill: Brisk,< 3 seconds   Peripheral Pulses: +2 palpable, equal bilaterally       CXR:  I have reviewed the CXR with the following interpretation: Chest x-ray was clear  Labs:     Recent Labs     12/07/20  1742   WBC 9.0   HGB 10.1*   HCT 29.6*        Recent Labs     12/07/20  1742   *   K 4.4   CL 98   CO2 24   BUN 20   CREATININE 1.2   CALCIUM 8.7     No results for input(s): AST, ALT, BILIDIR, BILITOT, ALKPHOS in the last 72 hours. No results for input(s): INR in the last 72 hours. No results for input(s): Juan Favre in the last 72 hours.     Urinalysis:      Lab Results   Component Value Date    NITRU Negative 12/07/2020    WBCUA NONE 12/07/2020    BACTERIA NONE SEEN 12/07/2020    RBCUA NONE 12/07/2020    BLOODU Negative 12/07/2020    SPECGRAV 1.020 12/07/2020    GLUCOSEU Negative 12/07/2020         ASSESSMENT:    Active Hospital Problems    Diagnosis Date Noted    Closed left hip fracture, initial encounter [facility]) [place]phane[place] Si 12/07/2020   Recent right hip fracture  Hypertension  Diabetes  Hyperlipidemia    PLAN:  Orthopedic consultation  Cover sugars with sliding scale  Continue other home medications  Lovenox for DVT prophylaxis    DVT Prophylaxis: Lovenox  Diet: Diet NPO, After Midnight  DIET NO SALT ADDED (3-4 GM); Code Status: Full Code    PT/OT Eval Status: Pending surgery    Dispo -goal rehabilitation       Judie Crump DO    Thank you Lalita Hogan MD for the opportunity to be involved in this patient's care. If you have any questions or concerns please feel free to contact me at [phone].

## 2020-12-09 NOTE — ANESTHESIA PRE PROCEDURE
Department of Anesthesiology  Preprocedure Note       Name:  Ysabel Camejo   Age:  63 y.o.  :  1955                                          MRN:  [id number]         Date:  2020      Surgeon: Apoorva Reilly):  Hang Lira MD    Procedure: Procedure(s):  HIP HEMIARTHROPLASTY -- HERB -- DROPLET +     PT. COMING FROM [place]    Medications prior to admission:   Prior to Admission medications    Medication Sig Start Date End Date Taking? Authorizing Provider   docusate sodium (COLACE) 100 MG capsule Take 100 mg by mouth 2 times daily 20  Yes Historical Provider, MD   glucose 4 g chewable tablet Take 4 g by mouth as needed 20  Yes Historical Provider, MD   Multiple Vitamins-Minerals (MULTIVITAMIN ADULT PO) Take 1 tablet by mouth daily 20  Yes Historical Provider, MD   omeprazole (PRILOSEC) 40 MG delayed release capsule Take 40 mg by mouth daily 20  Yes Historical Provider, MD   insulin glargine (LANTUS) 100 UNIT/ML injection vial Inject 60 Units into the skin nightly 10/13/20  Yes Shannon Huerta MD   aspirin EC 81 MG EC tablet Take 1 tablet by mouth 2 times daily for 28 days 20  Lakeisha Rubin,    OLANZapine (ZYPREXA) 7.5 MG tablet Take 1 tablet by mouth nightly 10/13/20 59/65/60  MIRA Lynn - CNP   venlafaxine (EFFEXOR XR) 75 MG extended release capsule Take 1 capsule by mouth daily (with breakfast) 10/14/20 55/21/76  MIRA Lynn - CNP   glimepiride (AMARYL) 4 MG tablet Take 4 mg by mouth every morning (before breakfast)    Historical Provider, MD   glucose 4 g chewable tablet Take 12 g by mouth as needed for Low blood sugar Chew and swallow 3 tablets by mouth as needed for low sugar. Check blood sugar in 15 minutes and repeat dose if still low.     Historical Provider, MD   lamoTRIgine (LAMICTAL) 25 MG tablet Take 25 mg by mouth nightly    Historical Provider, MD   rosuvastatin (CRESTOR) 10 MG tablet Take 10 mg by mouth daily    Historical Provider, MD   docusate sodium (COLACE) 100 MG capsule Take 100 mg by mouth 2 times daily    Historical Provider, MD       Current medications:    Current Facility-Administered Medications   Medication Dose Route Frequency Provider Last Rate Last Dose    dexamethasone (DECADRON) injection 4 mg  4 mg Intravenous Q12H Bishop Clemente DO   4 mg at 12/09/20 0600    tranexamic acid (CYKLOKAPRON) 1,000 mg in dextrose 5 % 100 mL IVPB  1,000 mg Intravenous Once Dossie DO Jerzy        aspirin EC tablet 81 mg  81 mg Oral BID Bishop Clemente DO   Stopped at 12/09/20 1023    docusate sodium (COLACE) capsule 100 mg  100 mg Oral BID Bishop Clemente DO   Stopped at 12/09/20 1023    [Held by provider] glimepiride (AMARYL) tablet 4 mg  4 mg Oral QAM AC Bishop Clemente DO        insulin lispro (HUMALOG) injection vial 0-12 Units  0-12 Units Subcutaneous TID WC Bishop Clemente DO   Stopped at 12/09/20 1149    insulin lispro (HUMALOG) injection vial 0-6 Units  0-6 Units Subcutaneous Nightly Bishop Clemente DO        insulin glargine (LANTUS) injection vial 60 Units  60 Units Subcutaneous Nightly Bishop Clemente DO   60 Units at 12/08/20 2218    lamoTRIgine (LAMICTAL) tablet 25 mg  25 mg Oral Nightly Bishop Clemente DO   25 mg at 12/08/20 2200    therapeutic multivitamin-minerals 1 tablet  1 tablet Oral Every Other Day Bishop Clemente DO   Stopped at 12/09/20 1024    OLANZapine (ZYPREXA) tablet 7.5 mg  7.5 mg Oral Nightly Bishop Clemente DO   7.5 mg at 12/08/20 2200    pantoprazole (PROTONIX) tablet 40 mg  40 mg Oral QAM AC Bishop Clemente DO   40 mg at 12/09/20 9376    rosuvastatin (CRESTOR) tablet 10 mg  10 mg Oral Daily Bishop Clemente DO   10 mg at 12/08/20 2200    venlafaxine (EFFEXOR XR) extended release capsule 75 mg  75 mg Oral Daily with breakfast Bishop Clemente DO        acetaminophen (TYLENOL) tablet 650 mg  650 mg Oral Q4H PRN Bishop Clemente DO   650 mg at 12/08/20 2224    ondansetron [facility]) injection 4 mg  4 mg Intravenous Q6H PRN Jolinda Faisal, DO        glucose (GLUTOSE) 40 % oral gel 15 g  15 g Oral PRN Jolinda Faisal, DO        dextrose 50 % IV solution  12.5 g Intravenous PRN Jolinda Faisal, DO        glucagon (rDNA) injection 1 mg  1 mg Intramuscular PRN Jolinda Faisal, DO        dextrose 5 % solution  100 mL/hr Intravenous PRN Jolinda Faisal, DO           Allergies:  No Known Allergies    Problem List:    Patient Active Problem List   Diagnosis Code    Severe episode of recurrent major depressive disorder, without psychotic features ([facility] F33.2    Suicidal ideations R45.851    SDH (subdural hematoma) ([facility]) S06.5X9A    Traumatic subdural hemorrhage with loss of consciousness of 30 minutes or less ([facility] S06. 8X1A    Diabetic acidosis without coma ([facility]) E11.10    Acute kidney injury superimposed on CKD ([facility]) N17.9, N18.9    Hyponatremia E87.1    Right adrenal mass ([facility]) E27.8    Hyperkalemia E87.5    Major depression, recurrent ([facility]) F33.9    Type 2 diabetes mellitus with complication, with long-term current use of insulin ([facility]) E11.8, Z79.4    HLD (hyperlipidemia) E78.5    HTN (hypertension) I10    Uncontrolled type 2 diabetes mellitus with hyperglycemia ([facility]) E11.65    SIRS (systemic inflammatory response syndrome) ([facility]) R65.10    Weight loss R63.4    Generalized abdominal pain R10.84    Lactic acidosis E87.2    Constipation K59.00    High anion gap metabolic acidosis N82.3    Non compliance w medication regimen Z91.14    Physical deconditioning R53.81    Dementia, vascular ([facility]) F01.50    Moderate protein-calorie malnutrition ([facility]) E44.0    TIA (transient ischemic attack) G45.9    DKA, type 1, not at goal ([facility] E10.10    Ileus ([facility]) K56.7    Adrenal adenoma, right D35.01    Hypertensive urgency I16.0    Hiccups M92.7    Umbilical hernia without obstruction and without gangrene K42.9    GERD (gastroesophageal reflux disease) K21.9    Depression F32.9    Hypotension I95.9    Depression, major, recurrent (HCC) F33.9    Closed right hip fracture, initial encounter ([facility] S72.001A    History of right hip hemiarthroplasty Z96.641    Closed left hip fracture, initial encounter ([facility] S72.002A       Past Medical History:        Diagnosis Date    Anxiety     Bronchitis     Cellulitis     Chronic sinusitis     Depression     Diabetes mellitus ([facility]     Diabetic retinopathy ([facility]     Fracture of left foot     High cholesterol     Hypercholesteremia     Hypercholesterolemia     Hypertension     Lumbago     Moderate mood disorder ([facility]     Third nerve palsy        Past Surgical History:        Procedure Laterality Date    EYE SURGERY      HIP SURGERY Right 11/19/2020    HIP HEMIARTHROPLASTY performed by Gary Viveros MD at [address] [facility] [employer] History:    Social History     Tobacco Use    Smoking status: Never Smoker    Smokeless tobacco: Never Used   Substance Use Topics    Alcohol use:  No                                Counseling given: Not Answered      Vital Signs (Current):   Vitals:    12/09/20 0300 12/09/20 0308 12/09/20 0730 12/09/20 1530   BP: (!) 98/57 (!) 107/58 111/71 102/82   Pulse: 90  96 84   Resp: 21  18 20   Temp: 36.8 °C (98.2 °F)  36.3 °C (97.4 °F) 36.2 °C (97.2 °F)   TempSrc: Axillary  Temporal Temporal   SpO2: 99%  98% 97%                                              BP Readings from Last 3 Encounters:   12/09/20 102/82   12/08/20 110/62   11/22/20 119/70       NPO Status:                                                                                 BMI:   Wt Readings from Last 3 Encounters:   12/07/20 170 lb 4.8 oz (77.2 kg)   11/18/20 170 lb (77.1 kg)   08/22/20 179 lb 7 oz (81.4 kg)     There is no height or weight on file to calculate BMI.    CBC:   Lab Results   Component Value Date    WBC 10.1 12/09/2020    RBC 4.02 12/09/2020    HGB 10.0 12/09/2020    HCT 28.9 12/09/2020    MCV 71.9 12/09/2020    RDW 14.6 HISTORY: Reason for exam:->hip fx per nursing home FINDINGS: There is a displaced left femoral neck fracture. The bones otherwise appear intact. No evidence of dislocation. There is evidence of calcific atherosclerosis. Mild degenerative changes are present. Displaced left femoral neck fracture. Xr Femur Right (min 2 Views)    Result Date: 11/18/2020  EXAMINATION: 4 XRAY VIEWS OF THE RIGHT FEMUR; ONE XRAY VIEW OF THE PELVIS AND TWO XRAY VIEWS RIGHT HIP 11/18/2020 4:07 pm COMPARISON: None. HISTORY: ORDERING SYSTEM PROVIDED HISTORY: Pain TECHNOLOGIST PROVIDED HISTORY: Reason for exam:->Pain What reading provider will be dictating this exam?->CRC FINDINGS: There is a transcervical fracture through the right femoral neck. The femoral head maintains articulation with the acetabulum. The remainder of the right femur is intact. The pelvic bones are intact, without fracture or focal lesion. The sacroiliac joints and the pubic symphysis are unremarkable. Severe degenerative changes are seen in the right knee. Right femoral neck fracture. Ct Head Wo Contrast    Result Date: 12/9/2020  EXAMINATION: CT OF THE HEAD WITHOUT CONTRAST  12/9/2020 12:46 am TECHNIQUE: CT of the head was performed without the administration of intravenous contrast. Dose modulation, iterative reconstruction, and/or weight based adjustment of the mA/kV was utilized to reduce the radiation dose to as low as reasonably achievable. COMPARISON: 11/18/2020 HISTORY: ORDERING SYSTEM PROVIDED HISTORY: Recent fall; AMS TECHNOLOGIST PROVIDED HISTORY: Reason for exam:->Recent fall; AMS Has a \"code stroke\" or \"stroke alert\" been called? ->No What reading provider will be dictating this exam?->CRC FINDINGS: BRAIN/VENTRICLES: There is no acute intracranial hemorrhage, mass effect or midline shift. No abnormal extra-axial fluid collection. The gray-white differentiation is maintained without evidence of an acute infarct.   There is no evidence of hydrocephalus. White matter disease appears unchanged. ORBITS: The visualized portion of the orbits demonstrate no acute abnormality. SINUSES: Mucosal thickening is noted in the posterior left maxillary sinus. A small polyp or retention cyst is again seen anteriorly in the left maxillary sinus. Large polyp or retention cyst is again seen in the right sphenoid sinus. The mastoid air cells are clear. SOFT TISSUES/SKULL:  No acute abnormality of the visualized skull or soft tissues. No acute intracranial abnormality. Ct Head Wo Contrast    Result Date: 11/18/2020  EXAMINATION: CT OF THE HEAD WITHOUT CONTRAST  11/18/2020 4:08 pm TECHNIQUE: CT of the head was performed without the administration of intravenous contrast. Dose modulation, iterative reconstruction, and/or weight based adjustment of the mA/kV was utilized to reduce the radiation dose to as low as reasonably achievable. COMPARISON: None. HISTORY: ORDERING SYSTEM PROVIDED HISTORY: Trauma TECHNOLOGIST PROVIDED HISTORY: Has a \"code stroke\" or \"stroke alert\" been called? ->No Reason for exam:->Trauma What reading provider will be dictating this exam?->CRC FINDINGS: BRAIN/VENTRICLES: There is no acute intracranial hemorrhage, mass effect or midline shift. No abnormal extra-axial fluid collection. The gray-white differentiation is maintained without evidence of an acute infarct. There is no evidence of hydrocephalus. Scattered foci of low attenuation involving the periventricular white matter compatible with microvascular ischemic changes. ORBITS: The visualized portion of the orbits demonstrate no acute abnormality. SINUSES: Note is made of a complex mucous retention cyst within the right sphenoid sinus measuring 1.8 x 1.8 cm. This was present on the patient's previous studies. Bandar Hazel SOFT TISSUES/SKULL:  No acute abnormality of the visualized skull or soft tissues. 1. There is no acute intracranial abnormality.   Specifically, there is no intracranial hemorrhage. 2. Atrophy and periventricular leukomalacia, 3. Chronic mucous retention cyst seen within the right sphenoid sinus. Ct Cervical Spine Wo Contrast    Result Date: 11/18/2020  EXAMINATION: CT OF THE CERVICAL SPINE WITHOUT CONTRAST 11/18/2020 4:08 pm TECHNIQUE: CT of the cervical spine was performed without the administration of intravenous contrast. Multiplanar reformatted images are provided for review. Dose modulation, iterative reconstruction, and/or weight based adjustment of the mA/kV was utilized to reduce the radiation dose to as low as reasonably achievable. COMPARISON: None. HISTORY: ORDERING SYSTEM PROVIDED HISTORY: Trauma TECHNOLOGIST PROVIDED HISTORY: Reason for exam:->Trauma What reading provider will be dictating this exam?->CRC FINDINGS: The ring of C1 is intact as is the dense. There is no compression fracture of the cervical spine. No jumped or perched facet is noted. Multilevel degenerative disc and degenerative joint disease is noted. The prevertebral soft tissues are unremarkable. The airway is widely patent. Images through the lung apices are negative for a pneumothorax. 1. There is no acute compression fracture or subluxation of the cervical spine. 2. Multilevel degenerative disc and degenerative joint disease. Xr Chest Portable    Result Date: 12/8/2020  EXAMINATION: ONE XRAY VIEW OF THE CHEST 12/8/2020 10:03 pm COMPARISON: Chest series from December 7, 2020 HISTORY: ORDERING SYSTEM PROVIDED HISTORY: cough TECHNOLOGIST PROVIDED HISTORY: Reason for exam:->cough What reading provider will be dictating this exam?->CRC FINDINGS: Coarse interstitial markings bilaterally. Overall there are low lung volumes. No airspace disease, pleural effusions, or pneumothoraces. Atherosclerotic disease in the aortic arch. The remaining cardiomediastinal silhouette and pulmonary vascularity appear within normal limits.   Osseous and thoracic soft tissue structures demonstrate no acute findings. No change from prior exam.  Atherosclerotic disease. No additional evidence of active cardiopulmonary pathology. Xr Chest Portable    Result Date: 2020  EXAMINATION: ONE XRAY VIEW OF THE CHEST 2020 8:00 pm COMPARISON: Chest series from 2020. HISTORY: ORDERING SYSTEM PROVIDED HISTORY: fever, eval for pneumonia TECHNOLOGIST PROVIDED HISTORY: Reason for exam:->fever, eval for pneumonia FINDINGS: Adequate and symmetric aeration of the lungs. There are no formed consolidations, pleural effusions, or pneumothoraces. Trachea and central mainstem bronchi appear clear. Minimal atherosclerotic disease in the aortic arch. The remaining cardiomediastinal silhouette and pulmonary vascularity appear within normal limits. Osseous and thoracic soft tissue structures demonstrate no acute findings. Atherosclerotic disease. No additional evidence of active cardiopulmonary pathology. Xr Hip 2-3 Vw W Pelvis Right    Result Date: 2020  EXAMINATION: 4 XRAY VIEWS OF THE RIGHT FEMUR; ONE XRAY VIEW OF THE PELVIS AND TWO XRAY VIEWS RIGHT HIP 2020 4:07 pm COMPARISON: None. HISTORY: ORDERING SYSTEM PROVIDED HISTORY: Pain TECHNOLOGIST PROVIDED HISTORY: Reason for exam:->Pain What reading provider will be dictating this exam?->CRC FINDINGS: There is a transcervical fracture through the right femoral neck. The femoral head maintains articulation with the acetabulum. The remainder of the right femur is intact. The pelvic bones are intact, without fracture or focal lesion. The sacroiliac joints and the pubic symphysis are unremarkable. Severe degenerative changes are seen in the right knee. Right femoral neck fracture.     EK2020  8:26 AM - [place], Mhy Incoming Ekg Results From [facility]     Component  Value  Ref Range & Units  Status  Collected  Lab    Ventricular Rate  85  BPM  Final  2020  2:55 PM  [facility]    Atrial Rate  85  BPM  Final  2020  2:55 PM  [facility]    P-R Interval  144  ms  Final  11/18/2020  2:55 PM  [facility]    QRS Duration  76  ms  Final  11/18/2020  2:55 PM  [facility]    Q-T Interval  368  ms  Final  11/18/2020  2:55 PM  [facility]    QTc Calculation (Bazett)  437  ms  Final  11/18/2020  2:55 PM  [facility]    P Axis  75  degrees  Final  11/18/2020  2:55 PM  [facility]    R Axis  25  degrees  Final  11/18/2020  2:55 PM  [facility]    T Axis  43  degrees  Final  11/18/2020  2:55 PM  [facility]    Testing Performed By     Lab - [address]  Name  Director  Address  Valid Date Range    360-[facility]  [facility] MUSE  Unknown  Unknown  04/18/16 0721-Present    Narrative & Impression     Normal sinus rhythm  Normal ECG  When compared with ECG of 06-OCT-2020 18:35,  No significant change was found  Confirmed by Davian Padron ([phone]) on 11/19/2020 8:26:28 AM             Anesthesia Evaluation  Patient summary reviewed and Nursing notes reviewed no history of anesthetic complications:   Airway: Mallampati: III  TM distance: >3 FB   Neck ROM: full  Mouth opening: < 3 FB Dental:    (+) edentulous      Pulmonary:                             ROS comment: COVID 19, O2 2L NC   Cardiovascular:  Exercise tolerance: good (>4 METS),   (+) hypertension:, hyperlipidemia      ECG reviewed  Rhythm: regular  Rate: normal           Beta Blocker:  Not on Beta Blocker         Neuro/Psych:   (+) CVA:, TIA, psychiatric history: stable with treatmentdepression/anxiety              ROS comment: Prior SDH    On 12/9 around 1 AM patient was rapid responsed after he was found somnolent and difficult to arouse. Patient had stable vitals at the time, alert and oriented x3 and able to move all extremities at the time of RRT. Patient noted to have no focal deficits. Per report from staff during RRT, patient was unresponsive but still able to breathe.  GI/Hepatic/Renal:   (+) GERD: poorly controlled, renal disease (CLEMENTE):,           Endo/Other:    (+) Diabetes ( )Type II DM, poorly controlled, using insulin, .                 Abdominal:           Vascular:                                          Anesthesia Plan      general     ASA 3       Induction: rapid sequence. BIS  MIPS: Postoperative opioids intended and Prophylactic antiemetics administered. Anesthetic plan and risks discussed with patient. Plan discussed with attending.                   MIRA Lara CRNA   12/9/2020

## 2020-12-10 ENCOUNTER — ANESTHESIA EVENT (OUTPATIENT)
Dept: OPERATING ROOM | Age: 65
DRG: 521 | End: 2020-12-10
Payer: MEDICARE

## 2020-12-10 ENCOUNTER — APPOINTMENT (OUTPATIENT)
Dept: NEUROLOGY | Age: 65
DRG: 521 | End: 2020-12-10
Attending: FAMILY MEDICINE
Payer: MEDICARE

## 2020-12-10 ENCOUNTER — APPOINTMENT (OUTPATIENT)
Dept: GENERAL RADIOLOGY | Age: 65
DRG: 521 | End: 2020-12-10
Attending: FAMILY MEDICINE
Payer: MEDICARE

## 2020-12-10 ENCOUNTER — ANESTHESIA (OUTPATIENT)
Dept: OPERATING ROOM | Age: 65
DRG: 521 | End: 2020-12-10
Payer: MEDICARE

## 2020-12-10 VITALS — SYSTOLIC BLOOD PRESSURE: 152 MMHG | DIASTOLIC BLOOD PRESSURE: 106 MMHG | OXYGEN SATURATION: 96 % | TEMPERATURE: 97.7 F

## 2020-12-10 LAB
ALBUMIN SERPL-MCNC: 2.9 G/DL (ref 3.5–5.2)
ALP BLD-CCNC: 98 U/L (ref 40–129)
ALT SERPL-CCNC: 20 U/L (ref 0–40)
ANION GAP SERPL CALCULATED.3IONS-SCNC: 12 MMOL/L (ref 7–16)
AST SERPL-CCNC: 27 U/L (ref 0–39)
BASOPHILS ABSOLUTE: 0.01 E9/L (ref 0–0.2)
BASOPHILS RELATIVE PERCENT: 0.1 % (ref 0–2)
BILIRUB SERPL-MCNC: 0.5 MG/DL (ref 0–1.2)
BUN BLDV-MCNC: 48 MG/DL (ref 8–23)
CALCIUM SERPL-MCNC: 8.9 MG/DL (ref 8.6–10.2)
CHLORIDE BLD-SCNC: 102 MMOL/L (ref 98–107)
CO2: 21 MMOL/L (ref 22–29)
CREAT SERPL-MCNC: 1.3 MG/DL (ref 0.7–1.2)
EKG ATRIAL RATE: 88 BPM
EKG P AXIS: 77 DEGREES
EKG P-R INTERVAL: 134 MS
EKG Q-T INTERVAL: 352 MS
EKG QRS DURATION: 78 MS
EKG QTC CALCULATION (BAZETT): 425 MS
EKG R AXIS: 22 DEGREES
EKG T AXIS: 49 DEGREES
EKG VENTRICULAR RATE: 88 BPM
EOSINOPHILS ABSOLUTE: 0 E9/L (ref 0.05–0.5)
EOSINOPHILS RELATIVE PERCENT: 0 % (ref 0–6)
GFR AFRICAN AMERICAN: >60
GFR NON-AFRICAN AMERICAN: 56 ML/MIN/1.73
GLUCOSE BLD-MCNC: 173 MG/DL (ref 74–99)
HCT VFR BLD CALC: 31.4 % (ref 37–54)
HEMOGLOBIN: 11.1 G/DL (ref 12.5–16.5)
IMMATURE GRANULOCYTES #: 0.07 E9/L
IMMATURE GRANULOCYTES %: 0.8 % (ref 0–5)
LYMPHOCYTES ABSOLUTE: 1.25 E9/L (ref 1.5–4)
LYMPHOCYTES RELATIVE PERCENT: 15 % (ref 20–42)
MCH RBC QN AUTO: 25 PG (ref 26–35)
MCHC RBC AUTO-ENTMCNC: 35.4 % (ref 32–34.5)
MCV RBC AUTO: 70.7 FL (ref 80–99.9)
METER GLUCOSE: 183 MG/DL (ref 74–99)
METER GLUCOSE: 187 MG/DL (ref 74–99)
METER GLUCOSE: 188 MG/DL (ref 74–99)
METER GLUCOSE: 272 MG/DL (ref 74–99)
MONOCYTES ABSOLUTE: 0.79 E9/L (ref 0.1–0.95)
MONOCYTES RELATIVE PERCENT: 9.5 % (ref 2–12)
NEUTROPHILS ABSOLUTE: 6.19 E9/L (ref 1.8–7.3)
NEUTROPHILS RELATIVE PERCENT: 74.6 % (ref 43–80)
PDW BLD-RTO: 14.5 FL (ref 11.5–15)
PLATELET # BLD: 317 E9/L (ref 130–450)
PMV BLD AUTO: 9.9 FL (ref 7–12)
POTASSIUM REFLEX MAGNESIUM: 5 MMOL/L (ref 3.5–5)
RBC # BLD: 4.44 E12/L (ref 3.8–5.8)
SODIUM BLD-SCNC: 135 MMOL/L (ref 132–146)
TOTAL PROTEIN: 6.4 G/DL (ref 6.4–8.3)
WBC # BLD: 8.3 E9/L (ref 4.5–11.5)

## 2020-12-10 PROCEDURE — 2709999900 HC NON-CHARGEABLE SUPPLY: Performed by: ORTHOPAEDIC SURGERY

## 2020-12-10 PROCEDURE — 27236 TREAT THIGH FRACTURE: CPT | Performed by: ORTHOPAEDIC SURGERY

## 2020-12-10 PROCEDURE — 80053 COMPREHEN METABOLIC PANEL: CPT

## 2020-12-10 PROCEDURE — 2500000003 HC RX 250 WO HCPCS: Performed by: NURSE ANESTHETIST, CERTIFIED REGISTERED

## 2020-12-10 PROCEDURE — 6370000000 HC RX 637 (ALT 250 FOR IP): Performed by: STUDENT IN AN ORGANIZED HEALTH CARE EDUCATION/TRAINING PROGRAM

## 2020-12-10 PROCEDURE — 3700000000 HC ANESTHESIA ATTENDED CARE: Performed by: ORTHOPAEDIC SURGERY

## 2020-12-10 PROCEDURE — 3600000005 HC SURGERY LEVEL 5 BASE: Performed by: ORTHOPAEDIC SURGERY

## 2020-12-10 PROCEDURE — 0SRS0JZ REPLACEMENT OF LEFT HIP JOINT, FEMORAL SURFACE WITH SYNTHETIC SUBSTITUTE, OPEN APPROACH: ICD-10-PCS | Performed by: ORTHOPAEDIC SURGERY

## 2020-12-10 PROCEDURE — C1713 ANCHOR/SCREW BN/BN,TIS/BN: HCPCS | Performed by: ORTHOPAEDIC SURGERY

## 2020-12-10 PROCEDURE — 2580000003 HC RX 258: Performed by: STUDENT IN AN ORGANIZED HEALTH CARE EDUCATION/TRAINING PROGRAM

## 2020-12-10 PROCEDURE — 6360000002 HC RX W HCPCS: Performed by: ORTHOPAEDIC SURGERY

## 2020-12-10 PROCEDURE — 3600000015 HC SURGERY LEVEL 5 ADDTL 15MIN: Performed by: ORTHOPAEDIC SURGERY

## 2020-12-10 PROCEDURE — 3700000001 HC ADD 15 MINUTES (ANESTHESIA): Performed by: ORTHOPAEDIC SURGERY

## 2020-12-10 PROCEDURE — 2580000003 HC RX 258: Performed by: NURSE ANESTHETIST, CERTIFIED REGISTERED

## 2020-12-10 PROCEDURE — 93010 ELECTROCARDIOGRAM REPORT: CPT | Performed by: INTERNAL MEDICINE

## 2020-12-10 PROCEDURE — 85025 COMPLETE CBC W/AUTO DIFF WBC: CPT

## 2020-12-10 PROCEDURE — 6360000002 HC RX W HCPCS: Performed by: INTERNAL MEDICINE

## 2020-12-10 PROCEDURE — 2700000000 HC OXYGEN THERAPY PER DAY

## 2020-12-10 PROCEDURE — 2500000003 HC RX 250 WO HCPCS: Performed by: ORTHOPAEDIC SURGERY

## 2020-12-10 PROCEDURE — 73502 X-RAY EXAM HIP UNI 2-3 VIEWS: CPT

## 2020-12-10 PROCEDURE — 82962 GLUCOSE BLOOD TEST: CPT

## 2020-12-10 PROCEDURE — 2060000000 HC ICU INTERMEDIATE R&B

## 2020-12-10 PROCEDURE — 6360000002 HC RX W HCPCS: Performed by: NURSE ANESTHETIST, CERTIFIED REGISTERED

## 2020-12-10 PROCEDURE — 2580000003 HC RX 258: Performed by: ORTHOPAEDIC SURGERY

## 2020-12-10 PROCEDURE — 95816 EEG AWAKE AND DROWSY: CPT

## 2020-12-10 PROCEDURE — 95819 EEG AWAKE AND ASLEEP: CPT

## 2020-12-10 PROCEDURE — 36415 COLL VENOUS BLD VENIPUNCTURE: CPT

## 2020-12-10 PROCEDURE — C1776 JOINT DEVICE (IMPLANTABLE): HCPCS | Performed by: ORTHOPAEDIC SURGERY

## 2020-12-10 DEVICE — HEAD FEM OD50MM ID26MM HIP UNIV SYS UHR: Type: IMPLANTABLE DEVICE | Site: HIP | Status: FUNCTIONAL

## 2020-12-10 DEVICE — STEM FEM SZ 5 L108MM NK L35MM 44MM OFFSET 127DEG HIP TI: Type: IMPLANTABLE DEVICE | Site: HIP | Status: FUNCTIONAL

## 2020-12-10 DEVICE — HEAD FEM DIA26MM -3MM OFFSET HIP CO CHROM POLYETH TAPR LO: Type: IMPLANTABLE DEVICE | Site: HIP | Status: FUNCTIONAL

## 2020-12-10 RX ORDER — LIDOCAINE HYDROCHLORIDE 20 MG/ML
INJECTION, SOLUTION INTRAVENOUS PRN
Status: DISCONTINUED | OUTPATIENT
Start: 2020-12-10 | End: 2020-12-10 | Stop reason: SDUPTHER

## 2020-12-10 RX ORDER — SODIUM CHLORIDE 0.9 % (FLUSH) 0.9 %
10 SYRINGE (ML) INJECTION EVERY 12 HOURS SCHEDULED
Status: DISCONTINUED | OUTPATIENT
Start: 2020-12-10 | End: 2020-12-13 | Stop reason: HOSPADM

## 2020-12-10 RX ORDER — SODIUM CHLORIDE, SODIUM LACTATE, POTASSIUM CHLORIDE, CALCIUM CHLORIDE 600; 310; 30; 20 MG/100ML; MG/100ML; MG/100ML; MG/100ML
INJECTION, SOLUTION INTRAVENOUS CONTINUOUS PRN
Status: DISCONTINUED | OUTPATIENT
Start: 2020-12-10 | End: 2020-12-10 | Stop reason: SDUPTHER

## 2020-12-10 RX ORDER — LIDOCAINE HYDROCHLORIDE AND EPINEPHRINE 10; 10 MG/ML; UG/ML
INJECTION, SOLUTION INFILTRATION; PERINEURAL PRN
Status: DISCONTINUED | OUTPATIENT
Start: 2020-12-10 | End: 2020-12-10 | Stop reason: HOSPADM

## 2020-12-10 RX ORDER — SUCCINYLCHOLINE/SOD CL,ISO/PF 200MG/10ML
SYRINGE (ML) INTRAVENOUS PRN
Status: DISCONTINUED | OUTPATIENT
Start: 2020-12-10 | End: 2020-12-10 | Stop reason: SDUPTHER

## 2020-12-10 RX ORDER — VANCOMYCIN HYDROCHLORIDE 1 G/20ML
INJECTION, POWDER, LYOPHILIZED, FOR SOLUTION INTRAVENOUS PRN
Status: DISCONTINUED | OUTPATIENT
Start: 2020-12-10 | End: 2020-12-10 | Stop reason: HOSPADM

## 2020-12-10 RX ORDER — GLYCOPYRROLATE 1 MG/5 ML
SYRINGE (ML) INTRAVENOUS PRN
Status: DISCONTINUED | OUTPATIENT
Start: 2020-12-10 | End: 2020-12-10 | Stop reason: SDUPTHER

## 2020-12-10 RX ORDER — DEXAMETHASONE SODIUM PHOSPHATE 10 MG/ML
INJECTION, SOLUTION INTRAMUSCULAR; INTRAVENOUS PRN
Status: DISCONTINUED | OUTPATIENT
Start: 2020-12-10 | End: 2020-12-10 | Stop reason: SDUPTHER

## 2020-12-10 RX ORDER — OXYCODONE HYDROCHLORIDE AND ACETAMINOPHEN 5; 325 MG/1; MG/1
2 TABLET ORAL EVERY 4 HOURS PRN
Status: DISCONTINUED | OUTPATIENT
Start: 2020-12-10 | End: 2020-12-13 | Stop reason: HOSPADM

## 2020-12-10 RX ORDER — ROCURONIUM BROMIDE 10 MG/ML
INJECTION, SOLUTION INTRAVENOUS PRN
Status: DISCONTINUED | OUTPATIENT
Start: 2020-12-10 | End: 2020-12-10 | Stop reason: SDUPTHER

## 2020-12-10 RX ORDER — OXYCODONE HYDROCHLORIDE AND ACETAMINOPHEN 5; 325 MG/1; MG/1
1 TABLET ORAL EVERY 4 HOURS PRN
Status: DISCONTINUED | OUTPATIENT
Start: 2020-12-10 | End: 2020-12-13 | Stop reason: HOSPADM

## 2020-12-10 RX ORDER — SODIUM CHLORIDE 0.9 % (FLUSH) 0.9 %
10 SYRINGE (ML) INJECTION PRN
Status: DISCONTINUED | OUTPATIENT
Start: 2020-12-10 | End: 2020-12-13 | Stop reason: HOSPADM

## 2020-12-10 RX ORDER — NEOSTIGMINE METHYLSULFATE 1 MG/ML
INJECTION, SOLUTION INTRAVENOUS PRN
Status: DISCONTINUED | OUTPATIENT
Start: 2020-12-10 | End: 2020-12-10 | Stop reason: SDUPTHER

## 2020-12-10 RX ORDER — ONDANSETRON 2 MG/ML
INJECTION INTRAMUSCULAR; INTRAVENOUS PRN
Status: DISCONTINUED | OUTPATIENT
Start: 2020-12-10 | End: 2020-12-10 | Stop reason: SDUPTHER

## 2020-12-10 RX ORDER — PROPOFOL 10 MG/ML
INJECTION, EMULSION INTRAVENOUS PRN
Status: DISCONTINUED | OUTPATIENT
Start: 2020-12-10 | End: 2020-12-10 | Stop reason: SDUPTHER

## 2020-12-10 RX ORDER — CEFAZOLIN SODIUM 1 G/3ML
INJECTION, POWDER, FOR SOLUTION INTRAMUSCULAR; INTRAVENOUS PRN
Status: DISCONTINUED | OUTPATIENT
Start: 2020-12-10 | End: 2020-12-10 | Stop reason: HOSPADM

## 2020-12-10 RX ORDER — FENTANYL CITRATE 50 UG/ML
INJECTION, SOLUTION INTRAMUSCULAR; INTRAVENOUS PRN
Status: DISCONTINUED | OUTPATIENT
Start: 2020-12-10 | End: 2020-12-10 | Stop reason: SDUPTHER

## 2020-12-10 RX ADMIN — OXYCODONE AND ACETAMINOPHEN 2 TABLET: 5; 325 TABLET ORAL at 21:27

## 2020-12-10 RX ADMIN — INSULIN LISPRO 3 UNITS: 100 INJECTION, SOLUTION INTRAVENOUS; SUBCUTANEOUS at 22:19

## 2020-12-10 RX ADMIN — LIDOCAINE HYDROCHLORIDE 100 MG: 20 INJECTION, SOLUTION INTRAVENOUS at 19:06

## 2020-12-10 RX ADMIN — FENTANYL CITRATE 50 MCG: 50 INJECTION, SOLUTION INTRAMUSCULAR; INTRAVENOUS at 19:25

## 2020-12-10 RX ADMIN — ROCURONIUM BROMIDE 40 MG: 10 INJECTION, SOLUTION INTRAVENOUS at 19:25

## 2020-12-10 RX ADMIN — FENTANYL CITRATE 100 MCG: 50 INJECTION, SOLUTION INTRAMUSCULAR; INTRAVENOUS at 19:06

## 2020-12-10 RX ADMIN — Medication 3 MG: at 20:09

## 2020-12-10 RX ADMIN — Medication 0.6 MG: at 20:09

## 2020-12-10 RX ADMIN — INSULIN GLARGINE 60 UNITS: 100 INJECTION, SOLUTION SUBCUTANEOUS at 22:20

## 2020-12-10 RX ADMIN — OLANZAPINE 7.5 MG: 5 TABLET, FILM COATED ORAL at 22:10

## 2020-12-10 RX ADMIN — ONDANSETRON HYDROCHLORIDE 4 MG: 2 INJECTION, SOLUTION INTRAMUSCULAR; INTRAVENOUS at 19:25

## 2020-12-10 RX ADMIN — DEXAMETHASONE SODIUM PHOSPHATE 10 MG: 10 INJECTION, SOLUTION INTRAMUSCULAR; INTRAVENOUS at 19:25

## 2020-12-10 RX ADMIN — DEXAMETHASONE SODIUM PHOSPHATE 4 MG: 4 INJECTION, SOLUTION INTRA-ARTICULAR; INTRALESIONAL; INTRAMUSCULAR; INTRAVENOUS; SOFT TISSUE at 05:57

## 2020-12-10 RX ADMIN — ROSUVASTATIN 10 MG: 10 TABLET, FILM COATED ORAL at 22:08

## 2020-12-10 RX ADMIN — LAMOTRIGINE 25 MG: 25 TABLET ORAL at 22:08

## 2020-12-10 RX ADMIN — FENTANYL CITRATE 50 MCG: 50 INJECTION, SOLUTION INTRAMUSCULAR; INTRAVENOUS at 20:17

## 2020-12-10 RX ADMIN — DEXAMETHASONE SODIUM PHOSPHATE 4 MG: 4 INJECTION, SOLUTION INTRA-ARTICULAR; INTRALESIONAL; INTRAMUSCULAR; INTRAVENOUS; SOFT TISSUE at 17:45

## 2020-12-10 RX ADMIN — SODIUM CHLORIDE, POTASSIUM CHLORIDE, SODIUM LACTATE AND CALCIUM CHLORIDE: 600; 310; 30; 20 INJECTION, SOLUTION INTRAVENOUS at 19:03

## 2020-12-10 RX ADMIN — Medication 160 MG: at 19:06

## 2020-12-10 RX ADMIN — ASPIRIN 325 MG: 325 TABLET, COATED ORAL at 22:10

## 2020-12-10 RX ADMIN — PROPOFOL 15 MG: 10 INJECTION, EMULSION INTRAVENOUS at 19:06

## 2020-12-10 RX ADMIN — Medication 10 ML: at 22:19

## 2020-12-10 RX ADMIN — FENTANYL CITRATE 50 MCG: 50 INJECTION, SOLUTION INTRAMUSCULAR; INTRAVENOUS at 19:49

## 2020-12-10 RX ADMIN — Medication 2 G: at 19:11

## 2020-12-10 RX ADMIN — TRANEXAMIC ACID 1000 MG: 1 INJECTION, SOLUTION INTRAVENOUS at 19:09

## 2020-12-10 ASSESSMENT — PULMONARY FUNCTION TESTS
PIF_VALUE: 0
PIF_VALUE: 0
PIF_VALUE: 2
PIF_VALUE: 0
PIF_VALUE: 0
PIF_VALUE: 22
PIF_VALUE: 21
PIF_VALUE: 15
PIF_VALUE: 22
PIF_VALUE: 11
PIF_VALUE: 13
PIF_VALUE: 23
PIF_VALUE: 0
PIF_VALUE: 21
PIF_VALUE: 0
PIF_VALUE: 16
PIF_VALUE: 20
PIF_VALUE: 20
PIF_VALUE: 22
PIF_VALUE: 19
PIF_VALUE: 22
PIF_VALUE: 16
PIF_VALUE: 0
PIF_VALUE: 21
PIF_VALUE: 22
PIF_VALUE: 21
PIF_VALUE: 11
PIF_VALUE: 16
PIF_VALUE: 22
PIF_VALUE: 0
PIF_VALUE: 22
PIF_VALUE: 20
PIF_VALUE: 21
PIF_VALUE: 15
PIF_VALUE: 21
PIF_VALUE: 22
PIF_VALUE: 20
PIF_VALUE: 21
PIF_VALUE: 0
PIF_VALUE: 0
PIF_VALUE: 16
PIF_VALUE: 16
PIF_VALUE: 20
PIF_VALUE: 20
PIF_VALUE: 21
PIF_VALUE: 0
PIF_VALUE: 0
PIF_VALUE: 2
PIF_VALUE: 1
PIF_VALUE: 2
PIF_VALUE: 21
PIF_VALUE: 22
PIF_VALUE: 13
PIF_VALUE: 0
PIF_VALUE: 20
PIF_VALUE: 21
PIF_VALUE: 0
PIF_VALUE: 0
PIF_VALUE: 15
PIF_VALUE: 11
PIF_VALUE: 24
PIF_VALUE: 0
PIF_VALUE: 22
PIF_VALUE: 21
PIF_VALUE: 16
PIF_VALUE: 21
PIF_VALUE: 21
PIF_VALUE: 10
PIF_VALUE: 21
PIF_VALUE: 19
PIF_VALUE: 0
PIF_VALUE: 11
PIF_VALUE: 16
PIF_VALUE: 21
PIF_VALUE: 19
PIF_VALUE: 21
PIF_VALUE: 12
PIF_VALUE: 18
PIF_VALUE: 22
PIF_VALUE: 21
PIF_VALUE: 0
PIF_VALUE: 19
PIF_VALUE: 16
PIF_VALUE: 0
PIF_VALUE: 1
PIF_VALUE: 0
PIF_VALUE: 11
PIF_VALUE: 22
PIF_VALUE: 19
PIF_VALUE: 13
PIF_VALUE: 0
PIF_VALUE: 0
PIF_VALUE: 21

## 2020-12-10 NOTE — PROGRESS NOTES
Attempted to get patient's labs, patient refused. Another nurse attempted as well and he refused again. Patient is alert and oriented.
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
## 2020-12-10 NOTE — PROGRESS NOTES
Hospitalist Progress Note      SYNOPSIS: Patient admitted on 2020 for presents to an outside hospital with left hip pain. He said he was getting rehabilitation done for his right hip fracture when he had a mechanical fall and landed on his left hip. Since that time the pain is so severe 10 out of 10 in severity that he cannot ambulate. He presented to the emergency room complaining of such. He denies any head injury, chest pain, palpitations. He states he simply tripped and fell. No syncope no seizures. In the emergency room he was screened for Covid and was positive. SUBJECTIVE:    Patient seen and examined  Records reviewed. OR postponed due to AMS but patient is oriented / this AM    Noted Dr Helen Cronin note last night  Refused labs  Depressed this AM  MRI brain negative    RN concerned about dysphagia    O/N trop negative / EKG reviewed    Stable overnight. No other overnight issues reported. Temp (24hrs), Av.5 °F (36.4 °C), Min:97.2 °F (36.2 °C), Max:97.8 °F (36.6 °C)    DIET: Diet NPO, After Midnight  CODE: Full Code    Intake/Output Summary (Last 24 hours) at 12/10/2020 0858  Last data filed at 12/10/2020 0442  Gross per 24 hour   Intake 0 ml   Output 1375 ml   Net -1375 ml       OBJECTIVE:    /70   Pulse 92   Temp 97.4 °F (36.3 °C) (Temporal)   Resp 18   SpO2 97%     General appearance: No apparent distress, appears stated age and cooperative. HEENT:  Conjunctivae/corneas clear. Neck: Supple. No jugular venous distention. Respiratory: Clear to auscultation bilaterally, normal respiratory effort  Cardiovascular: Regular rate rhythm, normal S1-S2  Abdomen: Soft, nontender, nondistended  Musculoskeletal: No clubbing, cyanosis, no bilateral lower extremity edema. Brisk capillary refill. Left lower extremity externally rotated and short.   Skin:  No rashes  on visible skin  Neurologic: awake, alert and following commands     ASSESSMENT:    Closed left hip fracture  Recent right hip fracture  Hypertension  Diabetes  Hyperlipidemia  Acute encephalopathy-RRT 12/9/2020  CLEMENTE     PLAN:    MRI brain wo contrast negative for AMS - happening every night now 2 nights in a row. Refusing blood draws this AM - CLEMENTE trend? OR postponed due to AMS but patient is oriented 5/5 this AM - likely OR today  Continue sliding scale insulin with Lantus  Discussed with Neurology - EEG today  ? Depression - will consult Psych    DISPOSITION: Pending further course. Likely rehab. PT OT when able    Medications:  REVIEWED DAILY    Infusion Medications    dextrose       Scheduled Medications    ceFAZolin  2 g Intravenous See Admin Instructions    dexamethasone  4 mg Intravenous Q12H    tranexamic acid (CYCLOKAPRON) IVPB  1,000 mg Intravenous Once    aspirin EC  81 mg Oral BID    docusate sodium  100 mg Oral BID    [Held by provider] glimepiride  4 mg Oral QAM AC    insulin lispro  0-12 Units Subcutaneous TID WC    insulin lispro  0-6 Units Subcutaneous Nightly    insulin glargine  60 Units Subcutaneous Nightly    lamoTRIgine  25 mg Oral Nightly    therapeutic multivitamin-minerals  1 tablet Oral Every Other Day    OLANZapine  7.5 mg Oral Nightly    pantoprazole  40 mg Oral QAM AC    rosuvastatin  10 mg Oral Daily    venlafaxine  75 mg Oral Daily with breakfast     PRN Meds: acetaminophen, ondansetron, glucose, dextrose, glucagon (rDNA), dextrose    Labs:     Recent Labs     12/07/20 1742 12/09/20  0036   WBC 9.0 10.1   HGB 10.1* 10.0*   HCT 29.6* 28.9*    299       Recent Labs     12/07/20  1742 12/09/20  0025 12/09/20  0036   *  --  133   K 4.4 4.22 4.4   CL 98  --  97*   CO2 24  --  21*   BUN 20  --  25*   CREATININE 1.2  --  1.5*   CALCIUM 8.7  --  8.5*   PHOS  --   --  4.3       Recent Labs     12/09/20  0036   PROT 6.2*   ALKPHOS 110   ALT 21   AST 29   BILITOT 0.6       No results for input(s): INR in the last 72 hours.     Recent Labs     12/09/20  7148 TROPONINI <0.01       Chronic labs:    Lab Results   Component Value Date    CHOL 114 10/08/2020    TRIG 71 10/08/2020    HDL 41 10/08/2020    LDLCALC 59 10/08/2020    TSH 0.708 12/09/2020    PSA 0.86 09/28/2018    INR 1.1 11/18/2020    LABA1C 9.0 (H) 11/18/2020       Radiology: REVIEWED DAILY    +++++++++++++++++++++++++++++++++++++++++++++++++  Clint [address] [place] [address]  +++++++++++++++++++++++++++++++++++++++++++++++++  NOTE: This report was transcribed using voice recognition software. Every effort was made to ensure accuracy; however, inadvertent computerized transcription errors may be present.

## 2020-12-10 NOTE — PROCEDURES
EEG Report  Edwinna Lesches is a 63 y.o. male      Appointment Date 12/10/2020 Appointment Time 9:00am   Facility Location [facility] [place] EEG Number [id number]   Type of Study Portable Floor 7415-A     Technical Specifications  Technician C/Donald Child of consciousness Awake   Sleep deprived? No   Hyperventilation tested? No   Photic stim tested? No   EEG recording Standard 10-20 electrode placement    Duration of recording 25   EEG complete? Yes       Clinical History  Patient is a 63 y.o. male who presented to [facility] with past medical history of a recent right hip fracture, hypertension, diabetes, hyperlipidemia who presents to an outside hospital with left hip pain. Neurology was consulted for history of SDH, prior stroke and seizure. Patient recently fractured his right hip and has been in rehab center post surgery. Patient states he fell last Friday on his left hip and has been having pain ever since. Patient is unable to bear weight secondary to pain. Since admission, x-ray confirmed left hip fracture and is to have an L ORIF completed so patient was transferred here for further work-up. Patient has since come back with COVID-19 although asymptomatic at this time. On 12/9 around 1 AM patient was rapid responsed after he was found somnolent and difficult to arouse. Patient had stable vitals at the time, alert and oriented x3 and able to move all extremities at the time of RRT. Patient noted to have no focal deficits. ABGs were also unremarkable. Patient has a history of prior stroke and seizures as well as a fall with questionable head trauma.     Medications    Current Facility-Administered Medications:     ceFAZolin (ANCEF) 2 g in sterile water 20 mL IV syringe, 2 g, Intravenous, See Admin Instructions, Aniyah Rhoades DO    dexamethasone (DECADRON) injection 4 mg, 4 mg, Intravenous, Q12H, Alonzo Garvin DO, 4 mg at 12/10/20 0557    tranexamic acid (CYKLOKAPRON) 1,000 mg in dextrose 5 % 100 mL IVPB, 1,000 mg, Intravenous, Once, Cleatus Boy, DO    aspirin EC tablet 81 mg, 81 mg, Oral, BID, Hannah Desai DO, Stopped at 12/09/20 1023    docusate sodium (COLACE) capsule 100 mg, 100 mg, Oral, BID, Hannah Desai DO, Stopped at 12/09/20 1023    [Held by provider] glimepiride (AMARYL) tablet 4 mg, 4 mg, Oral, QAM AC, Hannah Desai,     insulin lispro (HUMALOG) injection vial 0-12 Units, 0-12 Units, Subcutaneous, TID WC, Hannah Desai DO, Stopped at 12/09/20 1149    insulin lispro (HUMALOG) injection vial 0-6 Units, 0-6 Units, Subcutaneous, Nightly, Hannah Desai,     insulin glargine (LANTUS) injection vial 60 Units, 60 Units, Subcutaneous, Nightly, Hannah Desai DO, Stopped at 12/09/20 2100    lamoTRIgine (LAMICTAL) tablet 25 mg, 25 mg, Oral, Nightly, Hannah Desai DO, Stopped at 12/09/20 2127    therapeutic multivitamin-minerals 1 tablet, 1 tablet, Oral, Every Other Day, Hannah Desai DO, Stopped at 12/09/20 1024    OLANZapine (ZYPREXA) tablet 7.5 mg, 7.5 mg, Oral, Nightly, Hannah Desai DO, Stopped at 12/09/20 2127    pantoprazole (PROTONIX) tablet 40 mg, 40 mg, Oral, QAM AC, Hannah Desai DO, Stopped at 12/10/20 2782    rosuvastatin (CRESTOR) tablet 10 mg, 10 mg, Oral, Daily, Hannah Desai DO, Stopped at 12/09/20 2128    venlafaxine (EFFEXOR XR) extended release capsule 75 mg, 75 mg, Oral, Daily with breakfast, Hannah Desai DO, Stopped at 12/10/20 0429    acetaminophen (TYLENOL) tablet 650 mg, 650 mg, Oral, Q4H PRN, Hannah Desai DO, 650 mg at 12/08/20 2224    ondansetron (ZOFRAN) injection 4 mg, 4 mg, Intravenous, Q6H PRN, Taffy Cocker, DO    glucose (GLUTOSE) 40 % oral gel 15 g, 15 g, Oral, PRN, Taffy Cocker, DO    dextrose 50 % IV solution, 12.5 g, Intravenous, PRN, Taffy Cocker, DO    glucagon (rDNA) injection 1 mg, 1 mg, Intramuscular, PRN, Taffy Aixaer, DO    dextrose 5 % solution, 100 mL/hr, Intravenous, PRN, Abhinav Hinds DO        Physician Interpretation    General EEG Report  EEG study was performed using the 10-20 electrode placement system in patient who was awake and cooperative at time of study. No abnormal behavior or movements noted during the study. No activation procedures were performed. Type of EEG:   Routine inpatient EEG with video    Epileptiform Discharge   None appreciated    Non-Epileptiform Abnormality  Amplitude fast background activity in the waking state without abnormal discharges or epileptiform activity. Patient mainly awake during study with brief periods of drowsiness. Superimposed low amplitude beta activity was noted across all leads. No significant slowing appreciated. Ictal  Not applicable    General Impression  Normal EEG without evidence of abnormal discharges or significant slowing. The absence of epileptiform activity during EEG study does not exclude the possibility of seizures or epilepsy given limited duration of study and lack of epileptic type activity during study. Clinical correlation is indicated.     MD Mario Anand

## 2020-12-10 NOTE — CARE COORDINATION
OR held on 12/9, possible OR 12/10. Pt on RAAlyson coffman consult regarding depression. Speech eval ordered. Called pt in room, discussed discharge plan. Pt not sure if he wants to go back to [facility], he didn't really like it there. Discussed COVID+ LIDIA list, he wants to think about it, will follow up with him in the am for LIDIA choices. Gave COVID+ LIDIA list to pt, he is unable to read them. Called son and discussed options, he would like [place]. Referral to [place], await assessment. Scott PUTNAM

## 2020-12-10 NOTE — PROGRESS NOTES
Due to patient change in mental status and possible Seizure activity we will cancel surgery tonight and plan for OR tomorrow.  Await medical and Neurology recs/ re eval.    Npo after midnight  Pre op abx  Treatment consent      Electronically signed by Trever Gonzalez DO on 12/9/2020 at 7:53 PM

## 2020-12-10 NOTE — ANESTHESIA PRE PROCEDURE
Department of Anesthesiology  Preprocedure Note       Name:  Mendel Montaño   Age:  63 y.o.  :  1955                                          MRN:  [id number]         Date:  12/10/2020      Surgeon: Lisa Ramos):  Caty Turner MD    Procedure: Procedure(s):  HIP HEMIARTHROPLASTY -- HERB -- DROPLET +     PT. COMING FROM [place]    Medications prior to admission:   Prior to Admission medications    Medication Sig Start Date End Date Taking? Authorizing Provider   docusate sodium (COLACE) 100 MG capsule Take 100 mg by mouth 2 times daily 20   Historical Provider, MD   glucose 4 g chewable tablet Take 4 g by mouth as needed 20   Historical Provider, MD   Multiple Vitamins-Minerals (MULTIVITAMIN ADULT PO) Take 1 tablet by mouth daily 20   Historical Provider, MD   omeprazole (PRILOSEC) 40 MG delayed release capsule Take 40 mg by mouth daily 20   Historical Provider, MD   aspirin EC 81 MG EC tablet Take 1 tablet by mouth 2 times daily for 28 days 20  Medhat Powell DO   OLANZapine (ZYPREXA) 7.5 MG tablet Take 1 tablet by mouth nightly 10/13/20 38/65/96  MIRA Jones CNP   venlafaxine (EFFEXOR XR) 75 MG extended release capsule Take 1 capsule by mouth daily (with breakfast) 10/14/20 54/81/59  MIRA Jones CNP   insulin glargine (LANTUS) 100 UNIT/ML injection vial Inject 60 Units into the skin nightly 10/13/20   Cher Menard MD   glimepiride (AMARYL) 4 MG tablet Take 4 mg by mouth every morning (before breakfast)    Historical Provider, MD   glucose 4 g chewable tablet Take 12 g by mouth as needed for Low blood sugar Chew and swallow 3 tablets by mouth as needed for low sugar. Check blood sugar in 15 minutes and repeat dose if still low.     Historical Provider, MD   lamoTRIgine (LAMICTAL) 25 MG tablet Take 25 mg by mouth nightly    Historical Provider, MD   rosuvastatin (CRESTOR) 10 MG tablet Take 10 mg by mouth daily    Historical Provider, MD docusate sodium (COLACE) 100 MG capsule Take 100 mg by mouth 2 times daily    Historical Provider, MD       Current medications:    No current facility-administered medications for this visit. No current outpatient medications on file.      Facility-Administered Medications Ordered in Other Visits   Medication Dose Route Frequency Provider Last Rate Last Dose    ceFAZolin (ANCEF) 2 g in sterile water 20 mL IV syringe  2 g Intravenous See Admin Instructions Indira Ludwig DO        dexamethasone (DECADRON) injection 4 mg  4 mg Intravenous Q12H Lin Barajas DO   4 mg at 12/10/20 0557    tranexamic acid (CYKLOKAPRON) 1,000 mg in dextrose 5 % 100 mL IVPB  1,000 mg Intravenous Once Indira Ludwig DO        aspirin EC tablet 81 mg  81 mg Oral BID Lin Barajas DO   Stopped at 12/09/20 1023    docusate sodium (COLACE) capsule 100 mg  100 mg Oral BID Lin Barajas DO   Stopped at 12/09/20 1023    [Held by provider] glimepiride (AMARYL) tablet 4 mg  4 mg Oral QAM AC Lin Barajas DO        insulin lispro (HUMALOG) injection vial 0-12 Units  0-12 Units Subcutaneous TID WC Lin Barajas DO   Stopped at 12/09/20 1149    insulin lispro (HUMALOG) injection vial 0-6 Units  0-6 Units Subcutaneous Nightly Lin Barajas DO        insulin glargine (LANTUS) injection vial 60 Units  60 Units Subcutaneous Nightly Lin Barajas DO   Stopped at 12/09/20 2100    lamoTRIgine (LAMICTAL) tablet 25 mg  25 mg Oral Nightly Lin Barajas DO   Stopped at 12/09/20 2127    therapeutic multivitamin-minerals 1 tablet  1 tablet Oral Every Other Day Lin Barajas DO   Stopped at 12/09/20 1024    OLANZapine (ZYPREXA) tablet 7.5 mg  7.5 mg Oral Nightly Lin Barajas, DO   Stopped at 12/09/20 2127    pantoprazole (PROTONIX) tablet 40 mg  40 mg Oral QAM AC Lin Barajas, DO   Stopped at 12/10/20 0432    rosuvastatin (CRESTOR) tablet 10 mg  10 mg Oral Daily Lin Barajas DO   Stopped at 12/09/20 2128  venlafaxine (EFFEXOR XR) extended release capsule 75 mg  75 mg Oral Daily with breakfast Jolinda Faisal, DO   Stopped at 12/10/20 7843    acetaminophen (TYLENOL) tablet 650 mg  650 mg Oral Q4H PRN Jolinda Faisal, DO   650 mg at 12/08/20 2224    ondansetron (ZOFRAN) injection 4 mg  4 mg Intravenous Q6H PRN Jolinda Faisal, DO        glucose (GLUTOSE) 40 % oral gel 15 g  15 g Oral PRN Jolinda Faisal, DO        dextrose 50 % IV solution  12.5 g Intravenous PRN Jolinda Faisal, DO        glucagon (rDNA) injection 1 mg  1 mg Intramuscular PRN Jolinda Faisal, DO        dextrose 5 % solution  100 mL/hr Intravenous PRN Jolinda Faisal, DO           Allergies:  No Known Allergies    Problem List:    Patient Active Problem List   Diagnosis Code    Severe episode of recurrent major depressive disorder, without psychotic features ([facility] F33.2    Suicidal ideations R45.851    SDH (subdural hematoma) ([facility]) F09.2E9X    Traumatic subdural hemorrhage with loss of consciousness of 30 minutes or less ([facility] S06. 8X1A    Diabetic acidosis without coma ([facility]) E11.10    Acute kidney injury superimposed on CKD ([facility] N17.9, N18.9    Hyponatremia E87.1    Right adrenal mass (HCC) E27.8    Hyperkalemia E87.5    Major depression, recurrent ([facility]) F33.9    Type 2 diabetes mellitus with complication, with long-term current use of insulin (HCC) E11.8, Z79.4    HLD (hyperlipidemia) E78.5    HTN (hypertension) I10    Uncontrolled type 2 diabetes mellitus with hyperglycemia (HCC) E11.65    SIRS (systemic inflammatory response syndrome) ([facility]) R65.10    Weight loss R63.4    Generalized abdominal pain R10.84    Lactic acidosis E87.2    Constipation K59.00    High anion gap metabolic acidosis I44.0    Non compliance w medication regimen Z91.14    Physical deconditioning R53.81    Dementia, vascular (HCC) F01.50    Moderate protein-calorie malnutrition (HCC) E44.0    TIA (transient ischemic attack) G45.9    DKA, type FINDINGS: Anatomic alignment status post right hip surgery. Prostatic right femoral head appears unremarkable. Soft tissues changes and subcutaneous pockets of gas noted, compatible with recent surgery. Anatomic alignment post right hip surgery. Xr Hip Right (2-3 Views)    Result Date: 11/19/2020  EXAMINATION: TWO XRAY VIEWS OF THE RIGHT HIP 11/19/2020 2:26 pm COMPARISON: None. HISTORY: ORDERING SYSTEM PROVIDED HISTORY: post op TECHNOLOGIST PROVIDED HISTORY: Of operative side while in recovery room. Reason for exam:->post op What reading provider will be dictating this exam?->CRC FINDINGS: There is anatomic alignment of right hip prosthesis. Soft tissue changes noted compatible with recent surgery. Surgical clips noted in the lateral hip. No evidence of prosthesis complications. Anatomic alignment, status post right hip surgery. Prosthesis in anatomic alignment. Xr Hip Bilateral W Ap Pelvis (2 Views)    Result Date: 12/7/2020  EXAMINATION: ONE XRAY VIEW OF THE PELVIS AND TWO XRAY VIEWS OF EACH OF THE BILATERAL HIPS 12/7/2020 2:36 pm COMPARISON: Pelvis x-ray 03/02/2018 HISTORY: ORDERING SYSTEM PROVIDED HISTORY: fall, L hip fx per [facility] TECHNOLOGIST PROVIDED HISTORY: Reason for exam:->fall, L hip fx per [facility] FINDINGS: Displaced left femoral neck fracture. There is interval right hip replacement, which appears intact without evidence of dislocation or loosening. Skin staples overlie the lateral right hip with mild underlying gas, compatible with recent postoperative status. Mild degenerative changes are present. There is evidence of calcific atherosclerosis. 1. Displaced left femoral neck fracture. 2. The right hip replacement appears intact without evidence of dislocation or loosening. Xr Femur Left (min 2 Views)    Result Date: 12/7/2020  EXAMINATION: 2 XRAY VIEWS OF THE LEFT FEMUR 12/7/2020 2:36 pm COMPARISON: None.  HISTORY: ORDERING SYSTEM PROVIDED HISTORY: hip fx per nursing home TECHNOLOGIST PROVIDED HISTORY: Reason for exam:->hip fx per nursing home FINDINGS: There is a displaced left femoral neck fracture. The bones otherwise appear intact. No evidence of dislocation. There is evidence of calcific atherosclerosis. Mild degenerative changes are present. Displaced left femoral neck fracture. Xr Femur Right (min 2 Views)    Result Date: 11/18/2020  EXAMINATION: 4 XRAY VIEWS OF THE RIGHT FEMUR; ONE XRAY VIEW OF THE PELVIS AND TWO XRAY VIEWS RIGHT HIP 11/18/2020 4:07 pm COMPARISON: None. HISTORY: ORDERING SYSTEM PROVIDED HISTORY: Pain TECHNOLOGIST PROVIDED HISTORY: Reason for exam:->Pain What reading provider will be dictating this exam?->CRC FINDINGS: There is a transcervical fracture through the right femoral neck. The femoral head maintains articulation with the acetabulum. The remainder of the right femur is intact. The pelvic bones are intact, without fracture or focal lesion. The sacroiliac joints and the pubic symphysis are unremarkable. Severe degenerative changes are seen in the right knee. Right femoral neck fracture. Ct Head Wo Contrast    Result Date: 12/9/2020  EXAMINATION: CT OF THE HEAD WITHOUT CONTRAST  12/9/2020 12:46 am TECHNIQUE: CT of the head was performed without the administration of intravenous contrast. Dose modulation, iterative reconstruction, and/or weight based adjustment of the mA/kV was utilized to reduce the radiation dose to as low as reasonably achievable. COMPARISON: 11/18/2020 HISTORY: ORDERING SYSTEM PROVIDED HISTORY: Recent fall; AMS TECHNOLOGIST PROVIDED HISTORY: Reason for exam:->Recent fall; AMS Has a \"code stroke\" or \"stroke alert\" been called? ->No What reading provider will be dictating this exam?->CRC FINDINGS: BRAIN/VENTRICLES: There is no acute intracranial hemorrhage, mass effect or midline shift. No abnormal extra-axial fluid collection. The gray-white differentiation is maintained without evidence of an acute infarct.   There is no no acute findings. No change from prior exam.  Atherosclerotic disease. No additional evidence of active cardiopulmonary pathology. Xr Chest Portable    Result Date: 2020  EXAMINATION: ONE XRAY VIEW OF THE CHEST 2020 8:00 pm COMPARISON: Chest series from 2020. HISTORY: ORDERING SYSTEM PROVIDED HISTORY: fever, eval for pneumonia TECHNOLOGIST PROVIDED HISTORY: Reason for exam:->fever, eval for pneumonia FINDINGS: Adequate and symmetric aeration of the lungs. There are no formed consolidations, pleural effusions, or pneumothoraces. Trachea and central mainstem bronchi appear clear. Minimal atherosclerotic disease in the aortic arch. The remaining cardiomediastinal silhouette and pulmonary vascularity appear within normal limits. Osseous and thoracic soft tissue structures demonstrate no acute findings. Atherosclerotic disease. No additional evidence of active cardiopulmonary pathology. Xr Hip 2-3 Vw W Pelvis Right    Result Date: 2020  EXAMINATION: 4 XRAY VIEWS OF THE RIGHT FEMUR; ONE XRAY VIEW OF THE PELVIS AND TWO XRAY VIEWS RIGHT HIP 2020 4:07 pm COMPARISON: None. HISTORY: ORDERING SYSTEM PROVIDED HISTORY: Pain TECHNOLOGIST PROVIDED HISTORY: Reason for exam:->Pain What reading provider will be dictating this exam?->CRC FINDINGS: There is a transcervical fracture through the right femoral neck. The femoral head maintains articulation with the acetabulum. The remainder of the right femur is intact. The pelvic bones are intact, without fracture or focal lesion. The sacroiliac joints and the pubic symphysis are unremarkable. Severe degenerative changes are seen in the right knee. Right femoral neck fracture.     EK2020  8:26 AM - [place], Mhy Incoming Ekg Results From [facility]     Component  Value  Ref Range & Units  Status  Collected  Lab    Ventricular Rate  85  BPM  Final  2020  2:55 PM  [facility]    Atrial Rate  85  BPM  Final  2020  2:55 PM [facility]    P-R Interval  144  ms  Final  11/18/2020  2:55 PM  [facility]    QRS Duration  76  ms  Final  11/18/2020  2:55 PM  [facility]    Q-T Interval  368  ms  Final  11/18/2020  2:55 PM  [facility]    QTc Calculation (Bazett)  437  ms  Final  11/18/2020  2:55 PM  [facility]    P Axis  75  degrees  Final  11/18/2020  2:55 PM  [facility]    R Axis  25  degrees  Final  11/18/2020  2:55 PM  [facility]    T Axis  43  degrees  Final  11/18/2020  2:55 PM  [facility]    Testing Performed By     Lab - [address]  Name  Director  Address  Valid Date Range    360-[facility]  [facility] MUSE  Unknown  Unknown  04/18/16 0721-Present    Narrative & Impression     Normal sinus rhythm  Normal ECG  When compared with ECG of 06-OCT-2020 18:35,  No significant change was found  Confirmed by Minh Celis ([phone]) on 11/19/2020 8:26:28 AM             Anesthesia Evaluation  Patient summary reviewed and Nursing notes reviewed no history of anesthetic complications:   Airway: Mallampati: III  TM distance: >3 FB   Neck ROM: full  Mouth opening: < 3 FB Dental:    (+) edentulous      Pulmonary:   (+) pneumonia (COVID 19):                            ROS comment: O2 2L NC   Cardiovascular:  Exercise tolerance: good (>4 METS),   (+) hypertension:, hyperlipidemia      ECG reviewed  Rhythm: regular  Rate: normal           Beta Blocker:  Not on Beta Blocker         Neuro/Psych:   (+) CVA:, TIA, psychiatric history: stable with treatmentdepression/anxiety              ROS comment: Prior SDH    On 12/9 around 1 AM patient was rapid responsed after he was found somnolent and difficult to arouse. Patient had stable vitals at the time, alert and oriented x3 and able to move all extremities at the time of RRT. Patient noted to have no focal deficits. Per report from staff during RRT, patient was unresponsive but still able to breathe.  GI/Hepatic/Renal:   (+) GERD: poorly controlled, renal disease ([place]):,           Endo/Other:    (+) Diabetes ( )Type II DM, poorly controlled, using insulin, . Abdominal:           Vascular:                                          Anesthesia Plan      general     ASA 4       Induction: rapid sequence and intravenous. MIPS: Postoperative opioids intended and Prophylactic antiemetics administered. Anesthetic plan and risks discussed with patient. Plan discussed with CRNA.                   Kristopher Schuster MD   12/10/2020

## 2020-12-11 LAB
ALBUMIN SERPL-MCNC: 2 G/DL (ref 3.5–5.2)
ALP BLD-CCNC: 97 U/L (ref 40–129)
ALT SERPL-CCNC: 23 U/L (ref 0–40)
ANION GAP SERPL CALCULATED.3IONS-SCNC: 11 MMOL/L (ref 7–16)
ANION GAP SERPL CALCULATED.3IONS-SCNC: 11 MMOL/L (ref 7–16)
AST SERPL-CCNC: 47 U/L (ref 0–39)
BILIRUB SERPL-MCNC: 0.5 MG/DL (ref 0–1.2)
BUN BLDV-MCNC: 43 MG/DL (ref 8–23)
BUN BLDV-MCNC: 48 MG/DL (ref 8–23)
CALCIUM SERPL-MCNC: 8.7 MG/DL (ref 8.6–10.2)
CALCIUM SERPL-MCNC: 8.9 MG/DL (ref 8.6–10.2)
CHLORIDE BLD-SCNC: 95 MMOL/L (ref 98–107)
CHLORIDE BLD-SCNC: 99 MMOL/L (ref 98–107)
CO2: 15 MMOL/L (ref 22–29)
CO2: 22 MMOL/L (ref 22–29)
CREAT SERPL-MCNC: 1.2 MG/DL (ref 0.7–1.2)
CREAT SERPL-MCNC: 1.3 MG/DL (ref 0.7–1.2)
GFR AFRICAN AMERICAN: >60
GFR AFRICAN AMERICAN: >60
GFR NON-AFRICAN AMERICAN: 56 ML/MIN/1.73
GFR NON-AFRICAN AMERICAN: >60 ML/MIN/1.73
GLUCOSE BLD-MCNC: 252 MG/DL (ref 74–99)
GLUCOSE BLD-MCNC: 317 MG/DL (ref 74–99)
HCT VFR BLD CALC: 29.7 % (ref 37–54)
HEMOGLOBIN: 10.4 G/DL (ref 12.5–16.5)
METER GLUCOSE: 249 MG/DL (ref 74–99)
METER GLUCOSE: 251 MG/DL (ref 74–99)
METER GLUCOSE: 293 MG/DL (ref 74–99)
METER GLUCOSE: 358 MG/DL (ref 74–99)
POTASSIUM REFLEX MAGNESIUM: 6.2 MMOL/L (ref 3.5–5)
POTASSIUM SERPL-SCNC: 5.3 MMOL/L (ref 3.5–5)
PROCALCITONIN: 1.35 NG/ML (ref 0–0.08)
SODIUM BLD-SCNC: 125 MMOL/L (ref 132–146)
SODIUM BLD-SCNC: 128 MMOL/L (ref 132–146)
TOTAL PROTEIN: 6.1 G/DL (ref 6.4–8.3)
URINE CULTURE, ROUTINE: NORMAL

## 2020-12-11 PROCEDURE — 6360000002 HC RX W HCPCS: Performed by: STUDENT IN AN ORGANIZED HEALTH CARE EDUCATION/TRAINING PROGRAM

## 2020-12-11 PROCEDURE — 6370000000 HC RX 637 (ALT 250 FOR IP): Performed by: FAMILY MEDICINE

## 2020-12-11 PROCEDURE — 2580000003 HC RX 258: Performed by: STUDENT IN AN ORGANIZED HEALTH CARE EDUCATION/TRAINING PROGRAM

## 2020-12-11 PROCEDURE — 6370000000 HC RX 637 (ALT 250 FOR IP): Performed by: STUDENT IN AN ORGANIZED HEALTH CARE EDUCATION/TRAINING PROGRAM

## 2020-12-11 PROCEDURE — 36415 COLL VENOUS BLD VENIPUNCTURE: CPT

## 2020-12-11 PROCEDURE — 92523 SPEECH SOUND LANG COMPREHEN: CPT

## 2020-12-11 PROCEDURE — 97530 THERAPEUTIC ACTIVITIES: CPT

## 2020-12-11 PROCEDURE — 97165 OT EVAL LOW COMPLEX 30 MIN: CPT

## 2020-12-11 PROCEDURE — 97162 PT EVAL MOD COMPLEX 30 MIN: CPT

## 2020-12-11 PROCEDURE — 85014 HEMATOCRIT: CPT

## 2020-12-11 PROCEDURE — 80053 COMPREHEN METABOLIC PANEL: CPT

## 2020-12-11 PROCEDURE — 2060000000 HC ICU INTERMEDIATE R&B

## 2020-12-11 PROCEDURE — 85018 HEMOGLOBIN: CPT

## 2020-12-11 PROCEDURE — 2500000003 HC RX 250 WO HCPCS: Performed by: STUDENT IN AN ORGANIZED HEALTH CARE EDUCATION/TRAINING PROGRAM

## 2020-12-11 PROCEDURE — 2580000003 HC RX 258: Performed by: FAMILY MEDICINE

## 2020-12-11 PROCEDURE — 80048 BASIC METABOLIC PNL TOTAL CA: CPT

## 2020-12-11 PROCEDURE — 82962 GLUCOSE BLOOD TEST: CPT

## 2020-12-11 PROCEDURE — 84145 PROCALCITONIN (PCT): CPT

## 2020-12-11 RX ORDER — SODIUM CHLORIDE 9 MG/ML
INJECTION, SOLUTION INTRAVENOUS CONTINUOUS
Status: DISCONTINUED | OUTPATIENT
Start: 2020-12-11 | End: 2020-12-13 | Stop reason: HOSPADM

## 2020-12-11 RX ORDER — INSULIN GLARGINE 100 [IU]/ML
40 INJECTION, SOLUTION SUBCUTANEOUS NIGHTLY
Status: DISCONTINUED | OUTPATIENT
Start: 2020-12-11 | End: 2020-12-13 | Stop reason: HOSPADM

## 2020-12-11 RX ORDER — POLYETHYLENE GLYCOL 3350 17 G/17G
17 POWDER, FOR SOLUTION ORAL DAILY
Status: DISCONTINUED | OUTPATIENT
Start: 2020-12-11 | End: 2020-12-13 | Stop reason: HOSPADM

## 2020-12-11 RX ADMIN — WATER 1 G: 1 INJECTION INTRAMUSCULAR; INTRAVENOUS; SUBCUTANEOUS at 03:15

## 2020-12-11 RX ADMIN — INSULIN GLARGINE 40 UNITS: 100 INJECTION, SOLUTION SUBCUTANEOUS at 21:58

## 2020-12-11 RX ADMIN — Medication 10 ML: at 12:17

## 2020-12-11 RX ADMIN — TRANEXAMIC ACID 1000 MG: 1 INJECTION, SOLUTION INTRAVENOUS at 01:02

## 2020-12-11 RX ADMIN — WATER 1 G: 1 INJECTION INTRAMUSCULAR; INTRAVENOUS; SUBCUTANEOUS at 12:18

## 2020-12-11 RX ADMIN — ROSUVASTATIN 10 MG: 10 TABLET, FILM COATED ORAL at 21:48

## 2020-12-11 RX ADMIN — PANTOPRAZOLE SODIUM 40 MG: 40 TABLET, DELAYED RELEASE ORAL at 05:58

## 2020-12-11 RX ADMIN — WATER 1 G: 1 INJECTION INTRAMUSCULAR; INTRAVENOUS; SUBCUTANEOUS at 23:42

## 2020-12-11 RX ADMIN — SODIUM CHLORIDE: 9 INJECTION, SOLUTION INTRAVENOUS at 18:42

## 2020-12-11 RX ADMIN — DOCUSATE SODIUM 100 MG: 100 CAPSULE ORAL at 21:48

## 2020-12-11 RX ADMIN — Medication 1 TABLET: at 12:17

## 2020-12-11 RX ADMIN — DEXAMETHASONE SODIUM PHOSPHATE 4 MG: 4 INJECTION, SOLUTION INTRA-ARTICULAR; INTRALESIONAL; INTRAMUSCULAR; INTRAVENOUS; SOFT TISSUE at 18:42

## 2020-12-11 RX ADMIN — LAMOTRIGINE 25 MG: 25 TABLET ORAL at 21:49

## 2020-12-11 RX ADMIN — INSULIN LISPRO 6 UNITS: 100 INJECTION, SOLUTION INTRAVENOUS; SUBCUTANEOUS at 17:01

## 2020-12-11 RX ADMIN — DEXAMETHASONE SODIUM PHOSPHATE 4 MG: 4 INJECTION, SOLUTION INTRA-ARTICULAR; INTRALESIONAL; INTRAMUSCULAR; INTRAVENOUS; SOFT TISSUE at 05:57

## 2020-12-11 RX ADMIN — VENLAFAXINE HYDROCHLORIDE 75 MG: 75 CAPSULE, EXTENDED RELEASE ORAL at 12:16

## 2020-12-11 RX ADMIN — OLANZAPINE 7.5 MG: 5 TABLET, FILM COATED ORAL at 21:49

## 2020-12-11 RX ADMIN — OXYCODONE AND ACETAMINOPHEN 2 TABLET: 5; 325 TABLET ORAL at 21:48

## 2020-12-11 RX ADMIN — DOCUSATE SODIUM 100 MG: 100 CAPSULE ORAL at 12:17

## 2020-12-11 RX ADMIN — ASPIRIN 325 MG: 325 TABLET, COATED ORAL at 21:49

## 2020-12-11 RX ADMIN — OXYCODONE AND ACETAMINOPHEN 2 TABLET: 5; 325 TABLET ORAL at 04:26

## 2020-12-11 RX ADMIN — INSULIN LISPRO 6 UNITS: 100 INJECTION, SOLUTION INTRAVENOUS; SUBCUTANEOUS at 06:08

## 2020-12-11 RX ADMIN — ASPIRIN 325 MG: 325 TABLET, COATED ORAL at 12:17

## 2020-12-11 NOTE — PROGRESS NOTES
Physical Therapy  Physical Therapy Initial Assessment     Name: Erendira Stubbsllor  : 1955  MRN: [id number]    Referring Provider:  Laura Mauro DO    Date of Service: 2020    Evaluating PT:  Diane Vergara PT   Room #:  0327/7028-S  Diagnosis: Closed left hip fracture, initial encounter [facility]) [S72.002A]  Closed left hip fracture, initial encounter ([facility] [facility] Dillon Fletcherabe     PMHx/PSHx:   has a past medical history of Anxiety, Bronchitis, Cellulitis, Chronic sinusitis, Depression, Diabetes mellitus ([facility] [address] Diabetic retinopathy ([facility] [address] [facility] Fracture of left foot, High cholesterol, Hypercholesteremia, Hypercholesterolemia, Hypertension, Lumbago, Moderate mood disorder ([facility] [address] and Third nerve palsy. has a past surgical history that includes eye surgery; hip surgery (Right, 2020); and hip surgery (Left, 12/10/2020). Procedure/Surgery:  12/10/20 L HIP HEMIARTHROPLASTY, Right hip hemiarthroplasty for displaced femoral neck fracture, 20   Precautions:  Falls, WBAT LLE, WBAT RLE, Anterior hip precautions, COVID +    Equipment Needs: To be determined      SUBJECTIVE:    Patient lives alone  in a ranch home  with 3 steps to enter without Rail  Bed is on 1 floor and bath is on 1 floor. Patient ambulated independently  PTA. Equipment owned: None,    OBJECTIVE:   Initial Evaluation  Date: 20 Treatment Short Term/ Long Term   Goals   AM-PAC 6 Clicks 34/72     Was pt agreeable to Eval/treatment? yes     Does pt have pain? Yes L hip     Bed Mobility  Rolling: NT  Supine to sit: Max manage LLE  Sit to supine: Max  Scooting: Max  Rolling: Ind  Supine to sit: Ind  Sit to supine: Ind  Scooting: Ind   Transfers Sit to stand: Max to fww  Stand to sit: mod  Stand pivot: NT  Sit to stand: Mod Ind  Stand to sit: Mod Ind  Stand pivot: Mod Ind   Ambulation    No steps taken this date.    50 feet with fww MOd Ind   Stair negotiation: ascended and descended  NT  3 steps with 1 rail Min A   ROM BUE:  See OT eval  BLE: wfl     Strength BUE: See OT eval   RLE:  4/5  LLE:   3+/5 discomfort L hip.   4+/5   Balance Sitting EOB:  SBA  Dynamic Standing: Max A  Sitting EOB:  Ind  Dynamic Standing: Mod Ind     Patient is Alert & Oriented x person, place, time and situation and follows directions   Sensation:  Pt denies numbness and tingling to extremities  Edema:  LLe. Vitals:  Blood Pressure at rest - Blood Pressure post session -   Heart Rate at rest 95 Heart Rate post session -   SPO2 at rest 96% RA SPO2 post session -%     Therapeutic Exercises:  AROM completed for BLE prior to functional mobility. Patient education  Patient educated on role of Physical Therapy, risks of immobility, safety and plan of care, energy conservation, importance of positional changes for oxygen exchange and  importance of mobility while in hospital  WB precautions. Patient response to education:   Pt verbalized understanding Pt demonstrated skill Pt requires further education in this area   yes yes reminders     ASSESSMENT:    Comments:  Patient was seen this date for PT evaluation. . Patient was agreeable to intervention. Results of the functional assessment are noted above. Upon entering the room patient was found supine in bed. Sat EOB x 15 minutes to increase dynamic sitting balance and activity tolerance. STS attempted and completed x 2 reps with Max A  At end of session, patient in bed with  call light and phone within reach,  all lines and tubes intact, nursing notified. This patient can benefit from the continuation of skilled PT possibly in a rehab setting to maximize functional level and return to PLOF.      Treatment:  Patient practiced and was instructed in the following treatment:    · Bed mobility training - pt given verbal and tactile cues to facilitate proper sequencing and safety during rolling and supine>sit as well as provided with physical assistance to complete task    · Assistive device training - pt educated on using

## 2020-12-11 NOTE — OP NOTE
Operative Note      Patient: Amanuel Busch  YOB: 1955  MRN: [id number]    Date of Procedure: 12/10/2020    Pre-Op Diagnosis: FRACTURED LEFT HIP femoral neck    Post-Op Diagnosis: Same       Procedure(s):  HIP HEMIARTHROPLASTY -- HERB -- DROPLET +     PT. COMING FROM [place]    Surgeon(s):  Shanita Wisdom MD    Assistant:   Resident: Candida Jain DO; Anthony Bueno DO    Anesthesia: General    Estimated Blood Loss (mL): less than 662     Complications: None    Implants:  Implant Name Type Inv. Item Serial No.  Lot No. LRB No. Used Action   STEM FEM SZ 5 L108MM NK L35MM 44MM OFFSET 127DEG HIP TI  STEM FEM SZ 5 L108MM NK L35MM 44MM OFFSET 127DEG HIP TI  [employer] [id number] Left 1 Implanted   HEAD FEM OD50MM ID26MM HIP UNIV SYS UHR  HEAD FEM OD50MM ID26MM HIP UNIV SYS UHR  [employer] [id number] Left 1 Implanted   HEAD FEM [id number] -3MM OFFSET HIP CO CHROM POLYETH TAPR LO  HEAD FEM [id number] -3MM OFFSET HIP CO CHROM POLYETH TAPR LO  [employer] [id number] Left 1 Implanted         Drains:   Urethral Catheter Straight-tip 16 fr (Active)   Catheter Indications Perioperative use in selected surgeries including but not limited to urologic, pelvic or need for intraoperative monitoring of urinary output due to prolonged surgery, large volume infusion or need for diuretic therapy in surgery 12/07/20 2051   Urine Color Lorna 12/09/20 0730   Urine Appearance Clear 12/09/20 0730   Output (mL) 400 mL 12/10/20 1515         Brief Hospital Course:  Amanuel Busch is a patient admitted through the ER c/o Left hip pain. Xrays revealed a Left femoral neck fx. After examination of the patient, review of the radiologic studies, and appropriate pre-operative risk assessment, Dr. Elias Seo recommended Left hip arthroplasty, which the patient was agreeable towards. Operative Course: Outside the operating suite, the patient was seen and identified.  The operative site was marked and identified as appropriate by the patient, staff, surgeon, and Anesthesia. The patient was taken into the operating room, placed on the operative table, and placed under the appropriate amount of sedation under the care of the anesthesia team. The patient was placed into a lateral decubitus position. All bony prominences and neurovascular structures were well padded and protected. The operative site was then prepped and draped in a standard sterile fashion. An incision was made over the lateral trochanteric region and carried down to the level of the fascia linwood maintaining appropriate hemostasis with electrocautery. A small rent was placed in the fascia linwood. Electrocautery was used to extend the fascia linwood incision in line with its fibers to the length of the skin incision. A Charnley retractor was then placed in the anterior and posterior margin of the tensor fascia linwood incision, taking care not to violate any neurovascular structures. The anterior one-third of the gluteus medius tendon was identified. In line with its fibers, it was reflected using electrocautery off its trochanteric insertion. The gluteus medius and minimus tendons were reflected anteriorly down to the level of the hip capsule. The hip capsule was T'd up to the acetabulum and around the posterior medial and inferior aspects of the hip. The fracture was visualized. An oscillating saw was then used to make the femoral neck cut at a 45-degree angle, 1 cm proximal to the lesser trochanter. After this was done, a corkscrew was then placed into the femoral head. The femoral head was levered out of the acetabulum, and taken for sizing. At this point, the acetabulum was then inspected, removing all bony fragments and interposed tissue. A Woody retractor was placed under the femoral neck cut. A box osteotome was introduced into the femoral neck which was removed. Then a T-handle canal finder was then introduced and removed. It should be noted that Dr. Pete Jimenez was present for the entirety of the procedure.               Electronically signed by Magdi Louise DO on 12/10/2020 at 8:27 PM

## 2020-12-11 NOTE — PLAN OF CARE
Did speak with Dr Reji Oropeza; no new neurological issues  EEG pending    Will await results and follow if needed

## 2020-12-11 NOTE — BRIEF OP NOTE
Brief Postoperative Note      Patient: Abel Mclaughlin  YOB: 1955  MRN: [id number]    Date of Procedure: 12/10/2020    Pre-Op Diagnosis: FRACTURED LEFT HIP    Post-Op Diagnosis: Same       Procedure(s):  HIP HEMIARTHROPLASTY -- HERB -- DROPLET +     PT. COMING FROM [place]    Surgeon(s):  Trisha Everett MD    Assistant:  Resident: Corey Valenzuela DO; Nori Vega DO    Anesthesia: General    Estimated Blood Loss (mL): 031    Complications: None    Specimens:   * No specimens in log *    Implants:  Implant Name Type Inv.  Item Serial No.  Lot No. LRB No. Used Action   STEM FEM SZ 5 L108MM NK L35MM 44MM OFFSET 127DEG HIP TI  STEM FEM SZ 5 L108MM NK L35MM 44MM OFFSET 127DEG HIP TI  [employer] [id number] Left 1 Implanted   HEAD FEM OD50MM ID26MM HIP UNIV SYS UHR  HEAD FEM OD50MM ID26MM HIP UNIV SYS UHR  [employer] [id number] Left 1 Implanted   HEAD FEM [id number] -3MM OFFSET HIP CO CHROM POLYETH TAPR LO  HEAD FEM [id number] -3MM OFFSET HIP CO CHROM POLYETH TAPR LO  [employer] [id number] Left 1 Implanted         Drains:   Urethral Catheter Straight-tip 16 fr (Active)   Catheter Indications Perioperative use in selected surgeries including but not limited to urologic, pelvic or need for intraoperative monitoring of urinary output due to prolonged surgery, large volume infusion or need for diuretic therapy in surgery 12/07/20 2051   Urine Color Lorna 12/09/20 0730   Urine Appearance Clear 12/09/20 0730   Output (mL) 400 mL 12/10/20 1515       Findings: see op note    Electronically signed by Corey Valenzuela DO on 12/10/2020 at 8:26 PM

## 2020-12-11 NOTE — PROGRESS NOTES
Department of Orthopedic Surgery  Resident Progress Note    Patient seen and examined. Pain controlled to left hip. No new complaints. Denies numbness, tingling, paraesthesias.      VITALS:  BP (!) 147/70   Pulse 89   Temp 97 °F (36.1 °C) (Temporal)   Resp 15   SpO2 96%     General: alert and oriented    MUSCULOSKELETAL:   left lower extremity:  · Dressing C/D/I  · Compartments soft and compressible  · +PF/DF/EHL  · +2/4 DP & PT pulses, Brisk Cap refill, Toes warm and perfused  · Distal sensation grossly intact to Peroneals, Sural, Saphenous, and tibial nrs    CBC:   Lab Results   Component Value Date    WBC 8.3 12/10/2020    HGB 11.1 12/10/2020    HCT 31.4 12/10/2020     12/10/2020     PT/INR:    Lab Results   Component Value Date    PROTIME 11.9 11/18/2020    INR 1.1 11/18/2020       ASSESSMENT  · S/P L HHA 12/11    PLAN    · Weight bearing as tolerated LLE  · Deep venous thrombosis prophylaxis - ASA, PCD's, early mobilization  · 24 hour abx  · PT/OT  · Pain Control: PO  · Monitor H&H: awaiting labs, continue to monitor  · D/W Dr. Chau Mirza

## 2020-12-11 NOTE — CONSULTS
Psychiatry attempted to call the patient to complete psych consult for depression and patient did not answer. I then spoke with nurse who reported that patient is confused and able to participate in psychiatric assessment. I reviewed the chart and patient is currently on Effexor XR which is an SNRI for depression he is also on Lamictal and Zyprexa. Patient can continue his current psychiatric medications and can follow-up with his outpatient psychiatric provider after discharge. If there is any acute psychiatric need please reconsult psych.

## 2020-12-11 NOTE — PROGRESS NOTES
Hospitalist Progress Note      SYNOPSIS: Patient admitted on 2020 for presents to an outside hospital with left hip pain. He said he was getting rehabilitation done for his right hip fracture when he had a mechanical fall and landed on his left hip. Since that time the pain is so severe 10 out of 10 in severity that he cannot ambulate. He presented to the emergency room complaining of such. He denies any head injury, chest pain, palpitations. He states he simply tripped and fell. No syncope no seizures. In the emergency room he was screened for Covid and was positive. Patient status post left hip hemiarthroplasty 12/10/2020    SUBJECTIVE:    Patient seen and examined  Records reviewed. Patient status post left hip hemiarthroplasty 12/10/2020  Appears comfortable  Appreciative of dialing his son for him yesterday. Reports he got to talk to him after a long time and has a lot to reconcile. Stable overnight. No other overnight issues reported. Temp (24hrs), Av.5 °F (36.4 °C), Min:97 °F (36.1 °C), Max:98.1 °F (36.7 °C)    DIET: DIET GENERAL;  CODE: Full Code    Intake/Output Summary (Last 24 hours) at 2020 0854  Last data filed at 2020 0068  Gross per 24 hour   Intake 0 ml   Output 1100 ml   Net -1100 ml       OBJECTIVE:    BP (!) 147/70   Pulse 89   Temp 97 °F (36.1 °C) (Temporal)   Resp 15   SpO2 96%     General appearance: No apparent distress, appears stated age and cooperative. HEENT:  Conjunctivae/corneas clear. Neck: Supple. No jugular venous distention. Respiratory: Clear to auscultation bilaterally, normal respiratory effort  Cardiovascular: Regular rate rhythm, normal S1-S2  Abdomen: Soft, nontender, nondistended  Musculoskeletal: No clubbing, cyanosis, no bilateral lower extremity edema. Brisk capillary refill.   Left lower extremity in dressing appears cachectic  Skin:  No rashes  on visible skin  Neurologic: awake, alert and following commands; appears depressed    ASSESSMENT:    Closed left hip fracture status post hemiarthroplasty 12/10/2020  Recent right hip fracture  Hypertension  Diabetes  Hyperlipidemia  Acute encephalopathy-RRT 12/9/2020  CLEMENTE  Acute Depression  Moderate to severe protein calorie malnutrition       PLAN:    MRI brain wo contrast negative for AMS -discussed with neurology  Psych consult  Serum creatinine improving the right direction. Continue sliding scale insulin with Lantus-reduce long-acting  Psych consult pending  Pain control  Bowel regimen  PT OT  Dietary consult    DISPOSITION: Pending further course. Likely rehab.   PT OT when able    Medications:  REVIEWED DAILY    Infusion Medications    dextrose       Scheduled Medications    sodium chloride flush  10 mL Intravenous 2 times per day    ceFAZolin (ANCEF) IVPB  1 g Intravenous Q8H    aspirin  325 mg Oral BID    dexamethasone  4 mg Intravenous Q12H    docusate sodium  100 mg Oral BID    [Held by provider] glimepiride  4 mg Oral QAM AC    insulin lispro  0-12 Units Subcutaneous TID WC    insulin lispro  0-6 Units Subcutaneous Nightly    insulin glargine  60 Units Subcutaneous Nightly    lamoTRIgine  25 mg Oral Nightly    therapeutic multivitamin-minerals  1 tablet Oral Every Other Day    OLANZapine  7.5 mg Oral Nightly    pantoprazole  40 mg Oral QAM AC    rosuvastatin  10 mg Oral Daily    venlafaxine  75 mg Oral Daily with breakfast     PRN Meds: sodium chloride flush, oxyCODONE-acetaminophen **OR** oxyCODONE-acetaminophen, acetaminophen, ondansetron, glucose, dextrose, glucagon (rDNA), dextrose    Labs:     Recent Labs     12/09/20  0036 12/10/20  1333   WBC 10.1 8.3   HGB 10.0* 11.1*   HCT 28.9* 31.4*    317       Recent Labs     12/09/20  0036 12/10/20  1333 12/11/20  0448    135 125*   K 4.4 5.0 6.2*   CL 97* 102 99   CO2 21* 21* 15*   BUN 25* 48* 48*   CREATININE 1.5* 1.3* 1.2   CALCIUM 8.5* 8.9 8.7   PHOS 4.3  --   --        Recent Labs 12/09/20  0036 12/10/20  1333 12/11/20  0448   PROT 6.2* 6.4 6.1*   ALKPHOS 110 98 97   ALT 21 20 23   AST 29 27 47*   BILITOT 0.6 0.5 0.5       No results for input(s): INR in the last 72 hours. Recent Labs     12/09/20  2231   TROPONINI <0.01       Chronic labs:    Lab Results   Component Value Date    CHOL 114 10/08/2020    TRIG 71 10/08/2020    HDL 41 10/08/2020    LDLCALC 59 10/08/2020    TSH 0.708 12/09/2020    PSA 0.86 09/28/2018    INR 1.1 11/18/2020    LABA1C 9.0 (H) 11/18/2020       Radiology: REVIEWED DAILY    +++++++++++++++++++++++++++++++++++++++++++++++++  Clint [address] [place] [address]  +++++++++++++++++++++++++++++++++++++++++++++++++  NOTE: This report was transcribed using voice recognition software. Every effort was made to ensure accuracy; however, inadvertent computerized transcription errors may be present.

## 2020-12-11 NOTE — CARE COORDINATION
Spoke with [facility]) unable to accept due to not 14 days out of original COVID+. Spoke with son who was agreeable to [facility]. Referral to [facility], they are able to accept clinically but can't accept until next week, but can take at their sister facility [facility] and then transfer pt to [facility] next week after 14 days from original COVID+. Spoke with son who is agreeable. Anticipate discharge over the weekend. Notified [facility] of possible weekend discharge. Call [facility] [phone]([email or web address]) if weekend discharge. Envelope and ambulance form in soft chart. Kartik Davis [facility]

## 2020-12-11 NOTE — PROGRESS NOTES
Occupational Therapy  OCCUPATIONAL THERAPY INITIAL EVALUATION      Date:2020  Patient Name: Abel Mclaughlin  MRN: [id number]  : 1955  Room: [address] [place] [address]    Referring Provider: Corey Valenzuela DO      Evaluating OT: Scarlet Schirmer OTR/L [id number]    AM-PAC Daily Activity Raw Score:     Recommended Adaptive Equipment:  TBD     Diagnosis: Closed L hip fx. S/p fall   Surgery:   12/10/20 L HIP HEMIARTHROPLASTY,  Pertinent Medical History: Right hip hemiarthroplasty for displaced femoral neck fracture, 20, DM, diabetic retinopathy, moderate mood disorder, anxiety   Precautions:  Falls, WBAT R LE, WBAT L LE, anterolateral  hip precautions,   DROPLET PLUS,      Patient admitted from [facility] for therapy for recent hip fx. Assist with ADLs, w/walker for mobilty Prior to R hip fx:  Home Living: Pt lived alone,ranch, 3 steps/rail, Independent      Pain Level: c/o L hip pain and stiffness;   Cognition: alert, oriented  and conversing  Following commands   Memory:  fair   Sequencing:  fair   Problem solving:  Fair    Judgement/safety:  Fair      Functional Assessment:   Initial Eval Status  Date: 20 Treatment Status  Date: STGs = LTGs  Time frame: 10-14 days   Feeding Set-up      Grooming Min A,seated  Decrease standing balance/tolerance   Supervision    UB Dressing Min A  Supervision    LB Dressing Max A  Min A    Bathing Max A   Min A   Toileting Dependent      Bed Mobility  Max A  Supine <> sit      Functional Transfers Mod A  Sit-stand from bed (slightly elevated)  SBA    Functional Mobility Mod A,w/walker   approx 3 side steps next to bed  Assist with balance and walker management   SBA with good tolerance    Balance Sitting:     Static:  SBA- EOB   Standing:  Mod A  Supervision   with good tolerance    Activity Tolerance Fair - with light activity   Patient on room air   No complaints of SOB   Good with ADL activity    Visual/  Perceptual Glasses: none by bedside                 Hand Dominance right    Strength ROM Additional Info:    RUE  4-/5  WFL good  and wfl FMC/dexterity noted during ADL tasks       LUE 4-/5  WFL good  and wfl FMC/dexterity noted during ADL tasks       Hearing: WFL   Sensation:  No c/o numbness or tingling   Tone: WFL     Comments: Upon arrival patient lying in bed . At end of session, patient returned to bed  with call light and phone within reach, all lines and tubes intact. ALARM  ON      Overall patient demonstrated  decreased independence and safety during completion of ADL/functional transfer/mobility tasks. Pt would benefit from continued skilled OT to increase safety and independence with completion of ADL/IADL tasks for functional independence and quality of life.        Assessment of current deficits   Functional mobility [x]  ADLs [x] Strength [x]  Cognition []  Functional transfers  [x] IADLs [x] Safety Awareness [x]  Endurance [x]  Fine Motor Coordination [] Balance [x] Vision/perception [] Sensation []   Gross Motor Coordination [] ROM [] Delirium []                  Motor Control []       Plan of Care: 1-3 days/week for 1-2 weeks PRN   [x]ADL retraining/adapted techniques and AE recommendations to increase functional independence within precautions                    [x]Energy conservation techniques to improve tolerance for selfcare routine   [x]Functional transfer/mobility training/DME recommendations for increased independence, safety and fall prevention         [x]Patient/family education to increase safety and functional independence             [x]Environmental modifications for safe mobility and completion of ADLs                             []Cognitive retraining ex's to improve problem solving skills & safe participation in ADLs/IADLs     []Sensory re-education techniques to improve extremity awareness, maintain skin integrity and improve hand function                             []Visual/Perceptual retraining  to improve body awareness and safety during transfers and ADLs  []Splinting/positioning needs to maintain joint/skin integrity and prevent contractures  [x]Therapeutic activity to improve functional performance during ADLs. [x]Therapeutic exercise to improve tolerance and functional strength for ADLs   [x]Balance retraining/tolerance tasks for facilitation of postural control with dynamic challenges during ADLs . []Neuromuscular re-education: facilitation of righting/equilibrium reactions, midline orientation, scapular stability/mobility, Normalization muscle     tone and facilitation active functional movement/Attention                         []Delirium prevention/treatment    [x]Positioning to improve functional independence  []Other:       Rehab Potential:  Good for established goals     Patient / Family Goal: none stated       Patient and/or family were instructed on functional diagnosis, prognosis/goals and OT plan of care. Demonstrated fair  understanding. Eval Complexity: Low      Time In:1117  Time Out: 1140        Min Units   OT Eval Low 97165  x 1   OT Eval Medium 88976      OT Eval High 25538       OT Re-Eval [id number]       Therapeutic Ex 63104       Therapeutic Activities 14431       ADL/Self Care 21315       Orthotic Management 54607       Neuro Re-Ed 14483       Non-Billable Time          Evaluation Time includes thorough review of current medical information, gathering information on past medical history/social history and prior level of function, completion of standardized testing/informal observation of tasks, assessment of data and education on plan of care and goals.             Flavia Eng OTR/L [id number]

## 2020-12-11 NOTE — PROGRESS NOTES
SPEECH/LANGUAGE PATHOLOGY  SPEECH/LANGUAGE/COGNITIVE EVALUATION      PATIENT NAME:  Soni Kline      :  1955          TODAY'S DATE:  2020 [facility]      SPEECH PATHOLOGY DIAGNOSIS:    Mild-moderate cognitive deficits    THERAPY RECOMMENDATIONS:    Speech Pathology intervention is recommended 3-5 times per week for LOS or when goals are met with emphasis on the following: To improve all areas of memory for functional activities  To improve thought organization   To improve orientation  To improve problem solving   To improve insight               MOTOR SPEECH       Oral Peripheral Examination   Oral motor strength and coordination were within functional limits    Parameters of Speech Production  Respiration:  Within normal limits  Articulation:  Disotortions  Resonance:  Within normal limits  Quality:   Within normal limits  Pitch:     Within normal limits  Intensity: Within normal limits  Fluency:  Within normal limits  Prosody Within normal limits      RECEPTIVE LANGUAGE    Comprehension of Yes/No Questions: Inconsistent    Process  Simple Verbal Commands: Within normal limits  Process Intermediate Verbal Commands: Within normal limits  Process Complex Verbal Commands: incomplete     Comprehension of Conversation: Within normal limits       EXPRESSIVE LANGUAGE     Serials: Within normal limits    Imitation:  Words   Within normal limits  Sentences Within normal limits    Naming:  (Modality used: verbal)  Confrontation Naming  Within normal limits  Functional Description  Within normal limits}  Response Naming: Within normal limits    Conversation:   Within normal limits    COGNITION     Attention/Orientation  Attention: Easily Distracted  Orientation:  Oriented to Person, Place, Reason for hospitalization  Memory   Biographical:  recalled Address, Birthdate, Age and Family  Delayed recall:  recalled 0/3 words    Organization/Problem Solving/Reasoning   Verbal Sequencing: Impaired  Problem solving (verbal): Impaired     CLINICAL OBSERVATIONS NOTED DURING THE EVALUATION  Latent responses and Cueing was required                  Prognosis for improvements is fair +  This plan will be re-evaluated and revised in 1 week  if warranted. Patient stated goals: Agreed with above  Treatment goals discussed with Patient   The Patient understand(s) the diagnosis, prognosis and plan of care       CPT code:    80307  eval speech sound lang comprehension      The admitting diagnosis and active problem list, as listed below have been reviewed prior to initiation of this evaluation.      ADMITTING DIAGNOSIS: Closed left hip fracture, initial encounter ([facility] [S72.002A]  Closed left hip fracture, initial encounter ([facility] [S72.002A]     ACTIVE PROBLEM LIST:   Patient Active Problem List   Diagnosis    Severe episode of recurrent major depressive disorder, without psychotic features ([facility]    Suicidal ideations    SDH (subdural hematoma) (HCC)    Traumatic subdural hemorrhage with loss of consciousness of 30 minutes or less ([address]    Diabetic acidosis without coma (HCC)    Acute kidney injury superimposed on CKD ([facility]    Hyponatremia    Right adrenal mass (HCC)    Hyperkalemia    Major depression, recurrent ([address]    Type 2 diabetes mellitus with complication, with long-term current use of insulin ([address]    HLD (hyperlipidemia)    HTN (hypertension)    Uncontrolled type 2 diabetes mellitus with hyperglycemia (HCC)    SIRS (systemic inflammatory response syndrome) (HCC)    Weight loss    Generalized abdominal pain    Lactic acidosis    Constipation    High anion gap metabolic acidosis    Non compliance w medication regimen    Physical deconditioning    Dementia, vascular (HCC)    Moderate protein-calorie malnutrition (HCC)    TIA (transient ischemic attack)    DKA, type 1, not at goal [facility])    Ileus ([facility]    Adrenal adenoma, right    Hypertensive urgency    Hiccups    Umbilical hernia without obstruction and without gangrene    GERD (gastroesophageal reflux disease)    Depression    Hypotension    Depression, major, recurrent (HCC)    Closed right hip fracture, initial encounter ([facility]    History of right hip hemiarthroplasty    Closed left hip fracture, initial encounter ([facility]

## 2020-12-11 NOTE — ANESTHESIA POSTPROCEDURE EVALUATION
Department of Anesthesiology  Postprocedure Note    Patient: Abdoulaye Ann  MRN: [id number]  YOB: 1955  Date of evaluation: 12/10/2020  Time:  9:02 PM     Procedure Summary     Date:  12/10/20 Room / Location:  [facility] OR 12 / [facility]    Anesthesia Start:  4710 Anesthesia Stop:      Procedure:  HIP HEMIARTHROPLASTY -- HERB -- DROPLET +     PT. COMING FROM [facility] (Left ) Diagnosis:  (FRACTURED LEFT HIP)    Surgeon:  Marlen López MD Responsible Provider:  Lynn Gardner MD    Anesthesia Type:  general ASA Status:  4          Anesthesia Type: general    [place] Phase I:      [place] Phase II:      Last vitals: Reviewed and per EMR flowsheets.        Anesthesia Post Evaluation

## 2020-12-11 NOTE — PROGRESS NOTES
Post op imaging of left hip reviewed. Stable post operative findings. Greater trochanter is intact and lucency mentioned in report is secondary to normal surgical changes during implantation.     Ok to continue with WBAT LLE    Formal post op progress note to follow

## 2020-12-11 NOTE — PLAN OF CARE
Problem: Airway Clearance - Ineffective  Goal: Achieve or maintain patent airway  Outcome: Met This Shift     Problem: Breathing Pattern - Ineffective  Goal: Ability to achieve and maintain a regular respiratory rate  Outcome: Met This Shift     Problem:  Body Temperature -  Risk of, Imbalanced  Goal: Ability to maintain a body temperature within defined limits  Outcome: Met This Shift     Problem: Isolation Precautions - Risk of Spread of Infection  Goal: Prevent transmission of infection  Outcome: Met This Shift

## 2020-12-12 PROBLEM — G93.41 ACUTE METABOLIC ENCEPHALOPATHY: Status: ACTIVE | Noted: 2020-12-12

## 2020-12-12 LAB
ALBUMIN SERPL-MCNC: 2.7 G/DL (ref 3.5–5.2)
ALP BLD-CCNC: 84 U/L (ref 40–129)
ALT SERPL-CCNC: 15 U/L (ref 0–40)
ANION GAP SERPL CALCULATED.3IONS-SCNC: 10 MMOL/L (ref 7–16)
AST SERPL-CCNC: 34 U/L (ref 0–39)
BASOPHILS ABSOLUTE: 0.01 E9/L (ref 0–0.2)
BASOPHILS RELATIVE PERCENT: 0.2 % (ref 0–2)
BILIRUB SERPL-MCNC: 0.4 MG/DL (ref 0–1.2)
BUN BLDV-MCNC: 33 MG/DL (ref 8–23)
C-REACTIVE PROTEIN: 8.7 MG/DL (ref 0–0.4)
CALCIUM SERPL-MCNC: 8.7 MG/DL (ref 8.6–10.2)
CHLORIDE BLD-SCNC: 97 MMOL/L (ref 98–107)
CO2: 22 MMOL/L (ref 22–29)
CREAT SERPL-MCNC: 1.1 MG/DL (ref 0.7–1.2)
D DIMER: 902 NG/ML DDU
EOSINOPHILS ABSOLUTE: 0 E9/L (ref 0.05–0.5)
EOSINOPHILS RELATIVE PERCENT: 0 % (ref 0–6)
FERRITIN: 605 NG/ML
FIBRINOGEN: 693 MG/DL (ref 225–540)
GFR AFRICAN AMERICAN: >60
GFR NON-AFRICAN AMERICAN: >60 ML/MIN/1.73
GLUCOSE BLD-MCNC: 208 MG/DL (ref 74–99)
HCT VFR BLD CALC: 27.8 % (ref 37–54)
HEMOGLOBIN: 9.9 G/DL (ref 12.5–16.5)
IMMATURE GRANULOCYTES #: 0.03 E9/L
IMMATURE GRANULOCYTES %: 0.5 % (ref 0–5)
LAMOTRIGINE LEVEL: <0.9 UG/ML (ref 2.5–15)
LYMPHOCYTES ABSOLUTE: 0.69 E9/L (ref 1.5–4)
LYMPHOCYTES RELATIVE PERCENT: 12.5 % (ref 20–42)
MCH RBC QN AUTO: 25.1 PG (ref 26–35)
MCHC RBC AUTO-ENTMCNC: 35.6 % (ref 32–34.5)
MCV RBC AUTO: 70.4 FL (ref 80–99.9)
METER GLUCOSE: 218 MG/DL (ref 74–99)
METER GLUCOSE: 220 MG/DL (ref 74–99)
METER GLUCOSE: 259 MG/DL (ref 74–99)
METER GLUCOSE: 274 MG/DL (ref 74–99)
MONOCYTES ABSOLUTE: 0.47 E9/L (ref 0.1–0.95)
MONOCYTES RELATIVE PERCENT: 8.5 % (ref 2–12)
NEUTROPHILS ABSOLUTE: 4.33 E9/L (ref 1.8–7.3)
NEUTROPHILS RELATIVE PERCENT: 78.3 % (ref 43–80)
PDW BLD-RTO: 14.5 FL (ref 11.5–15)
PLATELET # BLD: 260 E9/L (ref 130–450)
PMV BLD AUTO: 10.5 FL (ref 7–12)
POTASSIUM REFLEX MAGNESIUM: 4.6 MMOL/L (ref 3.5–5)
RBC # BLD: 3.95 E12/L (ref 3.8–5.8)
SODIUM BLD-SCNC: 129 MMOL/L (ref 132–146)
TOTAL PROTEIN: 6 G/DL (ref 6.4–8.3)
WBC # BLD: 5.5 E9/L (ref 4.5–11.5)

## 2020-12-12 PROCEDURE — 86140 C-REACTIVE PROTEIN: CPT

## 2020-12-12 PROCEDURE — 82962 GLUCOSE BLOOD TEST: CPT

## 2020-12-12 PROCEDURE — 6370000000 HC RX 637 (ALT 250 FOR IP): Performed by: FAMILY MEDICINE

## 2020-12-12 PROCEDURE — 6370000000 HC RX 637 (ALT 250 FOR IP): Performed by: STUDENT IN AN ORGANIZED HEALTH CARE EDUCATION/TRAINING PROGRAM

## 2020-12-12 PROCEDURE — 2060000000 HC ICU INTERMEDIATE R&B

## 2020-12-12 PROCEDURE — 6360000002 HC RX W HCPCS: Performed by: STUDENT IN AN ORGANIZED HEALTH CARE EDUCATION/TRAINING PROGRAM

## 2020-12-12 PROCEDURE — 85025 COMPLETE CBC W/AUTO DIFF WBC: CPT

## 2020-12-12 PROCEDURE — 2580000003 HC RX 258: Performed by: FAMILY MEDICINE

## 2020-12-12 PROCEDURE — 85378 FIBRIN DEGRADE SEMIQUANT: CPT

## 2020-12-12 PROCEDURE — 97530 THERAPEUTIC ACTIVITIES: CPT

## 2020-12-12 PROCEDURE — 82728 ASSAY OF FERRITIN: CPT

## 2020-12-12 PROCEDURE — 85384 FIBRINOGEN ACTIVITY: CPT

## 2020-12-12 PROCEDURE — 2580000003 HC RX 258: Performed by: STUDENT IN AN ORGANIZED HEALTH CARE EDUCATION/TRAINING PROGRAM

## 2020-12-12 PROCEDURE — 80053 COMPREHEN METABOLIC PANEL: CPT

## 2020-12-12 RX ORDER — HYDROCODONE BITARTRATE AND ACETAMINOPHEN 5; 325 MG/1; MG/1
1 TABLET ORAL EVERY 4 HOURS PRN
Qty: 40 TABLET | Refills: 0 | Status: SHIPPED | OUTPATIENT
Start: 2020-12-12 | End: 2020-12-19

## 2020-12-12 RX ADMIN — POLYETHYLENE GLYCOL 3350 17 G: 17 POWDER, FOR SOLUTION ORAL at 09:22

## 2020-12-12 RX ADMIN — SODIUM CHLORIDE: 9 INJECTION, SOLUTION INTRAVENOUS at 23:20

## 2020-12-12 RX ADMIN — SODIUM CHLORIDE: 9 INJECTION, SOLUTION INTRAVENOUS at 15:34

## 2020-12-12 RX ADMIN — DEXAMETHASONE SODIUM PHOSPHATE 4 MG: 4 INJECTION, SOLUTION INTRA-ARTICULAR; INTRALESIONAL; INTRAMUSCULAR; INTRAVENOUS; SOFT TISSUE at 06:22

## 2020-12-12 RX ADMIN — WATER 1 G: 1 INJECTION INTRAMUSCULAR; INTRAVENOUS; SUBCUTANEOUS at 17:46

## 2020-12-12 RX ADMIN — ASPIRIN 325 MG: 325 TABLET, COATED ORAL at 09:21

## 2020-12-12 RX ADMIN — DOCUSATE SODIUM 100 MG: 100 CAPSULE ORAL at 21:39

## 2020-12-12 RX ADMIN — OXYCODONE AND ACETAMINOPHEN 2 TABLET: 5; 325 TABLET ORAL at 09:49

## 2020-12-12 RX ADMIN — LAMOTRIGINE 25 MG: 25 TABLET ORAL at 21:39

## 2020-12-12 RX ADMIN — INSULIN GLARGINE 40 UNITS: 100 INJECTION, SOLUTION SUBCUTANEOUS at 21:00

## 2020-12-12 RX ADMIN — INSULIN LISPRO 6 UNITS: 100 INJECTION, SOLUTION INTRAVENOUS; SUBCUTANEOUS at 11:19

## 2020-12-12 RX ADMIN — OLANZAPINE 7.5 MG: 5 TABLET, FILM COATED ORAL at 21:38

## 2020-12-12 RX ADMIN — DEXAMETHASONE SODIUM PHOSPHATE 4 MG: 4 INJECTION, SOLUTION INTRA-ARTICULAR; INTRALESIONAL; INTRAMUSCULAR; INTRAVENOUS; SOFT TISSUE at 17:47

## 2020-12-12 RX ADMIN — WATER 1 G: 1 INJECTION INTRAMUSCULAR; INTRAVENOUS; SUBCUTANEOUS at 11:16

## 2020-12-12 RX ADMIN — PANTOPRAZOLE SODIUM 40 MG: 40 TABLET, DELAYED RELEASE ORAL at 06:57

## 2020-12-12 RX ADMIN — INSULIN LISPRO 4 UNITS: 100 INJECTION, SOLUTION INTRAVENOUS; SUBCUTANEOUS at 17:42

## 2020-12-12 RX ADMIN — ROSUVASTATIN 10 MG: 10 TABLET, FILM COATED ORAL at 21:39

## 2020-12-12 RX ADMIN — DOCUSATE SODIUM 100 MG: 100 CAPSULE ORAL at 09:22

## 2020-12-12 RX ADMIN — VENLAFAXINE HYDROCHLORIDE 75 MG: 75 CAPSULE, EXTENDED RELEASE ORAL at 11:16

## 2020-12-12 RX ADMIN — ASPIRIN 325 MG: 325 TABLET, COATED ORAL at 23:21

## 2020-12-12 RX ADMIN — INSULIN LISPRO 4 UNITS: 100 INJECTION, SOLUTION INTRAVENOUS; SUBCUTANEOUS at 06:49

## 2020-12-12 NOTE — PROGRESS NOTES
Hospitalist Progress Note      SYNOPSIS: Patient admitted on 2020 for presents to an outside hospital with left hip pain. He said he was getting rehabilitation done for his right hip fracture when he had a mechanical fall and landed on his left hip. Since that time the pain is so severe 10 out of 10 in severity that he cannot ambulate. He presented to the emergency room complaining of such. He denies any head injury, chest pain, palpitations. He states he simply tripped and fell. No syncope no seizures. In the emergency room he was screened for Covid and was positive. Patient status post left hip hemiarthroplasty 12/10/2020    SUBJECTIVE:    Patient seen and examined  Records reviewed. Psych consult noted. Patient was not seen. Recommending continuation of the current medication. Labs appear to be stable  Patient continues to be stable from a Covid standpoint. Hyponatremia is noted. Blood sugars are improved but could be a little bit better. Stable overnight. No other overnight issues reported. Temp (24hrs), Av.1 °F (36.7 °C), Min:97.6 °F (36.4 °C), Max:98.6 °F (37 °C)    DIET: DIET GENERAL;  CODE: Full Code    Intake/Output Summary (Last 24 hours) at 2020 0931  Last data filed at 2020 0502  Gross per 24 hour   Intake 816.67 ml   Output 2025 ml   Net -1208.33 ml       OBJECTIVE:    /72   Pulse 88   Temp 97.6 °F (36.4 °C) (Temporal)   Resp 18   SpO2 96%     General appearance: No apparent distress, appears stated age and cooperative. HEENT:  Conjunctivae/corneas clear. Neck: Supple. No jugular venous distention. Respiratory: Clear to auscultation bilaterally, normal respiratory effort  Cardiovascular: Regular rate rhythm, normal S1-S2  Abdomen: Soft, nontender, nondistended  Musculoskeletal: No clubbing, cyanosis, no bilateral lower extremity edema. Brisk capillary refill.   Left lower extremity in dressing appears cachectic  Skin:  No rashes  on visible CL 99 95* 97*   CO2 15* 22 22   BUN 48* 43* 33*   CREATININE 1.2 1.3* 1.1   CALCIUM 8.7 8.9 8.7       Recent Labs     12/10/20  1333 12/11/20  0448 12/12/20  0510   PROT 6.4 6.1* 6.0*   ALKPHOS 98 97 84   ALT 20 23 15   AST 27 47* 34   BILITOT 0.5 0.5 0.4       No results for input(s): INR in the last 72 hours. Recent Labs     12/09/20  2231   TROPONINI <0.01       Chronic labs:    Lab Results   Component Value Date    CHOL 114 10/08/2020    TRIG 71 10/08/2020    HDL 41 10/08/2020    LDLCALC 59 10/08/2020    TSH 0.708 12/09/2020    PSA 0.86 09/28/2018    INR 1.1 11/18/2020    LABA1C 9.0 (H) 11/18/2020       Radiology: REVIEWED DAILY    +++++++++++++++++++++++++++++++++++++++++++++++++  Clint [address] [place] [address]  +++++++++++++++++++++++++++++++++++++++++++++++++  NOTE: This report was transcribed using voice recognition software. Every effort was made to ensure accuracy; however, inadvertent computerized transcription errors may be present.

## 2020-12-12 NOTE — CONSULTS
Comprehensive Nutrition Assessment    Type and Reason for Visit:  Initial, Consult    Nutrition Recommendations/Plan: Recommend changing diet order to carb control d/t hyperglycemia. Will add Bryan/Glucerna BID to promote optimal PO intake &post-op healing     Nutrition Assessment:  Pt admit s/p fall w/ hip fx & noted recent hip fx PTA. Found to be COVID19+. Now s/p hip darren w/ poor po intake post op. Will add ONS to promote optimal PO intake &healing. Malnutrition Assessment:  Malnutrition Status:  Mild malnutrition    Context:  Chronic Illness     Findings of the 6 clinical characteristics of malnutrition:  Energy Intake:  7 - 75% or less estimated energy requirements for 1 month or longer  Weight Loss:  1 - Mild weight loss (specify amount and time period)(9% wt loss x 6 months)     Body Fat Loss:  Unable to assess(D/t COVID19 iso)     Muscle Mass Loss:  Unable to assess    Fluid Accumulation:  No significant fluid accumulation     Strength:  Not Performed    Estimated Daily Nutrient Needs:  Energy (kcal):  6658-4629; Weight Used for Energy Requirements:  Current     Protein (g):  1.5-1.8= 105-115; Weight Used for Protein Requirements:  Ideal        Fluid (ml/day):  1800ml; Method Used for Fluid Requirements:  1 ml/kcal      Nutrition Related Findings:  -I/os, abd WNL, no edmea noted      Wounds:  Surgical Incision       Current Nutrition Therapies:    DIET GENERAL; Anthropometric Measures:  · Height: 5' 8\" (172.7 cm)  · Current Body Weight: 170 lb (77.1 kg)   · Usual Body Weight: 187 lb (84.8 kg)(5/2020 per EMR)     · Ideal Body Weight: 154 lbs; % Ideal Body Weight 110.4 %   · BMI: 25.9  · BMI Categories: Overweight (BMI 25.0-29. 9)       Nutrition Diagnosis:   · Mild malnutrition, In context of acute illness or injury related to increase demand for energy/nutrients as evidenced by weight loss, poor intake prior to admission      Nutrition Interventions:   Food and/or Nutrient Delivery:  Continue Current Diet, Start Oral Nutrition Supplement  Nutrition Education/Counseling:  No recommendation at this time   Coordination of Nutrition Care:  Continue to monitor while inpatient    Goals:  >50 % of meals/ONS       Nutrition Monitoring and Evaluation:   Food/Nutrient Intake Outcomes:  Food and Nutrient Intake, Supplement Intake  Physical Signs/Symptoms Outcomes:  Biochemical Data, GI Status, Fluid Status or Edema, Hemodynamic Status, Nutrition Focused Physical Findings, Skin, Weight     Discharge Planning:     Too soon to determine     Electronically signed by Tami Last RD, LD on 12/12/20 at 11:48 AM EST    Contact: x [phone]

## 2020-12-12 NOTE — PLAN OF CARE
Amber Velasco is a 63 y.o. male    Neurology is following for history of SDH, prior stroke, and seizure    PMH: Diabetes, HLD, HTN, 3rd nerve palsy,     He presented to [facility] with traumatic left hip fx---recently fractured right hip and has been in rehab following surgery. Tested COVID-19 positive. Patient was RRT on 12/9 with somnolence and difficult to arouse. Intermittently hypotensive and required O2 at times. CT of the head unrevealing. Now status post left ORIF 12/10    Patient not examined face to face d/t Covid-19 infection, to reduce the risk of exposure and preserve PPE for direct care staff. Extensive chart review including progress notes, labs, and diagnostic testing was completed and current patient progress discussed with bedside staff. HPI:    Progress notes reviewed in detail. For primary today he was awake and oriented, but appeared depressed. For physical therapy this afternoon, he was documented as alert and oriented x4 and following directions. EEG was normal.  He is still hyponatremic but otherwise medically stable and doing well postoperatively.     Objective:    /76   Pulse 88   Temp 97.3 °F (36.3 °C) (Tympanic)   Resp 18   Ht 5' 8\" (1.727 m)   SpO2 95%   BMI 25.89 kg/m²       Lab Results   Component Value Date     (L) 12/12/2020    K 4.6 12/12/2020    CL 97 (L) 12/12/2020    CO2 22 12/12/2020    BUN 33 (H) 12/12/2020    CREATININE 1.1 12/12/2020    GLUCOSE 208 (H) 12/12/2020    CALCIUM 8.7 12/12/2020    PROT 6.0 (L) 12/12/2020    LABALBU 2.7 (L) 12/12/2020    BILITOT 0.4 12/12/2020    ALKPHOS 84 12/12/2020    AST 34 12/12/2020    ALT 15 12/12/2020    LABGLOM >60 12/12/2020    GFRAA >60 12/12/2020     Lab Results   Component Value Date    WBC 5.5 12/12/2020    HGB 9.9 (L) 12/12/2020    HCT 27.8 (L) 12/12/2020    MCV 70.4 (L) 12/12/2020     12/12/2020    LYMPHOPCT 12.5 (L) 12/12/2020    RBC 3.95 12/12/2020    MCH 25.1 (L) 12/12/2020    MCHC 35.6 (H) 12/12/2020    RDW 14.5 12/12/2020     MRI of the brain: No acute events    EEG: normal     Impression:    Altered mental status: suspect metabolic encephalopathy in the setting L hip fx, CLEMENTE, hypoxia, and severe depression. No vascular events on MRI or seizures on EEG. Per documentation, patient is alert and oriented.     Plan:    Neuro signing off--call with new issues     No need for OP follow up    Meghann MEYERS-ALESSANDRA  5:15 PM

## 2020-12-12 NOTE — PROGRESS NOTES
Department of Orthopedic Surgery  Resident Progress Note    Patient seen and examined. Pain controlled to left hip. No new complaints. Denies numbness, tingling, paraesthesias. States pain has improved about left hip      VITALS:  /77   Pulse 83   Temp 98.6 °F (37 °C) (Temporal)   Resp 18   SpO2 96%     General: alert and oriented    MUSCULOSKELETAL:   left lower extremity:  · Dressing C/D/I  · Compartments soft and compressible  · +PF/DF/EHL  · +2/4 DP & PT pulses, Brisk Cap refill, Toes warm and perfused  · Distal sensation grossly intact to Peroneals, Sural, Saphenous, and tibial nrs    CBC:   Lab Results   Component Value Date    WBC 5.5 12/12/2020    HGB 9.9 12/12/2020    HCT 27.8 12/12/2020     12/12/2020     PT/INR:    Lab Results   Component Value Date    PROTIME 11.9 11/18/2020    INR 1.1 11/18/2020       ASSESSMENT  · S/P L HHA 12/11    PLAN    · Weight bearing as tolerated LLE  · Deep venous thrombosis prophylaxis - ASA, PCD's, early mobilization  · 24 hour abx- completed  · PT/OT- early mobilization  · Pain Control: PO  · Trend H&H  · Monitor vitals  · Remove staples on right hip  · Ortho to follow peripherally at this point.  Please contact with any questions or concerns

## 2020-12-12 NOTE — PROGRESS NOTES
Physical Therapy  Physical Therapy Treatment    Name: Mariam Weaver  : 1955  MRN: [id number]    Referring Provider:  Massimo Mendoza DO    Date of Service: 2020    Evaluating PT:  Fermin Santiago PT   Room #:  6959/8017-X  Diagnosis: Closed left hip fracture, initial encounter [facility]) [S72.002A]  Closed left hip fracture, initial encounter ([facility] [address] Martha Marquez     PMHx/PSHx:   has a past medical history of Anxiety, Bronchitis, Cellulitis, Chronic sinusitis, Depression, Diabetes mellitus ([facility] [address] Diabetic retinopathy ([facility] [address] Fracture of left foot, High cholesterol, Hypercholesteremia, Hypercholesterolemia, Hypertension, Lumbago, Moderate mood disorder ([facility] [address] and Third nerve palsy. has a past surgical history that includes eye surgery; hip surgery (Right, 2020); and hip surgery (Left, 12/10/2020). Procedure/Surgery:  12/10/20 L HIP HEMIARTHROPLASTY, Right hip hemiarthroplasty for displaced femoral neck fracture, 20   Precautions:  Falls, WBAT LLE, WBAT RLE, Anterior hip precautions, COVID +    Equipment Needs: To be determined      SUBJECTIVE:    Patient lives alone  in a ranch home  with 3 steps to enter without Rail  Bed is on 1 floor and bath is on 1 floor. Patient ambulated independently  PTA. Equipment owned: None,    OBJECTIVE:   Initial Evaluation  Date: 20 Treatment  20 Short Term/ Long Term   Goals   AM-PAC 6 Clicks 46/47 87/57    Was pt agreeable to Eval/treatment? yes yes    Does pt have pain? Yes L hip Reports L hip \"stiffness\"    Bed Mobility  Rolling: NT  Supine to sit: Max manage LLE  Sit to supine: Max  Scooting: Max Rolling: NT  Supine to sit: Max A  Sit to supine: Max A  Scooting: Max A Rolling: Ind  Supine to sit: Ind  Sit to supine: Ind  Scooting: Ind   Transfers Sit to stand: Max to fww  Stand to sit: mod  Stand pivot: NT Sit to stand: Max A  Stand to sit: Min A  Stand pivot: Min A with [employer]  Sit to stand: Mod Ind  Stand to sit: Mod Ind  Stand pivot:  Mod Ind Ambulation    No steps taken this date. 25 feet x2 reps with [employer] Min A 50 feet with fww MOd Ind   Stair negotiation: ascended and descended  NT  3 steps with 1 rail Min A   ROM BUE:  See OT eval  BLE:  wfl     Strength BUE: See OT eval   RLE:  4/5  LLE:   3+/5 discomfort L hip.   4+/5   Balance Sitting EOB:  SBA  Dynamic Standing: Max A Sitting EOB:  SBA  Dynamic Standing:  Min A with [employer] Sitting EOB:  Ind  Dynamic Standing: Mod Ind     Patient is Alert & Oriented x person, place, time and situation and follows directions   Sensation:  Pt denies numbness and tingling to extremities  Edema:  LLe. Therapeutic Exercises:  AROM completed for BLE prior to functional mobility. Patient education  Patient educated on following WB precautions, following hip precautions, [employer] usage, and gait training     Patient response to education:   Pt verbalized understanding Pt demonstrated skill Pt requires further education in this area   yes yes reminders     ASSESSMENT:    Comments:  Patient was seen this date for PT treatment. Patient was agreeable to intervention. Pt doing better today with ambulation. Still requires heavy assistance during bed mobility, monstly to assist with LLE. Requires increased assistance during sit>stand but once on feet able to hold himself up using [employer]. Initially very stiff with ambulation and noted L hip/knee flexion. But with ambulation around room pt loosened up and gait smoothened out. Pt still demonstrates slow, antalgic gait but able to ambulate with fair step length and steadiness around bed. Requires hands on assist for safety. Sat at EOB to rest.  Ambulated back around bed. Assisted back to bed. Placed in chair position. Call button in reach and needs met. Bed alarm on.     Treatment:  Patient practiced and was instructed in the following treatment:    · Bed mobility training - pt given verbal and tactile cues to facilitate proper sequencing and safety during rolling and supine>sit as well as provided with physical assistance to complete task    · Assistive device training - pt educated on using [employer] during gait, [employer] approximation/negotiation, and hand placement during sit<>stand to [employer]  · STS and transfer training - educated on hand/foot placement, safety, and sequencing during STS and pivot transfers using assistive device  · Gait training - verbal cues for [employer] approximation/negotiation, upright posture, and safety during 90 and 180 degree turns during gait     PLAN OF CARE:  Pt is making fair progress towards established goals. Continue PT POC.        Time in  0915  Time out  0945    Total Treatment Time  30 minutes         CPT codes:  [] Low Complexity PT evaluation 11834  [] Moderate Complexity PT evaluation 28458  [] High Complexity PT evaluation 49011  [] PT Re-evaluation 20321  [] Gait training 87141 - minutes  [] Manual therapy 06444 - minutes  [x] Therapeutic activities 12300 30 minutes  [] Therapeutic exercises 01455 - minutes  [] Neuromuscular reeducation 74218 - minutes       Kandi Man, PT, DPT  [id number]

## 2020-12-12 NOTE — PLAN OF CARE
Problem: Airway Clearance - Ineffective  Goal: Achieve or maintain patent airway  Outcome: Met This Shift     Problem: Gas Exchange - Impaired  Goal: Absence of hypoxia  Outcome: Met This Shift  Goal: Promote optimal lung function  Outcome: Met This Shift     Problem: Breathing Pattern - Ineffective  Goal: Ability to achieve and maintain a regular respiratory rate  Outcome: Met This Shift     Problem:  Body Temperature -  Risk of, Imbalanced  Goal: Ability to maintain a body temperature within defined limits  Outcome: Met This Shift  Goal: Will regain or maintain usual level of consciousness  Outcome: Met This Shift  Goal: Complications related to the disease process, condition or treatment will be avoided or minimized  Outcome: Met This Shift     Problem: Isolation Precautions - Risk of Spread of Infection  Goal: Prevent transmission of infection  Outcome: Met This Shift     Problem: Nutrition Deficits  Goal: Optimize nutrtional status  Outcome: Met This Shift     Problem: Risk for Fluid Volume Deficit  Goal: Maintain normal heart rhythm  Outcome: Met This Shift  Goal: Maintain absence of muscle cramping  Outcome: Met This Shift  Goal: Maintain normal serum potassium, sodium, calcium, phosphorus, and pH  Outcome: Met This Shift     Problem: Loneliness or Risk for Loneliness  Goal: Demonstrate positive use of time alone when socialization is not possible  Outcome: Met This Shift     Problem: Fatigue  Goal: Verbalize increase energy and improved vitality  Outcome: Met This Shift     Problem: Patient Education: Go to Patient Education Activity  Goal: Patient/Family Education  Outcome: Met This Shift     Problem: Falls - Risk of:  Goal: Will remain free from falls  Description: Will remain free from falls  Outcome: Met This Shift  Goal: Absence of physical injury  Description: Absence of physical injury  Outcome: Met This Shift     Problem: Skin Integrity:  Goal: Will show no infection signs and symptoms  Description: Will show no infection signs and symptoms  Outcome: Met This Shift  Goal: Absence of new skin breakdown  Description: Absence of new skin breakdown  Outcome: Met This Shift     Problem: Pain:  Goal: Pain level will decrease  Description: Pain level will decrease  Outcome: Met This Shift  Goal: Control of acute pain  Description: Control of acute pain  Outcome: Met This Shift  Goal: Control of chronic pain  Description: Control of chronic pain  Outcome: Met This Shift

## 2020-12-12 NOTE — PLAN OF CARE
Problem: Airway Clearance - Ineffective  Goal: Achieve or maintain patent airway  12/12/2020 1011 by Margaret Quinteros RN  Outcome: Met This Shift  12/12/2020 0552 by Bravo Doyle RN  Outcome: Met This Shift     Problem: Gas Exchange - Impaired  Goal: Absence of hypoxia  12/12/2020 0552 by Bravo Doyle RN  Outcome: Met This Shift  Goal: Promote optimal lung function  12/12/2020 0552 by Bravo Doyle RN  Outcome: Met This Shift     Problem: Breathing Pattern - Ineffective  Goal: Ability to achieve and maintain a regular respiratory rate  12/12/2020 1011 by Margaret Quinteros RN  Outcome: Met This Shift  12/12/2020 0552 by Bravo Doyle RN  Outcome: Met This Shift     Problem:  Body Temperature -  Risk of, Imbalanced  Goal: Ability to maintain a body temperature within defined limits  12/12/2020 1011 by Margaret Quinteros RN  Outcome: Met This Shift  12/12/2020 0552 by Bravo Doyle RN  Outcome: Met This Shift  Goal: Will regain or maintain usual level of consciousness  12/12/2020 0552 by Bravo Doyle RN  Outcome: Met This Shift  Goal: Complications related to the disease process, condition or treatment will be avoided or minimized  12/12/2020 0552 by Bravo Doyle RN  Outcome: Met This Shift     Problem: Isolation Precautions - Risk of Spread of Infection  Goal: Prevent transmission of infection  12/12/2020 1011 by Margaret Quinteros RN  Outcome: Met This Shift  12/12/2020 0552 by Bravo Doyle RN  Outcome: Met This Shift     Problem: Nutrition Deficits  Goal: Optimize nutrtional status  12/12/2020 0552 by Bravo Doyle RN  Outcome: Met This Shift     Problem: Risk for Fluid Volume Deficit  Goal: Maintain normal heart rhythm  12/12/2020 0552 by Bravo Doyle RN  Outcome: Met This Shift  Goal: Maintain absence of muscle cramping  12/12/2020 0552 by Bravo Doyle RN  Outcome: Met This Shift  Goal: Maintain normal serum potassium, sodium, calcium, phosphorus, and pH  12/12/2020 0552 by Bravo Doyle RN  Outcome: Met This Shift     Problem: Loneliness or Risk for Loneliness  Goal: Demonstrate positive use of time alone when socialization is not possible  12/12/2020 0552 by Bravo Doyle RN  Outcome: Met This Shift     Problem: Fatigue  Goal: Verbalize increase energy and improved vitality  12/12/2020 0552 by Bravo Doyle RN  Outcome: Met This Shift     Problem: Patient Education: Go to Patient Education Activity  Goal: Patient/Family Education  12/12/2020 0552 by Bravo Doyle RN  Outcome: Met This Shift     Problem: Falls - Risk of:  Goal: Will remain free from falls  Description: Will remain free from falls  12/12/2020 0552 by Bravo Doyle RN  Outcome: Met This Shift  Goal: Absence of physical injury  Description: Absence of physical injury  12/12/2020 0552 by Bravo Doyle RN  Outcome: Met This Shift     Problem: Skin Integrity:  Goal: Will show no infection signs and symptoms  Description: Will show no infection signs and symptoms  12/12/2020 1011 by Margaret Quinteros RN  Outcome: Met This Shift  12/12/2020 0552 by Bravo Doyle RN  Outcome: Met This Shift  Goal: Absence of new skin breakdown  Description: Absence of new skin breakdown  12/12/2020 1011 by Margaret Quinteros RN  Outcome: Met This Shift  12/12/2020 0552 by Bravo Doyle RN  Outcome: Met This Shift     Problem: Pain:  Goal: Pain level will decrease  Description: Pain level will decrease  12/12/2020 1011 by Margaret Quinteros RN  Outcome: Met This Shift  12/12/2020 0552 by Bravo Doyle RN  Outcome: Met This Shift  Goal: Control of acute pain  Description: Control of acute pain  12/12/2020 1011 by Margaret Quinteros RN  Outcome: Met This Shift  12/12/2020 0552 by Bravo Doyle RN  Outcome: Met This Shift  Goal: Control of chronic pain  Description: Control of chronic pain  12/12/2020 1011 by Margaret Quinteros RN  Outcome: Met This Shift  12/12/2020 0552 by Bravo Doyle RN  Outcome: Met This Shift

## 2020-12-13 VITALS
SYSTOLIC BLOOD PRESSURE: 124 MMHG | HEIGHT: 68 IN | BODY MASS INDEX: 25.89 KG/M2 | OXYGEN SATURATION: 94 % | DIASTOLIC BLOOD PRESSURE: 80 MMHG | RESPIRATION RATE: 20 BRPM | TEMPERATURE: 98.8 F | HEART RATE: 88 BPM

## 2020-12-13 LAB
ALBUMIN SERPL-MCNC: 2.4 G/DL (ref 3.5–5.2)
ALP BLD-CCNC: 76 U/L (ref 40–129)
ALT SERPL-CCNC: 13 U/L (ref 0–40)
ANION GAP SERPL CALCULATED.3IONS-SCNC: 9 MMOL/L (ref 7–16)
AST SERPL-CCNC: 30 U/L (ref 0–39)
BASOPHILS ABSOLUTE: 0.01 E9/L (ref 0–0.2)
BASOPHILS RELATIVE PERCENT: 0.2 % (ref 0–2)
BILIRUB SERPL-MCNC: 0.4 MG/DL (ref 0–1.2)
BUN BLDV-MCNC: 20 MG/DL (ref 8–23)
C-REACTIVE PROTEIN: 8.6 MG/DL (ref 0–0.4)
CALCIUM SERPL-MCNC: 8.6 MG/DL (ref 8.6–10.2)
CHLORIDE BLD-SCNC: 100 MMOL/L (ref 98–107)
CO2: 20 MMOL/L (ref 22–29)
CREAT SERPL-MCNC: 1 MG/DL (ref 0.7–1.2)
D DIMER: 733 NG/ML DDU
EOSINOPHILS ABSOLUTE: 0 E9/L (ref 0.05–0.5)
EOSINOPHILS RELATIVE PERCENT: 0 % (ref 0–6)
FERRITIN: 517 NG/ML
FIBRINOGEN: >700 MG/DL (ref 225–540)
GFR AFRICAN AMERICAN: >60
GFR NON-AFRICAN AMERICAN: >60 ML/MIN/1.73
GLUCOSE BLD-MCNC: 168 MG/DL (ref 74–99)
HCT VFR BLD CALC: 28 % (ref 37–54)
HEMOGLOBIN: 9.5 G/DL (ref 12.5–16.5)
IMMATURE GRANULOCYTES #: 0.04 E9/L
IMMATURE GRANULOCYTES %: 0.9 % (ref 0–5)
LYMPHOCYTES ABSOLUTE: 0.72 E9/L (ref 1.5–4)
LYMPHOCYTES RELATIVE PERCENT: 15.3 % (ref 20–42)
MCH RBC QN AUTO: 24.2 PG (ref 26–35)
MCHC RBC AUTO-ENTMCNC: 33.9 % (ref 32–34.5)
MCV RBC AUTO: 71.4 FL (ref 80–99.9)
METER GLUCOSE: 178 MG/DL (ref 74–99)
METER GLUCOSE: 179 MG/DL (ref 74–99)
METER GLUCOSE: 193 MG/DL (ref 74–99)
MONOCYTES ABSOLUTE: 0.32 E9/L (ref 0.1–0.95)
MONOCYTES RELATIVE PERCENT: 6.8 % (ref 2–12)
NEUTROPHILS ABSOLUTE: 3.61 E9/L (ref 1.8–7.3)
NEUTROPHILS RELATIVE PERCENT: 76.8 % (ref 43–80)
PDW BLD-RTO: 14.6 FL (ref 11.5–15)
PLATELET # BLD: 219 E9/L (ref 130–450)
PMV BLD AUTO: 10.5 FL (ref 7–12)
POTASSIUM REFLEX MAGNESIUM: 4.7 MMOL/L (ref 3.5–5)
RBC # BLD: 3.92 E12/L (ref 3.8–5.8)
SODIUM BLD-SCNC: 129 MMOL/L (ref 132–146)
TOTAL PROTEIN: 5.7 G/DL (ref 6.4–8.3)
WBC # BLD: 4.7 E9/L (ref 4.5–11.5)

## 2020-12-13 PROCEDURE — 6370000000 HC RX 637 (ALT 250 FOR IP): Performed by: STUDENT IN AN ORGANIZED HEALTH CARE EDUCATION/TRAINING PROGRAM

## 2020-12-13 PROCEDURE — 80053 COMPREHEN METABOLIC PANEL: CPT

## 2020-12-13 PROCEDURE — 85384 FIBRINOGEN ACTIVITY: CPT

## 2020-12-13 PROCEDURE — 85025 COMPLETE CBC W/AUTO DIFF WBC: CPT

## 2020-12-13 PROCEDURE — 36415 COLL VENOUS BLD VENIPUNCTURE: CPT

## 2020-12-13 PROCEDURE — 6360000002 HC RX W HCPCS: Performed by: STUDENT IN AN ORGANIZED HEALTH CARE EDUCATION/TRAINING PROGRAM

## 2020-12-13 PROCEDURE — 2580000003 HC RX 258: Performed by: STUDENT IN AN ORGANIZED HEALTH CARE EDUCATION/TRAINING PROGRAM

## 2020-12-13 PROCEDURE — 82962 GLUCOSE BLOOD TEST: CPT

## 2020-12-13 PROCEDURE — 85378 FIBRIN DEGRADE SEMIQUANT: CPT

## 2020-12-13 PROCEDURE — 82728 ASSAY OF FERRITIN: CPT

## 2020-12-13 PROCEDURE — 6370000000 HC RX 637 (ALT 250 FOR IP): Performed by: FAMILY MEDICINE

## 2020-12-13 PROCEDURE — 86140 C-REACTIVE PROTEIN: CPT

## 2020-12-13 PROCEDURE — 2580000003 HC RX 258: Performed by: FAMILY MEDICINE

## 2020-12-13 RX ADMIN — WATER 1 G: 1 INJECTION INTRAMUSCULAR; INTRAVENOUS; SUBCUTANEOUS at 12:13

## 2020-12-13 RX ADMIN — PANTOPRAZOLE SODIUM 40 MG: 40 TABLET, DELAYED RELEASE ORAL at 05:09

## 2020-12-13 RX ADMIN — INSULIN LISPRO 2 UNITS: 100 INJECTION, SOLUTION INTRAVENOUS; SUBCUTANEOUS at 06:19

## 2020-12-13 RX ADMIN — DEXAMETHASONE SODIUM PHOSPHATE 4 MG: 4 INJECTION, SOLUTION INTRA-ARTICULAR; INTRALESIONAL; INTRAMUSCULAR; INTRAVENOUS; SOFT TISSUE at 05:09

## 2020-12-13 RX ADMIN — SODIUM CHLORIDE: 9 INJECTION, SOLUTION INTRAVENOUS at 10:49

## 2020-12-13 RX ADMIN — VENLAFAXINE HYDROCHLORIDE 75 MG: 75 CAPSULE, EXTENDED RELEASE ORAL at 10:54

## 2020-12-13 RX ADMIN — WATER 1 G: 1 INJECTION INTRAMUSCULAR; INTRAVENOUS; SUBCUTANEOUS at 05:09

## 2020-12-13 RX ADMIN — ASPIRIN 325 MG: 325 TABLET, COATED ORAL at 10:54

## 2020-12-13 RX ADMIN — DOCUSATE SODIUM 100 MG: 100 CAPSULE ORAL at 10:53

## 2020-12-13 RX ADMIN — POLYETHYLENE GLYCOL 3350 17 G: 17 POWDER, FOR SOLUTION ORAL at 10:54

## 2020-12-13 RX ADMIN — Medication 1 TABLET: at 10:54

## 2020-12-13 NOTE — DISCHARGE INSTR - COC
Continuity of Care Form    Patient Name: Charly Spence   :  1955  MRN:  [id number]    Admit date:  2020  Discharge date:  ***    Code Status Order: Full Code   Advance Directives:     Admitting Physician:  Angel Patterson MD  PCP: King Garzon MD    Discharging Nurse: [facility] Unit/Room#: 4240/6822-J  Discharging Unit Phone Number: ***    Emergency Contact:   Extended Emergency Contact Information  Primary Emergency Contact: [address] [facility] of [address] [place] [address] Phone: [phone]  Relation: Child    Past Surgical History:  Past Surgical History:   Procedure Laterality Date    EYE SURGERY      HIP SURGERY Right 2020    HIP HEMIARTHROPLASTY performed by Joshua Yañez MD at [address] [place] [address] Left 12/10/2020    HIP HEMIARTHROPLASTY -- HERB -- DROPLET +     PT. COMING FROM [place] performed by Roque Hickman MD at [address] [place] [address]       Immunization History: There is no immunization history for the selected administration types on file for this patient. Active Problems:  Patient Active Problem List   Diagnosis Code    Severe episode of recurrent major depressive disorder, without psychotic features ([facility] F33.2    Suicidal ideations R45.851    SDH (subdural hematoma) ([facility]) S06.5X9A    Traumatic subdural hemorrhage with loss of consciousness of 30 minutes or less ([address] S06. 8X1A    Diabetic acidosis without coma (HCC) E11.10    Acute kidney injury superimposed on CKD ([facility] N17.9, N18.9    Hyponatremia E87.1    Right adrenal mass (HCC) E27.8    Hyperkalemia E87.5    Major depression, recurrent (HCC) F33.9    Type 2 diabetes mellitus with complication, with long-term current use of insulin (HCC) E11.8, Z79.4    HLD (hyperlipidemia) E78.5    HTN (hypertension) I10    Uncontrolled type 2 diabetes mellitus with hyperglycemia (HCC) E11.65    SIRS (systemic inflammatory response syndrome) (HCC) R65.10    Weight loss R63.4    Generalized abdominal pain R10.84    Lactic acidosis E87.2    Constipation K59.00    High anion gap metabolic acidosis V24.1    Non compliance w medication regimen Z91.14    Physical deconditioning R53.81    Dementia, vascular (HCC) F01.50    Moderate protein-calorie malnutrition (HCC) E44.0    TIA (transient ischemic attack) G45.9    DKA, type 1, not at goal [facility]) E10.10    Ileus ([facility] K56.7    Adrenal adenoma, right D35.01    Hypertensive urgency I16.0    Hiccups G73.8    Umbilical hernia without obstruction and without gangrene K42.9    GERD (gastroesophageal reflux disease) K21.9    Depression F32.9    Hypotension I95.9    Depression, major, recurrent (HCC) F33.9    Closed right hip fracture, initial encounter ([facility] S72.001A    History of right hip hemiarthroplasty Z96.641    Closed left hip fracture, initial encounter ([facility] S72.002A    Acute metabolic encephalopathy P88.18       Isolation/Infection:   Isolation          Droplet Plus        Patient Infection Status     Infection Onset Added Last Indicated Last Indicated By Review Planned Expiration Resolved Resolved By    COVID-19 12/07/20 12/07/20 12/07/20 Respiratory Panel, Molecular, with COVID-19 (Restricted: peds pts or suitable admitted adults) 12/14/20 12/21/20      Resolved    COVID-19 Rule Out 12/07/20 12/07/20 12/07/20 Respiratory Panel, Molecular, with COVID-19 (Restricted: peds pts or suitable admitted adults) (Ordered)   12/07/20 Rule-Out Test Resulted    COVID-19 Rule Out 11/19/20 11/19/20 11/19/20 Covid-19 Ambulatory (Ordered)   11/20/20 Rule-Out Test Resulted    COVID-19 Rule Out 10/06/20 10/06/20 10/06/20 COVID-19 (Ordered)   10/06/20 Rule-Out Test Resulted    C-diff Rule Out 05/04/20 05/04/20 05/04/20 Clostridium difficile EIA (Ordered)   08/21/20 Duyen Coleman RN          Nurse Assessment:  Last Vital Signs: /80   Pulse 88   Temp 98.8 °F (37.1 °C) (Temporal)   Resp 20   Ht 5' 8\" (1.727 m)   SpO2 94%   BMI 25.89 kg/m² Last documented pain score (0-10 scale): Pain Level: 0  Last Weight:   Wt Readings from Last 1 Encounters:   20 170 lb 4.8 oz (77.2 kg)     Mental Status:  alert    IV Access:  - None    Nursing Mobility/ADLs:  Walking   Assisted  Transfer  Assisted  Bathing  Assisted  Dressing  Assisted  Toileting  Assisted  Feeding  Independent  Med Admin  Dependent  Med Delivery   whole    Wound Care Documentation and Therapy:        Elimination:  Continence:   · Bowel: No  · Bladder: No  Urinary Catheter: Insertion Date: ***   Colostomy/Ileostomy/Ileal Conduit: No       Date of Last BM: ***    Intake/Output Summary (Last 24 hours) at 2020 1704  Last data filed at 2020 1549  Gross per 24 hour   Intake 1380 ml   Output 2625 ml   Net -1245 ml     I/O last 3 completed shifts: In: 9847 [P.O.:420; I.V.:3157]  Out:  [CUEX]    Safety Concerns: At Risk for Falls    Impairments/Disabilities:      None    Nutrition Therapy:  Current Nutrition Therapy:   - Oral Diet:  General    Routes of Feeding: Oral  Liquids: Thin Liquids  Daily Fluid Restriction: no  Last Modified Barium Swallow with Video (Video Swallowing Test): not done    Treatments at the Time of Hospital Discharge:   Respiratory Treatments: ***  Oxygen Therapy:  is not on home oxygen therapy.   Ventilator:    - No ventilator support    Rehab Therapies: Physical Therapy, Occupational Therapy and Speech/Language Therapy  Weight Bearing Status/Restrictions: No weight bearing restirctions  Other Medical Equipment (for information only, NOT a DME order):  wheelchair  Other Treatments: ***    Patient's personal belongings (please select all that are sent with patient):  None    RN SIGNATURE:  Electronically signed by Doe De Oliveira RN on 20 at 5:06 PM EST    CASE MANAGEMENT/SOCIAL WORK SECTION    Inpatient Status Date: ***    Readmission Risk Assessment Score:  Readmission Risk              Risk of Unplanned Readmission:        32

## 2020-12-13 NOTE — DISCHARGE SUMMARY
Hospitalist Discharge Summary    Patient ID: Escobar Figueroa   Patient : 1955  Patient's PCP: Akbar Canada MD    Admit Date: 2020   Admitting Physician: Liam Leiva DO    Discharge Date:  2020   Discharge Physician: Delmar Gunter MD   Discharge Condition: Stable  Discharge Disposition: [facility] course in brief:  (Please refer to daily progress notes for a comprehensive review of the hospitalization by requesting medical records)    Briefly this is a 73-year-old male who presented on 2020 with complaints of left hip pain. Patient was a transfer from outside hospital where he was diagnosed with right hip fracture. Transferred to [facility] for orthopedic intervention. Patient was Covid positive. Admitted for further evaluation and management. On 2020 patient had a adverse event of encephalopathy at 1 AM.  Neurology was consulted for evaluation. Work-up was unrevealing. EEG was a negative. MRI brain was also negative for acute events. Patient eventually had a hip hemiarthroplasty on 12/10/2020 by Dr. Jayme Seth and Vinny Lei supervised by Dr. Jason Rae. Postop course was unremarkable. Patient was discharged to [facility] in stable condition.       Consults:   IP CONSULT TO ORTHOPEDIC SURGERY  IP CONSULT TO NEUROLOGY  IP CONSULT TO PSYCHIATRY  IP CONSULT TO CASE MANAGEMENT  IP CONSULT TO DIETITIAN    Discharge Diagnoses:    Closed left hip fracture status post hemiarthroplasty 12/10/2020  Recent right hip fracture  Hypertension  Diabetes  Hyperlipidemia  Acute encephalopathy-RRT 2020  CLEMENTE  Acute Depression  Moderate to severe protein calorie malnutrition    Discharge Instructions / Follow up:    Future Appointments   Date Time Provider Marlena Mas   2020 12:45 PM SCHEDULE, SE ORTHO [facility]       Continued appropriate risk factor modification of blood pressure, diabetes and serum lipids Reason for exam:->hip fx per nursing home FINDINGS: There is a displaced left femoral neck fracture. The bones otherwise appear intact. No evidence of dislocation. There is evidence of calcific atherosclerosis. Mild degenerative changes are present. Displaced left femoral neck fracture. Xr Femur Right (min 2 Views)    Result Date: 11/18/2020  EXAMINATION: 4 XRAY VIEWS OF THE RIGHT FEMUR; ONE XRAY VIEW OF THE PELVIS AND TWO XRAY VIEWS RIGHT HIP 11/18/2020 4:07 pm COMPARISON: None. HISTORY: ORDERING SYSTEM PROVIDED HISTORY: Pain TECHNOLOGIST PROVIDED HISTORY: Reason for exam:->Pain What reading provider will be dictating this exam?->CRC FINDINGS: There is a transcervical fracture through the right femoral neck. The femoral head maintains articulation with the acetabulum. The remainder of the right femur is intact. The pelvic bones are intact, without fracture or focal lesion. The sacroiliac joints and the pubic symphysis are unremarkable. Severe degenerative changes are seen in the right knee. Right femoral neck fracture. Ct Head Wo Contrast    Result Date: 12/9/2020  EXAMINATION: CT OF THE HEAD WITHOUT CONTRAST  12/9/2020 12:46 am TECHNIQUE: CT of the head was performed without the administration of intravenous contrast. Dose modulation, iterative reconstruction, and/or weight based adjustment of the mA/kV was utilized to reduce the radiation dose to as low as reasonably achievable. COMPARISON: 11/18/2020 HISTORY: ORDERING SYSTEM PROVIDED HISTORY: Recent fall; AMS TECHNOLOGIST PROVIDED HISTORY: Reason for exam:->Recent fall; AMS Has a \"code stroke\" or \"stroke alert\" been called? ->No What reading provider will be dictating this exam?->CRC FINDINGS: BRAIN/VENTRICLES: There is no acute intracranial hemorrhage, mass effect or midline shift. No abnormal extra-axial fluid collection. The gray-white differentiation is maintained without evidence of an acute infarct.   There is no evidence of hydrocephalus. White matter disease appears unchanged. ORBITS: The visualized portion of the orbits demonstrate no acute abnormality. SINUSES: Mucosal thickening is noted in the posterior left maxillary sinus. A small polyp or retention cyst is again seen anteriorly in the left maxillary sinus. Large polyp or retention cyst is again seen in the right sphenoid sinus. The mastoid air cells are clear. SOFT TISSUES/SKULL:  No acute abnormality of the visualized skull or soft tissues. No acute intracranial abnormality. Ct Head Wo Contrast    Result Date: 11/18/2020  EXAMINATION: CT OF THE HEAD WITHOUT CONTRAST  11/18/2020 4:08 pm TECHNIQUE: CT of the head was performed without the administration of intravenous contrast. Dose modulation, iterative reconstruction, and/or weight based adjustment of the mA/kV was utilized to reduce the radiation dose to as low as reasonably achievable. COMPARISON: None. HISTORY: ORDERING SYSTEM PROVIDED HISTORY: Trauma TECHNOLOGIST PROVIDED HISTORY: Has a \"code stroke\" or \"stroke alert\" been called? ->No Reason for exam:->Trauma What reading provider will be dictating this exam?->CRC FINDINGS: BRAIN/VENTRICLES: There is no acute intracranial hemorrhage, mass effect or midline shift. No abnormal extra-axial fluid collection. The gray-white differentiation is maintained without evidence of an acute infarct. There is no evidence of hydrocephalus. Scattered foci of low attenuation involving the periventricular white matter compatible with microvascular ischemic changes. ORBITS: The visualized portion of the orbits demonstrate no acute abnormality. SINUSES: Note is made of a complex mucous retention cyst within the right sphenoid sinus measuring 1.8 x 1.8 cm. This was present on the patient's previous studies. Latanya Aurora SOFT TISSUES/SKULL:  No acute abnormality of the visualized skull or soft tissues. 1. There is no acute intracranial abnormality.   Specifically, there is no intracranial hemorrhage. 2. Atrophy and periventricular leukomalacia, 3. Chronic mucous retention cyst seen within the right sphenoid sinus. Ct Cervical Spine Wo Contrast    Result Date: 11/18/2020  EXAMINATION: CT OF THE CERVICAL SPINE WITHOUT CONTRAST 11/18/2020 4:08 pm TECHNIQUE: CT of the cervical spine was performed without the administration of intravenous contrast. Multiplanar reformatted images are provided for review. Dose modulation, iterative reconstruction, and/or weight based adjustment of the mA/kV was utilized to reduce the radiation dose to as low as reasonably achievable. COMPARISON: None. HISTORY: ORDERING SYSTEM PROVIDED HISTORY: Trauma TECHNOLOGIST PROVIDED HISTORY: Reason for exam:->Trauma What reading provider will be dictating this exam?->CRC FINDINGS: The ring of C1 is intact as is the dense. There is no compression fracture of the cervical spine. No jumped or perched facet is noted. Multilevel degenerative disc and degenerative joint disease is noted. The prevertebral soft tissues are unremarkable. The airway is widely patent. Images through the lung apices are negative for a pneumothorax. 1. There is no acute compression fracture or subluxation of the cervical spine. 2. Multilevel degenerative disc and degenerative joint disease. Xr Chest Portable    Result Date: 12/8/2020  EXAMINATION: ONE XRAY VIEW OF THE CHEST 12/8/2020 10:03 pm COMPARISON: Chest series from December 7, 2020 HISTORY: ORDERING SYSTEM PROVIDED HISTORY: cough TECHNOLOGIST PROVIDED HISTORY: Reason for exam:->cough What reading provider will be dictating this exam?->CRC FINDINGS: Coarse interstitial markings bilaterally. Overall there are low lung volumes. No airspace disease, pleural effusions, or pneumothoraces. Atherosclerotic disease in the aortic arch. The remaining cardiomediastinal silhouette and pulmonary vascularity appear within normal limits.   Osseous and thoracic soft tissue structures demonstrate no acute findings. No change from prior exam.  Atherosclerotic disease. No additional evidence of active cardiopulmonary pathology. Xr Chest Portable    Result Date: 12/7/2020  EXAMINATION: ONE XRAY VIEW OF THE CHEST 12/7/2020 8:00 pm COMPARISON: Chest series from November 18, 2020. HISTORY: ORDERING SYSTEM PROVIDED HISTORY: fever, eval for pneumonia TECHNOLOGIST PROVIDED HISTORY: Reason for exam:->fever, eval for pneumonia FINDINGS: Adequate and symmetric aeration of the lungs. There are no formed consolidations, pleural effusions, or pneumothoraces. Trachea and central mainstem bronchi appear clear. Minimal atherosclerotic disease in the aortic arch. The remaining cardiomediastinal silhouette and pulmonary vascularity appear within normal limits. Osseous and thoracic soft tissue structures demonstrate no acute findings. Atherosclerotic disease. No additional evidence of active cardiopulmonary pathology. Mri Brain Wo Contrast    Result Date: 12/9/2020  EXAMINATION: MRI OF THE BRAIN WITHOUT CONTRAST  12/9/2020 8:34 pm TECHNIQUE: Multiplanar multisequence MRI of the brain was performed without the administration of intravenous contrast. COMPARISON: CT head December 9, 2020, MRI brain March 15, 2019 HISTORY: ORDERING SYSTEM PROVIDED HISTORY: somulence TECHNOLOGIST PROVIDED HISTORY: Reason for exam:->somulence What reading provider will be dictating this exam?->CRC FINDINGS: Motion artifacts. INTRACRANIAL STRUCTURES/VENTRICLES: There is minimal parenchymal volume loss. There is mild T2/FLAIR hyperintensity in the periventricular and subcortical white matter, likely related to mild chronic microvascular disease. There is no acute infarct. No mass effect or midline shift. No evidence of an acute intracranial hemorrhage. There is mild communicating hydrocephalus. The sellar/suprasellar regions appear unremarkable. The normal signal voids within the major intracranial vessels appear maintained.  ORBITS: The visualized portion of the orbits demonstrate no acute abnormality. SINUSES: There is scattered minimal mucosal thickening in the paranasal sinuses. There are mild bilateral mastoid effusions. BONES/SOFT TISSUES: The bone marrow signal intensity appears normal. The soft tissues demonstrate no acute abnormality. No acute intracranial abnormality. Minimal parenchymal volume loss. Mild communicating hydrocephalus, stable Mild chronic microvascular disease. Mild bilateral mastoid effusions. Xr Hip 2-3 Vw W Pelvis Left    Result Date: 12/11/2020  EXAMINATION: ONE XRAY VIEW OF THE PELVIS AND TWO XRAY VIEWS LEFT HIP 12/11/2020 2:08 am COMPARISON: 12/07/2020 HISTORY: ORDERING SYSTEM PROVIDED HISTORY: post op TECHNOLOGIST PROVIDED HISTORY: Reason for exam:->post op What reading provider will be dictating this exam?->CRC FINDINGS: The patient now has a left hip arthroplasty device in normal alignment. There is now a lucency in or projecting over the superior aspect of the left greater trochanter. Right hip arthroplasty is again seen. Joint space alignment is normal.  Soft tissue swelling and gas overlie the left hip. Skin staples are in place. Atherosclerotic calcifications are again seen. Bladder catheter is now seen. Status post left hip arthroplasty. The lucency in the left greater trochanter could represent a fracture or may be artifactual and due to soft tissue gas overlying the bone. Attention on follow-up imaging is recommended. Xr Hip 2-3 Vw W Pelvis Right    Result Date: 11/18/2020  EXAMINATION: 4 XRAY VIEWS OF THE RIGHT FEMUR; ONE XRAY VIEW OF THE PELVIS AND TWO XRAY VIEWS RIGHT HIP 11/18/2020 4:07 pm COMPARISON: None. HISTORY: ORDERING SYSTEM PROVIDED HISTORY: Pain TECHNOLOGIST PROVIDED HISTORY: Reason for exam:->Pain What reading provider will be dictating this exam?->CRC FINDINGS: There is a transcervical fracture through the right femoral neck.   The femoral head maintains articulation with the acetabulum. The remainder of the right femur is intact. The pelvic bones are intact, without fracture or focal lesion. The sacroiliac joints and the pubic symphysis are unremarkable. Severe degenerative changes are seen in the right knee. Right femoral neck fracture. Discharge Medications:      Medication List      CONTINUE taking these medications    aspirin EC 81 MG EC tablet  Take 1 tablet by mouth 2 times daily for 28 days     * docusate sodium 100 MG capsule  Commonly known as: COLACE     * Colace 100 MG capsule  Generic drug: docusate sodium     glimepiride 4 MG tablet  Commonly known as: AMARYL     * glucose 4 g chewable tablet     * glucose 4 g chewable tablet     insulin glargine 100 UNIT/ML injection vial  Commonly known as: LANTUS  Inject 60 Units into the skin nightly     lamoTRIgine 25 MG tablet  Commonly known as: LAMICTAL     MULTIVITAMIN ADULT PO     OLANZapine 7.5 MG tablet  Commonly known as: ZYPREXA  Take 1 tablet by mouth nightly     omeprazole 40 MG delayed release capsule  Commonly known as: PRILOSEC     rosuvastatin 10 MG tablet  Commonly known as: CRESTOR     venlafaxine 75 MG extended release capsule  Commonly known as: EFFEXOR XR  Take 1 capsule by mouth daily (with breakfast)         * This list has 4 medication(s) that are the same as other medications prescribed for you. Read the directions carefully, and ask your doctor or other care provider to review them with you. ASK your doctor about these medications    HYDROcodone-acetaminophen 5-325 MG per tablet  Commonly known as: Norco  Take 1 tablet by mouth every 4 hours as needed for Pain for up to 7 days. Intended supply: 7 days. Take lowest dose possible to manage pain  Ask about: Should I take this medication?            Where to Get Your Medications      You can get these medications from any pharmacy    Bring a paper prescription for each of these medications  · HYDROcodone-acetaminophen 5-325 MG per tablet Time Spent on discharge is more than 45 minutes in the examination, evaluation, counseling and review of medications and discharge plan.    +++++++++++++++++++++++++++++++++++++++++++++++++  Ranjith Haro MD  [address]  +++++++++++++++++++++++++++++++++++++++++++++++++  NOTE: This report was transcribed using voice recognition software. Every effort was made to ensure accuracy; however, inadvertent computerized transcription errors may be present.

## 2020-12-13 NOTE — PLAN OF CARE
Problem: Airway Clearance - Ineffective  Goal: Achieve or maintain patent airway  12/12/2020 2229 by Amy Villegas RN  Outcome: Met This Shift  12/12/2020 1011 by Kiki Sagastume RN  Outcome: Met This Shift     Problem: Gas Exchange - Impaired  Goal: Absence of hypoxia  Outcome: Met This Shift  Goal: Promote optimal lung function  Outcome: Met This Shift     Problem: Breathing Pattern - Ineffective  Goal: Ability to achieve and maintain a regular respiratory rate  12/12/2020 2229 by Amy Villegas RN  Outcome: Met This Shift  12/12/2020 1011 by Kiki Sagastume RN  Outcome: Met This Shift     Problem:  Body Temperature -  Risk of, Imbalanced  Goal: Ability to maintain a body temperature within defined limits  12/12/2020 2229 by Amy Villegas RN  Outcome: Met This Shift  12/12/2020 1011 by Kiki Sagastume RN  Outcome: Met This Shift  Goal: Will regain or maintain usual level of consciousness  Outcome: Met This Shift  Goal: Complications related to the disease process, condition or treatment will be avoided or minimized  Outcome: Met This Shift     Problem: Isolation Precautions - Risk of Spread of Infection  Goal: Prevent transmission of infection  12/12/2020 2229 by Amy Villegas RN  Outcome: Met This Shift  12/12/2020 1011 by Kiki Sagastume RN  Outcome: Met This Shift     Problem: Nutrition Deficits  Goal: Optimize nutrtional status  Outcome: Met This Shift     Problem: Risk for Fluid Volume Deficit  Goal: Maintain normal heart rhythm  Outcome: Met This Shift  Goal: Maintain absence of muscle cramping  Outcome: Met This Shift  Goal: Maintain normal serum potassium, sodium, calcium, phosphorus, and pH  Outcome: Met This Shift     Problem: Loneliness or Risk for Loneliness  Goal: Demonstrate positive use of time alone when socialization is not possible  Outcome: Met This Shift     Problem: Fatigue  Goal: Verbalize increase energy and improved vitality  Outcome: Met This Shift     Problem: Patient Education: Go to Patient Education Activity  Goal: Patient/Family Education  Outcome: Met This Shift     Problem: Falls - Risk of:  Goal: Will remain free from falls  Description: Will remain free from falls  Outcome: Met This Shift  Goal: Absence of physical injury  Description: Absence of physical injury  Outcome: Met This Shift     Problem: Skin Integrity:  Goal: Will show no infection signs and symptoms  Description: Will show no infection signs and symptoms  12/12/2020 2229 by Rosalia Vera RN  Outcome: Met This Shift  12/12/2020 1011 by Noemi Viveros RN  Outcome: Met This Shift  Goal: Absence of new skin breakdown  Description: Absence of new skin breakdown  12/12/2020 2229 by Rosalia Vera RN  Outcome: Met This Shift  12/12/2020 1011 by Noemi Viveros RN  Outcome: Met This Shift     Problem: Pain:  Goal: Pain level will decrease  Description: Pain level will decrease  12/12/2020 2229 by Rosalia Vera RN  Outcome: Met This Shift  12/12/2020 1011 by Noemi Viveros RN  Outcome: Met This Shift  Goal: Control of acute pain  Description: Control of acute pain  12/12/2020 2229 by Rosalia Vera RN  Outcome: Met This Shift  12/12/2020 1011 by Noemi Viveros RN  Outcome: Met This Shift  Goal: Control of chronic pain  Description: Control of chronic pain  12/12/2020 2229 by Rosalia Vera RN  Outcome: Met This Shift  12/12/2020 1011 by Noemi Viveros RN  Outcome: Met This Shift     Problem: Malnutrition  (NI-5.2)  Goal: Food and/or Nutrient Delivery  Description: Individualized approach for food/nutrient provision.   12/12/2020 1149 by Nilam Garcia RD, LD  Outcome: Met This Shift

## 2020-12-13 NOTE — PROGRESS NOTES
Hospitalist Progress Note      SYNOPSIS: Patient admitted on 2020 for presents to an outside hospital with left hip pain. He said he was getting rehabilitation done for his right hip fracture when he had a mechanical fall and landed on his left hip. Since that time the pain is so severe 10 out of 10 in severity that he cannot ambulate. He presented to the emergency room complaining of such. He denies any head injury, chest pain, palpitations. He states he simply tripped and fell. No syncope no seizures. In the emergency room he was screened for Covid and was positive. Patient status post left hip hemiarthroplasty 12/10/2020    SUBJECTIVE:    Patient seen and examined  Records reviewed. Remains stable  No new complaints      Stable overnight. No other overnight issues reported. Temp (24hrs), Av.9 °F (36.6 °C), Min:97.3 °F (36.3 °C), Max:98.9 °F (37.2 °C)    DIET: DIET GENERAL;  Dietary Nutrition Supplements: Diabetic Oral Supplement  Dietary Nutrition Supplements: Wound Healing Oral Supplement  CODE: Full Code    Intake/Output Summary (Last 24 hours) at 2020 0815  Last data filed at 2020 0548  Gross per 24 hour   Intake 3577 ml   Output 2575 ml   Net 1002 ml       OBJECTIVE:    /75   Pulse 94   Temp 98.9 °F (37.2 °C) (Temporal)   Resp 20   Ht 5' 8\" (1.727 m)   SpO2 95%   BMI 25.89 kg/m²     General appearance: No apparent distress, appears stated age and cooperative. HEENT:  Conjunctivae/corneas clear. Neck: Supple. No jugular venous distention. Respiratory: Clear to auscultation bilaterally, normal respiratory effort  Cardiovascular: Regular rate rhythm, normal S1-S2  Abdomen: Soft, nontender, nondistended  Musculoskeletal: No clubbing, cyanosis, no bilateral lower extremity edema. Brisk capillary refill.   Left lower extremity in dressing appears cachectic  Skin:  No rashes  on visible skin  Neurologic: awake, alert and following commands; appears 20*   BUN 43* 33* 20   CREATININE 1.3* 1.1 1.0   CALCIUM 8.9 8.7 8.6       Recent Labs     12/11/20  0448 12/12/20  0510 12/13/20  0522   PROT 6.1* 6.0* 5.7*   ALKPHOS 97 84 76   ALT 23 15 13   AST 47* 34 30   BILITOT 0.5 0.4 0.4       No results for input(s): INR in the last 72 hours. No results for input(s): Meetdank Sanchezy in the last 72 hours. Chronic labs:    Lab Results   Component Value Date    CHOL 114 10/08/2020    TRIG 71 10/08/2020    HDL 41 10/08/2020    LDLCALC 59 10/08/2020    TSH 0.708 12/09/2020    PSA 0.86 09/28/2018    INR 1.1 11/18/2020    LABA1C 9.0 (H) 11/18/2020       Radiology: REVIEWED DAILY    +++++++++++++++++++++++++++++++++++++++++++++++++  Clint [address] [place] [address]  +++++++++++++++++++++++++++++++++++++++++++++++++  NOTE: This report was transcribed using voice recognition software. Every effort was made to ensure accuracy; however, inadvertent computerized transcription errors may be present.

## 2020-12-14 LAB
BLOOD CULTURE, ROUTINE: NORMAL
CULTURE, BLOOD 2: NORMAL

## 2020-12-14 NOTE — PROGRESS NOTES
Patient discharged to [facility] IV and monitor removed. Nurse to nurse called.

## 2020-12-30 ENCOUNTER — HOSPITAL ENCOUNTER (OUTPATIENT)
Dept: GENERAL RADIOLOGY | Age: 65
Discharge: HOME | End: 2021-01-01
Payer: COMMERCIAL

## 2020-12-30 ENCOUNTER — OFFICE VISIT (OUTPATIENT)
Dept: ORTHOPEDIC SURGERY | Age: 65
End: 2020-12-30
Payer: COMMERCIAL

## 2020-12-30 VITALS — TEMPERATURE: 97.2 F

## 2020-12-30 DIAGNOSIS — S72.001A CLOSED RIGHT HIP FRACTURE, INITIAL ENCOUNTER (HCC): ICD-10-CM

## 2020-12-30 DIAGNOSIS — Z96.642 HISTORY OF LEFT HIP HEMIARTHROPLASTY: ICD-10-CM

## 2020-12-30 DIAGNOSIS — Z96.641 HISTORY OF RIGHT HIP HEMIARTHROPLASTY: ICD-10-CM

## 2020-12-30 DIAGNOSIS — Z96.641 HISTORY OF RIGHT HIP HEMIARTHROPLASTY: Primary | ICD-10-CM

## 2020-12-30 PROCEDURE — 73502 X-RAY EXAM HIP UNI 2-3 VIEWS: CPT

## 2020-12-30 PROCEDURE — 99212 OFFICE O/P EST SF 10 MIN: CPT | Performed by: PHYSICIAN ASSISTANT

## 2020-12-30 PROCEDURE — 99024 POSTOP FOLLOW-UP VISIT: CPT | Performed by: PHYSICIAN ASSISTANT

## 2020-12-30 RX ORDER — ZINC SULFATE 50(220)MG
50 CAPSULE ORAL DAILY
COMMUNITY

## 2020-12-30 RX ORDER — OXYCODONE HYDROCHLORIDE AND ACETAMINOPHEN 5; 325 MG/1; MG/1
1 TABLET ORAL EVERY 4 HOURS PRN
COMMUNITY

## 2020-12-30 RX ORDER — CHOLECALCIFEROL (VITAMIN D3) 1250 MCG
1 CAPSULE ORAL DAILY
COMMUNITY

## 2020-12-30 RX ORDER — CALCIUM CARBONATE/VITAMIN D3 500 MG-10
TABLET ORAL
COMMUNITY

## 2020-12-30 RX ORDER — CHOLECALCIFEROL (VITAMIN D3) 125 MCG
5 CAPSULE ORAL DAILY
COMMUNITY

## 2020-12-30 RX ORDER — SOD CHLORID/B6/ZINC/CITRIC ACD
SPRAY, NON-AEROSOL (ML) IRRIGATION
COMMUNITY

## 2020-12-30 NOTE — PATIENT INSTRUCTIONS
Mone Arriola did surgery on right hip, Dr. Jered Daly did surgery on left hip. Since we were following his right we will also assess his left hip today. Removed staples from LEFT hip  Steri strips should fall off in the shower at some point, if they do not fall off in 10 days, remove them  Dressing: Can remove dressing in 1-2 days then open to air  Can shower in a couple days, NO Soaking or submerging incision in water until fully healed & all scabs are gone    WB:  Weight bearing as tolerated on right lower and left lower extremity    Left hip should remain with dislocation precautions until 6 weeks post op    Recommend that patient have pressure reducing measures as much as possible to prevent pressure injury as he states he is in the wheelchair as much as possible.      Therapy: Continue with PT/OT on gait training, ROM, Strengthening, fall prevention     DVT: Patient does not need to be on Aspirin AND Eliquis, recommend DC aspirin  Pain control : Per facility physician    Continue with ice to the injured extremity 2-3 times per day for swelling  If able continue with elevation and compression    Follow up in 6 weeks with XR of the pelvis and bilateral hips    Please contact office with any questions or concerns    Future Appointments   Date Time Provider Marlena Mas   2/10/2021 10:00 AM Gio Francisco  [facility] [place], [address]

## 2020-12-30 NOTE — PROGRESS NOTES
Patient presents today in a wheelchair, he states he is having very bad pain. However he can not put a number to it. Patient claims he tries to stay in Wheelchair as much as possible, as it hurts too much to walk. Patient states he still has staples in. Has asked to have them removed after Xrays.

## 2021-01-06 NOTE — PROGRESS NOTES
Chief Complaint   Patient presents with    Post-Op Check     Post op R hip darren for displaced femoral neck Fracture 11/19/20        OP:SURGEON:Dr. Charlene Sewell MD  DATE OF PROCEDURE: 12/10/2020  PROCEDURE:Left hip hemiarthroplasty for displaced femoral neck fracture     SURGEON: Dr. Les Medellin MD  DATE OF PROCEDURE: 11/19/2020  PROCEDURE:Right hip hemiarthroplasty for displaced femoral neck fracture    Subjective:  Brady Pettit is approximately 2 weeks follow-up from the above most recent surgery. Patient is allowed to be WBAT on BLE. Patient was not seen in office for RLE 2 week post op as had subsequent fall and sustained left hip fracture. Was asked to evaluate LLE for Dr. Mehran Vargas office post op as patient already attending appointment at our office and history of COVID + at discharge from hospital. Patient at SNF, has completed 2 week isolation, asymptomatic. Patient states he continues to have significant pain to LLE, states has pain when getting out of wheelchair. Limited activity with therapy. Patient denies any new falls. Denies chest pain or SOB. Denies fevers. Continues on ASA BID for DVT proph. The patient's facility comprehensive medical history transfer sheets were evaluated, and their history, medications, allergies, treatments were updated in [facility] today.       Review of Systems -    General ROS: negative for - chills, fatigue, fever or night sweats  Respiratory ROS: no cough, shortness of breath, or wheezing  Cardiovascular ROS: no chest pain or dyspnea on exertion  Gastrointestinal ROS: no abdominal pain, nausea, vomiting, diarrhea, constipation,or black or bloody stools  Genitourinary: no hematuria, dysuria, or incontinence   Musculoskeletal ROS: negative for -back or neck pain or stiffness, also see HPI  Neurological ROS: no TIA or stroke symptoms       Objective:    General: Alert and oriented X 3, cachectic, normocephalic atraumatic, external ears and eye normal, sclera clear, no acute distress, respirations easy and unlabored with no audible wheezes, skin warm and dry, speech and dress appropriate for noted age, affect euthymic. Extremity:  Bilateral Lower Extremity  Skin clean dry and intact, without signs of infection  Incisions well approximated on left hip, well healed to right hip without signs of redness, warmth or drainage- staples still intact to left hip  No edema noted  Compartments supple throughout thigh and leg  Calf supple and nontender  Demonstrates active knee flexion/extension, ankle plantar/dorsiflexion/great toe extension. States sensation intact to touch in sural/deep peroneal/superficial peroneal/saphenous/posterior tibial nerve distributions to foot/ankle. Palpable dorsalis pedis and posterior tibialis pulses, cap refill brisk in toes, foot warm/perfused. Temp 97.2 °F (36.2 °C) (Oral)     XR:   AP pelvis and 2V of the right hip demonstrates bilateral hip hemiarthroplasties in stable alignment. No evidence of subsidence of loosening. Leg lengths appear near equal    Assessment:   Diagnosis Orders   1. History of right hip hemiarthroplasty     2. History of left hip hemiarthroplasty         Plan:  Ronel Toledo did surgery on right hip, Dr. Elias Maxwell did surgery on left hip. Since we were following his right we will also assess his left hip today.    Removed staples from LEFT hip  Steri strips should fall off in the shower at some point, if they do not fall off in 10 days, remove them  Dressing: Can remove dressing in 1-2 days then open to air  Can shower in a couple days, NO Soaking or submerging incision in water until fully healed & all scabs are gone    WB:  Weight bearing as tolerated on right lower and left lower extremity    Left hip should remain with dislocation precautions until 6 weeks post op    Recommend that patient have pressure reducing measures as much as possible to prevent pressure injury as he states he is in the wheelchair as much as possible. Therapy: Continue with PT/OT on gait training, ROM, Strengthening, fall prevention     DVT: Patient does not need to be on Aspirin AND Eliquis, recommend DC aspirin  Pain control : Per facility physician    Continue with ice to the injured extremity 2-3 times per day for swelling  If able continue with elevation and compression    Follow up in 6 weeks with XR of the pelvis and bilateral hips    Please contact office with any questions or concerns    Electronically signed by Jomar Mendoza PA-C on 1/5/2021 at 7:48 PM  Note: This report was completed using [employer] voiced recognition software. Every effort has been made to ensure accuracy; however, inadvertent computerized transcription errors may be present.

## 2021-01-13 ENCOUNTER — HOSPITAL ENCOUNTER (EMERGENCY)
Age: 66
Discharge: HOME | End: 2021-01-13
Attending: EMERGENCY MEDICINE
Payer: MEDICARE

## 2021-01-13 VITALS
HEART RATE: 88 BPM | HEIGHT: 68 IN | SYSTOLIC BLOOD PRESSURE: 100 MMHG | BODY MASS INDEX: 25.76 KG/M2 | OXYGEN SATURATION: 99 % | RESPIRATION RATE: 16 BRPM | TEMPERATURE: 97.3 F | WEIGHT: 170 LBS | DIASTOLIC BLOOD PRESSURE: 69 MMHG

## 2021-01-13 DIAGNOSIS — Z46.6 URINARY CATHETER INSERTION/ADJUSTMENT/REMOVAL: Primary | ICD-10-CM

## 2021-01-13 PROCEDURE — 51703 INSERT BLADDER CATH COMPLEX: CPT

## 2021-01-13 PROCEDURE — 99283 EMERGENCY DEPT VISIT LOW MDM: CPT

## 2021-01-13 ASSESSMENT — ENCOUNTER SYMPTOMS
DIARRHEA: 0
SHORTNESS OF BREATH: 0
RHINORRHEA: 0
ABDOMINAL PAIN: 0

## 2021-01-13 NOTE — ED PROVIDER NOTES
Patient is a 79-year-old male who presents via wheelchair Flonnie Pippins from [facility] nursing facility where he currently resides for rehabilitation for complaint of inability to place catheter at the facility. Patient states he had bilateral hip fractures 1 month ago and requires indwelling catheter. Patient catheter was removed electively last night at the facility however they did not have a catheter replaced with. Patient states he has not urinated since last night, but denies any abdominal discomfort, distention or, feeling of urinary retention. Patient per facility note requires a coudé catheter. The history is provided by the patient. No  was used. Male  Problem  Presenting symptoms: no dysuria and no penile pain    Relieved by: straight cath. Worsened by:  Nothing  Associated symptoms: no abdominal pain, no diarrhea, no fever, no flank pain and no urinary frequency    Risk factors: urinary catheter       Review of Systems   Constitutional: Negative for chills and fever. HENT: Negative for congestion and rhinorrhea. Respiratory: Negative for shortness of breath. Cardiovascular: Negative for chest pain. Gastrointestinal: Negative for abdominal pain and diarrhea. Genitourinary: Positive for difficulty urinating. Negative for dysuria, flank pain, frequency and penile pain. Musculoskeletal: Negative for arthralgias and myalgias. Skin: Negative for rash. Neurological: Negative for dizziness, syncope, weakness and light-headedness. Psychiatric/Behavioral: Negative for confusion. Physical Exam  Vitals signs and nursing note reviewed. Constitutional:       General: He is not in acute distress. Appearance: He is not ill-appearing or toxic-appearing. HENT:      Head: Normocephalic and atraumatic. Mouth/Throat:      Mouth: Mucous membranes are moist.      Pharynx: Oropharynx is clear. Eyes:      Extraocular Movements: Extraocular movements intact. Pupils: Pupils are equal, round, and reactive to light. Neck:      Musculoskeletal: Normal range of motion and neck supple. Cardiovascular:      Rate and Rhythm: Normal rate and regular rhythm. Pulses: Normal pulses. Heart sounds: Normal heart sounds. Pulmonary:      Effort: Pulmonary effort is normal.      Breath sounds: Normal breath sounds. Abdominal:      General: Abdomen is flat. There is no distension. Palpations: Abdomen is soft. Tenderness: There is no abdominal tenderness. Musculoskeletal: Normal range of motion. General: No swelling. Skin:     General: Skin is warm and dry. Capillary Refill: Capillary refill takes less than 2 seconds. Neurological:      Mental Status: He is alert and oriented to person, place, and time. GCS: GCS eye subscore is 4. GCS verbal subscore is 5. GCS motor subscore is 6. MDM  Number of Diagnoses or Management Options  Urinary catheter insertion/adjustment/removal  Diagnosis management comments: Patient 65-year-old male who presents via wheelchair Pallavi Decent from the nursing home for placement of a urinary catheter. Patient had bilateral hip fractures 1 month ago and is catheter dependent for urination per notes. Patient had a catheter in place last night that for some reason was electively removed by the nursing facility and another catheter of the appropriate size was unavailable. Patient states he has not urinated since last night and was sent in today for coudé placement. Patient presents awake, alert, following all commands, no apparent distress. Vital signs are all stable. Physical exam is unremarkable. Labs and scans would be noncontributory. Patient denies any bleeding or pain at the urethral meatus. Coudé was placed successfully and clear yellow urine was expressed into the Lamar bag. Patient did complain of mild irritation on placement and mild discomfort with placement.   This will be treated with Tylenol and ibuprofen, and patient is to follow-up with urology. Plan for return to skilled nursing facility was discussed and patient is agreeable. No further changes and patient was discharged in stable condition.       --------------------------------------------- PAST HISTORY ---------------------------------------------  Past Medical History:  has a past medical history of Anxiety, Bronchitis, Cellulitis, Chronic sinusitis, Depression, Diabetes mellitus ([address] Diabetic retinopathy ([address] Fracture of left foot, High cholesterol, Hypercholesteremia, Hypercholesterolemia, Hypertension, Lumbago, Moderate mood disorder ([address] and Third nerve palsy. Past Surgical History:  has a past surgical history that includes eye surgery; hip surgery (Right, 11/19/2020); and hip surgery (Left, 12/10/2020). Social History:  reports that he has never smoked. He has never used smokeless tobacco. He reports that he does not drink alcohol or use drugs. Family History: family history is not on file. The patients home medications have been reviewed. Allergies: Empagliflozin and Liraglutide    -------------------------------------------------- RESULTS -------------------------------------------------  Labs:  No results found for this visit on 01/13/21. Radiology:  No orders to display     ------------------------- NURSING NOTES AND VITALS REVIEWED ---------------------------  Date / Time Roomed:  1/13/2021  5:16 PM  ED Bed Assignment:  30/30    The nursing notes within the ED encounter and vital signs as below have been reviewed.    /69   Pulse 88   Temp 97.3 °F (36.3 °C)   Resp 16   Ht 5' 8\" (1.727 m)   Wt 170 lb (77.1 kg)   SpO2 99%   BMI 25.85 kg/m²   Oxygen Saturation Interpretation: Normal      ------------------------------------------ PROGRESS NOTES ------------------------------------------  6:38 PM EST  I have spoken with the patient and discussed todays results, in addition to providing specific details for the plan of care and counseling regarding the diagnosis and prognosis. Their questions are answered at this time and they are agreeable with the plan. I discussed at length with them reasons for immediate return here for re evaluation. They will followup with their primary care physician by calling their office as needed. --------------------------------- ADDITIONAL PROVIDER NOTES ---------------------------------  At this time the patient is without objective evidence of an acute process requiring hospitalization or inpatient management. They have remained hemodynamically stable throughout their entire ED visit and are stable for discharge with outpatient follow-up. The plan has been discussed in detail and they are aware of the specific conditions for emergent return, as well as the importance of follow-up. Discharge Medication List as of 1/13/2021  8:26 PM          Diagnosis:  1. Urinary catheter insertion/adjustment/removal      Disposition:  Patient's disposition: Discharge to nursing home  Patient's condition is stable. 1/13/21, 6:38 PM EST. This note is prepared by Vincenzo Anderson MD -PGY-1           Vincenzo Anderson MD  Resident  01/13/21 1396      ATTENDING PROVIDER ATTESTATION:     I have personally performed and/or participated in the history, exam, medical decision making, and procedures and agree with all pertinent clinical information unless otherwise noted. I have also reviewed and agree with the past medical, family and social history unless otherwise noted. I have discussed this patient in detail with the resident and provided the instruction and education regarding the evidence-based evaluation and treatment of Other (sent from [facility] coude [facility]ant[facility]    Patient presented to the ED from a local facility after he was some having some abdominal distention and difficulty urinating with urinary retention.   Patient was able to be straight cathed at this facility with about 450 cc. Unfortunately the patient was able to have a Lamar placed and was sent here to have Lamar placement. Patient was in no distress had a soft abdomen without any peritoneal signs. A Lamar was placed on the difficulty and the patient had significant relief. She was appropriate for discharge and was given urology instructions for follow-up as instructions for his Lamar. Patient was given strict return precautions.     Electronically signed by Isabel Mccullough DO on 1/14/21 at 12:02 AM STEFF Mccullough DO  01/14/21 0003

## 2021-02-10 ENCOUNTER — OFFICE VISIT (OUTPATIENT)
Dept: ORTHOPEDIC SURGERY | Age: 66
End: 2021-02-10
Payer: MEDICARE

## 2021-02-10 ENCOUNTER — HOSPITAL ENCOUNTER (OUTPATIENT)
Dept: GENERAL RADIOLOGY | Age: 66
Discharge: HOME | End: 2021-02-12
Payer: MEDICARE

## 2021-02-10 VITALS — TEMPERATURE: 97.7 F

## 2021-02-10 DIAGNOSIS — Z96.641 HISTORY OF RIGHT HIP HEMIARTHROPLASTY: Primary | ICD-10-CM

## 2021-02-10 DIAGNOSIS — Z96.641 HISTORY OF RIGHT HIP HEMIARTHROPLASTY: ICD-10-CM

## 2021-02-10 DIAGNOSIS — Z96.642 HISTORY OF LEFT HIP HEMIARTHROPLASTY: ICD-10-CM

## 2021-02-10 PROCEDURE — 72170 X-RAY EXAM OF PELVIS: CPT

## 2021-02-10 PROCEDURE — 73502 X-RAY EXAM HIP UNI 2-3 VIEWS: CPT

## 2021-02-10 PROCEDURE — 99024 POSTOP FOLLOW-UP VISIT: CPT | Performed by: PHYSICIAN ASSISTANT

## 2021-02-10 PROCEDURE — 99212 OFFICE O/P EST SF 10 MIN: CPT | Performed by: PHYSICIAN ASSISTANT

## 2021-02-10 RX ORDER — SENNA PLUS 8.6 MG/1
1 TABLET ORAL DAILY
COMMUNITY

## 2021-02-10 RX ORDER — OXYBUTYNIN CHLORIDE 5 MG/1
5 TABLET, EXTENDED RELEASE ORAL 2 TIMES DAILY
COMMUNITY

## 2021-02-10 RX ORDER — ACETAMINOPHEN 325 MG/1
650 TABLET ORAL EVERY 6 HOURS PRN
COMMUNITY

## 2021-02-10 NOTE — PROGRESS NOTES
Chief Complaint   Patient presents with    Hip Pain     right HIP HEMIARTHROPLASTY dos: 11/19/20; patient states his hip and leg feel good but his right knee tenses up some times from a previous injury; 0/10 pain; patient states he is still doing therapy at [facility]        OP:SURGEON:Dr. Saleem Barker MD  DATE OF PROCEDURE: 12/10/2020  PROCEDURE:Left hip hemiarthroplasty for displaced femoral neck fracture     SURGEON: Dr. Nelly Seay MD  DATE OF PROCEDURE: 11/19/2020  PROCEDURE:Right hip hemiarthroplasty for displaced femoral neck fracture    Subjective:  Corona Acosta is approximately 2 months follow-up from the above most recent surgery. Patient is allowed to be WBAT on BLE. Patient is 3 months from [facility]. Patient at [facility] states feeling much better than last visit. He has no pain of significance to either hip. States that he is getting up with therapy more, workin bisi standing and weight bearing activity, states he will walk a short distance with therapy using a [employer]. Patient denies any new falls. Denies chest pain or SOB. Denies fevers. . The patient's facility comprehensive medical history transfer sheets were evaluated, and their history, medications, allergies, treatments were updated in [facility] today.       Review of Systems -    General ROS: negative for - chills, fatigue, fever or night sweats  Respiratory ROS: no cough, shortness of breath, or wheezing  Cardiovascular ROS: no chest pain or dyspnea on exertion  Gastrointestinal ROS: no abdominal pain, nausea, vomiting, diarrhea, constipation,or black or bloody stools  Genitourinary: no hematuria, dysuria, or incontinence   Musculoskeletal ROS: negative for -back or neck pain or stiffness, also see HPI  Neurological ROS: no TIA or stroke symptoms       Objective:    General: Alert and oriented X 3, cachectic, normocephalic atraumatic, external ears and eye normal, sclera clear, no acute distress, respirations easy and unlabored with no audible wheezes, skin warm and dry, speech and dress appropriate for noted age, affect euthymic. Extremity:  Bilateral Lower Extremity  Skin clean dry and intact, without signs of infection  Incisions well healed bilaterally without signs of redness, warmth or drainage  No edema noted  No TTP to bilateral hips at greater trochanter or to the proximal femur  Compartments supple throughout thigh and leg  Calf supple and nontender  Demonstrates active knee flexion/extension, ankle plantar/dorsiflexion/great toe extension. Patient has no pain with passive internal and external rotation of bilateral hips. States sensation intact to touch in sural/deep peroneal/superficial peroneal/saphenous/posterior tibial nerve distributions to foot/ankle. Palpable dorsalis pedis and posterior tibialis pulses, cap refill brisk in toes, foot warm/perfused. Temp 97.7 °F (36.5 °C)     XR:   AP pelvis and 2V of the right hip demonstrates bilateral hip hemiarthroplasties in stable alignment. No evidence of subsidence of loosening. Leg lengths appear near equal    Assessment:   Diagnosis Orders   1. History of right hip hemiarthroplasty     2.  History of left hip hemiarthroplasty         Plan:  ORDERS FOR [address] [place] [address] show bilateral hip hemiarthroplasty- well aligned, no concerns on XRAYS     WB:  Weight bearing as tolerated on right and left lower extremities     Recommend that patient have pressure reducing measures as much as possible to prevent pressure injury as he states he is in the wheelchair as much as possible.      Therapy: Continue working with PT/OT for gait training, strengthening, fall prevention, ADLs     From orthopedic perspective no need for DVT prophylaxis  Pain control : Per facility physician     Patient can use ice or heat as needed for comfort    Patient may follow up with orthopedics on an as needed basis    Electronically signed by Kathy Herrera PA-C on 2/10/2021 at 10:35 AM  Note: This report was completed using [employer] voiced recognition software. Every effort has been made to ensure accuracy; however, inadvertent computerized transcription errors may be present.

## 2021-08-06 ENCOUNTER — TELEPHONE (OUTPATIENT)
Dept: SURGERY | Age: 66
End: 2021-08-06

## 2021-08-30 ENCOUNTER — TELEPHONE (OUTPATIENT)
Dept: SURGERY | Age: 66
End: 2021-08-30

## 2021-08-30 NOTE — LETTER
[facility]  [address] [address]  Phone: [phone]  Fax: [phone]    RNVRZ [id number] [place]        August 30, 2021     [place]ne  [address] [place] [address]      Dear Marlo Monroy: We are sorry that you missed your appointment with Dr. Aldo Soler on 8/30/2021. Your health and follow-up medical care are important to us. Please call our office as soon as possible so that we may reschedule your appointment. If you have already rescheduled your appointment, please disregard this letter.     Sincerely,        Pastor Estrellita MA

## 2022-11-11 ENCOUNTER — HOSPITAL ENCOUNTER (INPATIENT)
Age: 67
LOS: 5 days | Discharge: NURSING, REHAB OR LONG-TERM CARE | DRG: 683 | End: 2022-11-17
Attending: EMERGENCY MEDICINE | Admitting: INTERNAL MEDICINE
Payer: MEDICARE

## 2022-11-11 ENCOUNTER — APPOINTMENT (OUTPATIENT)
Dept: CT IMAGING | Age: 67
DRG: 683 | End: 2022-11-11
Payer: MEDICARE

## 2022-11-11 ENCOUNTER — APPOINTMENT (OUTPATIENT)
Dept: GENERAL RADIOLOGY | Age: 67
DRG: 683 | End: 2022-11-11
Payer: MEDICARE

## 2022-11-11 DIAGNOSIS — K52.89 STERCORAL COLITIS: ICD-10-CM

## 2022-11-11 DIAGNOSIS — J18.9 PNEUMONIA DUE TO INFECTIOUS ORGANISM, UNSPECIFIED LATERALITY, UNSPECIFIED PART OF LUNG: ICD-10-CM

## 2022-11-11 DIAGNOSIS — N17.9 AKI (ACUTE KIDNEY INJURY) (HCC): Primary | ICD-10-CM

## 2022-11-11 LAB
ABO/RH: NORMAL
ALBUMIN SERPL-MCNC: 3.3 G/DL (ref 3.5–5.2)
ALP BLD-CCNC: 69 U/L (ref 40–129)
ALT SERPL-CCNC: 14 U/L (ref 0–40)
ANION GAP SERPL CALCULATED.3IONS-SCNC: 22 MMOL/L (ref 7–16)
ANTIBODY SCREEN: NORMAL
APTT: 25.8 SEC (ref 24.5–35.1)
AST SERPL-CCNC: 17 U/L (ref 0–39)
BASOPHILS ABSOLUTE: 0.02 E9/L (ref 0–0.2)
BASOPHILS RELATIVE PERCENT: 0.2 % (ref 0–2)
BILIRUB SERPL-MCNC: 0.8 MG/DL (ref 0–1.2)
BUN BLDV-MCNC: 58 MG/DL (ref 6–23)
CALCIUM SERPL-MCNC: 9.2 MG/DL (ref 8.6–10.2)
CHLORIDE BLD-SCNC: 91 MMOL/L (ref 98–107)
CO2: 18 MMOL/L (ref 22–29)
CREAT SERPL-MCNC: 2.1 MG/DL (ref 0.7–1.2)
EOSINOPHILS ABSOLUTE: 0 E9/L (ref 0.05–0.5)
EOSINOPHILS RELATIVE PERCENT: 0 % (ref 0–6)
GFR SERPL CREATININE-BSD FRML MDRD: 34 ML/MIN/1.73
GLUCOSE BLD-MCNC: 280 MG/DL (ref 74–99)
HCT VFR BLD CALC: 34.6 % (ref 37–54)
HEMOGLOBIN: 12.1 G/DL (ref 12.5–16.5)
IMMATURE GRANULOCYTES #: 0.11 E9/L
IMMATURE GRANULOCYTES %: 1 % (ref 0–5)
INR BLD: 1.5
LACTIC ACID: 4.4 MMOL/L (ref 0.5–2.2)
LYMPHOCYTES ABSOLUTE: 0.72 E9/L (ref 1.5–4)
LYMPHOCYTES RELATIVE PERCENT: 6.5 % (ref 20–42)
MCH RBC QN AUTO: 26.2 PG (ref 26–35)
MCHC RBC AUTO-ENTMCNC: 35 % (ref 32–34.5)
MCV RBC AUTO: 75.1 FL (ref 80–99.9)
MONOCYTES ABSOLUTE: 0.63 E9/L (ref 0.1–0.95)
MONOCYTES RELATIVE PERCENT: 5.7 % (ref 2–12)
NEUTROPHILS ABSOLUTE: 9.57 E9/L (ref 1.8–7.3)
NEUTROPHILS RELATIVE PERCENT: 86.6 % (ref 43–80)
PDW BLD-RTO: 13.3 FL (ref 11.5–15)
PLATELET # BLD: 227 E9/L (ref 130–450)
PMV BLD AUTO: 11.2 FL (ref 7–12)
POTASSIUM SERPL-SCNC: 4.2 MMOL/L (ref 3.5–5)
PROTHROMBIN TIME: 16.1 SEC (ref 9.3–12.4)
RBC # BLD: 4.61 E12/L (ref 3.8–5.8)
SODIUM BLD-SCNC: 131 MMOL/L (ref 132–146)
TOTAL CK: 69 U/L (ref 20–200)
TOTAL PROTEIN: 5.8 G/DL (ref 6.4–8.3)
TROPONIN, HIGH SENSITIVITY: 57 NG/L (ref 0–11)
WBC # BLD: 11.1 E9/L (ref 4.5–11.5)

## 2022-11-11 PROCEDURE — 80053 COMPREHEN METABOLIC PANEL: CPT

## 2022-11-11 PROCEDURE — 93005 ELECTROCARDIOGRAM TRACING: CPT | Performed by: EMERGENCY MEDICINE

## 2022-11-11 PROCEDURE — 87324 CLOSTRIDIUM AG IA: CPT

## 2022-11-11 PROCEDURE — 86901 BLOOD TYPING SEROLOGIC RH(D): CPT

## 2022-11-11 PROCEDURE — 71045 X-RAY EXAM CHEST 1 VIEW: CPT

## 2022-11-11 PROCEDURE — 85610 PROTHROMBIN TIME: CPT

## 2022-11-11 PROCEDURE — 87449 NOS EACH ORGANISM AG IA: CPT

## 2022-11-11 PROCEDURE — 86900 BLOOD TYPING SEROLOGIC ABO: CPT

## 2022-11-11 PROCEDURE — 83605 ASSAY OF LACTIC ACID: CPT

## 2022-11-11 PROCEDURE — 99285 EMERGENCY DEPT VISIT HI MDM: CPT

## 2022-11-11 PROCEDURE — 84484 ASSAY OF TROPONIN QUANT: CPT

## 2022-11-11 PROCEDURE — 70450 CT HEAD/BRAIN W/O DYE: CPT

## 2022-11-11 PROCEDURE — 74176 CT ABD & PELVIS W/O CONTRAST: CPT

## 2022-11-11 PROCEDURE — 86850 RBC ANTIBODY SCREEN: CPT

## 2022-11-11 PROCEDURE — 85730 THROMBOPLASTIN TIME PARTIAL: CPT

## 2022-11-11 PROCEDURE — 82550 ASSAY OF CK (CPK): CPT

## 2022-11-11 PROCEDURE — 85025 COMPLETE CBC W/AUTO DIFF WBC: CPT

## 2022-11-11 PROCEDURE — 72125 CT NECK SPINE W/O DYE: CPT

## 2022-11-11 PROCEDURE — 2580000003 HC RX 258: Performed by: EMERGENCY MEDICINE

## 2022-11-11 RX ORDER — 0.9 % SODIUM CHLORIDE 0.9 %
1000 INTRAVENOUS SOLUTION INTRAVENOUS ONCE
Status: COMPLETED | OUTPATIENT
Start: 2022-11-11 | End: 2022-11-12

## 2022-11-11 RX ORDER — SODIUM CHLORIDE 9 MG/ML
INJECTION, SOLUTION INTRAVENOUS CONTINUOUS
Status: DISCONTINUED | OUTPATIENT
Start: 2022-11-11 | End: 2022-11-12

## 2022-11-11 RX ADMIN — SODIUM CHLORIDE 1000 ML: 9 INJECTION, SOLUTION INTRAVENOUS at 20:58

## 2022-11-11 ASSESSMENT — PAIN - FUNCTIONAL ASSESSMENT: PAIN_FUNCTIONAL_ASSESSMENT: NONE - DENIES PAIN

## 2022-11-11 NOTE — LETTER
[address] Medicaid  CERTIFICATION OF NECESSITY  FOR TRANSPORTATION   BY WHEELCHAIR VAN     Individual Information   1. Name: Josiane Bustos 2. [place] Medicaid Billing Number:    3. Address: [place] [address]      Transportation Provider Information   4. Provider Name:    5. [place] Medicaid Provider Number:  National Provider Identifier (NPI):      Certification  7. Criteria:  By signing this document, the practitioner certifies that two statements are true:  A. This individual must be accompanied by a mobility-related assistive device from the point of pick-up to the point of drop-off. B. Transport of this individual by standard passenger vehicle or common carrier is precluded or contraindicated. 8. Period Beginning Date: 11/16/2022     9. Length  [x] Not more than 5 day(s)  [x] One Year     Additional Information Relevant to Certification   10. Comments or Explanations, If Necessary or Appropriate     Failure to thrive     Certifying Practitioner Information   11. Name of Practitioner: Darlene Hernandez MD   12. [place] Medicaid Provider Number, If Applicable:  [address] Provider Identifier (NPI):      Signature Information   14. Date of Signature: 11/16/2022   15. Name of Person Signing: Jyothi Houser RN     16. Signature and Professional Designation: Electronically signed by Jyothi Houser RN on 11/16/2022 at 11:06 AM       [facility] [address]  Rev. 7/2015    [facility] [address] Encounter Date/Time: 11/11/2022 2010    Hospital Account: [de-identified]    MRN: [id number]    Patient: Josiane Bustos    Contact Serial #: [id number]      ENCOUNTER          Patient Class: I Private Enc?   No Unit  BD: BFEZ 1IA 4413/0717-D   Hospital Service: Med/Surg   Encounter DX: CLEMENTE (acute kidney injury*   ADM Provider: Jesi Owens MD   Procedure:     ATT Provider: Darlene Hernandez MD   REF Provider:        Admission DX: CLEMENTE (acute kidney injury) ([facility] [address] Weakness and DX codes: N17.9, R53.1    PATIENT                 Name: Liz Barba : 1955 (74 yrs)   Address: [address] Sex: Male   [place] [address]         Marital Status:    Employer: DISABLED         Evangelical: Mormonism   Primary Care Provider: Ashwini Lucas MD         Primary Phone: [phone]   EMERGENCY CONTACT   Contact Name Legal Guardian? Relationship to Patient Home Phone Work Phone   1. Arthdenise Marie  2. Cas Santana    No Child  Child [phone]         GUARANTOR            Guarantor: Liz Barba     : 1955   Address: [address] [place] [address] Sex: Male   [place] [address]     Relation to Patient: Self       Home Phone: [phone]   Guarantor ID: [id number]       Work Phone:     Guarantor Employer: DISABLED         Status: DISABLED      COVERAGE        PRIMARY INSURANCE   Payor: MEDICARE Plan: MEDICARE PART A AND B   Payor Address: [facility] [address],  [facility], [facility] [address]       Group Number:   Insurance Type: INDEMNITY   Subscriber Name: Gene Novoa : 1955   Subscriber ID: [id number] [facility]. Rel. to Sub: Self   SECONDARY INSURANCE   Payor: [place]ID [place] Plan: [facility], [facility] DEPT OF*   Payor Address:  [facility] [address], [place], [address]          Group Number:   Insurance Type: INDEMNITY   Subscriber Name: Gene Novoa : 1955   Subscriber ID: [id number] Pat.  Rel. to Sub: SELF         CSN: [id number]

## 2022-11-11 NOTE — LETTER
[address] Medicaid  CERTIFICATION OF NECESSITY  FOR TRANSPORTATION   BY WHEELCHAIR VAN     Individual Information   1. Name: Valentin Chavez 2. [place] Medicaid Billing Number:   3. Address: [place] [address]      Transportation Provider Information   4. Provider Name: marbella   5. [place] Medicaid Provider Number:  National Provider Identifier (NPI):      Certification  7. Criteria:  By signing this document, the practitioner certifies that two statements are true:  A. This individual must be accompanied by a mobility-related assistive device from the point of pick-up to the point of drop-off. B. Transport of this individual by standard passenger vehicle or common carrier is precluded or contraindicated. 8. Period Beginning Date: 11/17/2022     9. Length  [x] Not more than 1 day(s)  [] One Year     Additional Information Relevant to Certification   10. Comments or Explanations, If Necessary or Appropriate     Weakness    Ambulatory dysfunction         Certifying Practitioner Information   11. Name of Practitioner: Gail Dudley MD   12. [place] Medicaid Provider Number, If Applicable:  [address] Provider Identifier (NPI):      Signature Information   14. Date of Signature: 11/17/2022   15. Name of Person Signing: Marcie Mathews RN     16. Signature and Professional Designation: Electronically signed by Marcie Mathews RN on 11/17/2022 at 11:23 AM  DISCHARGE PLANNER     [facility] [address]  Rev. 7/2015       [address] [place] [address] Encounter Date/Time: 11/11/2022 2010    Hospital Account: [de-identified]    MRN: [id number]    Patient: Valentin Chavez    Contact Serial #: [id number]      ENCOUNTER          Patient Class: I Private Enc?   No Unit  BD: NYLZ 2YZ 0786/1069-Z   Hospital Service: Med/Surg   Encounter DX: CLEMENTE (acute kidney injury*   ADM Provider: Emmanuelle Chapa MD   Procedure:     ATT Provider: Therese Dorantes MD   REF Provider:        Admission DX: CLEMENTE (acute kidney injury) ([facility] [address] Weakness and DX codes: N17.9, R53.1      PATIENT  Name: Denia Irizarry : 1955 (74 yrs)   Address: [address] [facility] Sex: Male   [place] [facility] [address]         Marital Status:    Employer: DISABLED         Baptism: Mu-ism   Primary Care Provider: Jelly Cutler MD         Primary Phone: [phone]   EMERGENCY CONTACT   Contact Name Legal Guardian? Relationship to Patient Home Phone Work Phone   1. Tacos Blevins  2. Cas Santana    No Child  Child [phone]         GUARANTOR            Guarantor: Denia Irizarry     : 1955   Address: [address] [place] [address] Sex: Male   [place] [address]     Relation to Patient: Self       Home Phone: [phone]   Guarantor ID: [id number]       Work Phone:     Guarantor Employer: DISABLED         Status: DISABLED      COVERAGE        PRIMARY INSURANCE   Payor: MEDICARE Plan: MEDICARE PART A AND B   Payor Address: [facility] [address],  [facility] [address], [facility] [address]       Group Number:   Insurance Type: INDEMNITY   Subscriber Name: Jensen Ames : 1955   Subscriber ID: [id number] Karly Hodgson. Rel. to Sub: Self   SECONDARY INSURANCE   Payor: [place] Plan: [facility], [facility] DEPT OF*   Payor Address:  [facility] [address], [address]          Group Number:   Insurance Type: INDEMNITY   Subscriber Name: Jensen Ames : 1955   Subscriber ID: [id number] Pat.  Rel. to Sub: SELF         CSN: [id number]

## 2022-11-11 NOTE — Clinical Note
Discharge Plan[de-identified] Other/[facility])   Telemetry/Cardiac Monitoring Required?: Yes

## 2022-11-12 ENCOUNTER — APPOINTMENT (OUTPATIENT)
Dept: ULTRASOUND IMAGING | Age: 67
DRG: 683 | End: 2022-11-12
Payer: MEDICARE

## 2022-11-12 PROBLEM — R53.1 WEAKNESS: Status: ACTIVE | Noted: 2022-11-12

## 2022-11-12 PROBLEM — N17.9 AKI (ACUTE KIDNEY INJURY) (HCC): Status: ACTIVE | Noted: 2022-11-12

## 2022-11-12 LAB
ANION GAP SERPL CALCULATED.3IONS-SCNC: 10 MMOL/L (ref 7–16)
ANION GAP SERPL CALCULATED.3IONS-SCNC: 12 MMOL/L (ref 7–16)
BUN BLDV-MCNC: 42 MG/DL (ref 6–23)
BUN BLDV-MCNC: 44 MG/DL (ref 6–23)
C DIFF TOXIN/ANTIGEN: NORMAL
CALCIUM SERPL-MCNC: 8.3 MG/DL (ref 8.6–10.2)
CALCIUM SERPL-MCNC: 8.4 MG/DL (ref 8.6–10.2)
CHLORIDE BLD-SCNC: 101 MMOL/L (ref 98–107)
CHLORIDE BLD-SCNC: 99 MMOL/L (ref 98–107)
CO2: 21 MMOL/L (ref 22–29)
CO2: 22 MMOL/L (ref 22–29)
CREAT SERPL-MCNC: 1.5 MG/DL (ref 0.7–1.2)
CREAT SERPL-MCNC: 1.5 MG/DL (ref 0.7–1.2)
GFR SERPL CREATININE-BSD FRML MDRD: 51 ML/MIN/1.73
GFR SERPL CREATININE-BSD FRML MDRD: 51 ML/MIN/1.73
GLUCOSE BLD-MCNC: 174 MG/DL (ref 74–99)
GLUCOSE BLD-MCNC: 210 MG/DL (ref 74–99)
HBA1C MFR BLD: 7.1 % (ref 4–5.6)
LACTIC ACID: 0.9 MMOL/L (ref 0.5–2.2)
MAGNESIUM: 1.1 MG/DL (ref 1.6–2.6)
METER GLUCOSE: 145 MG/DL (ref 74–99)
METER GLUCOSE: 156 MG/DL (ref 74–99)
METER GLUCOSE: 187 MG/DL (ref 74–99)
METER GLUCOSE: 220 MG/DL (ref 74–99)
PHOSPHORUS: 2.2 MG/DL (ref 2.5–4.5)
POTASSIUM SERPL-SCNC: 3.5 MMOL/L (ref 3.5–5)
POTASSIUM SERPL-SCNC: 3.6 MMOL/L (ref 3.5–5)
SODIUM BLD-SCNC: 131 MMOL/L (ref 132–146)
SODIUM BLD-SCNC: 134 MMOL/L (ref 132–146)
TOTAL CK: 35 U/L (ref 20–200)
TOTAL CK: 43 U/L (ref 20–200)
TROPONIN, HIGH SENSITIVITY: 41 NG/L (ref 0–11)
TROPONIN, HIGH SENSITIVITY: 46 NG/L (ref 0–11)
TROPONIN, HIGH SENSITIVITY: 55 NG/L (ref 0–11)

## 2022-11-12 PROCEDURE — 6370000000 HC RX 637 (ALT 250 FOR IP)

## 2022-11-12 PROCEDURE — 36415 COLL VENOUS BLD VENIPUNCTURE: CPT

## 2022-11-12 PROCEDURE — 6370000000 HC RX 637 (ALT 250 FOR IP): Performed by: INTERNAL MEDICINE

## 2022-11-12 PROCEDURE — 2580000003 HC RX 258: Performed by: INTERNAL MEDICINE

## 2022-11-12 PROCEDURE — 80048 BASIC METABOLIC PNL TOTAL CA: CPT

## 2022-11-12 PROCEDURE — 2500000003 HC RX 250 WO HCPCS: Performed by: NURSE PRACTITIONER

## 2022-11-12 PROCEDURE — 6360000002 HC RX W HCPCS: Performed by: EMERGENCY MEDICINE

## 2022-11-12 PROCEDURE — 87040 BLOOD CULTURE FOR BACTERIA: CPT

## 2022-11-12 PROCEDURE — 82962 GLUCOSE BLOOD TEST: CPT

## 2022-11-12 PROCEDURE — 84484 ASSAY OF TROPONIN QUANT: CPT

## 2022-11-12 PROCEDURE — 2580000003 HC RX 258: Performed by: EMERGENCY MEDICINE

## 2022-11-12 PROCEDURE — 76770 US EXAM ABDO BACK WALL COMP: CPT

## 2022-11-12 PROCEDURE — 2580000003 HC RX 258: Performed by: NURSE PRACTITIONER

## 2022-11-12 PROCEDURE — 84100 ASSAY OF PHOSPHORUS: CPT

## 2022-11-12 PROCEDURE — 6360000002 HC RX W HCPCS: Performed by: INTERNAL MEDICINE

## 2022-11-12 PROCEDURE — 83735 ASSAY OF MAGNESIUM: CPT

## 2022-11-12 PROCEDURE — 83036 HEMOGLOBIN GLYCOSYLATED A1C: CPT

## 2022-11-12 PROCEDURE — 82550 ASSAY OF CK (CPK): CPT

## 2022-11-12 PROCEDURE — 2500000003 HC RX 250 WO HCPCS: Performed by: INTERNAL MEDICINE

## 2022-11-12 PROCEDURE — 83605 ASSAY OF LACTIC ACID: CPT

## 2022-11-12 PROCEDURE — 2060000000 HC ICU INTERMEDIATE R&B

## 2022-11-12 RX ORDER — ACETAMINOPHEN 325 MG/1
650 TABLET ORAL EVERY 6 HOURS PRN
Status: DISCONTINUED | OUTPATIENT
Start: 2022-11-12 | End: 2022-11-17 | Stop reason: HOSPADM

## 2022-11-12 RX ORDER — ROSUVASTATIN CALCIUM 10 MG/1
10 TABLET, COATED ORAL DAILY
Status: DISCONTINUED | OUTPATIENT
Start: 2022-11-12 | End: 2022-11-17 | Stop reason: HOSPADM

## 2022-11-12 RX ORDER — SODIUM CHLORIDE 9 MG/ML
INJECTION, SOLUTION INTRAVENOUS PRN
Status: DISCONTINUED | OUTPATIENT
Start: 2022-11-12 | End: 2022-11-17 | Stop reason: HOSPADM

## 2022-11-12 RX ORDER — INSULIN LISPRO 100 [IU]/ML
0-4 INJECTION, SOLUTION INTRAVENOUS; SUBCUTANEOUS
Status: DISCONTINUED | OUTPATIENT
Start: 2022-11-12 | End: 2022-11-17 | Stop reason: HOSPADM

## 2022-11-12 RX ORDER — SODIUM CHLORIDE 0.9 % (FLUSH) 0.9 %
10 SYRINGE (ML) INJECTION PRN
Status: DISCONTINUED | OUTPATIENT
Start: 2022-11-12 | End: 2022-11-17 | Stop reason: HOSPADM

## 2022-11-12 RX ORDER — LAMOTRIGINE 25 MG/1
25 TABLET ORAL NIGHTLY
Status: DISCONTINUED | OUTPATIENT
Start: 2022-11-12 | End: 2022-11-17 | Stop reason: HOSPADM

## 2022-11-12 RX ORDER — ACETAMINOPHEN 650 MG/1
650 SUPPOSITORY RECTAL EVERY 6 HOURS PRN
Status: DISCONTINUED | OUTPATIENT
Start: 2022-11-12 | End: 2022-11-17 | Stop reason: HOSPADM

## 2022-11-12 RX ORDER — ASPIRIN 81 MG/1
81 TABLET ORAL 2 TIMES DAILY
Status: DISCONTINUED | OUTPATIENT
Start: 2022-11-12 | End: 2022-11-17 | Stop reason: HOSPADM

## 2022-11-12 RX ORDER — IPRATROPIUM BROMIDE AND ALBUTEROL SULFATE 2.5; .5 MG/3ML; MG/3ML
1 SOLUTION RESPIRATORY (INHALATION) EVERY 4 HOURS PRN
Status: DISCONTINUED | OUTPATIENT
Start: 2022-11-12 | End: 2022-11-17 | Stop reason: HOSPADM

## 2022-11-12 RX ORDER — ONDANSETRON 4 MG/1
4 TABLET, ORALLY DISINTEGRATING ORAL EVERY 8 HOURS PRN
Status: DISCONTINUED | OUTPATIENT
Start: 2022-11-12 | End: 2022-11-17 | Stop reason: HOSPADM

## 2022-11-12 RX ORDER — BISACODYL 10 MG
10 SUPPOSITORY, RECTAL RECTAL DAILY
Status: DISCONTINUED | OUTPATIENT
Start: 2022-11-12 | End: 2022-11-12

## 2022-11-12 RX ORDER — OXYCODONE HYDROCHLORIDE AND ACETAMINOPHEN 5; 325 MG/1; MG/1
1 TABLET ORAL EVERY 4 HOURS PRN
Status: DISCONTINUED | OUTPATIENT
Start: 2022-11-12 | End: 2022-11-15

## 2022-11-12 RX ORDER — BISACODYL 10 MG
10 SUPPOSITORY, RECTAL RECTAL EVERY 6 HOURS
Status: COMPLETED | OUTPATIENT
Start: 2022-11-12 | End: 2022-11-13

## 2022-11-12 RX ORDER — ONDANSETRON 2 MG/ML
4 INJECTION INTRAMUSCULAR; INTRAVENOUS EVERY 6 HOURS PRN
Status: DISCONTINUED | OUTPATIENT
Start: 2022-11-12 | End: 2022-11-17 | Stop reason: HOSPADM

## 2022-11-12 RX ORDER — PANTOPRAZOLE SODIUM 40 MG/1
40 TABLET, DELAYED RELEASE ORAL
Status: DISCONTINUED | OUTPATIENT
Start: 2022-11-12 | End: 2022-11-17 | Stop reason: HOSPADM

## 2022-11-12 RX ORDER — INSULIN LISPRO 100 [IU]/ML
0-4 INJECTION, SOLUTION INTRAVENOUS; SUBCUTANEOUS NIGHTLY
Status: DISCONTINUED | OUTPATIENT
Start: 2022-11-12 | End: 2022-11-17 | Stop reason: HOSPADM

## 2022-11-12 RX ORDER — MECOBALAMIN 5000 MCG
5 TABLET,DISINTEGRATING ORAL DAILY
Status: DISCONTINUED | OUTPATIENT
Start: 2022-11-12 | End: 2022-11-17 | Stop reason: HOSPADM

## 2022-11-12 RX ORDER — SODIUM CHLORIDE 0.9 % (FLUSH) 0.9 %
10 SYRINGE (ML) INJECTION EVERY 12 HOURS SCHEDULED
Status: DISCONTINUED | OUTPATIENT
Start: 2022-11-12 | End: 2022-11-17 | Stop reason: HOSPADM

## 2022-11-12 RX ORDER — DEXTROSE MONOHYDRATE 100 MG/ML
INJECTION, SOLUTION INTRAVENOUS CONTINUOUS PRN
Status: DISCONTINUED | OUTPATIENT
Start: 2022-11-12 | End: 2022-11-17 | Stop reason: HOSPADM

## 2022-11-12 RX ADMIN — PIPERACILLIN AND TAZOBACTAM 3375 MG: 3; .375 INJECTION, POWDER, FOR SOLUTION INTRAVENOUS at 18:00

## 2022-11-12 RX ADMIN — SODIUM CHLORIDE: 9 INJECTION, SOLUTION INTRAVENOUS at 00:14

## 2022-11-12 RX ADMIN — PANTOPRAZOLE SODIUM 40 MG: 40 TABLET, DELAYED RELEASE ORAL at 06:26

## 2022-11-12 RX ADMIN — LAMOTRIGINE 25 MG: 25 TABLET ORAL at 21:03

## 2022-11-12 RX ADMIN — DOXYCYCLINE 100 MG: 100 INJECTION, POWDER, LYOPHILIZED, FOR SOLUTION INTRAVENOUS at 15:33

## 2022-11-12 RX ADMIN — DOXYCYCLINE 100 MG: 100 INJECTION, POWDER, LYOPHILIZED, FOR SOLUTION INTRAVENOUS at 04:18

## 2022-11-12 RX ADMIN — BISACODYL 10 MG: 10 SUPPOSITORY RECTAL at 09:01

## 2022-11-12 RX ADMIN — Medication 5 MG: at 21:03

## 2022-11-12 RX ADMIN — ASPIRIN 81 MG: 81 TABLET, COATED ORAL at 21:03

## 2022-11-12 RX ADMIN — INSULIN LISPRO 1 UNITS: 100 INJECTION, SOLUTION INTRAVENOUS; SUBCUTANEOUS at 11:33

## 2022-11-12 RX ADMIN — ROSUVASTATIN 10 MG: 10 TABLET, FILM COATED ORAL at 08:58

## 2022-11-12 RX ADMIN — APIXABAN 2.5 MG: 2.5 TABLET, FILM COATED ORAL at 08:58

## 2022-11-12 RX ADMIN — BISACODYL 10 MG: 10 SUPPOSITORY RECTAL at 21:03

## 2022-11-12 RX ADMIN — ASPIRIN 81 MG: 81 TABLET, COATED ORAL at 08:58

## 2022-11-12 RX ADMIN — BISACODYL 10 MG: 10 SUPPOSITORY RECTAL at 13:33

## 2022-11-12 RX ADMIN — PIPERACILLIN SODIUM AND TAZOBACTAM SODIUM 4500 MG: 4; .5 INJECTION, POWDER, LYOPHILIZED, FOR SOLUTION INTRAVENOUS at 02:22

## 2022-11-12 RX ADMIN — Medication 10 ML: at 21:03

## 2022-11-12 RX ADMIN — PIPERACILLIN AND TAZOBACTAM 3375 MG: 3; .375 INJECTION, POWDER, FOR SOLUTION INTRAVENOUS at 09:05

## 2022-11-12 RX ADMIN — BISACODYL 10 MG: 10 SUPPOSITORY RECTAL at 04:00

## 2022-11-12 RX ADMIN — BISACODYL 5 MG: 5 TABLET, COATED ORAL at 21:03

## 2022-11-12 RX ADMIN — POLYETHYLENE GLYCOL 3350, SODIUM SULFATE ANHYDROUS, SODIUM BICARBONATE, SODIUM CHLORIDE, POTASSIUM CHLORIDE 4000 ML: 236; 22.74; 6.74; 5.86; 2.97 POWDER, FOR SOLUTION ORAL at 10:28

## 2022-11-12 RX ADMIN — SODIUM BICARBONATE: 84 INJECTION, SOLUTION INTRAVENOUS at 13:20

## 2022-11-12 NOTE — ED PROVIDER NOTES
Department of Emergency Medicine   ED  Provider Note  Admit Date/RoomTime: 11/11/2022  8:10 PM  ED Room: 7405/7405-A          History of Present Illness:  11/12/22, Time: 1:10 AM EST  Chief Complaint   Patient presents with    Failure To Thrive     Pt sent in from home for increased weakness and failure to thrive over past 4 days                Melvi Davis is a 74 y.o. male presenting to the ED for failure to thrive. Came on gradually, nothing makes it better or worse, no associated pain. Patient presents from home. He has been getting weak over the past 4 days. Bearl Clam on the ground, he was too weak to stand up. He was on the ground for about an hour. States he is also been losing weight. He has not been able to eat or drink due to fatigue. He denies any nausea, vomiting, cough, sputum, paresthesias, lethargy, neck pain or stiffness, or any other symptoms or complaints. He also mentions that his been having a significant diarrhea over the past couple of days. Review of Systems:   Pertinent positives and negatives are stated within HPI, all other systems reviewed and are negative.        --------------------------------------------- PAST HISTORY ---------------------------------------------  Past Medical History:  has a past medical history of Anxiety, Bronchitis, Cellulitis, Chronic sinusitis, Depression, Diabetes mellitus ([facility] [address] Diabetic retinopathy ([facility] [address] Fracture of left foot, High cholesterol, Hypercholesteremia, Hypercholesterolemia, Hypertension, Lumbago, Moderate mood disorder ([address] and Third nerve palsy. Past Surgical History:  has a past surgical history that includes eye surgery; hip surgery (Right, 11/19/2020); and hip surgery (Left, 12/10/2020). Social History:  reports that he has never smoked. He has never used smokeless tobacco. He reports that he does not drink alcohol and does not use drugs. Family History: family history is not on file. . Unless otherwise noted, family history is non contributory    The patients home medications have been reviewed. Allergies: Empagliflozin and Liraglutide        ---------------------------------------------------PHYSICAL EXAM--------------------------------------    Constitutional/General: Alert and oriented x3, cachectic  Head: Normocephalic and atraumatic  Eyes: PERRL, EOMI, sclera non icteric  Mouth: Oropharynx clear, handling secretions, no trismus, no asymmetry of the posterior oropharynx or uvular edema  Neck: Supple, full ROM, no stridor, no meningeal signs  Respiratory: Lungs clear to auscultation bilaterally, no wheezes, rales, or rhonchi. Not in respiratory distress  Cardiovascular:  Regular rate. Regular rhythm. 2+ distal pulses. Equal extremity pulses. Chest: No chest wall tenderness  GI:  Abdomen Soft, Non tender, Non distended. No rebound, guarding, or rigidity. No pulsatile masses. Musculoskeletal: Moves all extremities x 4. Warm and well perfused, no clubbing, cyanosis, or edema. Capillary refill <3 seconds  Integument: skin warm and dry. No rashes. Neurologic: GCS 15, no focal deficits, symmetric strength 5/5 in the upper and lower extremities bilaterally  Psychiatric: Normal Affect          -------------------------------------------------- RESULTS -------------------------------------------------  I have personally reviewed all laboratory and imaging results for this patient. Results are listed below.      LABS: (Lab results interpreted by me)  Results for orders placed or performed during the hospital encounter of 11/11/22   CBC with Auto Differential   Result Value Ref Range    WBC 11.1 4.5 - 11.5 E9/L    RBC 4.61 3.80 - 5.80 E12/L    Hemoglobin 12.1 (L) 12.5 - 16.5 g/dL    Hematocrit 34.6 (L) 37.0 - 54.0 %    MCV 75.1 (L) 80.0 - 99.9 fL    MCH 26.2 26.0 - 35.0 pg    MCHC 35.0 (H) 32.0 - 34.5 %    RDW 13.3 11.5 - 15.0 fL    Platelets 498 720 - 431 E9/L    MPV 11.2 7.0 - 12.0 fL    Neutrophils % 86.6 (H) 43.0 - 80.0 % Immature Granulocytes % 1.0 0.0 - 5.0 %    Lymphocytes % 6.5 (L) 20.0 - 42.0 %    Monocytes % 5.7 2.0 - 12.0 %    Eosinophils % 0.0 0.0 - 6.0 %    Basophils % 0.2 0.0 - 2.0 %    Neutrophils Absolute 9.57 (H) 1.80 - 7.30 E9/L    Immature Granulocytes # 0.11 E9/L    Lymphocytes Absolute 0.72 (L) 1.50 - 4.00 E9/L    Monocytes Absolute 0.63 0.10 - 0.95 E9/L    Eosinophils Absolute 0.00 (L) 0.05 - 0.50 E9/L    Basophils Absolute 0.02 0.00 - 0.20 E9/L   Comprehensive Metabolic Panel   Result Value Ref Range    Sodium 131 (L) 132 - 146 mmol/L    Potassium 4.2 3.5 - 5.0 mmol/L    Chloride 91 (L) 98 - 107 mmol/L    CO2 18 (L) 22 - 29 mmol/L    Anion Gap 22 (H) 7 - 16 mmol/L    Glucose 280 (H) 74 - 99 mg/dL    BUN 58 (H) 6 - 23 mg/dL    Creatinine 2.1 (H) 0.7 - 1.2 mg/dL    Est, Glom Filt Rate 34 >=60 mL/min/1.73    Calcium 9.2 8.6 - 10.2 mg/dL    Total Protein 5.8 (L) 6.4 - 8.3 g/dL    Albumin 3.3 (L) 3.5 - 5.2 g/dL    Total Bilirubin 0.8 0.0 - 1.2 mg/dL    Alkaline Phosphatase 69 40 - 129 U/L    ALT 14 0 - 40 U/L    AST 17 0 - 39 U/L   APTT   Result Value Ref Range    aPTT 25.8 24.5 - 35.1 sec   Protime-INR   Result Value Ref Range    Protime 16.1 (H) 9.3 - 12.4 sec    INR 1.5    Troponin   Result Value Ref Range    Troponin, High Sensitivity 57 (H) 0 - 11 ng/L   Lactic Acid   Result Value Ref Range    Lactic Acid 4.4 (HH) 0.5 - 2.2 mmol/L   CK   Result Value Ref Range    Total CK 69 20 - 200 U/L   Troponin   Result Value Ref Range    Troponin, High Sensitivity 55 (H) 0 - 11 ng/L   TYPE AND SCREEN   Result Value Ref Range    ABO/Rh A POS     Antibody Screen NEG    ,       RADIOLOGY:  Interpreted by Radiologist unless otherwise specified  CT HEAD WO CONTRAST   Final Result   No acute intracranial abnormality.       Diffuse volume loss and probable chronic small vessel ischemic white matter   change, but it has progressed significantly compared to the prior exam and   therefore other white matter processes such is demyelinating disease or   vasculitis are not excluded. CT CERVICAL SPINE WO CONTRAST   Final Result   No acute abnormality of the cervical spine. CT ABDOMEN PELVIS WO CONTRAST Additional Contrast? None   Final Result   Large amount of stool within the rectal vault with rectal wall thickening,   suggestive of stercoral colitis. Clinical correlation recommended. Compression deformity of L1, new since the prior examination but chronic in   appearance. Correlate with point tenderness. Patchy left lower lung ground-glass opacities, concerning for pneumonia. Moderate hiatal hernia. Additional findings as above. XR CHEST PORTABLE   Final Result   No acute cardiopulmonary process. EKG Interpretation  Interpreted by emergency department physician, Dr. Suzie Mustafa, rate 108, no STEMI, no ischemic change         ------------------------- NURSING NOTES AND VITALS REVIEWED ---------------------------   The nursing notes within the ED encounter and vital signs as below have been reviewed by myself  /77   Pulse 99   Temp 98 °F (36.7 °C) (Temporal)   Resp 18   Ht 5' 8.11\" (1.73 m)   Wt 166 lb 1.6 oz (75.3 kg)   SpO2 98%   BMI 25.17 kg/m²     Oxygen Saturation Interpretation: Normal    The patients available past medical records and past encounters were reviewed.         ------------------------------ ED COURSE/MEDICAL DECISION MAKING----------------------  Medications   0.9 % sodium chloride infusion ( IntraVENous Rate/Dose Change 11/12/22 4503)   polyethylene glycol (GoLYTELY) solution 4,000 mL (has no administration in time range)   bisacodyl (DULCOLAX) EC tablet 5 mg (has no administration in time range)   bisacodyl (DULCOLAX) suppository 10 mg (10 mg Rectal Given 11/12/22 5960)   piperacillin-tazobactam (ZOSYN) 3,375 mg in sodium chloride 0.9 % 50 mL IVPB ([id number]) (has no administration in time range)   doxycycline (VIBRAMYCIN) 100 mg in dextrose 5 % 100 mL IVPB ([id number]) (100 mg IntraVENous New Bag 11/12/22 3488)   ipratropium-albuterol (DUONEB) nebulizer solution 1 ampule (has no administration in time range)   apixaban (ELIQUIS) tablet 2.5 mg (has no administration in time range)   aspirin EC tablet 81 mg (has no administration in time range)   lamoTRIgine (LAMICTAL) tablet 25 mg (has no administration in time range)   melatonin disintegrating tablet 5 mg (has no administration in time range)   pantoprazole (PROTONIX) tablet 40 mg (has no administration in time range)   oxyCODONE-acetaminophen (PERCOCET) 5-325 MG per tablet 1 tablet (has no administration in time range)   rosuvastatin (CRESTOR) tablet 10 mg (has no administration in time range)   glucose chewable tablet 16 g (has no administration in time range)   dextrose bolus 10% 125 mL (has no administration in time range)     Or   dextrose bolus 10% 250 mL (has no administration in time range)   glucagon (rDNA) injection 1 mg (has no administration in time range)   dextrose 10 % infusion (has no administration in time range)   sodium chloride flush 0.9 % injection 10 mL (has no administration in time range)   sodium chloride flush 0.9 % injection 10 mL (has no administration in time range)   0.9 % sodium chloride infusion (has no administration in time range)   ondansetron (ZOFRAN-ODT) disintegrating tablet 4 mg (has no administration in time range)     Or   ondansetron (ZOFRAN) injection 4 mg (has no administration in time range)   magnesium hydroxide (MILK OF MAGNESIA) 400 MG/5ML suspension 30 mL (has no administration in time range)   acetaminophen (TYLENOL) tablet 650 mg (has no administration in time range)     Or   acetaminophen (TYLENOL) suppository 650 mg (has no administration in time range)   insulin lispro (HUMALOG) injection vial 0-4 Units (has no administration in time range)   insulin lispro (HUMALOG) injection vial 0-4 Units (has no administration in time range)   0.9 % sodium chloride bolus (0 mLs IntraVENous Stopped 11/12/22 0014)   piperacillin-tazobactam (ZOSYN) 4,500 mg in dextrose 5 % 100 mL IVPB ([id number]) (0 mg IntraVENous Stopped 11/12/22 0400)           The cardiac monitor revealed sinus with a heart rate in the 80s as interpreted by me. The cardiac monitor was ordered secondary to the patient's fatigue and to monitor the patient for dysrhythmia. CPT [id number]         Medical Decision Making:       And imaging reviewed. Blood cultures obtained. Patient was starte doxycycline and Zosyn. Surgery was consulted, patient to be admitted. Counseling: The emergency provider has spoken with the patient and discussed todays results, in addition to providing specific details for the plan of care and counseling regarding the diagnosis and prognosis. Questions are answered at this time and they are agreeable with the plan.       --------------------------------- IMPRESSION AND DISPOSITION ---------------------------------    IMPRESSION  1. CLEMENTE (acute kidney injury) ([facility] [address]    2. Pneumonia due to infectious organism, unspecified laterality, unspecified part of lung    3. Stercoral colitis        DISPOSITION  Disposition: Admit to telemetry  Patient condition is stable        NOTE: This report was transcribed using voice recognition software.  Every effort was made to ensure accuracy; however, inadvertent computerized transcription errors may be present        Curry Ohara MD  11/12/22 1174

## 2022-11-12 NOTE — PROGRESS NOTES
Enema given per order at this time. Pt unable to retain any enema, return of small amount of loose dark stool and enema noted at this time.

## 2022-11-12 NOTE — CONSULTS
Department of Internal Medicine  Nephrology Consult Note      Reason for Consult: CLEMENTE  Requesting Physician:  Dr. Bernardo Shahid:  Weakness    History Obtained From:  patient, electronic medical record    HISTORY OF PRESENT ILLNESS:  Briefly, Mr. Calvin Moritz is a 74year old male with a PMH of IDDM with diabetic retinopathy, HTN, HLD, subdural hematoma (2018), TIA, dementia, anxiety, depression and noncompliance, who was admitted on November 11, 2022 after presenting to the ER with weakness. Apparently, he was found on the ground covered in feces. His labs in ER were significant for lactic acid 4.4, sodium 131, chloride 91, bicarbonate 18, BUN 58 mg/dL, and creatinine 2.1 mg/dL, which are reasons for this consultation. A CT of the abdomen and pelvis was obtained which revealed a large amount of retained stool and possible pneumonia. He received a 1L normal saline bolus in ER. He admits to vomiting, diarrhea, and very poor oral intake.      Past Medical History:        Diagnosis Date    Anxiety     Bronchitis     Cellulitis     Chronic sinusitis     Depression     Diabetes mellitus ([address]     Diabetic retinopathy ([address]     Fracture of left foot     High cholesterol     Hypercholesteremia     Hypercholesterolemia     Hypertension     Lumbago     Moderate mood disorder ([address]     Third nerve palsy      Past Surgical History:        Procedure Laterality Date    EYE SURGERY      HIP SURGERY Right 11/19/2020    HIP HEMIARTHROPLASTY performed by Isamar Patel MD at [address] [place] [address] Left 12/10/2020    HIP HEMIARTHROPLASTY -- HERB -- DROPLET +     PT. COMING FROM [place] performed by Chhaya Pak MD at [facility] OR     Current Medications:    Current Facility-Administered Medications: bisacodyl (DULCOLAX) EC tablet 5 mg, 5 mg, Oral, Daily PRN  bisacodyl (DULCOLAX) suppository 10 mg, 10 mg, Rectal, Q6H  piperacillin-tazobactam (ZOSYN) 3,375 mg in sodium chloride 0.9 % 50 mL IVPB ([id number]), 3,375 mg, IntraVENous, Q8H  doxycycline (VIBRAMYCIN) 100 mg in dextrose 5 % 100 mL IVPB ([id number]), 100 mg, IntraVENous, Q12H  ipratropium-albuterol (DUONEB) nebulizer solution 1 ampule, 1 ampule, Inhalation, Q4H PRN  aspirin EC tablet 81 mg, 81 mg, Oral, BID  lamoTRIgine (LAMICTAL) tablet 25 mg, 25 mg, Oral, Nightly  melatonin disintegrating tablet 5 mg, 5 mg, Oral, Daily  pantoprazole (PROTONIX) tablet 40 mg, 40 mg, Oral, QAM AC  oxyCODONE-acetaminophen (PERCOCET) 5-325 MG per tablet 1 tablet, 1 tablet, Oral, Q4H PRN  rosuvastatin (CRESTOR) tablet 10 mg, 10 mg, Oral, Daily  glucose chewable tablet 16 g, 4 tablet, Oral, PRN  dextrose bolus 10% 125 mL, 125 mL, IntraVENous, PRN **OR** dextrose bolus 10% 250 mL, 250 mL, IntraVENous, PRN  glucagon (rDNA) injection 1 mg, 1 mg, SubCUTAneous, PRN  dextrose 10 % infusion, , IntraVENous, Continuous PRN  sodium chloride flush 0.9 % injection 10 mL, 10 mL, IntraVENous, 2 times per day  sodium chloride flush 0.9 % injection 10 mL, 10 mL, IntraVENous, PRN  0.9 % sodium chloride infusion, , IntraVENous, PRN  ondansetron (ZOFRAN-ODT) disintegrating tablet 4 mg, 4 mg, Oral, Q8H PRN **OR** ondansetron (ZOFRAN) injection 4 mg, 4 mg, IntraVENous, Q6H PRN  magnesium hydroxide (MILK OF MAGNESIA) 400 MG/5ML suspension 30 mL, 30 mL, Oral, Daily PRN  acetaminophen (TYLENOL) tablet 650 mg, 650 mg, Oral, Q6H PRN **OR** acetaminophen (TYLENOL) suppository 650 mg, 650 mg, Rectal, Q6H PRN  insulin lispro (HUMALOG) injection vial 0-4 Units, 0-4 Units, SubCUTAneous, TID WC  insulin lispro (HUMALOG) injection vial 0-4 Units, 0-4 Units, SubCUTAneous, Nightly  0.9 % sodium chloride infusion, , IntraVENous, Continuous  Allergies:  Empagliflozin and Liraglutide    Social History:    TOBACCO:   reports that he has never smoked. He has never used smokeless tobacco.    Family History:   History reviewed. No pertinent family history.   REVIEW OF SYSTEMS:    CONSTITUTIONAL:  positive for  fatigue and weight loss  EYES:  negative for  double vision and blurred vision  HEENT:  negative for  epistaxis  RESPIRATORY:  negative for  dyspnea  CARDIOVASCULAR:  negative for  dyspnea, edema  GASTROINTESTINAL:  positive for nausea, vomiting, and diarrhea  GENITOURINARY:  negative  INTEGUMENT/BREAST:  negative  HEMATOLOGIC/LYMPHATIC:  negative  ALLERGIC/IMMUNOLOGIC:  negative  ENDOCRINE:  negative  MUSCULOSKELETAL:  positive for  muscle weakness  NEUROLOGICAL:  positive for weakness  BEHAVIOR/PSYCH:  positive for poor appetite  PHYSICAL EXAM:      Vitals:    VITALS:  BP (!) 158/84   Pulse 99   Temp 97.5 °F (36.4 °C) (Temporal)   Resp 18   Ht 5' 8.11\" (1.73 m)   Wt 166 lb 1.6 oz (75.3 kg)   SpO2 96%   BMI 25.17 kg/m²   24HR INTAKE/OUTPUT:  No intake or output data in the 24 hours ending 11/12/22 1132    Constitutional:  Alert and oriented with intermittent confusion, ill-appearing  HEENT:  Normocephalic, PERRL, dry mucous membranes  Respiratory:  CTA bilaterally  Cardiovascular/Edema:  RRR, S1,S2  Gastrointestinal:  Soft, rounded, nontender, nondistended  Neurologic:  Nonfocal, R[place]  Skin:  Warm, dry, intact  Other:  No edema     DATA:    CBC:   Lab Results   Component Value Date/Time    WBC 11.1 11/11/2022 08:42 PM    RBC 4.61 11/11/2022 08:42 PM    HGB 12.1 11/11/2022 08:42 PM    HCT 34.6 11/11/2022 08:42 PM    MCV 75.1 11/11/2022 08:42 PM    MCH 26.2 11/11/2022 08:42 PM    MCHC 35.0 11/11/2022 08:42 PM    RDW 13.3 11/11/2022 08:42 PM     11/11/2022 08:42 PM    MPV 11.2 11/11/2022 08:42 PM     CMP:    Lab Results   Component Value Date/Time     11/11/2022 08:42 PM    K 4.2 11/11/2022 08:42 PM    K 4.7 12/13/2020 05:22 AM    CL 91 11/11/2022 08:42 PM    CO2 18 11/11/2022 08:42 PM    BUN 58 11/11/2022 08:42 PM    CREATININE 2.1 11/11/2022 08:42 PM    GFRAA >60 12/13/2020 05:22 AM    LABGLOM 34 11/11/2022 08:42 PM    GLUCOSE 280 11/11/2022 08:42 PM    PROT 5.8 11/11/2022 08:42 PM    LABALBU 3.3 11/11/2022 08:42 PM    CALCIUM 9.2 11/11/2022 08:42 PM    BILITOT 0.8 11/11/2022 08:42 PM    ALKPHOS 69 11/11/2022 08:42 PM    AST 17 11/11/2022 08:42 PM    ALT 14 11/11/2022 08:42 PM     Magnesium:    Lab Results   Component Value Date/Time    MG 1.9 12/09/2020 12:36 AM     Phosphorus:    Lab Results   Component Value Date/Time    PHOS 4.3 12/09/2020 12:36 AM     Radiology Review:      CXR 11/11/22   No acute cardiopulmonary process. CT abdomen and pelvis without IV contrast 11/11/22   Large amount of stool within the rectal vault with rectal wall thickening,   suggestive of stercoral colitis. Clinical correlation recommended. Compression deformity of L1, new since the prior examination but chronic in   appearance. Correlate with point tenderness. Patchy left lower lung ground-glass opacities, concerning for pneumonia. Moderate hiatal hernia. Additional findings as above. CT head without IV contrast 11/11/22   No acute intracranial abnormality. Diffuse volume loss and probable chronic small vessel ischemic white matter   change, but it has progressed significantly compared to the prior exam and   therefore other white matter processes such is demyelinating disease or   vasculitis are not excluded. IMPRESSION/RECOMMENDATIONS:      Briefly, Mr. Ashly Tate is a 74year old male with a PMH of IDDM with diabetic retinopathy, HTN, HLD, subdural hematoma (2018), TIA, dementia, anxiety, depression and noncompliance, who was admitted on November 11, 2022 after presenting to the ER with weakness. Apparently, he was found on the ground covered in feces. His labs in ER were significant for lactic acid 4.4, sodium 131, chloride 91, bicarbonate 18, BUN 58 mg/dL, and creatinine 2.1 mg/dL, which are reasons for this consultation. A CT of the abdomen and pelvis was obtained which revealed a large amount of retained stool and possible pneumonia. He received a 1L normal saline bolus in ER.  He admits to vomiting, diarrhea, and very poor oral intake. CLEMENTE stage I, likely volume responsive pre-renal CLEMENTE secondary to vomiting, diarrhea, and poor oral intake. We will continue with IV fluids. Hypotonic hyponatremia, translocational secondary to hyperglycemia. Corrected sodium for hyperglycemia 134. HAGMA, secondary to lactic acidosis. To start on bicarbonate drip. Lactic acidosis, lactic acid 4.4  HTN, not currently on BP medications   ---------------------------------------------------------------  Probable pneumonia, on piperacillin-tazobactam and doxycycline  Large retained stool, on bowel regimen   Type II DM with diabetic retinopathy, on SSI      Plan:    Obtain BMP  Change IV fluids to 1/2 NS + 75 mEq sodium bicarbonate at 100 cc/hr  Strict I&Os  Better glucose control   Monitor renal function, BMP 4 PM  Monitor bicarbonate level   Obtain urine indices  Obtain kidney ultrasound   Obtain magnesium and phosphorus levels    Case and plan discussed with Dr. Hebert Bazan    Thank you so much Dr. Elias Cha for allowing us to participate in the care of Mr. Karlene Montes.      Electronically signed by MIRA Torres CNP on 11/12/2022 at 12:50 PM

## 2022-11-12 NOTE — ED NOTES
Troponin collected and sent to lab with downtime blood requisition form        Nitesh Vitale RN  11/12/22 5211

## 2022-11-12 NOTE — ED NOTES
Pt incontinent of urine and stool x2. Linens and pads changed at this time.             Emmanuelle Bonilla RN  11/12/22 5878

## 2022-11-12 NOTE — CONSULTS
GENERAL SURGERY  CONSULT NOTE  11/12/2022    Physician Consulted: Dr. Farshad Garcia   Reason for Consult: stercoral colitis   Referring Physician: Dr. Jez STONE  Shweta Farmer is a 74 y.o. male hx of DM, depression who presents for evaluation of failure to thrive after being found down on the ground. The patient states he fell out of the bed earlier today and was unable to get up off the ground. Per ED report that patient was brought in deconditioned and covered in feces. General surgery was consulted for concern of stercoral colitis. Patient was examined. He currently denies any abdominal pain but reports that he has not had a bowel movement in four weeks until loose BM today in the ED. He reports decreased appetite with nausea. No emesis. Denies any fevers or chills. He states this is his first episode of severe constipation. Denies any prior abdominal surgical hx. Last colonoscopy > 10 years ago. CTAP showing constipation, fecal impaction with rectal thickening. No evidence of obstruction or perforation     WBC 11.1. LA 4.4. Cr 2.1 Troponin 57    Past Medical History:   Diagnosis Date    Anxiety     Bronchitis     Cellulitis     Chronic sinusitis     Depression     Diabetes mellitus ([address]     Diabetic retinopathy ([address]     Fracture of left foot     High cholesterol     Hypercholesteremia     Hypercholesterolemia     Hypertension     Lumbago     Moderate mood disorder ([address]     Third nerve palsy        Past Surgical History:   Procedure Laterality Date    EYE SURGERY      HIP SURGERY Right 11/19/2020    HIP HEMIARTHROPLASTY performed by Artem Delong MD at [facility] Left 12/10/2020    HIP HEMIARTHROPLASTY -- HERB -- DROPLET +     PT. COMING FROM [place] performed by Carlita Call MD at [facility] OR       Medications Prior to Admission:    Prior to Admission medications    Medication Sig Start Date End Date Taking?  Authorizing Provider   senna (SENOKOT) 8.6 MG tablet Take 1 tablet by mouth daily    Historical Provider, MD   oxybutynin (DITROPAN-XL) 5 MG extended release tablet Take 5 mg by mouth 2 times daily    Historical Provider, MD   acetaminophen (TYLENOL) 325 MG tablet Take 650 mg by mouth every 6 hours as needed for Pain    Historical Provider, MD   Cholecalciferol (VITAMIN D3) 1.25 MG (80657 UT) CAPS Take 1 capsule by mouth daily    Historical Provider, MD   zinc sulfate (ZINCATE) 220 (50 Zn) MG capsule Take 50 mg by mouth daily    Historical Provider, MD   apixaban (ELIQUIS) 2.5 MG TABS tablet Take 2.5 mg by mouth 2 times daily    Historical Provider, MD   Calcium Carb-Cholecalciferol (OYSTERCAL-D) 500-400 MG-UNIT TABS Take by mouth    Historical Provider, MD   ascorbic acid (VITAMIN C) 1000 MG tablet Take 1,000 mg by mouth daily    Historical Provider, MD   melatonin 5 MG TABS tablet Take 5 mg by mouth daily    Historical Provider, MD   Wound Cleanser LIQD Apply topically Apply to coccyx at bedtime every 3 days    Historical Provider, MD   oxyCODONE-acetaminophen (PERCOCET) 5-325 MG per tablet Take 1 tablet by mouth every 4 hours as needed for Pain.     Historical Provider, MD   glucose 4 g chewable tablet Take 4 g by mouth as needed for Low blood sugar  7/28/20   Historical Provider, MD   Multiple Vitamins-Minerals (MULTIVITAMIN ADULT PO) Take 1 tablet by mouth daily 1/7/20   Historical Provider, MD   omeprazole (PRILOSEC) 40 MG delayed release capsule Take 40 mg by mouth daily 7/28/20   Historical Provider, MD   aspirin EC 81 MG EC tablet Take 1 tablet by mouth 2 times daily for 28 days 11/19/20 1/11/21  Sonia Ireland DO   OLANZapine (ZYPREXA) 7.5 MG tablet Take 1 tablet by mouth nightly 10/13/20 01/09/86  MIRA Huntley CNP   venlafaxine (EFFEXOR XR) 75 MG extended release capsule Take 1 capsule by mouth daily (with breakfast) 10/14/20 92/29/47  MIRA Huntley CNP   insulin glargine (LANTUS) 100 UNIT/ML injection vial Inject 60 Units into the skin nightly 10/13/20   Jose E Massey MD   glimepiride (AMARYL) 4 MG tablet Take 4 mg by mouth every morning (before breakfast)    Historical Provider, MD   lamoTRIgine (LAMICTAL) 25 MG tablet Take 25 mg by mouth nightly    Historical Provider, MD   rosuvastatin (CRESTOR) 10 MG tablet Take 10 mg by mouth daily    Historical Provider, MD   docusate sodium (COLACE) 100 MG capsule Take 100 mg by mouth 2 times daily    Historical Provider, MD       Allergies   Allergen Reactions    Empagliflozin Other (See Comments)    Liraglutide Diarrhea       History reviewed. No pertinent family history. Social History     Tobacco Use    Smoking status: Never    Smokeless tobacco: Never   Substance Use Topics    Alcohol use: No    Drug use: No         Review of Systems   General ROS: malaise   Hematological and Lymphatic ROS: negative  Respiratory ROS: no cough, shortness of breath, or wheezing  Cardiovascular ROS: no chest pain or dyspnea on exertion  Gastrointestinal ROS: nausea, constipation x 4 weeks   Genito-Urinary ROS: no dysuria, trouble voiding, or hematuria  Musculoskeletal ROS: negative      PHYSICAL EXAM:    Vitals:    11/12/22 0130   BP: (!) 142/88   Pulse: (!) 106   Resp: 16   Temp:    SpO2: 96%       General Appearance:  appears frail  Skin:  Skin color, texture, turgor normal. No rashes or lesions. Head/face:  NCAT  Eyes:  No gross abnormalities. and PERRL  Lungs:  no increased work of breathing BS clear b/l   Heart:  mild tachycardia no extra heart sounds   Abdomen:  soft nondistended. Non tender   Extremities: pulses present in all extremities  Male Rectal:  loose stool in rectal vault.  Unable to disimpact     LABS:  CBC  Recent Labs     11/11/22 2042   WBC 11.1   HGB 12.1*   HCT 34.6*        BMP  Recent Labs     11/11/22 2042   *   K 4.2   CL 91*   CO2 18*   BUN 58*   CREATININE 2.1*   CALCIUM 9.2     Liver Function  Recent Labs     11/11/22 2042   BILITOT 0.8   AST 17   ALT 14   ALKPHOS 69   PROT 5.8* LABALBU 3.3*     No results for input(s): LACTATE in the last 72 hours. Recent Labs     11/11/22 2042   INR 1.5       RADIOLOGY  CT ABDOMEN PELVIS WO CONTRAST Additional Contrast? None    Result Date: 11/11/2022  EXAMINATION: CT OF THE ABDOMEN AND PELVIS WITHOUT CONTRAST 11/11/2022 10:41 pm TECHNIQUE: CT of the abdomen and pelvis was performed without the administration of intravenous contrast. Multiplanar reformatted images are provided for review. Automated exposure control, iterative reconstruction, and/or weight based adjustment of the mA/kV was utilized to reduce the radiation dose to as low as reasonably achievable. COMPARISON: 08/21/2020 HISTORY: ORDERING SYSTEM PROVIDED HISTORY: ab pain, diarhhea TECHNOLOGIST PROVIDED HISTORY: Reason for exam:->ab pain, diarhhea Additional Contrast?->None Decision Support Exception - unselect if not a suspected or confirmed emergency medical condition->Emergency Medical Condition ([place]) What reading provider will be dictating this exam?->CRC FINDINGS: Lower Chest: There are ground-glass opacities in the left lung base. There is a small right pleural effusion. Moderate hiatal hernia. Organs: The liver, gallbladder, spleen, pancreas are within normal limits. Redemonstration of complex right adrenal nodule, not significantly changed from prior examination. Left adrenal gland is grossly unremarkable. There are bilateral renal cystic lesions, similar to the prior examination. No hydronephrosis. Knee new GI/Bowel: There is no evidence of bowel obstruction. No evidence of abnormal bowel wall thickening or distension. There is a large amount of stool within the rectal vault. There is rectal wall thickening. Pelvis: The urinary bladder is distended. The prostate is not well visualized due to extensive streak artifact. Peritoneum/Retroperitoneum: No evidence of ascites or free air. No evidence of lymphadenopathy. Aorta is normal in caliber.  Bones/Soft Tissues:  No acute abnormality of the visualized osseous structures. Status post bilateral hip arthroplasties. New compression deformity of L1, likely chronic. Large amount of stool within the rectal vault with rectal wall thickening, suggestive of stercoral colitis. Clinical correlation recommended. Compression deformity of L1, new since the prior examination but chronic in appearance. Correlate with point tenderness. Patchy left lower lung ground-glass opacities, concerning for pneumonia. Moderate hiatal hernia. Additional findings as above. CT HEAD WO CONTRAST    Result Date: 11/11/2022  EXAMINATION: CT OF THE HEAD WITHOUT CONTRAST  11/11/2022 8:41 pm TECHNIQUE: CT of the head was performed without the administration of intravenous contrast. Automated exposure control, iterative reconstruction, and/or weight based adjustment of the mA/kV was utilized to reduce the radiation dose to as low as reasonably achievable. COMPARISON: 12/09/2020 HISTORY: ORDERING SYSTEM PROVIDED HISTORY: [facility] TECHNOLOGIST PROVIDED HISTORY: Has a \"code stroke\" or \"stroke alert\" been called? ->No Reason for exam:->[facility] Decision Support Exception - unselect if not a suspected or confirmed emergency medical condition->Emergency Medical Condition ([place]) What reading provider will be dictating this exam?->CRC FINDINGS: BRAIN/VENTRICLES: There is no acute intracranial hemorrhage, mass effect or midline shift. No abnormal extra-axial fluid collection. The gray-white differentiation is maintained without evidence of an acute infarct. There is prominence of the ventricles and sulci due to global parenchymal volume loss. There are nonspecific areas of hypoattenuation within the periventricular and subcortical white matter, which likely represent chronic microvascular ischemic change, but has progressed compared to the prior exam. ORBITS: The visualized portion of the orbits demonstrate no acute abnormality.  SINUSES: The visualized paranasal sinuses and mastoid air cells demonstrate no acute abnormality. SOFT TISSUES/SKULL: No acute abnormality of the visualized skull or soft tissues. No acute intracranial abnormality. Diffuse volume loss and probable chronic small vessel ischemic white matter change, but it has progressed significantly compared to the prior exam and therefore other white matter processes such is demyelinating disease or vasculitis are not excluded. CT CERVICAL SPINE WO CONTRAST    Result Date: 11/11/2022  EXAMINATION: CT OF THE CERVICAL SPINE WITHOUT CONTRAST 11/11/2022 10:41 pm TECHNIQUE: CT of the cervical spine was performed without the administration of intravenous contrast. Multiplanar reformatted images are provided for review. Automated exposure control, iterative reconstruction, and/or weight based adjustment of the mA/kV was utilized to reduce the radiation dose to as low as reasonably achievable. COMPARISON: None. HISTORY: ORDERING SYSTEM PROVIDED HISTORY: trauma TECHNOLOGIST PROVIDED HISTORY: Reason for exam:->trauma Decision Support Exception - unselect if not a suspected or confirmed emergency medical condition->Emergency Medical Condition ([place]) What reading provider will be dictating this exam?->CRC FINDINGS: BONES/ALIGNMENT: There is no acute fracture or traumatic malalignment. DEGENERATIVE CHANGES: Are mild-to-moderate discogenic changes predominantly at the C5-6 and C6-7 levels without evidence of acute fracture or subluxation. SOFT TISSUES: There is no prevertebral soft tissue swelling. No acute abnormality of the cervical spine.      XR CHEST PORTABLE    Result Date: 11/11/2022  EXAMINATION: ONE XRAY VIEW OF THE CHEST 11/11/2022 8:49 pm COMPARISON: 8 December 2020 HISTORY: ORDERING SYSTEM PROVIDED HISTORY: weakness, Possible Stroke TECHNOLOGIST PROVIDED HISTORY: Reason for exam:->weakness, Possible Stroke What reading provider will be dictating this exam?->CRC FINDINGS: Single AP upright portable chest demonstrates suboptimal inspiratory effort with no focal alveolar infiltrates or effusions. The cardiac silhouette appears unremarkable. There is no evidence of a pneumothorax. The soft tissues and osseous structures are unremarkable. No acute cardiopulmonary process. ASSESSMENT:  74 y.o. male with constipation x 4 weeks now with stercoral colitis    PLAN:  - disimpaction attempted  - aggressive bowel regimen; suppository followed by enema, 1 gallon of Golytely  - correct electrolytes   - repeat LA   - IV fluid rehydration       Electronically signed by Cherelle Gonzales MD on 11/12/22 at 2:32 AM EST        Attending Attestation   Patient seen and examined, agree with resident note except for changes made by me, for remaining HP/Consult/progress note details please see resident HP/Consult/progress note.         Yoanna Slade MD

## 2022-11-12 NOTE — PROGRESS NOTES
This is an additional note to the H& P written by my colleague this morning. Please refer to the note for full details. Patient was seen and examined at bedside personally by me. Patient was seen at bedside and appeared to be alert, oriented. Mentions that he has not had a bowel movement in the last 3 to 4 weeks. Mentions that he has been having a decreased appetite as well. Mentions that his daughter occasionally comes and checks on him at home. Stays by himself. Stercoral colitis  Failure to thrive  Acute kidney injury  Lactic acidosis  Non-insulin-dependent diabetes mellitus type 2 with diabetic retinopathy  Mood disorder-currently on lamotrigine  Hyperlipidemia  Hypertension    Continue on antibiotics-Zosyn, doxycycline  Neurosurgery-attempted disimpaction, continue bowel regimen  Hemoglobin A1c reviewed-currently at 7.1. Placed on sliding scale insulin along with hypoglycemia protocol. Sulfonylurea was held at this time. Unsure why the patient is on Eliquis. Not able to find on chart review.  Will DC at this time as PCP's med list from -9/29/22 does not contain Elqiuis as well   consulted- may need placement       Scheduled Meds:   bisacodyl  10 mg Rectal Q6H    piperacillin-tazobactam  3,375 mg IntraVENous Q8H    doxycycline (VIBRAMYCIN) IV  100 mg IntraVENous Q12H    apixaban  2.5 mg Oral BID    aspirin EC  81 mg Oral BID    lamoTRIgine  25 mg Oral Nightly    melatonin  5 mg Oral Daily    pantoprazole  40 mg Oral QAM AC    rosuvastatin  10 mg Oral Daily    sodium chloride flush  10 mL IntraVENous 2 times per day    insulin lispro  0-4 Units SubCUTAneous TID WC    insulin lispro  0-4 Units SubCUTAneous Nightly     Continuous Infusions:   dextrose      sodium chloride      sodium chloride 125 mL/hr at 11/12/22 0233     PRN Meds:.bisacodyl, ipratropium-albuterol, oxyCODONE-acetaminophen, glucose, dextrose bolus **OR** dextrose bolus, glucagon (rDNA), dextrose, sodium chloride flush, sodium chloride, ondansetron **OR** ondansetron, magnesium hydroxide, acetaminophen **OR** acetaminophen          Jatinder Kelly MD

## 2022-11-12 NOTE — H&P
Hospitalist History & Physical      PATIENT NAME:  Anahi Kirk    MRN:  [id number]  SERVICE DATE:  11/12/22    Primary Care Physician: Batool Hammond MD       SUBJECTIVE  CHIEF COMPLAINT:  had concerns including Failure To Thrive (Pt sent in from home for increased weakness and failure to thrive over past 4 days). HPI:  Mr. Anahi Kirk, a 74y.o. year old male  who  has a past medical history of Anxiety, Bronchitis, Cellulitis, Chronic sinusitis, Depression, Diabetes mellitus ([address] Diabetic retinopathy ([address] Fracture of left foot, High cholesterol, Hypercholesteremia, Hypercholesterolemia, Hypertension, Lumbago, Moderate mood disorder ([address] and Third nerve palsy. presents with failure to thrive and found down on the ground. Pt states she feel out of bed earlier today and was unable to get back on bed. Pt brought to ER deconditioned and covered in feces. No fever or chills no cough or SOB. PAST MEDICAL HISTORY:    Past Medical History:   Diagnosis Date    Anxiety     Bronchitis     Cellulitis     Chronic sinusitis     Depression     Diabetes mellitus ([address]     Diabetic retinopathy ([address]     Fracture of left foot     High cholesterol     Hypercholesteremia     Hypercholesterolemia     Hypertension     Lumbago     Moderate mood disorder ([address]     Third nerve palsy      PAST SURGICAL HISTORY:    Past Surgical History:   Procedure Laterality Date    EYE SURGERY      HIP SURGERY Right 11/19/2020    HIP HEMIARTHROPLASTY performed by Danette Wilkes MD at [facility] Left 12/10/2020    HIP HEMIARTHROPLASTY -- HERB -- DROPLET +     PT. COMING FROM [address] performed by Saji Aguilera MD at [address] [place] [address] Blvd:  History reviewed. No pertinent family history.   SOCIAL HISTORY:    Social History     Socioeconomic History    Marital status:      Spouse name: Not on file    Number of children: Not on file    Years of education: Not on file    Highest education level: Not on file   Occupational History    Not on file   Tobacco Use    Smoking status: Never    Smokeless tobacco: Never   Substance and Sexual Activity    Alcohol use: No    Drug use: No    Sexual activity: Not Currently   Other Topics Concern    Not on file   Social History Narrative    Not on file     Social Determinants of Health     Financial Resource Strain: Not on file   Food Insecurity: Not on file   Transportation Needs: Not on file   Physical Activity: Not on file   Stress: Not on file   Social Connections: Not on file   Intimate Partner Violence: Not on file   Housing Stability: Not on file    TOBACCO:   reports that he has never smoked. He has never used smokeless tobacco.  ETOH:   reports no history of alcohol use. MEDICATIONS:   Prior to Admission medications    Medication Sig Start Date End Date Taking?  Authorizing Provider   senna (SENOKOT) 8.6 MG tablet Take 1 tablet by mouth daily    Historical Provider, MD   oxybutynin (DITROPAN-XL) 5 MG extended release tablet Take 5 mg by mouth 2 times daily    Historical Provider, MD   acetaminophen (TYLENOL) 325 MG tablet Take 650 mg by mouth every 6 hours as needed for Pain    Historical Provider, MD   Cholecalciferol (VITAMIN D3) 1.25 MG (13175 UT) CAPS Take 1 capsule by mouth daily    Historical Provider, MD   zinc sulfate (ZINCATE) 220 (50 Zn) MG capsule Take 50 mg by mouth daily    Historical Provider, MD   apixaban (ELIQUIS) 2.5 MG TABS tablet Take 2.5 mg by mouth 2 times daily    Historical Provider, MD   Calcium Carb-Cholecalciferol (OYSTERCAL-D) 500-400 MG-UNIT TABS Take by mouth    Historical Provider, MD   ascorbic acid (VITAMIN C) 1000 MG tablet Take 1,000 mg by mouth daily    Historical Provider, MD   melatonin 5 MG TABS tablet Take 5 mg by mouth daily    Historical Provider, MD   Wound Cleanser LIQD Apply topically Apply to coccyx at bedtime every 3 days    Historical Provider, MD   oxyCODONE-acetaminophen (PERCOCET) 5-325 MG per tablet Take 1 tablet by mouth every 4 hours as needed for Pain. Historical Provider, MD   glucose 4 g chewable tablet Take 4 g by mouth as needed for Low blood sugar  7/28/20   Historical Provider, MD   Multiple Vitamins-Minerals (MULTIVITAMIN ADULT PO) Take 1 tablet by mouth daily 1/7/20   Historical Provider, MD   omeprazole (PRILOSEC) 40 MG delayed release capsule Take 40 mg by mouth daily 7/28/20   Historical Provider, MD   aspirin EC 81 MG EC tablet Take 1 tablet by mouth 2 times daily for 28 days 11/19/20 1/11/21  Delmas Pollen, DO   OLANZapine (ZYPREXA) 7.5 MG tablet Take 1 tablet by mouth nightly 10/13/20 26/65/93  MIRA Young CNP   venlafaxine (EFFEXOR XR) 75 MG extended release capsule Take 1 capsule by mouth daily (with breakfast) 10/14/20 80/95/27  MIRA Young CNP   insulin glargine (LANTUS) 100 UNIT/ML injection vial Inject 60 Units into the skin nightly 10/13/20   Lisandro Alex MD   glimepiride (AMARYL) 4 MG tablet Take 4 mg by mouth every morning (before breakfast)    Historical Provider, MD   lamoTRIgine (LAMICTAL) 25 MG tablet Take 25 mg by mouth nightly    Historical Provider, MD   rosuvastatin (CRESTOR) 10 MG tablet Take 10 mg by mouth daily    Historical Provider, MD   docusate sodium (COLACE) 100 MG capsule Take 100 mg by mouth 2 times daily    Historical Provider, MD        ALLERGIES: Empagliflozin and Liraglutide    REVIEW OF SYSTEM:   ROS as noted in HPI, 12 point ROS reviewed and otherwise negative.     OBJECTIVE  PHYSICAL EXAM:   Vitals:    11/11/22 2330 11/12/22 0015 11/12/22 0100 11/12/22 0114   BP: (!) 148/91 (!) 150/90 (!) 141/86    Pulse: (!) 108 (!) 105 (!) 105    Resp: 16 16 16    Temp:       SpO2: 97% 96% 96%    Weight:    172 lb 2 oz (78.1 kg)       General appearance: alert, appears stated age and cooperative  CONSTITUTIONAL:  no apparent distress  ENT:  normocephalic, without obvious abnormality, atraumatic  NECK:  supple, symmetrical, trachea midline  Heart: regular rate and rhythm, S1, S2 normal   Lungs: clear to auscultation bilaterally  Abdomen: soft lax, not tender, not distended, positive bowel sounds  Extremities: extremities normal, atraumatic, no cyanosis, edema  Skin: Normal skin color. No rashes or lesions. Neurologic:  Neurovascularly intact without any focal sensory/motor deficits. Cranial nerves: II-XII intact, grossly non-focal.  Psychiatric: Alert and oriented, thought content appropriate, normal insight      DATA:     Diagnostic tests reviewed for today's visit:    Most recent labs and imaging results reviewed.    Labs:   Recent Results (from the past 72 hour(s))   CBC with Auto Differential    Collection Time: 11/11/22  8:42 PM   Result Value Ref Range    WBC 11.1 4.5 - 11.5 E9/L    RBC 4.61 3.80 - 5.80 E12/L    Hemoglobin 12.1 (L) 12.5 - 16.5 g/dL    Hematocrit 34.6 (L) 37.0 - 54.0 %    MCV 75.1 (L) 80.0 - 99.9 fL    MCH 26.2 26.0 - 35.0 pg    MCHC 35.0 (H) 32.0 - 34.5 %    RDW 13.3 11.5 - 15.0 fL    Platelets 007 090 - 108 E9/L    MPV 11.2 7.0 - 12.0 fL    Neutrophils % 86.6 (H) 43.0 - 80.0 %    Immature Granulocytes % 1.0 0.0 - 5.0 %    Lymphocytes % 6.5 (L) 20.0 - 42.0 %    Monocytes % 5.7 2.0 - 12.0 %    Eosinophils % 0.0 0.0 - 6.0 %    Basophils % 0.2 0.0 - 2.0 %    Neutrophils Absolute 9.57 (H) 1.80 - 7.30 E9/L    Immature Granulocytes # 0.11 E9/L    Lymphocytes Absolute 0.72 (L) 1.50 - 4.00 E9/L    Monocytes Absolute 0.63 0.10 - 0.95 E9/L    Eosinophils Absolute 0.00 (L) 0.05 - 0.50 E9/L    Basophils Absolute 0.02 0.00 - 0.20 E9/L   Comprehensive Metabolic Panel    Collection Time: 11/11/22  8:42 PM   Result Value Ref Range    Sodium 131 (L) 132 - 146 mmol/L    Potassium 4.2 3.5 - 5.0 mmol/L    Chloride 91 (L) 98 - 107 mmol/L    CO2 18 (L) 22 - 29 mmol/L    Anion Gap 22 (H) 7 - 16 mmol/L    Glucose 280 (H) 74 - 99 mg/dL    BUN 58 (H) 6 - 23 mg/dL    Creatinine 2.1 (H) 0.7 - 1.2 mg/dL    Est, Glom Filt Rate 34 >=60 mL/min/1.73    Calcium 9.2 8.6 - 10.2 mg/dL Total Protein 5.8 (L) 6.4 - 8.3 g/dL    Albumin 3.3 (L) 3.5 - 5.2 g/dL    Total Bilirubin 0.8 0.0 - 1.2 mg/dL    Alkaline Phosphatase 69 40 - 129 U/L    ALT 14 0 - 40 U/L    AST 17 0 - 39 U/L   APTT    Collection Time: 11/11/22  8:42 PM   Result Value Ref Range    aPTT 25.8 24.5 - 35.1 sec   Protime-INR    Collection Time: 11/11/22  8:42 PM   Result Value Ref Range    Protime 16.1 (H) 9.3 - 12.4 sec    INR 1.5    Troponin    Collection Time: 11/11/22  8:42 PM   Result Value Ref Range    Troponin, High Sensitivity 57 (H) 0 - 11 ng/L   Lactic Acid    Collection Time: 11/11/22  8:42 PM   Result Value Ref Range    Lactic Acid 4.4 (HH) 0.5 - 2.2 mmol/L   CK    Collection Time: 11/11/22  8:42 PM   Result Value Ref Range    Total CK 69 20 - 200 U/L   TYPE AND SCREEN    Collection Time: 11/11/22  8:42 PM   Result Value Ref Range    ABO/Rh A POS     Antibody Screen NEG      Oupatient labs:  Lab Results   Component Value Date    CHOL 114 10/08/2020    TRIG 71 10/08/2020    HDL 41 10/08/2020    LDLCALC 59 10/08/2020    TSH 0.708 12/09/2020    PSA 0.86 09/28/2018    INR 1.5 11/11/2022    LABA1C 9.0 (H) 11/18/2020       Urinalysis:    Lab Results   Component Value Date/Time    NITRU Negative 12/09/2020 04:47 AM    WBCUA 0-1 12/09/2020 04:47 AM    BACTERIA FEW 12/09/2020 04:47 AM    RBCUA 1-3 12/09/2020 04:47 AM    BLOODU TRACE-INTACT 12/09/2020 04:47 AM    SPECGRAV >=1.030 12/09/2020 04:47 AM    GLUCOSEU Negative 12/09/2020 04:47 AM       Imaging:  CT ABDOMEN PELVIS WO CONTRAST Additional Contrast? None    Result Date: 11/11/2022  EXAMINATION: CT OF THE ABDOMEN AND PELVIS WITHOUT CONTRAST 11/11/2022 10:41 pm TECHNIQUE: CT of the abdomen and pelvis was performed without the administration of intravenous contrast. Multiplanar reformatted images are provided for review.  Automated exposure control, iterative reconstruction, and/or weight based adjustment of the mA/kV was utilized to reduce the radiation dose to as low as reasonably achievable. COMPARISON: 08/21/2020 HISTORY: ORDERING SYSTEM PROVIDED HISTORY: ab pain, diarhhea TECHNOLOGIST PROVIDED HISTORY: Reason for exam:->ab pain, diarhhea Additional Contrast?->None Decision Support Exception - unselect if not a suspected or confirmed emergency medical condition->Emergency Medical Condition ([place]) What reading provider will be dictating this exam?->CRC FINDINGS: Lower Chest: There are ground-glass opacities in the left lung base. There is a small right pleural effusion. Moderate hiatal hernia. Organs: The liver, gallbladder, spleen, pancreas are within normal limits. Redemonstration of complex right adrenal nodule, not significantly changed from prior examination. Left adrenal gland is grossly unremarkable. There are bilateral renal cystic lesions, similar to the prior examination. No hydronephrosis. Knee new GI/Bowel: There is no evidence of bowel obstruction. No evidence of abnormal bowel wall thickening or distension. There is a large amount of stool within the rectal vault. There is rectal wall thickening. Pelvis: The urinary bladder is distended. The prostate is not well visualized due to extensive streak artifact. Peritoneum/Retroperitoneum: No evidence of ascites or free air. No evidence of lymphadenopathy. Aorta is normal in caliber. Bones/Soft Tissues:  No acute abnormality of the visualized osseous structures. Status post bilateral hip arthroplasties. New compression deformity of L1, likely chronic. Large amount of stool within the rectal vault with rectal wall thickening, suggestive of stercoral colitis. Clinical correlation recommended. Compression deformity of L1, new since the prior examination but chronic in appearance. Correlate with point tenderness. Patchy left lower lung ground-glass opacities, concerning for pneumonia. Moderate hiatal hernia. Additional findings as above.      CT HEAD WO CONTRAST    Result Date: 11/11/2022  EXAMINATION: CT OF THE HEAD WITHOUT CONTRAST  11/11/2022 8:41 pm TECHNIQUE: CT of the head was performed without the administration of intravenous contrast. Automated exposure control, iterative reconstruction, and/or weight based adjustment of the mA/kV was utilized to reduce the radiation dose to as low as reasonably achievable. COMPARISON: 12/09/2020 HISTORY: ORDERING SYSTEM PROVIDED HISTORY: ams TECHNOLOGIST PROVIDED HISTORY: Has a \"code stroke\" or \"stroke alert\" been called? ->No Reason for exam:->ams Decision Support Exception - unselect if not a suspected or confirmed emergency medical condition->Emergency Medical Condition (MA) What reading provider will be dictating this exam?->CRC FINDINGS: BRAIN/VENTRICLES: There is no acute intracranial hemorrhage, mass effect or midline shift. No abnormal extra-axial fluid collection. The gray-white differentiation is maintained without evidence of an acute infarct. There is prominence of the ventricles and sulci due to global parenchymal volume loss. There are nonspecific areas of hypoattenuation within the periventricular and subcortical white matter, which likely represent chronic microvascular ischemic change, but has progressed compared to the prior exam. ORBITS: The visualized portion of the orbits demonstrate no acute abnormality. SINUSES: The visualized paranasal sinuses and mastoid air cells demonstrate no acute abnormality. SOFT TISSUES/SKULL: No acute abnormality of the visualized skull or soft tissues. No acute intracranial abnormality. Diffuse volume loss and probable chronic small vessel ischemic white matter change, but it has progressed significantly compared to the prior exam and therefore other white matter processes such is demyelinating disease or vasculitis are not excluded.      CT CERVICAL SPINE WO CONTRAST    Result Date: 11/11/2022  EXAMINATION: CT OF THE CERVICAL SPINE WITHOUT CONTRAST 11/11/2022 10:41 pm TECHNIQUE: CT of the cervical spine was performed without the administration of intravenous contrast. Multiplanar reformatted images are provided for review. Automated exposure control, iterative reconstruction, and/or weight based adjustment of the mA/kV was utilized to reduce the radiation dose to as low as reasonably achievable. COMPARISON: None. HISTORY: ORDERING SYSTEM PROVIDED HISTORY: trauma TECHNOLOGIST PROVIDED HISTORY: Reason for exam:->trauma Decision Support Exception - unselect if not a suspected or confirmed emergency medical condition->Emergency Medical Condition (MA) What reading provider will be dictating this exam?->CRC FINDINGS: BONES/ALIGNMENT: There is no acute fracture or traumatic malalignment. DEGENERATIVE CHANGES: Are mild-to-moderate discogenic changes predominantly at the C5-6 and C6-7 levels without evidence of acute fracture or subluxation. SOFT TISSUES: There is no prevertebral soft tissue swelling. No acute abnormality of the cervical spine. XR CHEST PORTABLE    Result Date: 11/11/2022  EXAMINATION: ONE XRAY VIEW OF THE CHEST 11/11/2022 8:49 pm COMPARISON: 8 December 2020 HISTORY: ORDERING SYSTEM PROVIDED HISTORY: weakness, Possible Stroke TECHNOLOGIST PROVIDED HISTORY: Reason for exam:->weakness, Possible Stroke What reading provider will be dictating this exam?->CRC FINDINGS: Single AP upright portable chest demonstrates suboptimal inspiratory effort with no focal alveolar infiltrates or effusions. The cardiac silhouette appears unremarkable. There is no evidence of a pneumothorax. The soft tissues and osseous structures are unremarkable. No acute cardiopulmonary process. CT HEAD WO CONTRAST   Final Result   No acute intracranial abnormality. Diffuse volume loss and probable chronic small vessel ischemic white matter   change, but it has progressed significantly compared to the prior exam and   therefore other white matter processes such is demyelinating disease or   vasculitis are not excluded.          CT CERVICAL SPINE WO CONTRAST   Final Result   No acute abnormality of the cervical spine. CT ABDOMEN PELVIS WO CONTRAST Additional Contrast? None   Final Result   Large amount of stool within the rectal vault with rectal wall thickening,   suggestive of stercoral colitis. Clinical correlation recommended. Compression deformity of L1, new since the prior examination but chronic in   appearance. Correlate with point tenderness. Patchy left lower lung ground-glass opacities, concerning for pneumonia. Moderate hiatal hernia. Additional findings as above. XR CHEST PORTABLE   Final Result   No acute cardiopulmonary process. ASSESSMENT AND PLAN  Active Problems:    * No active hospital problems. *  Resolved Problems:    * No resolved hospital problems. *    - Stercoral colitis  - Pneumonia   - CLEMENTE  - Dehydration   - elevated lactic acid      Plan:  - admit to tele monitoring   - fall precautions   - iv fluid hydration, monitor I/o  - repeat Cr check, avoid nephrotoxics  - Consult Nephrology   - Check total CK  - iv zosyn doxycycline started in ER, will continue the same and follow cultures   - trend lactic acid   - oxygen supplement  - Duoneb nebulizers   - general surgery consulted in ER, appreciate recommendations       VTE Prophylaxis: on eliquis     DVT Prophylaxis: []Lovenox []Heparin []PCD [x] Warfarin/NOAC []Encouraged ambulation    Diet: No diet orders on file  Code Status: Prior  Surrogate decision maker confirmed with patient:  Primary Emergency Contact: Gabriela Shine, Home Phone: [phone]      Disposition: [x]Med/Surg [] Intermediate [] ICU/CCU   Admit status: [] Observation [x] Inpatient       Additional work up or/and treatment plan may be added today or thereafter based on clinical progression. I am managing a portion of pt care. Some medical issues are handled by other specialists.  Additional work up and treatment should be done by my colleague hospitalist and at out pt setting by pt PCP and other out pt providers.      SIGNATUREDubarbara Guillermo MD  DATE: November 12, 2022

## 2022-11-13 LAB
ALBUMIN SERPL-MCNC: 2.8 G/DL (ref 3.5–5.2)
ALP BLD-CCNC: 51 U/L (ref 40–129)
ALT SERPL-CCNC: 14 U/L (ref 0–40)
ANION GAP SERPL CALCULATED.3IONS-SCNC: 7 MMOL/L (ref 7–16)
AST SERPL-CCNC: 15 U/L (ref 0–39)
BASOPHILS ABSOLUTE: 0.04 E9/L (ref 0–0.2)
BASOPHILS RELATIVE PERCENT: 0.5 % (ref 0–2)
BILIRUB SERPL-MCNC: 0.7 MG/DL (ref 0–1.2)
BUN BLDV-MCNC: 31 MG/DL (ref 6–23)
CALCIUM SERPL-MCNC: 8.1 MG/DL (ref 8.6–10.2)
CHLORIDE BLD-SCNC: 99 MMOL/L (ref 98–107)
CO2: 27 MMOL/L (ref 22–29)
CREAT SERPL-MCNC: 1.4 MG/DL (ref 0.7–1.2)
EOSINOPHILS ABSOLUTE: 0.17 E9/L (ref 0.05–0.5)
EOSINOPHILS RELATIVE PERCENT: 2.3 % (ref 0–6)
GFR SERPL CREATININE-BSD FRML MDRD: 56 ML/MIN/1.73
GLUCOSE BLD-MCNC: 98 MG/DL (ref 74–99)
HCT VFR BLD CALC: 27.3 % (ref 37–54)
HEMOGLOBIN: 9.9 G/DL (ref 12.5–16.5)
IMMATURE GRANULOCYTES #: 0.03 E9/L
IMMATURE GRANULOCYTES %: 0.4 % (ref 0–5)
LYMPHOCYTES ABSOLUTE: 2.03 E9/L (ref 1.5–4)
LYMPHOCYTES RELATIVE PERCENT: 27.4 % (ref 20–42)
MAGNESIUM: 1.1 MG/DL (ref 1.6–2.6)
MAGNESIUM: 1.8 MG/DL (ref 1.6–2.6)
MCH RBC QN AUTO: 26.7 PG (ref 26–35)
MCHC RBC AUTO-ENTMCNC: 36.3 % (ref 32–34.5)
MCV RBC AUTO: 73.6 FL (ref 80–99.9)
METER GLUCOSE: 109 MG/DL (ref 74–99)
METER GLUCOSE: 177 MG/DL (ref 74–99)
METER GLUCOSE: 225 MG/DL (ref 74–99)
METER GLUCOSE: 238 MG/DL (ref 74–99)
MONOCYTES ABSOLUTE: 0.58 E9/L (ref 0.1–0.95)
MONOCYTES RELATIVE PERCENT: 7.8 % (ref 2–12)
NEUTROPHILS ABSOLUTE: 4.55 E9/L (ref 1.8–7.3)
NEUTROPHILS RELATIVE PERCENT: 61.6 % (ref 43–80)
PDW BLD-RTO: 13.1 FL (ref 11.5–15)
PLATELET # BLD: 202 E9/L (ref 130–450)
PMV BLD AUTO: 10.9 FL (ref 7–12)
POTASSIUM REFLEX MAGNESIUM: 3.2 MMOL/L (ref 3.5–5)
RBC # BLD: 3.71 E12/L (ref 3.8–5.8)
SODIUM BLD-SCNC: 133 MMOL/L (ref 132–146)
TOTAL PROTEIN: 4.8 G/DL (ref 6.4–8.3)
WBC # BLD: 7.4 E9/L (ref 4.5–11.5)

## 2022-11-13 PROCEDURE — 36415 COLL VENOUS BLD VENIPUNCTURE: CPT

## 2022-11-13 PROCEDURE — 83735 ASSAY OF MAGNESIUM: CPT

## 2022-11-13 PROCEDURE — 85025 COMPLETE CBC W/AUTO DIFF WBC: CPT

## 2022-11-13 PROCEDURE — 2500000003 HC RX 250 WO HCPCS: Performed by: NURSE PRACTITIONER

## 2022-11-13 PROCEDURE — 2580000003 HC RX 258: Performed by: INTERNAL MEDICINE

## 2022-11-13 PROCEDURE — 2580000003 HC RX 258: Performed by: NURSE PRACTITIONER

## 2022-11-13 PROCEDURE — 82962 GLUCOSE BLOOD TEST: CPT

## 2022-11-13 PROCEDURE — 80053 COMPREHEN METABOLIC PANEL: CPT

## 2022-11-13 PROCEDURE — 6360000002 HC RX W HCPCS: Performed by: INTERNAL MEDICINE

## 2022-11-13 PROCEDURE — 2500000003 HC RX 250 WO HCPCS: Performed by: INTERNAL MEDICINE

## 2022-11-13 PROCEDURE — 6360000002 HC RX W HCPCS: Performed by: STUDENT IN AN ORGANIZED HEALTH CARE EDUCATION/TRAINING PROGRAM

## 2022-11-13 PROCEDURE — 1200000000 HC SEMI PRIVATE

## 2022-11-13 PROCEDURE — 6370000000 HC RX 637 (ALT 250 FOR IP): Performed by: STUDENT IN AN ORGANIZED HEALTH CARE EDUCATION/TRAINING PROGRAM

## 2022-11-13 PROCEDURE — 6370000000 HC RX 637 (ALT 250 FOR IP): Performed by: INTERNAL MEDICINE

## 2022-11-13 RX ORDER — POTASSIUM CHLORIDE 20 MEQ/1
40 TABLET, EXTENDED RELEASE ORAL PRN
Status: DISCONTINUED | OUTPATIENT
Start: 2022-11-13 | End: 2022-11-17 | Stop reason: HOSPADM

## 2022-11-13 RX ORDER — POTASSIUM CHLORIDE 7.45 MG/ML
10 INJECTION INTRAVENOUS PRN
Status: DISCONTINUED | OUTPATIENT
Start: 2022-11-13 | End: 2022-11-17 | Stop reason: HOSPADM

## 2022-11-13 RX ORDER — MAGNESIUM SULFATE 1 G/100ML
1000 INJECTION INTRAVENOUS PRN
Status: DISCONTINUED | OUTPATIENT
Start: 2022-11-13 | End: 2022-11-17 | Stop reason: HOSPADM

## 2022-11-13 RX ORDER — SODIUM CHLORIDE 9 MG/ML
INJECTION, SOLUTION INTRAVENOUS CONTINUOUS
Status: DISCONTINUED | OUTPATIENT
Start: 2022-11-13 | End: 2022-11-15

## 2022-11-13 RX ORDER — HEPARIN SODIUM 10000 [USP'U]/ML
5000 INJECTION, SOLUTION INTRAVENOUS; SUBCUTANEOUS EVERY 8 HOURS
Status: DISCONTINUED | OUTPATIENT
Start: 2022-11-13 | End: 2022-11-17 | Stop reason: HOSPADM

## 2022-11-13 RX ADMIN — ASPIRIN 81 MG: 81 TABLET, COATED ORAL at 20:10

## 2022-11-13 RX ADMIN — PIPERACILLIN AND TAZOBACTAM 3375 MG: 3; .375 INJECTION, POWDER, FOR SOLUTION INTRAVENOUS at 16:21

## 2022-11-13 RX ADMIN — MAGNESIUM SULFATE HEPTAHYDRATE 1000 MG: 1 INJECTION, SOLUTION INTRAVENOUS at 12:22

## 2022-11-13 RX ADMIN — BISACODYL 10 MG: 10 SUPPOSITORY RECTAL at 13:51

## 2022-11-13 RX ADMIN — ASPIRIN 81 MG: 81 TABLET, COATED ORAL at 08:35

## 2022-11-13 RX ADMIN — BISACODYL 10 MG: 10 SUPPOSITORY RECTAL at 04:06

## 2022-11-13 RX ADMIN — DOXYCYCLINE 100 MG: 100 INJECTION, POWDER, LYOPHILIZED, FOR SOLUTION INTRAVENOUS at 13:50

## 2022-11-13 RX ADMIN — MAGNESIUM SULFATE HEPTAHYDRATE 1000 MG: 1 INJECTION, SOLUTION INTRAVENOUS at 10:03

## 2022-11-13 RX ADMIN — HEPARIN SODIUM 5000 UNITS: 10000 INJECTION INTRAVENOUS; SUBCUTANEOUS at 20:15

## 2022-11-13 RX ADMIN — POTASSIUM PHOSPHATE, MONOBASIC AND POTASSIUM PHOSPHATE, DIBASIC 10 MMOL: 224; 236 INJECTION, SOLUTION, CONCENTRATE INTRAVENOUS at 11:30

## 2022-11-13 RX ADMIN — MAGNESIUM SULFATE HEPTAHYDRATE 1000 MG: 1 INJECTION, SOLUTION INTRAVENOUS at 11:12

## 2022-11-13 RX ADMIN — Medication 5 MG: at 20:10

## 2022-11-13 RX ADMIN — POTASSIUM CHLORIDE 40 MEQ: 1500 TABLET, EXTENDED RELEASE ORAL at 08:35

## 2022-11-13 RX ADMIN — Medication 10 ML: at 20:12

## 2022-11-13 RX ADMIN — PANTOPRAZOLE SODIUM 40 MG: 40 TABLET, DELAYED RELEASE ORAL at 05:17

## 2022-11-13 RX ADMIN — PIPERACILLIN AND TAZOBACTAM 3375 MG: 3; .375 INJECTION, POWDER, FOR SOLUTION INTRAVENOUS at 00:54

## 2022-11-13 RX ADMIN — SODIUM CHLORIDE: 9 INJECTION, SOLUTION INTRAVENOUS at 11:17

## 2022-11-13 RX ADMIN — Medication 10 ML: at 08:39

## 2022-11-13 RX ADMIN — LAMOTRIGINE 25 MG: 25 TABLET ORAL at 20:11

## 2022-11-13 RX ADMIN — DOXYCYCLINE 100 MG: 100 INJECTION, POWDER, LYOPHILIZED, FOR SOLUTION INTRAVENOUS at 04:05

## 2022-11-13 RX ADMIN — PIPERACILLIN AND TAZOBACTAM 3375 MG: 3; .375 INJECTION, POWDER, FOR SOLUTION INTRAVENOUS at 08:48

## 2022-11-13 RX ADMIN — BISACODYL 10 MG: 10 SUPPOSITORY RECTAL at 08:35

## 2022-11-13 RX ADMIN — HEPARIN SODIUM 5000 UNITS: 10000 INJECTION INTRAVENOUS; SUBCUTANEOUS at 13:52

## 2022-11-13 RX ADMIN — ROSUVASTATIN 10 MG: 10 TABLET, FILM COATED ORAL at 08:35

## 2022-11-13 RX ADMIN — BISACODYL 10 MG: 10 SUPPOSITORY RECTAL at 20:11

## 2022-11-13 RX ADMIN — MAGNESIUM SULFATE HEPTAHYDRATE 1000 MG: 1 INJECTION, SOLUTION INTRAVENOUS at 08:55

## 2022-11-13 RX ADMIN — INSULIN LISPRO 1 UNITS: 100 INJECTION, SOLUTION INTRAVENOUS; SUBCUTANEOUS at 16:22

## 2022-11-13 NOTE — PROGRESS NOTES
GENERAL SURGERY  DAILY PROGRESS NOTE  11/13/2022  Chief Complaint   Patient presents with    Failure To Thrive     Pt sent in from home for increased weakness and failure to thrive over past 4 days       Subjective:  No acute events overnight. Patient denies any n/v/d. No flatus, no BM. Denies any CP, SOB. Objective:  /79   Pulse 81   Temp 97.4 °F (36.3 °C) (Temporal)   Resp 18   Ht 5' 8.11\" (1.73 m)   Wt 167 lb 3.2 oz (75.8 kg)   SpO2 96%   BMI 25.34 kg/m²     GENERAL:  Laying in bed, awake, alert, cooperative, no apparent distress  HEAD: Normocephalic, atraumatic  EYES: No sclera icterus, pupils equal, EOMI,   LUNGS:  No increased work of breathing, lungs clear to ascultation b/l  CARDIOVASCULAR:  RRR  ABDOMEN:  Soft, non-tender, non-distended, normoactive bowel sounds  EXTREMITIES: No edema or swelling  SKIN: Warm and dry    Assessment/Plan:  74 y.o. male with constipation x4 wks now with stercoral colitits    Aggressive bowel regimen, duclolax, golytely 4L, soap suds enema  Abx: doxycycline, zosyn    Pain/Analgesia: tylenol supp  Bowel regimen: duclolax, golytely 4L, soap suds enema  DVT ppx: not currently on Eliquis  GI ppx: Protonix   Glucose protocol: not currently indicated     Mouth/eye care: As needed per patient  Lamar:   none  CVC sites:   none  Ancillary consults:  nephrology      Electronically signed by Soraida Mckeon MD on 11/13/2022 at 9:18 AM       Attending Attestation   Patient seen and examined, agree with resident note except for changes made by me, for remaining HP/Consult/progress note details please see resident HP/Consult/progress note. Aggressive bowel regimen.   Jeovany Singh for diet,     Bushra Fan MD

## 2022-11-13 NOTE — PROGRESS NOTES
Hospitalist Progress Note      PCP: Alfreida Hatchet, MD    Date of Admission: 11/11/2022    Chief Complaint: Abdominal pain    Hospital Course:   82-year-old male presents with failure to thrive and found down on the ground. Pt states he feel out of bed earlier today and was unable to get back on bed. Pt brought to ER deconditioned and covered in feces. CT abdomen showed severe constipation-stercoral colitis. General surgery was consulted-attempted disimpaction, bowel regimen was initiated. Started on antibiotics-Zosyn, doxycycline.  was consulted for placement. Nephrology was consulted for CLEMENTE. Also had hypomagnesemia, hypokalemia which was corrected    Subjective: Patient was seen at bedside this morning. Does not complain of any abdominal pain. Has been having small bowel movements with his meals.   Electrolyte correction,BOWEL regimen      Medications:  Reviewed    Infusion Medications    dextrose      sodium chloride      IV infusion builder 100 mL/hr at 11/12/22 1320     Scheduled Medications    polyethylene glycol  4,000 mL Oral Once    bisacodyl  10 mg Rectal Q6H    piperacillin-tazobactam  3,375 mg IntraVENous Q8H    doxycycline (VIBRAMYCIN) IV  100 mg IntraVENous Q12H    aspirin EC  81 mg Oral BID    lamoTRIgine  25 mg Oral Nightly    melatonin  5 mg Oral Daily    pantoprazole  40 mg Oral QAM AC    rosuvastatin  10 mg Oral Daily    sodium chloride flush  10 mL IntraVENous 2 times per day    insulin lispro  0-4 Units SubCUTAneous TID WC    insulin lispro  0-4 Units SubCUTAneous Nightly     PRN Meds: potassium chloride **OR** potassium alternative oral replacement **OR** potassium chloride, magnesium sulfate, bisacodyl, ipratropium-albuterol, oxyCODONE-acetaminophen, glucose, dextrose bolus **OR** dextrose bolus, glucagon (rDNA), dextrose, sodium chloride flush, sodium chloride, ondansetron **OR** ondansetron, magnesium hydroxide, acetaminophen **OR** acetaminophen      Intake/Output Summary (Last 24 hours) at 11/13/2022 0933  Last data filed at 11/13/2022 2888  Gross per 24 hour   Intake 2190.67 ml   Output 350 ml   Net 1840.67 ml       Exam:    /79   Pulse 81   Temp 97.4 °F (36.3 °C) (Temporal)   Resp 18   Ht 5' 8.11\" (1.73 m)   Wt 167 lb 3.2 oz (75.8 kg)   SpO2 96%   BMI 25.34 kg/m²     General appearance: No apparent distress, appears stated age and cooperative. HEENT: Pupils equal, round, and reactive to light. Conjunctivae/corneas clear. Neck: Supple, with full range of motion. No jugular venous distention. Trachea midline. Respiratory:  Normal respiratory effort. Clear to auscultation, bilaterally without Rales/Wheezes/Rhonchi. Cardiovascular: Regular rate and rhythm with normal S1/S2 without murmurs, rubs or gallops. Abdomen: Soft, non-tender, non-distended with normal bowel sounds. Musculoskeletal: No clubbing, cyanosis or edema bilaterally. Full range of motion without deformity. Skin: Skin color, texture, turgor normal.  No rashes or lesions.   Neurologic: No focal deficits  Psychiatric: Alert and oriented, thought content appropriate, normal insight    Labs:   Recent Labs     11/11/22 2042 11/13/22  0527   WBC 11.1 7.4   HGB 12.1* 9.9*   HCT 34.6* 27.3*    202     Recent Labs     11/12/22  1252 11/12/22  1440 11/13/22  0527   * 134 133   K 3.5 3.6 3.2*   CL 99 101 99   CO2 22 21* 27   BUN 44* 42* 31*   CREATININE 1.5* 1.5* 1.4*   CALCIUM 8.3* 8.4* 8.1*   PHOS  --  2.2*  --      Recent Labs     11/11/22 2042 11/13/22  0527   AST 17 15   ALT 14 14   BILITOT 0.8 0.7   ALKPHOS 69 51     Recent Labs     11/11/22 2042   INR 1.5     Recent Labs     11/11/22 2042 11/12/22  0854 11/12/22  1440   CKTOTAL 69 43 35       Assessment/Plan:    Active Hospital Problems    Diagnosis Date Noted    [facility] [N17.9] 11/12/2022     Priority: Medium    Weakness [R53.1] 11/12/2022     Priority: Medium   Stercoral colitis  Hypomagnesemia  Hypokalemia  Failure to thrive  Acute kidney injury  Lactic acidosis  Non-insulin-dependent diabetes mellitus type 2 with diabetic retinopathy  Mood disorder-currently on lamotrigine  Hyperlipidemia  Hypertension     Continue on antibiotics-Zosyn, doxycycline  Neurosurgery-attempted disimpaction, continue bowel regimen  Hemoglobin A1c reviewed-currently at 7.1. Placed on sliding scale insulin along with hypoglycemia protocol. Sulfonylurea was held at this time. Unsure why the patient is on Eliquis. Not able to find on chart review. Will DC at this time as PCP's med list from -9/29/22 does not contain Elqiuis as well  Electrolyte abnormality-replete per protocol  PT/OT Consulted   consulted- may need placement       DVT Prophylaxis: hep SC  Diet: ADULT DIET;  Regular  Code Status: Full Code    PT/OT Eval Status: Ordered    Dispo -ongoing specialist eval, electrolyte correction, placement    Jax Conway MD

## 2022-11-13 NOTE — PROGRESS NOTES
Department of Internal Medicine  Nephrology Progress Note    Events reviewed. SUBJECTIVE:  We are following MrAlyson Foy for CLEMENTE and hyponatremia. He reports no complaints.     PHYSICAL EXAM:      Vitals:    VITALS:  /79   Pulse 81   Temp 97.4 °F (36.3 °C) (Temporal)   Resp 18   Ht 5' 8.11\" (1.73 m)   Wt 167 lb 3.2 oz (75.8 kg)   SpO2 96%   BMI 25.34 kg/m²   24HR INTAKE/OUTPUT:    Intake/Output Summary (Last 24 hours) at 11/13/2022 1129  Last data filed at 11/13/2022 1057  Gross per 24 hour   Intake 2310.67 ml   Output 350 ml   Net 1960.67 ml       Constitutional:  Alert and oriented with intermittent confusion, ill-appearing  HEENT:  Normocephalic, PERRL, dry mucous membranes  Respiratory:  CTA bilaterally  Cardiovascular/Edema:  RRR, S1,S2  Gastrointestinal:  Soft, rounded, nontender, nondistended  Neurologic:  Nonfocal, RICHMOND  Skin:  Warm, dry, intact  Other:  No edema     Scheduled Meds:   polyethylene glycol  4,000 mL Oral Once    heparin (porcine)  5,000 Units SubCUTAneous Q8H    potassium phosphate IVPB  10 mmol IntraVENous Once    bisacodyl  10 mg Rectal Q6H    piperacillin-tazobactam  3,375 mg IntraVENous Q8H    doxycycline (VIBRAMYCIN) IV  100 mg IntraVENous Q12H    aspirin EC  81 mg Oral BID    lamoTRIgine  25 mg Oral Nightly    melatonin  5 mg Oral Daily    pantoprazole  40 mg Oral QAM AC    rosuvastatin  10 mg Oral Daily    sodium chloride flush  10 mL IntraVENous 2 times per day    insulin lispro  0-4 Units SubCUTAneous TID WC    insulin lispro  0-4 Units SubCUTAneous Nightly     Continuous Infusions:   sodium chloride 75 mL/hr at 11/13/22 1117    dextrose      sodium chloride       PRN Meds:.potassium chloride **OR** potassium alternative oral replacement **OR** potassium chloride, magnesium sulfate, bisacodyl, ipratropium-albuterol, oxyCODONE-acetaminophen, glucose, dextrose bolus **OR** dextrose bolus, glucagon (rDNA), dextrose, sodium chloride flush, sodium chloride, ondansetron **OR** ondansetron, magnesium hydroxide, acetaminophen **OR** acetaminophen    DATA:    CBC:   Lab Results   Component Value Date/Time    WBC 7.4 11/13/2022 05:27 AM    RBC 3.71 11/13/2022 05:27 AM    HGB 9.9 11/13/2022 05:27 AM    HCT 27.3 11/13/2022 05:27 AM    MCV 73.6 11/13/2022 05:27 AM    MCH 26.7 11/13/2022 05:27 AM    MCHC 36.3 11/13/2022 05:27 AM    RDW 13.1 11/13/2022 05:27 AM     11/13/2022 05:27 AM    MPV 10.9 11/13/2022 05:27 AM     CMP:    Lab Results   Component Value Date/Time     11/13/2022 05:27 AM    K 3.2 11/13/2022 05:27 AM    CL 99 11/13/2022 05:27 AM    CO2 27 11/13/2022 05:27 AM    BUN 31 11/13/2022 05:27 AM    CREATININE 1.4 11/13/2022 05:27 AM    GFRAA >60 12/13/2020 05:22 AM    LABGLOM 56 11/13/2022 05:27 AM    GLUCOSE 98 11/13/2022 05:27 AM    PROT 4.8 11/13/2022 05:27 AM    LABALBU 2.8 11/13/2022 05:27 AM    CALCIUM 8.1 11/13/2022 05:27 AM    BILITOT 0.7 11/13/2022 05:27 AM    ALKPHOS 51 11/13/2022 05:27 AM    AST 15 11/13/2022 05:27 AM    ALT 14 11/13/2022 05:27 AM     Magnesium:    Lab Results   Component Value Date/Time    MG 1.1 11/13/2022 05:27 AM     Phosphorus:    Lab Results   Component Value Date/Time    PHOS 2.2 11/12/2022 02:40 PM     Radiology Review:      CXR 11/11/22   No acute cardiopulmonary process. CT abdomen and pelvis without IV contrast 11/11/22   Large amount of stool within the rectal vault with rectal wall thickening,   suggestive of stercoral colitis. Clinical correlation recommended. Compression deformity of L1, new since the prior examination but chronic in   appearance. Correlate with point tenderness. Patchy left lower lung ground-glass opacities, concerning for pneumonia. Moderate hiatal hernia. Additional findings as above. CT head without IV contrast 11/11/22   No acute intracranial abnormality.        Diffuse volume loss and probable chronic small vessel ischemic white matter   change, but it has progressed significantly compared to the prior exam and   therefore other white matter processes such is demyelinating disease or   vasculitis are not excluded. Kidney ultrasound complete 11/12/22   No acute abnormality identified. BRIEF SUMMARY OF INITIAL CONSULT:    Briefly, Mr. Karlene Montes is a 74year old male with a PMH of IDDM with diabetic retinopathy, HTN, HLD, subdural hematoma (2018), TIA, dementia, anxiety, depression and noncompliance, who was admitted on November 11, 2022 after presenting to the ER with weakness. Apparently, he was found on the ground covered in feces. His labs in ER were significant for lactic acid 4.4, sodium 131, chloride 91, bicarbonate 18, BUN 58 mg/dL, and creatinine 2.1 mg/dL, which are reasons for this consultation. A CT of the abdomen and pelvis was obtained which revealed a large amount of retained stool and possible pneumonia. He received a 1L normal saline bolus in ER. He admits to vomiting, diarrhea, and very poor oral intake. IMPRESSION/RECOMMENDATIONS:      CLEMENTE stage I, likely volume responsive pre-renal CLEMENTE secondary to vomiting, diarrhea, and poor oral intake. Renal function improving with IV fluid administration. Kidney ultrasound unremarkable. Hypotonic hyponatremia, translocational secondary to hyperglycemia. Corrected sodium for hyperglycemia 134. HAGMA, secondary to lactic acidosis. Bicarbonate level improved with bicarbonate drip. Hypokalemia, secondary to poor oral intake and hypomagnesemia. To replace. Hypomagnesemia, secondary to poor oral intake and hypophosphatemia. To replace. Hypophosphatemia, secondary to poor oral intake. To replace.    Lactic acidosis, lactic acid 4.4  HTN, not currently on BP medications   ---------------------------------------------------------------  Probable pneumonia, on piperacillin-tazobactam and doxycycline  Large retained stool, on bowel regimen   Type II DM with diabetic retinopathy, on SSI      Plan:    Change IV fluids NS at 75 cc/hr  Replace magnesium  Replace potassium  Replace phosphorus   Strict I&Os  Better glucose control   Continue to monitor renal function  Continue to monitor bicarbonate level   Monitor potassium level  Monitor magnesium level  Monitor phosphorus level     Case and plan discussed with Dr. Kandis Dela Cruz      Electronically signed by MIRA Tello CNP on 11/13/2022 at 11:29 AM

## 2022-11-14 PROBLEM — E44.0 MODERATE PROTEIN-CALORIE MALNUTRITION (HCC): Chronic | Status: ACTIVE | Noted: 2022-11-14

## 2022-11-14 LAB
ANION GAP SERPL CALCULATED.3IONS-SCNC: 11 MMOL/L (ref 7–16)
BUN BLDV-MCNC: 18 MG/DL (ref 6–23)
CALCIUM SERPL-MCNC: 8.4 MG/DL (ref 8.6–10.2)
CHLORIDE BLD-SCNC: 102 MMOL/L (ref 98–107)
CHLORIDE URINE RANDOM: 74 MMOL/L
CO2: 23 MMOL/L (ref 22–29)
CREAT SERPL-MCNC: 1.2 MG/DL (ref 0.7–1.2)
CREATININE URINE: 63 MG/DL (ref 40–278)
EKG ATRIAL RATE: 108 BPM
EKG P AXIS: 69 DEGREES
EKG P-R INTERVAL: 134 MS
EKG Q-T INTERVAL: 358 MS
EKG QRS DURATION: 80 MS
EKG QTC CALCULATION (BAZETT): 479 MS
EKG R AXIS: 36 DEGREES
EKG T AXIS: 61 DEGREES
EKG VENTRICULAR RATE: 108 BPM
GFR SERPL CREATININE-BSD FRML MDRD: >60 ML/MIN/1.73
GLUCOSE BLD-MCNC: 135 MG/DL (ref 74–99)
MAGNESIUM: 1.4 MG/DL (ref 1.6–2.6)
METER GLUCOSE: 148 MG/DL (ref 74–99)
METER GLUCOSE: 188 MG/DL (ref 74–99)
METER GLUCOSE: 201 MG/DL (ref 74–99)
METER GLUCOSE: 293 MG/DL (ref 74–99)
MICROALBUMIN UR-MCNC: 75.3 MG/L
MICROALBUMIN/CREAT UR-RTO: 119.5 (ref 0–30)
PHOSPHORUS: 1.9 MG/DL (ref 2.5–4.5)
POTASSIUM REFLEX MAGNESIUM: 3.7 MMOL/L (ref 3.5–5)
POTASSIUM, UR: 21.3 MMOL/L
PROTEIN PROTEIN: 35 MG/DL (ref 0–12)
PROTEIN/CREAT RATIO: 0.6
PROTEIN/CREAT RATIO: 0.6 (ref 0–0.2)
SODIUM BLD-SCNC: 136 MMOL/L (ref 132–146)
SODIUM URINE: 96 MMOL/L

## 2022-11-14 PROCEDURE — 84300 ASSAY OF URINE SODIUM: CPT

## 2022-11-14 PROCEDURE — 83735 ASSAY OF MAGNESIUM: CPT

## 2022-11-14 PROCEDURE — 2580000003 HC RX 258: Performed by: STUDENT IN AN ORGANIZED HEALTH CARE EDUCATION/TRAINING PROGRAM

## 2022-11-14 PROCEDURE — 2580000003 HC RX 258: Performed by: NURSE PRACTITIONER

## 2022-11-14 PROCEDURE — 36415 COLL VENOUS BLD VENIPUNCTURE: CPT

## 2022-11-14 PROCEDURE — 82436 ASSAY OF URINE CHLORIDE: CPT

## 2022-11-14 PROCEDURE — 97161 PT EVAL LOW COMPLEX 20 MIN: CPT

## 2022-11-14 PROCEDURE — 84100 ASSAY OF PHOSPHORUS: CPT

## 2022-11-14 PROCEDURE — 82962 GLUCOSE BLOOD TEST: CPT

## 2022-11-14 PROCEDURE — 2500000003 HC RX 250 WO HCPCS: Performed by: INTERNAL MEDICINE

## 2022-11-14 PROCEDURE — 6360000002 HC RX W HCPCS: Performed by: INTERNAL MEDICINE

## 2022-11-14 PROCEDURE — 97530 THERAPEUTIC ACTIVITIES: CPT

## 2022-11-14 PROCEDURE — 2580000003 HC RX 258: Performed by: INTERNAL MEDICINE

## 2022-11-14 PROCEDURE — 82044 UR ALBUMIN SEMIQUANTITATIVE: CPT

## 2022-11-14 PROCEDURE — 99232 SBSQ HOSP IP/OBS MODERATE 35: CPT | Performed by: SURGERY

## 2022-11-14 PROCEDURE — 97535 SELF CARE MNGMENT TRAINING: CPT

## 2022-11-14 PROCEDURE — 2500000003 HC RX 250 WO HCPCS: Performed by: STUDENT IN AN ORGANIZED HEALTH CARE EDUCATION/TRAINING PROGRAM

## 2022-11-14 PROCEDURE — 97165 OT EVAL LOW COMPLEX 30 MIN: CPT

## 2022-11-14 PROCEDURE — 93010 ELECTROCARDIOGRAM REPORT: CPT | Performed by: INTERNAL MEDICINE

## 2022-11-14 PROCEDURE — 80048 BASIC METABOLIC PNL TOTAL CA: CPT

## 2022-11-14 PROCEDURE — 6360000002 HC RX W HCPCS: Performed by: STUDENT IN AN ORGANIZED HEALTH CARE EDUCATION/TRAINING PROGRAM

## 2022-11-14 PROCEDURE — 6370000000 HC RX 637 (ALT 250 FOR IP): Performed by: INTERNAL MEDICINE

## 2022-11-14 PROCEDURE — 82570 ASSAY OF URINE CREATININE: CPT

## 2022-11-14 PROCEDURE — 84133 ASSAY OF URINE POTASSIUM: CPT

## 2022-11-14 PROCEDURE — 84156 ASSAY OF PROTEIN URINE: CPT

## 2022-11-14 PROCEDURE — 1200000000 HC SEMI PRIVATE

## 2022-11-14 RX ADMIN — Medication 5 MG: at 22:46

## 2022-11-14 RX ADMIN — ASPIRIN 81 MG: 81 TABLET, COATED ORAL at 22:47

## 2022-11-14 RX ADMIN — PIPERACILLIN AND TAZOBACTAM 3375 MG: 3; .375 INJECTION, POWDER, FOR SOLUTION INTRAVENOUS at 17:53

## 2022-11-14 RX ADMIN — ASPIRIN 81 MG: 81 TABLET, COATED ORAL at 09:55

## 2022-11-14 RX ADMIN — HEPARIN SODIUM 5000 UNITS: 10000 INJECTION INTRAVENOUS; SUBCUTANEOUS at 05:10

## 2022-11-14 RX ADMIN — MAGNESIUM SULFATE HEPTAHYDRATE 1000 MG: 1 INJECTION, SOLUTION INTRAVENOUS at 10:11

## 2022-11-14 RX ADMIN — PANTOPRAZOLE SODIUM 40 MG: 40 TABLET, DELAYED RELEASE ORAL at 05:10

## 2022-11-14 RX ADMIN — PIPERACILLIN AND TAZOBACTAM 3375 MG: 3; .375 INJECTION, POWDER, FOR SOLUTION INTRAVENOUS at 10:00

## 2022-11-14 RX ADMIN — Medication 10 ML: at 22:49

## 2022-11-14 RX ADMIN — HEPARIN SODIUM 5000 UNITS: 10000 INJECTION INTRAVENOUS; SUBCUTANEOUS at 15:10

## 2022-11-14 RX ADMIN — PIPERACILLIN AND TAZOBACTAM 3375 MG: 3; .375 INJECTION, POWDER, FOR SOLUTION INTRAVENOUS at 00:43

## 2022-11-14 RX ADMIN — MAGNESIUM SULFATE HEPTAHYDRATE 1000 MG: 1 INJECTION, SOLUTION INTRAVENOUS at 10:12

## 2022-11-14 RX ADMIN — Medication 10 ML: at 09:55

## 2022-11-14 RX ADMIN — DOXYCYCLINE 100 MG: 100 INJECTION, POWDER, LYOPHILIZED, FOR SOLUTION INTRAVENOUS at 15:11

## 2022-11-14 RX ADMIN — SODIUM PHOSPHATE, MONOBASIC, MONOHYDRATE AND SODIUM PHOSPHATE, DIBASIC, ANHYDROUS 12 MMOL: 276; 142 INJECTION, SOLUTION INTRAVENOUS at 10:03

## 2022-11-14 RX ADMIN — HEPARIN SODIUM 5000 UNITS: 10000 INJECTION INTRAVENOUS; SUBCUTANEOUS at 22:47

## 2022-11-14 RX ADMIN — SODIUM CHLORIDE: 9 INJECTION, SOLUTION INTRAVENOUS at 16:35

## 2022-11-14 RX ADMIN — DOXYCYCLINE 100 MG: 100 INJECTION, POWDER, LYOPHILIZED, FOR SOLUTION INTRAVENOUS at 03:05

## 2022-11-14 RX ADMIN — ROSUVASTATIN 10 MG: 10 TABLET, FILM COATED ORAL at 09:55

## 2022-11-14 RX ADMIN — INSULIN LISPRO 2 UNITS: 100 INJECTION, SOLUTION INTRAVENOUS; SUBCUTANEOUS at 11:41

## 2022-11-14 RX ADMIN — LAMOTRIGINE 25 MG: 25 TABLET ORAL at 22:46

## 2022-11-14 RX ADMIN — INSULIN LISPRO 1 UNITS: 100 INJECTION, SOLUTION INTRAVENOUS; SUBCUTANEOUS at 16:36

## 2022-11-14 NOTE — ACP (ADVANCE CARE PLANNING)
Advance Care Planning   Healthcare Decision Maker:    Primary Decision Maker: Noy Rashel Pace - [phone]    Click here to complete Healthcare Decision Makers including selection of the Healthcare Decision Maker Relationship (ie \"Primary\").

## 2022-11-14 NOTE — PROGRESS NOTES
Department of Internal Medicine  Nephrology Progress Note    Events reviewed. SUBJECTIVE:  We are following MrAlyson Vann Mai for CLEMENTE and hyponatremia. He reports no complaints.     PHYSICAL EXAM:      Vitals:    VITALS:  /71   Pulse 78   Temp 97 °F (36.1 °C) (Temporal)   Resp 19   Ht 5' 8.11\" (1.73 m)   Wt 168 lb 3.2 oz (76.3 kg)   SpO2 98%   BMI 25.49 kg/m²   24HR INTAKE/OUTPUT:    Intake/Output Summary (Last 24 hours) at 11/14/2022 0932  Last data filed at 11/14/2022 6842  Gross per 24 hour   Intake 2867.98 ml   Output 750 ml   Net 2117.98 ml         Constitutional:  Alert and oriented with intermittent confusion, ill-appearing  HEENT:  Normocephalic, PERRL, dry mucous membranes  Respiratory:  CTA bilaterally  Cardiovascular/Edema:  RRR, S1,S2  Gastrointestinal:  Soft, rounded, nontender, nondistended  Neurologic:  Nonfocal, RICHMOND  Skin:  Warm, dry, intact  Other:  No edema     Scheduled Meds:   polyethylene glycol  4,000 mL Oral Once    heparin (porcine)  5,000 Units SubCUTAneous Q8H    piperacillin-tazobactam  3,375 mg IntraVENous Q8H    doxycycline (VIBRAMYCIN) IV  100 mg IntraVENous Q12H    aspirin EC  81 mg Oral BID    lamoTRIgine  25 mg Oral Nightly    melatonin  5 mg Oral Daily    pantoprazole  40 mg Oral QAM AC    rosuvastatin  10 mg Oral Daily    sodium chloride flush  10 mL IntraVENous 2 times per day    insulin lispro  0-4 Units SubCUTAneous TID WC    insulin lispro  0-4 Units SubCUTAneous Nightly     Continuous Infusions:   sodium chloride 75 mL/hr at 11/13/22 1117    dextrose      sodium chloride       PRN Meds:.sodium phosphate IVPB **OR** sodium phosphate IVPB, potassium chloride **OR** potassium alternative oral replacement **OR** potassium chloride, magnesium sulfate, bisacodyl, ipratropium-albuterol, oxyCODONE-acetaminophen, glucose, dextrose bolus **OR** dextrose bolus, glucagon (rDNA), dextrose, sodium chloride flush, sodium chloride, ondansetron **OR** ondansetron, magnesium hydroxide, acetaminophen **OR** acetaminophen    DATA:    CBC:   Lab Results   Component Value Date/Time    WBC 7.4 11/13/2022 05:27 AM    RBC 3.71 11/13/2022 05:27 AM    HGB 9.9 11/13/2022 05:27 AM    HCT 27.3 11/13/2022 05:27 AM    MCV 73.6 11/13/2022 05:27 AM    MCH 26.7 11/13/2022 05:27 AM    MCHC 36.3 11/13/2022 05:27 AM    RDW 13.1 11/13/2022 05:27 AM     11/13/2022 05:27 AM    MPV 10.9 11/13/2022 05:27 AM     CMP:    Lab Results   Component Value Date/Time     11/14/2022 06:32 AM    K 3.7 11/14/2022 06:32 AM     11/14/2022 06:32 AM    CO2 23 11/14/2022 06:32 AM    BUN 18 11/14/2022 06:32 AM    CREATININE 1.2 11/14/2022 06:32 AM    GFRAA >60 12/13/2020 05:22 AM    LABGLOM >60 11/14/2022 06:32 AM    GLUCOSE 135 11/14/2022 06:32 AM    PROT 4.8 11/13/2022 05:27 AM    LABALBU 2.8 11/13/2022 05:27 AM    CALCIUM 8.4 11/14/2022 06:32 AM    BILITOT 0.7 11/13/2022 05:27 AM    ALKPHOS 51 11/13/2022 05:27 AM    AST 15 11/13/2022 05:27 AM    ALT 14 11/13/2022 05:27 AM     Magnesium:    Lab Results   Component Value Date/Time    MG 1.4 11/14/2022 06:32 AM     Phosphorus:    Lab Results   Component Value Date/Time    PHOS 1.9 11/14/2022 06:32 AM     Radiology Review:      CXR 11/11/22   No acute cardiopulmonary process. CT abdomen and pelvis without IV contrast 11/11/22   Large amount of stool within the rectal vault with rectal wall thickening,   suggestive of stercoral colitis. Clinical correlation recommended. Compression deformity of L1, new since the prior examination but chronic in   appearance. Correlate with point tenderness. Patchy left lower lung ground-glass opacities, concerning for pneumonia. Moderate hiatal hernia. Additional findings as above. CT head without IV contrast 11/11/22   No acute intracranial abnormality.        Diffuse volume loss and probable chronic small vessel ischemic white matter   change, but it has progressed significantly compared to the prior exam and   therefore other white matter processes such is demyelinating disease or   vasculitis are not excluded. Kidney ultrasound complete 11/12/22   No acute abnormality identified. BRIEF SUMMARY OF INITIAL CONSULT:    Briefly, Mr. Calvin Moritz is a 74year old male with a PMH of IDDM with diabetic retinopathy, HTN, HLD, subdural hematoma (2018), TIA, dementia, anxiety, depression and noncompliance, who was admitted on November 11, 2022 after presenting to the ER with weakness. Apparently, he was found on the ground covered in feces. His labs in ER were significant for lactic acid 4.4, sodium 131, chloride 91, bicarbonate 18, BUN 58 mg/dL, and creatinine 2.1 mg/dL, which are reasons for this consultation. A CT of the abdomen and pelvis was obtained which revealed a large amount of retained stool and possible pneumonia. He received a 1L normal saline bolus in ER. He admits to vomiting, diarrhea, and very poor oral intake. Problems resolved:    Hypotonic hyponatremia, translocational secondary to hyperglycemia. Corrected sodium for hyperglycemia 134. HAGMA, secondary to lactic acidosis. Bicarbonate level improved with bicarbonate drip. Lactic acidosis, lactic acid 4.4    IMPRESSION/RECOMMENDATIONS:      CLEMENTE stage I-II versus CLEMENTE on CKD, volume responsive pre-renal CLEMENTE secondary to vomiting, diarrhea, and poor oral intake. Kidney ultrasound unremarkable. Resolved, creatinine level continues to improve with IV fluid administration. CKD stage II, with mild proteinuria, UACR: 119.5 mg/g, 2/2 diabetic kidney disease. Hypokalemia, with renal potassium wasting (urine K/creatinine: 34 mEq/g, >13), 2/2 hypomagnesemia and no reabsorbable anions administration (piperacillin)  Hypomagnesemia, secondary to poor oral intake, 2/2 PPI administration? Sher Ogren To replace. Hypophosphatemia, secondary to poor oral intake, rule out vitamin D deficiency. To replace.    HTN, not currently on BP medications ---------------------------------------------------------------  Probable pneumonia, on piperacillin-tazobactam and doxycycline  Large retained stool, on bowel regimen   Type II DM with diabetic retinopathy, on SSI  Hyperlipidemia, on rosuvastatin  Microcytic anemia, rule out iron deficiency      Plan:    Discontinue IV fluids  Replace magnesium  Replace phosphorus   Obtain vitamin D 25 level  Obtain iron studies, B12 and folate  Continue to monitor renal function  Continue to monitor potassium level  Continue to monitor magnesium level  Continue to monitor phosphorus level       Electronically signed by Donte Devries MD on 11/14/2022 at 9:32 AM

## 2022-11-14 NOTE — PROGRESS NOTES
[address]        OCBD:                                                  Patient Name: Tommy Ba    MRN: [id number]    : 1955    Room: [address] [place] [address]          Evaluating OT: Milbert Sever OTR/L; [id number]       Referring Provider: Norma Garcia MD    Specific Provider Orders/Date: OT Eval and Treat 22      Diagnosis: CLEMENTE (acute kidney injury); Weakness; Chronic moderate protein-calorie malnutrition; Failure to thrive. Pt found on ground ~1 hour (fell out of bed) unable to stand d/t weakness. Surgery: None this admission     Pertinent Medical History:  has a past medical history of Anxiety, Bronchitis, Cellulitis, Chronic sinusitis, Depression, Diabetes mellitus ([address] Diabetic retinopathy ([address] Fracture of left foot, High cholesterol, Hypercholesteremia, Hypercholesterolemia, Hypertension, Lumbago, Moderate mood disorder ([address] and Third nerve palsy.      Recommended Adaptive Equipment: TBD pending progress     Precautions:  Fall Risk, +alarms, spinal precautions for comfort (L1 compression deformity - chronic in appearance), TAPS, ham     Assessment of current deficits    [x] Functional mobility  [x]ADLs  [x] Strength               [x]Cognition    [x] Functional transfers   [x] IADLs         [x] Safety Awareness   [x]Endurance    [x] Fine Coordination              [x] Balance      [] Vision/perception   []Sensation     []Gross Motor Coordination  [] ROM  [] Delirium                   [] Motor Control     OT PLAN OF CARE   OT POC based on physician orders, patient diagnosis and results of clinical assessment    Frequency/Duration 1-3 days/wk for 2 weeks PRN   Specific OT Treatment Interventions to include:   * Instruction/training on adapted ADL techniques and AE recommendations to increase functional independence within precautions       * Training on energy conservation strategies, correct breathing pattern and techniques to improve independence/tolerance for self-care routine  * Functional transfer/mobility training/DME recommendations for increased independence, safety, and fall prevention  * Patient/Family education to increase follow through with safety techniques and functional independence  * Recommendation of environmental modifications for increased safety with functional transfers/mobility and ADLs  * Cognitive retraining/development of therapeutic activities to improve problem solving, judgement, memory, and attention for increased safety/participation in ADL/IADL tasks  * Therapeutic exercise to improve motor endurance, ROM, and functional strength for ADLs/functional transfers  * Therapeutic activities to facilitate/challenge dynamic balance, stand tolerance for increased safety and independence with ADLs  * Positioning to improve skin integrity, interaction with environment and functional independence    Home Living: Pt lives alone in 1 story home with 4-5 steps & 1 handrail to enter. Laundry in basement. Bathroom setup: Walk-in shower with shower chair   Equipment owned: Shower chair, ww, cane    Prior Level of Function: IND with ADLs , IND with IADLs; engaged in functional mobility with use of  no AD  Driving: No (daughter assists)  Occupation: None reported    Pain Level: Pt with no c/o pain during session  Cognition: A&O: 2-3/4 (pt stating \"2003\" & \"3796\" for year despite cues); Follows 1 step directions. Flat affect.     Memory:  Fair+   Sequencing:  Fair-   Problem solving:  Fair-   Judgement/safety:  Fair-     Functional Assessment:  AM-PAC Daily Activity Raw Score: 14/24   Initial Eval Status  Date: 11/14/22 Treatment Status  Date: STGs = LTGs  Time frame: 10-14 days   Feeding Setup   Independent    Grooming Minimal Assist   Modified [place]    UB Dressing Moderate Assist   Modified [place]    LB Dressing Moderate Assist   Modified [place]    Bathing Moderate Assist  Modified [place]    Toileting Maximal Assist   Modified [place]    Bed Mobility  Supine to sit: Minimal Assist   Sit to supine: Minimal Assist   Supine to sit: Independent   Sit to supine: Independent    Functional Transfers Sit to stand:Minimal Assist   Stand to sit:Minimal Assist  Stand pivot: NT  Commode: NT  Sit to stand:Modified [place]    Stand to sit:Modified [place]   Stand pivot: Modified [place]   Commode: Modified [place]     Functional Mobility Minimal Assist  Use of ww ~3 side steps towards HOB. Modified [place] with use of Appropriate AD   Balance Sitting:     Static - Supervision     Dynamic - SBA  Standing: Minimal Assist  Sitting:     Static: Independent     Dynamic: Independent  Standing: Modified [place]    Activity Tolerance Fair  Good   Visual/  Perceptual Glasses: Yes  Appears WFL         Safety Fair-  Good  during ADL completion     Hand Dominance Right   AROM (PROM) Strength Additional Info:    RUE  Shoulder flexion ~90 degrees, distally WFL. Shoulder 3-/5, distally 4-/5 grossly tested good  and wfl FMC/dexterity noted during ADL tasks     LUE Shoulder flexion ~90 degrees, distally WFL. Shoulder 3-/5, distally 4-/5 grossly tested Good  and wfl FMC/dexterity noted during ADL tasks       Hearing: [place] [facility]  Sensation:  No c/o numbness or tingling BUE  Tone: WFL BUE  Edema: Unremarkable    Comment: Cleared by RN to see pt. Upon arrival patient supine in bed and agreeable to OT session. At end of session, patient supine in bed with call light and phone within reach, +bed alarm, all lines and tubes intact. Overall patient demonstrated decreased independence and safety during completion of ADL/functional transfer/mobility tasks.  Therapist facilitated ADL tasks, functional transfers, functional mobility, bed mobility to address safety awareness, implementation of fall prevention strategies, & engagement throughout functional tasks. Pt c/o 6/10 dizziness seated EOB however reported improved symptoms after ~2 minutes. Pt would benefit from continued skilled OT to increase safety and independence with completion of ADL/IADL tasks for functional independence and quality of life. Treatment: OT treatment provided this date includes: ADL-  Instruction/training on safety and adapted techniques for completion of ADLs: Pt incontinent of BM during session requiring assist for posterior hygiene care while standing with use of ww & further assist to maintain dynamic standing balance. Pt required assist to don/doff socks seated EOB utilizing figure-4 technique with assist to maintain dynamic sitting balance. Pt sat EOB to doff soiled gown & don clean gown with cues for sequencing of commands. Mobility-  Instruction/training on safety and improved independence with bed mobility/functional transfers and functional mobility. Pt utilized ww to maintain dynamic standing balance throughout session. Pt completed x2 sit<>stand transfers with verbal cues for proper hand placement & assist to maintain safe transfer progressions. Pt took ~3 side steps towards [facility] with assist to implement fall prevention strategies & ww management/safety. Sitting EOB ~8 minutes to improve dynamic sitting balance and activity tolerance during ADLs. Skilled positioning/alignment-  Proper Positioning/Alignment. Pt required assist/cues to maintain proper body mechanics throughout session to promote skin/joint integrity, safety awareness, & implementation of fall prevention strategies throughout daily activities. Rehab Potential: Good for established goals     LTG: maximize independence with ADLs to return to PLOF    Patient and/or family were instructed on functional diagnosis, prognosis/goals and OT plan of care. Demonstrated fair- understanding.        Eval Complexity: Low  History: Expanded chart review of medical records and additional review of physical, cognitive, or psychosocial history related to current functional performance  Exam: 3+ performance deficits  Assistance/Modification: Min/mod assistance or modifications required to perform tasks. May have comorbidities that affect occupational performance. Evaluation time includes thorough review of current medical information, gathering information on past medical & social history & PLOF, completion of standardized testing, informal observation of tasks, consultation with other medical professions/disciplines, assessment of data & development of POC/goals. Time In: 10:20a  Time Out: 10:50a  Total Treatment Time: 15 minutes    Min Units   OT Eval Low 07571  x     OT Eval Medium 92437      OT Eval High 59776      OT Re-Eval [id number]       Therapeutic Ex 01479       Therapeutic Activities 23037       ADL/Self Care 82473  15 1    Orthotic Management 29971       Manual 58948     Neuro Re-Ed 54381       Non-Billable Time          Evaluation Time additionally includes thorough review of current medical information, gathering information on past medical history/social history and prior level of function, interpretation of standardized testing/informal observation of tasks, assessment of data and development of plan of care and goals.               [place] OTR/L; [id number]

## 2022-11-14 NOTE — PROGRESS NOTES
25.0-29. 9)    Estimated Daily Nutrient Needs:  Energy Requirements Based On: Formula  Weight Used for Energy Requirements: Current  Energy (kcal/day): 9297-6816 (REE 1523 x 1.2-1.3 SF)  Weight Used for Protein Requirements: Ideal  Protein (g/day):  (1.3-1.5g/kg IBW)  Method Used for Fluid Requirements: 1 ml/kcal  Fluid (ml/day): 4574-3254    Nutrition Diagnosis:   Moderate malnutrition, In context of chronic illness related to cognitive or neurological impairment (hx of dementia and SDH) as evidenced by poor intake prior to admission, moderate loss of subcutaneous fat, moderate muscle loss    Nutrition Interventions:   Food and/or Nutrient Delivery: Continue Current Diet, Start Oral Nutrition Supplement  Nutrition Education/Counseling: Education not indicated  Coordination of Nutrition Care: Continue to monitor while inpatient       Goals:  Previous Goal Met: Progressing toward Goal(s)  Goals: PO intake 75% or greater, by next RD assessment       Nutrition Monitoring and Evaluation:   Behavioral-Environmental Outcomes: None Identified  Food/Nutrient Intake Outcomes: Food and Nutrient Intake, Supplement Intake  Physical Signs/Symptoms Outcomes: Biochemical Data, Chewing or Swallowing, GI Status, Fluid Status or Edema, Hemodynamic Status, Meal Time Behavior, Nausea or Vomiting, Diarrhea, Constipation, Nutrition Focused Physical Findings, Weight, Skin    Discharge Planning:     Too soon to determine     Oralia Guzmán RD, LD  Contact: [phone]

## 2022-11-14 NOTE — PLAN OF CARE
Problem: Discharge Planning  Goal: Discharge to home or other facility with appropriate resources  Outcome: Progressing     Problem: Skin/Tissue Integrity  Goal: Absence of new skin breakdown  Description: 1. Monitor for areas of redness and/or skin breakdown  2. Assess vascular access sites hourly  3. Every 4-6 hours minimum:  Change oxygen saturation probe site  4. Every 4-6 hours:  If on nasal continuous positive airway pressure, respiratory therapy assess nares and determine need for appliance change or resting period.   Outcome: Progressing     Problem: Safety - Adult  Goal: Free from fall injury  Outcome: Progressing     Problem: Chronic Conditions and Co-morbidities  Goal: Patient's chronic conditions and co-morbidity symptoms are monitored and maintained or improved  Outcome: Progressing     Problem: Nutrition Deficit:  Goal: Optimize nutritional status  Outcome: Progressing

## 2022-11-14 NOTE — PROGRESS NOTES
GENERAL SURGERY  DAILY PROGRESS NOTE  11/14/2022  Chief Complaint   Patient presents with    Failure To Thrive     Pt sent in from home for increased weakness and failure to thrive over past 4 days       Subjective:  No acute events overnight. Patient denies any n/v/d. Patient denies any BM however per nursing, patient had 2-3 loose BM overnight and had another 2-3 BM during the day shift. Tolerated regular diet. Objective:  /71   Pulse 78   Temp 97 °F (36.1 °C) (Temporal)   Resp 19   Ht 5' 8.11\" (1.73 m)   Wt 168 lb 3.2 oz (76.3 kg)   SpO2 98%   BMI 25.49 kg/m²     GENERAL:  Laying in bed, awake, alert, cooperative, no apparent distress. Some mild confusion   HEAD: Normocephalic, atraumatic  EYES: No sclera icterus, pupils equal, EOMI,   LUNGS:  No increased work of breathing, lungs clear to ascultation b/l  CARDIOVASCULAR:  RRR  ABDOMEN:  Soft, non-tender, non-distended, normoactive bowel sounds  EXTREMITIES: No edema or swelling  SKIN: Warm and dry    Assessment/Plan:  74 y.o. male with constipation x4 wks now with stercoral colitits    Continue bowel regimen   Continue regular diet   No acute surgical intervention     Electronically signed by Marisol Gage MD on 11/14/2022 at 7:18 AM       I saw and examined the patient. Agree with assessment plan  Constipation x4 weeks now with stercoral colitis. Patient is tolerating diet. He is having bowel function. 3 loose bowel movements. Abdomen remains soft nontender nondistended  Continue aggressive bowel regimen  No plans for surgical intervention  We will sign off    Alie George MD, FACS  11/14/2022  1:38 PM    NOTE: This report was transcribed using voice recognition software. Every effort was made to ensure accuracy; however, inadvertent computerized transcription errors may be present.

## 2022-11-14 NOTE — CARE COORDINATION
This CM received return call from pt's son Brown Camacho whom relayed that this CM would call once therapy notes are in to discuss discharge disposition, as he and his sister have been discussing potential increased needs at home; reports pt does have a walker although does not use, and offered his sister's contact information should he be unavailable; same day pharmacy would be [facility] on [address].

## 2022-11-14 NOTE — PLAN OF CARE
Problem: Discharge Planning  Goal: Discharge to home or other facility with appropriate resources  11/14/2022 1759 by Pedro You RN  Outcome: Progressing  11/14/2022 1351 by Matt Wilkerson RN  Outcome: Progressing     Problem: Skin/Tissue Integrity  Goal: Absence of new skin breakdown  Description: 1. Monitor for areas of redness and/or skin breakdown  2. Assess vascular access sites hourly  3. Every 4-6 hours minimum:  Change oxygen saturation probe site  4. Every 4-6 hours:  If on nasal continuous positive airway pressure, respiratory therapy assess nares and determine need for appliance change or resting period.   11/14/2022 1759 by Pedro You RN  Outcome: Progressing  11/14/2022 1351 by Matt Wilkerson RN  Outcome: Progressing     Problem: Safety - Adult  Goal: Free from fall injury  11/14/2022 1759 by Pedro You RN  Outcome: Progressing  11/14/2022 1351 by Matt Wilkerson RN  Outcome: Progressing     Problem: Chronic Conditions and Co-morbidities  Goal: Patient's chronic conditions and co-morbidity symptoms are monitored and maintained or improved  11/14/2022 1759 by Pedro You RN  Outcome: Progressing  11/14/2022 1351 by Matt Wilkerson RN  Outcome: Progressing     Problem: Nutrition Deficit:  Goal: Optimize nutritional status  11/14/2022 1759 by Pedro You RN  Outcome: Progressing  11/14/2022 1351 by Matt Wilkerson RN  Outcome: Progressing

## 2022-11-14 NOTE — CARE COORDINATION
This CM met with pt at bedside to discuss discharge / transition of care plan. Pt  reports from home alone; 4 steps to enter with one rail; all first floor set-up; DME owned, none, await therapy recommendations; verified PCP and pharmacy; pt reports daughter stops by daily to check on him, relayed no contact information in chart for daughter, pt prefers that; this CM called pt's son Rukhsana Rush and left VM at (p) [phone]; no same day pharmacy provided by pt; discharge plan is to return home with no h/o SNF or HHC.

## 2022-11-14 NOTE — PROGRESS NOTES
Hospitalist Progress Note      PCP: Ebony Kessler MD    Date of Admission: 11/11/2022    Chief Complaint: Abdominal pain    Hospital Course:   79-year-old male presents with failure to thrive and found down on the ground. Pt states he feel out of bed earlier today and was unable to get back on bed. Pt brought to ER deconditioned and covered in feces. CT abdomen showed severe constipation-stercoral colitis. General surgery was consulted-attempted disimpaction, bowel regimen was initiated. Started on antibiotics-Zosyn, doxycycline.  was consulted for placement. Nephrology was consulted for CLEMENTE. Also had hypomagnesemia, hypokalemia which was corrected. Subjective: Patient was seen at bedside this morning. Does not complain of any abdominal pain. Does not mention having any bowel movements nor passing gas.   Bowel sounds heard on exam.  Electrolyte correction per protocol-hypomagnesemia, hypophosphatemia      Medications:  Reviewed    Infusion Medications    sodium chloride 75 mL/hr at 11/14/22 0956    dextrose      sodium chloride       Scheduled Medications    potassium phosphate IVPB  15 mmol IntraVENous Once    polyethylene glycol  4,000 mL Oral Once    heparin (porcine)  5,000 Units SubCUTAneous Q8H    piperacillin-tazobactam  3,375 mg IntraVENous Q8H    doxycycline (VIBRAMYCIN) IV  100 mg IntraVENous Q12H    aspirin EC  81 mg Oral BID    lamoTRIgine  25 mg Oral Nightly    melatonin  5 mg Oral Daily    pantoprazole  40 mg Oral QAM AC    rosuvastatin  10 mg Oral Daily    sodium chloride flush  10 mL IntraVENous 2 times per day    insulin lispro  0-4 Units SubCUTAneous TID WC    insulin lispro  0-4 Units SubCUTAneous Nightly     PRN Meds: sodium phosphate IVPB **OR** sodium phosphate IVPB, potassium chloride **OR** potassium alternative oral replacement **OR** potassium chloride, magnesium sulfate, bisacodyl, ipratropium-albuterol, oxyCODONE-acetaminophen, glucose, dextrose bolus **OR** dextrose bolus, glucagon (rDNA), dextrose, sodium chloride flush, sodium chloride, ondansetron **OR** ondansetron, magnesium hydroxide, acetaminophen **OR** acetaminophen      Intake/Output Summary (Last 24 hours) at 11/14/2022 1133  Last data filed at 11/14/2022 0953  Gross per 24 hour   Intake 2867.98 ml   Output 750 ml   Net 2117.98 ml       Exam:    /60   Pulse 74   Temp 97 °F (36.1 °C) (Temporal)   Resp 17   Ht 5' 8\" (1.727 m)   Wt 168 lb 3.2 oz (76.3 kg)   SpO2 97%   BMI 25.57 kg/m²     General appearance: No apparent distress, appears stated age and cooperative. HEENT: Pupils equal, round, and reactive to light. Conjunctivae/corneas clear. Neck: Supple, with full range of motion. No jugular venous distention. Trachea midline. Respiratory:  Normal respiratory effort. Clear to auscultation, bilaterally without Rales/Wheezes/Rhonchi. Cardiovascular: Regular rate and rhythm with normal S1/S2 without murmurs, rubs or gallops. Abdomen: Soft, non-tender, non-distended with normal bowel sounds. Musculoskeletal: No clubbing, cyanosis or edema bilaterally. Full range of motion without deformity. Skin: Skin color, texture, turgor normal.  No rashes or lesions.   Neurologic: No focal deficits  Psychiatric: Alert and oriented, thought content appropriate, normal insight    Labs:   Recent Labs     11/11/22 2042 11/13/22  0527   WBC 11.1 7.4   HGB 12.1* 9.9*   HCT 34.6* 27.3*    202     Recent Labs     11/12/22  1440 11/13/22  0527 11/14/22  0632    133 136   K 3.6 3.2* 3.7    99 102   CO2 21* 27 23   BUN 42* 31* 18   CREATININE 1.5* 1.4* 1.2   CALCIUM 8.4* 8.1* 8.4*   PHOS 2.2*  --  1.9*     Recent Labs     11/11/22 2042 11/13/22  0527   AST 17 15   ALT 14 14   BILITOT 0.8 0.7   ALKPHOS 69 51     Recent Labs     11/11/22 2042   INR 1.5     Recent Labs     11/11/22  2042 11/12/22  0854 11/12/22  1440   CKTOTAL 69 43 35       Assessment/Plan:    Active Hospital Problems    Diagnosis Date Noted    CLEMENTE (acute kidney injury) ([facility] [N17.9] 11/12/2022     Priority: Medium    Weakness [R53.1] 11/12/2022     Priority: Medium    Moderate protein-calorie malnutrition ([facility] [E44.0] 11/14/2022   Stercoral colitis  Hypomagnesemia  Hypokalemia  Failure to thrive  Acute kidney injury  Lactic acidosis  Non-insulin-dependent diabetes mellitus type 2 with diabetic retinopathy  Mood disorder-currently on lamotrigine  Hyperlipidemia  Hypertension     Continue on antibiotics-Zosyn, doxycycline  General Surgery-attempted disimpaction, continue bowel regimen  Hemoglobin A1c reviewed-currently at 7.1. Placed on sliding scale insulin along with hypoglycemia protocol. Sulfonylurea was held at this time. Unsure why the patient is on Eliquis. Not able to find on chart review. Will DC at this time as PCP's med list from -9/29/22 does not contain Elqiuis as well  Electrolyte abnormality-replete per protocol  PT/OT Consulted   consulted- may need placement       DVT Prophylaxis: hep SC  Diet: ADULT DIET; Regular  ADULT ORAL NUTRITION SUPPLEMENT; Breakfast, Dinner; Diabetic Oral Supplement  ADULT ORAL NUTRITION SUPPLEMENT; Lunch, Dinner;  Other Oral Supplement; Gelatein  Code Status: Full Code    PT/OT Eval Status: Ordered    Dispo -ongoing specialist eval, electrolyte correction, placement    Tawnya Goodwin MD

## 2022-11-15 LAB
ANION GAP SERPL CALCULATED.3IONS-SCNC: 9 MMOL/L (ref 7–16)
BASOPHILS ABSOLUTE: 0.05 E9/L (ref 0–0.2)
BASOPHILS RELATIVE PERCENT: 0.7 % (ref 0–2)
BUN BLDV-MCNC: 11 MG/DL (ref 6–23)
C-REACTIVE PROTEIN: 1.5 MG/DL (ref 0–0.4)
CALCIUM SERPL-MCNC: 8.2 MG/DL (ref 8.6–10.2)
CHLORIDE BLD-SCNC: 102 MMOL/L (ref 98–107)
CO2: 25 MMOL/L (ref 22–29)
CREAT SERPL-MCNC: 1 MG/DL (ref 0.7–1.2)
EOSINOPHILS ABSOLUTE: 0.22 E9/L (ref 0.05–0.5)
EOSINOPHILS RELATIVE PERCENT: 3.3 % (ref 0–6)
FERRITIN: 141 NG/ML
FOLATE: 15.3 NG/ML (ref 4.8–24.2)
GFR SERPL CREATININE-BSD FRML MDRD: >60 ML/MIN/1.73
GLUCOSE BLD-MCNC: 138 MG/DL (ref 74–99)
HCT VFR BLD CALC: 30.3 % (ref 37–54)
HEMOGLOBIN: 10.6 G/DL (ref 12.5–16.5)
IMMATURE GRANULOCYTES #: 0.04 E9/L
IMMATURE GRANULOCYTES %: 0.6 % (ref 0–5)
IRON SATURATION: 37 % (ref 20–55)
IRON: 66 MCG/DL (ref 59–158)
LYMPHOCYTES ABSOLUTE: 1.75 E9/L (ref 1.5–4)
LYMPHOCYTES RELATIVE PERCENT: 25.9 % (ref 20–42)
MAGNESIUM: 1.3 MG/DL (ref 1.6–2.6)
MCH RBC QN AUTO: 26.6 PG (ref 26–35)
MCHC RBC AUTO-ENTMCNC: 35 % (ref 32–34.5)
MCV RBC AUTO: 75.9 FL (ref 80–99.9)
METER GLUCOSE: 132 MG/DL (ref 74–99)
METER GLUCOSE: 154 MG/DL (ref 74–99)
METER GLUCOSE: 250 MG/DL (ref 74–99)
METER GLUCOSE: 266 MG/DL (ref 74–99)
METER GLUCOSE: 311 MG/DL (ref 74–99)
MONOCYTES ABSOLUTE: 0.49 E9/L (ref 0.1–0.95)
MONOCYTES RELATIVE PERCENT: 7.2 % (ref 2–12)
NEUTROPHILS ABSOLUTE: 4.21 E9/L (ref 1.8–7.3)
NEUTROPHILS RELATIVE PERCENT: 62.3 % (ref 43–80)
PDW BLD-RTO: 13.2 FL (ref 11.5–15)
PHOSPHORUS: 2 MG/DL (ref 2.5–4.5)
PLATELET # BLD: 221 E9/L (ref 130–450)
PMV BLD AUTO: 9.7 FL (ref 7–12)
POTASSIUM REFLEX MAGNESIUM: 3.5 MMOL/L (ref 3.5–5)
PROCALCITONIN: 0.14 NG/ML (ref 0–0.08)
RBC # BLD: 3.99 E12/L (ref 3.8–5.8)
SEDIMENTATION RATE, ERYTHROCYTE: 10 MM/HR (ref 0–15)
SODIUM BLD-SCNC: 136 MMOL/L (ref 132–146)
TOTAL IRON BINDING CAPACITY: 178 MCG/DL (ref 250–450)
TSH SERPL DL<=0.05 MIU/L-ACNC: 0.45 UIU/ML (ref 0.27–4.2)
VITAMIN B-12: 394 PG/ML (ref 211–946)
VITAMIN D 25-HYDROXY: 16 NG/ML (ref 30–100)
WBC # BLD: 6.8 E9/L (ref 4.5–11.5)

## 2022-11-15 PROCEDURE — 82306 VITAMIN D 25 HYDROXY: CPT

## 2022-11-15 PROCEDURE — 85025 COMPLETE CBC W/AUTO DIFF WBC: CPT

## 2022-11-15 PROCEDURE — 2580000003 HC RX 258: Performed by: NURSE PRACTITIONER

## 2022-11-15 PROCEDURE — 6360000002 HC RX W HCPCS: Performed by: INTERNAL MEDICINE

## 2022-11-15 PROCEDURE — 2580000003 HC RX 258: Performed by: LICENSED PRACTICAL NURSE

## 2022-11-15 PROCEDURE — 82962 GLUCOSE BLOOD TEST: CPT

## 2022-11-15 PROCEDURE — 1200000000 HC SEMI PRIVATE

## 2022-11-15 PROCEDURE — 6370000000 HC RX 637 (ALT 250 FOR IP): Performed by: STUDENT IN AN ORGANIZED HEALTH CARE EDUCATION/TRAINING PROGRAM

## 2022-11-15 PROCEDURE — 80048 BASIC METABOLIC PNL TOTAL CA: CPT

## 2022-11-15 PROCEDURE — 84443 ASSAY THYROID STIM HORMONE: CPT

## 2022-11-15 PROCEDURE — 82746 ASSAY OF FOLIC ACID SERUM: CPT

## 2022-11-15 PROCEDURE — 6370000000 HC RX 637 (ALT 250 FOR IP): Performed by: LICENSED PRACTICAL NURSE

## 2022-11-15 PROCEDURE — 84145 PROCALCITONIN (PCT): CPT

## 2022-11-15 PROCEDURE — 84100 ASSAY OF PHOSPHORUS: CPT

## 2022-11-15 PROCEDURE — 86140 C-REACTIVE PROTEIN: CPT

## 2022-11-15 PROCEDURE — 6360000002 HC RX W HCPCS: Performed by: STUDENT IN AN ORGANIZED HEALTH CARE EDUCATION/TRAINING PROGRAM

## 2022-11-15 PROCEDURE — 85651 RBC SED RATE NONAUTOMATED: CPT

## 2022-11-15 PROCEDURE — 6360000002 HC RX W HCPCS: Performed by: LICENSED PRACTICAL NURSE

## 2022-11-15 PROCEDURE — 2580000003 HC RX 258: Performed by: INTERNAL MEDICINE

## 2022-11-15 PROCEDURE — 2500000003 HC RX 250 WO HCPCS: Performed by: INTERNAL MEDICINE

## 2022-11-15 PROCEDURE — 82607 VITAMIN B-12: CPT

## 2022-11-15 PROCEDURE — 83735 ASSAY OF MAGNESIUM: CPT

## 2022-11-15 PROCEDURE — 83550 IRON BINDING TEST: CPT

## 2022-11-15 PROCEDURE — 6370000000 HC RX 637 (ALT 250 FOR IP): Performed by: INTERNAL MEDICINE

## 2022-11-15 PROCEDURE — 36415 COLL VENOUS BLD VENIPUNCTURE: CPT

## 2022-11-15 PROCEDURE — 83540 ASSAY OF IRON: CPT

## 2022-11-15 PROCEDURE — 82728 ASSAY OF FERRITIN: CPT

## 2022-11-15 PROCEDURE — 2500000003 HC RX 250 WO HCPCS: Performed by: LICENSED PRACTICAL NURSE

## 2022-11-15 RX ORDER — MAGNESIUM SULFATE IN WATER 40 MG/ML
2000 INJECTION, SOLUTION INTRAVENOUS ONCE
Status: COMPLETED | OUTPATIENT
Start: 2022-11-15 | End: 2022-11-15

## 2022-11-15 RX ORDER — ERGOCALCIFEROL 1.25 MG/1
50000 CAPSULE ORAL WEEKLY
Status: DISCONTINUED | OUTPATIENT
Start: 2022-11-15 | End: 2022-11-17 | Stop reason: HOSPADM

## 2022-11-15 RX ORDER — LANOLIN ALCOHOL/MO/W.PET/CERES
400 CREAM (GRAM) TOPICAL 2 TIMES DAILY
Status: DISCONTINUED | OUTPATIENT
Start: 2022-11-15 | End: 2022-11-17 | Stop reason: HOSPADM

## 2022-11-15 RX ADMIN — PIPERACILLIN AND TAZOBACTAM 3375 MG: 3; .375 INJECTION, POWDER, FOR SOLUTION INTRAVENOUS at 02:35

## 2022-11-15 RX ADMIN — BISACODYL 5 MG: 5 TABLET, COATED ORAL at 14:10

## 2022-11-15 RX ADMIN — PIPERACILLIN AND TAZOBACTAM 3375 MG: 3; .375 INJECTION, POWDER, FOR SOLUTION INTRAVENOUS at 09:58

## 2022-11-15 RX ADMIN — POTASSIUM PHOSPHATE, MONOBASIC AND POTASSIUM PHOSPHATE, DIBASIC 15 MMOL: 224; 236 INJECTION, SOLUTION, CONCENTRATE INTRAVENOUS at 12:44

## 2022-11-15 RX ADMIN — HEPARIN SODIUM 5000 UNITS: 10000 INJECTION INTRAVENOUS; SUBCUTANEOUS at 05:46

## 2022-11-15 RX ADMIN — Medication 5 MG: at 20:15

## 2022-11-15 RX ADMIN — ROSUVASTATIN 10 MG: 10 TABLET, FILM COATED ORAL at 07:48

## 2022-11-15 RX ADMIN — SODIUM CHLORIDE: 9 INJECTION, SOLUTION INTRAVENOUS at 05:53

## 2022-11-15 RX ADMIN — ASPIRIN 81 MG: 81 TABLET, COATED ORAL at 07:48

## 2022-11-15 RX ADMIN — Medication 10 ML: at 20:18

## 2022-11-15 RX ADMIN — HEPARIN SODIUM 5000 UNITS: 10000 INJECTION INTRAVENOUS; SUBCUTANEOUS at 22:13

## 2022-11-15 RX ADMIN — LAMOTRIGINE 25 MG: 25 TABLET ORAL at 20:15

## 2022-11-15 RX ADMIN — HEPARIN SODIUM 5000 UNITS: 10000 INJECTION INTRAVENOUS; SUBCUTANEOUS at 14:10

## 2022-11-15 RX ADMIN — PANTOPRAZOLE SODIUM 40 MG: 40 TABLET, DELAYED RELEASE ORAL at 05:47

## 2022-11-15 RX ADMIN — INSULIN LISPRO 2 UNITS: 100 INJECTION, SOLUTION INTRAVENOUS; SUBCUTANEOUS at 11:32

## 2022-11-15 RX ADMIN — DOCUSATE SODIUM 283 MG: 283 LIQUID RECTAL at 12:49

## 2022-11-15 RX ADMIN — MAGNESIUM HYDROXIDE 30 ML: 400 SUSPENSION ORAL at 14:10

## 2022-11-15 RX ADMIN — DOXYCYCLINE 100 MG: 100 INJECTION, POWDER, LYOPHILIZED, FOR SOLUTION INTRAVENOUS at 06:35

## 2022-11-15 RX ADMIN — ERGOCALCIFEROL 50000 UNITS: 1.25 CAPSULE ORAL at 13:03

## 2022-11-15 RX ADMIN — INSULIN LISPRO 3 UNITS: 100 INJECTION, SOLUTION INTRAVENOUS; SUBCUTANEOUS at 16:23

## 2022-11-15 RX ADMIN — Medication 400 MG: at 20:18

## 2022-11-15 RX ADMIN — Medication 10 ML: at 07:50

## 2022-11-15 RX ADMIN — ASPIRIN 81 MG: 81 TABLET, COATED ORAL at 20:15

## 2022-11-15 RX ADMIN — MAGNESIUM SULFATE HEPTAHYDRATE 2000 MG: 40 INJECTION, SOLUTION INTRAVENOUS at 12:46

## 2022-11-15 NOTE — PROGRESS NOTES
Department of Internal Medicine  Nephrology Progress Note    Events reviewed. SUBJECTIVE:  We are following Mr. Qian Barragan for CLEMENTE and hyponatremia. He reports no complaints.     PHYSICAL EXAM:      Vitals:    VITALS:  BP (!) 156/85   Pulse 75   Temp 97.2 °F (36.2 °C) (Temporal)   Resp 20   Ht 5' 8\" (1.727 m)   Wt 167 lb 1.6 oz (75.8 kg)   SpO2 98%   BMI 25.41 kg/m²   24HR INTAKE/OUTPUT:    Intake/Output Summary (Last 24 hours) at 11/15/2022 1242  Last data filed at 11/15/2022 0943  Gross per 24 hour   Intake 900.02 ml   Output 1700 ml   Net -799.98 ml         Constitutional:  Alert and oriented with intermittent confusion, ill-appearing  HEENT:  Normocephalic, PERRL, dry mucous membranes  Respiratory:  CTA bilaterally  Cardiovascular/Edema:  RRR, S1,S2  Gastrointestinal:  Soft, rounded, nontender, nondistended  Neurologic:  Nonfocal, RICHMOND  Skin:  Warm, dry, intact  Other:  No edema     Scheduled Meds:   potassium phosphate IVPB  15 mmol IntraVENous Once    magnesium sulfate  2,000 mg IntraVENous Once    vitamin D  50,000 Units Oral Weekly    docusate sodium  1 enema Rectal Once    bisacodyl  5 mg Oral Daily    magnesium hydroxide  30 mL Oral Daily    potassium phosphate IVPB  15 mmol IntraVENous Once    polyethylene glycol  4,000 mL Oral Once    heparin (porcine)  5,000 Units SubCUTAneous Q8H    aspirin EC  81 mg Oral BID    lamoTRIgine  25 mg Oral Nightly    melatonin  5 mg Oral Daily    pantoprazole  40 mg Oral QAM AC    rosuvastatin  10 mg Oral Daily    sodium chloride flush  10 mL IntraVENous 2 times per day    insulin lispro  0-4 Units SubCUTAneous TID WC    insulin lispro  0-4 Units SubCUTAneous Nightly     Continuous Infusions:   dextrose      sodium chloride       PRN Meds:.sodium phosphate IVPB **OR** sodium phosphate IVPB, potassium chloride **OR** potassium alternative oral replacement **OR** potassium chloride, magnesium sulfate, ipratropium-albuterol, glucose, dextrose bolus **OR** dextrose bolus, glucagon (rDNA), dextrose, sodium chloride flush, sodium chloride, ondansetron **OR** ondansetron, acetaminophen **OR** acetaminophen    DATA:    CBC:   Lab Results   Component Value Date/Time    WBC 6.8 11/15/2022 10:31 AM    RBC 3.99 11/15/2022 10:31 AM    HGB 10.6 11/15/2022 10:31 AM    HCT 30.3 11/15/2022 10:31 AM    MCV 75.9 11/15/2022 10:31 AM    MCH 26.6 11/15/2022 10:31 AM    MCHC 35.0 11/15/2022 10:31 AM    RDW 13.2 11/15/2022 10:31 AM     11/15/2022 10:31 AM    MPV 9.7 11/15/2022 10:31 AM     CMP:    Lab Results   Component Value Date/Time     11/15/2022 06:34 AM    K 3.5 11/15/2022 06:34 AM     11/15/2022 06:34 AM    CO2 25 11/15/2022 06:34 AM    BUN 11 11/15/2022 06:34 AM    CREATININE 1.0 11/15/2022 06:34 AM    GFRAA >60 12/13/2020 05:22 AM    LABGLOM >60 11/15/2022 06:34 AM    GLUCOSE 138 11/15/2022 06:34 AM    PROT 4.8 11/13/2022 05:27 AM    LABALBU 2.8 11/13/2022 05:27 AM    CALCIUM 8.2 11/15/2022 06:34 AM    BILITOT 0.7 11/13/2022 05:27 AM    ALKPHOS 51 11/13/2022 05:27 AM    AST 15 11/13/2022 05:27 AM    ALT 14 11/13/2022 05:27 AM     Magnesium:    Lab Results   Component Value Date/Time    MG 1.3 11/15/2022 06:34 AM     Phosphorus:    Lab Results   Component Value Date/Time    PHOS 2.0 11/15/2022 06:34 AM     Radiology Review:      CXR 11/11/22   No acute cardiopulmonary process. CT abdomen and pelvis without IV contrast 11/11/22   Large amount of stool within the rectal vault with rectal wall thickening,   suggestive of stercoral colitis. Clinical correlation recommended. Compression deformity of L1, new since the prior examination but chronic in   appearance. Correlate with point tenderness. Patchy left lower lung ground-glass opacities, concerning for pneumonia. Moderate hiatal hernia. Additional findings as above. CT head without IV contrast 11/11/22   No acute intracranial abnormality.        Diffuse volume loss and probable chronic small vessel ischemic white matter   change, but it has progressed significantly compared to the prior exam and   therefore other white matter processes such is demyelinating disease or   vasculitis are not excluded. Kidney ultrasound complete 11/12/22   No acute abnormality identified. BRIEF SUMMARY OF INITIAL CONSULT:    Briefly, Mr. Valentin Chavez is a 74year old male with a PMH of IDDM with diabetic retinopathy, HTN, HLD, subdural hematoma (2018), TIA, dementia, anxiety, depression and noncompliance, who was admitted on November 11, 2022 after presenting to the ER with weakness. Apparently, he was found on the ground covered in feces. His labs in ER were significant for lactic acid 4.4, sodium 131, chloride 91, bicarbonate 18, BUN 58 mg/dL, and creatinine 2.1 mg/dL, which are reasons for this consultation. A CT of the abdomen and pelvis was obtained which revealed a large amount of retained stool and possible pneumonia. He received a 1L normal saline bolus in ER. He admits to vomiting, diarrhea, and very poor oral intake. Problems resolved:    Hypotonic hyponatremia, translocational secondary to hyperglycemia. Corrected sodium for hyperglycemia 134. HAGMA, secondary to lactic acidosis. Bicarbonate level improved with bicarbonate drip. Lactic acidosis, lactic acid 4.4    IMPRESSION/RECOMMENDATIONS:      CLEMENTE stage I-II versus CLEMENTE on CKD, volume responsive pre-renal CLEMENTE secondary to vomiting, diarrhea, and poor oral intake. Kidney ultrasound unremarkable. Resolved, creatinine level continues to improve with IV fluid administration. CKD stage II, with mild proteinuria, UACR: 119.5 mg/g, 2/2 diabetic kidney disease. Hypokalemia, with renal potassium wasting (urine K/creatinine: 34 mEq/g, >13), 2/2 hypomagnesemia and no reabsorbable anions administration (piperacillin), potassium levels stable  Hypomagnesemia, secondary to poor oral intake, 2/2 PPI administration? Lucyann Push To replace.    Hypophosphatemia, secondary to poor oral intake, vitamin D deficiency. To replace.    Vitamin D deficiency, vitamin D 25 level 16  HTN, not currently on BP medications   ---------------------------------------------------------------  Probable pneumonia, on piperacillin-tazobactam and doxycycline  Large retained stool, on bowel regimen   Type II DM with diabetic retinopathy, on SSI  Hyperlipidemia, on rosuvastatin  Microcytic anemia, ferritin 141, iron saturation 27%, folate 15.3, B12 394      Plan:    Replace magnesium  Replace phosphorus   Start cholecalciferol 1000 units p.o. daily  Continue to monitor potassium level  Continue to monitor magnesium level  Continue to monitor phosphorus level       Electronically signed by Kirt Khan MD on 11/15/2022 at 12:42 PM

## 2022-11-15 NOTE — PLAN OF CARE
Problem: Discharge Planning  Goal: Discharge to home or other facility with appropriate resources  11/15/2022 0400 by Nikolay David RN  Outcome: Progressing     Problem: Skin/Tissue Integrity  Goal: Absence of new skin breakdown  Description: 1. Monitor for areas of redness and/or skin breakdown  2. Assess vascular access sites hourly  3. Every 4-6 hours minimum:  Change oxygen saturation probe site  4. Every 4-6 hours:  If on nasal continuous positive airway pressure, respiratory therapy assess nares and determine need for appliance change or resting period.   11/15/2022 0400 by Nikolay David RN  Outcome: Progressing     Problem: Safety - Adult  Goal: Free from fall injury  11/15/2022 0400 by Nikolay David RN  Outcome: Progressing     Problem: Chronic Conditions and Co-morbidities  Goal: Patient's chronic conditions and co-morbidity symptoms are monitored and maintained or improved  11/15/2022 0400 by Nikolay David RN  Outcome: Progressing

## 2022-11-15 NOTE — PROGRESS NOTES
Hospitalist Progress Note      PCP: Priyanka Duarte MD    Date of Admission: 11/11/2022    Chief Complaint: Abdominal pain    Hospital Course:   72-year-old male presents with failure to thrive and found down on the ground. Pt states he feel out of bed earlier today and was unable to get back on bed. Pt brought to ER deconditioned and covered in feces. CT abdomen showed severe constipation-stercoral colitis. General surgery was consulted-attempted disimpaction, bowel regimen was initiated. Started on antibiotics-Zosyn, doxycycline.  was consulted for placement. Nephrology was consulted for CLEMENTE. Also had hypomagnesemia, hypokalemia which was corrected. Subjective: Patient was seen at bedside this morning. Patient endorses no new complaints. There is a small soft repeat documented yesterday. Patient states that he has not had any bowel movement, at least not a larger one. Denies nausea, vomiting, abdominal pain.   Later he was brought lunch and he started eating, no complaints  He has not have cough, sputum, chest or back pain, fevers or chills before hospitalization and during hospital stay      Medications:  Reviewed    Infusion Medications    sodium chloride 75 mL/hr at 11/15/22 0553    dextrose      sodium chloride       Scheduled Medications    potassium phosphate IVPB  15 mmol IntraVENous Once    magnesium sulfate  2,000 mg IntraVENous Once    vitamin D  50,000 Units Oral Weekly    potassium phosphate IVPB  15 mmol IntraVENous Once    polyethylene glycol  4,000 mL Oral Once    heparin (porcine)  5,000 Units SubCUTAneous Q8H    aspirin EC  81 mg Oral BID    lamoTRIgine  25 mg Oral Nightly    melatonin  5 mg Oral Daily    pantoprazole  40 mg Oral QAM AC    rosuvastatin  10 mg Oral Daily    sodium chloride flush  10 mL IntraVENous 2 times per day    insulin lispro  0-4 Units SubCUTAneous TID WC    insulin lispro  0-4 Units SubCUTAneous Nightly     PRN Meds: sodium phosphate IVPB **OR** sodium phosphate IVPB, potassium chloride **OR** potassium alternative oral replacement **OR** potassium chloride, magnesium sulfate, bisacodyl, ipratropium-albuterol, glucose, dextrose bolus **OR** dextrose bolus, glucagon (rDNA), dextrose, sodium chloride flush, sodium chloride, ondansetron **OR** ondansetron, magnesium hydroxide, acetaminophen **OR** acetaminophen      Intake/Output Summary (Last 24 hours) at 11/15/2022 1029  Last data filed at 11/15/2022 0943  Gross per 24 hour   Intake 900.02 ml   Output 1700 ml   Net -799.98 ml       Exam:    BP (!) 156/85   Pulse 75   Temp 97.2 °F (36.2 °C) (Temporal)   Resp 20   Ht 5' 8\" (1.727 m)   Wt 167 lb 1.6 oz (75.8 kg)   SpO2 98%   BMI 25.41 kg/m²     General appearance: No apparent distress, appears stated age and cooperative. HEENT: Pupils equal, round, and reactive to light. Conjunctivae/corneas clear. Neck: Supple, with full range of motion. No jugular venous distention. Trachea midline. Respiratory:  Normal respiratory effort. Clear to auscultation, bilaterally without Rales/Wheezes/Rhonchi. Cardiovascular: Regular rate and rhythm with normal S1/S2 without murmurs, rubs or gallops. Abdomen: Soft, non-tender, non-distended with normal bowel sounds. Musculoskeletal: No clubbing, cyanosis or edema bilaterally. Full range of motion without deformity. Skin: Skin color, texture, turgor normal.  No rashes or lesions.   Neurologic: No focal deficits  Psychiatric: Alert and oriented, thought content appropriate, normal insight    Labs:   Recent Labs     11/13/22 0527   WBC 7.4   HGB 9.9*   HCT 27.3*        Recent Labs     11/12/22  1440 11/13/22  0527 11/14/22  0632 11/15/22  0634    133 136 136   K 3.6 3.2* 3.7 3.5    99 102 102   CO2 21* 27 23 25   BUN 42* 31* 18 11   CREATININE 1.5* 1.4* 1.2 1.0   CALCIUM 8.4* 8.1* 8.4* 8.2*   PHOS 2.2*  --  1.9* 2.0*     Recent Labs     11/13/22  0527   AST 15   ALT 14   BILITOT 0.7   ALKPHOS 51 No results for input(s): INR in the last 72 hours. Recent Labs     11/12/22  1440   CKTOTAL 28       Assessment/Plan:    Active Hospital Problems    Diagnosis Date Noted    CLEMENTE (acute kidney injury) ([facility] [address] [N17.9] 11/12/2022     Priority: Medium    Weakness [R53.1] 11/12/2022     Priority: Medium    Moderate protein-calorie malnutrition ([facility] [address] [E44.0] 11/14/2022   Stercoral colitis  Hypomagnesemia  Hypokalemia  Failure to thrive  Acute kidney injury  Lactic acidosis  Non-insulin-dependent diabetes mellitus type 2 with diabetic retinopathy  Mood disorder-currently on lamotrigine  Hyperlipidemia  Hypertension  Complex right adrenal nodule. Outpatient follow up      Plan:  - Discontinue abx. Patient has no clinical pneumonia, has remained afebrile, normal white blood cells, procalcitonin 0.14 which currently with low likelihood of bacterial infection  - General Surgery-attempted disimpaction, continue bowel regimen  -Change his bowel regimen to daily instead of as needed  - I Recommended patient to have outpatient colonoscopy   - Hemoglobin A1c reviewed-currently at 7.1. Placed on sliding scale insulin along with hypoglycemia protocol. Sulfonylurea was held at this time. - Unsure why the patient is on Eliquis. Not able to find on chart review. Will DC at this time as PCP's med list from -9/29/22 does not contain Elqiuis as well  - Electrolyte abnormality-replete per protocol. Continue to replace phosphorus, magnesium at remains low  - PT/OT on board   -  consulted- may need placement       DVT Prophylaxis: hep SC  Diet: ADULT DIET; Regular  ADULT ORAL NUTRITION SUPPLEMENT; Breakfast, Dinner; Diabetic Oral Supplement  ADULT ORAL NUTRITION SUPPLEMENT; Lunch, Dinner;  Other Oral Supplement; Gelatein  Code Status: Full Code    PT/OT Eval Status: Ordered    Dispo -ongoing specialist eval, electrolyte correction, placement    Daniel George MD

## 2022-11-16 ENCOUNTER — APPOINTMENT (OUTPATIENT)
Dept: GENERAL RADIOLOGY | Age: 67
DRG: 683 | End: 2022-11-16
Payer: MEDICARE

## 2022-11-16 LAB
ALBUMIN SERPL-MCNC: 3 G/DL (ref 3.5–5.2)
ALP BLD-CCNC: 63 U/L (ref 40–129)
ALT SERPL-CCNC: 42 U/L (ref 0–40)
ANION GAP SERPL CALCULATED.3IONS-SCNC: 11 MMOL/L (ref 7–16)
AST SERPL-CCNC: 23 U/L (ref 0–39)
BILIRUB SERPL-MCNC: 0.4 MG/DL (ref 0–1.2)
BUN BLDV-MCNC: 11 MG/DL (ref 6–23)
CALCIUM SERPL-MCNC: 9 MG/DL (ref 8.6–10.2)
CHLORIDE BLD-SCNC: 101 MMOL/L (ref 98–107)
CO2: 23 MMOL/L (ref 22–29)
CREAT SERPL-MCNC: 1.1 MG/DL (ref 0.7–1.2)
GFR SERPL CREATININE-BSD FRML MDRD: >60 ML/MIN/1.73
GLUCOSE BLD-MCNC: 172 MG/DL (ref 74–99)
MAGNESIUM: 1.6 MG/DL (ref 1.6–2.6)
METER GLUCOSE: 163 MG/DL (ref 74–99)
METER GLUCOSE: 303 MG/DL (ref 74–99)
METER GLUCOSE: 323 MG/DL (ref 74–99)
PHOSPHORUS: 2.3 MG/DL (ref 2.5–4.5)
POTASSIUM REFLEX MAGNESIUM: 4 MMOL/L (ref 3.5–5)
POTASSIUM SERPL-SCNC: 4 MMOL/L (ref 3.5–5)
SODIUM BLD-SCNC: 135 MMOL/L (ref 132–146)
TOTAL PROTEIN: 5.6 G/DL (ref 6.4–8.3)

## 2022-11-16 PROCEDURE — 2580000003 HC RX 258: Performed by: INTERNAL MEDICINE

## 2022-11-16 PROCEDURE — 6370000000 HC RX 637 (ALT 250 FOR IP): Performed by: STUDENT IN AN ORGANIZED HEALTH CARE EDUCATION/TRAINING PROGRAM

## 2022-11-16 PROCEDURE — 6370000000 HC RX 637 (ALT 250 FOR IP): Performed by: INTERNAL MEDICINE

## 2022-11-16 PROCEDURE — 83735 ASSAY OF MAGNESIUM: CPT

## 2022-11-16 PROCEDURE — 80048 BASIC METABOLIC PNL TOTAL CA: CPT

## 2022-11-16 PROCEDURE — 36415 COLL VENOUS BLD VENIPUNCTURE: CPT

## 2022-11-16 PROCEDURE — 2500000003 HC RX 250 WO HCPCS: Performed by: INTERNAL MEDICINE

## 2022-11-16 PROCEDURE — 82962 GLUCOSE BLOOD TEST: CPT

## 2022-11-16 PROCEDURE — 97535 SELF CARE MNGMENT TRAINING: CPT

## 2022-11-16 PROCEDURE — 74018 RADEX ABDOMEN 1 VIEW: CPT

## 2022-11-16 PROCEDURE — 84100 ASSAY OF PHOSPHORUS: CPT

## 2022-11-16 PROCEDURE — 6360000002 HC RX W HCPCS: Performed by: STUDENT IN AN ORGANIZED HEALTH CARE EDUCATION/TRAINING PROGRAM

## 2022-11-16 PROCEDURE — 80053 COMPREHEN METABOLIC PANEL: CPT

## 2022-11-16 PROCEDURE — 1200000000 HC SEMI PRIVATE

## 2022-11-16 PROCEDURE — 97530 THERAPEUTIC ACTIVITIES: CPT

## 2022-11-16 RX ORDER — SODIUM PHOSPHATE, DIBASIC AND SODIUM PHOSPHATE, MONOBASIC 7; 19 G/133ML; G/133ML
1 ENEMA RECTAL
Status: DISCONTINUED | OUTPATIENT
Start: 2022-11-16 | End: 2022-11-16

## 2022-11-16 RX ORDER — SODIUM PHOSPHATE, DIBASIC AND SODIUM PHOSPHATE, MONOBASIC 7; 19 G/133ML; G/133ML
1 ENEMA RECTAL ONCE
Status: DISCONTINUED | OUTPATIENT
Start: 2022-11-16 | End: 2022-11-17 | Stop reason: HOSPADM

## 2022-11-16 RX ORDER — LACTULOSE 10 G/15ML
20 SOLUTION ORAL ONCE
Status: COMPLETED | OUTPATIENT
Start: 2022-11-16 | End: 2022-11-16

## 2022-11-16 RX ADMIN — HEPARIN SODIUM 5000 UNITS: 10000 INJECTION INTRAVENOUS; SUBCUTANEOUS at 21:27

## 2022-11-16 RX ADMIN — Medication 5 MG: at 21:27

## 2022-11-16 RX ADMIN — ROSUVASTATIN 10 MG: 10 TABLET, FILM COATED ORAL at 10:02

## 2022-11-16 RX ADMIN — HEPARIN SODIUM 5000 UNITS: 10000 INJECTION INTRAVENOUS; SUBCUTANEOUS at 05:47

## 2022-11-16 RX ADMIN — Medication 400 MG: at 10:01

## 2022-11-16 RX ADMIN — ASPIRIN 81 MG: 81 TABLET, COATED ORAL at 10:01

## 2022-11-16 RX ADMIN — INSULIN LISPRO 3 UNITS: 100 INJECTION, SOLUTION INTRAVENOUS; SUBCUTANEOUS at 17:15

## 2022-11-16 RX ADMIN — LACTULOSE 20 G: 20 SOLUTION ORAL at 10:03

## 2022-11-16 RX ADMIN — PANTOPRAZOLE SODIUM 40 MG: 40 TABLET, DELAYED RELEASE ORAL at 05:47

## 2022-11-16 RX ADMIN — INSULIN LISPRO 4 UNITS: 100 INJECTION, SOLUTION INTRAVENOUS; SUBCUTANEOUS at 21:32

## 2022-11-16 RX ADMIN — Medication 400 MG: at 21:27

## 2022-11-16 RX ADMIN — MAGNESIUM HYDROXIDE 30 ML: 400 SUSPENSION ORAL at 10:03

## 2022-11-16 RX ADMIN — SODIUM PHOSPHATE, MONOBASIC, MONOHYDRATE AND SODIUM PHOSPHATE, DIBASIC, ANHYDROUS 15 MMOL: 276; 142 INJECTION, SOLUTION INTRAVENOUS at 17:08

## 2022-11-16 RX ADMIN — BISACODYL 5 MG: 5 TABLET, COATED ORAL at 10:01

## 2022-11-16 RX ADMIN — ASPIRIN 81 MG: 81 TABLET, COATED ORAL at 21:27

## 2022-11-16 RX ADMIN — LAMOTRIGINE 25 MG: 25 TABLET ORAL at 21:27

## 2022-11-16 RX ADMIN — HEPARIN SODIUM 5000 UNITS: 10000 INJECTION INTRAVENOUS; SUBCUTANEOUS at 17:08

## 2022-11-16 ASSESSMENT — PAIN SCALES - GENERAL
PAINLEVEL_OUTOF10: 0
PAINLEVEL_OUTOF10: 0

## 2022-11-16 NOTE — PROGRESS NOTES
Department of Internal Medicine  Nephrology Progress Note    Events reviewed. SUBJECTIVE:  We are following Mr. Adolfo Mir for CLEMENTE and hyponatremia. He reports no complaints.     PHYSICAL EXAM:      Vitals:    VITALS:  BP (!) 149/99   Pulse 81   Temp 98.2 °F (36.8 °C) (Temporal)   Resp 18   Ht 5' 8\" (1.727 m)   Wt 164 lb 5 oz (74.5 kg)   SpO2 97%   BMI 24.98 kg/m²   24HR INTAKE/OUTPUT:    Intake/Output Summary (Last 24 hours) at 11/16/2022 1038  Last data filed at 11/16/2022 1000  Gross per 24 hour   Intake 270 ml   Output 500 ml   Net -230 ml         Constitutional:  Alert and oriented with intermittent confusion, ill-appearing  HEENT:  Normocephalic, PERRL, dry mucous membranes  Respiratory:  CTA bilaterally  Cardiovascular/Edema:  RRR, S1,S2  Gastrointestinal:  Soft, rounded, nontender, nondistended  Neurologic:  Nonfocal, RICHMOND  Skin:  Warm, dry, intact  Other:  No edema     Scheduled Meds:   vitamin D  50,000 Units Oral Weekly    bisacodyl  5 mg Oral Daily    magnesium hydroxide  30 mL Oral Daily    magnesium oxide  400 mg Oral BID    potassium phosphate IVPB  15 mmol IntraVENous Once    polyethylene glycol  4,000 mL Oral Once    heparin (porcine)  5,000 Units SubCUTAneous Q8H    aspirin EC  81 mg Oral BID    lamoTRIgine  25 mg Oral Nightly    melatonin  5 mg Oral Daily    pantoprazole  40 mg Oral QAM AC    rosuvastatin  10 mg Oral Daily    sodium chloride flush  10 mL IntraVENous 2 times per day    insulin lispro  0-4 Units SubCUTAneous TID WC    insulin lispro  0-4 Units SubCUTAneous Nightly     Continuous Infusions:   dextrose      sodium chloride       PRN Meds:.sodium phosphate, sodium phosphate IVPB **OR** sodium phosphate IVPB, potassium chloride **OR** potassium alternative oral replacement **OR** potassium chloride, magnesium sulfate, ipratropium-albuterol, glucose, dextrose bolus **OR** dextrose bolus, glucagon (rDNA), dextrose, sodium chloride flush, sodium chloride, ondansetron **OR** ondansetron, acetaminophen **OR** acetaminophen    DATA:    CBC:   Lab Results   Component Value Date/Time    WBC 6.8 11/15/2022 10:31 AM    RBC 3.99 11/15/2022 10:31 AM    HGB 10.6 11/15/2022 10:31 AM    HCT 30.3 11/15/2022 10:31 AM    MCV 75.9 11/15/2022 10:31 AM    MCH 26.6 11/15/2022 10:31 AM    MCHC 35.0 11/15/2022 10:31 AM    RDW 13.2 11/15/2022 10:31 AM     11/15/2022 10:31 AM    MPV 9.7 11/15/2022 10:31 AM     CMP:    Lab Results   Component Value Date/Time     11/16/2022 05:44 AM    K 4.0 11/16/2022 05:44 AM    K 4.0 11/16/2022 05:44 AM     11/16/2022 05:44 AM    CO2 23 11/16/2022 05:44 AM    BUN 11 11/16/2022 05:44 AM    CREATININE 1.1 11/16/2022 05:44 AM    GFRAA >60 12/13/2020 05:22 AM    LABGLOM >60 11/16/2022 05:44 AM    GLUCOSE 172 11/16/2022 05:44 AM    PROT 5.6 11/16/2022 05:44 AM    LABALBU 3.0 11/16/2022 05:44 AM    CALCIUM 9.0 11/16/2022 05:44 AM    BILITOT 0.4 11/16/2022 05:44 AM    ALKPHOS 63 11/16/2022 05:44 AM    AST 23 11/16/2022 05:44 AM    ALT 42 11/16/2022 05:44 AM     Magnesium:    Lab Results   Component Value Date/Time    MG 1.6 11/16/2022 05:44 AM     Phosphorus:    Lab Results   Component Value Date/Time    PHOS 2.3 11/16/2022 05:44 AM     Radiology Review:      CXR 11/11/22   No acute cardiopulmonary process. CT abdomen and pelvis without IV contrast 11/11/22   Large amount of stool within the rectal vault with rectal wall thickening,   suggestive of stercoral colitis. Clinical correlation recommended. Compression deformity of L1, new since the prior examination but chronic in   appearance. Correlate with point tenderness. Patchy left lower lung ground-glass opacities, concerning for pneumonia. Moderate hiatal hernia. Additional findings as above. CT head without IV contrast 11/11/22   No acute intracranial abnormality.        Diffuse volume loss and probable chronic small vessel ischemic white matter   change, but it has progressed significantly compared to the prior exam and   therefore other white matter processes such is demyelinating disease or   vasculitis are not excluded. Kidney ultrasound complete 11/12/22   No acute abnormality identified. BRIEF SUMMARY OF INITIAL CONSULT:    Briefly, Mr. Isabel Fowler is a 74year old male with a PMH of IDDM with diabetic retinopathy, HTN, HLD, subdural hematoma (2018), TIA, dementia, anxiety, depression and noncompliance, who was admitted on November 11, 2022 after presenting to the ER with weakness. Apparently, he was found on the ground covered in feces. His labs in ER were significant for lactic acid 4.4, sodium 131, chloride 91, bicarbonate 18, BUN 58 mg/dL, and creatinine 2.1 mg/dL, which are reasons for this consultation. A CT of the abdomen and pelvis was obtained which revealed a large amount of retained stool and possible pneumonia. He received a 1L normal saline bolus in ER. He admits to vomiting, diarrhea, and very poor oral intake. Problems resolved:    Hypotonic hyponatremia, translocational secondary to hyperglycemia. Corrected sodium for hyperglycemia 134. HAGMA, secondary to lactic acidosis. Bicarbonate level improved with bicarbonate drip. Lactic acidosis, lactic acid 4.4  CLEMENTE stage I-II versus CLEMENTE on CKD, volume responsive pre-renal CLEMENTE secondary to vomiting, diarrhea, and poor oral intake. Kidney ultrasound unremarkable. Resolved, creatinine level continues to improve with IV fluid administration. Hypokalemia, with renal potassium wasting (urine K/creatinine: 34 mEq/g, >13), 2/2 hypomagnesemia and no reabsorbable anions administration (piperacillin), potassium levels stable    IMPRESSION/RECOMMENDATIONS:        CKD stage II, with mild proteinuria, UACR: 119.5 mg/g, 2/2 diabetic kidney disease. Hypomagnesemia, secondary to poor oral intake, 2/2 PPI administration? Yamilka Balling To replace. Hypophosphatemia, secondary to poor oral intake, vitamin D deficiency. To replace. Vitamin D deficiency, vitamin D 25 level 16, on ergocalciferol  HTN, not currently on BP medications   ---------------------------------------------------------------  Probable pneumonia, on piperacillin-tazobactam and doxycycline  Large retained stool, on bowel regimen   Type II DM with diabetic retinopathy, on SSI  Hyperlipidemia, on rosuvastatin  Microcytic anemia, ferritin 141, iron saturation 27%, folate 15.3, B12 394      Plan:    Replace magnesium  Replace phosphorus   Continue cholecalciferol 1000 units p.o. daily  Continue to monitor potassium level  Continue to monitor magnesium level  Continue to monitor phosphorus level   Discharge planning      Electronically signed by Nadeen Jones MD on 11/16/2022 at 10:38 AM

## 2022-11-16 NOTE — PROGRESS NOTES
OCCUPATIONAL THERAPY TREATMENT NOTE     [address] [address] [address]         UZVV:  Patient Name: Pretty Gil  MRN: [id number]  : 1955  Room: [address] [place] [address]-A     Evaluating OT: Kerry Darnell OTR/L; [id number]        Referring Provider: Ronel Pacheco MD    Specific Provider Orders/Date: OT Eval and Treat 22       Diagnosis: CLEMENTE (acute kidney injury); Weakness; Chronic moderate protein-calorie malnutrition; Failure to thrive. Pt found on ground ~1 hour (fell out of bed) unable to stand d/t weakness. Surgery: None this admission     Pertinent Medical History:  has a past medical history of Anxiety, Bronchitis, Cellulitis, Chronic sinusitis, Depression, Diabetes mellitus ([address] Diabetic retinopathy ([address] Fracture of left foot, High cholesterol, Hypercholesteremia, Hypercholesterolemia, Hypertension, Lumbago, Moderate mood disorder ([address] and Third nerve palsy.       Recommended Adaptive Equipment: TBD pending progress      Precautions:  Fall Risk, +alarms, spinal precautions for comfort (L1 compression deformity - chronic in appearance), TAPS, ham      Assessment of current deficits    [x] Functional mobility            [x]ADLs           [x] Strength                  [x]Cognition    [x] Functional transfers          [x] IADLs         [x] Safety Awareness   [x]Endurance    [x] Fine Coordination                         [x] Balance      [] Vision/perception   []Sensation      []Gross Motor Coordination             [] ROM           [] Delirium                   [] Motor Control      OT PLAN OF CARE   OT POC based on physician orders, patient diagnosis and results of clinical assessment     Frequency/Duration 1-3 days/wk for 2 weeks PRN   Specific OT Treatment Interventions to include:   * Instruction/training on adapted ADL techniques and AE recommendations to increase functional independence within precautions       * Training on energy conservation strategies, correct breathing pattern and techniques to improve independence/tolerance for self-care routine  * Functional transfer/mobility training/DME recommendations for increased independence, safety, and fall prevention  * Patient/Family education to increase follow through with safety techniques and functional independence  * Recommendation of environmental modifications for increased safety with functional transfers/mobility and ADLs  * Cognitive retraining/development of therapeutic activities to improve problem solving, judgement, memory, and attention for increased safety/participation in ADL/IADL tasks  * Therapeutic exercise to improve motor endurance, ROM, and functional strength for ADLs/functional transfers  * Therapeutic activities to facilitate/challenge dynamic balance, stand tolerance for increased safety and independence with ADLs  * Positioning to improve skin integrity, interaction with environment and functional independence     Home Living: Pt lives alone in 1 story home with 4-5 steps & 1 handrail to enter. Laundry in basement. Bathroom setup: Walk-in shower with shower chair   Equipment owned: Shower chair, ww, cane     Prior Level of Function: IND with ADLs , IND with IADLs; engaged in functional mobility with use of  no AD  Driving: No (daughter assists)  Occupation: None reported     Pain Level: Pt with no c/o pain during session  Cognition: A&O: 2-3/4 (pt stating \"2003\" & \"7167\" for year despite cues); Follows 1 step directions. Flat affect.                Memory:  Fair+              Sequencing:  Fair-              Problem solving:  Fair-              Judgement/safety:  Fair-                Functional Assessment:  AM-PAC Daily Activity Raw Score: 14/24    Initial Eval Status  Date: 11/14/22 Treatment Status  Date: 11/16/22 STGs = LTGs  Time frame: 10-14 days   Feeding Setup  Set up  Independent    Grooming Minimal Assist  MIN A when standing   Modified Burnett    UB Dressing Moderate Assist  MIN A to [facility] gown delayed response to v/c's  Modified Burnett    LB Dressing Moderate Assist  MOD A to christina/doff pants pt requiring assist to thread pants over B LE using reacher v/c's for technique     MIN A to christina/doff socks using cross over technique      Modified Burnett    Bathing Moderate Assist N/t Modified Burnett    Toileting Maximal Assist  N/t Modified Burnett    Bed Mobility  Supine to sit: Minimal Assist   Sit to supine: Minimal Assist  Supine<>sit MIN A pt educated with regards to log roll technique to follow spinal precautions  Supine to sit: Independent   Sit to supine: Independent    Functional Transfers Sit to stand:Minimal Assist   Stand to sit:Minimal Assist  Stand pivot: NT  Commode: NT MIN A sit<>stand from EOB to w/w v/c's for hand placement   Sit to stand:Modified Burnett    Stand to sit:Modified Burnett   Stand pivot: Modified Burnett   Commode: Modified Burnett     Functional Mobility Minimal Assist  Use of ww ~3 side steps towards HOB. MIN A with w/w house hold distances v/c's for walker management and safety  Modified Burnett with use of Appropriate AD   Balance Sitting:     Static - Supervision     Dynamic - SBA  Standing: Minimal Assist Sitting:   Static: supervision   Dynamic: SBA  Standing: MIN A with w/w   Sitting:     Static: Independent     Dynamic: Independent  Standing: Modified Burnett    Activity Tolerance Fair Fair-   Good   Visual/  Perceptual Glasses: Yes  Appears WFL           Safety Fair-   Good  during ADL completion      Comments: Upon arrival pt supine in bed, agreeable to therapy session. Pt educated with regards to bed mobility, functional transfers, functional mobility, spinal precautions, LE dressing AE, UE/LE dressing techniques, bathing AE/technique.  At end of session pt supine in bed, with bed alarm set,  all lines and tubes intact, call light within reach.     Pt has made fair  progress towards set goals.    Continue with current plan of care      Treatment Time [id number]            Treatment Time Out: 0124                Treatment Charges: Mins Units   Ther Ex  [id number]     Manual Therapy [address] [facility]     Thera Activities 45884 15 1   ADL/Home Mgt 34950 15 1   Neuro Re-ed 23701     Group Therapy      Orthotic manage/training  23037     Non-Billable Time     Total Timed Treatment [address] OLMSTEAD/L [address]

## 2022-11-16 NOTE — PROGRESS NOTES
Hospitalist Progress Note      PCP: Murphy Werner MD    Date of Admission: 11/11/2022    Chief Complaint: Abdominal pain    Hospital Course:   53-year-old male presents with failure to thrive and found down on the ground. Pt states he feel out of bed earlier today and was unable to get back on bed. Pt brought to ER deconditioned and covered in feces. CT abdomen showed severe constipation-stercoral colitis. General surgery was consulted-attempted disimpaction, bowel regimen was initiated. Started on antibiotics-Zosyn, doxycycline that were discontinued on 11/15 as no evidence of clinical or radiological pneumonia.  was consulted for placement. Nephrology was consulted for CLEMENTE. CLEMENTE has resolved and IV fluids were discontinued. Nephrology also followed for hypomagnesemia, hypokalemia which were corrected. Patient has generalized weakness and alternates diarrhea with constipation. He said he had a colonoscopy and his teenage years days and cannot remember why it was done. He has not had a recent colonoscopy. He follows with the [facility]. Discussed with patient that he needs to have a colonoscopy as soon as possible as an outpatient. Unsure why the patient is on Eliquis. Not able to find on chart review. Will DC at this time as PCP's med list from -9/29/22 does not contain Elqiuis as well  Patient was seen by PT and OT. He is adequate for placement and has been resected, waiting for approval    Subjective: Patient seen and examined at bedside today. Patient has been downgraded to 54 unit. He endorses no new complaint, has not had regular BM except for a small BM 2 days ago. Her he has been eating his regular meals and complaining of no nausea or vomiting, no abdominal pain or distention.   He said he is passing gas      Medications:  Reviewed    Infusion Medications    dextrose      sodium chloride       Scheduled Medications    sodium phosphate IVPB  15 mmol IntraVENous Once    vitamin D deformity. Skin: Skin color, texture, turgor normal.  No rashes or lesions. Neurologic: No focal deficits  Psychiatric: Alert and oriented, thought content appropriate, normal insight    Labs:   Recent Labs     11/15/22  1031   WBC 6.8   HGB 10.6*   HCT 30.3*        Recent Labs     11/14/22  0632 11/15/22  0634 11/16/22  0544    136 135   K 3.7 3.5 4.0  4.0    102 101   CO2 23 25 23   BUN 18 11 11   CREATININE 1.2 1.0 1.1   CALCIUM 8.4* 8.2* 9.0   PHOS 1.9* 2.0* 2.3*     Recent Labs     11/16/22  0544   AST 23   ALT 42*   BILITOT 0.4   ALKPHOS 63     No results for input(s): INR in the last 72 hours. No results for input(s): Farooq Ebbs in the last 72 hours. Assessment/Plan:    Active Hospital Problems    Diagnosis Date Noted    CLEMENTE (acute kidney injury) ([facility] [N17.9] 11/12/2022     Priority: Medium    Weakness [R53.1] 11/12/2022     Priority: Medium    Moderate protein-calorie malnutrition ([facility] [E44.0] 11/14/2022   Stercoral colitis  Hypomagnesemia  Hypokalemia  Failure to thrive  Acute kidney injury  Lactic acidosis  Non-insulin-dependent diabetes mellitus type 2 with diabetic retinopathy  Mood disorder-currently on lamotrigine  Hyperlipidemia  Hypertension  Complex right adrenal nodule. Outpatient follow up      Plan:    -Change his bowel regimen to daily instead of as needed  - I Recommended patient to have outpatient colonoscopy   - Electrolyte abnormality-replete per protocol. Continue to replace phosphorus, magnesium at remains low  - PT/OT on board   - Awaiting for regular BM  - Needs acceptance for placement       DVT Prophylaxis: hep SC  Diet: ADULT ORAL NUTRITION SUPPLEMENT; Breakfast, Dinner; Diabetic Oral Supplement  ADULT ORAL NUTRITION SUPPLEMENT; Lunch, Dinner; Other Oral Supplement; Gelatein  ADULT DIET;  Regular; High Fiber  Code Status: Full Code    PT/OT Eval Status: Ordered    Dispo -ongoing specialist eval, electrolyte correction, placement    Brianna Bruno Chiquita Vivar MD

## 2022-11-16 NOTE — CARE COORDINATION
Return message from Brigid Has, pt accepted to [facility]; discharge plan is [facility] pending acceptance; envelope and ambulette form completed in soft-chart; 7000 to be completed once discharge order is obtained.

## 2022-11-16 NOTE — PROGRESS NOTES
Nurse to nurse given to 47, message left with patient's son Noemi Stephens to notify him of room change.

## 2022-11-16 NOTE — CARE COORDINATION
Discussed pt's therapy evals with srinivasan Stevens; reviewed SNF list for [facility]; chose [facility]; this CM discussed with pt via room phone (moved to 9003), pt agreeable; referral made to [facility]; discharge plan is [facility] pending acceptance; envelope and ambulette form completed in soft-chart; 7000 to be completed once discharge order is obtained. The Plan for Transition of Care is related to the following treatment goals: medical stability    The Patient and/or patient representative srinivasan Stevens was provided with a choice of provider and agrees   with the discharge plan. [x] Yes [] No    Freedom of choice list was provided with basic dialogue that supports the patient's individualized plan of care/goals, treatment preferences and shares the quality data associated with the providers.  [x] Yes [] No

## 2022-11-16 NOTE — DISCHARGE INSTR - COC
Continuity of Care Form    Patient Name: Amy Ngo   :  1955  MRN:  [id number]    Admit date:  2022  Discharge date:  Cat    Code Status Order: Full Code   Advance Directives:     Admitting Physician:  Nikolay Luevano MD  PCP: Ezra Page MD    Discharging Nurse: [address] Unit/Room#: 7291/8598-T  Discharging Unit Phone Number: [phone]    Emergency Contact:   Extended Emergency Contact Information  Primary Emergency Contact: Rosario Arriaza [address] Phone: [phone]  Relation: Child  Secondary Emergency Contact: Bianca Amorable  Work Phone: [phone]  Relation: Child   needed? No    Past Surgical History:  Past Surgical History:   Procedure Laterality Date    EYE SURGERY      HIP SURGERY Right 2020    HIP HEMIARTHROPLASTY performed by Roxana Aragon MD at [address] [facility] Left 12/10/2020    HIP HEMIARTHROPLASTY -- HEBR -- DROPLET +     PT. COMING FROM [facility] performed by Christine Burns MD at [address]       Immunization History:   Immunization History   Administered Date(s) Administered    COVID-19, PFIZER PURPLE top, DILUTE for use, (age 15 y+), 30mcg/0.3mL 2020, 2021       Active Problems:  Patient Active Problem List   Diagnosis Code    Severe episode of recurrent major depressive disorder, without psychotic features ([facility] F33.2    Suicidal ideations R45.851    SDH (subdural hematoma) S06. 5XAA    Traumatic subdural hemorrhage with loss of consciousness of 30 minutes or less ([address] S06. 5X1A    Diabetic acidosis without coma (HCC) E11.10    Acute kidney injury superimposed on CKD (HCC) N17.9, N18.9    Hyponatremia E87.1    Right adrenal mass (HCC) E27.8    Hyperkalemia E87.5    Major depression, recurrent (HCC) F33.9    Type 2 diabetes mellitus with complication, with long-term current use of insulin (HCC) E11.8, Z79.4    HLD (hyperlipidemia) E78.5    HTN (hypertension) I10    Uncontrolled type 2 diabetes mellitus with hyperglycemia (HCC) E11.65    SIRS (systemic inflammatory response syndrome) (HCC) R65.10    Weight loss R63.4    Generalized abdominal pain R10.84    Lactic acidosis E87.20    Constipation K59.00    High anion gap metabolic acidosis W46.32    Non compliance w medication regimen Z91.14    Physical deconditioning R53.81    Dementia, vascular (HCC) F01.50    Moderate protein-calorie malnutrition (HCC) E44.0    TIA (transient ischemic attack) G45.9    DKA, type 1, not at goal [facility]) E10.10    Ileus ([facility] K56.7    Adrenal adenoma, right D35.01    Hypertensive urgency I16.0    Hiccups K58.4    Umbilical hernia without obstruction and without gangrene K42.9    GERD (gastroesophageal reflux disease) K21.9    Depression F32. A    Hypotension I95.9    Depression, major, recurrent (HCC) F33.9    Closed right hip fracture, initial encounter ([facility] S72.001A    History of right hip hemiarthroplasty Z96.641    Closed left hip fracture, initial encounter ([facility] B10.793G    Acute metabolic encephalopathy O39.48    History of left hip hemiarthroplasty R99.703    CLEMENTE (acute kidney injury) ([facility] N17.9    Weakness R53.1       Isolation/Infection:   Isolation            No Isolation          Patient Infection Status       Infection Onset Added Last Indicated Last Indicated By Review Planned Expiration Resolved Resolved By    None active    Resolved    C-diff Rule Out 22 CLOSTRIDIUM DIFFICILE EIA (Ordered)   22 Rule-Out Test Resulted    COVID-19 20 Respiratory Panel, Molecular, with COVID-19 (Restricted: peds pts or suitable admitted adults)   20     COVID-19 (Rule Out) 20 Respiratory Panel, Molecular, with COVID-19 (Restricted: peds pts or suitable admitted adults) (Ordered)   20 Rule-Out Test Resulted    COVID-19 (Rule Out) 20 Covid-19 Ambulatory (Ordered)   20 Rule-Out Test Resulted COVID-19 (Rule Out) 10/06/20 10/06/20 10/06/20 COVID-19 (Ordered)   10/06/20 Rule-Out Test Resulted    C-diff Rule Out 05/04/20 05/04/20 05/04/20 Clostridium difficile EIA (Ordered)   08/21/20 Evens Atkinson RN            Nurse Assessment:  Last Vital Signs: BP (!) 149/99   Pulse 81   Temp 98.2 °F (36.8 °C) (Temporal)   Resp 18   Ht 5' 8\" (1.727 m)   Wt 164 lb 5 oz (74.5 kg)   SpO2 97%   BMI 24.98 kg/m²     Last documented pain score (0-10 scale):    Last Weight:   Wt Readings from Last 1 Encounters:   11/16/22 164 lb 5 oz (74.5 kg)     Mental Status:  disoriented    IV Access:  - None    Nursing Mobility/ADLs:  Walking   Assisted  Transfer  Assisted  Bathing  Assisted  Dressing  Assisted  Toileting  Assisted  Feeding  Assisted  Med Admin  Assisted  Med Delivery   whole    Wound Care Documentation and Therapy:        Elimination:  Continence: Bowel: Yes  Bladder: No  Urinary Catheter: None   Colostomy/Ileostomy/Ileal Conduit: No       Date of Last BM: 11/14/2022      Intake/Output Summary (Last 24 hours) at 11/16/2022 1104  Last data filed at 11/16/2022 1000  Gross per 24 hour   Intake 270 ml   Output 500 ml   Net -230 ml     I/O last 3 completed shifts: In: 480 [P.O.:480]  Out: 500 [Urine:500]    Safety Concerns: At Risk for Falls    Impairments/Disabilities:      Speech, Vision, and Hearing    Nutrition Therapy:  Current Nutrition Therapy:   - Oral Diet:  General    Routes of Feeding: Oral  Liquids: Thin Liquids  Daily Fluid Restriction: no  Last Modified Barium Swallow with Video (Video Swallowing Test): not done    Treatments at the Time of Hospital Discharge:   Respiratory Treatments: none  Oxygen Therapy:  is not on home oxygen therapy.   Ventilator:    - No ventilator support    Rehab Therapies: Physical Therapy and Occupational Therapy  Weight Bearing Status/Restrictions: No weight bearing restrictions  Other Medical Equipment (for information only, NOT a DME order):  bedside commode and hospital bed  Other Treatments: none    Patient's personal belongings (please select all that are sent with patient):  None    RN SIGNATURE:  Electronically signed by Daija Mcdonald RN on 11/17/22 at 12:18 PM EST    CASE MANAGEMENT/SOCIAL WORK SECTION    Inpatient Status Date: 11/17/2022    Readmission Risk Assessment Score:  Readmission Risk              Risk of Unplanned Readmission:  20           Discharging to Facility/ Agency   Name: [address] [facility] [place] [address]  Phone:[phone]  Fax:[phone]    Dialysis Facility (if applicable)   Name:  Address:  Dialysis Schedule:  Phone:  Fax:    / signature: Electronically signed by Ryan Stephens RN on 11/16/22 at 11:05 AM EST    PHYSICIAN SECTION    Prognosis: Good    Condition at Discharge: Stable    Rehab Potential (if transferring to Rehab): Good    Recommended Labs or Other Treatments After Discharge: none    Physician Certification: I certify the above information and transfer of Lex Sim  is necessary for the continuing treatment of the diagnosis listed and that he requires [facility] for greater 30 days.      Update Admission H&P: No change in H&P    PHYSICIAN SIGNATURE:  Electronically signed by Daija Mcdonald RN on 11/17/22 at 12:19 PM EST

## 2022-11-17 VITALS
RESPIRATION RATE: 18 BRPM | HEIGHT: 68 IN | WEIGHT: 166.8 LBS | BODY MASS INDEX: 25.28 KG/M2 | OXYGEN SATURATION: 99 % | TEMPERATURE: 98.1 F | DIASTOLIC BLOOD PRESSURE: 69 MMHG | HEART RATE: 68 BPM | SYSTOLIC BLOOD PRESSURE: 121 MMHG

## 2022-11-17 LAB
ANION GAP SERPL CALCULATED.3IONS-SCNC: 8 MMOL/L (ref 7–16)
BLOOD CULTURE, ROUTINE: NORMAL
BUN BLDV-MCNC: 18 MG/DL (ref 6–23)
CALCIUM SERPL-MCNC: 9.2 MG/DL (ref 8.6–10.2)
CHLORIDE BLD-SCNC: 102 MMOL/L (ref 98–107)
CO2: 24 MMOL/L (ref 22–29)
CREAT SERPL-MCNC: 1.2 MG/DL (ref 0.7–1.2)
CULTURE, BLOOD 2: NORMAL
GFR SERPL CREATININE-BSD FRML MDRD: >60 ML/MIN/1.73
GLUCOSE BLD-MCNC: 225 MG/DL (ref 74–99)
MAGNESIUM: 1.7 MG/DL (ref 1.6–2.6)
METER GLUCOSE: 222 MG/DL (ref 74–99)
METER GLUCOSE: 345 MG/DL (ref 74–99)
METER GLUCOSE: 381 MG/DL (ref 74–99)
PHOSPHORUS: 2.9 MG/DL (ref 2.5–4.5)
POTASSIUM REFLEX MAGNESIUM: 4.6 MMOL/L (ref 3.5–5)
SARS-COV-2, NAAT: NOT DETECTED
SODIUM BLD-SCNC: 134 MMOL/L (ref 132–146)

## 2022-11-17 PROCEDURE — 82962 GLUCOSE BLOOD TEST: CPT

## 2022-11-17 PROCEDURE — 87635 SARS-COV-2 COVID-19 AMP PRB: CPT

## 2022-11-17 PROCEDURE — 80048 BASIC METABOLIC PNL TOTAL CA: CPT

## 2022-11-17 PROCEDURE — 6370000000 HC RX 637 (ALT 250 FOR IP): Performed by: INTERNAL MEDICINE

## 2022-11-17 PROCEDURE — 6360000002 HC RX W HCPCS: Performed by: STUDENT IN AN ORGANIZED HEALTH CARE EDUCATION/TRAINING PROGRAM

## 2022-11-17 PROCEDURE — 83735 ASSAY OF MAGNESIUM: CPT

## 2022-11-17 PROCEDURE — 36415 COLL VENOUS BLD VENIPUNCTURE: CPT

## 2022-11-17 PROCEDURE — 6370000000 HC RX 637 (ALT 250 FOR IP): Performed by: STUDENT IN AN ORGANIZED HEALTH CARE EDUCATION/TRAINING PROGRAM

## 2022-11-17 PROCEDURE — 84100 ASSAY OF PHOSPHORUS: CPT

## 2022-11-17 PROCEDURE — 2580000003 HC RX 258: Performed by: INTERNAL MEDICINE

## 2022-11-17 RX ORDER — ROSUVASTATIN CALCIUM 10 MG/1
20 TABLET, COATED ORAL DAILY
Qty: 30 TABLET | Refills: 3 | Status: SHIPPED | OUTPATIENT
Start: 2022-11-17 | End: 2022-11-28

## 2022-11-17 RX ORDER — INSULIN LISPRO 100 [IU]/ML
0-4 INJECTION, SOLUTION INTRAVENOUS; SUBCUTANEOUS
Qty: 1 EACH | Refills: 0 | Status: SHIPPED | OUTPATIENT
Start: 2022-11-17 | End: 2022-11-28

## 2022-11-17 RX ORDER — INSULIN GLARGINE 100 [IU]/ML
4 INJECTION, SOLUTION SUBCUTANEOUS NIGHTLY
Qty: 1 EACH | Refills: 3 | Status: SHIPPED | OUTPATIENT
Start: 2022-11-17

## 2022-11-17 RX ORDER — LANOLIN ALCOHOL/MO/W.PET/CERES
400 CREAM (GRAM) TOPICAL DAILY
Qty: 7 TABLET | Refills: 0 | Status: ON HOLD | OUTPATIENT
Start: 2022-11-17 | End: 2022-11-23 | Stop reason: HOSPADM

## 2022-11-17 RX ORDER — ERGOCALCIFEROL 1.25 MG/1
50000 CAPSULE ORAL WEEKLY
Qty: 5 CAPSULE | Refills: 0 | Status: ON HOLD | OUTPATIENT
Start: 2022-11-22 | End: 2022-11-23 | Stop reason: HOSPADM

## 2022-11-17 RX ADMIN — HEPARIN SODIUM 5000 UNITS: 10000 INJECTION INTRAVENOUS; SUBCUTANEOUS at 13:41

## 2022-11-17 RX ADMIN — INSULIN LISPRO 1 UNITS: 100 INJECTION, SOLUTION INTRAVENOUS; SUBCUTANEOUS at 08:41

## 2022-11-17 RX ADMIN — INSULIN LISPRO 4 UNITS: 100 INJECTION, SOLUTION INTRAVENOUS; SUBCUTANEOUS at 17:08

## 2022-11-17 RX ADMIN — MAGNESIUM HYDROXIDE 30 ML: 400 SUSPENSION ORAL at 08:41

## 2022-11-17 RX ADMIN — ASPIRIN 81 MG: 81 TABLET, COATED ORAL at 08:40

## 2022-11-17 RX ADMIN — PANTOPRAZOLE SODIUM 40 MG: 40 TABLET, DELAYED RELEASE ORAL at 05:57

## 2022-11-17 RX ADMIN — Medication 10 ML: at 08:43

## 2022-11-17 RX ADMIN — INSULIN LISPRO 4 UNITS: 100 INJECTION, SOLUTION INTRAVENOUS; SUBCUTANEOUS at 12:06

## 2022-11-17 RX ADMIN — BISACODYL 5 MG: 5 TABLET, COATED ORAL at 08:39

## 2022-11-17 RX ADMIN — HEPARIN SODIUM 5000 UNITS: 10000 INJECTION INTRAVENOUS; SUBCUTANEOUS at 05:57

## 2022-11-17 RX ADMIN — Medication 400 MG: at 08:41

## 2022-11-17 RX ADMIN — ROSUVASTATIN 10 MG: 10 TABLET, FILM COATED ORAL at 08:40

## 2022-11-17 ASSESSMENT — PAIN SCALES - GENERAL
PAINLEVEL_OUTOF10: 0

## 2022-11-17 NOTE — PLAN OF CARE
Problem: Skin/Tissue Integrity  Goal: Absence of new skin breakdown  Description: 1. Monitor for areas of redness and/or skin breakdown  2. Assess vascular access sites hourly  3. Every 4-6 hours minimum:  Change oxygen saturation probe site  4. Every 4-6 hours:  If on nasal continuous positive airway pressure, respiratory therapy assess nares and determine need for appliance change or resting period.   11/17/2022 1203 by Pricilla Martinez RN  Outcome: Completed  11/17/2022 1203 by Pricilla Martinez RN  Outcome: Progressing     Problem: Safety - Adult  Goal: Free from fall injury  11/17/2022 1203 by Pricilla Martinez RN  Outcome: Completed  11/17/2022 1203 by Pricilla Martinez RN  Outcome: Progressing     Problem: Chronic Conditions and Co-morbidities  Goal: Patient's chronic conditions and co-morbidity symptoms are monitored and maintained or improved  11/17/2022 1203 by Pricilla Martinez RN  Outcome: Completed  11/17/2022 1203 by Pricilla Martinez RN  Outcome: Progressing  Flowsheets (Taken 11/17/2022 1139)  Care Plan - Patient's Chronic Conditions and Co-Morbidity Symptoms are Monitored and Maintained or Improved: Monitor and assess patient's chronic conditions and comorbid symptoms for stability, deterioration, or improvement     Problem: Nutrition Deficit:  Goal: Optimize nutritional status  11/17/2022 1203 by Pricilla Martinez RN  Outcome: Completed  11/17/2022 1203 by Pricilla Martinez RN  Outcome: Progressing     Problem: Pain  Goal: Verbalizes/displays adequate comfort level or baseline comfort level  11/17/2022 1203 by Pricilla Martinez RN  Outcome: Completed  11/17/2022 1203 by Pricilla Martinez RN  Outcome: Progressing  Flowsheets (Taken 11/17/2022 0730)  Verbalizes/displays adequate comfort level or baseline comfort level: Encourage patient to monitor pain and request assistance

## 2022-11-17 NOTE — DISCHARGE INSTRUCTIONS
Follow-up with primary care physician within 1 week of hospital discharge  Outpatient colonoscopies  highly recommended

## 2022-11-17 NOTE — PLAN OF CARE
Problem: Skin/Tissue Integrity  Goal: Absence of new skin breakdown  Description: 1. Monitor for areas of redness and/or skin breakdown  2. Assess vascular access sites hourly  3. Every 4-6 hours minimum:  Change oxygen saturation probe site  4. Every 4-6 hours:  If on nasal continuous positive airway pressure, respiratory therapy assess nares and determine need for appliance change or resting period.   11/16/2022 1955 by Jcarlos Soriano RN  Outcome: Progressing  11/16/2022 1955 by Jcarlos Soriano RN  Outcome: Progressing     Problem: Chronic Conditions and Co-morbidities  Goal: Patient's chronic conditions and co-morbidity symptoms are monitored and maintained or improved  11/16/2022 1955 by Jcarlos Soriano RN  Outcome: Progressing  11/16/2022 1955 by Jcarlos Soriano RN  Outcome: Progressing  Flowsheets (Taken 11/16/2022 1300)  Care Plan - Patient's Chronic Conditions and Co-Morbidity Symptoms are Monitored and Maintained or Improved: Monitor and assess patient's chronic conditions and comorbid symptoms for stability, deterioration, or improvement     Problem: Nutrition Deficit:  Goal: Optimize nutritional status  11/16/2022 1955 by Jcarlos Soriano RN  Outcome: Progressing  11/16/2022 1955 by Jcarlos Soriano RN  Outcome: Progressing     Problem: Pain  Goal: Verbalizes/displays adequate comfort level or baseline comfort level  11/16/2022 1955 by Jcarlos Soriano RN  Outcome: Progressing  11/16/2022 1955 by Jcarlos Soriano RN  Outcome: Progressing

## 2022-11-17 NOTE — PROGRESS NOTES
Department of Internal Medicine  Nephrology Progress Note    Events reviewed. SUBJECTIVE:  We are following Mr. Josiane Bustos for CLEMENTE and hyponatremia. He reports no complaints.     PHYSICAL EXAM:      Vitals:    VITALS:  /69   Pulse 68   Temp 98.1 °F (36.7 °C) (Oral)   Resp 18   Ht 5' 8\" (1.727 m)   Wt 166 lb 12.8 oz (75.7 kg)   SpO2 99%   BMI 25.36 kg/m²   24HR INTAKE/OUTPUT:    Intake/Output Summary (Last 24 hours) at 11/17/2022 0915  Last data filed at 11/17/2022 0332  Gross per 24 hour   Intake 300 ml   Output 800 ml   Net -500 ml         Constitutional:  Alert and oriented with intermittent confusion, ill-appearing  HEENT:  Normocephalic, PERRL, dry mucous membranes  Respiratory:  CTA bilaterally  Cardiovascular/Edema:  RRR, S1,S2  Gastrointestinal:  Soft, rounded, nontender, nondistended  Neurologic:  Nonfocal, RICHMOND  Skin:  Warm, dry, intact  Other:  No edema     Scheduled Meds:   sodium phosphate  1 enema Rectal Once    vitamin D  50,000 Units Oral Weekly    bisacodyl  5 mg Oral Daily    magnesium hydroxide  30 mL Oral Daily    magnesium oxide  400 mg Oral BID    potassium phosphate IVPB  15 mmol IntraVENous Once    polyethylene glycol  4,000 mL Oral Once    heparin (porcine)  5,000 Units SubCUTAneous Q8H    aspirin EC  81 mg Oral BID    lamoTRIgine  25 mg Oral Nightly    melatonin  5 mg Oral Daily    pantoprazole  40 mg Oral QAM AC    rosuvastatin  10 mg Oral Daily    sodium chloride flush  10 mL IntraVENous 2 times per day    insulin lispro  0-4 Units SubCUTAneous TID WC    insulin lispro  0-4 Units SubCUTAneous Nightly     Continuous Infusions:   dextrose      sodium chloride       PRN Meds:.sodium phosphate IVPB **OR** sodium phosphate IVPB, potassium chloride **OR** potassium alternative oral replacement **OR** potassium chloride, magnesium sulfate, ipratropium-albuterol, glucose, dextrose bolus **OR** dextrose bolus, glucagon (rDNA), dextrose, sodium chloride flush, sodium chloride, ondansetron **OR** ondansetron, acetaminophen **OR** acetaminophen    DATA:    CBC:   Lab Results   Component Value Date/Time    WBC 6.8 11/15/2022 10:31 AM    RBC 3.99 11/15/2022 10:31 AM    HGB 10.6 11/15/2022 10:31 AM    HCT 30.3 11/15/2022 10:31 AM    MCV 75.9 11/15/2022 10:31 AM    MCH 26.6 11/15/2022 10:31 AM    MCHC 35.0 11/15/2022 10:31 AM    RDW 13.2 11/15/2022 10:31 AM     11/15/2022 10:31 AM    MPV 9.7 11/15/2022 10:31 AM     CMP:    Lab Results   Component Value Date/Time     11/17/2022 04:11 AM    K 4.6 11/17/2022 04:11 AM     11/17/2022 04:11 AM    CO2 24 11/17/2022 04:11 AM    BUN 18 11/17/2022 04:11 AM    CREATININE 1.2 11/17/2022 04:11 AM    GFRAA >60 12/13/2020 05:22 AM    LABGLOM >60 11/17/2022 04:11 AM    GLUCOSE 225 11/17/2022 04:11 AM    PROT 5.6 11/16/2022 05:44 AM    LABALBU 3.0 11/16/2022 05:44 AM    CALCIUM 9.2 11/17/2022 04:11 AM    BILITOT 0.4 11/16/2022 05:44 AM    ALKPHOS 63 11/16/2022 05:44 AM    AST 23 11/16/2022 05:44 AM    ALT 42 11/16/2022 05:44 AM     Magnesium:    Lab Results   Component Value Date/Time    MG 1.7 11/17/2022 04:11 AM     Phosphorus:    Lab Results   Component Value Date/Time    PHOS 2.9 11/17/2022 04:11 AM     Radiology Review:      CXR 11/11/22   No acute cardiopulmonary process. CT abdomen and pelvis without IV contrast 11/11/22   Large amount of stool within the rectal vault with rectal wall thickening,   suggestive of stercoral colitis. Clinical correlation recommended. Compression deformity of L1, new since the prior examination but chronic in   appearance. Correlate with point tenderness. Patchy left lower lung ground-glass opacities, concerning for pneumonia. Moderate hiatal hernia. Additional findings as above. CT head without IV contrast 11/11/22   No acute intracranial abnormality.        Diffuse volume loss and probable chronic small vessel ischemic white matter   change, but it has progressed significantly compared to the prior exam and   therefore other white matter processes such is demyelinating disease or   vasculitis are not excluded. Kidney ultrasound complete 11/12/22   No acute abnormality identified. BRIEF SUMMARY OF INITIAL CONSULT:    Briefly, Mr. Vibha Selby is a 74year old male with a PMH of IDDM with diabetic retinopathy, HTN, HLD, subdural hematoma (2018), TIA, dementia, anxiety, depression and noncompliance, who was admitted on November 11, 2022 after presenting to the ER with weakness. Apparently, he was found on the ground covered in feces. His labs in ER were significant for lactic acid 4.4, sodium 131, chloride 91, bicarbonate 18, BUN 58 mg/dL, and creatinine 2.1 mg/dL, which are reasons for this consultation. A CT of the abdomen and pelvis was obtained which revealed a large amount of retained stool and possible pneumonia. He received a 1L normal saline bolus in ER. He admits to vomiting, diarrhea, and very poor oral intake. Problems resolved:    Hypotonic hyponatremia, translocational secondary to hyperglycemia. Corrected sodium for hyperglycemia 134. HAGMA, secondary to lactic acidosis. Bicarbonate level improved with bicarbonate drip. Lactic acidosis, lactic acid 4.4  CLEMENTE stage I-II versus CLEMENTE on CKD, volume responsive pre-renal CLEMENTE secondary to vomiting, diarrhea, and poor oral intake. Kidney ultrasound unremarkable. Resolved, creatinine level continues to improve with IV fluid administration. Hypokalemia, with renal potassium wasting (urine K/creatinine: 34 mEq/g, >13), 2/2 hypomagnesemia and no reabsorbable anions administration (piperacillin), potassium levels stable    IMPRESSION/RECOMMENDATIONS:        CKD stage II, with mild proteinuria, UACR: 119.5 mg/g, 2/2 diabetic kidney disease. Hypomagnesemia, secondary to poor oral intake, 2/2 PPI administration? .  Levels improved  Hypophosphatemia, secondary to poor oral intake, vitamin D deficiency.   Levels improved  Vitamin D deficiency, vitamin D 25 level 16, on ergocalciferol  HTN, not currently on BP medications   ---------------------------------------------------------------  Probable pneumonia, on piperacillin-tazobactam and doxycycline  Large retained stool, on bowel regimen   Type II DM with diabetic retinopathy, on SSI  Hyperlipidemia, on rosuvastatin  Microcytic anemia, ferritin 141, iron saturation 27%, folate 15.3, B12 394      Plan:    Continue cholecalciferol 1000 units p.o. daily  Continue to monitor potassium level  Continue to monitor magnesium level  Continue to monitor phosphorus level   Okay to discharge from renal point of view  Follow-up with us in 3 to 4 weeks      Electronically signed by Rendall Schirmer, MD on 11/17/2022 at 9:15 AM

## 2022-11-17 NOTE — DISCHARGE SUMMARY
Hospitalist Discharge Summary    Patient ID: Delmi Walsh   Patient : 1955  Patient's PCP: Amie Null MD    Admit Date: 2022   Admitting Physician: Marysol Pires MD    Discharge Date:  2022   Discharge Physician: iTffany Richter MD   Discharge Condition: Stable  Discharge Disposition: [facility] course in brief:  (Please refer to daily progress notes for a comprehensive review of the hospitalization by requesting medical records)    31-year-old male presents with failure to thrive and found down on the ground. Pt states he feel out of bed earlier today and was unable to get back on bed. Pt brought to ER deconditioned and covered in feces. CT abdomen showed severe constipation-stercoral colitis. General surgery was consulted-attempted disimpaction, bowel regimen was initiated. Started on antibiotics-Zosyn, doxycycline that were discontinued on 11/15 as no evidence of clinical or radiological pneumonia.  was consulted for placement. Nephrology was consulted for CLEMENTE. CLEMENTE has resolved and IV fluids were discontinued. Nephrology also followed for hypomagnesemia, hypokalemia which were corrected. Patient has generalized weakness and alternates diarrhea with constipation. He said he had a colonoscopy and his teenage years days and cannot remember why it was done. He has not had a recent colonoscopy. He follows with the [place]. Discussed with patient that he needs to have a colonoscopy as soon as possible as an outpatient. Unsure why the patient is on Eliquis. Not able to find on chart review. Will DC at this time as PCP's med list from -22 does not contain Elqiuis as well. Patient was seen by PT and OT. He is adequate for placement and pre-CERT was obtained. Patient has been given multiple bowel regimen and he had a small bowel movement on . Repeat KUB shows no stools accumulation on the left side of the colon.   Patient has been able to eat his meals with no discomfort, nausea or vomiting, he is passing gas has no abdominal pain or discomfort or distention, and he said that is his regular habits. Discussed with patient the need to have colonoscopy as an outpatient and he states that he will get a referral from the [place] to have this done as soon as possible. Patient was cleared by nephrology for discharge. Seen at bedside today and he has received maximum benefit from current hospitalization he is discharged to SNF in stable condition    Physical exam on discharge    General appearance: No apparent distress, appears stated age and cooperative. HEENT: Pupils equal, round, and reactive to light. Conjunctivae/corneas clear. Neck: Supple, with full range of motion. No jugular venous distention. Trachea midline. Respiratory:  Normal respiratory effort. Clear to auscultation, bilaterally without Rales/Wheezes/Rhonchi. Cardiovascular: Regular rate and rhythm with normal S1/S2 without murmurs, rubs or gallops. Abdomen: Soft, non-tender, non-distended with normal bowel sounds. Musculoskeletal: No clubbing, cyanosis or edema bilaterally. Full range of motion without deformity. Skin: Skin color, texture, turgor normal.  No rashes or lesions. Neurologic: No focal deficits  Psychiatric: Alert and oriented, thought content appropriate, normal insight       Consults:   IP CONSULT TO HOSPITALIST  IP CONSULT TO GENERAL SURGERY  IP CONSULT TO NEPHROLOGY  IP CONSULT TO DIETITIAN    Discharge Diagnoses:    Resolved AKA  Deconditioning and FTT/moderate protein calorie malnutrition  Multiple electrolyte derangement. Has been corrected  Diabetes mellitus type 2  Mood disorder-currently on lamotrigine  Hyperlipidemia  Hypertension  Complex right adrenal nodule. Outpatient follow up        Discharge Instructions / Follow up:    No future appointments.     Continued appropriate risk factor modification of blood pressure, diabetes and serum lipids will remain essential to reducing risk of future atherosclerotic development    Activity: activity as tolerated    Significant labs:  CBC:   Recent Labs     11/15/22  1031   WBC 6.8   RBC 3.99   HGB 10.6*   HCT 30.3*   MCV 75.9*   RDW 13.2        BMP:   Recent Labs     11/15/22  0634 11/16/22  0544 11/17/22  0411    135 134   K 3.5 4.0  4.0 4.6    101 102   CO2 25 23 24   BUN 11 11 18   CREATININE 1.0 1.1 1.2   MG 1.3* 1.6 1.7   PHOS 2.0* 2.3* 2.9     LFT:  Recent Labs     11/16/22  0544   PROT 5.6*   ALKPHOS 63   ALT 42*   AST 23   BILITOT 0.4     PT/INR: No results for input(s): INR, APTT in the last 72 hours. BNP: No results for input(s): BNP in the last 72 hours. Hgb A1C:   Lab Results   Component Value Date    LABA1C 7.1 (H) 11/12/2022     Folate and B12:   Lab Results   Component Value Date    WPYRUINE39 282 11/15/2022   ,   Lab Results   Component Value Date    FOLATE 15.3 11/15/2022     Thyroid Studies:   Lab Results   Component Value Date    TSH 0.445 11/15/2022    I2ULICD 6.3 02/07/2017       Urinalysis:    Lab Results   Component Value Date/Time    NITRU Negative 12/09/2020 04:47 AM    WBCUA 0-1 12/09/2020 04:47 AM    BACTERIA FEW 12/09/2020 04:47 AM    RBCUA 1-3 12/09/2020 04:47 AM    BLOODU TRACE-INTACT 12/09/2020 04:47 AM    SPECGRAV >=1.030 12/09/2020 04:47 AM    GLUCOSEU Negative 12/09/2020 04:47 AM       Imaging:  CT ABDOMEN PELVIS WO CONTRAST Additional Contrast? None    Result Date: 11/11/2022  EXAMINATION: CT OF THE ABDOMEN AND PELVIS WITHOUT CONTRAST 11/11/2022 10:41 pm TECHNIQUE: CT of the abdomen and pelvis was performed without the administration of intravenous contrast. Multiplanar reformatted images are provided for review. Automated exposure control, iterative reconstruction, and/or weight based adjustment of the mA/kV was utilized to reduce the radiation dose to as low as reasonably achievable.  COMPARISON: 08/21/2020 HISTORY: ORDERING SYSTEM PROVIDED HISTORY: ab pain, diarhhea TECHNOLOGIST PROVIDED HISTORY: Reason for exam:->ab pain, diarhhea Additional Contrast?->None Decision Support Exception - unselect if not a suspected or confirmed emergency medical condition->Emergency Medical Condition (MA) What reading provider will be dictating this exam?->CRC FINDINGS: Lower Chest: There are ground-glass opacities in the left lung base. There is a small right pleural effusion. Moderate hiatal hernia. Organs: The liver, gallbladder, spleen, pancreas are within normal limits. Redemonstration of complex right adrenal nodule, not significantly changed from prior examination. Left adrenal gland is grossly unremarkable. There are bilateral renal cystic lesions, similar to the prior examination. No hydronephrosis. Knee new GI/Bowel: There is no evidence of bowel obstruction. No evidence of abnormal bowel wall thickening or distension. There is a large amount of stool within the rectal vault. There is rectal wall thickening. Pelvis: The urinary bladder is distended. The prostate is not well visualized due to extensive streak artifact. Peritoneum/Retroperitoneum: No evidence of ascites or free air. No evidence of lymphadenopathy. Aorta is normal in caliber. Bones/Soft Tissues:  No acute abnormality of the visualized osseous structures. Status post bilateral hip arthroplasties. New compression deformity of L1, likely chronic. Large amount of stool within the rectal vault with rectal wall thickening, suggestive of stercoral colitis. Clinical correlation recommended. Compression deformity of L1, new since the prior examination but chronic in appearance. Correlate with point tenderness. Patchy left lower lung ground-glass opacities, concerning for pneumonia. Moderate hiatal hernia. Additional findings as above.      XR ABDOMEN (KUB) (SINGLE AP VIEW)    Result Date: 11/16/2022  EXAMINATION: ONE SUPINE XRAY VIEW(S) OF THE ABDOMEN 11/16/2022 1:59 pm COMPARISON: 7 February 2019 HISTORY: ORDERING SYSTEM PROVIDED HISTORY: constipation TECHNOLOGIST PROVIDED HISTORY: Reason for exam:->constipation What reading provider will be dictating this exam?->CRC FINDINGS: No free air, bowel wall pneumatosis or dilated bowel. No abnormal accumulation of fecal material thin the lower GI tract. No abnormal abdominopelvic calcifications. There are bilateral hip arthroplasties with heterotopic bone evident adjacent to the left hip. 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 No active process in the abdomen or pelvis. Heterotopic bone adjacent to the left hip arthroplasty. Osteoarthritis at the SI joints. CT HEAD WO CONTRAST    Result Date: 11/11/2022  EXAMINATION: CT OF THE HEAD WITHOUT CONTRAST  11/11/2022 8:41 pm TECHNIQUE: CT of the head was performed without the administration of intravenous contrast. Automated exposure control, iterative reconstruction, and/or weight based adjustment of the mA/kV was utilized to reduce the radiation dose to as low as reasonably achievable. COMPARISON: 12/09/2020 HISTORY: ORDERING SYSTEM PROVIDED HISTORY: ams TECHNOLOGIST PROVIDED HISTORY: Has a \"code stroke\" or \"stroke alert\" been called? ->No Reason for exam:->ams Decision Support Exception - unselect if not a suspected or confirmed emergency medical condition->Emergency Medical Condition (MA) What reading provider will be dictating this exam?->CRC FINDINGS: BRAIN/VENTRICLES: There is no acute intracranial hemorrhage, mass effect or midline shift. No abnormal extra-axial fluid collection. The gray-white differentiation is maintained without evidence of an acute infarct. There is prominence of the ventricles and sulci due to global parenchymal volume loss.  There are nonspecific areas of hypoattenuation within the periventricular and subcortical white matter, which likely represent chronic microvascular ischemic change, but has progressed compared to the prior exam. ORBITS: The visualized portion of the orbits demonstrate no acute abnormality. SINUSES: The visualized paranasal sinuses and mastoid air cells demonstrate no acute abnormality. SOFT TISSUES/SKULL: No acute abnormality of the visualized skull or soft tissues. No acute intracranial abnormality. Diffuse volume loss and probable chronic small vessel ischemic white matter change, but it has progressed significantly compared to the prior exam and therefore other white matter processes such is demyelinating disease or vasculitis are not excluded. CT CERVICAL SPINE WO CONTRAST    Result Date: 11/11/2022  EXAMINATION: CT OF THE CERVICAL SPINE WITHOUT CONTRAST 11/11/2022 10:41 pm TECHNIQUE: CT of the cervical spine was performed without the administration of intravenous contrast. Multiplanar reformatted images are provided for review. Automated exposure control, iterative reconstruction, and/or weight based adjustment of the mA/kV was utilized to reduce the radiation dose to as low as reasonably achievable. COMPARISON: None. HISTORY: ORDERING SYSTEM PROVIDED HISTORY: trauma TECHNOLOGIST PROVIDED HISTORY: Reason for exam:->trauma Decision Support Exception - unselect if not a suspected or confirmed emergency medical condition->Emergency Medical Condition (MA) What reading provider will be dictating this exam?->CRC FINDINGS: BONES/ALIGNMENT: There is no acute fracture or traumatic malalignment. DEGENERATIVE CHANGES: Are mild-to-moderate discogenic changes predominantly at the C5-6 and C6-7 levels without evidence of acute fracture or subluxation. SOFT TISSUES: There is no prevertebral soft tissue swelling. No acute abnormality of the cervical spine.      XR CHEST PORTABLE    Result Date: 11/11/2022  EXAMINATION: ONE XRAY VIEW OF THE CHEST 11/11/2022 8:49 pm COMPARISON: 8 December 2020 HISTORY: ORDERING SYSTEM PROVIDED HISTORY: weakness, Possible Stroke TECHNOLOGIST PROVIDED HISTORY: Reason for exam:->weakness, Possible Stroke What reading provider will be dictating this exam?->CRC FINDINGS: Single AP upright portable chest demonstrates suboptimal inspiratory effort with no focal alveolar infiltrates or effusions. The cardiac silhouette appears unremarkable. There is no evidence of a pneumothorax. The soft tissues and osseous structures are unremarkable. No acute cardiopulmonary process. US RETROPERITONEAL COMPLETE    Result Date: 11/12/2022  EXAMINATION: RETROPERITONEAL ULTRASOUND OF THE KIDNEYS AND URINARY BLADDER 11/12/2022 COMPARISON: CT yesterday HISTORY: ORDERING SYSTEM PROVIDED HISTORY: CLEMENTE TECHNOLOGIST PROVIDED HISTORY: Reason for exam:->CLEMENTE What reading provider will be dictating this exam?->CRC FINDINGS: Kidneys: The right kidney measures approximately 11.3 cm in length and the left kidney measures approximately 10.9 cm in length. There is no hydronephrosis. There mild renal cysts. Echogenicity is not well assessed on exam. Bladder: Volume of 348 cc is measured without obvious mass     No acute abnormality identified.        Discharge Medications:      Medication List        START taking these medications      docusate sodium 100 MG capsule  Commonly known as: COLACE     insulin lispro 100 UNIT/ML Soln injection vial  Commonly known as: HUMALOG  Inject 0-4 Units into the skin 3 times daily (with meals)     magnesium oxide 400 (240 Mg) MG tablet  Commonly known as: MAG-OX  Take 1 tablet by mouth daily for 7 days     metFORMIN 500 MG tablet  Commonly known as: GLUCOPHAGE  Take 1 tablet by mouth 2 times daily (with meals)     senna 8.6 MG tablet  Commonly known as: SENOKOT     Vitamin D (Ergocalciferol) 74904 units Caps  Take 50,000 Units by mouth once a week for 6 doses  Start taking on: November 22, 2022            CHANGE how you take these medications      insulin glargine 100 UNIT/ML injection vial  Commonly known as: LANTUS  Inject 4 Units into the skin nightly  What changed: how much to take     rosuvastatin 10 MG tablet  Commonly known as: CRESTOR  Take 2 tablets by mouth daily  What changed: how much to take            CONTINUE taking these medications      aspirin EC 81 MG EC tablet  Take 1 tablet by mouth 2 times daily for 28 days     glucose 4 g chewable tablet     lamoTRIgine 25 MG tablet  Commonly known as: LAMICTAL     melatonin 5 MG Tabs tablet     MULTIVITAMIN ADULT PO     OLANZapine 7.5 MG tablet  Commonly known as: ZYPREXA  Take 1 tablet by mouth nightly     omeprazole 40 MG delayed release capsule  Commonly known as: PRILOSEC     Oystercal-D 500-400 MG-UNIT Tabs  Generic drug: Calcium Carb-Cholecalciferol            STOP taking these medications      acetaminophen 325 MG tablet  Commonly known as: TYLENOL     apixaban 2.5 MG Tabs tablet  Commonly known as: ELIQUIS     ascorbic acid 1000 MG tablet  Commonly known as: VITAMIN C     glimepiride 4 MG tablet  Commonly known as: AMARYL     oxybutynin 5 MG extended release tablet  Commonly known as: DITROPAN-XL     oxyCODONE-acetaminophen 5-325 MG per tablet  Commonly known as: PERCOCET     venlafaxine 75 MG extended release capsule  Commonly known as: EFFEXOR XR     Vitamin D3 1.25 MG (85954 UT) Caps     Wound Cleanser Liqd     zinc sulfate 220 (50 Zn) MG capsule  Commonly known as: ZINCATE               Where to Get Your Medications        You can get these medications from any pharmacy    Bring a paper prescription for each of these medications  insulin glargine 100 UNIT/ML injection vial  insulin lispro 100 UNIT/ML Soln injection vial  magnesium oxide 400 (240 Mg) MG tablet  metFORMIN 500 MG tablet  rosuvastatin 10 MG tablet  Vitamin D (Ergocalciferol) 87984 units Caps         Time Spent on discharge is more than 45 minutes in the examination, evaluation, counseling and review of medications and discharge plan.    +++++++++++++++++++++++++++++++++++++++++++++++++  Dorothy Camejo MD  [facility] - [facility] [place], [place]  +++++++++++++++++++++++++++++++++++++++++++++++++  NOTE: This report was transcribed using voice recognition software. Every effort was made to ensure accuracy; however, inadvertent computerized transcription errors may be present.

## 2022-11-17 NOTE — PLAN OF CARE
Problem: Discharge Planning  Goal: Discharge to home or other facility with appropriate resources  Outcome: Progressing  Flowsheets (Taken 11/17/2022 1139)  Discharge to home or other facility with appropriate resources: Identify barriers to discharge with patient and caregiver     Problem: Skin/Tissue Integrity  Goal: Absence of new skin breakdown  Description: 1. Monitor for areas of redness and/or skin breakdown  2. Assess vascular access sites hourly  3. Every 4-6 hours minimum:  Change oxygen saturation probe site  4. Every 4-6 hours:  If on nasal continuous positive airway pressure, respiratory therapy assess nares and determine need for appliance change or resting period.   Outcome: Progressing     Problem: Safety - Adult  Goal: Free from fall injury  Outcome: Progressing     Problem: Chronic Conditions and Co-morbidities  Goal: Patient's chronic conditions and co-morbidity symptoms are monitored and maintained or improved  Outcome: Progressing  Flowsheets (Taken 11/17/2022 1138)  Care Plan - Patient's Chronic Conditions and Co-Morbidity Symptoms are Monitored and Maintained or Improved: Monitor and assess patient's chronic conditions and comorbid symptoms for stability, deterioration, or improvement     Problem: Nutrition Deficit:  Goal: Optimize nutritional status  Outcome: Progressing     Problem: Pain  Goal: Verbalizes/displays adequate comfort level or baseline comfort level  Outcome: Progressing  Flowsheets (Taken 11/17/2022 0730)  Verbalizes/displays adequate comfort level or baseline comfort level: Encourage patient to monitor pain and request assistance

## 2022-11-17 NOTE — PLAN OF CARE
Problem: Discharge Planning  Goal: Discharge to home or other facility with appropriate resources  Outcome: Progressing  Flowsheets (Taken 11/16/2022 1300)  Discharge to home or other facility with appropriate resources: Identify barriers to discharge with patient and caregiver     Problem: Skin/Tissue Integrity  Goal: Absence of new skin breakdown  Description: 1. Monitor for areas of redness and/or skin breakdown  2. Assess vascular access sites hourly  3. Every 4-6 hours minimum:  Change oxygen saturation probe site  4. Every 4-6 hours:  If on nasal continuous positive airway pressure, respiratory therapy assess nares and determine need for appliance change or resting period.   Outcome: Progressing     Problem: Safety - Adult  Goal: Free from fall injury  Outcome: Progressing     Problem: Chronic Conditions and Co-morbidities  Goal: Patient's chronic conditions and co-morbidity symptoms are monitored and maintained or improved  Outcome: Progressing  Flowsheets (Taken 11/16/2022 1300)  Care Plan - Patient's Chronic Conditions and Co-Morbidity Symptoms are Monitored and Maintained or Improved: Monitor and assess patient's chronic conditions and comorbid symptoms for stability, deterioration, or improvement     Problem: Nutrition Deficit:  Goal: Optimize nutritional status  Outcome: Progressing     Problem: Pain  Goal: Verbalizes/displays adequate comfort level or baseline comfort level  Outcome: Progressing

## 2022-11-17 NOTE — CARE COORDINATION
11/17 Discharge order noted. Transportation arranged for 3:00 with physician's ambulette. Pt, facility and nursing aware of time.  Hakeem Brock RN

## 2022-11-21 ENCOUNTER — APPOINTMENT (OUTPATIENT)
Dept: GENERAL RADIOLOGY | Age: 67
DRG: 641 | End: 2022-11-21
Payer: MEDICARE

## 2022-11-21 ENCOUNTER — HOSPITAL ENCOUNTER (INPATIENT)
Age: 67
LOS: 2 days | Discharge: NURSING, REHAB OR LONG-TERM CARE | DRG: 641 | End: 2022-11-23
Attending: EMERGENCY MEDICINE | Admitting: INTERNAL MEDICINE
Payer: MEDICARE

## 2022-11-21 ENCOUNTER — TELEPHONE (OUTPATIENT)
Dept: OTHER | Facility: CLINIC | Age: 67
End: 2022-11-21

## 2022-11-21 DIAGNOSIS — R06.00 DYSPNEA, UNSPECIFIED TYPE: ICD-10-CM

## 2022-11-21 DIAGNOSIS — E87.1 HYPONATREMIA: Primary | ICD-10-CM

## 2022-11-21 LAB
ALBUMIN SERPL-MCNC: 3.5 G/DL (ref 3.5–5.2)
ALP BLD-CCNC: 65 U/L (ref 40–129)
ALT SERPL-CCNC: 31 U/L (ref 0–40)
ANION GAP SERPL CALCULATED.3IONS-SCNC: 9 MMOL/L (ref 7–16)
AST SERPL-CCNC: 15 U/L (ref 0–39)
BASOPHILS ABSOLUTE: 0.05 E9/L (ref 0–0.2)
BASOPHILS RELATIVE PERCENT: 0.5 % (ref 0–2)
BILIRUB SERPL-MCNC: 0.5 MG/DL (ref 0–1.2)
BUN BLDV-MCNC: 11 MG/DL (ref 6–23)
CALCIUM SERPL-MCNC: 8.9 MG/DL (ref 8.6–10.2)
CHLORIDE BLD-SCNC: 88 MMOL/L (ref 98–107)
CO2: 24 MMOL/L (ref 22–29)
CREAT SERPL-MCNC: 1 MG/DL (ref 0.7–1.2)
EOSINOPHILS ABSOLUTE: 0.31 E9/L (ref 0.05–0.5)
EOSINOPHILS RELATIVE PERCENT: 3.2 % (ref 0–6)
GFR SERPL CREATININE-BSD FRML MDRD: >60 ML/MIN/1.73
GLUCOSE BLD-MCNC: 192 MG/DL (ref 74–99)
HCT VFR BLD CALC: 32.4 % (ref 37–54)
HEMOGLOBIN: 11.5 G/DL (ref 12.5–16.5)
IMMATURE GRANULOCYTES #: 0.06 E9/L
IMMATURE GRANULOCYTES %: 0.6 % (ref 0–5)
LACTIC ACID, SEPSIS: 0.9 MMOL/L (ref 0.5–1.9)
LACTIC ACID, SEPSIS: 1 MMOL/L (ref 0.5–1.9)
LYMPHOCYTES ABSOLUTE: 2.22 E9/L (ref 1.5–4)
LYMPHOCYTES RELATIVE PERCENT: 22.9 % (ref 20–42)
MCH RBC QN AUTO: 26.7 PG (ref 26–35)
MCHC RBC AUTO-ENTMCNC: 35.5 % (ref 32–34.5)
MCV RBC AUTO: 75.3 FL (ref 80–99.9)
MONOCYTES ABSOLUTE: 0.64 E9/L (ref 0.1–0.95)
MONOCYTES RELATIVE PERCENT: 6.6 % (ref 2–12)
NEUTROPHILS ABSOLUTE: 6.4 E9/L (ref 1.8–7.3)
NEUTROPHILS RELATIVE PERCENT: 66.2 % (ref 43–80)
OSMOLALITY: 276 MOSM/KG (ref 285–310)
PDW BLD-RTO: 14.1 FL (ref 11.5–15)
PLATELET # BLD: 234 E9/L (ref 130–450)
PMV BLD AUTO: 9.6 FL (ref 7–12)
POTASSIUM REFLEX MAGNESIUM: 4.2 MMOL/L (ref 3.5–5)
PRO-BNP: 239 PG/ML (ref 0–125)
RBC # BLD: 4.3 E12/L (ref 3.8–5.8)
REASON FOR REJECTION: NORMAL
REASON FOR REJECTION: NORMAL
REJECTED TEST: NORMAL
REJECTED TEST: NORMAL
SODIUM BLD-SCNC: 121 MMOL/L (ref 132–146)
T4 TOTAL: 7.8 MCG/DL (ref 4.5–11.7)
TOTAL PROTEIN: 5.6 G/DL (ref 6.4–8.3)
TROPONIN, HIGH SENSITIVITY: 32 NG/L (ref 0–11)
TROPONIN, HIGH SENSITIVITY: 34 NG/L (ref 0–11)
TSH SERPL DL<=0.05 MIU/L-ACNC: 1.75 UIU/ML (ref 0.27–4.2)
WBC # BLD: 9.7 E9/L (ref 4.5–11.5)

## 2022-11-21 PROCEDURE — 84484 ASSAY OF TROPONIN QUANT: CPT

## 2022-11-21 PROCEDURE — 99285 EMERGENCY DEPT VISIT HI MDM: CPT

## 2022-11-21 PROCEDURE — 93005 ELECTROCARDIOGRAM TRACING: CPT | Performed by: NURSE PRACTITIONER

## 2022-11-21 PROCEDURE — 80053 COMPREHEN METABOLIC PANEL: CPT

## 2022-11-21 PROCEDURE — 71046 X-RAY EXAM CHEST 2 VIEWS: CPT

## 2022-11-21 PROCEDURE — 83880 ASSAY OF NATRIURETIC PEPTIDE: CPT

## 2022-11-21 PROCEDURE — 99222 1ST HOSP IP/OBS MODERATE 55: CPT | Performed by: INTERNAL MEDICINE

## 2022-11-21 PROCEDURE — 84443 ASSAY THYROID STIM HORMONE: CPT

## 2022-11-21 PROCEDURE — 84436 ASSAY OF TOTAL THYROXINE: CPT

## 2022-11-21 PROCEDURE — 2060000000 HC ICU INTERMEDIATE R&B

## 2022-11-21 PROCEDURE — 83605 ASSAY OF LACTIC ACID: CPT

## 2022-11-21 PROCEDURE — 85025 COMPLETE CBC W/AUTO DIFF WBC: CPT

## 2022-11-21 PROCEDURE — 82533 TOTAL CORTISOL: CPT

## 2022-11-21 PROCEDURE — 36415 COLL VENOUS BLD VENIPUNCTURE: CPT

## 2022-11-21 PROCEDURE — 83930 ASSAY OF BLOOD OSMOLALITY: CPT

## 2022-11-21 RX ORDER — 0.9 % SODIUM CHLORIDE 0.9 %
500 INTRAVENOUS SOLUTION INTRAVENOUS ONCE
Status: DISCONTINUED | OUTPATIENT
Start: 2022-11-21 | End: 2022-11-21

## 2022-11-21 RX ORDER — INSULIN LISPRO 100 [IU]/ML
0-4 INJECTION, SOLUTION INTRAVENOUS; SUBCUTANEOUS NIGHTLY
Status: DISCONTINUED | OUTPATIENT
Start: 2022-11-21 | End: 2022-11-22

## 2022-11-21 RX ORDER — DEXTROSE MONOHYDRATE 100 MG/ML
INJECTION, SOLUTION INTRAVENOUS CONTINUOUS PRN
Status: DISCONTINUED | OUTPATIENT
Start: 2022-11-21 | End: 2022-11-23 | Stop reason: HOSPADM

## 2022-11-21 RX ORDER — 0.9 % SODIUM CHLORIDE 0.9 %
1000 INTRAVENOUS SOLUTION INTRAVENOUS ONCE
Status: COMPLETED | OUTPATIENT
Start: 2022-11-21 | End: 2022-11-22

## 2022-11-21 RX ORDER — SODIUM CHLORIDE 9 MG/ML
INJECTION, SOLUTION INTRAVENOUS CONTINUOUS
OUTPATIENT
Start: 2022-11-21

## 2022-11-21 RX ORDER — INSULIN LISPRO 100 [IU]/ML
0-4 INJECTION, SOLUTION INTRAVENOUS; SUBCUTANEOUS
Status: DISCONTINUED | OUTPATIENT
Start: 2022-11-22 | End: 2022-11-22

## 2022-11-21 RX ORDER — LANSOPRAZOLE
30 KIT
Status: DISCONTINUED | OUTPATIENT
Start: 2022-11-22 | End: 2022-11-23 | Stop reason: HOSPADM

## 2022-11-21 RX ORDER — ROSUVASTATIN CALCIUM 20 MG/1
20 TABLET, COATED ORAL DAILY
Status: DISCONTINUED | OUTPATIENT
Start: 2022-11-22 | End: 2022-11-23 | Stop reason: HOSPADM

## 2022-11-21 RX ORDER — LANOLIN ALCOHOL/MO/W.PET/CERES
4.5 CREAM (GRAM) TOPICAL DAILY
Status: DISCONTINUED | OUTPATIENT
Start: 2022-11-21 | End: 2022-11-23 | Stop reason: HOSPADM

## 2022-11-21 RX ORDER — INSULIN GLARGINE 100 [IU]/ML
4 INJECTION, SOLUTION SUBCUTANEOUS NIGHTLY
Status: DISCONTINUED | OUTPATIENT
Start: 2022-11-21 | End: 2022-11-23

## 2022-11-21 RX ORDER — SENNA PLUS 8.6 MG/1
1 TABLET ORAL DAILY
Status: DISCONTINUED | OUTPATIENT
Start: 2022-11-22 | End: 2022-11-23 | Stop reason: HOSPADM

## 2022-11-21 RX ORDER — MIRTAZAPINE 15 MG/1
15 TABLET, ORALLY DISINTEGRATING ORAL NIGHTLY
Status: DISCONTINUED | OUTPATIENT
Start: 2022-11-21 | End: 2022-11-23 | Stop reason: HOSPADM

## 2022-11-21 ASSESSMENT — PAIN - FUNCTIONAL ASSESSMENT
PAIN_FUNCTIONAL_ASSESSMENT: NONE - DENIES PAIN
PAIN_FUNCTIONAL_ASSESSMENT: NONE - DENIES PAIN

## 2022-11-22 LAB
ADENOVIRUS BY PCR: NOT DETECTED
ALBUMIN SERPL-MCNC: 3.2 G/DL (ref 3.5–5.2)
ALP BLD-CCNC: 63 U/L (ref 40–129)
ALT SERPL-CCNC: 27 U/L (ref 0–40)
ANION GAP SERPL CALCULATED.3IONS-SCNC: 10 MMOL/L (ref 7–16)
ANION GAP SERPL CALCULATED.3IONS-SCNC: 7 MMOL/L (ref 7–16)
ANION GAP SERPL CALCULATED.3IONS-SCNC: 9 MMOL/L (ref 7–16)
AST SERPL-CCNC: 12 U/L (ref 0–39)
BASOPHILS ABSOLUTE: 0.05 E9/L (ref 0–0.2)
BASOPHILS RELATIVE PERCENT: 0.6 % (ref 0–2)
BILIRUB SERPL-MCNC: 0.4 MG/DL (ref 0–1.2)
BORDETELLA PARAPERTUSSIS BY PCR: NOT DETECTED
BORDETELLA PERTUSSIS BY PCR: NOT DETECTED
BUN BLDV-MCNC: 12 MG/DL (ref 6–23)
CALCIUM SERPL-MCNC: 9 MG/DL (ref 8.6–10.2)
CALCIUM SERPL-MCNC: 9.1 MG/DL (ref 8.6–10.2)
CALCIUM SERPL-MCNC: 9.3 MG/DL (ref 8.6–10.2)
CHLAMYDOPHILIA PNEUMONIAE BY PCR: NOT DETECTED
CHLORIDE BLD-SCNC: 100 MMOL/L (ref 98–107)
CHLORIDE BLD-SCNC: 97 MMOL/L (ref 98–107)
CHLORIDE BLD-SCNC: 98 MMOL/L (ref 98–107)
CHLORIDE URINE RANDOM: 70 MMOL/L
CO2: 23 MMOL/L (ref 22–29)
CO2: 23 MMOL/L (ref 22–29)
CO2: 26 MMOL/L (ref 22–29)
CORONAVIRUS 229E BY PCR: NOT DETECTED
CORONAVIRUS HKU1 BY PCR: NOT DETECTED
CORONAVIRUS NL63 BY PCR: NOT DETECTED
CORONAVIRUS OC43 BY PCR: NOT DETECTED
CORTISOL TOTAL: 6.88 MCG/DL (ref 2.68–18.4)
CREAT SERPL-MCNC: 0.9 MG/DL (ref 0.7–1.2)
CREAT SERPL-MCNC: 1 MG/DL (ref 0.7–1.2)
CREAT SERPL-MCNC: 1.4 MG/DL (ref 0.7–1.2)
CREATININE URINE: 10 MG/DL (ref 40–278)
CREATININE URINE: 70 MG/DL (ref 40–278)
EKG ATRIAL RATE: 76 BPM
EKG P AXIS: 52 DEGREES
EKG P-R INTERVAL: 132 MS
EKG Q-T INTERVAL: 392 MS
EKG QRS DURATION: 74 MS
EKG QTC CALCULATION (BAZETT): 441 MS
EKG R AXIS: 23 DEGREES
EKG T AXIS: 55 DEGREES
EKG VENTRICULAR RATE: 76 BPM
EOSINOPHILS ABSOLUTE: 0.31 E9/L (ref 0.05–0.5)
EOSINOPHILS RELATIVE PERCENT: 3.7 % (ref 0–6)
GFR SERPL CREATININE-BSD FRML MDRD: 56 ML/MIN/1.73
GFR SERPL CREATININE-BSD FRML MDRD: >60 ML/MIN/1.73
GFR SERPL CREATININE-BSD FRML MDRD: >60 ML/MIN/1.73
GLUCOSE BLD-MCNC: 251 MG/DL (ref 74–99)
GLUCOSE BLD-MCNC: 254 MG/DL (ref 74–99)
GLUCOSE BLD-MCNC: 270 MG/DL (ref 74–99)
HBA1C MFR BLD: 8 % (ref 4–5.6)
HCT VFR BLD CALC: 31.3 % (ref 37–54)
HEMOGLOBIN: 11 G/DL (ref 12.5–16.5)
HUMAN METAPNEUMOVIRUS BY PCR: NOT DETECTED
HUMAN RHINOVIRUS/ENTEROVIRUS BY PCR: NOT DETECTED
IMMATURE GRANULOCYTES #: 0.05 E9/L
IMMATURE GRANULOCYTES %: 0.6 % (ref 0–5)
INFLUENZA A BY PCR: NOT DETECTED
INFLUENZA B BY PCR: NOT DETECTED
LYMPHOCYTES ABSOLUTE: 2.22 E9/L (ref 1.5–4)
LYMPHOCYTES RELATIVE PERCENT: 26.1 % (ref 20–42)
MAGNESIUM: 1.8 MG/DL (ref 1.6–2.6)
MCH RBC QN AUTO: 26.6 PG (ref 26–35)
MCHC RBC AUTO-ENTMCNC: 35.1 % (ref 32–34.5)
MCV RBC AUTO: 75.6 FL (ref 80–99.9)
METER GLUCOSE: 225 MG/DL (ref 74–99)
METER GLUCOSE: 260 MG/DL (ref 74–99)
METER GLUCOSE: 263 MG/DL (ref 74–99)
METER GLUCOSE: 265 MG/DL (ref 74–99)
METER GLUCOSE: 280 MG/DL (ref 74–99)
METER GLUCOSE: 413 MG/DL (ref 74–99)
MONOCYTES ABSOLUTE: 0.5 E9/L (ref 0.1–0.95)
MONOCYTES RELATIVE PERCENT: 5.9 % (ref 2–12)
MYCOPLASMA PNEUMONIAE BY PCR: NOT DETECTED
NEUTROPHILS ABSOLUTE: 5.36 E9/L (ref 1.8–7.3)
NEUTROPHILS RELATIVE PERCENT: 63.1 % (ref 43–80)
OSMOLALITY URINE: 469 MOSM/KG (ref 300–900)
OSMOLALITY URINE: 67 MOSM/KG (ref 300–900)
PARAINFLUENZA VIRUS 1 BY PCR: NOT DETECTED
PARAINFLUENZA VIRUS 2 BY PCR: NOT DETECTED
PARAINFLUENZA VIRUS 3 BY PCR: NOT DETECTED
PARAINFLUENZA VIRUS 4 BY PCR: NOT DETECTED
PDW BLD-RTO: 13.9 FL (ref 11.5–15)
PHOSPHORUS: 3 MG/DL (ref 2.5–4.5)
PLATELET # BLD: 230 E9/L (ref 130–450)
PMV BLD AUTO: 9.8 FL (ref 7–12)
POTASSIUM SERPL-SCNC: 4.5 MMOL/L (ref 3.5–5)
POTASSIUM SERPL-SCNC: 4.6 MMOL/L (ref 3.5–5)
POTASSIUM SERPL-SCNC: 4.6 MMOL/L (ref 3.5–5)
POTASSIUM, UR: 31.4 MMOL/L
RBC # BLD: 4.14 E12/L (ref 3.8–5.8)
RESPIRATORY SYNCYTIAL VIRUS BY PCR: NOT DETECTED
SARS-COV-2, PCR: NOT DETECTED
SODIUM BLD-SCNC: 129 MMOL/L (ref 132–146)
SODIUM BLD-SCNC: 131 MMOL/L (ref 132–146)
SODIUM BLD-SCNC: 133 MMOL/L (ref 132–146)
SODIUM URINE: 84 MMOL/L
SODIUM URINE: <20 MMOL/L
TOTAL PROTEIN: 5.1 G/DL (ref 6.4–8.3)
VITAMIN B-12: 452 PG/ML (ref 211–946)
WBC # BLD: 8.5 E9/L (ref 4.5–11.5)

## 2022-11-22 PROCEDURE — 36415 COLL VENOUS BLD VENIPUNCTURE: CPT

## 2022-11-22 PROCEDURE — 51798 US URINE CAPACITY MEASURE: CPT

## 2022-11-22 PROCEDURE — 2580000003 HC RX 258: Performed by: INTERNAL MEDICINE

## 2022-11-22 PROCEDURE — 80048 BASIC METABOLIC PNL TOTAL CA: CPT

## 2022-11-22 PROCEDURE — 82436 ASSAY OF URINE CHLORIDE: CPT

## 2022-11-22 PROCEDURE — 0202U NFCT DS 22 TRGT SARS-COV-2: CPT

## 2022-11-22 PROCEDURE — 97165 OT EVAL LOW COMPLEX 30 MIN: CPT

## 2022-11-22 PROCEDURE — 99233 SBSQ HOSP IP/OBS HIGH 50: CPT | Performed by: INTERNAL MEDICINE

## 2022-11-22 PROCEDURE — 6370000000 HC RX 637 (ALT 250 FOR IP): Performed by: INTERNAL MEDICINE

## 2022-11-22 PROCEDURE — 84100 ASSAY OF PHOSPHORUS: CPT

## 2022-11-22 PROCEDURE — 84300 ASSAY OF URINE SODIUM: CPT

## 2022-11-22 PROCEDURE — 99291 CRITICAL CARE FIRST HOUR: CPT | Performed by: INTERNAL MEDICINE

## 2022-11-22 PROCEDURE — 82607 VITAMIN B-12: CPT

## 2022-11-22 PROCEDURE — 83935 ASSAY OF URINE OSMOLALITY: CPT

## 2022-11-22 PROCEDURE — 83735 ASSAY OF MAGNESIUM: CPT

## 2022-11-22 PROCEDURE — 97161 PT EVAL LOW COMPLEX 20 MIN: CPT

## 2022-11-22 PROCEDURE — 83036 HEMOGLOBIN GLYCOSYLATED A1C: CPT

## 2022-11-22 PROCEDURE — 84133 ASSAY OF URINE POTASSIUM: CPT

## 2022-11-22 PROCEDURE — 2060000000 HC ICU INTERMEDIATE R&B

## 2022-11-22 PROCEDURE — 82962 GLUCOSE BLOOD TEST: CPT

## 2022-11-22 PROCEDURE — 93010 ELECTROCARDIOGRAM REPORT: CPT | Performed by: INTERNAL MEDICINE

## 2022-11-22 PROCEDURE — 82570 ASSAY OF URINE CREATININE: CPT

## 2022-11-22 PROCEDURE — 85025 COMPLETE CBC W/AUTO DIFF WBC: CPT

## 2022-11-22 PROCEDURE — 80053 COMPREHEN METABOLIC PANEL: CPT

## 2022-11-22 RX ORDER — DEXTROSE MONOHYDRATE 50 MG/ML
INJECTION, SOLUTION INTRAVENOUS CONTINUOUS
Status: ACTIVE | OUTPATIENT
Start: 2022-11-22 | End: 2022-11-22

## 2022-11-22 RX ORDER — INSULIN LISPRO 100 [IU]/ML
0-4 INJECTION, SOLUTION INTRAVENOUS; SUBCUTANEOUS NIGHTLY
Status: DISCONTINUED | OUTPATIENT
Start: 2022-11-22 | End: 2022-11-23

## 2022-11-22 RX ORDER — INSULIN LISPRO 100 [IU]/ML
0-16 INJECTION, SOLUTION INTRAVENOUS; SUBCUTANEOUS
Status: DISCONTINUED | OUTPATIENT
Start: 2022-11-22 | End: 2022-11-23

## 2022-11-22 RX ORDER — DEXTROSE MONOHYDRATE 100 MG/ML
INJECTION, SOLUTION INTRAVENOUS CONTINUOUS PRN
Status: DISCONTINUED | OUTPATIENT
Start: 2022-11-22 | End: 2022-11-23 | Stop reason: HOSPADM

## 2022-11-22 RX ADMIN — SODIUM CHLORIDE 1000 ML: 9 INJECTION, SOLUTION INTRAVENOUS at 00:16

## 2022-11-22 RX ADMIN — INSULIN LISPRO 8 UNITS: 100 INJECTION, SOLUTION INTRAVENOUS; SUBCUTANEOUS at 16:01

## 2022-11-22 RX ADMIN — INSULIN GLARGINE 4 UNITS: 100 INJECTION, SOLUTION SUBCUTANEOUS at 00:19

## 2022-11-22 RX ADMIN — Medication 4.5 MG: at 21:31

## 2022-11-22 RX ADMIN — DEXTROSE MONOHYDRATE: 50 INJECTION, SOLUTION INTRAVENOUS at 17:41

## 2022-11-22 RX ADMIN — SENNOSIDES 8.6 MG: 8.6 TABLET, FILM COATED ORAL at 09:15

## 2022-11-22 RX ADMIN — ROSUVASTATIN CALCIUM 20 MG: 20 TABLET, FILM COATED ORAL at 09:17

## 2022-11-22 RX ADMIN — INSULIN GLARGINE 4 UNITS: 100 INJECTION, SOLUTION SUBCUTANEOUS at 21:37

## 2022-11-22 RX ADMIN — INSULIN LISPRO 8 UNITS: 100 INJECTION, SOLUTION INTRAVENOUS; SUBCUTANEOUS at 10:52

## 2022-11-22 RX ADMIN — INSULIN LISPRO 4 UNITS: 100 INJECTION, SOLUTION INTRAVENOUS; SUBCUTANEOUS at 21:34

## 2022-11-22 RX ADMIN — DEXTROSE MONOHYDRATE: 50 INJECTION, SOLUTION INTRAVENOUS at 09:59

## 2022-11-22 RX ADMIN — MIRTAZAPINE 15 MG: 15 TABLET, ORALLY DISINTEGRATING ORAL at 21:31

## 2022-11-22 NOTE — PROGRESS NOTES
Admission database completed to the best of this RN's ability. Patients medications reviewed at bedside with patient and son.

## 2022-11-22 NOTE — PLAN OF CARE
Problem: Skin/Tissue Integrity  Goal: Absence of new skin breakdown  Description: 1. Monitor for areas of redness and/or skin breakdown  2. Assess vascular access sites hourly  3. Every 4-6 hours minimum:  Change oxygen saturation probe site  4. Every 4-6 hours:  If on nasal continuous positive airway pressure, respiratory therapy assess nares and determine need for appliance change or resting period.   11/22/2022 1500 by Marck Mckeon RN  Outcome: Progressing  11/22/2022 0200 by Gregg Montero RN  Outcome: Progressing     Problem: Safety - Adult  Goal: Free from fall injury  11/22/2022 1500 by Marck Mckeon RN  Outcome: Progressing  11/22/2022 0200 by Gregg Montero RN  Outcome: Progressing

## 2022-11-22 NOTE — SIGNIFICANT EVENT
Admitted earlier by me last evenint    Supposedly was walking around when arrived on floor  Went to bathroom  Ate chips  He didn't get 500cc IV bolus ordered by me in ER yet    Nurse went in room and thought he was unresponsive and called RRT  Palpable Pulse and vitals normal.  /81   Pulse 80   Temp 97.3 °F (36.3 °C) (Axillary)   Resp 16   Ht 5' 9\" (1.753 m)   Wt 171 lb 3.2 oz (77.7 kg)   SpO2 100%   BMI 25.28 kg/m²     Noted that son, said he tends to be less responsive  He looked similar to when I saw him in ER, just more lethargic- he spontaneous crack eyes open and track people    I shouted at him to open his eyes and he would  He would also follow commands  Heent hola  Chest dec bs, dec expansion  Heart regular  Mvmt x4 non focal      Abg ordered, rule out BECKY/hypercapneia    I called monitor tech to verify if cardiac abnromalitiy  Only sinus rhtythem noted  Also no shaking to suggest seizure    I am still requsting small fluid bolus  And  Urine Na+ <20-- ?dry  But urine osm 67  <100 suggests low solute diet/tea toast  vs psychogenic polydipsia (which son says he doesn't drink a lot)     AM labs drawn now- very if serum Na+ is indeed low?   No signs of infection, no fever, no pyuria  No new suspicious meds to cause lethargic  30min CCT  - update primary team

## 2022-11-22 NOTE — TELEPHONE ENCOUNTER
Writer contacted ALESSANDRA Camara to inform of 30 day readmission risk. ALESSANDRA Walker informed writer there is no decision on disposition at this time.       Call Back: If you need to call back to inform of disposition you can contact me at [phone]

## 2022-11-22 NOTE — DISCHARGE INSTR - COC
Continuity of Care Form    Patient Name: Zeyad Simpson   :  1955  MRN:  [id number]    Admit date:  2022  Discharge date:  2022    Code Status Order: Prior   Advance Directives:     Admitting Physician:  Angelique Logan DO  PCP: Murphy Werner MD    Discharging Nurse: Candy White 23 Unit/Room#: 3355/5594-Z  Discharging Unit Phone Number: [phone]    Emergency Contact:   Extended Emergency Contact Information  Primary Emergency Contact: Radha Garvin [address] Phone: [phone]  Relation: Child  Secondary Emergency Contact: Jayashree Lin  Work Phone: [phone]  Relation: Child   needed? No    Past Surgical History:  Past Surgical History:   Procedure Laterality Date    EYE SURGERY      HIP SURGERY Right 2020    HIP HEMIARTHROPLASTY performed by Eleanor Alvarez MD at [place] [address] Left 12/10/2020    HIP HEMIARTHROPLASTY -- HERB -- DROPLET +     PT. COMING FROM [facility] performed by Mat Nina MD at [address] [place] [address]       Immunization History:   Immunization History   Administered Date(s) Administered    COVID-19, PFIZER PURPLE top, DILUTE for use, (age 15 y+), 30mcg/0.3mL 2020, 2021       Active Problems:  Patient Active Problem List   Diagnosis Code    Severe episode of recurrent major depressive disorder, without psychotic features ([facility] [address] F33.2    Suicidal ideations R45.851    SDH (subdural hematoma) S06. 5XAA    Traumatic subdural hemorrhage with loss of consciousness of 30 minutes or less ([address] S06. 5X1A    Diabetic acidosis without coma (HCC) E11.10    Acute kidney injury superimposed on CKD ([address] N17.9, N18.9    Hyponatremia E87.1    Right adrenal mass (HCC) E27.8    Hyperkalemia E87.5    Major depression, recurrent (HCC) F33.9    Type 2 diabetes mellitus with complication, with long-term current use of insulin (HCC) E11.8, Z79.4    HLD (hyperlipidemia) E78.5    HTN (hypertension) I10 Uncontrolled type 2 diabetes mellitus with hyperglycemia (HCC) E11.65    SIRS (systemic inflammatory response syndrome) (HCC) R65.10    Weight loss R63.4    Generalized abdominal pain R10.84    Lactic acidosis E87.20    Constipation K59.00    High anion gap metabolic acidosis I14.56    Non compliance w medication regimen Z91.14    Physical deconditioning R53.81    Dementia, vascular (HCC) F01.50    Moderate protein-calorie malnutrition (HCC) E44.0    TIA (transient ischemic attack) G45.9    DKA, type 1, not at goal [facility]) E10.10    Ileus ([facility] [address] K56.7    Adrenal adenoma, right D35.01    Hypertensive urgency I16.0    Hiccups T74.0    Umbilical hernia without obstruction and without gangrene K42.9    GERD (gastroesophageal reflux disease) K21.9    Depression F32. A    Hypotension I95.9    Depression, major, recurrent (HCC) F33.9    Closed right hip fracture, initial encounter ([facility] [address] S72.001A    History of right hip hemiarthroplasty Z96.641    Closed left hip fracture, initial encounter ([facility] [address] O99.374I    Acute metabolic encephalopathy M70.06    History of left hip hemiarthroplasty C73.483    CLEMENTE (acute kidney injury) ([facility] [address] N17.9    Weakness R53.1       Isolation/Infection:   Isolation            No Isolation          Patient Infection Status       Infection Onset Added Last Indicated Last Indicated By Review Planned Expiration Resolved Resolved By    COVID-19 (Rule Out) 22 Respiratory Panel, Molecular, with COVID-19 (Restricted: peds pts or suitable admitted adults) (Ordered) 22      Resolved    C-diff Rule Out 22 CLOSTRIDIUM DIFFICILE EIA (Ordered)   22 Rule-Out Test Resulted    COVID-19 20 Respiratory Panel, Molecular, with COVID-19 (Restricted: peds pts or suitable admitted adults)   20     COVID-19 (Rule Out) 20 Respiratory Panel, Molecular, with COVID-19 (Restricted: peds pts or suitable admitted adults) (Ordered)   12/07/20 Rule-Out Test Resulted    COVID-19 (Rule Out) 11/19/20 11/19/20 11/19/20 Covid-19 Ambulatory (Ordered)   11/20/20 Rule-Out Test Resulted    COVID-19 (Rule Out) 10/06/20 10/06/20 10/06/20 COVID-19 (Ordered)   10/06/20 Rule-Out Test Resulted    C-diff Rule Out 05/04/20 05/04/20 05/04/20 Clostridium difficile EIA (Ordered)   08/21/20 Evelin Vo RN            Nurse Assessment:  Last Vital Signs: /73   Pulse 85   Temp 98.9 °F (37.2 °C) (Oral)   Resp 18   Ht 5' 9\" (1.753 m)   Wt 171 lb 3.2 oz (77.7 kg)   SpO2 97%   BMI 25.28 kg/m²     Last documented pain score (0-10 scale):    Last Weight:   Wt Readings from Last 1 Encounters:   11/22/22 171 lb 3.2 oz (77.7 kg)     Mental Status:  oriented and alert    IV Access:  - None    Nursing Mobility/ADLs:  Walking   Independent  Transfer  Independent  Bathing  Assisted  Dressing  Assisted  Toileting  Independent  Feeding  [address]  Independent  Med Delivery   whole    Wound Care Documentation and Therapy:        Elimination:  Continence: Bowel: Yes  Bladder: Yes  Urinary Catheter: None   Colostomy/Ileostomy/Ileal Conduit: No       Date of Last BM: ***    Intake/Output Summary (Last 24 hours) at 11/22/2022 0936  Last data filed at 11/22/2022 8979  Gross per 24 hour   Intake --   Output 1375 ml   Net -1375 ml     I/O last 3 completed shifts:  In: -   Out: [address] [Urine:875]    Safety Concerns: At Risk for Falls    Impairments/Disabilities:      None    Nutrition Therapy:  Current Nutrition Therapy:   - Oral Diet:  General    Routes of Feeding: Oral  Liquids: Thin Liquids  Daily Fluid Restriction: yes - amount 1000 ml  Last Modified Barium Swallow with Video (Video Swallowing Test): not done    Treatments at the Time of Hospital Discharge:   Respiratory Treatments: ***  Oxygen Therapy:  is not on home oxygen therapy.   Ventilator:    - No ventilator support    Rehab Therapies: Physical Therapy and Occupational Therapy  Weight Bearing Status/Restrictions: No weight bearing restrictions  Other Medical Equipment (for information only, NOT a DME order):  {EQUIPMENT:[id number]}  Other Treatments: ***    Patient's personal belongings (please select all that are sent with patient):  {[facility] DME Belongings:026574748}    RN SIGNATURE:  Electronically signed by Bo Osuna RN on 11/23/22 at 1:42 PM EST    CASE MANAGEMENT/SOCIAL WORK SECTION    Inpatient Status Date: ***    Readmission Risk Assessment Score:  Readmission Risk              Risk of Unplanned Readmission:  15           Discharging to Facility/ Agency   Name: [facility]  Address: [address] [place] [address]  Phone:[phone]  FYD:[phone]    Dialysis Facility (if applicable)   Name:  Address:  Dialysis Schedule:  Phone:  Fax:    / signature: Electronically signed by INDY Maria on 11/22/2022 at 9:36 AM      PHYSICIAN SECTION    Prognosis: {Prognosis:4885569201}    Condition at Discharge: [address] [place] [address] Patient Condition:[id number]}    Rehab Potential (if transferring to Rehab): {Prognosis:8918379250}    Recommended Labs or Other Treatments After Discharge: ***    Physician Certification: I certify the above information and transfer of Heber Marion  is necessary for the continuing treatment of the diagnosis listed and that he requires Hammad Quiroga for less 30 days.      Update Admission H&P: {[facility] DME Changes in PRRIO:445975229}    PHYSICIAN SIGNATURE:  {Esignature:[id number]}

## 2022-11-22 NOTE — ED NOTES
Report to [facility], care of patient relinquished.       Ahmet Billingsley, VITALY  11/21/22 4223

## 2022-11-22 NOTE — H&P
[facility] History and Physical        Chief Complaint:  low Na+  History of Present Illness   The patient is a 74 y.o. male    history of diabetes, hypertension, high cholesterol, depression, hip surgeries last year  Noted since hip surgery s/p FX one year ago - patient has had a slow decline--currently residing at a rehab facility after having lower extremity weakness since having his right hip replaced last year    Per son- he was more talkative in past, but \"NOT sob\" - no acute complaints  Doesn't interact much, doesn't want to eat  [facility] sent him back to ER bc of concerns of \"SOB\"  Patient currently resides at [facility]. charting - from [facility] states that he had a recent diagnosis of \"pneumonia+\"-- but per DC summary they didn't feel he had pneu;monia--during workup . ER evaluation on 11/12/2022 after being found down on the ground and was admitted for failure to thrive.   +++severe constipation-stercoral colitis  Dheyration +CLEMENTE noted    He was discharged home on 11/17/2022 to [facility]. .    [facility] sent him in for \"sob\"  Noted onging failure to thrive,  dec PO intake chronic   NO hypoxia noted o2 tsat fine, NOT sob  Cxr:  Impression   No acute process. No metabolic acidosis - to cause in breathing     We are NOWrequested to admit bc of \"New hyponatremia:\"-- which I feel may be errorir  Na+ 134 on 11/17:  Now \"121 today\"- but I feel this is lab error  Noted outpatient labs down 2 days ago from [facility] on 11.19-- 136      [facility] on 11/17:  Per DC summary from sound:  \"74year-old male presents with failure to thrive and found down on the ground. Pt states he feel out of bed earlier today and was unable to get back on bed. Pt brought to ER deconditioned and covered in feces. CT abdomen showed severe constipation-stercoral colitis.   General surgery was consulted-attempted disimpaction, bowel regimen was initiated. Started on antibiotics-Zosyn, doxycycline that were discontinued on 11/15 as no evidence of clinical or radiological pneumonia.  was consulted for placement. Nephrology was consulted for CLEMENTE. CLEMENTE has resolved and IV fluids were discontinued. Nephrology also followed for hypomagnesemia, hypokalemia which were corrected. Patient has generalized weakness and alternates diarrhea with constipation. He said he had a colonoscopy and his teenage years days and cannot remember why it was done. He has not had a recent colonoscopy. He follows with the [place]. Discussed with patient that he needs to have a colonoscopy as soon as possible as an outpatient. Unsure why the patient is on Eliquis. Not able to find on chart review. Will DC at this time as PCP's med list from -9/29/22 does not contain Elqiuis as well. Patient was seen by PT and OT. He is adequate for placement and pre-CERT was obtained. Patient has been given multiple bowel regimen and he had a small bowel movement on 11/14. Repeat KUB shows no stools accumulation on the left side of the colon. Patient has been able to eat his meals with no discomfort, nausea or vomiting, he is passing gas has no abdominal pain or discomfort or distention, and he said that is his regular habits. Discussed with patient the need to have colonoscopy as an outpatient and he states that he will get a referral from the [place] to have this done as soon as possible\"    - hx taken from the   REVIEW OF SYSTEMS:  no fevers, chills, cp, sob, n/v, ha, vision/hearing changes, wt changes, hot/cold flashes, other open skin lesions, diarrhea, constipation, dysuria/hematuria unless noted in HPI. Complete ROS performed with the patient and is otherwise negative.     Past Medical History:      Diagnosis Date    Anxiety     Bronchitis     Cellulitis     Chronic sinusitis     Depression     Diabetes mellitus (HCC)     Diabetic retinopathy ([facility] [address]     Fracture of left foot     High cholesterol     Hypercholesteremia     Hypercholesterolemia     Hypertension     Lumbago     Moderate mood disorder ([address]     Third nerve palsy        Past Surgical History:        Procedure Laterality Date    EYE SURGERY      HIP SURGERY Right 11/19/2020    HIP HEMIARTHROPLASTY performed by Ghanshyam Bernal MD at [facility] Left 12/10/2020    HIP HEMIARTHROPLASTY -- HERB -- DROPLET +     PT. COMING FROM [place] performed by Miriam Singh MD at [facility] Medications:  Prior to Admission medications    Medication Sig Start Date End Date Taking?  Authorizing Provider   insulin glargine (LANTUS) 100 UNIT/ML injection vial Inject 4 Units into the skin nightly 11/17/22   Tonio Henry MD   rosuvastatin (CRESTOR) 10 MG tablet Take 2 tablets by mouth daily 11/17/22   Tonio Henry MD   Ergocalciferol (VITAMIN D) 91821 units CAPS Take 50,000 Units by mouth once a week for 6 doses 11/22/22 12/28/22  Tonio Henry MD   magnesium oxide (MAG-OX) 400 (240 Mg) MG tablet Take 1 tablet by mouth daily for 7 days 11/17/22 11/24/22  Tonio Henry MD   metFORMIN (GLUCOPHAGE) 500 MG tablet Take 1 tablet by mouth 2 times daily (with meals) 11/17/22 12/17/22  Tonio Henry MD   insulin lispro (HUMALOG) 100 UNIT/ML SOLN injection vial Inject 0-4 Units into the skin 3 times daily (with meals) 11/17/22 12/17/22  Tonio Henry MD   senna (SENOKOT) 8.6 MG tablet Take 1 tablet by mouth daily    Historical Provider, MD   Calcium Carb-Cholecalciferol (OYSTERCAL-D) 500-400 MG-UNIT TABS Take by mouth    Historical Provider, MD   melatonin 5 MG TABS tablet Take 5 mg by mouth daily    Historical Provider, MD   glucose 4 g chewable tablet Take 4 g by mouth as needed for Low blood sugar  7/28/20   Historical Provider, MD   Multiple Vitamins-Minerals (MULTIVITAMIN ADULT PO) Take 1 tablet by mouth daily 1/7/20   Historical Provider, MD   omeprazole (PRILOSEC) 40 MG delayed release capsule Take 40 mg by mouth daily 7/28/20   Historical Provider, MD   aspirin EC 81 MG EC tablet Take 1 tablet by mouth 2 times daily for 28 days 11/19/20 1/11/21  Oris Labor, DO   OLANZapine (ZYPREXA) 7.5 MG tablet Take 1 tablet by mouth nightly 10/13/20 78/21/46  Mary Anne Casjeffery Fowler, APRN - CNP   lamoTRIgine (LAMICTAL) 25 MG tablet Take 25 mg by mouth nightly    Historical Provider, MD   docusate sodium (COLACE) 100 MG capsule Take 100 mg by mouth 2 times daily    Historical Provider, MD       Allergies:  Empagliflozin and Liraglutide    Social History:   TOBACCO:   reports that he has never smoked. He has never used smokeless tobacco.  ETOH:   reports no history of alcohol use. Family History:   No family history on file. Or deferred/otherwise considered non contributory to current admission  PHYSICAL EXAM:    VS: BP (!) 171/88   Pulse 77   Temp 97.4 °F (36.3 °C) (Oral)   Resp 16   Ht 5' 9\" (1.753 m)   Wt 162 lb (73.5 kg)   SpO2 99%   BMI 23.92 kg/m²     General Appearance:     no acute distress. Psych:  HEENT:    A.O. As per HPI details  NC/AT, PERRL, no pallor no icterus, lips/ext mucous membrane grossly N    Neck:   Supple, trachea midline, no obvious JVD   Resp:     CTAB, No wheezes, No rhonchi   Chest wall:    No tenderness or deformity   Heart:    Regular rate and rhythm, S1 and S2 normal, no rub or gallop. Abdomen:     Soft, non-tender, bowel sounds active    no suspicious obvious masses/organomegaly   Genitalia & Rectal:    Deferred.    Extremities x4:   Extremities normal, atraumatic, no cyanosis, no clubbing   Musculoskeletal:      NO active synovitis or swollen b/l wrists, 2-5 MCPs examined   Skin:   Skin color, texture, turgor fairly normal, no ACUTE rashes or lesions in lower legs and arms examined   Lymph nodes:   Cervical nodes grossly normal   Neurologic:  .Grossly symmetric  strength in UEs and LEs with symmetric grossly intact to light touch sensation     LABS:  CBC:   Lab Results   Component Value Date/Time    WBC 9.7 11/21/2022 07:28 PM    RBC 4.30 11/21/2022 07:28 PM    HGB 11.5 11/21/2022 07:28 PM    HCT 32.4 11/21/2022 07:28 PM     11/21/2022 07:28 PM    MCV 75.3 11/21/2022 07:28 PM     BMP:    Lab Results   Component Value Date/Time     11/21/2022 07:28 PM    K 4.2 11/21/2022 07:28 PM    CL 88 11/21/2022 07:28 PM    CO2 24 11/21/2022 07:28 PM    BUN 11 11/21/2022 07:28 PM    CREATININE 1.0 11/21/2022 07:28 PM    GLUCOSE 192 11/21/2022 07:28 PM    CALCIUM 8.9 11/21/2022 07:28 PM     Hepatic Function Panel:    Lab Results   Component Value Date/Time    ALKPHOS 65 11/21/2022 07:28 PM    AST 15 11/21/2022 07:28 PM    ALT 31 11/21/2022 07:28 PM    PROT 5.6 11/21/2022 07:28 PM    LABALBU 3.5 11/21/2022 07:28 PM    BILITOT 0.5 11/21/2022 07:28 PM     Magnesium:    Lab Results   Component Value Date/Time    MG 1.7 11/17/2022 04:11 AM       PT/INR:    Lab Results   Component Value Date/Time    PROTIME 16.1 11/11/2022 08:42 PM    INR 1.5 11/11/2022 08:42 PM     U/A:   Lab Results   Component Value Date/Time    LEUKOCYTESUR Negative 12/09/2020 04:47 AM    PHUR 5.0 12/09/2020 04:47 AM    WBCUA 0-1 12/09/2020 04:47 AM    RBCUA 1-3 12/09/2020 04:47 AM    BACTERIA FEW 12/09/2020 04:47 AM    SPECGRAV >=1.030 12/09/2020 04:47 AM    BLOODU TRACE-INTACT 12/09/2020 04:47 AM    GLUCOSEU Negative 12/09/2020 04:47 AM     ABG:  No results found for: PHART, FIR8FHQ, PO2ART, X8QCFBJX, NPA0VVF, BEART  TSH:    Lab Results   Component Value Date/Time    TSH 0.445 11/15/2022 10:27 AM     Cardiac Enzymes:   Lab Results   Component Value Date    CKTOTAL 35 11/12/2022    CKTOTAL 43 11/12/2022    CKTOTAL 69 11/11/2022    TROPONINI <0.01 12/09/2020    TROPONINI <0.01 11/18/2020    TROPONINI 0.02 08/21/2020       Radiology: XR CHEST (2 VW)    Result Date: 11/21/2022  EXAMINATION: TWO XRAY VIEWS OF THE CHEST 11/21/2022 7:06 pm COMPARISON: 11/11/2022 HISTORY: ORDERING SYSTEM PROVIDED HISTORY: SOB TECHNOLOGIST PROVIDED HISTORY: Reason for exam:->SOB FINDINGS: The lungs are without acute focal process. There is no effusion or pneumothorax. The cardiomediastinal silhouette is without acute process. The osseous structures are without acute process. No acute process. EKG:  Normal sinus rhythm  Normal ECG  No previous ECGs available   Assessment & Plan   ACTIVE hospital problems being addressed/reassessed for this admission:  Principal Problem:    Hyponatremia  Resolved Problems:    * No resolved hospital problems. *    Code status/DVT prophylaxis and PLAN --see orders   Note extensive time spent coordinating care between ER docs, ER and floor nurses, and transitioning care over to day providers  Plan of care/ clinical impressions/communication specifics detailed below:    74 y.o. male    history of diabetes, hypertension, high cholesterol, depression, hip surgeries last year  Noted since hip surgery s/p FX one year ago - patient has had a slow decline--currently residing at a rehab facility after having lower extremity weakness since having his right hip replaced last year    Per son- he was more talkative in past, but \"NOT sob\" - no acute complaints  Doesn't interact much, doesn't want to eat  Noted seen [place] 4 days ago - SNF sent him back to ER bc of concerns of \"SOB\"  Patient currently resides at [facility]. charting - from [facility] states that he had a recent diagnosis of \"pneumonia+\"-- but per DC summary they didn't feel he had pneu;monia--during workup . ER evaluation on 11/12/2022 after being found down on the ground and was admitted for failure to thrive.   +++severe constipation-stercoral colitis  Dheyration +CLEMENTE noted    He was discharged home on 11/17/2022 to [facility]. .    [facility] sent him in for \"sob\"  Noted onging failure to thrive,  dec PO intake chronic   NO hypoxia noted o2 tsat fine, NOT sob  Cxr:  Impression   No acute process.    No metabolic acidosis - to cause in breathing     We are NOWrequested to admit bc of \"New hyponatremia:\"-- which I feel may be errorir  Na+ 134 on 11/17:  Now \"121 today\"- but I feel this is lab error  Noted outpatient labs down 2 days ago from [facility] on 11.19-- 136    Will cehck urine lytes/osm  Gentle NS IV hydration overnight bc of dec PO intake chronically  Stop pysch meds, cause siadh  Nephro consult? Repeat labs- likely error    At risk for constipation/stercolitis    Trop 30-40 baseline, ekg N  Check thyroid, cortisol etc..      Dec PO intake, will hold metformin, continue insulin  Dec mental alertness , will give IV fludis  and hold lamictal  and antipyschotics (no recent seizure hx per son)- hx of subdural and TIA and depression in past  Will restart remeron  +constipation- hold calcium, colace,     Chronic deconditioning, PT/OT- ?placement  Chronic dsythymia  ?palliative          Patient was recently admitted [facility] to [facility] on 11/17:  Per DC summary from sound:  \"74year-old male presents with failure to thrive and found down on the ground. Pt states he feel out of bed earlier today and was unable to get back on bed. Pt brought to ER deconditioned and covered in feces. CT abdomen showed severe constipation-stercoral colitis. General surgery was consulted-attempted disimpaction, bowel regimen was initiated. Started on antibiotics-Zosyn, doxycycline that were discontinued on 11/15 as no evidence of clinical or radiological pneumonia.  was consulted for placement. Nephrology was consulted for CLEMENTE. CLEMENTE has resolved and IV fluids were discontinued. Nephrology also followed for hypomagnesemia, hypokalemia which were corrected. Patient has generalized weakness and alternates diarrhea with constipation. He said he had a colonoscopy and his teenage years days and cannot remember why it was done. He has not had a recent colonoscopy. He follows with the [facility].   Discussed with patient that he needs to have a colonoscopy as soon as possible as an outpatient. Unsure why the patient is on Eliquis. Not able to find on chart review. Will DC at this time as PCP's med list from -9/29/22 does not contain Elqiuis as well. Patient was seen by PT and OT. He is adequate for placement and pre-CERT was obtained. Patient has been given multiple bowel regimen and he had a small bowel movement on 11/14. Repeat KUB shows no stools accumulation on the left side of the colon.   Patient has been able to eat his meals with no discomfort, nausea or vomiting, he is passing gas has no abdominal pain or discomfort or distention    Newton Clarke MD   Night Officer, [facility] doctor   for questions after 7:30am    Covering for [facility] CHILDREN Service  If Qs please call [phone]  Electronically signed by Kaylee Sosa MD on 11/21/2022 at 10:00 PM

## 2022-11-22 NOTE — PROGRESS NOTES
Left message with answering service for Dr. Angelica Montalvo regarding sodium level. Awaiting call back.

## 2022-11-22 NOTE — PROGRESS NOTES
Called into patients room by nurses aid due to patient being non responsive to her. Patient found to be unresponsive to painful/verbal stimuli. RRT called. Vitals obtained and noted to be stable. Dr. Natalie Norman at bedside updated on situation. ABG attempted by respiratory and patient became alert. No further need for ABG due to patient becoming alert per Dr. Natalie Norman. Patient stable at this time. Will continue to monitor. Santi Beal attempted to be updated at the number of [phone]. No answer at this time. Will attempt again later this morning to reach him.

## 2022-11-22 NOTE — CARE COORDINATION
11/22/2022  Social Work Discharge Planning:COVID RULE OUT. RRT this morning. Pt is now stable. Pt came skilled from [facility] and per liaison is a bedhold and will not need a precert. Will need a COVID test to return. Awaiting therapy evals. Room air. Sw tried to reach Pts srinivasan Agosto to confirm return to [facility]. Waiting for a return call. BARBARA and transport form are completed. Electronically signed by INDY Garner on 11/22/2022 at 9:33 AM    11/22/2022    Social Work Discharge Planning:SW received a call from Pts srinivasan Agosto confirming plan is to return to [place].  Electronically signed by INDY Garner on 11/22/2022 at 9:47 AM'

## 2022-11-22 NOTE — CONSULTS
Department of Internal Medicine  Nephrology Consult Note      Reason for Consult:  Hyponatremia  Requesting Physician:  Mariana Munoz DO    CHIEF COMPLAINT:  Shortness of breath    History Obtained From:  patient, electronic medical record    HISTORY OF PRESENT ILLNESS:  Briefly, Mr. Kitty Peterson is a 74year old male with a PMH of CKD stage II (baseline creatinine 0.9-1.0 mg/dL) with mild proteinuria probably 2/2 diabetic nephropathy, IDDM with diabetic retinopathy, HTN, HLD, subdural hematoma (2018), TIA, dementia, anxiety and depression, recently admitted from 11/11/22 to 11/17/22 for failure to thrive, found down at home, seen by us in consultation for CLEMENTE, who presented to the ED on November 21, 2022 with SOB. Lab work significant for sodium 121 mmol/L, reason for this consultation. Home medications include metformin, olanzapine, and lamotrigine. Review of records indicates that patient received a 1L bolus of 0.9% NaCl at 0016, consumed potato chips and then became lethargic, RRT was called, repeat BMP at 0335 revealed sodium level of 129.     Past Medical History:        Diagnosis Date    Anxiety     Bronchitis     Cellulitis     Chronic sinusitis     Depression     Diabetes mellitus ([address]     Diabetic retinopathy ([address]     Fracture of left foot     High cholesterol     Hypercholesteremia     Hypercholesterolemia     Hypertension     Lumbago     Moderate mood disorder ([address]     Third nerve palsy      Past Surgical History:        Procedure Laterality Date    EYE SURGERY      HIP SURGERY Right 11/19/2020    HIP HEMIARTHROPLASTY performed by Kelsey Moore MD at [facility] Left 12/10/2020    HIP HEMIARTHROPLASTY -- HERB -- DROPLET +     PT. COMING FROM [place] performed by Morris Jansen MD at [facility] OR     Current Medications:    Current Facility-Administered Medications: glucose chewable tablet 16 g, 4 tablet, Oral, PRN  dextrose bolus 10% 125 mL, 125 mL, IntraVENous, PRN **OR** dextrose bolus 10% 250 mL, 250 mL, IntraVENous, PRN  glucagon (rDNA) injection 1 mg, 1 mg, SubCUTAneous, PRN  dextrose 10 % infusion, , IntraVENous, Continuous PRN  insulin lispro (HUMALOG) injection vial 0-16 Units, 0-16 Units, SubCUTAneous, TID WC  insulin lispro (HUMALOG) injection vial 0-4 Units, 0-4 Units, SubCUTAneous, Nightly  melatonin tablet 4.5 mg, 4.5 mg, Oral, Daily  rosuvastatin (CRESTOR) tablet 20 mg, 20 mg, Oral, Daily  senna (SENOKOT) tablet 8.6 mg, 1 tablet, Oral, Daily  lansoprazole suspension SUSP 30 mg, 30 mg, Oral, QAM AC  mirtazapine (REMERON SOL-TAB) disintegrating tablet 15 mg, 15 mg, Oral, Nightly  insulin glargine (LANTUS) injection vial 4 Units, 4 Units, SubCUTAneous, Nightly  glucose chewable tablet 16 g, 4 tablet, Oral, PRN  dextrose bolus 10% 125 mL, 125 mL, IntraVENous, PRN **OR** dextrose bolus 10% 250 mL, 250 mL, IntraVENous, PRN  glucagon (rDNA) injection 1 mg, 1 mg, SubCUTAneous, PRN  dextrose 10 % infusion, , IntraVENous, Continuous PRN  Allergies:  Empagliflozin and Liraglutide    Social History:    TOBACCO:   reports that he has never smoked. He has never used smokeless tobacco.    Family History:   No family history on file.   REVIEW OF SYSTEMS:    CONSTITUTIONAL:  positive for  fatigue and weight loss  EYES:  negative for  double vision and blurred vision  HEENT:  negative for  epistaxis  RESPIRATORY:  negative for  dyspnea  CARDIOVASCULAR:  negative for  dyspnea, edema  GASTROINTESTINAL:  positive for nausea, vomiting, and diarrhea  GENITOURINARY:  negative  INTEGUMENT/BREAST:  negative  HEMATOLOGIC/LYMPHATIC:  negative  ALLERGIC/IMMUNOLOGIC:  negative  ENDOCRINE:  negative  MUSCULOSKELETAL:  positive for  muscle weakness  NEUROLOGICAL:  positive for weakness  BEHAVIOR/PSYCH:  positive for poor appetite    PHYSICAL EXAM:      Vitals:    VITALS:  /68   Pulse 87   Temp 98.3 °F (36.8 °C) (Oral)   Resp 18   Ht 5' 9\" (1.753 m)   Wt 171 lb 3.2 oz (77.7 kg)   SpO2 96%   BMI 25.28 kg/m²   24HR INTAKE/OUTPUT:    Intake/Output Summary (Last 24 hours) at 11/22/2022 1503  Last data filed at 11/22/2022 1232  Gross per 24 hour   Intake 1683.46 ml   Output 1825 ml   Net -141.54 ml       Constitutional:  Alert and oriented with intermittent confusion, ill-appearing  HEENT:  Normocephalic, PERRL, dry mucous membranes  Respiratory:  CTA bilaterally  Cardiovascular/Edema:  RRR, S1,S2  Gastrointestinal:  Soft, rounded, nontender, nondistended  Neurologic:  Nonfocal, [place]  Skin:  Warm, dry, intact  Other:  No edema     DATA:    CBC:   Lab Results   Component Value Date/Time    WBC 8.5 11/22/2022 03:35 AM    RBC 4.14 11/22/2022 03:35 AM    HGB 11.0 11/22/2022 03:35 AM    HCT 31.3 11/22/2022 03:35 AM    MCV 75.6 11/22/2022 03:35 AM    MCH 26.6 11/22/2022 03:35 AM    MCHC 35.1 11/22/2022 03:35 AM    RDW 13.9 11/22/2022 03:35 AM     11/22/2022 03:35 AM    MPV 9.8 11/22/2022 03:35 AM     CMP:    Lab Results   Component Value Date/Time     11/22/2022 10:07 AM    K 4.6 11/22/2022 10:07 AM    K 4.2 11/21/2022 07:28 PM    CL 98 11/22/2022 10:07 AM    CO2 26 11/22/2022 10:07 AM    BUN 12 11/22/2022 10:07 AM    CREATININE 1.0 11/22/2022 10:07 AM    GFRAA >60 12/13/2020 05:22 AM    LABGLOM >60 11/22/2022 10:07 AM    GLUCOSE 251 11/22/2022 10:07 AM    PROT 5.1 11/22/2022 03:35 AM    LABALBU 3.2 11/22/2022 03:35 AM    CALCIUM 9.3 11/22/2022 10:07 AM    BILITOT 0.4 11/22/2022 03:35 AM    ALKPHOS 63 11/22/2022 03:35 AM    AST 12 11/22/2022 03:35 AM    ALT 27 11/22/2022 03:35 AM     Magnesium:    Lab Results   Component Value Date/Time    MG 1.7 11/17/2022 04:11 AM     Phosphorus:    Lab Results   Component Value Date/Time    PHOS 2.9 11/17/2022 04:11 AM     Radiology Review:      CXR 11-22-22   No acute process.          IMPRESSION/RECOMMENDATIONS:      Briefly, Mr. Vicki Walden is a 74year old male with a PMH of CKD stage II (baseline creatinine 0.9-1.0 mg/dL) with mild proteinuria probably 2/2 diabetic nephropathy, IDDM with diabetic retinopathy, HTN, HLD, subdural hematoma (2018), TIA, dementia, anxiety and depression, recently admitted from 11/11/22 to 11/17/22 for failure to thrive, found down at home, seen by us in consultation for CLEMENTE, who presented to the ED on November 21, 2022 with SOB. Lab work significant for sodium 121 mmol/L, reason for this consultation. Home medications include metformin, olanzapine, and lamotrigine. Review of records indicates that patient received a 1L bolus of 0.9% NaCl at 0016, consumed potato chips and then became lethargic, RRT was called, repeat BMP at 0335 revealed sodium level of 129. Hypotonic hyponatremia likely 2/2 poor solute intake (tea and toast phenomenon), versus lab error? ?.  Urine osmolality 67, urine sodium less than 20, rapid correction after NaCl bolus and oral salt intake, consistent with this diagnosis. Given D5W @ 200 cc/hr x 2 hours only to prevent ODS. Repeat BMP at 1600, repeat urine electrolytes/urine osmolality. CKD stage II, with mild proteinuria, UACR: 119.5 mg/g, 2/2 diabetic kidney disease.   Vitamin D deficiency, vitamin D 25 level 16, on ergocalciferol  Hx of HTN, generally controlled without medication  ---------------------------------------------------------------  Type II DM with diabetic retinopathy, on SSI  HLD, on rosuvastatin   Microcytic anemia, ferritin 141, iron saturation 27%, folate 15.3, B12 394, Hgb stable  Mood disorder, on olanzapine, and lamotrigine  Hx adrenal adenoma, random cortisol level 6.88 11/21/22  Hx SDH  Nutrition, regular diet, 1 liter fluid restriction, consider dietary consult/enteral nutrition/goals of care     Plan:    Obtain BMP now and at 1600  D5W at 200 cc/hr x 2 hours only  Repeat urine electrolytes, urine creatinine, urine osmolality  Fluid restriction: 1 L  Strict I&O q4h   Better glucose control   Monitor blood pressure  Notify renal if sodium level is > 131 or < 128      Thank you  Negin Amin DO for allowing us to participate in the care of Mr. Qian Barragan.      Electronically signed by Rose Avalos MD on 11/22/2022 at 3:03 PM

## 2022-11-22 NOTE — PROGRESS NOTES
Physical Therapy  Facility/Department: [address] INTERNAL MEDICINE 2  Physical Therapy Initial Assessment    Name: Jez Moralez  : 1955  MRN: [id number]  Date of Service: 2022    Patient Diagnosis(es): The primary encounter diagnosis was Hyponatremia. A diagnosis of Dyspnea, unspecified type was also pertinent to this visit. Past Medical History:  has a past medical history of Anxiety, Bronchitis, Cellulitis, Chronic sinusitis, Depression, Diabetes mellitus ([address] Diabetic retinopathy ([address] Fracture of left foot, High cholesterol, Hypercholesteremia, Hypercholesterolemia, Hypertension, Lumbago, Moderate mood disorder ([address] and Third nerve palsy. Past Surgical History:  has a past surgical history that includes eye surgery; hip surgery (Right, 2020); and hip surgery (Left, 12/10/2020). Referring provider:  Rox Sebastian DO    PT Order:  PT eval and treat     Evaluating PT:  Katarzyna Lofton PT, DPT PT [id number]    Room #:  2070/5249-S  Diagnosis:  Hyponatremia [E87.1]  Dyspnea, unspecified type [R06.00]  Precautions:  fall risk  Equipment Needs:  none    SUBJECTIVE:    Pt lives alone in a 1 story home with 4-5 stairs to enter and 1 rail. Pt admitted from nursing facility. Pt reported he was using a walker and only ambulating short distances. OBJECTIVE:   Initial Evaluation  Date:  Treatment Short Term/ Long Term   Goals   Was pt agreeable to Eval/treatment? yes     Does pt have pain?  None reported     Bed Mobility  Rolling: SBA  Supine to sit: SBA  Sit to supine: SBA  Scooting: SBA to sitting EOB  independent   Transfers Sit to stand: SBA  Stand to sit: SBA  Stand pivot: NT  Modified independent   Ambulation    35 feet with w/w CGA   100+ feet with w/w supervision    Stair negotiation: ascended and descended  NT      ROM BLE:  WFL     Strength BLE:  grossly 4/5  Grossly 4+/5   Balance Sitting EOB:  supervision  Dynamic Standing:  SBA with w/w  Sitting EOB:  independent  Dynamic Standing:  Supervision with w/w   AM-PAC 6 Clicks 58/99       Pt is A & O x 3  Sensation:  Pt denies numbness and tingling to extremities      Patient education  Pt educated on PT objectives during eval and while in the hospital, hand placement during transfers. Patient response to education:   Pt verbalized understanding Pt demonstrated skill Pt requires further education in this area   yes With cueing yes     ASSESSMENT:    Conditions Requiring Skilled Therapeutic Intervention:    [x]Decreased strength     []Decreased ROM  [x]Decreased functional mobility  [x]Decreased balance   [x]Decreased endurance   [x]Decreased posture  []Decreased sensation  []Decreased coordination   []Decreased vision  []Decreased safety awareness   []Increased pain       Comments:  Pt found in bed. No report of dizziness during functional mobility. Cueing required for hand placement during transfers. Pt ambulates with slow gaits speed. No LOB. At end of eval, pt left in bed with call light in reach and bed alarm on.      Pt's/ family goals   1. None stated    Prognosis is good for reaching above PT goals. Patient and or family understand(s) diagnosis, prognosis, and plan of care.   yes    PHYSICAL THERAPY PLAN OF CARE:    PT POC is established based on physician order and patient diagnosis     Diagnosis:  Hyponatremia [E87.1]  Dyspnea, unspecified type [R06.00]    Current Treatment Recommendations:     [x] Strengthening to improve independence with functional mobility   [] ROM to improve independence with functional mobility   [x] Balance Training to improve static/dynamic balance and to reduce fall risk  [x] Endurance Training to improve activity tolerance during functional mobility   [x] Transfer Training to improve safety and independence with all functional transfers   [x] Gait Training to improve gait mechanics, endurance and assess need for appropriate assistive device  [] Stair Training in preparation for safe discharge home and/or into the community   [] Positioning to prevent skin breakdown and contractures  [x] Safety and Education Training   [x] Patient/Caregiver Education   [] HEP  [] Other     PT long term treatment goals are located in above grid    Frequency of treatments: 2-5x/week x 1-2 weeks. Time in  1303  Time out  1312    Evaluation Time includes thorough review of current medical information, gathering information on past medical history/social history and prior level of function, completion of standardized testing/informal observation of tasks, assessment of data and education on plan of care and goals.     CPT codes:  [x] Low Complexity PT evaluation 05219  [] Moderate Complexity PT evaluation 20127  [] High Complexity PT evaluation 61334  [] PT Re-evaluation 77691  [] Therapeutic activities 25857 __minutes  [] Therapeutic exercises 36441 __ minutes      Dee Dee Lundberg, PT, DPT  PT [id number]

## 2022-11-22 NOTE — PROGRESS NOTES
[facility] Hospitalist   Progress Note    Admitting Date and Time: 11/21/2022  6:29 PM  Admit Dx: Hyponatremia [E87.1]  Dyspnea, unspecified type [R06.00]    Subjective:    Pt lying in bed eyes closed. Awakens easily to name. States feels a little better. Eating well. Denies any new concerns . Per RN: No new concerns noted.      ROS: denies fever, chills, cp, sob, n/v, HA unless stated above.     melatonin  4.5 mg Oral Daily    rosuvastatin  20 mg Oral Daily    senna  1 tablet Oral Daily    lansoprazole  30 mg Oral QAM AC    mirtazapine  15 mg Oral Nightly    insulin glargine  4 Units SubCUTAneous Nightly    insulin lispro  0-4 Units SubCUTAneous TID WC    insulin lispro  0-4 Units SubCUTAneous Nightly     glucose, 4 tablet, PRN  dextrose bolus, 125 mL, PRN   Or  dextrose bolus, 250 mL, PRN  glucagon (rDNA), 1 mg, PRN  dextrose, , Continuous PRN         Objective:    /73   Pulse 85   Temp 98.9 °F (37.2 °C) (Oral)   Resp 18   Ht 5' 9\" (1.753 m)   Wt 171 lb 3.2 oz (77.7 kg)   SpO2 97%   BMI 25.28 kg/m²   General Appearance: alert and oriented to person, place and time and in no acute distress  Skin: warm and dry  Head: normocephalic and atraumatic  Eyes: pupils equal, round, and reactive to light, extraocular eye movements intact, conjunctivae normal  Neck: neck supple and non tender without mass   Pulmonary/Chest: clear to auscultation bilaterally- no wheezes, rales or rhonchi, normal air movement, no respiratory distress  Cardiovascular: normal rate, normal S1 and S2 and no carotid bruits  Abdomen: soft, non-tender, non-distended, normal bowel sounds, no masses or organomegaly  Extremities: no cyanosis, no clubbing and no edema  Neurologic: no cranial nerve deficit and speech normal      Recent Labs     11/21/22 1928 11/22/22  0335   * 129*   K 4.2 4.5   CL 88* 97*   CO2 24 23   BUN 11 12   CREATININE 1.0 0.9   GLUCOSE 192* 270*   CALCIUM 8.9 9.0       Recent Labs 11/21/22 1928 11/22/22  0335   ALKPHOS 65 63   PROT 5.6* 5.1*   LABALBU 3.5 3.2*   BILITOT 0.5 0.4   AST 15 12   ALT 31 27       Recent Labs     11/21/22 1928 11/22/22  0335   WBC 9.7 8.5   RBC 4.30 4.14   HGB 11.5* 11.0*   HCT 32.4* 31.3*   MCV 75.3* 75.6*   MCH 26.7 26.6   MCHC 35.5* 35.1*   RDW 14.1 13.9    230   MPV 9.6 9.8            Radiology:   XR CHEST (2 VW)   Final Result   No acute process. Assessment:  Principal Problem:    Hyponatremia  Resolved Problems:    * No resolved hospital problems. *      Plan:  SOB- Recent diagnosis pneumonia with hospitalization. [facility]17. Generalized Weakness- Recent Pneumonia with hospitalization. TSH/B12 pending. PT/OT. Follow. Uncontrolled DM II- Glucose 270. A1C Continue Hypoglycemic protocol/Lantus/SSI/Carb Control Diet. HLD- Continue Rosuvastatin. Pseudohyponatremia- Na+ 129. Glucose 270 Corrected Na+ 132. Continue to follow. Depression/mood Disorder- Continue Zyprexa/Lamictal.     NOTE: This report was transcribed using voice recognition software. Every effort was made to ensure accuracy; however, inadvertent computerized transcription errors may be present. In Review of EMR,  evaluation, management and diagnosis. Care plan has been discussed with attending. Time spent 17  minutes. Electronically signed by Brandon Huerta CNP on 11/22/2022 at 8:38 AM  Addendum: I have personally participated in the history, exam, medical decision making with James Ferro on the date of service and I agree with all of the pertinent clinical information unless otherwise noted. I have also reviewed and agree with the past medical, family, and social history unless otherwise noted.       Patient was admitted with      PHYSICAL EXAM:  Vitals:  /73   Pulse 85   Temp 98.9 °F (37.2 °C) (Oral)   Resp 18   Ht 5' 9\" (1.753 m)   Wt 171 lb 3.2 oz (77.7 kg)   SpO2 97%   BMI 25.28 kg/m²   Gen: awake, alert, NAD  Lungs: clear to auscultation bilaterally no crackles no wheezing. Heart: RRR, no murmur   Abdomen: soft nontender nondistended positive bowel sounds. Extremities: full range of motion no peripheral edema. Impression:  Principal Problem:    Hyponatremia  Resolved Problems:    * No resolved hospital problems. *        My findings/plan include:     Generalized weakness - Electrolytes are within normal limits. Normal TSH. Will check vitamin B12 levels. PT/OT  Pseudohyponatremia - Corrected sodium is 132  Dyspnea - Patient is not hypoxic. CXR is unremarkable. Will check viral respiratory panel. Low suspicion for PE as he is not hypoxic and no sinus tachycardia noted. Depression/Mood disorder - Patient is on zyprexa and lamictal  DM-II, hyperglycemia - Patient is on lantus 4 u qhs, metformin 500 mg po bid, lispro 0-4 u TID. Currently on lantus 4 u and low dose insulin ss. Will ss to high. HLD - Continue crestor  DVT prophylaxis - Lovnox     NOTE: This report was transcribed using voice recognition software. Every effort was made to ensure accuracy; however, inadvertent computerized transcription errors may be present.      Electronically signed by Patel Harvey DO on 11/22/2022 at 8:10 AM

## 2022-11-22 NOTE — PROGRESS NOTES
Occupational Therapy  OCCUPATIONAL THERAPY INITIAL EVALUATION     Laura Lopes [facility], [place]        ESIE:                                                  Patient Name: Lakeisha Foy    MRN: [id number]    : 1955    Room: [address] [place] [address]      Evaluating OT: Don Acuna OTR/L [id number]      Referring Provider: Nora Pace MD    Specific Provider Orders/Date:OT eval and treat 22      Diagnosis:  Hyponatremia [E87.1]  Dyspnea, unspecified type [R06.00]     Pertinent Medical History: DM, diabetic retinopathy, HTN       Precautions:  Fall Risk     Assessment of current deficits    [x] Functional mobility  [x]ADLs  [x] Strength               [x]Cognition    [x] Functional transfers   [x] IADLs         [x] Safety Awareness   [x]Endurance    [] Fine Coordination              [x] Balance      [] Vision/perception   []Sensation     []Gross Motor Coordination  [] ROM  [] Delirium                   [] Motor Control     OT PLAN OF CARE   OT POC based on physician orders, patient diagnosis and results of clinical assessment    Frequency/Duration 2-4 days/wk for 2 weeks PRN   Specific OT Treatment Interventions to include:   * Instruction/training on adapted ADL techniques and AE recommendations to increase functional independence within precautions       * Training on energy conservation strategies, correct breathing pattern and techniques to improve independence/tolerance for self-care routine  * Functional transfer/mobility training/DME recommendations for increased independence, safety, and fall prevention  * Patient/Family education to increase follow through with safety techniques and functional independence  * Recommendation of environmental modifications for increased safety with functional transfers/mobility and ADLs  * Cognitive retraining/development of therapeutic activities to improve problem solving, judgement, memory, and attention for increased safety/participation in ADL/IADL tasks  * Therapeutic exercise to improve motor endurance, ROM, and functional strength for ADLs/functional transfers  * Therapeutic activities to facilitate/challenge dynamic balance, stand tolerance for increased safety and independence with ADLs  * Positioning to improve skin integrity, interaction with environment and functional independence        Recommended Adaptive Equipment:  TBD     Home Living: Pt admitted from SNF. Pt reports he is working on walking with a wheeled walker in therapy. Prior to SNF, pt lived alone in 1 story house with 4 LANCE and 1 hand rail. Pt had walk in shower with shower chair and grab bars. Pt received assistance with ADLs and IADLs from daughter. Pain Level: pt did not report pain this date; pt agreeable to therapy  Cognition: A&O: pt alert and oriented grossly; [facility] command follow demonstrated.    Memory:  Fair+   Sequencing:  Fair   Problem solving:  Fair   Judgement/safety:  Fair     Functional Assessment:  AM-PAC Daily Activity Raw Score: 17/24   Initial Eval Status  Date: 11/22/22 Treatment Status  Date: STGs = LTGs  Time frame: 10-14 days   Feeding Independent     Grooming CGA  Mod I/I   UB Dressing Min A  Mod I/I   LB Dressing Mod A  To adjust socks    SBA-with use of AD as appropriate/needed   Bathing Mod A  SBA -with use of AD as appropriate/needed   Toileting Min A  Sup    Bed Mobility  Supine to sit: SBA   Sit to supine: SBA   Independent    Functional Transfers SBA with wheeled walker  Sit<>stand from EOB  Cues for hand placement  Independent    Functional Mobility CGA with wheeled walker  Short distance in room  Cues for wheeled walker management  Independent -with device as needed to maximize independence with ADLs and functional task completion   Balance Sitting:     Static:  Sup    Dynamic:SBA  Standing: CGA with wheeled walker  I for static/dynamic sitting balance to maximize independence with ADLs and functional task completion    I for standing balance to maximize independence with ADLs and functional task completion   Activity Tolerance Fair with light activity  Good with ADL activity. Pt will demonstrate good understanding of education provided on EC/WS techniques    Visual/  Perceptual Glasses: yes             Additional long-term goal: Pt will increase functional independence to PLOF to allow pt to live in least restrictive environment. Hand Dominance R   AROM (PROM) Strength   RUE  WFL WFL   LUE WFL WFL     Hearing: WFL   Sensation:  No c/o numbness or tingling   Tone: WFL   Edema: none noted    Comments: Upon arrival patient lying in bed. At end of session, patient returned to bed with call light and phone within reach, all lines and tubes intact, and alarm set. Overall patient demonstrated decreased independence and safety during completion of ADL/functional transfer/mobility tasks. Pt would benefit from continued skilled OT to increase safety and independence with completion of ADL/IADL tasks for functional independence and quality of life. Rehab Potential: Good for established goals     Patient / Family Goal: none stated    Patient and/or family were instructed on functional diagnosis, prognosis/goals and OT plan of care. Demonstrated fair understanding.      Eval Complexity: Low    Time In: 1302  Time Out: 1312    Min Units   OT Eval Low 97165  x  1   OT Eval Medium 30111      OT Eval High 60357      OT Re-Eval [id number]       Therapeutic Ex 42742       Therapeutic Activities 50236       ADL/Self Care 98063       Orthotic Management 45088       Manual 01536     Neuro Re-Ed 48762       Non-Billable Time          Evaluation Time additionally includes thorough review of current medical information, gathering information on past medical history/social history and prior level of function, interpretation of standardized testing/informal observation of tasks, assessment of data and development of plan of care and goals.             Kimberly Rangel, OTR/L, [id number]

## 2022-11-22 NOTE — ED PROVIDER NOTES
[address] [facility]  Department of Emergency Medicine   ED  Encounter Note  Admit Date/RoomTime: 2022  6:29 PM  ED Room:     NAME: Jez Moralez  : 1955  MRN: [id number]     Chief Complaint:  Shortness of Breath (Recent pneumonia dx.  )    History of Present Illness      Jez Moralez is a 74 y.o. old male with a history of diabetes, hypertension, high cholesterol, depression, hip surgeries last year who presents to the emergency department by ambulance, with shortness of breath that has been worsening over the last few days. Patient currently resides at [facility]. charting states that he had a recent diagnosis of pneumonia. Patient states he is not aware of this. Patient states he is currently residing at a rehab facility after having lower extremity weakness since having his right hip replaced last year. Patient denies any pain. He denies chest pain. He denies, nausea, vomiting, diarrhea, headaches, dizziness. He states that it feels as if he has a hard time taking a breath. He denies pleuritic chest pain. Patient had an ER evaluation on 2022 after being found down on the ground and was admitted for failure to thrive. He was discharged home on 2022 to [facility]. ROS   Pertinent positives and negatives are stated within HPI, all other systems reviewed and are negative. Past Medical History:  has a past medical history of Anxiety, Bronchitis, Cellulitis, Chronic sinusitis, Depression, Diabetes mellitus ([address] Diabetic retinopathy ([address] Fracture of left foot, High cholesterol, Hypercholesteremia, Hypercholesterolemia, Hypertension, Lumbago, Moderate mood disorder ([address] and Third nerve palsy. Surgical History:  has a past surgical history that includes eye surgery; hip surgery (Right, 2020); and hip surgery (Left, 12/10/2020). Social History:  reports that he has never smoked.  He has never used smokeless tobacco. He reports that he does not drink alcohol and does not use drugs. Family History: family history is not on file. Allergies: Empagliflozin and Liraglutide    Physical Exam   Oxygen Saturation Interpretation: Normal on room air analysis. ED Triage Vitals [11/21/22 1834]   BP Temp Temp src Heart Rate Resp SpO2 Height Weight   133/71 98 °F (36.7 °C) -- 78 16 98 % -- --         General Appearance/Constitutional:  Alert  HEENT:  NC/NT. PERRLA. Airway patent. Neck:  Normal ROM. Supple. Respiratoty:  Breath sounds: equal bilaterally. Tachypneic. Lung sounds: diminished breath sounds- throughout. CV:  Regular rate and rhythm, normal heart sounds, without pathological murmurs, ectopy, gallops, or rubs. [place] GI:  Soft, nontender, good bowel sounds. No firm or pulsatile mass. Integument:  Normal turgor. Warm, dry, without visible rash. Extremities/Lymphatics:  Edema:  none Bilateral lower extremity(s). No evidence of DVT seen on physical exam.. Neurological:  Oriented x3. Motor functions intact. GCS 15, moves all extremities x4, patient at his baseline mental status per son at bedside. Denies any unilateral weakness. Denies numbness, tingling sensation equal to bilateral upper and lower extremities. 3/5 bilateral upper extremity  strength.     Lab / Imaging Results   (All laboratory and radiology results have been personally reviewed by myself)  Labs:  Results for orders placed or performed during the hospital encounter of 11/21/22   CBC with Auto Differential   Result Value Ref Range    WBC 9.7 4.5 - 11.5 E9/L    RBC 4.30 3.80 - 5.80 E12/L    Hemoglobin 11.5 (L) 12.5 - 16.5 g/dL    Hematocrit 32.4 (L) 37.0 - 54.0 %    MCV 75.3 (L) 80.0 - 99.9 fL    MCH 26.7 26.0 - 35.0 pg    MCHC 35.5 (H) 32.0 - 34.5 %    RDW 14.1 11.5 - 15.0 fL    Platelets 184 948 - 381 E9/L    MPV 9.6 7.0 - 12.0 fL    Neutrophils % 66.2 43.0 - 80.0 %    Immature Granulocytes % 0.6 0.0 - 5.0 %    Lymphocytes % 22.9 20.0 - 42.0 %    Monocytes % 6.6 2.0 - 12.0 %    Eosinophils % 3.2 0.0 - 6.0 %    Basophils % 0.5 0.0 - 2.0 %    Neutrophils Absolute 6.40 1.80 - 7.30 E9/L    Immature Granulocytes # 0.06 E9/L    Lymphocytes Absolute 2.22 1.50 - 4.00 E9/L    Monocytes Absolute 0.64 0.10 - 0.95 E9/L    Eosinophils Absolute 0.31 0.05 - 0.50 E9/L    Basophils Absolute 0.05 0.00 - 0.20 E9/L   Comprehensive Metabolic Panel w/ Reflex to MG   Result Value Ref Range    Sodium 121 (L) 132 - 146 mmol/L    Potassium reflex Magnesium 4.2 3.5 - 5.0 mmol/L    Chloride 88 (L) 98 - 107 mmol/L    CO2 24 22 - 29 mmol/L    Anion Gap 9 7 - 16 mmol/L    Glucose 192 (H) 74 - 99 mg/dL    BUN 11 6 - 23 mg/dL    Creatinine 1.0 0.7 - 1.2 mg/dL    Est, Glom Filt Rate >60 >=60 mL/min/1.73    Calcium 8.9 8.6 - 10.2 mg/dL    Total Protein 5.6 (L) 6.4 - 8.3 g/dL    Albumin 3.5 3.5 - 5.2 g/dL    Total Bilirubin 0.5 0.0 - 1.2 mg/dL    Alkaline Phosphatase 65 40 - 129 U/L    ALT 31 0 - 40 U/L    AST 15 0 - 39 U/L   Troponin   Result Value Ref Range    Troponin, High Sensitivity 34 (H) 0 - 11 ng/L   Lactate, Sepsis   Result Value Ref Range    Lactic Acid, Sepsis 0.9 0.5 - 1.9 mmol/L   Brain Natriuretic Peptide   Result Value Ref Range    Pro- (H) 0 - 125 pg/mL   EKG 12 Lead   Result Value Ref Range    Ventricular Rate 76 BPM    Atrial Rate 76 BPM    P-R Interval 132 ms    QRS Duration 74 ms    Q-T Interval 392 ms    QTc Calculation (Bazett) 441 ms    P Axis 52 degrees    R Axis 23 degrees    T Axis 55 degrees     Imaging: All Radiology results interpreted by Radiologist unless otherwise noted. XR CHEST (2 VW)   Final Result   No acute process. EKG #1:  Interpreted by emergency department attending physician unless otherwise noted.     11/21/22  Time: 1928    Rhythm: normal sinus   Rate: 76  Axis: normal  Conduction: normal  ST Segments: normal  T Waves: normal    Clinical Impression: no acute changes  Comparison to Prior tracings: Today's EKG is compared to 11/11/2022. Prior EKG was sinus tachycardia. ST segments remain unchanged. ED Course / Medical Decision Making   Medications - No data to display     Re-Evaluations:  11/21/22      Time: 853    Updated patient and son on Admission. Consultations:             [facility].  Dr. Soraida Werner admitted patient under Dr. Santos Ramirez. Procedures:   none    MDM: Patient arrived to the ER today via ambulance with complaints of shortness of breath. He arrived from a rehab facility. He was recently admitted for failure to thrive and acute kidney injury. Patient had no complaints today other than feeling that he was having a hard time taking a deep breath. He denied chest pain, dizziness, headaches, nausea, vomiting, diarrhea. No weakness. Patient was a GCS of 15 throughout his ER stay. His son arrived at bedside and stated that patient is at his baseline. Chest x-ray showed no signs of pneumonia. His CMP revealed a acute decline in his sodium. Today it was 121. When he was here for hospitalization it maintained in the mid 130s. BNP was 239, his initial troponin was 34. Throughout his hospitalization, his troponins were in the 40s. Glucose 192. CBC was at patient's baseline. EKG showed no concerning ischemic changes. Patient was admitted to the hospitalist service for acute hyponatremia. No identifiable cause for his complaints of shortness of breath. Patient and his son were updated on the plan for admission. Plan of Care/Counseling:  MIRA Wu CNP and EM Attending Physician reviewed today's visit with the patient and son(s) in addition to providing specific details for the plan of care and counseling regarding the diagnosis and prognosis. Questions are answered at this time and are agreeable with the plan. Assessment      1. Hyponatremia    2.  Dyspnea, unspecified type      This patient's ED course included: a personal history and physicial examination, re-evaluation prior to disposition, multiple bedside re-evaluations, cardiac monitoring, and continuous pulse oximetry  This patient has remained hemodynamically stable during their ED course. Plan   Admit to Telemetry Unit. Patient condition is stable. New Medications     New Prescriptions    No medications on file     Electronically signed by MIRA Vasquez CNP   DD: 11/21/22  **This report was transcribed using voice recognition software. Every effort was made to ensure accuracy; however, inadvertent computerized transcription errors may be present.   END OF PROVIDER NOTE      MIRA Vasquez CNP  11/21/22 [id number]

## 2022-11-23 VITALS
HEIGHT: 69 IN | TEMPERATURE: 98.1 F | HEART RATE: 80 BPM | SYSTOLIC BLOOD PRESSURE: 142 MMHG | RESPIRATION RATE: 18 BRPM | BODY MASS INDEX: 24.78 KG/M2 | DIASTOLIC BLOOD PRESSURE: 81 MMHG | OXYGEN SATURATION: 100 % | WEIGHT: 167.3 LBS

## 2022-11-23 LAB
ANION GAP SERPL CALCULATED.3IONS-SCNC: 6 MMOL/L (ref 7–16)
BUN BLDV-MCNC: 14 MG/DL (ref 6–23)
CALCIUM SERPL-MCNC: 9 MG/DL (ref 8.6–10.2)
CHLORIDE BLD-SCNC: 99 MMOL/L (ref 98–107)
CO2: 26 MMOL/L (ref 22–29)
CREAT SERPL-MCNC: 1.2 MG/DL (ref 0.7–1.2)
GFR SERPL CREATININE-BSD FRML MDRD: >60 ML/MIN/1.73
GLUCOSE BLD-MCNC: 257 MG/DL (ref 74–99)
HCT VFR BLD CALC: 32.6 % (ref 37–54)
HEMOGLOBIN: 11.2 G/DL (ref 12.5–16.5)
MCH RBC QN AUTO: 26.2 PG (ref 26–35)
MCHC RBC AUTO-ENTMCNC: 34.4 % (ref 32–34.5)
MCV RBC AUTO: 76.2 FL (ref 80–99.9)
METER GLUCOSE: 260 MG/DL (ref 74–99)
METER GLUCOSE: 273 MG/DL (ref 74–99)
METER GLUCOSE: 298 MG/DL (ref 74–99)
PDW BLD-RTO: 14.4 FL (ref 11.5–15)
PLATELET # BLD: 238 E9/L (ref 130–450)
PMV BLD AUTO: 9.9 FL (ref 7–12)
POTASSIUM SERPL-SCNC: 4.8 MMOL/L (ref 3.5–5)
RBC # BLD: 4.28 E12/L (ref 3.8–5.8)
SARS-COV-2, NAAT: NOT DETECTED
SODIUM BLD-SCNC: 131 MMOL/L (ref 132–146)
WBC # BLD: 7 E9/L (ref 4.5–11.5)

## 2022-11-23 PROCEDURE — 99239 HOSP IP/OBS DSCHRG MGMT >30: CPT | Performed by: INTERNAL MEDICINE

## 2022-11-23 PROCEDURE — 6370000000 HC RX 637 (ALT 250 FOR IP): Performed by: INTERNAL MEDICINE

## 2022-11-23 PROCEDURE — 80048 BASIC METABOLIC PNL TOTAL CA: CPT

## 2022-11-23 PROCEDURE — 85027 COMPLETE CBC AUTOMATED: CPT

## 2022-11-23 PROCEDURE — 87635 SARS-COV-2 COVID-19 AMP PRB: CPT

## 2022-11-23 PROCEDURE — 82962 GLUCOSE BLOOD TEST: CPT

## 2022-11-23 PROCEDURE — 6370000000 HC RX 637 (ALT 250 FOR IP): Performed by: NURSE PRACTITIONER

## 2022-11-23 PROCEDURE — 36415 COLL VENOUS BLD VENIPUNCTURE: CPT

## 2022-11-23 RX ORDER — INSULIN LISPRO 100 [IU]/ML
0-4 INJECTION, SOLUTION INTRAVENOUS; SUBCUTANEOUS
Status: DISCONTINUED | OUTPATIENT
Start: 2022-11-23 | End: 2022-11-23 | Stop reason: HOSPADM

## 2022-11-23 RX ORDER — INSULIN LISPRO 100 [IU]/ML
0-4 INJECTION, SOLUTION INTRAVENOUS; SUBCUTANEOUS NIGHTLY
Status: DISCONTINUED | OUTPATIENT
Start: 2022-11-23 | End: 2022-11-23 | Stop reason: HOSPADM

## 2022-11-23 RX ORDER — TAMSULOSIN HYDROCHLORIDE 0.4 MG/1
0.4 CAPSULE ORAL DAILY
Status: DISCONTINUED | OUTPATIENT
Start: 2022-11-23 | End: 2022-11-23 | Stop reason: HOSPADM

## 2022-11-23 RX ORDER — TAMSULOSIN HYDROCHLORIDE 0.4 MG/1
0.4 CAPSULE ORAL DAILY
Qty: 30 CAPSULE | Refills: 2 | Status: ON HOLD | OUTPATIENT
Start: 2022-11-23 | End: 2022-12-23

## 2022-11-23 RX ORDER — INSULIN GLARGINE 100 [IU]/ML
15 INJECTION, SOLUTION SUBCUTANEOUS EVERY MORNING
Status: DISCONTINUED | OUTPATIENT
Start: 2022-11-23 | End: 2022-11-23 | Stop reason: HOSPADM

## 2022-11-23 RX ADMIN — INSULIN LISPRO 8 UNITS: 100 INJECTION, SOLUTION INTRAVENOUS; SUBCUTANEOUS at 06:00

## 2022-11-23 RX ADMIN — INSULIN LISPRO 2 UNITS: 100 INJECTION, SOLUTION INTRAVENOUS; SUBCUTANEOUS at 08:50

## 2022-11-23 RX ADMIN — ROSUVASTATIN CALCIUM 20 MG: 20 TABLET, FILM COATED ORAL at 08:44

## 2022-11-23 RX ADMIN — INSULIN LISPRO 2 UNITS: 100 INJECTION, SOLUTION INTRAVENOUS; SUBCUTANEOUS at 10:46

## 2022-11-23 RX ADMIN — TAMSULOSIN HYDROCHLORIDE 0.4 MG: 0.4 CAPSULE ORAL at 13:17

## 2022-11-23 RX ADMIN — INSULIN GLARGINE 15 UNITS: 100 INJECTION, SOLUTION SUBCUTANEOUS at 08:52

## 2022-11-23 RX ADMIN — SENNOSIDES 8.6 MG: 8.6 TABLET, FILM COATED ORAL at 08:44

## 2022-11-23 NOTE — CONSULTS
11/23/2022 11:55 AM  Lex Sim  [id number]     Chief Complaint:    Urinary retention    History of Present Illness: The patient is a 74 y.o. male patient who presented to the hospital with complaints of shortness of breath from a nursing facility. Nephrology is following for hyponatremia. We were asked to evaluate him for urinary retention after he was bladder scanned this morning for approximately 450ml. He had a PVR of 257ml yesterday evening. Attempts to place a Lamar catheter unsuccessful today. He is voiding with the use of a urinal. Denies discomfort, dysuria, or trouble urinating. He has a history of a 4.4cm right adrenal adenoma  and had remained unchanged from previous years. He was last seen in our office in February 2021  He was to have MRI and follow up in September 2021 but cancelled his appointment. He also has history of enlarged prostate for which he was to be taking Flomax but appears he was not on this prior to admission.        Past Medical History:   Diagnosis Date    Anxiety     Bronchitis     Cellulitis     Chronic sinusitis     Depression     Diabetes mellitus ([address]     Diabetic retinopathy ([address]     Fracture of left foot     High cholesterol     Hypercholesteremia     Hypercholesterolemia     Hypertension     Lumbago     Moderate mood disorder ([address]     Third nerve palsy          Past Surgical History:   Procedure Laterality Date    EYE SURGERY      HIP SURGERY Right 11/19/2020    HIP HEMIARTHROPLASTY performed by Caitie Cabral MD at [address] [place] [address] Left 12/10/2020    HIP HEMIARTHROPLASTY -- HERB -- DROPLET +     PT. COMING FROM [place] performed by Javan Homans, MD at [facility] OR       Medications Prior to Admission:    Medications Prior to Admission: insulin glargine (LANTUS) 100 UNIT/ML injection vial, Inject 4 Units into the skin nightly  rosuvastatin (CRESTOR) 10 MG tablet, Take 2 tablets by mouth daily  metFORMIN (GLUCOPHAGE) 500 MG tablet, Take 1 tablet by mouth 2 times daily (with meals)  insulin lispro (HUMALOG) 100 UNIT/ML SOLN injection vial, Inject 0-4 Units into the skin 3 times daily (with meals)  [DISCONTINUED] Ergocalciferol (VITAMIN D) 12137 units CAPS, Take 50,000 Units by mouth once a week for 6 doses (Patient not taking: Reported on 11/21/2022)  [DISCONTINUED] magnesium oxide (MAG-OX) 400 (240 Mg) MG tablet, Take 1 tablet by mouth daily for 7 days (Patient not taking: Reported on 11/21/2022)  [DISCONTINUED] senna (SENOKOT) 8.6 MG tablet, Take 1 tablet by mouth daily (Patient not taking: Reported on 11/21/2022)  [DISCONTINUED] Calcium Carb-Cholecalciferol (OYSTERCAL-D) 500-400 MG-UNIT TABS, Take by mouth (Patient not taking: Reported on 11/21/2022)  [DISCONTINUED] melatonin 5 MG TABS tablet, Take 5 mg by mouth daily (Patient not taking: Reported on 11/21/2022)  glucose 4 g chewable tablet, Take 4 g by mouth as needed for Low blood sugar   [DISCONTINUED] Multiple Vitamins-Minerals (MULTIVITAMIN ADULT PO), Take 1 tablet by mouth daily (Patient not taking: Reported on 11/21/2022)  [DISCONTINUED] omeprazole (PRILOSEC) 40 MG delayed release capsule, Take 40 mg by mouth daily (Patient not taking: Reported on 11/21/2022)  aspirin EC 81 MG EC tablet, Take 1 tablet by mouth 2 times daily for 28 days  OLANZapine (ZYPREXA) 7.5 MG tablet, Take 1 tablet by mouth nightly  [DISCONTINUED] lamoTRIgine (LAMICTAL) 25 MG tablet, Take 25 mg by mouth nightly (Patient not taking: Reported on 11/21/2022)    Allergies:    Empagliflozin and Liraglutide    Social History:    reports that he has never smoked. He has never used smokeless tobacco. He reports that he does not drink alcohol and does not use drugs. Family History:   Non-contributory to this Urological problem  family history is not on file.     Review of Systems:  Constitutional: No fever or chills   Respiratory: negative for cough and hemoptysis  Cardiovascular: negative for chest pain and dyspnea  Gastrointestinal: negative for abdominal pain, diarrhea, nausea and vomiting   Derm: negative for rash and skin lesion(s)  Neurological: negative for seizures and tremors  Musculoskeletal: Negative    Psychiatric: Negative   : As above in the HPI, otherwise negative  All other reviews are negative    Physical Exam:     Vitals:  BP (!) 142/81   Pulse 80   Temp 98.1 °F (36.7 °C) (Oral)   Resp 18   Ht 5' 9\" (1.753 m)   Wt 167 lb 4.8 oz (75.9 kg)   SpO2 100%   BMI 24.71 kg/m²     General:  Awake, alert, oriented X 3. No apparent distress. HEENT:  Normocephalic, atraumatic. Lungs:  Respirations symmetric and non-labored. Abdomen:  soft, nontender, no masses  Extremities:  No clubbing, cyanosis, or edema  Skin:  Warm and dry, no open lesions or rashes  Neuro: There are no motor or sensory deficits in the 4 quadrant extremities   Rectal: deferred  Genitourinary:  no ham     Labs:     Recent Labs     11/21/22  1928 11/22/22  0335 11/23/22  0725   WBC 9.7 8.5 7.0   RBC 4.30 4.14 4.28   HGB 11.5* 11.0* 11.2*   HCT 32.4* 31.3* 32.6*   MCV 75.3* 75.6* 76.2*   MCH 26.7 26.6 26.2   MCHC 35.5* 35.1* 34.4   RDW 14.1 13.9 14.4    230 238   MPV 9.6 9.8 9.9         Recent Labs     11/22/22  1007 11/22/22  1600 11/23/22  0725   CREATININE 1.0 1.4* 1.2       Lab Results   Component Value Date    PSA 0.86 09/28/2018       Imaging:     Narrative   EXAMINATION:   RETROPERITONEAL ULTRASOUND OF THE KIDNEYS AND URINARY BLADDER       11/12/2022       COMPARISON:   CT yesterday       HISTORY:   ORDERING SYSTEM PROVIDED HISTORY: CLEMENTE   TECHNOLOGIST PROVIDED HISTORY:       Reason for exam:->CLEMENTE   What reading provider will be dictating this exam?->CRC       FINDINGS:       Kidneys: The right kidney measures approximately 11.3 cm in length and the left kidney   measures approximately 10.9 cm in length. There is no hydronephrosis. There mild renal cysts.   Echogenicity is not   well assessed on exam. Bladder:       Volume of 348 cc is measured without obvious mass           Impression   No acute abnormality identified. Assessment/plan:  BPH   Incomplete bladder emptying  Right adrenal adenoma     PVRs 200-400's  Has history of incomplete emptying secondary to BPH  He was to be taking Flomax, this will be restarted   Hold on ham insertion at this time. He is comfortable   Renal ultrasound from 11/12/22 shows bladder volume of 348cc without any evidence of hydronephrosis   Creatinine stable  Nephrology following  No ham at this time  Monitor PVRs, ham if patient is uncomfortable or worsening renal function  Please call with questions         Electronically signed by MIRA Goetz CNP on 11/23/2022 at 11:55 AM  [facility]     I agree with the nurse practitioner assessment and plan. I personally evaluated the patient and made any changes to reflect my impression and plan. Hold on ham. OK for DC. Call with questions.      Rocio Dumont MD

## 2022-11-23 NOTE — DISCHARGE SUMMARY
[facility] Physician Discharge Summary        [address] [facility] [address]  [address] [facility] [place] [address]  [phone]    Follow up in 1 week(s)      DO Dayanna Ding [address] [place] [address]  [phone]    Follow up        Activity level: As tolerated     Dispo: Home      Condition on discharge: Stable     Patient ID:  Jez Moralez  [id number]  38 y.o.  1955    Admit date: 11/21/2022    Discharge date and time:  11/23/2022  12:27 PM    Admission Diagnoses: Principal Problem:    Hyponatremia  Resolved Problems:    * No resolved hospital problems. *      Discharge Diagnoses: Principal Problem:    Hyponatremia  Resolved Problems:    * No resolved hospital problems. *      Consults:  IP CONSULT TO NEPHROLOGY  IP CONSULT TO SOCIAL WORK  IP CONSULT TO UROLOGY    Procedures: None    Hospital Course:   Patient Jez Moralez is a 74 y.o. presented with Hyponatremia [E87.1]  Dyspnea, unspecified type [R500]    74year old male with generalized weakness, dyspnea, and hyponatremia. Nephrology was consulted. Sodium levels are currently stable. Generalized weakness has improved. Lamar has been placed for retention. Medically stable for discharge to [facility].      Discharge Exam:    General Appearance: alert and oriented to person, place and time and in no acute distress  Skin: warm and dry  Head: normocephalic and atraumatic  Eyes: pupils equal, round, and reactive to light, extraocular eye movements intact, conjunctivae normal  Neck: neck supple and non tender without mass   Pulmonary/Chest: clear to auscultation bilaterally- no wheezes, rales or rhonchi, normal air movement, no respiratory distress  Cardiovascular: normal rate, normal S1 and S2 and no carotid bruits  Abdomen: soft, non-tender, non-distended, normal bowel sounds, no masses or organomegaly  Extremities: no cyanosis, no clubbing and no edema  Neurologic: no cranial nerve deficit and speech normal    I/O last 3 completed shifts: In: 2041.5 [P.O.:608; I.V.:443; IV Piggyback:990.5]  Out: 3000 [Urine:3000]  I/O this shift: In: 520 [P.O.:520]  Out: 320 [Urine:320]      LABS:  Recent Labs     11/22/22  1007 11/22/22  1600 11/23/22  0725   * 133 131*   K 4.6 4.6 4.8   CL 98 100 99   CO2 26 23 26   BUN 12 12 14   CREATININE 1.0 1.4* 1.2   GLUCOSE 251* 254* 257*   CALCIUM 9.3 9.1 9.0       Recent Labs     11/21/22  1928 11/22/22  0335 11/23/22  0725   WBC 9.7 8.5 7.0   RBC 4.30 4.14 4.28   HGB 11.5* 11.0* 11.2*   HCT 32.4* 31.3* 32.6*   MCV 75.3* 75.6* 76.2*   MCH 26.7 26.6 26.2   MCHC 35.5* 35.1* 34.4   RDW 14.1 13.9 14.4    230 238   MPV 9.6 9.8 9.9       No results for input(s): POCGLU in the last 72 hours. Imaging:  XR CHEST (2 VW)    Result Date: 11/21/2022  EXAMINATION: TWO XRAY VIEWS OF THE CHEST 11/21/2022 7:06 pm COMPARISON: 11/11/2022 HISTORY: ORDERING SYSTEM PROVIDED HISTORY: SOB TECHNOLOGIST PROVIDED HISTORY: Reason for exam:->SOB FINDINGS: The lungs are without acute focal process. There is no effusion or pneumothorax. The cardiomediastinal silhouette is without acute process. The osseous structures are without acute process. No acute process.        Patient Instructions:      Medication List        START taking these medications      tamsulosin 0.4 MG capsule  Commonly known as: FLOMAX  Take 1 capsule by mouth daily            CONTINUE taking these medications      aspirin EC 81 MG EC tablet  Take 1 tablet by mouth 2 times daily for 28 days     glucose 4 g chewable tablet     insulin glargine 100 UNIT/ML injection vial  Commonly known as: LANTUS  Inject 4 Units into the skin nightly     insulin lispro 100 UNIT/ML Soln injection vial  Commonly known as: HUMALOG  Inject 0-4 Units into the skin 3 times daily (with meals)     metFORMIN 500 MG tablet  Commonly known as: GLUCOPHAGE  Take 1 tablet by mouth 2 times daily (with meals)     OLANZapine 7.5 MG tablet  Commonly known as: ZYPREXA  Take 1 tablet by mouth nightly     rosuvastatin 10 MG tablet  Commonly known as: CRESTOR  Take 2 tablets by mouth daily            STOP taking these medications      docusate sodium 100 MG capsule  Commonly known as: COLACE     lamoTRIgine 25 MG tablet  Commonly known as: LAMICTAL     magnesium oxide 400 (240 Mg) MG tablet  Commonly known as: MAG-OX     melatonin 5 MG Tabs tablet     MULTIVITAMIN ADULT PO     omeprazole 40 MG delayed release capsule  Commonly known as: PRILOSEC     Oystercal-D 500-400 MG-UNIT Tabs  Generic drug: Calcium Carb-Cholecalciferol     senna 8.6 MG tablet  Commonly known as: SENOKOT     Vitamin D (Ergocalciferol) 63284 units Caps               Where to Get Your Medications        These medications were sent to [place] [address] [address] [phone] Nirmala Alter [phone] [address] [facility] [address]      Phone: [phone]   tamsulosin 0.4 MG capsule           Note that 35 minutes was spent in preparing discharge papers, discussing discharge with patient, medication review, etc.    Signed:  Electronically signed by Onelia Rojas DO on 11/23/2022 at 12:27 PM

## 2022-11-23 NOTE — CARE COORDINATION
11/23/2022  Social Work Discharge Planning:Urology consulted. AMPAC is 17/24. Plan is to return to [facility]. Pt came skilled from [facility] and per liaison is a bedhold and will not need a precert. Will need a COVID test to return. BARBARA and transport form are completed.  Electronically signed by INDY Kimball on 11/23/2022 at 9:53 AM

## 2022-11-23 NOTE — PROGRESS NOTES
Department of Internal Medicine  Nephrology Consult Note    Events reviewed. SUBJECTIVE: We are following Mr. Shweta Farmer for hyponatremia and CKD. Reports no complaints.     PHYSICAL EXAM:      Vitals:    VITALS:  BP (!) 142/81   Pulse 80   Temp 98.1 °F (36.7 °C) (Oral)   Resp 18   Ht 5' 9\" (1.753 m)   Wt 167 lb 4.8 oz (75.9 kg)   SpO2 100%   BMI 24.71 kg/m²   24HR INTAKE/OUTPUT:    Intake/Output Summary (Last 24 hours) at 11/23/2022 1003  Last data filed at 11/23/2022 0743  Gross per 24 hour   Intake 2221.46 ml   Output 1945 ml   Net 276.46 ml         Constitutional:  Alert and oriented with intermittent confusion, ill-appearing  HEENT:  Normocephalic, PERRL, dry mucous membranes  Respiratory:  CTA bilaterally  Cardiovascular/Edema:  RRR, S1,S2  Gastrointestinal:  Soft, rounded, nontender, nondistended  Neurologic:  Nonfocal, RICHMOND  Skin:  Warm, dry, intact  Other:  No edema     Scheduled Meds:   insulin glargine  15 Units SubCUTAneous QAM    insulin lispro  0-4 Units SubCUTAneous TID WC    insulin lispro  0-4 Units SubCUTAneous Nightly    melatonin  4.5 mg Oral Daily    rosuvastatin  20 mg Oral Daily    senna  1 tablet Oral Daily    lansoprazole  30 mg Oral QAM AC    mirtazapine  15 mg Oral Nightly     Continuous Infusions:   dextrose      dextrose       PRN Meds:.glucose, dextrose bolus **OR** dextrose bolus, glucagon (rDNA), dextrose, glucose, dextrose bolus **OR** dextrose bolus, glucagon (rDNA), dextrose      DATA:    CBC:   Lab Results   Component Value Date/Time    WBC 7.0 11/23/2022 07:25 AM    RBC 4.28 11/23/2022 07:25 AM    HGB 11.2 11/23/2022 07:25 AM    HCT 32.6 11/23/2022 07:25 AM    MCV 76.2 11/23/2022 07:25 AM    MCH 26.2 11/23/2022 07:25 AM    MCHC 34.4 11/23/2022 07:25 AM    RDW 14.4 11/23/2022 07:25 AM     11/23/2022 07:25 AM    MPV 9.9 11/23/2022 07:25 AM     CMP:    Lab Results   Component Value Date/Time     11/23/2022 07:25 AM    K 4.8 11/23/2022 07:25 AM    K 4.2 11/21/2022 07:28 PM    CL 99 11/23/2022 07:25 AM    CO2 26 11/23/2022 07:25 AM    BUN 14 11/23/2022 07:25 AM    CREATININE 1.2 11/23/2022 07:25 AM    GFRAA >60 12/13/2020 05:22 AM    LABGLOM >60 11/23/2022 07:25 AM    GLUCOSE 257 11/23/2022 07:25 AM    PROT 5.1 11/22/2022 03:35 AM    LABALBU 3.2 11/22/2022 03:35 AM    CALCIUM 9.0 11/23/2022 07:25 AM    BILITOT 0.4 11/22/2022 03:35 AM    ALKPHOS 63 11/22/2022 03:35 AM    AST 12 11/22/2022 03:35 AM    ALT 27 11/22/2022 03:35 AM     Magnesium:    Lab Results   Component Value Date/Time    MG 1.8 11/22/2022 04:00 PM     Phosphorus:    Lab Results   Component Value Date/Time    PHOS 3.0 11/22/2022 04:00 PM     Radiology Review:      CXR 11-22-22   No acute process. BRIEF SUMMARY OF INITIAL CONSULT:    Briefly, Mr. Joanna Castañeda is a 74year old male with a PMH of CKD stage II (baseline creatinine 0.9-1.0 mg/dL) with mild proteinuria probably 2/2 diabetic nephropathy, IDDM with diabetic retinopathy, HTN, HLD, subdural hematoma (2018), TIA, dementia, anxiety and depression, recently admitted from 11/11/22 to 11/17/22 for failure to thrive, found down at home, seen by us in consultation for CLEMENTE, who presented to the ED on November 21, 2022 with SOB. Lab work significant for sodium 121 mmol/L, reason for this consultation. Home medications include metformin, olanzapine, and lamotrigine. Review of records indicates that patient received a 1L bolus of 0.9% NaCl at 0016, consumed potato chips and then became lethargic, RRT was called, repeat BMP at 0335 revealed sodium level of 129. IMPRESSION/RECOMMENDATIONS:      Hypotonic hyponatremia likely 2/2 poor solute intake (tea and toast phenomenon), versus lab error? ?.  Urine osmolality 67, urine sodium less than 20. Sodium levels improved at adequate rate and stable. Mild component of translocation due to hyperglycemia. CKD stage II, with mild proteinuria, UACR: 119.5 mg/g, 2/2 diabetic kidney disease.   Vitamin D deficiency, vitamin D 25 level 16, on ergocalciferol  Hx of HTN, generally controlled without medication  Urinary retention, PVR greater than 200 cc  ---------------------------------------------------------------  Type II DM with diabetic retinopathy, on SSI  HLD, on rosuvastatin   Microcytic anemia, ferritin 141, iron saturation 27%, folate 15.3, B12 394, Hgb stable  Mood disorder, on olanzapine, and lamotrigine  Hx adrenal adenoma, random cortisol level 6.88 11/21/22  Hx SDH  Nutrition, regular diet, 1 liter fluid restriction, consider dietary consult/enteral nutrition/goals of care     Plan:      Continue fluid restriction: 1 L  Urology consult  Better glucose control   Continue to monitor blood pressure  Discharge planning        Electronically signed by Marci Arevalo MD on 11/23/2022 at 10:03 AM

## 2022-11-23 NOTE — CARE COORDINATION
11/23/2022  Social Work Discharge Planning:SW set up transport via [facility] for Pt to return to [facility]. Nurse here, LIDIA villanueva and son Joni Greene  were notified. Will need a COVID test. Electronically signed by INDY Orozco on 11/23/2022 at 11:48 AM

## 2022-11-27 ENCOUNTER — HOSPITAL ENCOUNTER (INPATIENT)
Age: 67
LOS: 3 days | Discharge: NURSING, REHAB OR LONG-TERM CARE | DRG: 641 | End: 2022-12-01
Attending: STUDENT IN AN ORGANIZED HEALTH CARE EDUCATION/TRAINING PROGRAM | Admitting: FAMILY MEDICINE
Payer: MEDICARE

## 2022-11-27 ENCOUNTER — TELEPHONE (OUTPATIENT)
Dept: OTHER | Facility: CLINIC | Age: 67
End: 2022-11-27

## 2022-11-27 ENCOUNTER — APPOINTMENT (OUTPATIENT)
Dept: GENERAL RADIOLOGY | Age: 67
DRG: 641 | End: 2022-11-27
Payer: MEDICARE

## 2022-11-27 DIAGNOSIS — E87.1 HYPONATREMIA: Primary | ICD-10-CM

## 2022-11-27 DIAGNOSIS — R73.9 HYPERGLYCEMIA: ICD-10-CM
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
## 2022-11-27 LAB
ALBUMIN SERPL-MCNC: 3.3 G/DL (ref 3.5–5.2)
ALP BLD-CCNC: 67 U/L (ref 40–129)
ALT SERPL-CCNC: 12 U/L (ref 0–40)
ANION GAP SERPL CALCULATED.3IONS-SCNC: 16 MMOL/L (ref 7–16)
AST SERPL-CCNC: 13 U/L (ref 0–39)
BASOPHILS ABSOLUTE: 0.02 E9/L (ref 0–0.2)
BASOPHILS RELATIVE PERCENT: 0.2 % (ref 0–2)
BETA-HYDROXYBUTYRATE: 2.39 MMOL/L (ref 0.02–0.27)
BILIRUB SERPL-MCNC: 0.7 MG/DL (ref 0–1.2)
BILIRUBIN DIRECT: <0.2 MG/DL (ref 0–0.3)
BILIRUBIN, INDIRECT: ABNORMAL MG/DL (ref 0–1)
BUN BLDV-MCNC: 36 MG/DL (ref 6–23)
CALCIUM SERPL-MCNC: 9.3 MG/DL (ref 8.6–10.2)
CHLORIDE BLD-SCNC: 89 MMOL/L (ref 98–107)
CHP ED QC CHECK: NORMAL
CO2: 21 MMOL/L (ref 22–29)
CREAT SERPL-MCNC: 1.2 MG/DL (ref 0.7–1.2)
EOSINOPHILS ABSOLUTE: 0.01 E9/L (ref 0.05–0.5)
EOSINOPHILS RELATIVE PERCENT: 0.1 % (ref 0–6)
GFR SERPL CREATININE-BSD FRML MDRD: >60 ML/MIN/1.73
GLUCOSE BLD-MCNC: 350 MG/DL
GLUCOSE BLD-MCNC: 355 MG/DL (ref 74–99)
HCT VFR BLD CALC: 27.9 % (ref 37–54)
HEMOGLOBIN: 10 G/DL (ref 12.5–16.5)
IMMATURE GRANULOCYTES #: 0.07 E9/L
IMMATURE GRANULOCYTES %: 0.6 % (ref 0–5)
LACTIC ACID: 2.9 MMOL/L (ref 0.5–2.2)
LIPASE: 57 U/L (ref 13–60)
LYMPHOCYTES ABSOLUTE: 0.88 E9/L (ref 1.5–4)
LYMPHOCYTES RELATIVE PERCENT: 7.1 % (ref 20–42)
MCH RBC QN AUTO: 26.7 PG (ref 26–35)
MCHC RBC AUTO-ENTMCNC: 35.8 % (ref 32–34.5)
MCV RBC AUTO: 74.4 FL (ref 80–99.9)
METER GLUCOSE: 350 MG/DL (ref 74–99)
MONOCYTES ABSOLUTE: 0.95 E9/L (ref 0.1–0.95)
MONOCYTES RELATIVE PERCENT: 7.6 % (ref 2–12)
NEUTROPHILS ABSOLUTE: 10.55 E9/L (ref 1.8–7.3)
NEUTROPHILS RELATIVE PERCENT: 84.4 % (ref 43–80)
PDW BLD-RTO: 14 FL (ref 11.5–15)
PH VENOUS: 7.44 (ref 7.35–7.45)
PLATELET # BLD: 240 E9/L (ref 130–450)
PMV BLD AUTO: 10.9 FL (ref 7–12)
POTASSIUM REFLEX MAGNESIUM: 4.4 MMOL/L (ref 3.5–5)
RBC # BLD: 3.75 E12/L (ref 3.8–5.8)
SODIUM BLD-SCNC: 126 MMOL/L (ref 132–146)
TOTAL PROTEIN: 5.8 G/DL (ref 6.4–8.3)
TROPONIN, HIGH SENSITIVITY: 43 NG/L (ref 0–11)
WBC # BLD: 12.5 E9/L (ref 4.5–11.5)

## 2022-11-27 PROCEDURE — 84484 ASSAY OF TROPONIN QUANT: CPT

## 2022-11-27 PROCEDURE — 80048 BASIC METABOLIC PNL TOTAL CA: CPT

## 2022-11-27 PROCEDURE — 85025 COMPLETE CBC W/AUTO DIFF WBC: CPT

## 2022-11-27 PROCEDURE — 83690 ASSAY OF LIPASE: CPT

## 2022-11-27 PROCEDURE — 96360 HYDRATION IV INFUSION INIT: CPT

## 2022-11-27 PROCEDURE — 82800 BLOOD PH: CPT

## 2022-11-27 PROCEDURE — 82962 GLUCOSE BLOOD TEST: CPT

## 2022-11-27 PROCEDURE — 71045 X-RAY EXAM CHEST 1 VIEW: CPT

## 2022-11-27 PROCEDURE — 80076 HEPATIC FUNCTION PANEL: CPT

## 2022-11-27 PROCEDURE — 82010 KETONE BODYS QUAN: CPT

## 2022-11-27 PROCEDURE — 99285 EMERGENCY DEPT VISIT HI MDM: CPT

## 2022-11-27 PROCEDURE — 83605 ASSAY OF LACTIC ACID: CPT

## 2022-11-27 RX ORDER — 0.9 % SODIUM CHLORIDE 0.9 %
1000 INTRAVENOUS SOLUTION INTRAVENOUS ONCE
Status: DISCONTINUED | OUTPATIENT
Start: 2022-11-27 | End: 2022-11-27

## 2022-11-27 ASSESSMENT — PAIN DESCRIPTION - DESCRIPTORS: DESCRIPTORS: ACHING

## 2022-11-27 ASSESSMENT — PAIN - FUNCTIONAL ASSESSMENT: PAIN_FUNCTIONAL_ASSESSMENT: 0-10

## 2022-11-27 ASSESSMENT — PAIN DESCRIPTION - LOCATION: LOCATION: BACK;ABDOMEN

## 2022-11-27 ASSESSMENT — PAIN SCALES - GENERAL: PAINLEVEL_OUTOF10: 7

## 2022-11-27 ASSESSMENT — PAIN DESCRIPTION - ORIENTATION: ORIENTATION: RIGHT;LEFT

## 2022-11-27 NOTE — LETTER
[address] Medicaid  CERTIFICATION OF NECESSITY  FOR TRANSPORTATION   BY WHEELCHAIR VAN     Individual Information   1. Name: Kitty Peterson 2. [place] Medicaid Billing Number:    3. Address: [place] [address]      Transportation Provider Information   4. Provider Name:    5. [place] Medicaid Provider Number:  National Provider Identifier (NPI):      Certification  7. Criteria:  By signing this document, the practitioner certifies that two statements are true:  A. This individual must be accompanied by a mobility-related assistive device from the point of pick-up to the point of drop-off. B. Transport of this individual by standard passenger vehicle or common carrier is precluded or contraindicated. 8. Period Beginning Date: 12/1/22   9. Length  [x] Not more than 1 day(s)  [] One Year     Additional Information Relevant to Certification   10. Comments or Explanations, If Necessary or Appropriate     Hyponatremia, weakness     Certifying Practitioner Information   11. Name of Practitioner: Lashae Neal MD   12. [place] Medicaid Provider Number, If Applicable:  [address] Provider Identifier (NPI):      Signature Information   14. Date of Signature: 12/1/22 15. Name of Person Signing: Merle Burnett Ala   89. Signature and Professional Designation: Electronically signed by INDY Burnett on 12/1/2022 at 8:05 AM  Discharge planner     [facility] [id number]  Rev. 7/2015  [address] [place] [address] Encounter Date/Time: 11/27/2022 2153    Hospital Account: [de-identified]    MRN: [id number]    Patient: Kitty Peterson    Contact Serial #: [id number]      ENCOUNTER          Patient Class: I Private Enc? No Unit RM BD: SEYZ 4S PICU 4501/4501-B   Hospital Service:  INM   Encounter DX: Hyponatremia [E87.1]   ADM Provider: Julisa Rico MD   Procedure:     ATT Provider: John Gibson MD   REF Provider:        Admission DX: Hyponatremia, Hyperglycemia, Fall, initial encounter and DX codes: E87.1, R73.9, N97.XNDZ      PATIENT                 Name: Vicki Walden : 1955 (74 yrs)   Address: [address] Sex: Male   [facility] [place] [address]         Marital Status:    Employer: DISABLED         Adventist: Hinduism   Primary Care Provider: Rodrick Alanis MD         Primary Phone: [phone]   EMERGENCY CONTACT   Contact Name Legal Guardian? Relationship to Patient Home Phone Work Phone   1. Slime Armstrong  2. SantanaCas    No Child  Child [phone]         GUARANTOR            Guarantor: Vicki Walden     : 1955   Address: [address] [place] [address] Sex: Male   [place] [address]     Relation to Patient: Self       Home Phone: [phone]   Guarantor ID: [id number]       Work Phone:     Guarantor Employer: DISABLED         Status: DISABLED      COVERAGE  PRIMARY INSURANCE   Payor: MEDICARE Plan: MEDICARE PART A AND B   Payor Address:  [address] [address],  [facility] [facility] [address]       Group Number:   Insurance Type: INDEMNITY   Subscriber Name: Senia Crowell : 1955   Subscriber ID: [id number] Doe Sifuentes. Rel. to Sub: Self   SECONDARY INSURANCE   Payor: [place]ICA[place] Plan: [facility] DEPT OF*   Payor Address:   [address], [place], [address]          Group Number:   Insurance Type: INDEMNITY   Subscriber Name: Senia Crowell : 1955   Subscriber ID: [id number] Pat.  Rel. to Sub: SELF         CSN: [id number]

## 2022-11-28 ENCOUNTER — APPOINTMENT (OUTPATIENT)
Dept: CT IMAGING | Age: 67
DRG: 641 | End: 2022-11-28
Payer: MEDICARE

## 2022-11-28 LAB
ANION GAP SERPL CALCULATED.3IONS-SCNC: 11 MMOL/L (ref 7–16)
ANION GAP SERPL CALCULATED.3IONS-SCNC: 13 MMOL/L (ref 7–16)
BACTERIA: ABNORMAL /HPF
BILIRUBIN URINE: NEGATIVE
BLOOD, URINE: NEGATIVE
BUN BLDV-MCNC: 27 MG/DL (ref 6–23)
BUN BLDV-MCNC: 30 MG/DL (ref 6–23)
CALCIUM SERPL-MCNC: 9.1 MG/DL (ref 8.6–10.2)
CALCIUM SERPL-MCNC: 9.2 MG/DL (ref 8.6–10.2)
CHLORIDE BLD-SCNC: 97 MMOL/L (ref 98–107)
CHLORIDE BLD-SCNC: 98 MMOL/L (ref 98–107)
CLARITY: CLEAR
CO2: 23 MMOL/L (ref 22–29)
CO2: 25 MMOL/L (ref 22–29)
COLOR: YELLOW
CREAT SERPL-MCNC: 1.1 MG/DL (ref 0.7–1.2)
CREAT SERPL-MCNC: 1.1 MG/DL (ref 0.7–1.2)
GFR SERPL CREATININE-BSD FRML MDRD: >60 ML/MIN/1.73
GFR SERPL CREATININE-BSD FRML MDRD: >60 ML/MIN/1.73
GLUCOSE BLD-MCNC: 206 MG/DL (ref 74–99)
GLUCOSE BLD-MCNC: 243 MG/DL (ref 74–99)
GLUCOSE URINE: 500 MG/DL
HBA1C MFR BLD: 8.3 % (ref 4–5.6)
KETONES, URINE: 15 MG/DL
LACTIC ACID: 1.8 MMOL/L (ref 0.5–2.2)
LEUKOCYTE ESTERASE, URINE: NEGATIVE
METER GLUCOSE: 226 MG/DL (ref 74–99)
METER GLUCOSE: 256 MG/DL (ref 74–99)
METER GLUCOSE: 267 MG/DL (ref 74–99)
NITRITE, URINE: NEGATIVE
OSMOLALITY URINE: 645 MOSM/KG (ref 300–900)
PH UA: 6 (ref 5–9)
POTASSIUM SERPL-SCNC: 4.1 MMOL/L (ref 3.5–5)
POTASSIUM SERPL-SCNC: 4.7 MMOL/L (ref 3.5–5)
PROTEIN UA: ABNORMAL MG/DL
RBC UA: ABNORMAL /HPF (ref 0–2)
SODIUM BLD-SCNC: 133 MMOL/L (ref 132–146)
SODIUM BLD-SCNC: 133 MMOL/L (ref 132–146)
SODIUM BLD-SCNC: 134 MMOL/L (ref 132–146)
SODIUM BLD-SCNC: 134 MMOL/L (ref 132–146)
SPECIFIC GRAVITY UA: 1.01 (ref 1–1.03)
TROPONIN, HIGH SENSITIVITY: 37 NG/L (ref 0–11)
TROPONIN, HIGH SENSITIVITY: 41 NG/L (ref 0–11)
UROBILINOGEN, URINE: 1 E.U./DL
WBC UA: ABNORMAL /HPF (ref 0–5)

## 2022-11-28 PROCEDURE — 84295 ASSAY OF SERUM SODIUM: CPT

## 2022-11-28 PROCEDURE — 74177 CT ABD & PELVIS W/CONTRAST: CPT

## 2022-11-28 PROCEDURE — 2580000003 HC RX 258: Performed by: FAMILY MEDICINE

## 2022-11-28 PROCEDURE — C9113 INJ PANTOPRAZOLE SODIUM, VIA: HCPCS | Performed by: FAMILY MEDICINE

## 2022-11-28 PROCEDURE — 84484 ASSAY OF TROPONIN QUANT: CPT

## 2022-11-28 PROCEDURE — 81001 URINALYSIS AUTO W/SCOPE: CPT

## 2022-11-28 PROCEDURE — 6370000000 HC RX 637 (ALT 250 FOR IP): Performed by: FAMILY MEDICINE

## 2022-11-28 PROCEDURE — 2580000003 HC RX 258: Performed by: STUDENT IN AN ORGANIZED HEALTH CARE EDUCATION/TRAINING PROGRAM

## 2022-11-28 PROCEDURE — 83036 HEMOGLOBIN GLYCOSYLATED A1C: CPT

## 2022-11-28 PROCEDURE — 36415 COLL VENOUS BLD VENIPUNCTURE: CPT

## 2022-11-28 PROCEDURE — 6360000004 HC RX CONTRAST MEDICATION: Performed by: RADIOLOGY

## 2022-11-28 PROCEDURE — 6360000002 HC RX W HCPCS: Performed by: FAMILY MEDICINE

## 2022-11-28 PROCEDURE — 80048 BASIC METABOLIC PNL TOTAL CA: CPT

## 2022-11-28 PROCEDURE — 83605 ASSAY OF LACTIC ACID: CPT

## 2022-11-28 PROCEDURE — 82962 GLUCOSE BLOOD TEST: CPT

## 2022-11-28 PROCEDURE — A4216 STERILE WATER/SALINE, 10 ML: HCPCS | Performed by: FAMILY MEDICINE

## 2022-11-28 PROCEDURE — 84133 ASSAY OF URINE POTASSIUM: CPT

## 2022-11-28 PROCEDURE — 2060000000 HC ICU INTERMEDIATE R&B

## 2022-11-28 PROCEDURE — 6360000002 HC RX W HCPCS: Performed by: EMERGENCY MEDICINE

## 2022-11-28 PROCEDURE — 84300 ASSAY OF URINE SODIUM: CPT

## 2022-11-28 PROCEDURE — 83935 ASSAY OF URINE OSMOLALITY: CPT

## 2022-11-28 PROCEDURE — S5553 INSULIN LONG ACTING 5 U: HCPCS | Performed by: FAMILY MEDICINE

## 2022-11-28 PROCEDURE — 82436 ASSAY OF URINE CHLORIDE: CPT

## 2022-11-28 PROCEDURE — 2500000003 HC RX 250 WO HCPCS: Performed by: EMERGENCY MEDICINE

## 2022-11-28 PROCEDURE — 70450 CT HEAD/BRAIN W/O DYE: CPT

## 2022-11-28 PROCEDURE — 72125 CT NECK SPINE W/O DYE: CPT

## 2022-11-28 PROCEDURE — A4216 STERILE WATER/SALINE, 10 ML: HCPCS | Performed by: EMERGENCY MEDICINE

## 2022-11-28 PROCEDURE — 2580000003 HC RX 258: Performed by: EMERGENCY MEDICINE

## 2022-11-28 PROCEDURE — 87040 BLOOD CULTURE FOR BACTERIA: CPT

## 2022-11-28 RX ORDER — SODIUM CHLORIDE 0.9 % (FLUSH) 0.9 %
5-40 SYRINGE (ML) INJECTION PRN
Status: DISCONTINUED | OUTPATIENT
Start: 2022-11-28 | End: 2022-12-01 | Stop reason: HOSPADM

## 2022-11-28 RX ORDER — SODIUM CHLORIDE 0.9 % (FLUSH) 0.9 %
5-40 SYRINGE (ML) INJECTION EVERY 12 HOURS SCHEDULED
Status: DISCONTINUED | OUTPATIENT
Start: 2022-11-28 | End: 2022-12-01 | Stop reason: HOSPADM

## 2022-11-28 RX ORDER — INSULIN LISPRO 100 [IU]/ML
0-16 INJECTION, SOLUTION INTRAVENOUS; SUBCUTANEOUS
Status: DISCONTINUED | OUTPATIENT
Start: 2022-11-28 | End: 2022-12-01 | Stop reason: HOSPADM

## 2022-11-28 RX ORDER — ROSUVASTATIN CALCIUM 20 MG/1
20 TABLET, COATED ORAL DAILY
Status: DISCONTINUED | OUTPATIENT
Start: 2022-11-28 | End: 2022-12-01 | Stop reason: HOSPADM

## 2022-11-28 RX ORDER — 0.9 % SODIUM CHLORIDE 0.9 %
1000 INTRAVENOUS SOLUTION INTRAVENOUS ONCE
Status: COMPLETED | OUTPATIENT
Start: 2022-11-28 | End: 2022-11-28

## 2022-11-28 RX ORDER — ACETAMINOPHEN 325 MG/1
650 TABLET ORAL EVERY 6 HOURS PRN
Status: DISCONTINUED | OUTPATIENT
Start: 2022-11-28 | End: 2022-12-01 | Stop reason: HOSPADM

## 2022-11-28 RX ORDER — DEXTROSE MONOHYDRATE 100 MG/ML
INJECTION, SOLUTION INTRAVENOUS CONTINUOUS PRN
Status: DISCONTINUED | OUTPATIENT
Start: 2022-11-28 | End: 2022-12-01 | Stop reason: HOSPADM

## 2022-11-28 RX ORDER — INSULIN GLARGINE-YFGN 100 [IU]/ML
4 INJECTION, SOLUTION SUBCUTANEOUS NIGHTLY
Status: DISCONTINUED | OUTPATIENT
Start: 2022-11-28 | End: 2022-12-01 | Stop reason: HOSPADM

## 2022-11-28 RX ORDER — POLYETHYLENE GLYCOL 3350 17 G/17G
17 POWDER, FOR SOLUTION ORAL DAILY PRN
Status: DISCONTINUED | OUTPATIENT
Start: 2022-11-28 | End: 2022-12-01 | Stop reason: HOSPADM

## 2022-11-28 RX ORDER — OLANZAPINE 5 MG/1
7.5 TABLET ORAL NIGHTLY
Status: DISCONTINUED | OUTPATIENT
Start: 2022-11-28 | End: 2022-12-01 | Stop reason: HOSPADM

## 2022-11-28 RX ORDER — SODIUM CHLORIDE 9 MG/ML
INJECTION, SOLUTION INTRAVENOUS PRN
Status: DISCONTINUED | OUTPATIENT
Start: 2022-11-28 | End: 2022-12-01 | Stop reason: HOSPADM

## 2022-11-28 RX ORDER — ENOXAPARIN SODIUM 100 MG/ML
40 INJECTION SUBCUTANEOUS DAILY
Status: DISCONTINUED | OUTPATIENT
Start: 2022-11-28 | End: 2022-12-01 | Stop reason: HOSPADM

## 2022-11-28 RX ORDER — ASPIRIN 81 MG/1
81 TABLET ORAL 2 TIMES DAILY
Status: DISCONTINUED | OUTPATIENT
Start: 2022-11-28 | End: 2022-11-28

## 2022-11-28 RX ORDER — ASPIRIN 81 MG/1
81 TABLET ORAL DAILY
Status: DISCONTINUED | OUTPATIENT
Start: 2022-11-28 | End: 2022-12-01 | Stop reason: HOSPADM

## 2022-11-28 RX ORDER — ROSUVASTATIN CALCIUM 10 MG/1
10 TABLET, COATED ORAL DAILY
COMMUNITY

## 2022-11-28 RX ORDER — POTASSIUM CHLORIDE 20 MEQ/1
40 TABLET, EXTENDED RELEASE ORAL PRN
Status: DISCONTINUED | OUTPATIENT
Start: 2022-11-28 | End: 2022-12-01 | Stop reason: HOSPADM

## 2022-11-28 RX ORDER — SODIUM CHLORIDE 9 MG/ML
INJECTION, SOLUTION INTRAVENOUS CONTINUOUS
Status: DISCONTINUED | OUTPATIENT
Start: 2022-11-28 | End: 2022-11-29

## 2022-11-28 RX ORDER — TAMSULOSIN HYDROCHLORIDE 0.4 MG/1
0.4 CAPSULE ORAL DAILY
Status: DISCONTINUED | OUTPATIENT
Start: 2022-11-28 | End: 2022-12-01 | Stop reason: HOSPADM

## 2022-11-28 RX ORDER — INSULIN LISPRO 100 [IU]/ML
0-4 INJECTION, SOLUTION INTRAVENOUS; SUBCUTANEOUS NIGHTLY
Status: DISCONTINUED | OUTPATIENT
Start: 2022-11-28 | End: 2022-12-01 | Stop reason: HOSPADM

## 2022-11-28 RX ORDER — KETOROLAC TROMETHAMINE 30 MG/ML
15 INJECTION, SOLUTION INTRAMUSCULAR; INTRAVENOUS ONCE
Status: COMPLETED | OUTPATIENT
Start: 2022-11-28 | End: 2022-11-28

## 2022-11-28 RX ORDER — OLANZAPINE 7.5 MG/1
7.5 TABLET ORAL NIGHTLY
COMMUNITY

## 2022-11-28 RX ORDER — ASPIRIN 81 MG/1
81 TABLET ORAL DAILY
COMMUNITY

## 2022-11-28 RX ORDER — ONDANSETRON 2 MG/ML
4 INJECTION INTRAMUSCULAR; INTRAVENOUS EVERY 6 HOURS PRN
Status: DISCONTINUED | OUTPATIENT
Start: 2022-11-28 | End: 2022-12-01 | Stop reason: HOSPADM

## 2022-11-28 RX ORDER — ACETAMINOPHEN 650 MG/1
650 SUPPOSITORY RECTAL EVERY 6 HOURS PRN
Status: DISCONTINUED | OUTPATIENT
Start: 2022-11-28 | End: 2022-12-01 | Stop reason: HOSPADM

## 2022-11-28 RX ORDER — POTASSIUM CHLORIDE 7.45 MG/ML
10 INJECTION INTRAVENOUS PRN
Status: DISCONTINUED | OUTPATIENT
Start: 2022-11-28 | End: 2022-12-01 | Stop reason: HOSPADM

## 2022-11-28 RX ORDER — SODIUM PHOSPHATE, DIBASIC AND SODIUM PHOSPHATE, MONOBASIC 7; 19 G/133ML; G/133ML
1 ENEMA RECTAL
Status: ACTIVE | OUTPATIENT
Start: 2022-11-28 | End: 2022-11-29

## 2022-11-28 RX ORDER — ONDANSETRON 4 MG/1
4 TABLET, ORALLY DISINTEGRATING ORAL EVERY 8 HOURS PRN
Status: DISCONTINUED | OUTPATIENT
Start: 2022-11-28 | End: 2022-12-01 | Stop reason: HOSPADM

## 2022-11-28 RX ADMIN — KETOROLAC TROMETHAMINE 15 MG: 30 INJECTION, SOLUTION INTRAMUSCULAR; INTRAVENOUS at 03:47

## 2022-11-28 RX ADMIN — PANTOPRAZOLE SODIUM 40 MG: 40 INJECTION, POWDER, FOR SOLUTION INTRAVENOUS at 12:58

## 2022-11-28 RX ADMIN — IOPAMIDOL 90 ML: 755 INJECTION, SOLUTION INTRAVENOUS at 00:53

## 2022-11-28 RX ADMIN — SODIUM CHLORIDE 1000 ML: 9 INJECTION, SOLUTION INTRAVENOUS at 01:01

## 2022-11-28 RX ADMIN — TAMSULOSIN HYDROCHLORIDE 0.4 MG: 0.4 CAPSULE ORAL at 12:59

## 2022-11-28 RX ADMIN — INSULIN LISPRO 8 UNITS: 100 INJECTION, SOLUTION INTRAVENOUS; SUBCUTANEOUS at 16:25

## 2022-11-28 RX ADMIN — ASPIRIN 81 MG: 81 TABLET, COATED ORAL at 12:59

## 2022-11-28 RX ADMIN — SODIUM CHLORIDE: 9 INJECTION, SOLUTION INTRAVENOUS at 13:49

## 2022-11-28 RX ADMIN — INSULIN GLARGINE-YFGN 4 UNITS: 100 INJECTION, SOLUTION SUBCUTANEOUS at 20:19

## 2022-11-28 RX ADMIN — SODIUM CHLORIDE, PRESERVATIVE FREE 10 ML: 5 INJECTION INTRAVENOUS at 13:51

## 2022-11-28 RX ADMIN — FAMOTIDINE 20 MG: 10 INJECTION, SOLUTION INTRAVENOUS at 03:48

## 2022-11-28 RX ADMIN — OLANZAPINE 7.5 MG: 5 TABLET, FILM COATED ORAL at 20:20

## 2022-11-28 RX ADMIN — INSULIN LISPRO 8 UNITS: 100 INJECTION, SOLUTION INTRAVENOUS; SUBCUTANEOUS at 12:56

## 2022-11-28 RX ADMIN — ROSUVASTATIN 20 MG: 20 TABLET, FILM COATED ORAL at 12:58

## 2022-11-28 RX ADMIN — ENOXAPARIN SODIUM 40 MG: 100 INJECTION SUBCUTANEOUS at 12:58

## 2022-11-28 ASSESSMENT — PAIN DESCRIPTION - PAIN TYPE: TYPE: ACUTE PAIN

## 2022-11-28 ASSESSMENT — PAIN DESCRIPTION - ORIENTATION: ORIENTATION: MID

## 2022-11-28 ASSESSMENT — LIFESTYLE VARIABLES
HOW MANY STANDARD DRINKS CONTAINING ALCOHOL DO YOU HAVE ON A TYPICAL DAY: PATIENT DOES NOT DRINK
HOW OFTEN DO YOU HAVE A DRINK CONTAINING ALCOHOL: NEVER

## 2022-11-28 ASSESSMENT — PAIN DESCRIPTION - FREQUENCY: FREQUENCY: CONTINUOUS

## 2022-11-28 ASSESSMENT — PAIN DESCRIPTION - LOCATION: LOCATION: ABDOMEN

## 2022-11-28 ASSESSMENT — PAIN DESCRIPTION - ONSET: ONSET: ON-GOING

## 2022-11-28 ASSESSMENT — PAIN - FUNCTIONAL ASSESSMENT: PAIN_FUNCTIONAL_ASSESSMENT: ACTIVITIES ARE NOT PREVENTED

## 2022-11-28 ASSESSMENT — PAIN DESCRIPTION - DESCRIPTORS: DESCRIPTORS: ACHING;BURNING;CRAMPING

## 2022-11-28 ASSESSMENT — PAIN SCALES - GENERAL
PAINLEVEL_OUTOF10: 5
PAINLEVEL_OUTOF10: 0

## 2022-11-28 NOTE — CONSULTS
Nightly  OLANZapine (ZYPREXA) tablet 7.5 mg, 7.5 mg, Oral, Nightly  rosuvastatin (CRESTOR) tablet 20 mg, 20 mg, Oral, Daily  tamsulosin (FLOMAX) capsule 0.4 mg, 0.4 mg, Oral, Daily  glucose chewable tablet 16 g, 4 tablet, Oral, PRN  dextrose bolus 10% 125 mL, 125 mL, IntraVENous, PRN **OR** dextrose bolus 10% 250 mL, 250 mL, IntraVENous, PRN  glucagon (rDNA) injection 1 mg, 1 mg, SubCUTAneous, PRN  dextrose 10 % infusion, , IntraVENous, Continuous PRN  0.9 % sodium chloride infusion, , IntraVENous, Continuous  sodium chloride flush 0.9 % injection 5-40 mL, 5-40 mL, IntraVENous, 2 times per day  sodium chloride flush 0.9 % injection 5-40 mL, 5-40 mL, IntraVENous, PRN  0.9 % sodium chloride infusion, , IntraVENous, PRN  potassium chloride (KLOR-CON M) extended release tablet 40 mEq, 40 mEq, Oral, PRN **OR** potassium bicarb-citric acid (EFFER-K) effervescent tablet 40 mEq, 40 mEq, Oral, PRN **OR** potassium chloride 10 mEq/100 mL IVPB (Peripheral Line), 10 mEq, IntraVENous, PRN  enoxaparin (LOVENOX) injection 40 mg, 40 mg, SubCUTAneous, Daily  ondansetron (ZOFRAN-ODT) disintegrating tablet 4 mg, 4 mg, Oral, Q8H PRN **OR** ondansetron (ZOFRAN) injection 4 mg, 4 mg, IntraVENous, Q6H PRN  polyethylene glycol (GLYCOLAX) packet 17 g, 17 g, Oral, Daily PRN  acetaminophen (TYLENOL) tablet 650 mg, 650 mg, Oral, Q6H PRN **OR** acetaminophen (TYLENOL) suppository 650 mg, 650 mg, Rectal, Q6H PRN  pantoprazole (PROTONIX) 40 mg in sodium chloride (PF) 0.9 % 10 mL injection, 40 mg, IntraVENous, Daily  insulin lispro (HUMALOG) injection vial 0-16 Units, 0-16 Units, SubCUTAneous, TID WC  insulin lispro (HUMALOG) injection vial 0-4 Units, 0-4 Units, SubCUTAneous, Nightly  aspirin EC tablet 81 mg, 81 mg, Oral, Daily  Allergies:  Empagliflozin and Liraglutide    Social History:    TOBACCO:   reports that he has never smoked. He has never used smokeless tobacco.  ETOH:   reports no history of alcohol use.     Family History:   No family history on file.   REVIEW OF SYSTEMS:    Review of systems not obtained due to patient factors - mental status  PHYSICAL EXAM:      Vitals:    VITALS:  BP (!) 142/83   Pulse 94   Temp 98 °F (36.7 °C) (Oral)   Resp 16   Ht 5' 8\" (1.727 m)   Wt 170 lb (77.1 kg)   SpO2 98%   BMI 25.85 kg/m²   24HR BLOOD PRESSURE RANGE:  Systolic (86QFW), RTC:230 , Min:116 , BB  ; Diastolic (05FDE), NTL:85, Min:69, Max:83   24HR INTAKE/OUTPUT:  No intake or output data in the 24 hours ending 22 1152    Constitutional:  Alert and oriented with intermittent confusion, ill-appearing  HEENT:  Normocephalic, PERRL, dry mucous membranes  Respiratory:  CTA bilaterally  Cardiovascular/Edema:  RRR, S1,S2  Gastrointestinal:  Soft, rounded, nontender, nondistended  Neurologic:  Nonfocal, RICHMOND  Skin:  Warm, dry, intact  Other:  No edema     DATA:    CBC with Differential:    Lab Results   Component Value Date/Time    WBC 12.5 2022 10:49 PM    RBC 3.75 2022 10:49 PM    HGB 10.0 2022 10:49 PM    HCT 27.9 2022 10:49 PM     2022 10:49 PM    MCV 74.4 2022 10:49 PM    MCH 26.7 2022 10:49 PM    MCHC 35.8 2022 10:49 PM    RDW 14.0 2022 10:49 PM    LYMPHOPCT 7.1 2022 10:49 PM    MONOPCT 7.6 2022 10:49 PM    BASOPCT 0.2 2022 10:49 PM    MONOSABS 0.95 2022 10:49 PM    LYMPHSABS 0.88 2022 10:49 PM    EOSABS 0.01 2022 10:49 PM    BASOSABS 0.02 2022 10:49 PM     CMP:    Lab Results   Component Value Date/Time     2022 10:49 PM    K 4.4 2022 10:49 PM    CL 89 2022 10:49 PM    CO2 21 2022 10:49 PM    BUN 36 2022 10:49 PM    CREATININE 1.2 2022 10:49 PM    GFRAA >60 2020 05:22 AM    LABGLOM >60 2022 10:49 PM    GLUCOSE 350 2022 11:02 PM    PROT 5.8 2022 10:49 PM    LABALBU 3.3 2022 10:49 PM    CALCIUM 9.3 2022 10:49 PM    BILITOT 0.7 2022 10:49 PM    ALKPHOS 67 11/27/2022 10:49 PM    AST 13 11/27/2022 10:49 PM    ALT 12 11/27/2022 10:49 PM     Magnesium:    Lab Results   Component Value Date/Time    MG 1.8 11/22/2022 04:00 PM     Phosphorus:    Lab Results   Component Value Date/Time    PHOS 3.0 11/22/2022 04:00 PM     Radiology Review:      CT ABD/Pelvis 11/28/22   1. Moderate retrocardiac hiatal hernia with thickening of the distal   esophagus compatible with esophagitis. 2. Large rectal stool impaction. 3. Incidentally noted 4.9 cm right adrenal myelolipoma. RECOMMENDATIONS:   Careful clinical correlation and follow up recommended. BRIEF SUMMARY OF INITIAL CONSULT:     Briefly, Mr. Delmi Walsh is a 74year old male with a PMH of CKD stage II (baseline creatinine 0.9-1.0 mg/dL) with mild proteinuria probably 2/2 diabetic nephropathy, IDDM with diabetic retinopathy, HTN, HLD, subdural hematoma (2018), TIA, dementia, anxiety and depression, recently admitted from 11/11/22 to 11/17/22 for failure to thrive, and again from 11/21/22 to 11/23/22 for hyponatremia, who presented to the ED on November 27, 2022 after a fall. His lab work was significant for sodium 126 mmol/L, reason for this consultation. Home medications include metformin, olanzapine, and lamotrigine. He reports that after his last admission he was discharged to [facility] and he has not been eating for the last 4-5 days. IMPRESSION/RECOMMENDATIONS:       Hypotonic hyponatremia likely 2/2 poor solute intake (tea and toast phenomenon),  Mild component of translocation due to hyperglycemia. CKD stage II, with mild proteinuria, UACR: 119.5 mg/g, 2/2 diabetic kidney disease.   Vitamin D deficiency, vitamin D 25 level 16, on ergocalciferol  Hx of HTN, generally controlled without medication  Urinary retention, Urology follows outpatient  ---------------------------------------------------------------  Type II DM with diabetic retinopathy, on SSI  HLD, on rosuvastatin   Microcytic anemia, ferritin 141, iron saturation 27%, folate 15.3, B12 394, Hgb stable  Mood disorder, on olanzapine, and lamotrigine  Hx adrenal adenoma, random cortisol level 6.88 11/21/22  Hx SDH  Nutrition, regular diet, 1 liter fluid restriction, consider dietary consult/enteral nutrition/goals of care      Plan:        Continue fluid restriction: 1 L  Obtain urine electrolytes  Continue NS at 75 cc.hr  Better glucose control   Continue to monitor blood pressure  Strict I&O  Continue to monitor sodium level, Q 6 hours  BMP every 6 hours      Thank you so much Dr. Lele Ryder MD for allowing us to participate in the care of Mr. Anahi Kirk

## 2022-11-28 NOTE — ED PROVIDER NOTES
HPI:  11/27/22, Time: 9:55 PM STEFF Potts is a 74 y.o. male presenting to the ED for Fall, beginning several hrs ago. The complaint has been constant, moderate in severity, and worsened by nothing. 70-year-old male history of dementia presents from his nursing facility complaint of a fall. Patient states he fell multiple times today. He states he is not supposed to be walking. His fall was unwitnessed. He does not appear to be on any blood thinning medications according to his packet that came with him. He states he did hit his head. And otherwise is having some epigastric abdominal pain that is coming and going for the past several months. Denies any other acute complaints at this time. Review of Systems:   Pertinent positives and negatives are stated within HPI, all other systems reviewed and are negative.          --------------------------------------------- PAST HISTORY ---------------------------------------------  Past Medical History:  has a past medical history of Anxiety, Bronchitis, Cellulitis, Chronic sinusitis, Depression, Diabetes mellitus ([address] Diabetic retinopathy ([facility] [address] Fracture of left foot, High cholesterol, Hypercholesteremia, Hypercholesterolemia, Hypertension, Lumbago, Moderate mood disorder ([address] and Third nerve palsy. Past Surgical History:  has a past surgical history that includes eye surgery; hip surgery (Right, 11/19/2020); and hip surgery (Left, 12/10/2020). Social History:  reports that he has never smoked. He has never used smokeless tobacco. He reports that he does not drink alcohol and does not use drugs. Family History: family history is not on file. The patients home medications have been reviewed.     Allergies: Empagliflozin and Liraglutide      ---------------------------------------------------PHYSICAL EXAM--------------------------------------    Constitutional/General: Alert and oriented x3, well appearing, non toxic in NAD  Head: Normocephalic and atraumatic  Eyes: PERRL, EOMI  Mouth: Oropharynx clear, handling secretions, no trismus  Neck: Supple, full ROM, non tender to palpation in the midline, no stridor, no crepitus, no meningeal signs  Pulmonary: Lungs clear to auscultation bilaterally, no wheezes, rales, or rhonchi. Not in respiratory distress  Cardiovascular:  Regular rate. Regular rhythm. No murmurs, gallops, or rubs. 2+ distal pulses  Chest: no chest wall tenderness  Abdomen: Soft. Epigastric tenderness to palpation non distended. +BS. No rebound, guarding, or rigidity. No pulsatile masses appreciated. Musculoskeletal: Moves all extremities x 4. Warm and well perfused, no clubbing, cyanosis, or edema. Capillary refill <3 seconds   Skin: warm and dry. No rashes. Neurologic:  CN 2-12 grossly intact, no focal deficits, symmetric strength 5/5 in the upper and lower extremities bilaterally   Psych: Normal Affect    -------------------------------------------------- RESULTS -------------------------------------------------  I have personally reviewed all laboratory and imaging results for this patient. Results are listed below.      LABS:  Results for orders placed or performed during the hospital encounter of 11/27/22   CBC with Auto Differential   Result Value Ref Range    WBC 12.5 (H) 4.5 - 11.5 E9/L    RBC 3.75 (L) 3.80 - 5.80 E12/L    Hemoglobin 10.0 (L) 12.5 - 16.5 g/dL    Hematocrit 27.9 (L) 37.0 - 54.0 %    MCV 74.4 (L) 80.0 - 99.9 fL    MCH 26.7 26.0 - 35.0 pg    MCHC 35.8 (H) 32.0 - 34.5 %    RDW 14.0 11.5 - 15.0 fL    Platelets 209 335 - 216 E9/L    MPV 10.9 7.0 - 12.0 fL    Neutrophils % 84.4 (H) 43.0 - 80.0 %    Immature Granulocytes % 0.6 0.0 - 5.0 %    Lymphocytes % 7.1 (L) 20.0 - 42.0 %    Monocytes % 7.6 2.0 - 12.0 %    Eosinophils % 0.1 0.0 - 6.0 %    Basophils % 0.2 0.0 - 2.0 %    Neutrophils Absolute 10.55 (H) 1.80 - 7.30 E9/L    Immature Granulocytes # 0.07 E9/L    Lymphocytes Absolute 0.88 (L) 1.50 - 4.00 E9/L    Monocytes Absolute 0.95 0.10 - 0.95 E9/L    Eosinophils Absolute 0.01 (L) 0.05 - 0.50 E9/L    Basophils Absolute 0.02 0.00 - 0.20 X2/K   Basic Metabolic Panel w/ Reflex to MG   Result Value Ref Range    Sodium 126 (L) 132 - 146 mmol/L    Potassium reflex Magnesium 4.4 3.5 - 5.0 mmol/L    Chloride 89 (L) 98 - 107 mmol/L    CO2 21 (L) 22 - 29 mmol/L    Anion Gap 16 7 - 16 mmol/L    Glucose 355 (H) 74 - 99 mg/dL    BUN 36 (H) 6 - 23 mg/dL    Creatinine 1.2 0.7 - 1.2 mg/dL    Est, Glom Filt Rate >60 >=60 mL/min/1.73    Calcium 9.3 8.6 - 10.2 mg/dL   Troponin   Result Value Ref Range    Troponin, High Sensitivity 43 (H) 0 - 11 ng/L   Hepatic Function Panel   Result Value Ref Range    Total Protein 5.8 (L) 6.4 - 8.3 g/dL    Albumin 3.3 (L) 3.5 - 5.2 g/dL    Alkaline Phosphatase 67 40 - 129 U/L    ALT 12 0 - 40 U/L    AST 13 0 - 39 U/L    Total Bilirubin 0.7 0.0 - 1.2 mg/dL    Bilirubin, Direct <0.2 0.0 - 0.3 mg/dL    Bilirubin, Indirect see below 0.0 - 1.0 mg/dL   Lipase   Result Value Ref Range    Lipase 57 13 - 60 U/L   Lactic Acid   Result Value Ref Range    Lactic Acid 2.9 (H) 0.5 - 2.2 mmol/L   Beta-Hydroxybutyrate   Result Value Ref Range    Beta-Hydroxybutyrate 2.39 (H) 0.02 - 0.27 mmol/L   PH, VENOUS   Result Value Ref Range    pH, Facundo 7.44 7.35 - 7.45   Troponin   Result Value Ref Range    Troponin, High Sensitivity 41 (H) 0 - 11 ng/L   Lactic Acid   Result Value Ref Range    Lactic Acid 1.8 0.5 - 2.2 mmol/L   POCT Glucose   Result Value Ref Range    Glucose 350 mg/dL    QC OK? y    POCT Glucose   Result Value Ref Range    Meter Glucose 350 (H) 74 - 99 mg/dL       RADIOLOGY:  Interpreted by Radiologist.  CT CSpine W/O Contrast   Final Result   No acute abnormality of the cervical spine. RECOMMENDATIONS:   Careful clinical correlation and follow up recommended. CT Head W/O Contrast   Final Result   No acute intracranial abnormality.       RECOMMENDATIONS:   Careful clinical correlation and follow up recommended. CT ABDOMEN PELVIS W IV CONTRAST Additional Contrast? None   Final Result   1. Moderate retrocardiac hiatal hernia with thickening of the distal   esophagus compatible with esophagitis. 2. Large rectal stool impaction. 3. Incidentally noted 4.9 cm right adrenal myelolipoma. RECOMMENDATIONS:   Careful clinical correlation and follow up recommended. XR CHEST PORTABLE   Final Result   No acute process. ------------------------- NURSING NOTES AND VITALS REVIEWED ---------------------------   The nursing notes within the ED encounter and vital signs as below have been reviewed by myself. /69   Pulse (!) 104   Temp 98 °F (36.7 °C) (Oral)   Resp 20   Ht 5' 8\" (1.727 m)   Wt 170 lb (77.1 kg)   BMI 25.85 kg/m²   Oxygen Saturation Interpretation: Normal    The patients available past medical records and past encounters were reviewed. ------------------------------ ED COURSE/MEDICAL DECISION MAKING----------------------  Medications   sodium phosphate (FLEET) rectal enema 1 enema (has no administration in time range)   famotidine (PEPCID) 20 mg in sodium chloride (PF) 0.9 % 10 mL injection (has no administration in time range)   ketorolac (TORADOL) injection 15 mg (has no administration in time range)   0.9 % sodium chloride bolus (0 mLs IntraVENous Stopped 11/28/22 0145)   iopamidol (ISOVUE-370) 76 % injection 90 mL (90 mLs IntraVENous Given 11/28/22 0053)       NEXUS Criteria for C-Spine Imaging  Focal Neurologic Deficit Present? no   Midline Spinal Tenderness Present? no   Altered Level of Consciousness Present?  dementia   Intoxication Present? no   Distracting Injury Present? no     The cardiac monitor revealed sinus as interpreted by me.  The cardiac monitor was ordered secondary to the patients fall and to monitor the patient for dysrhythmia    ED Course as of 11/28/22 0325 Mon Nov 28, 2022 0057 Attempted to call patient's son who did not  at this time. [CB]      ED Course User Index  [CB] Kim Mccoy MD       Medical Decision Making:     Differential diagnosis includes but not limited to concussion, cervical fracture, subdural/epidural hematoma, hip fracture     72-year-old male present with complaint of a fall does have a history of dementia. Patient also complain of epigastric tenderness. CT scan of his head and cervical spine unremarkable for any acute pathology chest x-ray negative for any signs of trauma. Patient is noted to have stool retention on his CAT scan of his head and pelvis in his rectum. Patient was given a fleets enema for this. Patient laboratory work-up significant for hyponatremia which he had on previous admission however for sodium did return to normal range. However due to his sodium downtrending since then as well as hyperglycemia and his fall he will be admitted to the hospital.  Spoke with admitting physician who is agreeable this plan. Laboratory and imaging results interpreted by me   Medications given here in the emergency department were pepcid and toradol NS bolus    Re-Evaluations:             Re-evaluation. Patients symptoms show no change      Consultations:             none    Critical Care: none        This patient's ED course included: a personal history and physicial examination, re-evaluation prior to disposition, multiple bedside re-evaluations, IV medications, cardiac monitoring, continuous pulse oximetry, and a personal history and physicial eaxmination    This patient has remained hemodynamically stable during their ED course. Counseling: The emergency provider has spoken with the patient and discussed todays results, in addition to providing specific details for the plan of care and counseling regarding the diagnosis and prognosis.   Questions are answered at this time and they are agreeable with the plan.       --------------------------------- IMPRESSION AND DISPOSITION ---------------------------------    IMPRESSION  1. Hyponatremia    2. Hyperglycemia    3.  Fall, initial encounter        DISPOSITION  Disposition: Admit to telemetry  Patient condition is stable            Gm Zaragoza MD  Resident  11/28/22 5947

## 2022-11-28 NOTE — CARE COORDINATION
Social Work/ Transition of Care:    Pt presents to the ED secondary to a fall at [address]. Pt is admitted inpatient with hyponatremia. SOO met with pt who was awake and alert, nodding his head \"yes\" or \"no\" to questions asked but unsure if pt is confused. SOO spoke to pt's son, Anoop King, over the phone, who reports plan will be for pt to return to [facility] upon discharge. Per Yoan Cowan at [facility], pt was Lalo", a bed hold, and able to return upon discharge. SOO/SAKSHI to follow.

## 2022-11-28 NOTE — H&P
Hospital Medicine History & Physical      PCP: Batool Hammond MD    Date of Admission: 11/27/2022    Date of Service: Pt seen/examined on  11/28/2022 and Admitted to Inpatient with expected LOS greater than two midnights due to medical therapy. Chief Complaint:  Falls       History Of Present Illness:    74 y.o. male with  past medical history of Anxiety, Bronchitis, Cellulitis, Chronic sinusitis, Depression, Diabetes mellitus ([address] Diabetic retinopathy ([address] Fracture of left foot, High cholesterol, Hypercholesteremia, Hypercholesterolemia, Hypertension, Lumbago, Moderate mood disorder ([address] and Third nerve palsy. Patient presents resides in a nursing facility . Patient sustained a fall . Patient states that he has fallen multiple times a day . Patient states that he is weak. He reports no chest pain fever chills nausea vomiting diarrhea . Patient was admitted 11/21 -11/23 due to hyponatremia . In the ED patient was hemodynamically stable. Lab data revael sodium 126 potassium 4.4 BUN 36 SCr 1.2 lactic acid 2.9 >> 1.8 glucose 355 WBC 12.5 HGB 10.0  Trop 43 >> 41 COVID neg CT head neg CT cervical spine neg beta hydroxy 2.39 CT abd /pelvis fecal impaction  esophagitis . Patient received fluid bolus .  Patient will be admitted for further evaluation        Past Medical History:          Diagnosis Date    Anxiety     Bronchitis     Cellulitis     Chronic sinusitis     Depression     Diabetes mellitus ([address]     Diabetic retinopathy ([address]     Fracture of left foot     High cholesterol     Hypercholesteremia     Hypercholesterolemia     Hypertension     Lumbago     Moderate mood disorder ([address]     Third nerve palsy        Past Surgical History:          Procedure Laterality Date    EYE SURGERY      HIP SURGERY Right 11/19/2020    HIP HEMIARTHROPLASTY performed by Danette Wilkes MD at [facility] Left 12/10/2020    HIP HEMIARTHROPLASTY -- HERB -- DROPLET +     PT. COMING FROM MARGOT performed by Donalynn Bence, MD at [facility] OR       Medications Prior to Admission:      Prior to Admission medications    Medication Sig Start Date End Date Taking? Authorizing Provider   tamsulosin (FLOMAX) 0.4 MG capsule Take 1 capsule by mouth daily 11/23/22 12/23/22  MIRA Glass CNP   insulin glargine (LANTUS) 100 UNIT/ML injection vial Inject 4 Units into the skin nightly 11/17/22   Amanda Bryson MD   rosuvastatin (CRESTOR) 10 MG tablet Take 2 tablets by mouth daily 11/17/22   Amanda Bryson MD   metFORMIN (GLUCOPHAGE) 500 MG tablet Take 1 tablet by mouth 2 times daily (with meals) 11/17/22 12/17/22  Amanda Bryson MD   insulin lispro (HUMALOG) 100 UNIT/ML SOLN injection vial Inject 0-4 Units into the skin 3 times daily (with meals) 11/17/22 12/17/22  Amanda Bryson MD   glucose 4 g chewable tablet Take 4 g by mouth as needed for Low blood sugar  7/28/20   Historical Provider, MD   aspirin EC 81 MG EC tablet Take 1 tablet by mouth 2 times daily for 28 days 11/19/20 1/11/21  Christopher Girt, DO   OLANZapine (ZYPREXA) 7.5 MG tablet Take 1 tablet by mouth nightly 10/13/20 93/56/87  MIRA Vargas CNP       Allergies:  Empagliflozin and Liraglutide    Social History:      The patient currently lives  in a facility     TOBACCO:   reports that he has never smoked. He has never used smokeless tobacco.  ETOH:   reports no history of alcohol use. Family History:    Reviewed in detail and negative for DM, CAD, Cancer, CVA. Positive as follows:    No family history on file. REVIEW OF SYSTEMS:   Pertinent positives as noted in the HPI. All other systems reviewed and negative. PHYSICAL EXAM:    /69   Pulse (!) 104   Temp 98 °F (36.7 °C) (Oral)   Resp 20   Ht 5' 8\" (1.727 m)   Wt 170 lb (77.1 kg)   BMI 25.85 kg/m²     General appearance:  No apparent distress, appears stated age and cooperative.   HEENT:  Normal cephalic, atraumatic without obvious deformity. Pupils equal, round, and reactive to light. Extra ocular muscles intact. Conjunctivae/corneas clear. Neck: Supple, with full range of motion. No jugular venous distention. Trachea midline. Respiratory:  Normal respiratory effort. Clear to auscultation, bilaterally without Rales/Wheezes/Rhonchi. Cardiovascular:  Regular rate and rhythm with normal S1/S2 without murmurs, rubs or gallops. Abdomen: Soft, non-tender, non-distended with normal bowel sounds. Musculoskeletal:  No clubbing, cyanosis or edema bilaterally. Full range of motion without deformity. Skin: Skin color, texture, turgor normal.  No rashes or lesions. Neurologic:  Neurovascularly intact without any focal sensory/motor deficits. Cranial nerves: II-XII intact, grossly non-focal.  Psychiatric:  Alert and oriented, thought content appropriate, normal insight    CXR:  I have reviewed the CXR   EKG:  I have reviewed the EKG     Labs:     Recent Labs     11/27/22 2249   WBC 12.5*   HGB 10.0*   HCT 27.9*        Recent Labs     11/27/22 2249   *   K 4.4   CL 89*   CO2 21*   BUN 36*   CREATININE 1.2   CALCIUM 9.3     Recent Labs     11/27/22 2249   AST 13   ALT 12   BILIDIR <0.2   BILITOT 0.7   ALKPHOS 67     No results for input(s): INR in the last 72 hours. No results for input(s): Tamia Smack in the last 72 hours. Urinalysis:      Lab Results   Component Value Date/Time    NITRU Negative 12/09/2020 04:47 AM    WBCUA 0-1 12/09/2020 04:47 AM    BACTERIA FEW 12/09/2020 04:47 AM    RBCUA 1-3 12/09/2020 04:47 AM    BLOODU TRACE-INTACT 12/09/2020 04:47 AM    SPECGRAV >=1.030 12/09/2020 04:47 AM    GLUCOSEU Negative 12/09/2020 04:47 AM     No acute intracranial abnormality    No acute abnormality of the cervical spine. . Moderate retrocardiac hiatal hernia with thickening of the distal   esophagus compatible with esophagitis. 2. Large rectal stool impaction.    3. Incidentally noted 4.9 cm right adrenal myelolipoma. RECOMMENDATIONS:       ASSESSMENT:    Active Hospital Problems    Diagnosis Date Noted    Hyponatremia [E87.1] 09/29/2018       PLAN:    Hyponatremia :   sodium 126 on admission , likely due to hyperglycemia . Patient was admitted  from 11/21-23 for the same issue   Admit inpatient vitals telemetry trend  sodium q6H urine studies nephrology consult IVF     IDDM with hyperglycemia : glucose was 355 on admission beta  hydroxy 2.39 lactic acid was 2.8 but correct with ivf . Patient is not in DKA Cw  IVF  POCT ACHS sliding scale       Falls :likely due hyponatremia and deconditioning    COVID NEG PT/OT  fall precautions CT head neg     Elevated Trop : 43 >> 41 at baseline . Patient reports no chest pain . EKG no acute changes     Stool impaction : as seen on CT A/P on admission .  Patient received enema in ED     Abdominal pain ; CT A/P revealed esophagitis IV Protonix    Lactic acidosis : 2.8 >> 1.8 with IVF     Microcytic Anemia : chronic      Myeolipoma : as seen on CT 4.9 cm     Dementia           DVT Prophylaxis: Lovenox   Diet: No diet orders on file  Code Status: Prior    PT/OT Eval Status: ordered     Karsten Mendes MD

## 2022-11-28 NOTE — TELEPHONE ENCOUNTER
Writer contacted Dr. Alyson Arambula to inform of 30 day readmission risk. Dr. Alyson Arambula informed writer there is no decision on disposition at this time.       Call Back: If you need to call back to inform of disposition you can contact me at [phone]

## 2022-11-28 NOTE — ED NOTES
Attempting to locate FLEET enema. Pharmacy reports central carries it and central reports pharmacy carries it.       Evelia Keller RN  11/28/22 4858

## 2022-11-29 ENCOUNTER — APPOINTMENT (OUTPATIENT)
Dept: GENERAL RADIOLOGY | Age: 67
DRG: 641 | End: 2022-11-29
Payer: MEDICARE

## 2022-11-29 LAB
ANION GAP SERPL CALCULATED.3IONS-SCNC: 7 MMOL/L (ref 7–16)
ANION GAP SERPL CALCULATED.3IONS-SCNC: 9 MMOL/L (ref 7–16)
BASOPHILS ABSOLUTE: 0.04 E9/L (ref 0–0.2)
BASOPHILS RELATIVE PERCENT: 0.6 % (ref 0–2)
BETA-HYDROXYBUTYRATE: 0.4 MMOL/L (ref 0.02–0.27)
BUN BLDV-MCNC: 20 MG/DL (ref 6–23)
BUN BLDV-MCNC: 22 MG/DL (ref 6–23)
CALCIUM SERPL-MCNC: 8.6 MG/DL (ref 8.6–10.2)
CALCIUM SERPL-MCNC: 8.8 MG/DL (ref 8.6–10.2)
CHLORIDE BLD-SCNC: 100 MMOL/L (ref 98–107)
CHLORIDE BLD-SCNC: 101 MMOL/L (ref 98–107)
CHLORIDE URINE RANDOM: <20 MMOL/L
CO2: 25 MMOL/L (ref 22–29)
CO2: 25 MMOL/L (ref 22–29)
CREAT SERPL-MCNC: 1 MG/DL (ref 0.7–1.2)
CREAT SERPL-MCNC: 1 MG/DL (ref 0.7–1.2)
EOSINOPHILS ABSOLUTE: 0.13 E9/L (ref 0.05–0.5)
EOSINOPHILS RELATIVE PERCENT: 1.8 % (ref 0–6)
GFR SERPL CREATININE-BSD FRML MDRD: >60 ML/MIN/1.73
GFR SERPL CREATININE-BSD FRML MDRD: >60 ML/MIN/1.73
GLUCOSE BLD-MCNC: 156 MG/DL (ref 74–99)
GLUCOSE BLD-MCNC: 166 MG/DL (ref 74–99)
HCT VFR BLD CALC: 23.1 % (ref 37–54)
HEMOGLOBIN: 8.2 G/DL (ref 12.5–16.5)
IMMATURE GRANULOCYTES #: 0.02 E9/L
IMMATURE GRANULOCYTES %: 0.3 % (ref 0–5)
LYMPHOCYTES ABSOLUTE: 2.3 E9/L (ref 1.5–4)
LYMPHOCYTES RELATIVE PERCENT: 31.9 % (ref 20–42)
MCH RBC QN AUTO: 26.8 PG (ref 26–35)
MCHC RBC AUTO-ENTMCNC: 35.5 % (ref 32–34.5)
MCV RBC AUTO: 75.5 FL (ref 80–99.9)
METER GLUCOSE: 170 MG/DL (ref 74–99)
METER GLUCOSE: 234 MG/DL (ref 74–99)
METER GLUCOSE: 237 MG/DL (ref 74–99)
METER GLUCOSE: 260 MG/DL (ref 74–99)
MONOCYTES ABSOLUTE: 0.59 E9/L (ref 0.1–0.95)
MONOCYTES RELATIVE PERCENT: 8.2 % (ref 2–12)
NEUTROPHILS ABSOLUTE: 4.12 E9/L (ref 1.8–7.3)
NEUTROPHILS RELATIVE PERCENT: 57.2 % (ref 43–80)
PDW BLD-RTO: 14.2 FL (ref 11.5–15)
PLATELET # BLD: 212 E9/L (ref 130–450)
PMV BLD AUTO: 10.8 FL (ref 7–12)
POTASSIUM REFLEX MAGNESIUM: 4 MMOL/L (ref 3.5–5)
POTASSIUM SERPL-SCNC: 4 MMOL/L (ref 3.5–5)
POTASSIUM, UR: 37.9 MMOL/L
RBC # BLD: 3.06 E12/L (ref 3.8–5.8)
SODIUM BLD-SCNC: 131 MMOL/L (ref 132–146)
SODIUM BLD-SCNC: 132 MMOL/L (ref 132–146)
SODIUM BLD-SCNC: 133 MMOL/L (ref 132–146)
SODIUM BLD-SCNC: 133 MMOL/L (ref 132–146)
SODIUM BLD-SCNC: 134 MMOL/L (ref 132–146)
SODIUM BLD-SCNC: 134 MMOL/L (ref 132–146)
SODIUM URINE: 26 MMOL/L
WBC # BLD: 7.2 E9/L (ref 4.5–11.5)

## 2022-11-29 PROCEDURE — 82962 GLUCOSE BLOOD TEST: CPT

## 2022-11-29 PROCEDURE — 85025 COMPLETE CBC W/AUTO DIFF WBC: CPT

## 2022-11-29 PROCEDURE — S5553 INSULIN LONG ACTING 5 U: HCPCS | Performed by: FAMILY MEDICINE

## 2022-11-29 PROCEDURE — 6360000002 HC RX W HCPCS: Performed by: FAMILY MEDICINE

## 2022-11-29 PROCEDURE — 36415 COLL VENOUS BLD VENIPUNCTURE: CPT

## 2022-11-29 PROCEDURE — 80048 BASIC METABOLIC PNL TOTAL CA: CPT

## 2022-11-29 PROCEDURE — 2060000000 HC ICU INTERMEDIATE R&B

## 2022-11-29 PROCEDURE — 74018 RADEX ABDOMEN 1 VIEW: CPT | Performed by: RADIOLOGY

## 2022-11-29 PROCEDURE — 74018 RADEX ABDOMEN 1 VIEW: CPT

## 2022-11-29 PROCEDURE — 82010 KETONE BODYS QUAN: CPT

## 2022-11-29 PROCEDURE — C9113 INJ PANTOPRAZOLE SODIUM, VIA: HCPCS | Performed by: FAMILY MEDICINE

## 2022-11-29 PROCEDURE — 6370000000 HC RX 637 (ALT 250 FOR IP): Performed by: INTERNAL MEDICINE

## 2022-11-29 PROCEDURE — 6370000000 HC RX 637 (ALT 250 FOR IP): Performed by: FAMILY MEDICINE

## 2022-11-29 PROCEDURE — A4216 STERILE WATER/SALINE, 10 ML: HCPCS | Performed by: FAMILY MEDICINE

## 2022-11-29 PROCEDURE — 2580000003 HC RX 258: Performed by: FAMILY MEDICINE

## 2022-11-29 PROCEDURE — 84295 ASSAY OF SERUM SODIUM: CPT

## 2022-11-29 RX ORDER — POLYETHYLENE GLYCOL 3350 17 G/17G
17 POWDER, FOR SOLUTION ORAL DAILY
Status: DISCONTINUED | OUTPATIENT
Start: 2022-11-29 | End: 2022-12-01 | Stop reason: HOSPADM

## 2022-11-29 RX ADMIN — PANTOPRAZOLE SODIUM 40 MG: 40 INJECTION, POWDER, FOR SOLUTION INTRAVENOUS at 08:15

## 2022-11-29 RX ADMIN — INSULIN LISPRO 8 UNITS: 100 INJECTION, SOLUTION INTRAVENOUS; SUBCUTANEOUS at 16:17

## 2022-11-29 RX ADMIN — SODIUM CHLORIDE, PRESERVATIVE FREE 10 ML: 5 INJECTION INTRAVENOUS at 20:24

## 2022-11-29 RX ADMIN — OLANZAPINE 7.5 MG: 5 TABLET, FILM COATED ORAL at 20:26

## 2022-11-29 RX ADMIN — ENOXAPARIN SODIUM 40 MG: 100 INJECTION SUBCUTANEOUS at 08:15

## 2022-11-29 RX ADMIN — SODIUM CHLORIDE: 9 INJECTION, SOLUTION INTRAVENOUS at 06:10

## 2022-11-29 RX ADMIN — INSULIN GLARGINE-YFGN 4 UNITS: 100 INJECTION, SOLUTION SUBCUTANEOUS at 20:24

## 2022-11-29 RX ADMIN — INSULIN LISPRO 4 UNITS: 100 INJECTION, SOLUTION INTRAVENOUS; SUBCUTANEOUS at 11:30

## 2022-11-29 RX ADMIN — ROSUVASTATIN 20 MG: 20 TABLET, FILM COATED ORAL at 08:14

## 2022-11-29 RX ADMIN — POLYETHYLENE GLYCOL 3350 17 G: 17 POWDER, FOR SOLUTION ORAL at 20:25

## 2022-11-29 RX ADMIN — ASPIRIN 81 MG: 81 TABLET, COATED ORAL at 08:14

## 2022-11-29 RX ADMIN — TAMSULOSIN HYDROCHLORIDE 0.4 MG: 0.4 CAPSULE ORAL at 08:14

## 2022-11-29 ASSESSMENT — PAIN SCALES - GENERAL
PAINLEVEL_OUTOF10: 0
PAINLEVEL_OUTOF10: 0

## 2022-11-29 NOTE — PROGRESS NOTES
Department of Internal Medicine  Nephrology Consult Note    Events reviewed. SUBJECTIVE: We are following Mr. Lex Sim for hyponatremia and CKD. Reports no complaints.     PHYSICAL EXAM:      Vitals:    VITALS:  BP (!) 140/78   Pulse 86   Temp 98.6 °F (37 °C) (Oral)   Resp 16   Ht 5' 8\" (1.727 m)   Wt 158 lb 11.7 oz (72 kg)   SpO2 97%   BMI 24.14 kg/m²   24HR BLOOD PRESSURE RANGE:  Systolic (86AIE), KEF:607 , Min:126 , XEZ:746 ; Diastolic (95MUP), TQZ:05, Min:78, Max:85  24HR INTAKE/OUTPUT:    Intake/Output Summary (Last 24 hours) at 11/29/2022 1040  Last data filed at 11/29/2022 0815  Gross per 24 hour   Intake 1466.69 ml   Output 400 ml   Net 1066.69 ml       Constitutional:  Alert and oriented with intermittent confusion, ill-appearing  HEENT:  Normocephalic, PERRL, dry mucous membranes  Respiratory:  CTA bilaterally  Cardiovascular/Edema:  RRR, S1,S2  Gastrointestinal:  Soft, rounded, nontender, nondistended  Neurologic:  Nonfocal, R[place]  Skin:  Warm, dry, intact  Other:  No edema     Scheduled Meds:   insulin glargine-yfgn  4 Units SubCUTAneous Nightly    OLANZapine  7.5 mg Oral Nightly    rosuvastatin  20 mg Oral Daily    tamsulosin  0.4 mg Oral Daily    sodium chloride flush  5-40 mL IntraVENous 2 times per day    enoxaparin  40 mg SubCUTAneous Daily    pantoprazole (PROTONIX) 40 mg injection  40 mg IntraVENous Daily    insulin lispro  0-16 Units SubCUTAneous TID WC    insulin lispro  0-4 Units SubCUTAneous Nightly    aspirin EC  81 mg Oral Daily     Continuous Infusions:   dextrose      sodium chloride       PRN Meds:.glucose, dextrose bolus **OR** dextrose bolus, glucagon (rDNA), dextrose, sodium chloride flush, sodium chloride, potassium chloride **OR** potassium alternative oral replacement **OR** potassium chloride, ondansetron **OR** ondansetron, polyethylene glycol, acetaminophen **OR** acetaminophen      DATA:    CBC with Differential:    Lab Results   Component Value Date/Time    WBC 7.2 11/29/2022 04:49 AM    RBC 3.06 11/29/2022 04:49 AM    HGB 8.2 11/29/2022 04:49 AM    HCT 23.1 11/29/2022 04:49 AM     11/29/2022 04:49 AM    MCV 75.5 11/29/2022 04:49 AM    MCH 26.8 11/29/2022 04:49 AM    MCHC 35.5 11/29/2022 04:49 AM    RDW 14.2 11/29/2022 04:49 AM    LYMPHOPCT 31.9 11/29/2022 04:49 AM    MONOPCT 8.2 11/29/2022 04:49 AM    BASOPCT 0.6 11/29/2022 04:49 AM    MONOSABS 0.59 11/29/2022 04:49 AM    LYMPHSABS 2.30 11/29/2022 04:49 AM    EOSABS 0.13 11/29/2022 04:49 AM    BASOSABS 0.04 11/29/2022 04:49 AM     CMP:    Lab Results   Component Value Date/Time     11/29/2022 07:24 AM    K 4.0 11/29/2022 04:49 AM     11/29/2022 04:49 AM    CO2 25 11/29/2022 04:49 AM    BUN 20 11/29/2022 04:49 AM    CREATININE 1.0 11/29/2022 04:49 AM    GFRAA >60 12/13/2020 05:22 AM    LABGLOM >60 11/29/2022 04:49 AM    GLUCOSE 156 11/29/2022 04:49 AM    PROT 5.8 11/27/2022 10:49 PM    LABALBU 3.3 11/27/2022 10:49 PM    CALCIUM 8.6 11/29/2022 04:49 AM    BILITOT 0.7 11/27/2022 10:49 PM    ALKPHOS 67 11/27/2022 10:49 PM    AST 13 11/27/2022 10:49 PM    ALT 12 11/27/2022 10:49 PM     Magnesium:    Lab Results   Component Value Date/Time    MG 1.8 11/22/2022 04:00 PM     Phosphorus:    Lab Results   Component Value Date/Time    PHOS 3.0 11/22/2022 04:00 PM     Radiology Review:      CT ABD/Pelvis 11/28/22   1. Moderate retrocardiac hiatal hernia with thickening of the distal   esophagus compatible with esophagitis. 2. Large rectal stool impaction. 3. Incidentally noted 4.9 cm right adrenal myelolipoma. RECOMMENDATIONS:   Careful clinical correlation and follow up recommended.           BRIEF SUMMARY OF INITIAL CONSULT:     Briefly, Mr. Kaylynn Wills is a 74year old male with a PMH of CKD stage II (baseline creatinine 0.9-1.0 mg/dL) with mild proteinuria probably 2/2 diabetic nephropathy, IDDM with diabetic retinopathy, HTN, HLD, subdural hematoma (2018), TIA, dementia, anxiety and depression, recently admitted from 11/11/22 to 11/17/22 for failure to thrive, and again from 11/21/22 to 11/23/22 for hyponatremia, who presented to the ED on November 27, 2022 after a fall. His lab work was significant for sodium 126 mmol/L, reason for this consultation. Home medications include metformin, olanzapine, and lamotrigine. He reports that after his last admission he was discharged to [facility] and he has not been eating for the last 4-5 days. IMPRESSION/RECOMMENDATIONS:       Hypotonic hyponatremia likely 2/2 poor solute intake (tea and toast phenomenon), with mild component of translocation due to hyperglycemia. Urine sodium 26, urine chloride <20. Resolved. CKD stage II, with mild proteinuria, UACR: 119.5 mg/g, 2/2 diabetic kidney disease. Renal function is stable  Vitamin D deficiency, vitamin D 25 level 16, on ergocalciferol  Hx of HTN, generally controlled without medication  Urinary retention, Urology follows outpatient  ---------------------------------------------------------------  Type II DM with diabetic retinopathy, on SSI  HLD, on rosuvastatin   Microcytic anemia, ferritin 141, iron saturation 27%, folate 15.3, B12 394  Mood disorder, on olanzapine, and lamotrigine  Hx adrenal adenoma, random cortisol level 6.88 11/21/22  Hx SDH  Nutrition, regular diet, 1 liter fluid restriction, consider dietary consult/enteral nutrition/goals of care      Plan:     Continue fluid restriction: 1 L  Discontinue IV fluids  Continue to monitor blood pressure  Discharge planning.

## 2022-11-29 NOTE — PROGRESS NOTES
Hospitalist Progress Note      Synopsis: Patient admitted on 11/27/2022 74 y.o. male with  past medical history of Anxiety, Bronchitis, Cellulitis, Chronic sinusitis, Depression, Diabetes mellitus ([address] Diabetic retinopathy ([address] Fracture of left foot, High cholesterol, Hypercholesteremia, Hypercholesterolemia, Hypertension, Lumbago, Moderate mood disorder ([address] and Third nerve palsy. Patient presents resides in a nursing facility . Patient sustained a fall . Patient states that he has fallen multiple times a day . Patient states that he is weak. He reports no chest pain fever chills nausea vomiting diarrhea . Patient was admitted 11/21 -11/23 due to hyponatremia . In the ED patient was hemodynamically stable. Lab data revael sodium 126 potassium 4.4 BUN 36 SCr 1.2 lactic acid 2.9 >> 1.8 glucose 355 WBC 12.5 HGB 10.0  Trop 43 >> 41 COVID neg CT head neg CT cervical spine neg beta hydroxy 2.39 CT abd /pelvis fecal impaction  esophagitis . Patient received fluid bolus . Patient will be admitted for further evaluation        Subjective    Feeling better wants to go home  No CP or SOB  No fever or chills   No uncontrolled pain  No vomiting or diarrhea   No events reported overnight     Exam:  BP (!) 140/78   Pulse 86   Temp 98.6 °F (37 °C) (Oral)   Resp 16   Ht 5' 8\" (1.727 m)   Wt 158 lb 11.7 oz (72 kg)   SpO2 97%   BMI 24.14 kg/m²   General appearance: No apparent distress, appears stated age and cooperative. HEENT: Pupils equal, round, and reactive to light. Conjunctivae/corneas clear. Neck: Supple. No jugular venous distention. Trachea midline. Respiratory:  Normal respiratory effort. Clear to auscultation, bilaterally without Rales/Wheezes/Rhonchi. Cardiovascular: Regular rate and rhythm with normal S1/S2 without murmurs, rubs or gallops. Abdomen: Soft, non-tender, non-distended with normal bowel sounds. Musculoskeletal: No clubbing, cyanosis or edema bilaterally. Brisk capillary refill. 2+radial pulses. Skin:  No rashes    Neurologic: awake, alert and following commands    Medications:  Reviewed    Infusion Medications    dextrose      sodium chloride       Scheduled Medications    insulin glargine-yfgn  4 Units SubCUTAneous Nightly    OLANZapine  7.5 mg Oral Nightly    rosuvastatin  20 mg Oral Daily    tamsulosin  0.4 mg Oral Daily    sodium chloride flush  5-40 mL IntraVENous 2 times per day    enoxaparin  40 mg SubCUTAneous Daily    pantoprazole (PROTONIX) 40 mg injection  40 mg IntraVENous Daily    insulin lispro  0-16 Units SubCUTAneous TID WC    insulin lispro  0-4 Units SubCUTAneous Nightly    aspirin EC  81 mg Oral Daily     PRN Meds: glucose, dextrose bolus **OR** dextrose bolus, glucagon (rDNA), dextrose, sodium chloride flush, sodium chloride, potassium chloride **OR** potassium alternative oral replacement **OR** potassium chloride, ondansetron **OR** ondansetron, polyethylene glycol, acetaminophen **OR** acetaminophen    I/O    Intake/Output Summary (Last 24 hours) at 11/29/2022 1332  Last data filed at 11/29/2022 0815  Gross per 24 hour   Intake 1466.69 ml   Output 400 ml   Net 1066.69 ml       Labs:   Recent Labs     11/27/22 2249 11/29/22  0449   WBC 12.5* 7.2   HGB 10.0* 8.2*   HCT 27.9* 23.1*    212       Recent Labs     11/28/22  1632 11/28/22 2003 11/28/22  2304 11/29/22  0151 11/29/22  0449 11/29/22  0724      < > 134 132 133 134   K 4.1  --  4.0  --  4.0  --    CL 98  --  100  --  101  --    CO2 25  --  25  --  25  --    BUN 27*  --  22  --  20  --    CREATININE 1.1  --  1.0  --  1.0  --    CALCIUM 9.1  --  8.8  --  8.6  --     < > = values in this interval not displayed. Recent Labs     11/27/22 2249   PROT 5.8*   ALKPHOS 67   ALT 12   AST 13   BILITOT 0.7   LIPASE 57       No results for input(s): INR in the last 72 hours. No results for input(s): Jennifer Rands in the last 72 hours.     Chronic labs:  Lab Results   Component Value Date    CHOL 114 10/08/2020    TRIG 71 10/08/2020    HDL 41 10/08/2020    LDLCALC 59 10/08/2020    TSH 1.750 11/21/2022    PSA 0.86 09/28/2018    INR 1.5 11/11/2022    LABA1C 8.3 (H) 11/28/2022       Radiology:  Imaging studies reviewed today. ASSESSMENT:    Principal Problem:    Hyponatremia  Resolved Problems:    * No resolved hospital problems. *       Hyponatremia :   sodium 126 on admission , l hypotonic secondary to poor solute intake with component of pseudohyponatremia related to hyperglycemia . Patient was admitted  from 11/21-23 for the same issue   Admit inpatient vitals telemetry trend  sodium q6H urine studies nephrology appreciated improved with NS IVF      IDDM with hyperglycemia : glucose was 355 on admission beta  hydroxy 2.39 lactic acid was 2.8 but correct with ivf . Patient is not in DKA Cw  IVF  POCT ACHS sliding scale       Falls :likely due hyponatremia and deconditioning    COVID NEG PT/OT  fall precautions CT head neg      Elevated Trop : 43 >> 41 at baseline . Patient reports no chest pain . EKG no acute changes      Stool impaction : as seen on CT A/P on admission . Patient received enema in ED      Abdominal pain ; CT A/P revealed esophagitis IV Protonix     Lactic acidosis : 2.8 >> 1.8 with IVF      Microcytic Anemia : chronic       Myeolipoma : as seen on CT 4.9 cm      Dementia   stable-at risk for delirium in the hospital  Avoid circadian disruption  Avoid sedating anticholinergic medications  Monitor        Diet: ADULT DIET; Regular; 3 carb choices (45 gm/meal)  Code Status: Full Code  PT/OT Eval Status:     DVT Prophylaxis:     Recommended disposition at discharge: Anticipate discharge 24 to 48 hours    +++++++++++++++++++++++++++++++++++++++++++++++++  Yoseph Monae MD   [facility].  +++++++++++++++++++++++++++++++++++++++++++++++++  NOTE: This report was transcribed using voice recognition software.  Every effort was made to ensure accuracy; however, inadvertent computerized transcription errors may be present.

## 2022-11-29 NOTE — PROGRESS NOTES
Son, Alexia Mccallum, has discussed with the family and states [employer] is not working out due to patient constantly being sent back to the hospital. He does have a history of going [facility] [address] and would like to explore him going back there. Please give son a call at [phone].     Electronically signed by Noni Ren RN on 11/29/2022 at 6:45 PM

## 2022-11-29 NOTE — CARE COORDINATION
Patient for return to hospitals when released. Will need therapy prior to discharge. Ambulance on soft chart. For questions I can be reached at [phone].  Isac Garnett

## 2022-11-30 LAB
ANION GAP SERPL CALCULATED.3IONS-SCNC: 7 MMOL/L (ref 7–16)
BASOPHILS ABSOLUTE: 0.05 E9/L (ref 0–0.2)
BASOPHILS RELATIVE PERCENT: 0.8 % (ref 0–2)
BUN BLDV-MCNC: 14 MG/DL (ref 6–23)
CALCIUM SERPL-MCNC: 8.3 MG/DL (ref 8.6–10.2)
CHLORIDE BLD-SCNC: 98 MMOL/L (ref 98–107)
CO2: 26 MMOL/L (ref 22–29)
CREAT SERPL-MCNC: 1 MG/DL (ref 0.7–1.2)
EOSINOPHILS ABSOLUTE: 0.18 E9/L (ref 0.05–0.5)
EOSINOPHILS RELATIVE PERCENT: 2.9 % (ref 0–6)
GFR SERPL CREATININE-BSD FRML MDRD: >60 ML/MIN/1.73
GLUCOSE BLD-MCNC: 207 MG/DL (ref 74–99)
HCT VFR BLD CALC: 24 % (ref 37–54)
HEMOGLOBIN: 8.4 G/DL (ref 12.5–16.5)
IMMATURE GRANULOCYTES #: 0.01 E9/L
IMMATURE GRANULOCYTES %: 0.2 % (ref 0–5)
LYMPHOCYTES ABSOLUTE: 2.47 E9/L (ref 1.5–4)
LYMPHOCYTES RELATIVE PERCENT: 39.5 % (ref 20–42)
MCH RBC QN AUTO: 26.6 PG (ref 26–35)
MCHC RBC AUTO-ENTMCNC: 35 % (ref 32–34.5)
MCV RBC AUTO: 75.9 FL (ref 80–99.9)
METER GLUCOSE: 213 MG/DL (ref 74–99)
METER GLUCOSE: 264 MG/DL (ref 74–99)
METER GLUCOSE: 269 MG/DL (ref 74–99)
METER GLUCOSE: 288 MG/DL (ref 74–99)
MONOCYTES ABSOLUTE: 0.55 E9/L (ref 0.1–0.95)
MONOCYTES RELATIVE PERCENT: 8.8 % (ref 2–12)
NEUTROPHILS ABSOLUTE: 2.99 E9/L (ref 1.8–7.3)
NEUTROPHILS RELATIVE PERCENT: 47.8 % (ref 43–80)
PDW BLD-RTO: 14.3 FL (ref 11.5–15)
PLATELET # BLD: 212 E9/L (ref 130–450)
PMV BLD AUTO: 10.1 FL (ref 7–12)
POTASSIUM REFLEX MAGNESIUM: 4 MMOL/L (ref 3.5–5)
RBC # BLD: 3.16 E12/L (ref 3.8–5.8)
SODIUM BLD-SCNC: 131 MMOL/L (ref 132–146)
WBC # BLD: 6.3 E9/L (ref 4.5–11.5)

## 2022-11-30 PROCEDURE — A4216 STERILE WATER/SALINE, 10 ML: HCPCS | Performed by: FAMILY MEDICINE

## 2022-11-30 PROCEDURE — 97161 PT EVAL LOW COMPLEX 20 MIN: CPT

## 2022-11-30 PROCEDURE — 2060000000 HC ICU INTERMEDIATE R&B

## 2022-11-30 PROCEDURE — 6370000000 HC RX 637 (ALT 250 FOR IP): Performed by: FAMILY MEDICINE

## 2022-11-30 PROCEDURE — 6360000002 HC RX W HCPCS: Performed by: FAMILY MEDICINE

## 2022-11-30 PROCEDURE — 85025 COMPLETE CBC W/AUTO DIFF WBC: CPT

## 2022-11-30 PROCEDURE — 36415 COLL VENOUS BLD VENIPUNCTURE: CPT

## 2022-11-30 PROCEDURE — 2580000003 HC RX 258: Performed by: FAMILY MEDICINE

## 2022-11-30 PROCEDURE — 80048 BASIC METABOLIC PNL TOTAL CA: CPT

## 2022-11-30 PROCEDURE — 82962 GLUCOSE BLOOD TEST: CPT

## 2022-11-30 PROCEDURE — S5553 INSULIN LONG ACTING 5 U: HCPCS | Performed by: FAMILY MEDICINE

## 2022-11-30 PROCEDURE — 6370000000 HC RX 637 (ALT 250 FOR IP): Performed by: INTERNAL MEDICINE

## 2022-11-30 PROCEDURE — 97530 THERAPEUTIC ACTIVITIES: CPT

## 2022-11-30 PROCEDURE — C9113 INJ PANTOPRAZOLE SODIUM, VIA: HCPCS | Performed by: FAMILY MEDICINE

## 2022-11-30 PROCEDURE — 97535 SELF CARE MNGMENT TRAINING: CPT

## 2022-11-30 PROCEDURE — 97165 OT EVAL LOW COMPLEX 30 MIN: CPT

## 2022-11-30 RX ORDER — POLYETHYLENE GLYCOL 3350 17 G/17G
17 POWDER, FOR SOLUTION ORAL DAILY
Qty: 527 G | Refills: 1 | DISCHARGE
Start: 2022-12-01 | End: 2022-12-31

## 2022-11-30 RX ORDER — SODIUM CHLORIDE 1000 MG
1 TABLET, SOLUBLE MISCELLANEOUS 3 TIMES DAILY
Qty: 90 TABLET | Refills: 3 | DISCHARGE
Start: 2022-11-30

## 2022-11-30 RX ORDER — SENNA PLUS 8.6 MG/1
2 TABLET ORAL NIGHTLY PRN
Qty: 120 TABLET | Refills: 11 | DISCHARGE
Start: 2022-11-30 | End: 2023-11-30

## 2022-11-30 RX ADMIN — INSULIN LISPRO 8 UNITS: 100 INJECTION, SOLUTION INTRAVENOUS; SUBCUTANEOUS at 12:01

## 2022-11-30 RX ADMIN — SODIUM CHLORIDE, PRESERVATIVE FREE 10 ML: 5 INJECTION INTRAVENOUS at 20:00

## 2022-11-30 RX ADMIN — ROSUVASTATIN 20 MG: 20 TABLET, FILM COATED ORAL at 08:26

## 2022-11-30 RX ADMIN — SODIUM CHLORIDE, PRESERVATIVE FREE 10 ML: 5 INJECTION INTRAVENOUS at 11:28

## 2022-11-30 RX ADMIN — ENOXAPARIN SODIUM 40 MG: 100 INJECTION SUBCUTANEOUS at 08:26

## 2022-11-30 RX ADMIN — INSULIN GLARGINE-YFGN 4 UNITS: 100 INJECTION, SOLUTION SUBCUTANEOUS at 20:01

## 2022-11-30 RX ADMIN — INSULIN LISPRO 4 UNITS: 100 INJECTION, SOLUTION INTRAVENOUS; SUBCUTANEOUS at 17:31

## 2022-11-30 RX ADMIN — POLYETHYLENE GLYCOL 3350 17 G: 17 POWDER, FOR SOLUTION ORAL at 11:27

## 2022-11-30 RX ADMIN — TAMSULOSIN HYDROCHLORIDE 0.4 MG: 0.4 CAPSULE ORAL at 08:26

## 2022-11-30 RX ADMIN — INSULIN LISPRO 8 UNITS: 100 INJECTION, SOLUTION INTRAVENOUS; SUBCUTANEOUS at 08:27

## 2022-11-30 RX ADMIN — ASPIRIN 81 MG: 81 TABLET, COATED ORAL at 08:26

## 2022-11-30 RX ADMIN — OLANZAPINE 7.5 MG: 5 TABLET, FILM COATED ORAL at 20:00

## 2022-11-30 RX ADMIN — PANTOPRAZOLE SODIUM 40 MG: 40 INJECTION, POWDER, FOR SOLUTION INTRAVENOUS at 11:27

## 2022-11-30 ASSESSMENT — PAIN SCALES - GENERAL: PAINLEVEL_OUTOF10: 0

## 2022-11-30 NOTE — PROGRESS NOTES
Physical Therapy    Initial Assessment     Name: Kitty Peterson  : 1955  MRN: [id number]      Date of Service: 2022    Evaluating PT: Jenni Sainz, PT, DPT [id number]      Room #:  4501/4501-B  Diagnosis:  Hyponatremia [E87.1]  Hyperglycemia [R73.9]  Fall, initial encounter [W19. XXXA]  PMHx/PSHx:  DM, HTN, third nerve palsy, anxiety, lumbago, depression  Precautions:  Fall risk, alarm    SUBJECTIVE:    Pt was admitted from [place] [address]. Pt lives alone in a single story house with 5 stair(s) and 1 rail(s) to enter. Pt ambulated without AD prior to admission. OBJECTIVE:   Initial Evaluation  Date: 22 Treatment Date: Short Term/ Long Term   Goals   AM-PAC 6 Clicks      Was pt agreeable to Eval/treatment? Yes     Does pt have pain? 10/10 generalized pain     Bed Mobility  Rolling: NT  Supine to sit: SBA  Sit to supine: SBA  Scooting: SBA to EOB  Rolling: Independent   Supine to sit: Independent   Sit to supine: Independent   Scooting: Independent    Transfers Sit to stand: Min A  Stand to sit: Min A  Stand pivot: NT  Sit to stand: Supervision  Stand to sit: Supervision  Stand pivot: Supervision with [employer]   Ambulation   Sidestepped 3 feet with [employer] with Mod A  >100 feet with [employer] with Supervision   Stair negotiation: ascended and descended NT  >4 steps with 1 rail with SBA   ROM BUE: Refer to OT note  BLE: WFL     Strength BUE: Refer to OT note  BLE: WFL     Balance Sitting EOB: SBA  Dynamic Standing: Min A with [employer]  Sitting EOB: Independent   Dynamic Standing: Supervision with [employer]     Pt is A & O x: 3 grossly to person, place, and month. Sensation: Pt denies numbness and tingling of extremities. Edema: Unremarkable. Patient education  Pt educated on hand placement during transfers.     Patient response to education:   Pt verbalized understanding Pt demonstrated skill Pt requires further education in this area   Yes With cues Yes     ASSESSMENT:    Conditions Requiring Skilled Therapeutic Intervention:    [x]Decreased strength     []Decreased ROM  [x]Decreased functional mobility  [x]Decreased balance   [x]Decreased endurance   []Decreased posture  []Decreased sensation  []Decreased coordination   []Decreased vision  [x]Decreased safety awareness   [x]Increased pain       Comments:    Pt was in bed upon room entry; agreeable to PT evaluation. Pt is grossly oriented. Pt was irritable but agreeable to participate. Pt completed all bed mobility with ease. Pt completed several transfers from EOB. Pt sidestepped with [employer]. Steps were small and unsteady. Pt declined further ambulation due to back pain. Pt was assisted back to bed and positioned comfortably. Pt was left in bed with all needs met at conclusion of session. Treatment:  Patient practiced and was instructed in the following treatment:    Therapeutic activities:  Transfers: Pt was cued for hand placement during sit <> stand transfers. Pt completed x3 transfers from EOB. Ambulation: Pt sidestepped at EOB with [employer]. Pt was cued for [employer] technique and sequencing. Skillful positioning in bed to protect skin/joint integrity. Pt's/family goals:  1. To return home. Prognosis is Fair for reaching above PT goals. Patient and or family understand(s) diagnosis, prognosis, and plan of care. Yes. PHYSICAL THERAPY PLAN OF CARE:    PT POC is established based on physician order and patient diagnosis     Referring provider/PT Order:    Start   Ordering Provider    11/28/22 0730  PT evaluation and treat  Start:  11/28/22 0730,   End:  11/28/22 0730,   ONE TIME,   Standing Count:  1 Occurrences,   Ladi Barton MD      Diagnosis:  Hyponatremia [E87.1]  Hyperglycemia [R73.9]  Fall, initial encounter [W19. XXXA]  Specific instructions for next treatment:  Progress activity.     Current Treatment Recommendations:     [x] Strengthening to improve independence with functional mobility   [] ROM to improve independence with functional mobility   [x] Balance Training to improve static/dynamic balance and to reduce fall risk  [x] Endurance Training to improve activity tolerance during functional mobility   [x] Transfer Training to improve safety and independence with all functional transfers   [x] Gait Training to improve gait mechanics, endurance and assess need for appropriate assistive device  [x] Stair Training in preparation for safe discharge home and/or into the community   [] Positioning to prevent skin breakdown and contractures  [x] Safety and Education Training   [] Patient/Caregiver Education   [] HEP  [] Other     PT long term treatment goals are located in above grid    Frequency of treatments: 2-5x/week x 1-2 weeks. Time in  0826  Time out  0846    Total Treatment Time  10 minutes     Evaluation Time includes thorough review of current medical information, gathering information on past medical history/social history and prior level of function, completion of standardized testing/informal observation of tasks, assessment of data and education on plan of care and goals.     CPT codes:  [x] Low Complexity PT evaluation 86588  [] Moderate Complexity PT evaluation 05412  [] High Complexity PT evaluation 59288  [] PT Re-evaluation 72291  [] Gait training 62419 0 minutes  [] Manual therapy 62573 0 minutes  [x] Therapeutic activities 21304 10 minutes  [] Therapeutic exercises 87595 0 minutes  [] Neuromuscular reeducation 22859 0 minutes     Shanti Suarez, PT, DPT  [id number]

## 2022-11-30 NOTE — CARE COORDINATION
Spoke with son, he would prefer a referral to [facility] instead of return to [facility]. Referral to [facility]. Patient likely stable for discharge today. Son unsure on next choice if [facility] does not have bed, will follow up with son based on facility determination. PT worked with patient today. He will not require a precert. [facility] accepted, bed available if stable for discharge today. Could not find transport until tomorrow morning by wheelchair. [place] booked, physicians unavailable until tomorrow. [facility] arranged for 11am. Will need covid prior to discharge. For questions I can be reached at [phone]. Bibi [place], [place]    The Plan for Transition of Care is related to the following treatment goals: discharge planning when stable     The Patient and/or patient representative Karlene Jyoti was provided with a choice of provider and agrees   with the discharge plan. [x] Yes [] No    Freedom of choice list was provided with basic dialogue that supports the patient's individualized plan of care/goals, treatment preferences and shares the quality data associated with the providers.  [x] Yes [] No

## 2022-11-30 NOTE — DISCHARGE INSTR - COC
Continuity of Care Form    Patient Name: Mindy Sebastian   :  1955  MRN:  [id number]    Admit date:  2022  Discharge date:  22    Code Status Order: Full Code   Advance Directives:     Admitting Physician:  Michael Yoon MD  PCP: Raza Roa MD    Discharging Nurse: Candy Denney 23 Unit/Room#: 4501/4501-B  Discharging Unit Phone Number: [phone]    Emergency Contact:   Extended Emergency Contact Information  Primary Emergency Contact: Floralysa Peterson [address] Phone: [phone]  Relation: Child  Secondary Emergency Contact: Solitario Paredes  Work Phone: [phone]  Relation: Child   needed? No    Past Surgical History:  Past Surgical History:   Procedure Laterality Date    EYE SURGERY      HIP SURGERY Right 2020    HIP HEMIARTHROPLASTY performed by Josue Mckeon MD at [place] [address] Left 12/10/2020    HIP HEMIARTHROPLASTY -- HERB -- DROPLET +     PT. COMING FROM [facility] performed by Donalynn Bence, MD at [address] [place] [address]       Immunization History:   Immunization History   Administered Date(s) Administered    COVID-19, PFIZER PURPLE top, DILUTE for use, (age 15 y+), 30mcg/0.3mL 2020, 2021       Active Problems:  Patient Active Problem List   Diagnosis Code    Severe episode of recurrent major depressive disorder, without psychotic features [address] F33.2    Suicidal ideations R45.851    SDH (subdural hematoma) S06. 5XAA    Traumatic subdural hemorrhage with loss of consciousness of 30 minutes or less ([address] S06. 5X1A    Diabetic acidosis without coma (HCC) E11.10    Acute kidney injury superimposed on CKD ([address] N17.9, N18.9    Hyponatremia E87.1    Right adrenal mass (HCC) E27.8    Hyperkalemia E87.5    Major depression, recurrent (HCC) F33.9    Type 2 diabetes mellitus with complication, with long-term current use of insulin (HCC) E11.8, Z79.4    HLD (hyperlipidemia) E78.5    HTN (hypertension) I10 Uncontrolled type 2 diabetes mellitus with hyperglycemia (HCC) E11.65    SIRS (systemic inflammatory response syndrome) (HCC) R65.10    Weight loss R63.4    Generalized abdominal pain R10.84    Lactic acidosis E87.20    Constipation K59.00    High anion gap metabolic acidosis J39.83    Non compliance w medication regimen Z91.14    Physical deconditioning R53.81    Dementia, vascular (HCC) F01.50    Moderate protein-calorie malnutrition (HCC) E44.0    TIA (transient ischemic attack) G45.9    DKA, type 1, not at goal [facility]) E10.10    Ileus ([facility] K56.7    Adrenal adenoma, right D35.01    Hypertensive urgency I16.0    Hiccups U36.5    Umbilical hernia without obstruction and without gangrene K42.9    GERD (gastroesophageal reflux disease) K21.9    Depression F32. A    Hypotension I95.9    Depression, major, recurrent (HCC) F33.9    Closed right hip fracture, initial encounter ([facility] S72.001A    History of right hip hemiarthroplasty Z96.641    Closed left hip fracture, initial encounter ([facility] [address] J17.999Q    Acute metabolic encephalopathy D32.47    History of left hip hemiarthroplasty R85.775    CLEMENTE (acute kidney injury) ([facility] [address] N17.9    Weakness R53.1       Isolation/Infection:   Isolation            No Isolation          Patient Infection Status       Infection Onset Added Last Indicated Last Indicated By Review Planned Expiration Resolved Resolved By    None active    Resolved    COVID-19 (Rule Out) 22 Respiratory Panel, Molecular, with COVID-19 (Restricted: peds pts or suitable admitted adults) (Ordered)   22 Rule-Out Test Resulted    C-diff Rule Out 22 CLOSTRIDIUM DIFFICILE EIA (Ordered)   22 Rule-Out Test Resulted    COVID-19 20 Respiratory Panel, Molecular, with COVID-19 (Restricted: peds pts or suitable admitted adults)   20     COVID-19 (Rule Out) 20 Respiratory Panel, Molecular, with COVID-19 (Restricted: peds pts or suitable admitted adults) (Ordered)   12/07/20 Rule-Out Test Resulted    COVID-19 (Rule Out) 11/19/20 11/19/20 11/19/20 Covid-19 Ambulatory (Ordered)   11/20/20 Rule-Out Test Resulted    COVID-19 (Rule Out) 10/06/20 10/06/20 10/06/20 COVID-19 (Ordered)   10/06/20 Rule-Out Test Resulted    C-diff Rule Out 05/04/20 05/04/20 05/04/20 Clostridium difficile EIA (Ordered)   08/21/20 Rosalind Alonzo, RN            Nurse Assessment:  Last Vital Signs: /66   Pulse 90   Temp 98.6 °F (37 °C) (Temporal)   Resp 17   Ht 5' 8\" (1.727 m)   Wt 158 lb 11.7 oz (72 kg)   SpO2 96%   BMI 24.14 kg/m²     Last documented pain score (0-10 scale): Pain Level: 0  Last Weight:   Wt Readings from Last 1 Encounters:   11/29/22 158 lb 11.7 oz (72 kg)     Mental Status:  oriented, alert, thought processes intact, and able to concentrate and follow conversation    IV Access:  - None    Nursing Mobility/ADLs:  Walking   Assisted  Transfer  Assisted  Bathing  Assisted  Dressing  Assisted  Toileting  Assisted  Feeding  Independent  Med [address] [facility]  Independent  Med Delivery   whole    Wound Care Documentation and Therapy:        Elimination:  Continence: Bowel: Yes and No  Bladder: Yes but does have accident's/misses urinal  Urinary Catheter: None   Colostomy/Ileostomy/Ileal Conduit: No       Date of Last BM: 11/28/22    Intake/Output Summary (Last 24 hours) at 11/30/2022 1613  Last data filed at 11/30/2022 1034  Gross per 24 hour   Intake 300 ml   Output 300 ml   Net 0 ml     I/O last 3 completed shifts: In: 1753.7 [P.O.:600; I.V.:1153.7]  Out: 700 [Urine:700]    Safety Concerns: At Risk for Falls    Impairments/Disabilities:      None    Nutrition Therapy:  Current Nutrition Therapy:   - Oral Diet:  Carb Control 3 carbs/meal (1500kcals/day)    Routes of Feeding: Oral  Liquids:  Thin Liquids  Daily Fluid Restriction: no  Last Modified Barium Swallow with Video (Video Swallowing Test): not done    Treatments at the Time of Hospital Discharge:   Respiratory Treatments: None  Oxygen Therapy:  is not on home oxygen therapy. Ventilator:    - No ventilator support    Rehab Therapies: Physical Therapy and Occupational Therapy  Weight Bearing Status/Restrictions: No weight bearing restrictions  Other Medical Equipment (for information only, NOT a DME order):  walker  Other Treatments: ***    Patient's personal belongings (please select all that are sent with patient):  None    RN SIGNATURE:  Electronically signed by Litzy Mathias RN on 12/1/22 at 11:31 AM EST    CASE MANAGEMENT/SOCIAL WORK SECTION    Inpatient Status Date: ***    Readmission Risk Assessment Score:  Readmission Risk              Risk of Unplanned Readmission:  26           Discharging to Facility/ Agency   Name:   Address:  Phone:  Fax:    Dialysis Facility (if applicable)   Name:  Address:  Dialysis Schedule:  Phone:  Fax:    / signature: {Esignature:[id number]}    PHYSICIAN SECTION    Prognosis: {Prognosis:0349608399}    Condition at Discharge: Saranya Villafana Patient Condition:[id number]}    Rehab Potential (if transferring to Rehab): {Prognosis:5212068904}    Recommended Labs or Other Treatments After Discharge: ***    Physician Certification: I certify the above information and transfer of Jez Moralez  is necessary for the continuing treatment of the diagnosis listed and that he requires {Admit to Appropriate Level of Care:70106} for {GREATER/LESS:880717092} 30 days.      Update Admission H&P: {CHP DME Changes in CVMFF:989451732}    PHYSICIAN SIGNATURE:  Electronically signed by Thomas Welch MD on 11/30/22 at 4:13 PM EST

## 2022-11-30 NOTE — DISCHARGE SUMMARY
Physician Discharge Summary     Patient ID:  Wallace Taylor  [id number]  36 y.o.  1955    Admit date: 11/27/2022    Discharge date and time:  12/1/2022    Discharge Diagnoses: Principal Problem:    Hyponatremia  Resolved Problems:    * No resolved hospital problems. *      Consults: IP CONSULT TO INTERNAL MEDICINE  IP CONSULT TO NEPHROLOGY    Procedures: See below    Hospital Course:  Patient admitted on 11/27/2022 74 y.o. male with  past medical history of Anxiety, Bronchitis, Cellulitis, Chronic sinusitis, Depression, Diabetes mellitus ([address] Diabetic retinopathy ([facility] [address] Fracture of left foot, High cholesterol, Hypercholesteremia, Hypercholesterolemia, Hypertension, Lumbago, Moderate mood disorder ([address] and Third nerve palsy. Patient presents resides in a nursing facility . Patient sustained a fall . Patient states that he has fallen multiple times a day . Patient states that he is weak. He reports no chest pain fever chills nausea vomiting diarrhea . Patient was admitted 11/21 -11/23 due to hyponatremia . In the ED patient was hemodynamically stable. Lab data revael sodium 126 potassium 4.4 BUN 36 SCr 1.2 lactic acid 2.9 >> 1.8 glucose 355 WBC 12.5 HGB 10.0  Trop 43 >> 41 COVID neg CT head neg CT cervical spine neg beta hydroxy 2.39 CT abd /pelvis fecal impaction  esophagitis . Patient received fluid bolus . Patient will be admitted for further evaluation      Hyponatremia :   sodium 126 on admission , hypotonic secondary to poor solute intake with component of pseudohyponatremia related to hyperglycemia . Patient was admitted  from 11/21-23 for the same issue   Admit inpatient vitals telemetry trend  sodium q6H urine studies nephrology appreciated improved with NS IVF . Fluid restriction. Salt tabs added with discharge. IDDM with hyperglycemia : glucose was 355 on admission beta  hydroxy 2.39 lactic acid was 2.8 but correct with ivf .   Patient is not in DKA Cw  IVF  POCT ACHS sliding scale Falls :likely due hyponatremia and deconditioning    COVID NEG PT/OT  fall precautions CT head neg      Elevated Trop : 43 >> 41 at baseline . Patient reports no chest pain . EKG no acute changes      Stool impaction : as seen on CT A/P on admission .  Patient received enema in ED      Abdominal pain ; CT A/P revealed esophagitis IV Protonix     Lactic acidosis : 2.8 >> 1.8 with IVF      Microcytic Anemia : chronic       Myeolipoma : as seen on CT 4.9 cm      Dementia   stable-at risk for delirium in the hospital  Avoid circadian disruption  Avoid sedating anticholinergic medications  Monitor       Discharge Exam:  See progress note from today    Condition:  Stable    Disposition: SNF    Patient Instructions:   Current Discharge Medication List        START taking these medications    Details   sodium chloride 1 g tablet Take 1 tablet by mouth 3 times daily  Qty: 90 tablet, Refills: 3      polyethylene glycol (GLYCOLAX) 17 g packet Take 17 g by mouth daily  Qty: 527 g, Refills: 1      senna (SENOKOT) 8.6 MG tablet Take 2 tablets by mouth nightly as needed for Constipation  Qty: 120 tablet, Refills: 11           CONTINUE these medications which have NOT CHANGED    Details   aspirin 81 MG EC tablet Take 81 mg by mouth daily      OLANZapine (ZYPREXA) 7.5 MG tablet Take 7.5 mg by mouth nightly      rosuvastatin (CRESTOR) 10 MG tablet Take 10 mg by mouth daily      insulin lispro (HUMALOG) 100 UNIT/ML injection vial Inject 0-4 Units into the skin 3 times daily (before meals) *Per Sliding Scale*      tamsulosin (FLOMAX) 0.4 MG capsule Take 1 capsule by mouth daily  Qty: 30 capsule, Refills: 2      insulin glargine (LANTUS) 100 UNIT/ML injection vial Inject 4 Units into the skin nightly  Qty: 1 each, Refills: 3      metFORMIN (GLUCOPHAGE) 500 MG tablet Take 1 tablet by mouth 2 times daily (with meals)  Qty: 60 tablet, Refills: 0      glucose 4 g chewable tablet Take 4 g by mouth as needed for Low blood sugar Activity: activity as tolerated  Diet: regular diet carb control,     Follow-up with 1 week PCP    Note that over 31 minutes was spent in preparing discharge papers, discussing discharge with patient, staff, consultants, medication review, arranging follow up, etc.    Signed:  Richard Luis MD  12/1/2022  4:12 PM

## 2022-11-30 NOTE — PROGRESS NOTES
Department of Internal Medicine  Nephrology Consult Note    Events reviewed. SUBJECTIVE: We are following MrAlyson Ngo for hyponatremia and CKD. Reports no complaints.     PHYSICAL EXAM:      Vitals:    VITALS:  /66   Pulse 90   Temp 98.6 °F (37 °C) (Temporal)   Resp 17   Ht 5' 8\" (1.727 m)   Wt 158 lb 11.7 oz (72 kg)   SpO2 96%   BMI 24.14 kg/m²   24HR BLOOD PRESSURE RANGE:  Systolic (07MTZ), KQU:137 , Min:122 , RL ; Diastolic (92ZHO), URY:25, Min:66, Max:88  24HR INTAKE/OUTPUT:    Intake/Output Summary (Last 24 hours) at 2022 1008  Last data filed at 2022 0647  Gross per 24 hour   Intake 621.68 ml   Output 300 ml   Net 321.68 ml         Constitutional:  Alert and oriented with intermittent confusion, ill-appearing  HEENT:  Normocephalic, PERRL, dry mucous membranes  Respiratory:  CTA bilaterally  Cardiovascular/Edema:  RRR, S1,S2  Gastrointestinal:  Soft, rounded, nontender, nondistended  Neurologic:  Nonfocal, [place]  Skin:  Warm, dry, intact  Other:  No edema     Scheduled Meds:   polyethylene glycol  17 g Oral Daily    insulin glargine-yfgn  4 Units SubCUTAneous Nightly    OLANZapine  7.5 mg Oral Nightly    rosuvastatin  20 mg Oral Daily    tamsulosin  0.4 mg Oral Daily    sodium chloride flush  5-40 mL IntraVENous 2 times per day    enoxaparin  40 mg SubCUTAneous Daily    pantoprazole (PROTONIX) 40 mg injection  40 mg IntraVENous Daily    insulin lispro  0-16 Units SubCUTAneous TID WC    insulin lispro  0-4 Units SubCUTAneous Nightly    aspirin EC  81 mg Oral Daily     Continuous Infusions:   dextrose      sodium chloride       PRN Meds:.glucose, dextrose bolus **OR** dextrose bolus, glucagon (rDNA), dextrose, sodium chloride flush, sodium chloride, potassium chloride **OR** potassium alternative oral replacement **OR** potassium chloride, ondansetron **OR** ondansetron, polyethylene glycol, acetaminophen **OR** acetaminophen      DATA:    CBC with Differential:    Lab Results   Component Value Date/Time    WBC 6.3 11/30/2022 04:27 AM    RBC 3.16 11/30/2022 04:27 AM    HGB 8.4 11/30/2022 04:27 AM    HCT 24.0 11/30/2022 04:27 AM     11/30/2022 04:27 AM    MCV 75.9 11/30/2022 04:27 AM    MCH 26.6 11/30/2022 04:27 AM    MCHC 35.0 11/30/2022 04:27 AM    RDW 14.3 11/30/2022 04:27 AM    LYMPHOPCT 39.5 11/30/2022 04:27 AM    MONOPCT 8.8 11/30/2022 04:27 AM    BASOPCT 0.8 11/30/2022 04:27 AM    MONOSABS 0.55 11/30/2022 04:27 AM    LYMPHSABS 2.47 11/30/2022 04:27 AM    EOSABS 0.18 11/30/2022 04:27 AM    BASOSABS 0.05 11/30/2022 04:27 AM     CMP:    Lab Results   Component Value Date/Time     11/30/2022 04:27 AM    K 4.0 11/30/2022 04:27 AM    CL 98 11/30/2022 04:27 AM    CO2 26 11/30/2022 04:27 AM    BUN 14 11/30/2022 04:27 AM    CREATININE 1.0 11/30/2022 04:27 AM    GFRAA >60 12/13/2020 05:22 AM    LABGLOM >60 11/30/2022 04:27 AM    GLUCOSE 207 11/30/2022 04:27 AM    PROT 5.8 11/27/2022 10:49 PM    LABALBU 3.3 11/27/2022 10:49 PM    CALCIUM 8.3 11/30/2022 04:27 AM    BILITOT 0.7 11/27/2022 10:49 PM    ALKPHOS 67 11/27/2022 10:49 PM    AST 13 11/27/2022 10:49 PM    ALT 12 11/27/2022 10:49 PM     Magnesium:    Lab Results   Component Value Date/Time    MG 1.8 11/22/2022 04:00 PM     Phosphorus:    Lab Results   Component Value Date/Time    PHOS 3.0 11/22/2022 04:00 PM     Radiology Review:      CT ABD/Pelvis 11/28/22   1. Moderate retrocardiac hiatal hernia with thickening of the distal   esophagus compatible with esophagitis. 2. Large rectal stool impaction. 3. Incidentally noted 4.9 cm right adrenal myelolipoma. RECOMMENDATIONS:   Careful clinical correlation and follow up recommended.           BRIEF SUMMARY OF INITIAL CONSULT:     Briefly, Mr. Lakeisha Foy is a 74year old male with a PMH of CKD stage II (baseline creatinine 0.9-1.0 mg/dL) with mild proteinuria probably 2/2 diabetic nephropathy, IDDM with diabetic retinopathy, HTN, HLD, subdural hematoma (2018), TIA, dementia, anxiety and depression, recently admitted from 11/11/22 to 11/17/22 for failure to thrive, and again from 11/21/22 to 11/23/22 for hyponatremia, who presented to the ED on November 27, 2022 after a fall. His lab work was significant for sodium 126 mmol/L, reason for this consultation. Home medications include metformin, olanzapine, and lamotrigine. He reports that after his last admission he was discharged to [facility] and he has not been eating for the last 4-5 days. IMPRESSION/RECOMMENDATIONS:       Hypotonic hyponatremia likely 2/2 poor solute intake (tea and toast phenomenon), with mild component of translocation due to hyperglycemia. Urine sodium 26, urine chloride <20. Sodium levels improved since admission, stable last 24 hours. CKD stage II, with mild proteinuria, UACR: 119.5 mg/g, 2/2 diabetic kidney disease. Renal function is stable  Vitamin D deficiency, vitamin D 25 level 16, on ergocalciferol  Hx of HTN, generally controlled without medication  Urinary retention, Urology follows outpatient  ---------------------------------------------------------------  Type II DM with diabetic retinopathy, on SSI  HLD, on rosuvastatin   Mood disorder, on olanzapine, and lamotrigine  Hx adrenal adenoma, random cortisol level 6.88 11/21/22  Hx SDH  Microcytic anemia, ferritin 141, iron saturation 27%, folate 15.3, B12 394  Nutrition, regular diet, 1 liter fluid restriction, consider dietary consult/enteral nutrition/goals of care      Plan:     Continue fluid restriction: 1 L  Continue to monitor blood pressure  Discharge planning.

## 2022-11-30 NOTE — PROGRESS NOTES
Hospitalist Progress Note      Synopsis: Patient admitted on 11/27/2022 74 y.o. male with  past medical history of Anxiety, Bronchitis, Cellulitis, Chronic sinusitis, Depression, Diabetes mellitus ([address] Diabetic retinopathy ([address] Fracture of left foot, High cholesterol, Hypercholesteremia, Hypercholesterolemia, Hypertension, Lumbago, Moderate mood disorder ([address] and Third nerve palsy. Patient presents resides in a nursing facility . Patient sustained a fall . Patient states that he has fallen multiple times a day . Patient states that he is weak. He reports no chest pain fever chills nausea vomiting diarrhea . Patient was admitted 11/21 -11/23 due to hyponatremia . In the ED patient was hemodynamically stable. Lab data revael sodium 126 potassium 4.4 BUN 36 SCr 1.2 lactic acid 2.9 >> 1.8 glucose 355 WBC 12.5 HGB 10.0  Trop 43 >> 41 COVID neg CT head neg CT cervical spine neg beta hydroxy 2.39 CT abd /pelvis fecal impaction  esophagitis . Patient received fluid bolus . Patient will be admitted for further evaluation        Subjective    Feeling better wants to go home  No CP or SOB  No fever or chills   No uncontrolled pain  No vomiting or diarrhea   No events reported overnight     Exam:  /66   Pulse 90   Temp 98.6 °F (37 °C) (Temporal)   Resp 17   Ht 5' 8\" (1.727 m)   Wt 158 lb 11.7 oz (72 kg)   SpO2 96%   BMI 24.14 kg/m²   General appearance: No apparent distress, appears stated age and cooperative. HEENT: Pupils equal, round, and reactive to light. Conjunctivae/corneas clear. Neck: Supple. No jugular venous distention. Trachea midline. Respiratory:  Normal respiratory effort. Clear to auscultation, bilaterally without Rales/Wheezes/Rhonchi. Cardiovascular: Regular rate and rhythm with normal S1/S2 without murmurs, rubs or gallops. Abdomen: Soft, non-tender, non-distended with normal bowel sounds. Musculoskeletal: No clubbing, cyanosis or edema bilaterally. Brisk capillary refill. 2+radial pulses. Skin:  No rashes    Neurologic: awake, alert and following commands    Medications:  Reviewed    Infusion Medications    dextrose      sodium chloride       Scheduled Medications    polyethylene glycol  17 g Oral Daily    insulin glargine-yfgn  4 Units SubCUTAneous Nightly    OLANZapine  7.5 mg Oral Nightly    rosuvastatin  20 mg Oral Daily    tamsulosin  0.4 mg Oral Daily    sodium chloride flush  5-40 mL IntraVENous 2 times per day    enoxaparin  40 mg SubCUTAneous Daily    pantoprazole (PROTONIX) 40 mg injection  40 mg IntraVENous Daily    insulin lispro  0-16 Units SubCUTAneous TID WC    insulin lispro  0-4 Units SubCUTAneous Nightly    aspirin EC  81 mg Oral Daily     PRN Meds: glucose, dextrose bolus **OR** dextrose bolus, glucagon (rDNA), dextrose, sodium chloride flush, sodium chloride, potassium chloride **OR** potassium alternative oral replacement **OR** potassium chloride, ondansetron **OR** ondansetron, polyethylene glycol, acetaminophen **OR** acetaminophen    I/O    Intake/Output Summary (Last 24 hours) at 11/30/2022 1132  Last data filed at 11/30/2022 1034  Gross per 24 hour   Intake 741.68 ml   Output 300 ml   Net 441.68 ml         Labs:   Recent Labs     11/27/22 2249 11/29/22 0449 11/30/22 0427   WBC 12.5* 7.2 6.3   HGB 10.0* 8.2* 8.4*   HCT 27.9* 23.1* 24.0*    212 212         Recent Labs     11/28/22  2304 11/29/22  0151 11/29/22  0449 11/29/22  0724 11/29/22  1524 11/29/22  1856 11/30/22  0427      < > 133   < > 133 131* 131*   K 4.0  --  4.0  --   --   --  4.0     --  101  --   --   --  98   CO2 25  --  25  --   --   --  26   BUN 22  --  20  --   --   --  14   CREATININE 1.0  --  1.0  --   --   --  1.0   CALCIUM 8.8  --  8.6  --   --   --  8.3*    < > = values in this interval not displayed.          Recent Labs     11/27/22 2249   PROT 5.8*   ALKPHOS 67   ALT 12   AST 13   BILITOT 0.7   LIPASE 57         No results for input(s): INR in the last 72 hours. No results for input(s): Rachelle Shaikh in the last 72 hours. Chronic labs:  Lab Results   Component Value Date    CHOL 114 10/08/2020    TRIG 71 10/08/2020    HDL 41 10/08/2020    LDLCALC 59 10/08/2020    TSH 1.750 11/21/2022    PSA 0.86 09/28/2018    INR 1.5 11/11/2022    LABA1C 8.3 (H) 11/28/2022       Radiology:  Imaging studies reviewed today. ASSESSMENT:    Principal Problem:    Hyponatremia  Resolved Problems:    * No resolved hospital problems. *       Hyponatremia :   sodium 126 on admission , l hypotonic secondary to poor solute intake with component of pseudohyponatremia related to hyperglycemia . Patient was admitted  from 11/21-23 for the same issue   Admit inpatient vitals telemetry trend  sodium q6H urine studies nephrology appreciated improved with NS IVF      IDDM with hyperglycemia : glucose was 355 on admission beta  hydroxy 2.39 lactic acid was 2.8 but correct with ivf . Patient is not in DKA Cw  IVF  POCT ACHS sliding scale       Falls :likely due hyponatremia and deconditioning    COVID NEG PT/OT  fall precautions CT head neg      Elevated Trop : 43 >> 41 at baseline . Patient reports no chest pain . EKG no acute changes      Stool impaction : as seen on CT A/P on admission . Patient received enema in ED      Abdominal pain ; CT A/P revealed esophagitis IV Protonix     Lactic acidosis : 2.8 >> 1.8 with IVF      Microcytic Anemia : chronic       Myeolipoma : as seen on CT 4.9 cm      Dementia   stable-at risk for delirium in the hospital  Avoid circadian disruption  Avoid sedating anticholinergic medications  Monitor        Diet: ADULT DIET;  Regular; 3 carb choices (45 gm/meal)  Code Status: Full Code  PT/OT Eval Status:     DVT Prophylaxis:     Recommended disposition at discharge: Stable for discharge-to SNF pending precertification    +++++++++++++++++++++++++++++++++++++++++++++++++  José Bond MD   [facility].  +++++++++++++++++++++++++++++++++++++++++++++++++  NOTE: This report was transcribed using voice recognition software. Every effort was made to ensure accuracy; however, inadvertent computerized transcription errors may be present.

## 2022-11-30 NOTE — PROGRESS NOTES
[address]                                                  Patient Name: Lakeisha Foy  MRN: [id number]  : 1955  Room: [address] [place] [address]    Evaluating OT: DELANEY Benoit, OTR/L  # [id number]    Referring Provider:  Raphael Carrillo MD  Specific Provider Orders:  \"OT Eval and Treat\"  22    Diagnosis: Hyponatremia [E87.1]  Hyperglycemia [R73.9]  Fall, initial encounter [W19. XXXA]    Pt was admitted after experiencing several falls at Facility    Pertinent Medical History:  Pt has a past medical history of Anxiety, Bronchitis, Cellulitis, Chronic sinusitis, Depression, Diabetes mellitus ([address] Diabetic retinopathy ([address] Fracture of left foot, High cholesterol, Hypercholesteremia, Hypercholesterolemia, Hypertension, Lumbago, Moderate mood disorder ([address] and Third nerve palsy. ,  has a past surgical history that includes eye surgery; hip surgery (Right, 2020); and hip surgery (Left, 12/10/2020).     Surgeries this admission: None     Precautions:  Fall Risk  Cognition - Bed alarm    Assessment of current deficits   [x] Functional mobility  [x]ADLs  [x] Strength               [x]Cognition   [x] Functional transfers   [x] IADLs         [x] Safety Awareness   [x]Endurance   [] Fine Coordination              [x] Balance     [] Vision/perception   []Sensation    []Gross Motor Coordination  [] ROM  [] Delirium                  [] Motor Control       OT PLAN OF CARE   OT POC based on physician orders, patient diagnosis and results of clinical assessment    Frequency/Duration 1-3 days/wk for 2 weeks PRN   Specific OT Treatment to include:     * Instruction/training on adapted ADL techniques and AE recommendations to increase functional independence within precautions       * Training on energy conservation strategies, correct breathing pattern and techniques to improve independence/tolerance for self-care routine  * Functional transfer/mobility training/DME recommendations for increased independence, safety, and fall prevention  * Patient/Family education to increase follow through with safety techniques and functional independence  * Recommendation of environmental modifications for increased safety with functional transfers/mobility and ADLs  * Cognitive retraining/development of therapeutic activities to improve problem solving, judgement, memory, and attention for increased safety/participation in ADL/IADL tasks  * Therapeutic exercise to improve motor endurance, ROM, and functional strength for ADLs/functional transfers  * Therapeutic activities to facilitate/challenge dynamic balance, stand tolerance for increased safety and independence with ADLs  * Therapeutic activities to facilitate gross/fine motor skills for increased independence with ADLs  * Neuro-muscular re-education: facilitation of righting/equilibrium reactions  * Positioning to improve skin integrity, interaction with environment and functional independence  * Delirium prevention/treatment  * Manual techniques for edema management  Other:    Recommended Adaptive Equipment: TBD as pt progresses     Pt is a Poor historian - very little information available on chart:    Home Living:  Per chart, pt was transferred from a SNF. Per previous therapy records, earlier this month, pt was living with his Dtr in a 1-story house     Bathroom setup:  [place] [address], [address] owned:  [email or web address], [employer], Shower chair    Available Family Assist:  Staff assist    Prior Level of Function:  Pt received assist w/ ADLs, Transfers and Mobility using [employer] for ambulation.    Driving:  Not reported  Occupation:  Not reported    Pain Level:  Denied pain this session    Additional Complaints:  None indicated    Cognition: A & O x 2 - pt was able to state his name and , little verbalization, not consistently answering orientation questions   Able to Follow Multi-Step Commands w/ Min-Mod VCs   Memory:  fair    Sequencing:  fair    Problem solving:  fair    Judgement/safety:  fair   Additional Comments:  Pt presented w/ a flat affect, fair eye contact.       Vitals/Lab Values:  WFL Room air        Functional Assessment:  AM-PAC Daily Activity Raw Score: 15/24     Initial Eval Status  Date: 11-30-22   Treatment Status  Date: STGs = LTGs  Time frame: 10-14 days   Feeding NT      NA   Grooming Min A/Set up    Mod VCs for quality of task of washing hands/face  Mod Encouragement for minimal participation in task  Seated EOB - declined to attempt to ambulate to bathroom    SUP/Set up  Standing at the [address] [place] [address] A/Set up    Min A + Mod VCs to don/doff gown seated EOB    SUP/Set up     LB Dressing Mod A/Set up    Donning socks only seated EOB, Mod VCs to problem solve task, encourage participation, declined pants  Pt ed for safe/adaptive techs, use of adaptive equip    Min A/Set up     Bathing NT    Pt declined     Mod A      Toileting NT    Pt declined    Mod A     Bed Mobility  Supine to sit: SUP   Sit to supine:  SUP     VCs for safety    Supine to sit: IND  Sit to supine: IND     Functional Transfers Min A    EOB 2x, Min VCsPt ed for safety/hand placement    SUP     Functional Mobility NT    Pt declined    SUP     Balance Sitting:     Static:  SUP EOB    Dynamic:  SUP w/ functional ax EOB     Standing:     Static:  Min A w/ [employer]    Dynamic:  Min A w/ functional ax/mobility w/ [employer]    Sitting:     Static:  IND    Dynamic:  IND w/ functional ax    Standing:     Static:  SUP w/ AD PRN    Dynamic:  SUP w/ functional ax/mobility w/ AD PRN   Activity Tolerance Fair    Tolerated sitting w/ light ax ~ 10 mins, standing ~ 1 min - Pt returned to seated position abruptly despite encouragement to remain standing, attempt ambulation, declined to participate in any further ax - no reason provided    Good(-) Visual/  Perceptual    Hearing: WNL   Glasses: None at b/s    [facility]   Hearing Aids:  None at b/s               Hand Dominance: Right   AROM Strength Additional Info:    RUE  WFL WNL Good ;   Good FMC/dexterity noted during ADL tasks     LUE WFL WNL Good ;    Good FMC/dexterity noted during ADL tasks       Sensation:  Denies numbness or tingling Javon UEs   Tone: WFL Javon UEs   Edema: None Noted Javon UEs     Comments: Upon arrival, patient was found in side-lying. He was agreeable to participate in therapeutic ax. No Family present during session. Received permission from RN prior to engaging pt in OT services. Educated pt on role of OT services. At the end of the session, patient returned to side-lying. Call light and phone within reach, all lines and tubes intact. Oriented pt to call bell. Made all appropriate Environmental Modifications to facilitate pt's level of IND and safety. All needs met. Bed Alarm activated. Overall patient demonstrated decreased independence and safety during completion of ADL/functional transfer/mobility tasks. Pt would benefit from continued skilled OT to increase safety and independence with completion of ADL/IADL tasks for functional independence and quality of life.     Treatment: OT treatment provided this date includes:   Instruction/training on safety and adapted techniques for completion of ADLs, use of DME/AD/Adaptive equip:    Instruction/training on safe functional mobility/transfer techniques, use of DME/AD:    Instruction/training on work simplification for completion of ADLs:     Neuromuscular Reeducation to facilitate balance/righting reactions for increased function with ADLs:   Activity tolerance - Sitting/Standing to improve endurance w/ functional ax   Cognitive retraining -  Oriented pt to current Date, Place and Situation; Cues for safety/safety awareness, sequencing, problem solving    Skilled monitoring of pt's response to tx ax Consulted RN    Made all appropriate Environmental Modifications to facilitate pt's level of IND and safety. Recommendations for Continued Participation in OT services during Hospitalization and at D/C - SNF    Pt and/or Family verbalized/demonstrated a Fair understanding of education provided. Will Review PRN. Rehab Potential: Good(-) for established goals     Patient / Family Goal: Not stated      Patient and/or family were instructed on functional diagnosis, prognosis/goals and OT plan of care. Demonstrated Fair understanding. Eval Complexity: Low    Time In: 0933  Time Out: 0956  Total Treatment Time: 8 minutes    Min Units   OT Eval Low 97165  X  1   OT Eval Medium 73232      OT Eval High 73319      OT Re-Eval [id number]       Therapeutic Ex 53122       Therapeutic Activities 75360       ADL/Self Care 34754  8  1   Orthotic Management 19924       Manual 07081     Neuro Re-Ed 67788       Non-Billable Time              Evaluation Time additionally includes thorough review of current medical information, gathering information on past medical history/social history and prior level of function, completion of standardized testing/informal observation of tasks, assessment of data and education on plan of care and goals.             Daniela Stuart, MOT, OTR/L  # [id number]

## 2022-12-01 VITALS
BODY MASS INDEX: 25.09 KG/M2 | DIASTOLIC BLOOD PRESSURE: 62 MMHG | TEMPERATURE: 98.4 F | OXYGEN SATURATION: 100 % | RESPIRATION RATE: 18 BRPM | HEIGHT: 68 IN | SYSTOLIC BLOOD PRESSURE: 107 MMHG | HEART RATE: 94 BPM | WEIGHT: 165.57 LBS

## 2022-12-01 LAB
ANION GAP SERPL CALCULATED.3IONS-SCNC: 6 MMOL/L (ref 7–16)
BASOPHILS ABSOLUTE: 0.06 E9/L (ref 0–0.2)
BASOPHILS RELATIVE PERCENT: 1 % (ref 0–2)
BUN BLDV-MCNC: 14 MG/DL (ref 6–23)
CALCIUM SERPL-MCNC: 8.7 MG/DL (ref 8.6–10.2)
CHLORIDE BLD-SCNC: 101 MMOL/L (ref 98–107)
CO2: 25 MMOL/L (ref 22–29)
CREAT SERPL-MCNC: 1.1 MG/DL (ref 0.7–1.2)
EOSINOPHILS ABSOLUTE: 0.2 E9/L (ref 0.05–0.5)
EOSINOPHILS RELATIVE PERCENT: 3.2 % (ref 0–6)
GFR SERPL CREATININE-BSD FRML MDRD: >60 ML/MIN/1.73
GLUCOSE BLD-MCNC: 199 MG/DL (ref 74–99)
HCT VFR BLD CALC: 24.2 % (ref 37–54)
HEMOGLOBIN: 8.4 G/DL (ref 12.5–16.5)
IMMATURE GRANULOCYTES #: 0.02 E9/L
IMMATURE GRANULOCYTES %: 0.3 % (ref 0–5)
LYMPHOCYTES ABSOLUTE: 2.64 E9/L (ref 1.5–4)
LYMPHOCYTES RELATIVE PERCENT: 42.6 % (ref 20–42)
MCH RBC QN AUTO: 26.4 PG (ref 26–35)
MCHC RBC AUTO-ENTMCNC: 34.7 % (ref 32–34.5)
MCV RBC AUTO: 76.1 FL (ref 80–99.9)
METER GLUCOSE: 286 MG/DL (ref 74–99)
MONOCYTES ABSOLUTE: 0.5 E9/L (ref 0.1–0.95)
MONOCYTES RELATIVE PERCENT: 8.1 % (ref 2–12)
NEUTROPHILS ABSOLUTE: 2.77 E9/L (ref 1.8–7.3)
NEUTROPHILS RELATIVE PERCENT: 44.8 % (ref 43–80)
PDW BLD-RTO: 14.4 FL (ref 11.5–15)
PLATELET # BLD: 251 E9/L (ref 130–450)
PMV BLD AUTO: 10.6 FL (ref 7–12)
POTASSIUM REFLEX MAGNESIUM: 4.4 MMOL/L (ref 3.5–5)
RBC # BLD: 3.18 E12/L (ref 3.8–5.8)
SARS-COV-2, NAAT: NOT DETECTED
SODIUM BLD-SCNC: 132 MMOL/L (ref 132–146)
WBC # BLD: 6.2 E9/L (ref 4.5–11.5)

## 2022-12-01 PROCEDURE — 36415 COLL VENOUS BLD VENIPUNCTURE: CPT

## 2022-12-01 PROCEDURE — 87635 SARS-COV-2 COVID-19 AMP PRB: CPT

## 2022-12-01 PROCEDURE — 2580000003 HC RX 258: Performed by: FAMILY MEDICINE

## 2022-12-01 PROCEDURE — 85025 COMPLETE CBC W/AUTO DIFF WBC: CPT

## 2022-12-01 PROCEDURE — A4216 STERILE WATER/SALINE, 10 ML: HCPCS | Performed by: FAMILY MEDICINE

## 2022-12-01 PROCEDURE — 80048 BASIC METABOLIC PNL TOTAL CA: CPT

## 2022-12-01 PROCEDURE — 6360000002 HC RX W HCPCS: Performed by: FAMILY MEDICINE

## 2022-12-01 PROCEDURE — 82962 GLUCOSE BLOOD TEST: CPT

## 2022-12-01 PROCEDURE — 6370000000 HC RX 637 (ALT 250 FOR IP): Performed by: FAMILY MEDICINE

## 2022-12-01 PROCEDURE — C9113 INJ PANTOPRAZOLE SODIUM, VIA: HCPCS | Performed by: FAMILY MEDICINE

## 2022-12-01 RX ADMIN — ROSUVASTATIN 20 MG: 20 TABLET, FILM COATED ORAL at 09:09

## 2022-12-01 RX ADMIN — PANTOPRAZOLE SODIUM 40 MG: 40 INJECTION, POWDER, FOR SOLUTION INTRAVENOUS at 09:10

## 2022-12-01 RX ADMIN — ENOXAPARIN SODIUM 40 MG: 100 INJECTION SUBCUTANEOUS at 09:09

## 2022-12-01 RX ADMIN — INSULIN LISPRO 8 UNITS: 100 INJECTION, SOLUTION INTRAVENOUS; SUBCUTANEOUS at 12:03

## 2022-12-01 RX ADMIN — ASPIRIN 81 MG: 81 TABLET, COATED ORAL at 09:09

## 2022-12-01 RX ADMIN — TAMSULOSIN HYDROCHLORIDE 0.4 MG: 0.4 CAPSULE ORAL at 09:09

## 2022-12-01 RX ADMIN — SODIUM CHLORIDE, PRESERVATIVE FREE 10 ML: 5 INJECTION INTRAVENOUS at 09:10

## 2022-12-01 NOTE — CARE COORDINATION
Message left with son regarding discharge today at 11am. Physicians ambulette arranged yesterday. Facility aware of discharge time. Will need covid resulted prior to discharge. For questions I can be reached at [phone].  Lawerence Maxime Gilmore

## 2022-12-01 NOTE — PLAN OF CARE
Problem: Discharge Planning  Goal: Discharge to home or other facility with appropriate resources  Outcome: Progressing  Flowsheets (Taken 11/30/2022 2300)  Discharge to home or other facility with appropriate resources:   Identify barriers to discharge with patient and caregiver   Identify discharge learning needs (meds, wound care, etc)     Problem: Pain  Goal: Verbalizes/displays adequate comfort level or baseline comfort level  Outcome: Progressing  Flowsheets (Taken 11/30/2022 2300)  Verbalizes/displays adequate comfort level or baseline comfort level:   Encourage patient to monitor pain and request assistance   Assess pain using appropriate pain scale   Administer analgesics based on type and severity of pain and evaluate response     Problem: Safety - Adult  Goal: Free from fall injury  Outcome: Progressing  Flowsheets (Taken 12/1/2022 0548)  Free From Fall Injury: Instruct family/caregiver on patient safety     Problem: Skin/Tissue Integrity  Goal: Absence of new skin breakdown  Description: 1. Monitor for areas of redness and/or skin breakdown  2. Assess vascular access sites hourly  3. Every 4-6 hours minimum:  Change oxygen saturation probe site  4. Every 4-6 hours:  If on nasal continuous positive airway pressure, respiratory therapy assess nares and determine need for appliance change or resting period.   Outcome: Progressing     Problem: Chronic Conditions and Co-morbidities  Goal: Patient's chronic conditions and co-morbidity symptoms are monitored and maintained or improved  Outcome: Progressing  Flowsheets (Taken 11/30/2022 2300)  Care Plan - Patient's Chronic Conditions and Co-Morbidity Symptoms are Monitored and Maintained or Improved: Monitor and assess patient's chronic conditions and comorbid symptoms for stability, deterioration, or improvement

## 2022-12-01 NOTE — PLAN OF CARE
Problem: Discharge Planning  Goal: Discharge to home or other facility with appropriate resources  12/1/2022 1045 by Alexandra Almanza RN  Outcome: Progressing  12/1/2022 0548 by Idalia Marie RN  Outcome: Progressing  Flowsheets (Taken 11/30/2022 2300)  Discharge to home or other facility with appropriate resources:   Identify barriers to discharge with patient and caregiver   Identify discharge learning needs (meds, wound care, etc)     Problem: Pain  Goal: Verbalizes/displays adequate comfort level or baseline comfort level  12/1/2022 1045 by Alexandra Almanza RN  Outcome: Progressing  12/1/2022 0548 by Idalia Marie RN  Outcome: Progressing  Flowsheets (Taken 11/30/2022 2300)  Verbalizes/displays adequate comfort level or baseline comfort level:   Encourage patient to monitor pain and request assistance   Assess pain using appropriate pain scale   Administer analgesics based on type and severity of pain and evaluate response     Problem: Safety - Adult  Goal: Free from fall injury  12/1/2022 1045 by Alexandra Almanza RN  Outcome: Progressing  12/1/2022 0548 by Idalia Marie RN  Outcome: Progressing  Flowsheets (Taken 12/1/2022 0548)  Free From Fall Injury: Instruct family/caregiver on patient safety     Problem: Skin/Tissue Integrity  Goal: Absence of new skin breakdown  Description: 1. Monitor for areas of redness and/or skin breakdown  2. Assess vascular access sites hourly  3. Every 4-6 hours minimum:  Change oxygen saturation probe site  4. Every 4-6 hours:  If on nasal continuous positive airway pressure, respiratory therapy assess nares and determine need for appliance change or resting period.   12/1/2022 1045 by Alexandra Almanza RN  Outcome: Progressing  12/1/2022 0548 by Idalia Marie RN  Outcome: Progressing     Problem: Chronic Conditions and Co-morbidities  Goal: Patient's chronic conditions and co-morbidity symptoms are monitored and maintained or improved  12/1/2022 1045 by Alexandra Almanza RN  Outcome: Progressing  12/1/2022 0548 by Katie Palm, RN  Outcome: Progressing  Flowsheets (Taken 11/30/2022 2300)  Care Plan - Patient's Chronic Conditions and Co-Morbidity Symptoms are Monitored and Maintained or Improved: Monitor and assess patient's chronic conditions and comorbid symptoms for stability, deterioration, or improvement

## 2022-12-01 NOTE — PROGRESS NOTES
Department of Internal Medicine  Nephrology Consult Note    Events reviewed. SUBJECTIVE: We are following Mr. Vibha Selby for hyponatremia and CKD. Reports no complaints.     PHYSICAL EXAM:      Vitals:    VITALS:  /62   Pulse 94   Temp 98.4 °F (36.9 °C) (Oral)   Resp 18   Ht 5' 8\" (1.727 m)   Wt 165 lb 9.1 oz (75.1 kg)   SpO2 100%   BMI 25.17 kg/m²   24HR BLOOD PRESSURE RANGE:  Systolic (50WSV), ZHA:723 , Min:107 , XFY:934 ; Diastolic (19DKZ), HRK:07, Min:62, Max:75  24HR INTAKE/OUTPUT:    Intake/Output Summary (Last 24 hours) at 12/1/2022 1257  Last data filed at 12/1/2022 0934  Gross per 24 hour   Intake 360 ml   Output 700 ml   Net -340 ml         Constitutional:  Alert and oriented with intermittent confusion, ill-appearing  HEENT:  Normocephalic, PERRL, dry mucous membranes  Respiratory:  CTA bilaterally  Cardiovascular/Edema:  RRR, S1,S2  Gastrointestinal:  Soft, rounded, nontender, nondistended  Neurologic:  Nonfocal, [place]  Skin:  Warm, dry, intact  Other:  No edema     Scheduled Meds:   polyethylene glycol  17 g Oral Daily    insulin glargine-yfgn  4 Units SubCUTAneous Nightly    OLANZapine  7.5 mg Oral Nightly    rosuvastatin  20 mg Oral Daily    tamsulosin  0.4 mg Oral Daily    sodium chloride flush  5-40 mL IntraVENous 2 times per day    enoxaparin  40 mg SubCUTAneous Daily    pantoprazole (PROTONIX) 40 mg injection  40 mg IntraVENous Daily    insulin lispro  0-16 Units SubCUTAneous TID WC    insulin lispro  0-4 Units SubCUTAneous Nightly    aspirin EC  81 mg Oral Daily     Continuous Infusions:   dextrose      sodium chloride       PRN Meds:.glucose, dextrose bolus **OR** dextrose bolus, glucagon (rDNA), dextrose, sodium chloride flush, sodium chloride, potassium chloride **OR** potassium alternative oral replacement **OR** potassium chloride, ondansetron **OR** ondansetron, polyethylene glycol, acetaminophen **OR** acetaminophen      DATA:    CBC with Differential:    Lab Results Component Value Date/Time    WBC 6.2 12/01/2022 04:30 AM    RBC 3.18 12/01/2022 04:30 AM    HGB 8.4 12/01/2022 04:30 AM    HCT 24.2 12/01/2022 04:30 AM     12/01/2022 04:30 AM    MCV 76.1 12/01/2022 04:30 AM    MCH 26.4 12/01/2022 04:30 AM    MCHC 34.7 12/01/2022 04:30 AM    RDW 14.4 12/01/2022 04:30 AM    LYMPHOPCT 42.6 12/01/2022 04:30 AM    MONOPCT 8.1 12/01/2022 04:30 AM    BASOPCT 1.0 12/01/2022 04:30 AM    MONOSABS 0.50 12/01/2022 04:30 AM    LYMPHSABS 2.64 12/01/2022 04:30 AM    EOSABS 0.20 12/01/2022 04:30 AM    BASOSABS 0.06 12/01/2022 04:30 AM     CMP:    Lab Results   Component Value Date/Time     12/01/2022 04:30 AM    K 4.4 12/01/2022 04:30 AM     12/01/2022 04:30 AM    CO2 25 12/01/2022 04:30 AM    BUN 14 12/01/2022 04:30 AM    CREATININE 1.1 12/01/2022 04:30 AM    GFRAA >60 12/13/2020 05:22 AM    LABGLOM >60 12/01/2022 04:30 AM    GLUCOSE 199 12/01/2022 04:30 AM    PROT 5.8 11/27/2022 10:49 PM    LABALBU 3.3 11/27/2022 10:49 PM    CALCIUM 8.7 12/01/2022 04:30 AM    BILITOT 0.7 11/27/2022 10:49 PM    ALKPHOS 67 11/27/2022 10:49 PM    AST 13 11/27/2022 10:49 PM    ALT 12 11/27/2022 10:49 PM     Magnesium:    Lab Results   Component Value Date/Time    MG 1.8 11/22/2022 04:00 PM     Phosphorus:    Lab Results   Component Value Date/Time    PHOS 3.0 11/22/2022 04:00 PM     Radiology Review:      CT ABD/Pelvis 11/28/22   1. Moderate retrocardiac hiatal hernia with thickening of the distal   esophagus compatible with esophagitis. 2. Large rectal stool impaction. 3. Incidentally noted 4.9 cm right adrenal myelolipoma. RECOMMENDATIONS:   Careful clinical correlation and follow up recommended.           BRIEF SUMMARY OF INITIAL CONSULT:     Briefly, Mr. Alok Potts is a 74year old male with a PMH of CKD stage II (baseline creatinine 0.9-1.0 mg/dL) with mild proteinuria probably 2/2 diabetic nephropathy, IDDM with diabetic retinopathy, HTN, HLD, subdural hematoma (2018), TIA, dementia, anxiety and depression, recently admitted from 11/11/22 to 11/17/22 for failure to thrive, and again from 11/21/22 to 11/23/22 for hyponatremia, who presented to the ED on November 27, 2022 after a fall. His lab work was significant for sodium 126 mmol/L, reason for this consultation. Home medications include metformin, olanzapine, and lamotrigine. He reports that after his last admission he was discharged to [facility] and he has not been eating for the last 4-5 days. IMPRESSION/RECOMMENDATIONS:       Hypotonic hyponatremia likely 2/2 poor solute intake (tea and toast phenomenon), with mild component of translocation due to hyperglycemia. Urine sodium 26, urine chloride <20. Sodium levels improved since admission, stable last 24 hours. CKD stage II, with mild proteinuria, UACR: 119.5 mg/g, 2/2 diabetic kidney disease. Renal function is stable  Vitamin D deficiency, vitamin D 25 level 16, on ergocalciferol  Hx of HTN, generally controlled without medication  Urinary retention, Urology follows outpatient  ---------------------------------------------------------------  Type II DM with diabetic retinopathy, on SSI  HLD, on rosuvastatin   Mood disorder, on olanzapine, and lamotrigine  Hx adrenal adenoma, random cortisol level 6.88 11/21/22  Hx SDH  Microcytic anemia, ferritin 141, iron saturation 27%, folate 15.3, B12 394  Nutrition, regular diet, 1 liter fluid restriction, consider dietary consult/enteral nutrition/goals of care      Plan:     Continue fluid restriction: 1 L  Discharge planning. Patient seen, case and plan discussed with Dr. Yayo Melgoza.     Electronically signed by MIRA Dias CNP on 12/1/2022 at 12:58 PM

## 2022-12-01 NOTE — PROGRESS NOTES
Hospitalist Progress Note      Synopsis: Patient admitted on 11/27/2022 74 y.o. male with  past medical history of Anxiety, Bronchitis, Cellulitis, Chronic sinusitis, Depression, Diabetes mellitus ([address] Diabetic retinopathy ([address] Fracture of left foot, High cholesterol, Hypercholesteremia, Hypercholesterolemia, Hypertension, Lumbago, Moderate mood disorder ([address] and Third nerve palsy. Patient presents resides in a nursing facility . Patient sustained a fall . Patient states that he has fallen multiple times a day . Patient states that he is weak. He reports no chest pain fever chills nausea vomiting diarrhea . Patient was admitted 11/21 -11/23 due to hyponatremia . In the ED patient was hemodynamically stable. Lab data revael sodium 126 potassium 4.4 BUN 36 SCr 1.2 lactic acid 2.9 >> 1.8 glucose 355 WBC 12.5 HGB 10.0  Trop 43 >> 41 COVID neg CT head neg CT cervical spine neg beta hydroxy 2.39 CT abd /pelvis fecal impaction  esophagitis . Patient received fluid bolus . Patient will be admitted for further evaluation        Subjective    Feeling better wants to go home  No CP or SOB  No fever or chills   No uncontrolled pain  No vomiting or diarrhea   No events reported overnight     Exam:  /62   Pulse 94   Temp 98.4 °F (36.9 °C) (Oral)   Resp 18   Ht 5' 8\" (1.727 m)   Wt 165 lb 9.1 oz (75.1 kg)   SpO2 100%   BMI 25.17 kg/m²   General appearance: No apparent distress, appears stated age and cooperative. HEENT: Pupils equal, round, and reactive to light. Conjunctivae/corneas clear. Neck: Supple. No jugular venous distention. Trachea midline. Respiratory:  Normal respiratory effort. Clear to auscultation, bilaterally without Rales/Wheezes/Rhonchi. Cardiovascular: Regular rate and rhythm with normal S1/S2 without murmurs, rubs or gallops. Abdomen: Soft, non-tender, non-distended with normal bowel sounds. Musculoskeletal: No clubbing, cyanosis or edema bilaterally. Brisk capillary refill. 2+radial pulses. Skin:  No rashes    Neurologic: awake, alert and following commands    Medications:  Reviewed    Infusion Medications    dextrose      sodium chloride       Scheduled Medications    polyethylene glycol  17 g Oral Daily    insulin glargine-yfgn  4 Units SubCUTAneous Nightly    OLANZapine  7.5 mg Oral Nightly    rosuvastatin  20 mg Oral Daily    tamsulosin  0.4 mg Oral Daily    sodium chloride flush  5-40 mL IntraVENous 2 times per day    enoxaparin  40 mg SubCUTAneous Daily    pantoprazole (PROTONIX) 40 mg injection  40 mg IntraVENous Daily    insulin lispro  0-16 Units SubCUTAneous TID WC    insulin lispro  0-4 Units SubCUTAneous Nightly    aspirin EC  81 mg Oral Daily     PRN Meds: glucose, dextrose bolus **OR** dextrose bolus, glucagon (rDNA), dextrose, sodium chloride flush, sodium chloride, potassium chloride **OR** potassium alternative oral replacement **OR** potassium chloride, ondansetron **OR** ondansetron, polyethylene glycol, acetaminophen **OR** acetaminophen    I/O    Intake/Output Summary (Last 24 hours) at 12/1/2022 0837  Last data filed at 12/1/2022 0543  Gross per 24 hour   Intake 240 ml   Output 700 ml   Net -460 ml         Labs:   Recent Labs     11/29/22 0449 11/30/22 0427 12/01/22  0430   WBC 7.2 6.3 6.2   HGB 8.2* 8.4* 8.4*   HCT 23.1* 24.0* 24.2*    212 251         Recent Labs     11/29/22  0449 11/29/22  0724 11/29/22  1856 11/30/22  0427 12/01/22  0430      < > 131* 131* 132   K 4.0  --   --  4.0 4.4     --   --  98 101   CO2 25  --   --  26 25   BUN 20  --   --  14 14   CREATININE 1.0  --   --  1.0 1.1   CALCIUM 8.6  --   --  8.3* 8.7    < > = values in this interval not displayed. No results for input(s): PROT, ALB, ALKPHOS, ALT, AST, BILITOT, AMYLASE, LIPASE in the last 72 hours. No results for input(s): INR in the last 72 hours. No results for input(s): Normajean [place] in the last 72 hours.     Chronic labs:  Lab Results Component Value Date    CHOL 114 10/08/2020    TRIG 71 10/08/2020    HDL 41 10/08/2020    LDLCALC 59 10/08/2020    TSH 1.750 11/21/2022    PSA 0.86 09/28/2018    INR 1.5 11/11/2022    LABA1C 8.3 (H) 11/28/2022       Radiology:  Imaging studies reviewed today. ASSESSMENT:    Principal Problem:    Hyponatremia  Resolved Problems:    * No resolved hospital problems. *       Hyponatremia :   sodium 126 on admission , l hypotonic secondary to poor solute intake with component of pseudohyponatremia related to hyperglycemia . Patient was admitted  from 11/21-23 for the same issue   Admit inpatient vitals telemetry trend  sodium q6H urine studies nephrology appreciated improved with NS IVF      IDDM with hyperglycemia : glucose was 355 on admission beta  hydroxy 2.39 lactic acid was 2.8 but correct with ivf . Patient is not in DKA Cw  IVF  POCT ACHS sliding scale       Falls :likely due hyponatremia and deconditioning    COVID NEG PT/OT  fall precautions CT head neg      Elevated Trop : 43 >> 41 at baseline . Patient reports no chest pain . EKG no acute changes      Stool impaction : as seen on CT A/P on admission . Patient received enema in ED      Abdominal pain ; CT A/P revealed esophagitis IV Protonix     Lactic acidosis : 2.8 >> 1.8 with IVF      Microcytic Anemia : chronic       Myeolipoma : as seen on CT 4.9 cm      Dementia   stable-at risk for delirium in the hospital  Avoid circadian disruption  Avoid sedating anticholinergic medications  Monitor        Diet: ADULT DIET; Regular; 3 carb choices (45 gm/meal)  Code Status: Full Code  PT/OT Eval Status:     DVT Prophylaxis:     Recommended disposition at discharge: Stable for discharge-to SNF pending precertification-- today    +++++++++++++++++++++++++++++++++++++++++++++++++  Radha Dos Santos MD   [facility].  +++++++++++++++++++++++++++++++++++++++++++++++++  NOTE: This report was transcribed using voice recognition software.  Every effort was made to ensure accuracy; however, inadvertent computerized transcription errors may be present.

## 2022-12-03 LAB
BLOOD CULTURE, ROUTINE: NORMAL
CULTURE, BLOOD 2: NORMAL

## 2022-12-03 NOTE — PROGRESS NOTES
Physician Progress Note      PATIENTColletta Ada  CSN #:                  [id number]  :                       1955  ADMIT DATE:       2022 6:29 PM  [address] DATE:        2022 3:52 PM  RESPONDING  PROVIDER #:        Serena Warner DO          QUERY TEXT:    Dr. Stan Hogan,    Patient admitted with weakness. Noted documentation of \"hyponatremia\" and   \"pseudohyponatremia\" both in the  Progress note. If possible, please document in progress notes and discharge summary if you   are evaluating and /or treating any of the following: The medical record reflects the following:  Risk Factors: Weakness, poor PO intake  Clinical Indicators: per the  Nephrology consult note \"Hypotonic   hyponatremia likely 2/2 poor solute intake (tea and toast phenomenon), versus   lab error??\" the  progress notes states \"Pseudohyponatremia - Corrected   sodium is 132\" On admission Na 121 and the H&P states \"Now \"121 today\"- but I   feel this is lab error\"  Treatment: Nephrology consult, IVFs, Fluid restriction    Thank you,  Maggi Laguna RN  Clinical Documentation Improvement  [email or web address]  Options provided:  -- Hyponatremia confirmed and pseudohyponatremia ruled out  -- pseudohyponatremia confirmed and Hyponatremia ruled out  -- Other - I will add my own diagnosis  -- Disagree - Not applicable / Not valid  -- Disagree - Clinically unable to determine / Unknown  -- Refer to Clinical Documentation Reviewer    PROVIDER RESPONSE TEXT:    After study, pseudohyponatremia confirmed and Hyponatremia ruled out.     Query created by: Kunal Trammell on 2022 7:13 AM      Electronically signed by:  Serena Warner DO 12/3/2022 11:01 AM

## 2022-12-08 ENCOUNTER — APPOINTMENT (OUTPATIENT)
Dept: GENERAL RADIOLOGY | Age: 67
DRG: 378 | End: 2022-12-08
Payer: MEDICARE

## 2022-12-08 ENCOUNTER — HOSPITAL ENCOUNTER (INPATIENT)
Age: 67
LOS: 6 days | Discharge: NURSING, REHAB OR LONG-TERM CARE | DRG: 378 | End: 2022-12-15
Attending: EMERGENCY MEDICINE | Admitting: INTERNAL MEDICINE
Payer: MEDICARE

## 2022-12-08 ENCOUNTER — TELEPHONE (OUTPATIENT)
Dept: OTHER | Facility: CLINIC | Age: 67
End: 2022-12-08

## 2022-12-08 DIAGNOSIS — D72.829 LEUKOCYTOSIS, UNSPECIFIED TYPE: ICD-10-CM

## 2022-12-08 DIAGNOSIS — R06.02 SHORTNESS OF BREATH: ICD-10-CM

## 2022-12-08 DIAGNOSIS — K92.1 GASTROINTESTINAL HEMORRHAGE WITH MELENA: Primary | ICD-10-CM

## 2022-12-08 DIAGNOSIS — D64.9 ACUTE ANEMIA: ICD-10-CM

## 2022-12-08 DIAGNOSIS — K92.2 GASTROINTESTINAL HEMORRHAGE, UNSPECIFIED GASTROINTESTINAL HEMORRHAGE TYPE: ICD-10-CM

## 2022-12-08 LAB
ALBUMIN SERPL-MCNC: 3 G/DL (ref 3.5–5.2)
ALP BLD-CCNC: 74 U/L (ref 40–129)
ALT SERPL-CCNC: 13 U/L (ref 0–40)
ANION GAP SERPL CALCULATED.3IONS-SCNC: 9 MMOL/L (ref 7–16)
AST SERPL-CCNC: 12 U/L (ref 0–39)
B.E.: -0.8 MMOL/L (ref -3–3)
BASOPHILS ABSOLUTE: 0.04 E9/L (ref 0–0.2)
BASOPHILS RELATIVE PERCENT: 0.2 % (ref 0–2)
BILIRUB SERPL-MCNC: 0.6 MG/DL (ref 0–1.2)
BUN BLDV-MCNC: 31 MG/DL (ref 6–23)
CALCIUM SERPL-MCNC: 9 MG/DL (ref 8.6–10.2)
CHLORIDE BLD-SCNC: 101 MMOL/L (ref 98–107)
CO2: 24 MMOL/L (ref 22–29)
COHB: 1.1 % (ref 0–1.5)
CREAT SERPL-MCNC: 1.2 MG/DL (ref 0.7–1.2)
CRITICAL: ABNORMAL
DATE ANALYZED: ABNORMAL
DATE OF COLLECTION: ABNORMAL
EOSINOPHILS ABSOLUTE: 0.02 E9/L (ref 0.05–0.5)
EOSINOPHILS RELATIVE PERCENT: 0.1 % (ref 0–6)
GFR SERPL CREATININE-BSD FRML MDRD: >60 ML/MIN/1.73
GLUCOSE BLD-MCNC: 215 MG/DL (ref 74–99)
HCO3: 22.8 MMOL/L (ref 22–26)
HCT VFR BLD CALC: 23.7 % (ref 37–54)
HEMOGLOBIN: 8.2 G/DL (ref 12.5–16.5)
HHB: 5.3 % (ref 0–5)
IMMATURE GRANULOCYTES #: 0.08 E9/L
IMMATURE GRANULOCYTES %: 0.5 % (ref 0–5)
INFLUENZA A BY PCR: NOT DETECTED
INFLUENZA B BY PCR: NOT DETECTED
LAB: ABNORMAL
LACTIC ACID: 0.9 MMOL/L (ref 0.5–2.2)
LIPASE: 61 U/L (ref 13–60)
LYMPHOCYTES ABSOLUTE: 2.38 E9/L (ref 1.5–4)
LYMPHOCYTES RELATIVE PERCENT: 13.8 % (ref 20–42)
Lab: ABNORMAL
MAGNESIUM: 1.7 MG/DL (ref 1.6–2.6)
MCH RBC QN AUTO: 25.6 PG (ref 26–35)
MCHC RBC AUTO-ENTMCNC: 34.6 % (ref 32–34.5)
MCV RBC AUTO: 74.1 FL (ref 80–99.9)
METHB: 0.3 % (ref 0–1.5)
MODE: ABNORMAL
MONOCYTES ABSOLUTE: 1.14 E9/L (ref 0.1–0.95)
MONOCYTES RELATIVE PERCENT: 6.6 % (ref 2–12)
NEUTROPHILS ABSOLUTE: 13.57 E9/L (ref 1.8–7.3)
NEUTROPHILS RELATIVE PERCENT: 78.8 % (ref 43–80)
O2 CONTENT: 12 ML/DL
O2 SATURATION: 94.6 % (ref 92–98.5)
O2HB: 93.3 % (ref 94–97)
OPERATOR ID: 1023
PATIENT TEMP: 37 C
PCO2: 33.2 MMHG (ref 35–45)
PDW BLD-RTO: 14.5 FL (ref 11.5–15)
PH BLOOD GAS: 7.45 (ref 7.35–7.45)
PLATELET # BLD: 330 E9/L (ref 130–450)
PMV BLD AUTO: 10.1 FL (ref 7–12)
PO2: 73.2 MMHG (ref 75–100)
POTASSIUM SERPL-SCNC: 4.3 MMOL/L (ref 3.5–5)
PRO-BNP: 95 PG/ML (ref 0–125)
RBC # BLD: 3.2 E12/L (ref 3.8–5.8)
RSV BY PCR: NEGATIVE
SARS-COV-2, NAAT: NOT DETECTED
SODIUM BLD-SCNC: 134 MMOL/L (ref 132–146)
SOURCE, BLOOD GAS: ABNORMAL
THB: 9.1 G/DL (ref 11.5–16.5)
TIME ANALYZED: 2309
TOTAL PROTEIN: 5.4 G/DL (ref 6.4–8.3)
TROPONIN, HIGH SENSITIVITY: 39 NG/L (ref 0–11)
WBC # BLD: 17.2 E9/L (ref 4.5–11.5)

## 2022-12-08 PROCEDURE — 83690 ASSAY OF LIPASE: CPT

## 2022-12-08 PROCEDURE — 93005 ELECTROCARDIOGRAM TRACING: CPT | Performed by: EMERGENCY MEDICINE

## 2022-12-08 PROCEDURE — 85025 COMPLETE CBC W/AUTO DIFF WBC: CPT

## 2022-12-08 PROCEDURE — 83880 ASSAY OF NATRIURETIC PEPTIDE: CPT

## 2022-12-08 PROCEDURE — 87040 BLOOD CULTURE FOR BACTERIA: CPT

## 2022-12-08 PROCEDURE — 71045 X-RAY EXAM CHEST 1 VIEW: CPT

## 2022-12-08 PROCEDURE — 81001 URINALYSIS AUTO W/SCOPE: CPT

## 2022-12-08 PROCEDURE — 84484 ASSAY OF TROPONIN QUANT: CPT

## 2022-12-08 PROCEDURE — 82805 BLOOD GASES W/O2 SATURATION: CPT

## 2022-12-08 PROCEDURE — 87635 SARS-COV-2 COVID-19 AMP PRB: CPT

## 2022-12-08 PROCEDURE — 83930 ASSAY OF BLOOD OSMOLALITY: CPT

## 2022-12-08 PROCEDURE — 83735 ASSAY OF MAGNESIUM: CPT

## 2022-12-08 PROCEDURE — 99285 EMERGENCY DEPT VISIT HI MDM: CPT

## 2022-12-08 PROCEDURE — 83605 ASSAY OF LACTIC ACID: CPT

## 2022-12-08 PROCEDURE — 87088 URINE BACTERIA CULTURE: CPT

## 2022-12-08 PROCEDURE — 87807 RSV ASSAY W/OPTIC: CPT

## 2022-12-08 PROCEDURE — 0202U NFCT DS 22 TRGT SARS-COV-2: CPT

## 2022-12-08 PROCEDURE — 80053 COMPREHEN METABOLIC PANEL: CPT

## 2022-12-08 PROCEDURE — 87502 INFLUENZA DNA AMP PROBE: CPT

## 2022-12-08 NOTE — LETTER
[place] Department Medicaid  CERTIFICATION OF NECESSITY  FOR NON-EMERGENCY TRANSPORTATION   BY GROUND AMBULANCE      Individual Information   1. Name: Wallace Taylor 2. [place] Medicaid Billing Number:    3. Address: [facility] [place], [place] [address] [facility]      Transportation Provider Information   4. Provider Name:    5. [place] Medicaid Provider Number:  National Provider Identifier (NPI):      Certification  7. Criteria:  During transport, this individual requires:  [x] Medical treatment or continuous     supervision by an EMT. [] The administration or regulation of oxygen by another person. [] Supervised protective restraint. 8. Period Beginning Date:    5. Length  [x] Not more than 1 day(s)  [] One Year     Additional Information Relevant to Certification   10. Comments or Explanations, If Necessary or Appropriate   Vascular dementia, Hx: Traumatic subdural hemorrhage with loss of consciousness , generalized weakness impairing functional mobility. Certifying Practitioner Information   11. Name of Practitioner: Dr. Sada Peter DO   12. [place] Medicaid Provider Number, If Applicable:  [address] Provider Identifier (NPI):      Signature Information   14. Date of Signature: 12/13/2022   15. Name of Person Signing: Electronically signed by Lisbeth Goodman RN on 12/13/2022 at 3:31 PM     16.  Signature and Professional Designation: Electronically signed by Lisbeth Goodman RN on 12/13/2022 at 3:31 PM  Discharge [address] [place] [id number]  Rev. 7/2015

## 2022-12-09 ENCOUNTER — APPOINTMENT (OUTPATIENT)
Dept: CT IMAGING | Age: 67
DRG: 378 | End: 2022-12-09
Payer: MEDICARE

## 2022-12-09 PROBLEM — D72.829 LEUKOCYTOSIS: Status: ACTIVE | Noted: 2022-12-09

## 2022-12-09 PROBLEM — K92.2 GI BLEED: Status: ACTIVE | Noted: 2022-01-01

## 2022-12-09 LAB
ADENOVIRUS BY PCR: NOT DETECTED
BACTERIA: ABNORMAL /HPF
BILIRUBIN URINE: NEGATIVE
BLOOD, URINE: NEGATIVE
BORDETELLA PARAPERTUSSIS BY PCR: NOT DETECTED
BORDETELLA PERTUSSIS BY PCR: NOT DETECTED
C-REACTIVE PROTEIN: 5.6 MG/DL (ref 0–0.4)
CHLAMYDOPHILIA PNEUMONIAE BY PCR: NOT DETECTED
CLARITY: CLEAR
COLOR: YELLOW
CORONAVIRUS 229E BY PCR: NOT DETECTED
CORONAVIRUS HKU1 BY PCR: NOT DETECTED
CORONAVIRUS NL63 BY PCR: NOT DETECTED
CORONAVIRUS OC43 BY PCR: NOT DETECTED
EKG ATRIAL RATE: 99 BPM
EKG P AXIS: 64 DEGREES
EKG P-R INTERVAL: 128 MS
EKG Q-T INTERVAL: 344 MS
EKG QRS DURATION: 80 MS
EKG QTC CALCULATION (BAZETT): 441 MS
EKG R AXIS: 19 DEGREES
EKG T AXIS: 47 DEGREES
EKG VENTRICULAR RATE: 99 BPM
EPITHELIAL CELLS, UA: ABNORMAL /HPF
GLUCOSE URINE: >=1000 MG/DL
HUMAN METAPNEUMOVIRUS BY PCR: NOT DETECTED
HUMAN RHINOVIRUS/ENTEROVIRUS BY PCR: NOT DETECTED
INFLUENZA A BY PCR: NOT DETECTED
INFLUENZA B BY PCR: NOT DETECTED
KETONES, URINE: 15 MG/DL
LEUKOCYTE ESTERASE, URINE: NEGATIVE
METER GLUCOSE: 183 MG/DL (ref 74–99)
METER GLUCOSE: 198 MG/DL (ref 74–99)
METER GLUCOSE: 208 MG/DL (ref 74–99)
METER GLUCOSE: 217 MG/DL (ref 74–99)
MYCOPLASMA PNEUMONIAE BY PCR: NOT DETECTED
NITRITE, URINE: NEGATIVE
OSMOLALITY: 302 MOSM/KG (ref 285–310)
PARAINFLUENZA VIRUS 1 BY PCR: NOT DETECTED
PARAINFLUENZA VIRUS 2 BY PCR: NOT DETECTED
PARAINFLUENZA VIRUS 3 BY PCR: NOT DETECTED
PARAINFLUENZA VIRUS 4 BY PCR: NOT DETECTED
PH UA: 6 (ref 5–9)
PROCALCITONIN: 0.13 NG/ML (ref 0–0.08)
PROTEIN UA: ABNORMAL MG/DL
RBC UA: ABNORMAL /HPF (ref 0–2)
RESPIRATORY SYNCYTIAL VIRUS BY PCR: NOT DETECTED
SARS-COV-2, PCR: NOT DETECTED
SPECIFIC GRAVITY UA: 1.01 (ref 1–1.03)
TROPONIN, HIGH SENSITIVITY: 38 NG/L (ref 0–11)
UROBILINOGEN, URINE: 1 E.U./DL
WBC UA: ABNORMAL /HPF (ref 0–5)

## 2022-12-09 PROCEDURE — 6360000002 HC RX W HCPCS: Performed by: NURSE PRACTITIONER

## 2022-12-09 PROCEDURE — 6370000000 HC RX 637 (ALT 250 FOR IP): Performed by: NURSE PRACTITIONER

## 2022-12-09 PROCEDURE — 2580000003 HC RX 258: Performed by: NURSE PRACTITIONER

## 2022-12-09 PROCEDURE — 82962 GLUCOSE BLOOD TEST: CPT

## 2022-12-09 PROCEDURE — C9113 INJ PANTOPRAZOLE SODIUM, VIA: HCPCS | Performed by: NURSE PRACTITIONER

## 2022-12-09 PROCEDURE — 6360000002 HC RX W HCPCS: Performed by: EMERGENCY MEDICINE

## 2022-12-09 PROCEDURE — 86140 C-REACTIVE PROTEIN: CPT

## 2022-12-09 PROCEDURE — 2580000003 HC RX 258: Performed by: EMERGENCY MEDICINE

## 2022-12-09 PROCEDURE — APPSS45 APP SPLIT SHARED TIME 31-45 MINUTES: Performed by: NURSE PRACTITIONER

## 2022-12-09 PROCEDURE — 99222 1ST HOSP IP/OBS MODERATE 55: CPT | Performed by: INTERNAL MEDICINE

## 2022-12-09 PROCEDURE — 84484 ASSAY OF TROPONIN QUANT: CPT

## 2022-12-09 PROCEDURE — A4216 STERILE WATER/SALINE, 10 ML: HCPCS | Performed by: NURSE PRACTITIONER

## 2022-12-09 PROCEDURE — 2580000003 HC RX 258: Performed by: RADIOLOGY

## 2022-12-09 PROCEDURE — C9113 INJ PANTOPRAZOLE SODIUM, VIA: HCPCS | Performed by: EMERGENCY MEDICINE

## 2022-12-09 PROCEDURE — 2060000000 HC ICU INTERMEDIATE R&B

## 2022-12-09 PROCEDURE — 71275 CT ANGIOGRAPHY CHEST: CPT

## 2022-12-09 PROCEDURE — 93010 ELECTROCARDIOGRAM REPORT: CPT | Performed by: INTERNAL MEDICINE

## 2022-12-09 PROCEDURE — 84145 PROCALCITONIN (PCT): CPT

## 2022-12-09 PROCEDURE — 6360000004 HC RX CONTRAST MEDICATION: Performed by: RADIOLOGY

## 2022-12-09 RX ORDER — ROSUVASTATIN CALCIUM 10 MG/1
10 TABLET, COATED ORAL DAILY
Status: DISCONTINUED | OUTPATIENT
Start: 2022-12-09 | End: 2022-12-15 | Stop reason: HOSPADM

## 2022-12-09 RX ORDER — TAMSULOSIN HYDROCHLORIDE 0.4 MG/1
0.4 CAPSULE ORAL DAILY
Status: DISCONTINUED | OUTPATIENT
Start: 2022-12-09 | End: 2022-12-15 | Stop reason: HOSPADM

## 2022-12-09 RX ORDER — SODIUM CHLORIDE 0.9 % (FLUSH) 0.9 %
10 SYRINGE (ML) INJECTION PRN
Status: COMPLETED | OUTPATIENT
Start: 2022-12-09 | End: 2022-12-09

## 2022-12-09 RX ORDER — SODIUM CHLORIDE 1000 MG
1 TABLET, SOLUBLE MISCELLANEOUS
Status: DISCONTINUED | OUTPATIENT
Start: 2022-12-09 | End: 2022-12-15 | Stop reason: HOSPADM

## 2022-12-09 RX ORDER — ACETAMINOPHEN 650 MG/1
650 SUPPOSITORY RECTAL EVERY 6 HOURS PRN
Status: DISCONTINUED | OUTPATIENT
Start: 2022-12-09 | End: 2022-12-15 | Stop reason: HOSPADM

## 2022-12-09 RX ORDER — INSULIN GLARGINE 100 [IU]/ML
5 INJECTION, SOLUTION SUBCUTANEOUS NIGHTLY
COMMUNITY

## 2022-12-09 RX ORDER — INSULIN LISPRO 100 [IU]/ML
0-4 INJECTION, SOLUTION INTRAVENOUS; SUBCUTANEOUS NIGHTLY
Status: DISCONTINUED | OUTPATIENT
Start: 2022-12-09 | End: 2022-12-15 | Stop reason: HOSPADM

## 2022-12-09 RX ORDER — POLYETHYLENE GLYCOL 3350 17 G/17G
17 POWDER, FOR SOLUTION ORAL DAILY PRN
Status: DISCONTINUED | OUTPATIENT
Start: 2022-12-09 | End: 2022-12-15 | Stop reason: HOSPADM

## 2022-12-09 RX ORDER — ONDANSETRON 4 MG/1
4 TABLET, ORALLY DISINTEGRATING ORAL EVERY 8 HOURS PRN
Status: DISCONTINUED | OUTPATIENT
Start: 2022-12-09 | End: 2022-12-15 | Stop reason: HOSPADM

## 2022-12-09 RX ORDER — PANTOPRAZOLE SODIUM 40 MG/1
40 TABLET, DELAYED RELEASE ORAL DAILY
COMMUNITY

## 2022-12-09 RX ORDER — SODIUM CHLORIDE 0.9 % (FLUSH) 0.9 %
5-40 SYRINGE (ML) INJECTION PRN
Status: DISCONTINUED | OUTPATIENT
Start: 2022-12-09 | End: 2022-12-15 | Stop reason: HOSPADM

## 2022-12-09 RX ORDER — INSULIN LISPRO 100 [IU]/ML
0-4 INJECTION, SOLUTION INTRAVENOUS; SUBCUTANEOUS
Status: DISCONTINUED | OUTPATIENT
Start: 2022-12-09 | End: 2022-12-15 | Stop reason: HOSPADM

## 2022-12-09 RX ORDER — SODIUM CHLORIDE 9 MG/ML
INJECTION, SOLUTION INTRAVENOUS PRN
Status: DISCONTINUED | OUTPATIENT
Start: 2022-12-09 | End: 2022-12-15 | Stop reason: HOSPADM

## 2022-12-09 RX ORDER — DEXTROSE MONOHYDRATE 100 MG/ML
INJECTION, SOLUTION INTRAVENOUS CONTINUOUS PRN
Status: DISCONTINUED | OUTPATIENT
Start: 2022-12-09 | End: 2022-12-15 | Stop reason: HOSPADM

## 2022-12-09 RX ORDER — PANTOPRAZOLE SODIUM 40 MG/10ML
80 INJECTION, POWDER, LYOPHILIZED, FOR SOLUTION INTRAVENOUS ONCE
Status: COMPLETED | OUTPATIENT
Start: 2022-12-09 | End: 2022-12-09

## 2022-12-09 RX ORDER — SODIUM CHLORIDE 1000 MG
1 TABLET, SOLUBLE MISCELLANEOUS DAILY
COMMUNITY

## 2022-12-09 RX ORDER — SODIUM CHLORIDE 0.9 % (FLUSH) 0.9 %
5-40 SYRINGE (ML) INJECTION EVERY 12 HOURS SCHEDULED
Status: DISCONTINUED | OUTPATIENT
Start: 2022-12-09 | End: 2022-12-15 | Stop reason: HOSPADM

## 2022-12-09 RX ORDER — SODIUM CHLORIDE 9 MG/ML
INJECTION, SOLUTION INTRAVENOUS CONTINUOUS
Status: DISCONTINUED | OUTPATIENT
Start: 2022-12-09 | End: 2022-12-15 | Stop reason: HOSPADM

## 2022-12-09 RX ORDER — CALCIUM CARBONATE 750 MG/1
750 TABLET ORAL EVERY 12 HOURS PRN
COMMUNITY

## 2022-12-09 RX ORDER — ONDANSETRON 2 MG/ML
4 INJECTION INTRAMUSCULAR; INTRAVENOUS EVERY 6 HOURS PRN
Status: DISCONTINUED | OUTPATIENT
Start: 2022-12-09 | End: 2022-12-15 | Stop reason: HOSPADM

## 2022-12-09 RX ORDER — SENNA PLUS 8.6 MG/1
2 TABLET ORAL NIGHTLY PRN
Status: DISCONTINUED | OUTPATIENT
Start: 2022-12-09 | End: 2022-12-15 | Stop reason: HOSPADM

## 2022-12-09 RX ORDER — ACETAMINOPHEN 325 MG/1
650 TABLET ORAL EVERY 6 HOURS PRN
Status: DISCONTINUED | OUTPATIENT
Start: 2022-12-09 | End: 2022-12-15 | Stop reason: HOSPADM

## 2022-12-09 RX ORDER — AMOXICILLIN 250 MG
1 CAPSULE ORAL 2 TIMES DAILY
COMMUNITY

## 2022-12-09 RX ADMIN — TAMSULOSIN HYDROCHLORIDE 0.4 MG: 0.4 CAPSULE ORAL at 07:35

## 2022-12-09 RX ADMIN — SODIUM CHLORIDE 1 G: 1 TABLET ORAL at 12:20

## 2022-12-09 RX ADMIN — SODIUM CHLORIDE 3000 MG: 900 INJECTION INTRAVENOUS at 02:55

## 2022-12-09 RX ADMIN — SODIUM CHLORIDE 40 MG: 9 INJECTION, SOLUTION INTRAMUSCULAR; INTRAVENOUS; SUBCUTANEOUS at 07:32

## 2022-12-09 RX ADMIN — SODIUM CHLORIDE 1 G: 1 TABLET ORAL at 16:08

## 2022-12-09 RX ADMIN — OLANZAPINE 7.5 MG: 2.5 TABLET, FILM COATED ORAL at 20:40

## 2022-12-09 RX ADMIN — IOPAMIDOL 75 ML: 755 INJECTION, SOLUTION INTRAVENOUS at 00:49

## 2022-12-09 RX ADMIN — SODIUM CHLORIDE 1 G: 1 TABLET ORAL at 07:35

## 2022-12-09 RX ADMIN — SODIUM CHLORIDE: 9 INJECTION, SOLUTION INTRAVENOUS at 02:54

## 2022-12-09 RX ADMIN — ROSUVASTATIN CALCIUM 10 MG: 10 TABLET, FILM COATED ORAL at 09:32

## 2022-12-09 RX ADMIN — SODIUM CHLORIDE, PRESERVATIVE FREE 10 ML: 5 INJECTION INTRAVENOUS at 00:44

## 2022-12-09 RX ADMIN — SODIUM CHLORIDE: 9 INJECTION, SOLUTION INTRAVENOUS at 15:47

## 2022-12-09 RX ADMIN — PANTOPRAZOLE SODIUM 80 MG: 40 INJECTION, POWDER, FOR SOLUTION INTRAVENOUS at 02:28

## 2022-12-09 ASSESSMENT — PAIN SCALES - GENERAL
PAINLEVEL_OUTOF10: 0
PAINLEVEL_OUTOF10: 0

## 2022-12-09 ASSESSMENT — PAIN - FUNCTIONAL ASSESSMENT: PAIN_FUNCTIONAL_ASSESSMENT: NONE - DENIES PAIN

## 2022-12-09 NOTE — ED PROVIDER NOTES
Delmi Walsh is a 74 y.o. male presenting to the ED for shortness of breath, cough, beginning pta ago. The complaint has been intermittent, moderate in severity, and worsened by nothing. 73 yo m who presents w [facility] for alledged heart rate of 150 and low pox read by lpn there. Pt presents here w normal vitals pox 94-95 on ra, normal hr in sinus rhythm. Pt does report cough and sob for a few days. Pt knows he is in the emergency room and is in no pain, denies any vomiting abdominal pain or diarrhea. Pt takes food by mouth and denies any aspiration history. He denies any chills or subjective fevers. History is limited due to minimal verbal communication. Pt is ordered speech therapy at [facility]. He is a type 1 diabetc pcpc dr Mikaela Mora at [facility]. Pt does receive speech therapy at [facility] for swallowing and speech    Review of Systems:   Review of Systems   Unable to perform ROS: Other minimal verbal communication            --------------------------------------------- PAST HISTORY ---------------------------------------------  Past Medical History:  has a past medical history of Anxiety, Bronchitis, Cellulitis, Chronic sinusitis, Depression, Diabetes mellitus ([address] Diabetic retinopathy ([facility] [address] Fracture of left foot, High cholesterol, Hypercholesteremia, Hypercholesterolemia, Hypertension, Lumbago, Moderate mood disorder ([address] and Third nerve palsy. Past Surgical History:  has a past surgical history that includes eye surgery; hip surgery (Right, 11/19/2020); and hip surgery (Left, 12/10/2020). Social History:  reports that he has never smoked. He has never used smokeless tobacco. He reports that he does not drink alcohol and does not use drugs. Family History: family history is not on file. The patients home medications have been reviewed.     Allergies: Empagliflozin and Liraglutide    -------------------------------------------------- RESULTS -------------------------------------------------  All laboratory and radiology results have been personally reviewed by myself   LABS:  Results for orders placed or performed during the hospital encounter of 12/08/22   COVID-19, Rapid    Specimen: Nasopharyngeal Swab   Result Value Ref Range    SARS-CoV-2, NAAT Not Detected Not Detected   RAPID INFLUENZA A/B ANTIGENS    Specimen: Nasopharyngeal   Result Value Ref Range    Influenza A by PCR Not Detected Not Detected    Influenza B by PCR Not Detected Not Detected   Rapid RSV Antigen    Specimen: Nasopharyngeal Swab   Result Value Ref Range    RSV by PCR Negative Negative   CMP   Result Value Ref Range    Sodium 134 132 - 146 mmol/L    Potassium 4.3 3.5 - 5.0 mmol/L    Chloride 101 98 - 107 mmol/L    CO2 24 22 - 29 mmol/L    Anion Gap 9 7 - 16 mmol/L    Glucose 215 (H) 74 - 99 mg/dL    BUN 31 (H) 6 - 23 mg/dL    Creatinine 1.2 0.7 - 1.2 mg/dL    Est, Glom Filt Rate >60 >=60 mL/min/1.73    Calcium 9.0 8.6 - 10.2 mg/dL    Total Protein 5.4 (L) 6.4 - 8.3 g/dL    Albumin 3.0 (L) 3.5 - 5.2 g/dL    Total Bilirubin 0.6 0.0 - 1.2 mg/dL    Alkaline Phosphatase 74 40 - 129 U/L    ALT 13 0 - 40 U/L    AST 12 0 - 39 U/L   Lactic Acid   Result Value Ref Range    Lactic Acid 0.9 0.5 - 2.2 mmol/L   Lipase   Result Value Ref Range    Lipase 61 (H) 13 - 60 U/L   Magnesium   Result Value Ref Range    Magnesium 1.7 1.6 - 2.6 mg/dL   CBC with Auto Differential   Result Value Ref Range    WBC 17.2 (H) 4.5 - 11.5 E9/L    RBC 3.20 (L) 3.80 - 5.80 E12/L    Hemoglobin 8.2 (L) 12.5 - 16.5 g/dL    Hematocrit 23.7 (L) 37.0 - 54.0 %    MCV 74.1 (L) 80.0 - 99.9 fL    MCH 25.6 (L) 26.0 - 35.0 pg    MCHC 34.6 (H) 32.0 - 34.5 %    RDW 14.5 11.5 - 15.0 fL    Platelets 486 157 - 073 E9/L    MPV 10.1 7.0 - 12.0 fL    Neutrophils % 78.8 43.0 - 80.0 %    Immature Granulocytes % 0.5 0.0 - 5.0 %    Lymphocytes % 13.8 (L) 20.0 - 42.0 %    Monocytes % 6.6 2.0 - 12.0 %    Eosinophils % 0.1 0.0 - 6.0 % Basophils % 0.2 0.0 - 2.0 %    Neutrophils Absolute 13.57 (H) 1.80 - 7.30 E9/L    Immature Granulocytes # 0.08 E9/L    Lymphocytes Absolute 2.38 1.50 - 4.00 E9/L    Monocytes Absolute 1.14 (H) 0.10 - 0.95 E9/L    Eosinophils Absolute 0.02 (L) 0.05 - 0.50 E9/L    Basophils Absolute 0.04 0.00 - 0.20 E9/L   Troponin   Result Value Ref Range    Troponin, High Sensitivity 39 (H) 0 - 11 ng/L   Brain Natriuretic Peptide   Result Value Ref Range    Pro-BNP 95 0 - 125 pg/mL   Osmolality, Serum   Result Value Ref Range    Osmolality 302 285 - 310 mOsm/Kg   Blood Gas, Arterial   Result Value Ref Range    Date Analyzed 20221208     Time Analyzed 2309     Source: Blood Arterial     pH, Blood Gas 7.454 (H) 7.350 - 7.450    PCO2 33.2 (L) 35.0 - 45.0 mmHg    PO2 73.2 (L) 75.0 - 100.0 mmHg    HCO3 22.8 22.0 - 26.0 mmol/L    B.E. -0.8 -3.0 - 3.0 mmol/L    O2 Sat 94.6 92.0 - 98.5 %    O2Hb 93.3 (L) 94.0 - 97.0 %    COHb 1.1 0.0 - 1.5 %    MetHb 0.3 0.0 - 1.5 %    O2 Content 12.0 mL/dL    HHb 5.3 (H) 0.0 - 5.0 %    tHb (est) 9.1 (L) 11.5 - 16.5 g/dL    Mode RA     Date Of Collection      Time Collected      Pt Temp 37.0 C     ID [id number]     Lab [id number]     Critical(s) Notified . No Critical Values    Troponin   Result Value Ref Range    Troponin, High Sensitivity 38 (H) 0 - 11 ng/L   EKG 12 Lead   Result Value Ref Range    Ventricular Rate 99 BPM    Atrial Rate 99 BPM    P-R Interval 128 ms    QRS Duration 80 ms    Q-T Interval 344 ms    QTc Calculation (Bazett) 441 ms    P Axis 64 degrees    R Axis 19 degrees    T Axis 47 degrees       RADIOLOGY:  Interpreted by Radiologist.  XR CHEST PORTABLE   Final Result   No acute cardiopulmonary process. CTA PULMONARY W CONTRAST    (Results Pending)       ------------------------- NURSING NOTES AND VITALS REVIEWED ---------------------------   The nursing notes within the ED encounter and vital signs as below have been reviewed.    /74   Pulse 98   Temp 97.9 °F (36.6 °C) (Axillary)   Resp 18   Ht 5' 8\" (1.727 m)   Wt 160 lb (72.6 kg)   SpO2 95%   BMI 24.33 kg/m²   Oxygen Saturation Interpretation: Normal      ---------------------------------------------------PHYSICAL EXAM--------------------------------------    Physical Exam  Vitals reviewed. Constitutional:       General: He is not in acute distress. Appearance: Normal appearance. He is not toxic-appearing. Comments: cachexia   HENT:      Head: Normocephalic and atraumatic. Right Ear: External ear normal.      Left Ear: External ear normal.      Nose: Nose normal. No congestion. Mouth/Throat:      Mouth: Mucous membranes are moist.      Pharynx: Oropharynx is clear. No posterior oropharyngeal erythema. Eyes:      Extraocular Movements: Extraocular movements intact. Pupils: Pupils are equal, round, and reactive to light. Cardiovascular:      Rate and Rhythm: Normal rate and regular rhythm. Pulses: Normal pulses. Heart sounds: No murmur heard. Pulmonary:      Effort: Pulmonary effort is normal.      Breath sounds: No wheezing or rhonchi. Chest:      Chest wall: No tenderness. Abdominal:      General: Bowel sounds are normal.      Tenderness: There is no abdominal tenderness. There is no right CVA tenderness, left CVA tenderness or guarding. Genitourinary:     Rectum: Guaiac result positive. Comments: Strongly heme positive w gross melena    Done w chaperone VITALY roberts  Musculoskeletal:         General: No swelling or deformity. Normal range of motion. Cervical back: Normal range of motion and neck supple. No muscular tenderness. Comments: Holds all extremities up against gravity no focal weakness   Skin:     General: Skin is warm and dry. Capillary Refill: Capillary refill takes less than 2 seconds. Neurological:      General: No focal deficit present. Mental Status: He is alert and oriented to person, place, and time.    Psychiatric:         Mood and Affect: Mood normal.             ------------------------------ ED COURSE/MEDICAL DECISION MAKING----------------------  Medications   pantoprazole (PROTONIX) injection 80 mg (has no administration in time range)   iopamidol (ISOVUE-370) 76 % injection 75 mL (75 mLs IntraVENous Given 12/9/22 0049)   sodium chloride flush 0.9 % injection 10 mL (10 mLs IntraVENous Given 12/9/22 0044)     EKG: This EKG is signed and interpreted by me. [id number]  Rate: 99  Rhythm: Sinus  Interpretation: no acute changes  Comparison: stable as compared to patient's most recent EKG          ED COURSE:       Medical Decision Making:    Pt presented from ECG w c/o sob and cough alledgedly was briefly tachy and hypoxic there, pt has normal vss here. Pt has had 2gm hgb drop in the past 1.5 weeks. Pt strognly heme positive w gross melena. PPI IV ordered. Pt will be admitted. D/w dr Loli Steven will admit to dr Ifeanyi Augustin, will have dr chin group consulted for gen surg      Counseling: The emergency provider has spoken with the patient and discussed todays results, in addition to providing specific details for the plan of care and counseling regarding the diagnosis and prognosis. Questions are answered at this time and they are agreeable with the plan.      --------------------------------- IMPRESSION AND DISPOSITION ---------------------------------    IMPRESSION  1. Gastrointestinal hemorrhage with melena    2. Acute anemia    3. Leukocytosis, unspecified type    4. Shortness of breath        DISPOSITION  Disposition: Discharge to home  Patient condition is good      NOTE: This report was transcribed using voice recognition software.  Every effort was made to ensure accuracy; however, inadvertent computerized transcription errors may be present       Sai Barron DO  12/09/22 0104

## 2022-12-09 NOTE — H&P
[facility] History and Physical      CHIEF COMPLAINT:  Shortness of breath, cough, and stomach ache    History of Present Illness: This is a 74year old male with PMH significant for cellulitis, DM, depression, retinopathy, HLD, HTN, third nerve palsy, and moderate mood disorder. Patient recently admitted 11/21/22-11/23/22 and 11/27/22-12/01/22 for hyponatremia secondary to poor solute intake with component of pseudohyponatremia intake related to hyperglycemia. Sent in from NH due to HR of 150's and low pulse ox. On arrival patient's VS 97.9, 102, 18, 95/69, and 94% on RA. Patient complained of cough, shortness of breath and stomach ache starting a few days ago. Denies recent illness, fever, chills, CP, abdominal pain, and changes in bowel/bladder habits. Denies hematemesis, hematochezia, and melena. Stool for occult blood grossly + per ED. No abdominal pain with palpation. Patient does admit to weakness. WBC 17.2 with H&H 8.2/23.7. H&H was 8.4/24.2 at discharge and 10.0/27.9 on 11/27/22. General surgery consulted for input. Started on Protonix in ED.     Informant(s) for H&P: Patient, and chart review    REVIEW OF SYSTEMS:  A comprehensive review of systems was negative except for: what is in the HPI      PMH:  Past Medical History:   Diagnosis Date    Anxiety     Bronchitis     Cellulitis     Chronic sinusitis     Depression     Diabetes mellitus ([address]     Diabetic retinopathy ([address]     Fracture of left foot     High cholesterol     Hypercholesteremia     Hypercholesterolemia     Hypertension     Lumbago     Moderate mood disorder ([address]     Third nerve palsy        Surgical History:  Past Surgical History:   Procedure Laterality Date    EYE SURGERY      HIP SURGERY Right 11/19/2020    HIP HEMIARTHROPLASTY performed by Vik Kauffman MD at [place] [address] Left 12/10/2020    HIP HEMIARTHROPLASTY -- HERB -- DROPLET +     PT. COMING FROM [place] performed by Raymond Matamoros Bo Swenson MD at [address] [place] [address]       Medications Prior to Admission:    Prior to Admission medications    Medication Sig Start Date End Date Taking? Authorizing Provider   sodium chloride 1 g tablet Take 1 tablet by mouth 3 times daily 11/30/22   Luis Myers MD   polyethylene glycol [facility]) 17 g packet Take 17 g by mouth daily 12/1/22 12/31/22  Luis Myers MD   senna (SENOKOT) 8.6 MG tablet Take 2 tablets by mouth nightly as needed for Constipation 11/30/22 11/30/23  Luis Myers MD   aspirin 81 MG EC tablet Take 81 mg by mouth daily    Historical Provider, MD   OLANZapine (ZYPREXA) 7.5 MG tablet Take 7.5 mg by mouth nightly    Historical Provider, MD   rosuvastatin (CRESTOR) 10 MG tablet Take 10 mg by mouth daily    Historical Provider, MD   insulin lispro (HUMALOG) 100 UNIT/ML injection vial Inject 0-4 Units into the skin 3 times daily (before meals) *Per Sliding Scale*    Historical Provider, MD   tamsulosin (FLOMAX) 0.4 MG capsule Take 1 capsule by mouth daily 11/23/22 12/23/22  Nayeli Kline, APRN - CNP   insulin glargine (LANTUS) 100 UNIT/ML injection vial Inject 4 Units into the skin nightly 11/17/22   Subhash Khan MD   metFORMIN (GLUCOPHAGE) 500 MG tablet Take 1 tablet by mouth 2 times daily (with meals) 11/17/22 12/17/22  Subhash Khan MD   glucose 4 g chewable tablet Take 4 g by mouth as needed for Low blood sugar     Historical Provider, MD       Allergies:    Empagliflozin and Liraglutide    Social History:    reports that he has never smoked. He has never used smokeless tobacco. He reports that he does not drink alcohol and does not use drugs. Family History:   family history is not on file. Patient does not know family history. PHYSICAL EXAM:  Vitals:  /74   Pulse 98   Temp 97.9 °F (36.6 °C) (Axillary)   Resp 18   Ht 5' 8\" (1.727 m)   Wt 160 lb (72.6 kg)   SpO2 95%   BMI 24.33 kg/m²     General Appearance: Ill appearing.  alert and oriented to person, place and time and in no acute distress  Skin: warm and dry  Head: normocephalic and atraumatic  Eyes: pupils equal, round, and reactive to light, conjunctivae normal  Pulmonary/Chest: Lungs with rhonchi on auscultation bilaterally and throughout. Non-productive cough noted. No respiratory distress noted. Cardiovascular: normal rate, normal S1 and S2   Abdomen: soft, non-tender, non-distended, normal bowel sounds, no masses or organomegaly  Extremities: no cyanosis, no clubbing and no edema  Neurologic: slow to respond. Oriented x 3. LABS:  Recent Labs     12/08/22  2247      K 4.3      CO2 24   BUN 31*   CREATININE 1.2   GLUCOSE 215*   CALCIUM 9.0       Recent Labs     12/08/22  2247   WBC 17.2*   RBC 3.20*   HGB 8.2*   HCT 23.7*   MCV 74.1*   MCH 25.6*   MCHC 34.6*   RDW 14.5      MPV 10.1       No results for input(s): POCGLU in the last 72 hours. Radiology:   XR CHEST PORTABLE   Final Result   No acute cardiopulmonary process. CTA PULMONARY W CONTRAST    (Results Pending)       EKG:     ASSESSMENT:      Principal Problem:    GI bleed  Active Problems:    Leukocytosis    Uncontrolled type 2 diabetes mellitus with hyperglycemia (HCC)    Weight loss  Resolved Problems:    * No resolved hospital problems. *      PLAN:    1.  GI bleed- NPO. Stool for occult blood grossly positive. H&H 8.2/23.7 was 8.4/24.2 at discharge and 10.0/27.9 on 11/27/22. General surgery consulted for input. Started on Protonix. VSS at this time. IVF. 2.  Leukocytosis- WBC 17.2. BC x 2 done. Urine pending. RSV, influenza and COVID negative. Will check respiratory panel, procalcitonin, and CRP. CT of chest pending. CXR negative for acute process. 3.  Uncontrolled DM with hyperglycemia- NPO. Hold Glucophage and Lantus at this time. Check BS AC HS and cover with low SSC. 4.  Elevated troponin- Chronic. Monitor. Was in the 42's last admit.  Now 39 and 38.   5.  Weakness- PT/OT eval and treat    Code Status: Full code  DVT prophylaxis: Bleeding risk     35 minutes or more spent reviewing patient chart, assessing patient, discussing plan of care with patient and family, discussing plan of care with collaborating physician, and documentation. NOTE: This report was transcribed using voice recognition software. Every effort was made to ensure accuracy; however, inadvertent computerized transcription errors may be present. Electronically signed by MIRA Stephenson CNP on 12/9/2022 at 1:30 AM     Attending Physician Statement:  Chrissy Johnson M.D., F.A.C.P. I have seen/discussed the case, including pertinent history and exam findings with NP. Meds reviewed. I agree with the NP history/PE, assessment, plan and orders as documented by the [address] reviewed, including other providers notes, relevant labs and imaging. Noted recent admission and DC kayla kto NH last week:  \"resides in a nursing facility . Patient sustained a fall . Patient states that he has fallen multiple times a day . Patient states that he is weak. He reports no chest pain fever chills nausea vomiting diarrhea . Patient was admitted 11/21 -11/23 due to hyponatremia . In the ED patient was hemodynamically stable.  Lab data revael sodium 126 potassium 4.4 BUN 36 SCr 1.2 lactic acid 2.9 >> 1.8 glucose 355 WBC 12.5 HGB 10.0  Trop 43 >> 41 COVID neg CT head neg CT cervical spine neg beta hydroxy 2.39 CT abd /pelvis fecal impaction \"    Bc of constipation - DCd on glycolax and senna  Bc of hyponatremia - DCd on salt tabs    Now sent back in for mental status changes- this seems recurrent and chronic  He will occasional answer Questions, in ER he would intermittnetly- per nursing  But per last admission:  \"Dementia   stable-at risk for delirium in the hospital  Avoid circadian disruption  Avoid sedating anticholinergic medications\"      Billing based on   Medically complex case  My assessment of various problems as below  UGIB- drop hemoglobin, +hemstool  No active GIB in ER, no blood stools  Inc WBC uncertain etiology - started on unasyn in ER  Likely some dehdydration- IV fluids and blood loss anemia  -acute vs chronic debiliation- weakness, reassess sp fluids etc.    PPI and gen surg consult- seen Lidia Camelia recently last admission  Moderate high complexity case   25min personal time +NP time=[id number] FS  >50% of time spent coordinating care -including ER then pimary service who will take over +other providers and/or counseling patient/family  Remainder of medical problems as per NP note.

## 2022-12-09 NOTE — PROGRESS NOTES
Database initiated. Patient is awake in bed but not speaking to me. He shook his head no when I asked if I could ask the questions. . information taken from chart history and facility paperwork he is from [facility]. Paperwork states he is a full code needs mechanically altered/ground up texture foods with thin liquids.

## 2022-12-09 NOTE — PROGRESS NOTES
DTR bedside and is now in agreement to have patient stay and wait for possible transfer to [facility] on Monday. Dr. Reyes Lex updated.

## 2022-12-09 NOTE — PROGRESS NOTES
DTR bedside requesting patient to be transferred to [facility]. Dr. Cirilo Moody notified and stated transfer would not be an option until Monday. DTR updated and is requesting to sign out AMA and take patient to [facility]. DTR stated she will call for her own ambulance for patient and sign AMA paperwork. Dr. Cirilo Moody notified.

## 2022-12-09 NOTE — TELEPHONE ENCOUNTER
Writer contacted ED provider to inform of 30 day readmission risk. Writer's attempt to contact ED provider was unsuccessful.         Call Back: If you need to call back to inform of disposition you can contact me at [phone]

## 2022-12-09 NOTE — ED NOTES
[address] called, sbar confirmed     Lobito Gutierrez, LPN  22/95/12 9626
Report given and care transferred to [facility].      [facility]  12/09/22 [id number]
[FreeTextEntry2] : voice changes

## 2022-12-10 LAB
ALBUMIN SERPL-MCNC: 2.7 G/DL (ref 3.5–5.2)
ALP BLD-CCNC: 63 U/L (ref 40–129)
ALT SERPL-CCNC: 10 U/L (ref 0–40)
ANION GAP SERPL CALCULATED.3IONS-SCNC: 11 MMOL/L (ref 7–16)
AST SERPL-CCNC: 8 U/L (ref 0–39)
BASOPHILS ABSOLUTE: 0.03 E9/L (ref 0–0.2)
BASOPHILS RELATIVE PERCENT: 0.3 % (ref 0–2)
BILIRUB SERPL-MCNC: 0.4 MG/DL (ref 0–1.2)
BUN BLDV-MCNC: 24 MG/DL (ref 6–23)
CALCIUM SERPL-MCNC: 8.5 MG/DL (ref 8.6–10.2)
CHLORIDE BLD-SCNC: 108 MMOL/L (ref 98–107)
CO2: 21 MMOL/L (ref 22–29)
CREAT SERPL-MCNC: 1 MG/DL (ref 0.7–1.2)
EOSINOPHILS ABSOLUTE: 0.06 E9/L (ref 0.05–0.5)
EOSINOPHILS RELATIVE PERCENT: 0.5 % (ref 0–6)
GFR SERPL CREATININE-BSD FRML MDRD: >60 ML/MIN/1.73
GLUCOSE BLD-MCNC: 168 MG/DL (ref 74–99)
HCT VFR BLD CALC: 23.9 % (ref 37–54)
HEMOGLOBIN: 8.2 G/DL (ref 12.5–16.5)
IMMATURE GRANULOCYTES #: 0.06 E9/L
IMMATURE GRANULOCYTES %: 0.5 % (ref 0–5)
LYMPHOCYTES ABSOLUTE: 1.85 E9/L (ref 1.5–4)
LYMPHOCYTES RELATIVE PERCENT: 16.2 % (ref 20–42)
MCH RBC QN AUTO: 26.4 PG (ref 26–35)
MCHC RBC AUTO-ENTMCNC: 34.3 % (ref 32–34.5)
MCV RBC AUTO: 76.8 FL (ref 80–99.9)
METER GLUCOSE: 147 MG/DL (ref 74–99)
METER GLUCOSE: 147 MG/DL (ref 74–99)
METER GLUCOSE: 150 MG/DL (ref 74–99)
MONOCYTES ABSOLUTE: 0.67 E9/L (ref 0.1–0.95)
MONOCYTES RELATIVE PERCENT: 5.9 % (ref 2–12)
NEUTROPHILS ABSOLUTE: 8.77 E9/L (ref 1.8–7.3)
NEUTROPHILS RELATIVE PERCENT: 76.6 % (ref 43–80)
PDW BLD-RTO: 14.7 FL (ref 11.5–15)
PLATELET # BLD: 297 E9/L (ref 130–450)
PMV BLD AUTO: 10.1 FL (ref 7–12)
POTASSIUM REFLEX MAGNESIUM: 4.1 MMOL/L (ref 3.5–5)
RBC # BLD: 3.11 E12/L (ref 3.8–5.8)
SODIUM BLD-SCNC: 140 MMOL/L (ref 132–146)
TOTAL PROTEIN: 4.9 G/DL (ref 6.4–8.3)
WBC # BLD: 11.4 E9/L (ref 4.5–11.5)

## 2022-12-10 PROCEDURE — C9113 INJ PANTOPRAZOLE SODIUM, VIA: HCPCS | Performed by: NURSE PRACTITIONER

## 2022-12-10 PROCEDURE — 85025 COMPLETE CBC W/AUTO DIFF WBC: CPT

## 2022-12-10 PROCEDURE — A4216 STERILE WATER/SALINE, 10 ML: HCPCS | Performed by: NURSE PRACTITIONER

## 2022-12-10 PROCEDURE — 2580000003 HC RX 258: Performed by: NURSE PRACTITIONER

## 2022-12-10 PROCEDURE — 6370000000 HC RX 637 (ALT 250 FOR IP): Performed by: NURSE PRACTITIONER

## 2022-12-10 PROCEDURE — 51798 US URINE CAPACITY MEASURE: CPT

## 2022-12-10 PROCEDURE — 36415 COLL VENOUS BLD VENIPUNCTURE: CPT

## 2022-12-10 PROCEDURE — 80053 COMPREHEN METABOLIC PANEL: CPT

## 2022-12-10 PROCEDURE — 6360000002 HC RX W HCPCS: Performed by: NURSE PRACTITIONER

## 2022-12-10 PROCEDURE — 82962 GLUCOSE BLOOD TEST: CPT

## 2022-12-10 PROCEDURE — 2060000000 HC ICU INTERMEDIATE R&B

## 2022-12-10 RX ADMIN — ROSUVASTATIN CALCIUM 10 MG: 10 TABLET, FILM COATED ORAL at 08:29

## 2022-12-10 RX ADMIN — SODIUM CHLORIDE 1 G: 1 TABLET ORAL at 16:26

## 2022-12-10 RX ADMIN — SODIUM CHLORIDE: 9 INJECTION, SOLUTION INTRAVENOUS at 16:26

## 2022-12-10 RX ADMIN — SODIUM CHLORIDE, PRESERVATIVE FREE 40 MG: 5 INJECTION INTRAVENOUS at 21:13

## 2022-12-10 RX ADMIN — SODIUM CHLORIDE 1 G: 1 TABLET ORAL at 08:33

## 2022-12-10 RX ADMIN — SODIUM CHLORIDE 1 G: 1 TABLET ORAL at 12:00

## 2022-12-10 RX ADMIN — SODIUM CHLORIDE 40 MG: 9 INJECTION, SOLUTION INTRAMUSCULAR; INTRAVENOUS; SUBCUTANEOUS at 08:30

## 2022-12-10 RX ADMIN — SODIUM CHLORIDE: 9 INJECTION, SOLUTION INTRAVENOUS at 04:31

## 2022-12-10 RX ADMIN — TAMSULOSIN HYDROCHLORIDE 0.4 MG: 0.4 CAPSULE ORAL at 08:30

## 2022-12-10 RX ADMIN — SODIUM CHLORIDE, PRESERVATIVE FREE 10 ML: 5 INJECTION INTRAVENOUS at 21:13
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
## 2022-12-10 NOTE — PROGRESS NOTES
Perfect serve consult sent to Dr. Preeti Lehman.      Electronically signed by Lily Null RN on 12/9/2022 at 7:37 PM

## 2022-12-11 LAB
ALBUMIN SERPL-MCNC: 2.8 G/DL (ref 3.5–5.2)
ALP BLD-CCNC: 70 U/L (ref 40–129)
ALT SERPL-CCNC: 10 U/L (ref 0–40)
ANION GAP SERPL CALCULATED.3IONS-SCNC: 12 MMOL/L (ref 7–16)
AST SERPL-CCNC: 8 U/L (ref 0–39)
BASOPHILS ABSOLUTE: 0.05 E9/L (ref 0–0.2)
BASOPHILS RELATIVE PERCENT: 0.6 % (ref 0–2)
BILIRUB SERPL-MCNC: 0.5 MG/DL (ref 0–1.2)
BUN BLDV-MCNC: 19 MG/DL (ref 6–23)
CALCIUM SERPL-MCNC: 8.7 MG/DL (ref 8.6–10.2)
CHLORIDE BLD-SCNC: 112 MMOL/L (ref 98–107)
CO2: 20 MMOL/L (ref 22–29)
CREAT SERPL-MCNC: 1 MG/DL (ref 0.7–1.2)
EOSINOPHILS ABSOLUTE: 0.08 E9/L (ref 0.05–0.5)
EOSINOPHILS RELATIVE PERCENT: 0.9 % (ref 0–6)
GFR SERPL CREATININE-BSD FRML MDRD: >60 ML/MIN/1.73
GLUCOSE BLD-MCNC: 135 MG/DL (ref 74–99)
HCT VFR BLD CALC: 25 % (ref 37–54)
HEMOGLOBIN: 8.2 G/DL (ref 12.5–16.5)
IMMATURE GRANULOCYTES #: 0.04 E9/L
IMMATURE GRANULOCYTES %: 0.4 % (ref 0–5)
LYMPHOCYTES ABSOLUTE: 1.72 E9/L (ref 1.5–4)
LYMPHOCYTES RELATIVE PERCENT: 19.1 % (ref 20–42)
MCH RBC QN AUTO: 24.9 PG (ref 26–35)
MCHC RBC AUTO-ENTMCNC: 32.8 % (ref 32–34.5)
MCV RBC AUTO: 76 FL (ref 80–99.9)
METER GLUCOSE: 130 MG/DL (ref 74–99)
METER GLUCOSE: 135 MG/DL (ref 74–99)
METER GLUCOSE: 245 MG/DL (ref 74–99)
METER GLUCOSE: 259 MG/DL (ref 74–99)
MONOCYTES ABSOLUTE: 0.51 E9/L (ref 0.1–0.95)
MONOCYTES RELATIVE PERCENT: 5.7 % (ref 2–12)
NEUTROPHILS ABSOLUTE: 6.59 E9/L (ref 1.8–7.3)
NEUTROPHILS RELATIVE PERCENT: 73.3 % (ref 43–80)
PDW BLD-RTO: 14.6 FL (ref 11.5–15)
PLATELET # BLD: 341 E9/L (ref 130–450)
PMV BLD AUTO: 10 FL (ref 7–12)
POTASSIUM REFLEX MAGNESIUM: 4.3 MMOL/L (ref 3.5–5)
RBC # BLD: 3.29 E12/L (ref 3.8–5.8)
SODIUM BLD-SCNC: 144 MMOL/L (ref 132–146)
TOTAL PROTEIN: 5 G/DL (ref 6.4–8.3)
URINE CULTURE, ROUTINE: NORMAL
WBC # BLD: 9 E9/L (ref 4.5–11.5)

## 2022-12-11 PROCEDURE — 36415 COLL VENOUS BLD VENIPUNCTURE: CPT

## 2022-12-11 PROCEDURE — 99222 1ST HOSP IP/OBS MODERATE 55: CPT | Performed by: SURGERY

## 2022-12-11 PROCEDURE — C9113 INJ PANTOPRAZOLE SODIUM, VIA: HCPCS | Performed by: NURSE PRACTITIONER

## 2022-12-11 PROCEDURE — 6370000000 HC RX 637 (ALT 250 FOR IP): Performed by: INTERNAL MEDICINE

## 2022-12-11 PROCEDURE — 6360000002 HC RX W HCPCS: Performed by: NURSE PRACTITIONER

## 2022-12-11 PROCEDURE — 2580000003 HC RX 258: Performed by: NURSE PRACTITIONER

## 2022-12-11 PROCEDURE — 2060000000 HC ICU INTERMEDIATE R&B

## 2022-12-11 PROCEDURE — 85025 COMPLETE CBC W/AUTO DIFF WBC: CPT

## 2022-12-11 PROCEDURE — 82962 GLUCOSE BLOOD TEST: CPT

## 2022-12-11 PROCEDURE — A4216 STERILE WATER/SALINE, 10 ML: HCPCS | Performed by: NURSE PRACTITIONER

## 2022-12-11 PROCEDURE — 6370000000 HC RX 637 (ALT 250 FOR IP): Performed by: NURSE PRACTITIONER

## 2022-12-11 PROCEDURE — 80053 COMPREHEN METABOLIC PANEL: CPT

## 2022-12-11 RX ORDER — DIMETHICONE, OXYBENZONE, AND PADIMATE O 2; 2.5; 6.6 G/100G; G/100G; G/100G
STICK TOPICAL PRN
Status: DISCONTINUED | OUTPATIENT
Start: 2022-12-11 | End: 2022-12-15 | Stop reason: HOSPADM

## 2022-12-11 RX ADMIN — ROSUVASTATIN CALCIUM 10 MG: 10 TABLET, FILM COATED ORAL at 07:56

## 2022-12-11 RX ADMIN — SODIUM CHLORIDE, PRESERVATIVE FREE 40 MG: 5 INJECTION INTRAVENOUS at 20:26

## 2022-12-11 RX ADMIN — SODIUM CHLORIDE 1 G: 1 TABLET ORAL at 13:45

## 2022-12-11 RX ADMIN — Medication: at 15:14

## 2022-12-11 RX ADMIN — SODIUM CHLORIDE, PRESERVATIVE FREE 10 ML: 5 INJECTION INTRAVENOUS at 13:20

## 2022-12-11 RX ADMIN — OLANZAPINE 7.5 MG: 2.5 TABLET, FILM COATED ORAL at 20:25

## 2022-12-11 RX ADMIN — SODIUM CHLORIDE 1 G: 1 TABLET ORAL at 16:53

## 2022-12-11 RX ADMIN — TAMSULOSIN HYDROCHLORIDE 0.4 MG: 0.4 CAPSULE ORAL at 07:56

## 2022-12-11 RX ADMIN — SODIUM CHLORIDE: 9 INJECTION, SOLUTION INTRAVENOUS at 20:29

## 2022-12-11 RX ADMIN — SODIUM CHLORIDE: 9 INJECTION, SOLUTION INTRAVENOUS at 05:23

## 2022-12-11 RX ADMIN — SODIUM CHLORIDE 1 G: 1 TABLET ORAL at 07:56

## 2022-12-11 RX ADMIN — SODIUM CHLORIDE, PRESERVATIVE FREE 40 MG: 5 INJECTION INTRAVENOUS at 07:56

## 2022-12-11 RX ADMIN — INSULIN LISPRO 2 UNITS: 100 INJECTION, SOLUTION INTRAVENOUS; SUBCUTANEOUS at 16:53

## 2022-12-11 ASSESSMENT — PAIN SCALES - GENERAL: PAINLEVEL_OUTOF10: 0
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
## 2022-12-11 NOTE — CONSULTS
Surgery History and Physical/Consult Note    CC:    GI bleed    HPI:  This is a 74 y.o. male with PMH below admitted 12/8/2022 with shortness of breath cough and stomachache per the chief complaint in the H&P. He is nonverbal today so information is obtained per chart review. Family stool was positive for occult blood per the ER. He denied any hematemesis hematochezia or melena per the H&P. They also note that he was denying abdominal pain at the time of their evaluation. He does shake his head no when asked if he has abdominal pain. Unknown if he has ever had endoscopies previously. Was recently seen downtown by trauma surgery for stercoral colitis. Their note says colonoscopy was over 10 years ago. PMH:  Past Medical History:   Diagnosis Date    Anxiety     Bronchitis     Cellulitis     Chronic sinusitis     Depression     Diabetes mellitus (HCC)     Diabetic retinopathy ([address]     Fracture of left foot     High cholesterol     Hypercholesteremia     Hypercholesterolemia     Hypertension     Lumbago     Moderate mood disorder ([address]     Third nerve palsy        PSH:  Past Surgical History:   Procedure Laterality Date    EYE SURGERY      HIP SURGERY Right 11/19/2020    HIP HEMIARTHROPLASTY performed by Kirk Villegas MD at [place] [id number] Left 12/10/2020    HIP HEMIARTHROPLASTY -- HERB -- DROPLET +     PT. COMING FROM [place] performed by Erica Gonzalez MD at [facility] OR       Family History:  No family history on file. Social History:   reports that he has never smoked. He has never used smokeless tobacco. He reports that he does not drink alcohol and does not use drugs. Review of Systems:  A 14pt complete review of systems was unable to be performed, due to patient status    Allergies:   Allergies   Allergen Reactions    Jardiance [Empagliflozin] Other (See Comments)     ACIDOSIS    Victoza [Liraglutide] Diarrhea       Medications:  Home medications:   Prior to Admission medications    Medication Sig Start Date End Date Taking? Authorizing Provider   calcium carbonate (TUMS EXTRA STRENGTH 750) 750 MG chewable tablet Take 750 mg by mouth every 12 hours as needed (GERD)   Yes Historical Provider, MD   insulin glargine (LANTUS) 100 UNIT/ML injection vial Inject 5 Units into the skin nightly   Yes Historical Provider, MD   metFORMIN (GLUCOPHAGE) 500 MG tablet Take 500 mg by mouth in the morning and 500 mg in the evening. Yes Historical Provider, MD   pantoprazole (PROTONIX) 40 MG tablet Take 40 mg by mouth daily   Yes Historical Provider, MD   senna-docusate (PERICOLACE) 8.6-50 MG per tablet Take 1 tablet by mouth in the morning and 1 tablet in the evening.    Yes Historical Provider, MD   sodium chloride 1 g tablet Take 1 g by mouth daily   Yes Historical Provider, MD   polyethylene glycol (GLYCOLAX) 17 g packet Take 17 g by mouth daily 12/1/22 12/31/22  Juan Law MD   aspirin 81 MG EC tablet Take 81 mg by mouth daily    Historical Provider, MD   OLANZapine (ZYPREXA) 7.5 MG tablet Take 7.5 mg by mouth nightly    Historical Provider, MD   rosuvastatin (CRESTOR) 10 MG tablet Take 10 mg by mouth daily    Historical Provider, MD   insulin lispro (HUMALOG) 100 UNIT/ML injection vial Inject 0-4 Units into the skin 4 times daily (before meals and nightly) PER SLIDING SCALE: 0-150=0U, 151-200=2U, 201-250=4U, 251-300=6U, 301-350=8U, 351-400=10U, 401-450=12U, 450+=CALL MD    Historical Provider, MD   tamsulosin (FLOMAX) 0.4 MG capsule Take 1 capsule by mouth daily 11/23/22 12/23/22  Nayeli Kline, APRN - CNP   glucose 4 g chewable tablet Take 4 g by mouth as needed for Low blood sugar     Historical Provider, MD       Scheduled medications:   pantoprazole (PROTONIX) 40 mg injection  40 mg IntraVENous Q12H    sodium chloride flush  5-40 mL IntraVENous 2 times per day    OLANZapine  7.5 mg Oral Nightly    rosuvastatin  10 mg Oral Daily    sodium chloride  1 g Oral TID WC tamsulosin  0.4 mg Oral Daily    insulin lispro  0-4 Units SubCUTAneous TID     insulin lispro  0-4 Units SubCUTAneous Nightly       PRN medications: sodium chloride flush, sodium chloride, ondansetron **OR** ondansetron, polyethylene glycol, acetaminophen **OR** acetaminophen, senna, glucose, dextrose bolus **OR** dextrose bolus, glucagon (rDNA), dextrose    Objective:    Physical Examination:  Vitals:    12/10/22 1517 12/10/22 2108 12/11/22 0416 12/11/22 0745   BP: 128/72 125/82  130/80   Pulse: 95 95  89   Resp: 16 17  16   Temp: 98.6 °F (37 °C) 98.1 °F (36.7 °C)  99 °F (37.2 °C)   TempSrc: Axillary Axillary  Oral   SpO2: 98% 98%  100%   Weight:   152 lb 1.9 oz (69 kg)    Height:           General -awake  HEENT - Normocephalic, Atraumatic. Neck - supple, trachea midline  Respiratory - Breathing comfortably, no respiratory distress  Heart - Regular Rate  Abdomen - soft, nontender without rebound or guarding, nondistended  Neurological -awake, not verbalizing  Psych -unable to assess  Musculoskeletal -unable to assess  Skin - warm, pink    Labs:    CBC  Recent Labs     12/11/22  0530   WBC 9.0   HGB 8.2*   HCT 25.0*        BMP  Recent Labs     12/11/22  0530      K 4.3   *   CO2 20*   BUN 19   CREATININE 1.0   CALCIUM 8.7     Liver Function  Recent Labs     12/08/22  2247 12/10/22  0430 12/11/22  0530   LIPASE 61*  --   --    BILITOT 0.6   < > 0.5   AST 12   < > 8   ALT 13   < > 10   ALKPHOS 74   < > 70   PROT 5.4*   < > 5.0*   LABALBU 3.0*   < > 2.8*    < > = values in this interval not displayed. No results for input(s): LACTATE in the last 72 hours. No results for input(s): INR, PTT in the last 72 hours.     Invalid input(s): PT    Imaging:    XR CHEST PORTABLE    Result Date: 12/8/2022  EXAMINATION: ONE XRAY VIEW OF THE CHEST 12/8/2022 10:25 pm COMPARISON: 27 November 2022 HISTORY: ORDERING SYSTEM PROVIDED HISTORY: hypoxia cough sob TECHNOLOGIST PROVIDED HISTORY: Reason for exam:->hypoxia cough sob FINDINGS: Single AP erect portable chest demonstrate satisfactory expansion lungs with mild elevation of the right hemidiaphragm. There are no focal alveolar infiltrates or effusions. The cardiac silhouette appears unremarkable. There is no evidence of a pneumothorax. No acute cardiopulmonary process. CTA PULMONARY W CONTRAST    Result Date: 12/9/2022  EXAMINATION: CTA OF THE CHEST 12/9/2022 12:41 am TECHNIQUE: CTA of the chest was performed after the administration of intravenous contrast.  Multiplanar reformatted images are provided for review. MIP images are provided for review. Automated exposure control, iterative reconstruction, and/or weight based adjustment of the mA/kV was utilized to reduce the radiation dose to as low as reasonably achievable. COMPARISON: None. HISTORY: ORDERING SYSTEM PROVIDED HISTORY: leukocytosis, sob, cough was tachycardic TECHNOLOGIST PROVIDED HISTORY: Reason for exam:->leukocytosis, sob, cough was tachycardic Decision Support Exception - unselect if not a suspected or confirmed emergency medical condition->Emergency Medical Condition (MA) FINDINGS: Pulmonary Arteries: Pulmonary arteries are adequately opacified for evaluation. No evidence of intraluminal filling defect to suggest pulmonary embolism. Main pulmonary artery is normal in caliber. Mediastinum: No evidence of mediastinal lymphadenopathy. The heart is normal in size. There is a small pericardial effusion. There is no acute abnormality of the thoracic aorta. Small hiatal hernia. Patulous esophagus. Layering debris/fluid within the distal esophagus. Layering debris/fluid within the distal trachea proximal bilateral main bronchi. Lungs/pleura: The lungs are without acute process. Minimal bilateral dependent atelectasis. No focal consolidation or pulmonary edema. No evidence of pleural effusion or pneumothorax.  Upper Abdomen:  Limited images of the upper abdomen demonstrate no acute abnormality. There is a complex right adrenal mass containing focal calcifications and macroscopic fat, unchanged from prior examination and compatible with a myelolipoma. Soft Tissues/Bones: No acute bone or soft tissue abnormality. Redemonstration of L1 superior endplate fracture. No evidence of pulmonary embolism. Small hiatal hernia with fluid/debris in the patulous esophagus, placing patient increased risk for aspiration. Fluid/debris within the distal trachea and bilateral main bronchi. ASSESSMENT & PLAN:    I have examined the patient and performed key aspects of physical exam, reviewed the record (including all pertinent and new radiology images and laboratory findings), and discussed the case with the surgical team.  I agree with the assessment and plan with the following additions, corrections, and changes. This is a 74 y.o. male admitted 2022 with anemia and occult positive. Unsure if he has ever had endoscopy histories. Discussed with Dr. Jose Hall, possible transfer to [place] in progress. Hemoglobin currently stable. PPI twice daily, and if he is not transferred we can plan for upper endoscopy. Sheila Paz MD   2022   10:33 AM    NOTE: This report, in part or full, may have been transcribed using voice recognition software. Every effort was made to ensure accuracy; however, inadvertent computerized transcription errors may be present. Please excuse any transcriptional grammatical or spelling errors that may have escaped my editorial review.

## 2022-12-12 LAB
BASOPHILS ABSOLUTE: 0.05 E9/L (ref 0–0.2)
BASOPHILS RELATIVE PERCENT: 0.6 % (ref 0–2)
EOSINOPHILS ABSOLUTE: 0.1 E9/L (ref 0.05–0.5)
EOSINOPHILS RELATIVE PERCENT: 1.2 % (ref 0–6)
HCT VFR BLD CALC: 24.3 % (ref 37–54)
HEMOGLOBIN: 8 G/DL (ref 12.5–16.5)
IMMATURE GRANULOCYTES #: 0.04 E9/L
IMMATURE GRANULOCYTES %: 0.5 % (ref 0–5)
LYMPHOCYTES ABSOLUTE: 2.4 E9/L (ref 1.5–4)
LYMPHOCYTES RELATIVE PERCENT: 28.1 % (ref 20–42)
MCH RBC QN AUTO: 25 PG (ref 26–35)
MCHC RBC AUTO-ENTMCNC: 32.9 % (ref 32–34.5)
MCV RBC AUTO: 75.9 FL (ref 80–99.9)
METER GLUCOSE: 198 MG/DL (ref 74–99)
METER GLUCOSE: 246 MG/DL (ref 74–99)
METER GLUCOSE: 249 MG/DL (ref 74–99)
METER GLUCOSE: 310 MG/DL (ref 74–99)
MONOCYTES ABSOLUTE: 0.72 E9/L (ref 0.1–0.95)
MONOCYTES RELATIVE PERCENT: 8.4 % (ref 2–12)
NEUTROPHILS ABSOLUTE: 5.23 E9/L (ref 1.8–7.3)
NEUTROPHILS RELATIVE PERCENT: 61.2 % (ref 43–80)
PDW BLD-RTO: 14.6 FL (ref 11.5–15)
PLATELET # BLD: 318 E9/L (ref 130–450)
PMV BLD AUTO: 9.9 FL (ref 7–12)
RBC # BLD: 3.2 E12/L (ref 3.8–5.8)
WBC # BLD: 8.5 E9/L (ref 4.5–11.5)

## 2022-12-12 PROCEDURE — 99232 SBSQ HOSP IP/OBS MODERATE 35: CPT | Performed by: SURGERY

## 2022-12-12 PROCEDURE — 85025 COMPLETE CBC W/AUTO DIFF WBC: CPT

## 2022-12-12 PROCEDURE — 2580000003 HC RX 258: Performed by: NURSE PRACTITIONER

## 2022-12-12 PROCEDURE — 6370000000 HC RX 637 (ALT 250 FOR IP): Performed by: NURSE PRACTITIONER

## 2022-12-12 PROCEDURE — C9113 INJ PANTOPRAZOLE SODIUM, VIA: HCPCS | Performed by: NURSE PRACTITIONER

## 2022-12-12 PROCEDURE — 2060000000 HC ICU INTERMEDIATE R&B

## 2022-12-12 PROCEDURE — A4216 STERILE WATER/SALINE, 10 ML: HCPCS | Performed by: NURSE PRACTITIONER

## 2022-12-12 PROCEDURE — 36415 COLL VENOUS BLD VENIPUNCTURE: CPT

## 2022-12-12 PROCEDURE — 82962 GLUCOSE BLOOD TEST: CPT

## 2022-12-12 PROCEDURE — 6360000002 HC RX W HCPCS: Performed by: NURSE PRACTITIONER

## 2022-12-12 PROCEDURE — 99232 SBSQ HOSP IP/OBS MODERATE 35: CPT | Performed by: INTERNAL MEDICINE

## 2022-12-12 RX ADMIN — SODIUM CHLORIDE 1 G: 1 TABLET ORAL at 18:16

## 2022-12-12 RX ADMIN — INSULIN LISPRO 3 UNITS: 100 INJECTION, SOLUTION INTRAVENOUS; SUBCUTANEOUS at 11:37

## 2022-12-12 RX ADMIN — SODIUM CHLORIDE 1 G: 1 TABLET ORAL at 08:30

## 2022-12-12 RX ADMIN — OLANZAPINE 7.5 MG: 2.5 TABLET, FILM COATED ORAL at 20:17

## 2022-12-12 RX ADMIN — ROSUVASTATIN CALCIUM 10 MG: 10 TABLET, FILM COATED ORAL at 08:30

## 2022-12-12 RX ADMIN — SODIUM CHLORIDE, PRESERVATIVE FREE 10 ML: 5 INJECTION INTRAVENOUS at 20:18

## 2022-12-12 RX ADMIN — SODIUM CHLORIDE, PRESERVATIVE FREE 40 MG: 5 INJECTION INTRAVENOUS at 08:32

## 2022-12-12 RX ADMIN — TAMSULOSIN HYDROCHLORIDE 0.4 MG: 0.4 CAPSULE ORAL at 08:30

## 2022-12-12 RX ADMIN — INSULIN LISPRO 1 UNITS: 100 INJECTION, SOLUTION INTRAVENOUS; SUBCUTANEOUS at 16:54

## 2022-12-12 RX ADMIN — SODIUM CHLORIDE: 9 INJECTION, SOLUTION INTRAVENOUS at 11:39

## 2022-12-12 RX ADMIN — SODIUM CHLORIDE, PRESERVATIVE FREE 10 ML: 5 INJECTION INTRAVENOUS at 08:34

## 2022-12-12 RX ADMIN — SODIUM CHLORIDE, PRESERVATIVE FREE 40 MG: 5 INJECTION INTRAVENOUS at 20:18

## 2022-12-12 ASSESSMENT — PAIN SCALES - GENERAL
PAINLEVEL_OUTOF10: 0
PAINLEVEL_OUTOF10: 0
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
## 2022-12-12 NOTE — PROGRESS NOTES
GENERAL SURGERY  DAILY PROGRESS NOTE  12/12/2022    Chief Complaint   Patient presents with    Shortness of Breath     Sent from [facility] for hypoxia on RA and tachycardia, Pt states SOB    Cough    Tachycardia       Subjective:  No acute issues overnight. Doing well this morning. No nausea no vomiting no abdominal pain    Objective:  /84   Pulse 100   Temp 98.8 °F (37.1 °C) (Oral)   Resp 18   Ht 5' 8\" (1.727 m)   Wt 156 lb 15.5 oz (71.2 kg)   SpO2 100%   BMI 23.87 kg/m²     GENERAL:  Laying in bed, awake, alert, cooperative, no apparent distress  LUNGS:  No increased work of breathing  CARDIOVASCULAR:  RR  ABDOMEN:  Soft, non-tender, non-distended  EXTREMITIES: No edema       Assessment/Plan:  74 y.o. male anemia concern for possible GI bleed    He being transferred  If patient does state for prolonged period of time we will plan on performing endoscopies however okay to transfer from our standpoint  Continue to monitor hemoglobin  Okay for diet    Electronically signed by Jolie Patel DO on 12/12/2022 at 9:31 AM     Attending Physician Statement:    Chief Complaint:   Chief Complaint   Patient presents with    Shortness of Breath     Sent from [facility] for hypoxia on RA and tachycardia, Pt states SOB    Cough    Tachycardia       I have examined the patient and performed the key aspects of physical exam, reviewed the record (including all pertinent and new radiology images and laboratory findings), and discussed the case with the surgical team.  I agree with the assessment and plan with the following additions, corrections, and changes. 14pt review of symptoms completed and negative except as mentioned. More alert today and conversive. No abdominal pain. Hemoglobin stable. Awaiting decision for transfer to [facility] potentially. If he is not accepted we can plan for EGD here otherwise can defer to them at the [facility].     Rhiannon Cordero MD  12/12/22  10:36 AM    NOTE: This report, in part or full, may have been transcribed using voice recognition software. Every effort was made to ensure accuracy; however, inadvertent computerized transcription errors may be present. Please excuse any transcriptional grammatical or spelling errors that may have escaped my editorial review.

## 2022-12-12 NOTE — CARE COORDINATION
Update-received a call from Yossi Ho with [facility] ([phone] regarding reason for transfer. All clinical information faxed to [phone], SW faxed as well earlier. He will present to physicians to see if they will accept patient. He is not service connected and does not have travel benefits-therefore it would be the patient's responsibility financially to transport to [facility]. Torsten to get back to me after a decision has been been reached.     Betsy Terry BSN, RN  [facility]

## 2022-12-12 NOTE — PROGRESS NOTES
Comprehensive Nutrition Assessment    Type and Reason for Visit:  Initial, Positive Nutrition Screen    Nutrition Recommendations/Plan:   Continue current diet. ADAT as medically feasible. Added ONS (Clear) BID. Will monitor while inpatient. Malnutrition Assessment:  Malnutrition Status: At risk for malnutrition (Comment) (significant wt loss, reported decreased appetite) (12/12/22 1839)         Nutrition Assessment:    ADM w/GIB, SOB. PMH:  DM, Vascular Dementia, Stercoral Colitis. Pt at nut'l risk w/ significant wt loss. Added Clear ONS BID since on Clear liquid diet. Awaiting decision to transfer to [facility]. Nutrition Related Findings:    A&O, cough, SOB, HgB A1C 8.3 (11/28/22), reported decrease in appetite on admission, no edema, + BS Wound Type: None       Current Nutrition Intake & Therapies:    Average Meal Intake: Unable to assess (no record of po intake)  Average Supplements Intake: None Ordered  ADULT DIET; Clear Liquid  ADULT ORAL NUTRITION SUPPLEMENT; Breakfast, Dinner; Clear Liquid Oral Supplement    Anthropometric Measures:  Height: 5' 8\" (172.7 cm)  Ideal Body Weight (IBW): 154 lbs (70 kg)    Admission Body Weight:  (12/8 estimated wt 160#)  Current Body Weight: 156 lb 15.5 oz (71.2 kg), 101.9 % IBW. Weight Source: Bed Scale (11/12 bed scale. No other actual wts <1 yr)  Current BMI (kg/m2): 23.9  Usual Body Weight: 172 lb 2 oz (78.1 kg)  % Weight Change (Calculated): -8.8  Weight Adjustment For: No Adjustment                 BMI Categories: Normal Weight (BMI 18.5-24. 9)    Estimated Daily Nutrient Needs:  Energy Requirements Based On: Formula  Weight Used for Energy Requirements: Current  Energy (kcal/day): 8498-4417  Weight Used for Protein Requirements: Current  Protein (g/day): 75-85  Method Used for Fluid Requirements: 1 ml/kcal  Fluid (ml/day): 9924-4229    Nutrition Diagnosis:   Unintended weight loss related to altered GI function, cognitive or neurological impairment as evidenced by weight loss greater than or equal to 5% in 1 month    Nutrition Interventions:   Food and/or Nutrient Delivery: Continue Current Diet, Start Oral Nutrition Supplement (ADAT as medically feasible, added Clear ONS BID (pt has hyperglycemia))  Nutrition Education/Counseling: No recommendation at this time  Coordination of Nutrition Care: Continue to monitor while inpatient       Goals:     Goals: Meet at least 75% of estimated needs, by next RD assessment       Nutrition Monitoring and Evaluation:      Food/Nutrient Intake Outcomes: Diet Advancement/Tolerance, Food and Nutrient Intake, Supplement Intake  Physical Signs/Symptoms Outcomes: Biochemical Data, Fluid Status or Edema, Skin, Nutrition Focused Physical Findings    Discharge Planning:     Too soon to determine     David Rico RD  Contact: [phone]

## 2022-12-12 NOTE — CARE COORDINATION
Social Work / Discharge Planning : SW called [facility] transfer center [phone] ext [phone] and left detailed message in regards to patient to transfer to [facility]. SW also printed patient info and faxed to transfer center [phone]. Await response. SOO to follow.  Electronically signed by INDY William on 12/12/22 at 10:23 AM EST

## 2022-12-13 PROBLEM — R06.02 SHORTNESS OF BREATH: Status: ACTIVE | Noted: 2022-12-13

## 2022-12-13 PROBLEM — D64.9 ACUTE ANEMIA: Status: ACTIVE | Noted: 2022-12-13

## 2022-12-13 LAB
ALBUMIN SERPL-MCNC: 2.7 G/DL (ref 3.5–5.2)
ALP BLD-CCNC: 72 U/L (ref 40–129)
ALT SERPL-CCNC: 8 U/L (ref 0–40)
ANION GAP SERPL CALCULATED.3IONS-SCNC: 8 MMOL/L (ref 7–16)
AST SERPL-CCNC: 10 U/L (ref 0–39)
BASOPHILS ABSOLUTE: 0.05 E9/L (ref 0–0.2)
BASOPHILS RELATIVE PERCENT: 0.5 % (ref 0–2)
BILIRUB SERPL-MCNC: 0.5 MG/DL (ref 0–1.2)
BUN BLDV-MCNC: 6 MG/DL (ref 6–23)
CALCIUM SERPL-MCNC: 8.3 MG/DL (ref 8.6–10.2)
CHLORIDE BLD-SCNC: 109 MMOL/L (ref 98–107)
CO2: 24 MMOL/L (ref 22–29)
CREAT SERPL-MCNC: 0.8 MG/DL (ref 0.7–1.2)
EOSINOPHILS ABSOLUTE: 0.21 E9/L (ref 0.05–0.5)
EOSINOPHILS RELATIVE PERCENT: 2 % (ref 0–6)
GFR SERPL CREATININE-BSD FRML MDRD: >60 ML/MIN/1.73
GLUCOSE BLD-MCNC: 179 MG/DL (ref 74–99)
HCT VFR BLD CALC: 25.6 % (ref 37–54)
HEMOGLOBIN: 8.6 G/DL (ref 12.5–16.5)
IMMATURE GRANULOCYTES #: 0.05 E9/L
IMMATURE GRANULOCYTES %: 0.5 % (ref 0–5)
LYMPHOCYTES ABSOLUTE: 2.35 E9/L (ref 1.5–4)
LYMPHOCYTES RELATIVE PERCENT: 22.8 % (ref 20–42)
MCH RBC QN AUTO: 25.4 PG (ref 26–35)
MCHC RBC AUTO-ENTMCNC: 33.6 % (ref 32–34.5)
MCV RBC AUTO: 75.5 FL (ref 80–99.9)
METER GLUCOSE: 240 MG/DL (ref 74–99)
METER GLUCOSE: 314 MG/DL (ref 74–99)
METER GLUCOSE: 401 MG/DL (ref 74–99)
MONOCYTES ABSOLUTE: 0.9 E9/L (ref 0.1–0.95)
MONOCYTES RELATIVE PERCENT: 8.7 % (ref 2–12)
NEUTROPHILS ABSOLUTE: 6.73 E9/L (ref 1.8–7.3)
NEUTROPHILS RELATIVE PERCENT: 65.5 % (ref 43–80)
PDW BLD-RTO: 14.6 FL (ref 11.5–15)
PLATELET # BLD: 311 E9/L (ref 130–450)
PMV BLD AUTO: 9.9 FL (ref 7–12)
POTASSIUM SERPL-SCNC: 3.4 MMOL/L (ref 3.5–5)
RBC # BLD: 3.39 E12/L (ref 3.8–5.8)
SODIUM BLD-SCNC: 141 MMOL/L (ref 132–146)
TOTAL PROTEIN: 4.8 G/DL (ref 6.4–8.3)
WBC # BLD: 10.3 E9/L (ref 4.5–11.5)

## 2022-12-13 PROCEDURE — 99232 SBSQ HOSP IP/OBS MODERATE 35: CPT | Performed by: INTERNAL MEDICINE

## 2022-12-13 PROCEDURE — 2580000003 HC RX 258: Performed by: NURSE PRACTITIONER

## 2022-12-13 PROCEDURE — 2060000000 HC ICU INTERMEDIATE R&B

## 2022-12-13 PROCEDURE — 99232 SBSQ HOSP IP/OBS MODERATE 35: CPT | Performed by: SURGERY

## 2022-12-13 PROCEDURE — A4216 STERILE WATER/SALINE, 10 ML: HCPCS | Performed by: NURSE PRACTITIONER

## 2022-12-13 PROCEDURE — 6360000002 HC RX W HCPCS: Performed by: NURSE PRACTITIONER

## 2022-12-13 PROCEDURE — 6370000000 HC RX 637 (ALT 250 FOR IP): Performed by: NURSE PRACTITIONER

## 2022-12-13 PROCEDURE — 80053 COMPREHEN METABOLIC PANEL: CPT

## 2022-12-13 PROCEDURE — 85025 COMPLETE CBC W/AUTO DIFF WBC: CPT

## 2022-12-13 PROCEDURE — 82962 GLUCOSE BLOOD TEST: CPT

## 2022-12-13 PROCEDURE — C9113 INJ PANTOPRAZOLE SODIUM, VIA: HCPCS | Performed by: NURSE PRACTITIONER

## 2022-12-13 PROCEDURE — 36415 COLL VENOUS BLD VENIPUNCTURE: CPT

## 2022-12-13 RX ADMIN — INSULIN LISPRO 4 UNITS: 100 INJECTION, SOLUTION INTRAVENOUS; SUBCUTANEOUS at 11:07

## 2022-12-13 RX ADMIN — SODIUM CHLORIDE, PRESERVATIVE FREE 40 MG: 5 INJECTION INTRAVENOUS at 20:12

## 2022-12-13 RX ADMIN — ROSUVASTATIN CALCIUM 10 MG: 10 TABLET, FILM COATED ORAL at 09:49

## 2022-12-13 RX ADMIN — TAMSULOSIN HYDROCHLORIDE 0.4 MG: 0.4 CAPSULE ORAL at 09:32

## 2022-12-13 RX ADMIN — SODIUM CHLORIDE 1 G: 1 TABLET ORAL at 09:32

## 2022-12-13 RX ADMIN — SODIUM CHLORIDE, PRESERVATIVE FREE 10 ML: 5 INJECTION INTRAVENOUS at 09:33

## 2022-12-13 RX ADMIN — SODIUM CHLORIDE 1 G: 1 TABLET ORAL at 15:11

## 2022-12-13 RX ADMIN — SODIUM CHLORIDE 1 G: 1 TABLET ORAL at 18:58

## 2022-12-13 RX ADMIN — OLANZAPINE 7.5 MG: 2.5 TABLET, FILM COATED ORAL at 20:12

## 2022-12-13 RX ADMIN — SODIUM CHLORIDE, PRESERVATIVE FREE 10 ML: 5 INJECTION INTRAVENOUS at 20:15

## 2022-12-13 RX ADMIN — SODIUM CHLORIDE, PRESERVATIVE FREE 40 MG: 5 INJECTION INTRAVENOUS at 09:32

## 2022-12-13 RX ADMIN — INSULIN LISPRO 3 UNITS: 100 INJECTION, SOLUTION INTRAVENOUS; SUBCUTANEOUS at 16:34

## 2022-12-13 ASSESSMENT — PAIN SCALES - GENERAL
PAINLEVEL_OUTOF10: 0
PAINLEVEL_OUTOF10: 0

## 2022-12-13 NOTE — DISCHARGE INSTR - COC
Continuity of Care Form    Patient Name: Amada Jon   :  1955  MRN:  [id number]    Admit date:  2022  Discharge date:  12-    Code Status Order: Full Code   Advance Directives:     Admitting Physician:  Fermín Tena DO  PCP: Freddy Dudley    Discharging Nurse: Ty Angeles, [facility] Unit/Room#: 0610/0610-A  Discharging Unit Phone Number: [phone]    Emergency Contact:   Extended Emergency Contact Information  Primary Emergency Contact: Ruth Randolph  Address: [address], [address] [facility] [employer] of [address] Phone: [phone]  Relation: Child  Preferred language: English  Secondary Emergency Contact: Bryce Phoenix  Address:  [facility]           [address] [place], [facility] [employer] of [address] Phone: [phone]  Mobile Phone: [phone]  Relation: Child  Preferred language: English   needed? No    Past Surgical History:  Past Surgical History:   Procedure Laterality Date    EYE SURGERY      HIP SURGERY Right 2020    HIP HEMIARTHROPLASTY performed by Shelly Steiner MD at [address] [facility] Left 12/10/2020    HIP HEMIARTHROPLASTY -- HERB -- DROPLET +     PT. COMING FROM [facility] performed by Pily Roque MD at [address]       Immunization History:   Immunization History   Administered Date(s) Administered    COVID-19, PFIZER PURPLE top, DILUTE for use, (age 15 y+), 30mcg/0.3mL 2020, 2021       Active Problems:  Patient Active Problem List   Diagnosis Code    Severe episode of recurrent major depressive disorder, without psychotic features ([address] F33.2    Suicidal ideations R45.851    SDH (subdural hematoma) S06. 5XAA    Traumatic subdural hemorrhage with loss of consciousness of 30 minutes or less ([address] S06. 5X1A    Diabetic acidosis without coma (HCC) E11.10    Acute kidney injury superimposed on CKD (HCC) N17.9, N18.9    Hyponatremia E87.1    Right adrenal mass ([facility] E27.8    Hyperkalemia E87.5    Major depression, recurrent ([facility] F33.9    Type 2 diabetes mellitus with complication, with long-term current use of insulin (HCC) E11.8, Z79.4    HLD (hyperlipidemia) E78.5    HTN (hypertension) I10    Uncontrolled type 2 diabetes mellitus with hyperglycemia (HCC) E11.65    SIRS (systemic inflammatory response syndrome) ([facility]) R65.10    Weight loss R63.4    Generalized abdominal pain R10.84    Lactic acidosis E87.20    Constipation K59.00    High anion gap metabolic acidosis F98.77    Non compliance w medication regimen Z91.14    Physical deconditioning R53.81    Dementia, vascular (HCC) F01.50    Moderate protein-calorie malnutrition (HCC) E44.0    TIA (transient ischemic attack) G45.9    DKA, type 1, not at goal [facility]) E10.10    Ileus ([facility] K56.7    Adrenal adenoma, right D35.01    Hypertensive urgency I16.0    Hiccups M03.9    Umbilical hernia without obstruction and without gangrene K42.9    GERD (gastroesophageal reflux disease) K21.9    Depression F32. A    Hypotension I95.9    Depression, major, recurrent ([facility]) F33.9    Closed right hip fracture, initial encounter [facility]) S72.001A    History of right hip hemiarthroplasty Z96.641    Closed left hip fracture, initial encounter ([facility] K13.909S    Acute metabolic encephalopathy V12.93    History of left hip hemiarthroplasty D69.226    CLEMENTE (acute kidney injury) ([facility] N17.9    Weakness R53.1    GI bleed K92.2    Leukocytosis D72.829    Shortness of breath R06.02    Acute anemia D64.9       Isolation/Infection:   Isolation            No Isolation          Patient Infection Status       Infection Onset Added Last Indicated Last Indicated By Review Planned Expiration Resolved Resolved By    None active    Resolved    COVID-19 (Rule Out) 12/09/22 12/09/22 12/09/22 Respiratory Panel, Molecular, with COVID-19 (Restricted: peds pts or suitable admitted adults) (Ordered)   12/09/22 Rule-Out Test Resulted    COVID-19 (Rule Out) 12/08/22 22 COVID-19, Rapid (Ordered)   22 Rule-Out Test Resulted    COVID-19 (Rule Out) 22 Respiratory Panel, Molecular, with COVID-19 (Restricted: peds pts or suitable admitted adults) (Ordered)   22 Rule-Out Test Resulted    C-diff Rule Out 22 CLOSTRIDIUM DIFFICILE EIA (Ordered)   22 Rule-Out Test Resulted    COVID-19 20 Respiratory Panel, Molecular, with COVID-19 (Restricted: peds pts or suitable admitted adults)   20     COVID-19 (Rule Out) 20 Respiratory Panel, Molecular, with COVID-19 (Restricted: peds pts or suitable admitted adults) (Ordered)   20 Rule-Out Test Resulted    COVID-19 (Rule Out) 20 Covid-19 Ambulatory (Ordered)   20 Rule-Out Test Resulted    COVID-19 (Rule Out) 10/06/20 10/06/20 10/06/20 COVID-19 (Ordered)   10/06/20 Rule-Out Test Resulted    C-diff Rule Out 20 Clostridium difficile EIA (Ordered)   20 Fannie Dixon RN            Nurse Assessment:  Last Vital Signs: BP (!) 148/93   Pulse 85   Temp 98.3 °F (36.8 °C) (Oral)   Resp 16   Ht 5' 8\" (1.727 m)   Wt 155 lb 8 oz (70.5 kg)   SpO2 100%   BMI 23.64 kg/m²     Last documented pain score (0-10 scale): Pain Level: 0  Last Weight:   Wt Readings from Last 1 Encounters:   22 155 lb 8 oz (70.5 kg)     Mental Status:  oriented and alert    IV Access:  - None    Nursing Mobility/ADLs:  Walking   Assisted  Transfer  Assisted  Bathing  Dependent  Dressing  Assisted  Toileting  Assisted  Feeding  Independent  Med Admin  Assisted  Med Delivery   crushed    Wound Care Documentation and Therapy:        Elimination:  Continence:    Bowel: No  Bladder: Yes  Urinary Catheter: Removal Date 12-15-22    Colostomy/Ileostomy/Ileal Conduit: No       Date of Last BM: 22    Intake/Output Summary (Last 24 hours) at 2022 1532  Last data filed at 12/13/2022 0354  Gross per 24 hour   Intake --   Output 900 ml   Net -900 ml     I/O last 3 completed shifts:  In: -   Out: 3000 [Urine:3000]    Safety Concerns: At Risk for Falls    Impairments/Disabilities:      None    Nutrition Therapy:  Current Nutrition Therapy:   - Oral Diet:  GI     Routes of Feeding: Oral  Liquids: No Restrictions  Daily Fluid Restriction: no  Last Modified Barium Swallow with Video (Video Swallowing Test): not done    Treatments at the Time of Hospital Discharge:   Respiratory Treatments:   Oxygen Therapy:    Ventilator:        Rehab Therapies: Physical Therapy and Occupational Therapy  Weight Bearing Status/Restrictions: No weight bearing restrictions  Other Medical Equipment (for information only, NOT a DME order):  walker  Other Treatments:     Patient's personal belongings (please select all that are sent with patient):  None    RN SIGNATURE:  Electronically signed by Derrick Shaw RN on 12/15/22 at 3:43 PM EST    CASE MANAGEMENT/SOCIAL WORK SECTION    Inpatient Status Date: 12/9/22    Readmission Risk Assessment Score:  Readmission Risk              Risk of Unplanned Readmission:  32           Discharging to Facility/ Agency   Name: [address] [place] [address]. [place] [address]  YL:[phone]  XCM:[phone]    Dialysis Facility (if applicable)   Name:  Address:  Dialysis Schedule:  Phone:  Fax:    / signature: Electronically signed by William Park RN on 12/13/2022 at 3:32 PM      PHYSICIAN SECTION    Prognosis: Fair    Condition at Discharge: Stable    Rehab Potential (if transferring to Rehab): Fair    Recommended Labs or Other Treatments After Discharge:     Physician Certification: I certify the above information and transfer of Loren Mena  is necessary for the continuing treatment of the diagnosis listed and that he requires [facility] for greater 30 days.      Update Admission H&P: No change in H&P    PHYSICIAN SIGNATURE:  Shlomo Alcala DO
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
## 2022-12-13 NOTE — PROGRESS NOTES
Physical Therapy  Facility/Department: [facility] [address] [facility]    Name: Zeyad Simpson  : 1955  MRN: [id number]  Date of Service: 2022  The pt was seen laying supine in bed, was initially receptive to PT, then when attempting to move the pt refused despite encouragement. Will re-attempt PT evaluation as able. Lary Mcneil.  Jose Jon, [address]  number:  [phone] [phone]

## 2022-12-13 NOTE — PROGRESS NOTES
GENERAL SURGERY  DAILY PROGRESS NOTE  12/13/2022    Chief Complaint   Patient presents with    Shortness of Breath     Sent from [facility] for hypoxia on RA and tachycardia, Pt states SOB    Cough    Tachycardia       Subjective:  No acute issues overnight. Hemoglobin remained stable. No signs of bleeding. Objective:  BP (!) 140/75   Pulse 85   Temp 98 °F (36.7 °C) (Oral)   Resp 15   Ht 5' 8\" (1.727 m)   Wt 155 lb 8 oz (70.5 kg)   SpO2 97%   BMI 23.64 kg/m²     GENERAL:  Laying in bed, awake, alert, cooperative, no apparent distress  LUNGS:  No increased work of breathing  CARDIOVASCULAR:  RR  ABDOMEN:  Soft, non-tender, non-distended  EXTREMITIES: No edema       Assessment/Plan:  74 y.o. male with anemia, FOBT+ stool and concern for possible GI bleed. We received notification this morning that the patient is no longer being transferred to [facility]. We will plan for EGD tomorrow 12/14. I spoke with the patient's son and Fiorella Ortiz, and discussed the details of the procedure, benefits and risks to include bleeding and perforation possibly requiring open surgery. The son expressed understanding and consented to moving forward with the procedure. Electronically signed by Poonam Jameson DO on 12/13/2022 at 7:27 AM     Attending Physician Statement:    Chief Complaint:   Chief Complaint   Patient presents with    Shortness of Breath     Sent from [facility] for hypoxia on RA and tachycardia, Pt states SOB    Cough    Tachycardia       I have examined the patient and performed the key aspects of physical exam, reviewed the record (including all pertinent and new radiology images and laboratory findings), and discussed the case with the surgical team.  I agree with the assessment and plan with the following additions, corrections, and changes. 14pt review of symptoms completed and negative except as mentioned. Hemoglobin stable.   However, still with anemia and FOBT positive stool, now that he is not being transferred to the [address] [place] [address] we will plan for EGD tomorrow. Maudry Kussmaul, MD  12/13/22  11:44 AM    NOTE: This report, in part or full, may have been transcribed using voice recognition software. Every effort was made to ensure accuracy; however, inadvertent computerized transcription errors may be present. Please excuse any transcriptional grammatical or spelling errors that may have escaped my editorial review.

## 2022-12-13 NOTE — CARE COORDINATION
Transition of Care-Call placed to Jacy Fails at the [phone], unable to reach-left voicemail regarding admission to [facility]. Call placed to srinivasan Cantrell. to update and inquire as to plan if patient is not accepted at [facility] People stated that patient would return to [facility]. PT/OT ordered, d/t insurance carrier-will not need pre-cert, confirmed with liaison can return once stable, COVID test requested day of discharge. General Surgery is following for GI Bleed-EGD planned for 12/14. BARBARA, envelope and destination completed.     Trent RODRIGUEZN, RN  [facility]

## 2022-12-14 ENCOUNTER — ANESTHESIA (OUTPATIENT)
Dept: ENDOSCOPY | Age: 67
DRG: 378 | End: 2022-12-14
Payer: MEDICARE

## 2022-12-14 ENCOUNTER — ANESTHESIA EVENT (OUTPATIENT)
Dept: ENDOSCOPY | Age: 67
End: 2022-12-14

## 2022-12-14 ENCOUNTER — ANESTHESIA EVENT (OUTPATIENT)
Dept: ENDOSCOPY | Age: 67
DRG: 378 | End: 2022-12-14
Payer: MEDICARE

## 2022-12-14 LAB
ALBUMIN SERPL-MCNC: 2.5 G/DL (ref 3.5–5.2)
ALP BLD-CCNC: 72 U/L (ref 40–129)
ALT SERPL-CCNC: 8 U/L (ref 0–40)
ANION GAP SERPL CALCULATED.3IONS-SCNC: 7 MMOL/L (ref 7–16)
AST SERPL-CCNC: 8 U/L (ref 0–39)
BASOPHILS ABSOLUTE: 0.05 E9/L (ref 0–0.2)
BASOPHILS RELATIVE PERCENT: 0.5 % (ref 0–2)
BILIRUB SERPL-MCNC: 0.6 MG/DL (ref 0–1.2)
BLOOD CULTURE, ROUTINE: NORMAL
BLOOD CULTURE, ROUTINE: NORMAL
BUN BLDV-MCNC: 5 MG/DL (ref 6–23)
CALCIUM SERPL-MCNC: 8 MG/DL (ref 8.6–10.2)
CHLORIDE BLD-SCNC: 105 MMOL/L (ref 98–107)
CO2: 25 MMOL/L (ref 22–29)
CREAT SERPL-MCNC: 0.9 MG/DL (ref 0.7–1.2)
EOSINOPHILS ABSOLUTE: 0.15 E9/L (ref 0.05–0.5)
EOSINOPHILS RELATIVE PERCENT: 1.4 % (ref 0–6)
GFR SERPL CREATININE-BSD FRML MDRD: >60 ML/MIN/1.73
GLUCOSE BLD-MCNC: 191 MG/DL (ref 74–99)
HCT VFR BLD CALC: 23.4 % (ref 37–54)
HEMOGLOBIN: 8 G/DL (ref 12.5–16.5)
IMMATURE GRANULOCYTES #: 0.04 E9/L
IMMATURE GRANULOCYTES %: 0.4 % (ref 0–5)
LYMPHOCYTES ABSOLUTE: 2.27 E9/L (ref 1.5–4)
LYMPHOCYTES RELATIVE PERCENT: 21.6 % (ref 20–42)
MCH RBC QN AUTO: 25.7 PG (ref 26–35)
MCHC RBC AUTO-ENTMCNC: 34.2 % (ref 32–34.5)
MCV RBC AUTO: 75.2 FL (ref 80–99.9)
METER GLUCOSE: 164 MG/DL (ref 74–99)
METER GLUCOSE: 171 MG/DL (ref 74–99)
METER GLUCOSE: 204 MG/DL (ref 74–99)
METER GLUCOSE: 262 MG/DL (ref 74–99)
MONOCYTES ABSOLUTE: 0.9 E9/L (ref 0.1–0.95)
MONOCYTES RELATIVE PERCENT: 8.6 % (ref 2–12)
NEUTROPHILS ABSOLUTE: 7.09 E9/L (ref 1.8–7.3)
NEUTROPHILS RELATIVE PERCENT: 67.5 % (ref 43–80)
PDW BLD-RTO: 14.6 FL (ref 11.5–15)
PLATELET # BLD: 290 E9/L (ref 130–450)
PMV BLD AUTO: 10 FL (ref 7–12)
POTASSIUM SERPL-SCNC: 3.3 MMOL/L (ref 3.5–5)
RBC # BLD: 3.11 E12/L (ref 3.8–5.8)
SODIUM BLD-SCNC: 137 MMOL/L (ref 132–146)
TOTAL PROTEIN: 4.5 G/DL (ref 6.4–8.3)
WBC # BLD: 10.5 E9/L (ref 4.5–11.5)

## 2022-12-14 PROCEDURE — 7100000010 HC PHASE II RECOVERY - FIRST 15 MIN: Performed by: SURGERY

## 2022-12-14 PROCEDURE — 2500000003 HC RX 250 WO HCPCS: Performed by: NURSE ANESTHETIST, CERTIFIED REGISTERED

## 2022-12-14 PROCEDURE — 2580000003 HC RX 258: Performed by: NURSE ANESTHETIST, CERTIFIED REGISTERED

## 2022-12-14 PROCEDURE — 36415 COLL VENOUS BLD VENIPUNCTURE: CPT

## 2022-12-14 PROCEDURE — 85025 COMPLETE CBC W/AUTO DIFF WBC: CPT

## 2022-12-14 PROCEDURE — 2709999900 HC NON-CHARGEABLE SUPPLY: Performed by: SURGERY

## 2022-12-14 PROCEDURE — 6360000002 HC RX W HCPCS: Performed by: NURSE ANESTHETIST, CERTIFIED REGISTERED

## 2022-12-14 PROCEDURE — 3609012400 HC EGD TRANSORAL BIOPSY SINGLE/MULTIPLE: Performed by: SURGERY

## 2022-12-14 PROCEDURE — 3700000001 HC ADD 15 MINUTES (ANESTHESIA): Performed by: SURGERY

## 2022-12-14 PROCEDURE — 0DB58ZX EXCISION OF ESOPHAGUS, VIA NATURAL OR ARTIFICIAL OPENING ENDOSCOPIC, DIAGNOSTIC: ICD-10-PCS | Performed by: SURGERY

## 2022-12-14 PROCEDURE — 0DB68ZX EXCISION OF STOMACH, VIA NATURAL OR ARTIFICIAL OPENING ENDOSCOPIC, DIAGNOSTIC: ICD-10-PCS | Performed by: SURGERY

## 2022-12-14 PROCEDURE — 82962 GLUCOSE BLOOD TEST: CPT

## 2022-12-14 PROCEDURE — 2580000003 HC RX 258: Performed by: NURSE PRACTITIONER

## 2022-12-14 PROCEDURE — 80053 COMPREHEN METABOLIC PANEL: CPT

## 2022-12-14 PROCEDURE — 6370000000 HC RX 637 (ALT 250 FOR IP): Performed by: INTERNAL MEDICINE

## 2022-12-14 PROCEDURE — A4216 STERILE WATER/SALINE, 10 ML: HCPCS | Performed by: NURSE PRACTITIONER

## 2022-12-14 PROCEDURE — C9113 INJ PANTOPRAZOLE SODIUM, VIA: HCPCS | Performed by: NURSE PRACTITIONER

## 2022-12-14 PROCEDURE — 3700000000 HC ANESTHESIA ATTENDED CARE: Performed by: SURGERY

## 2022-12-14 PROCEDURE — 7100000011 HC PHASE II RECOVERY - ADDTL 15 MIN: Performed by: SURGERY

## 2022-12-14 PROCEDURE — 6370000000 HC RX 637 (ALT 250 FOR IP): Performed by: NURSE PRACTITIONER

## 2022-12-14 PROCEDURE — 6370000000 HC RX 637 (ALT 250 FOR IP): Performed by: SURGERY

## 2022-12-14 PROCEDURE — 6360000002 HC RX W HCPCS: Performed by: NURSE PRACTITIONER

## 2022-12-14 PROCEDURE — 2060000000 HC ICU INTERMEDIATE R&B

## 2022-12-14 RX ORDER — LIDOCAINE HYDROCHLORIDE 20 MG/ML
INJECTION, SOLUTION EPIDURAL; INFILTRATION; INTRACAUDAL; PERINEURAL PRN
Status: DISCONTINUED | OUTPATIENT
Start: 2022-12-14 | End: 2022-12-14 | Stop reason: SDUPTHER

## 2022-12-14 RX ORDER — PROPOFOL 10 MG/ML
INJECTION, EMULSION INTRAVENOUS PRN
Status: DISCONTINUED | OUTPATIENT
Start: 2022-12-14 | End: 2022-12-14 | Stop reason: SDUPTHER

## 2022-12-14 RX ORDER — SODIUM CHLORIDE 9 MG/ML
INJECTION, SOLUTION INTRAVENOUS CONTINUOUS PRN
Status: DISCONTINUED | OUTPATIENT
Start: 2022-12-14 | End: 2022-12-14 | Stop reason: SDUPTHER

## 2022-12-14 RX ADMIN — PROPOFOL 100 MG: 10 INJECTION, EMULSION INTRAVENOUS at 09:06

## 2022-12-14 RX ADMIN — SODIUM CHLORIDE 1 G: 1 TABLET ORAL at 15:25

## 2022-12-14 RX ADMIN — INSULIN LISPRO 2 UNITS: 100 INJECTION, SOLUTION INTRAVENOUS; SUBCUTANEOUS at 15:47

## 2022-12-14 RX ADMIN — SODIUM CHLORIDE: 9 INJECTION, SOLUTION INTRAVENOUS at 04:02

## 2022-12-14 RX ADMIN — OLANZAPINE 7.5 MG: 2.5 TABLET, FILM COATED ORAL at 20:10

## 2022-12-14 RX ADMIN — TAMSULOSIN HYDROCHLORIDE 0.4 MG: 0.4 CAPSULE ORAL at 11:05

## 2022-12-14 RX ADMIN — SODIUM CHLORIDE, PRESERVATIVE FREE 40 MG: 5 INJECTION INTRAVENOUS at 11:05

## 2022-12-14 RX ADMIN — LIDOCAINE HYDROCHLORIDE 100 MG: 20 INJECTION, SOLUTION EPIDURAL; INFILTRATION; INTRACAUDAL; PERINEURAL at 09:06

## 2022-12-14 RX ADMIN — POTASSIUM BICARBONATE 40 MEQ: 782 TABLET, EFFERVESCENT ORAL at 11:05

## 2022-12-14 RX ADMIN — SODIUM CHLORIDE 1 G: 1 TABLET ORAL at 11:05

## 2022-12-14 RX ADMIN — BISACODYL 10 MG: 5 TABLET, COATED ORAL at 15:24

## 2022-12-14 RX ADMIN — ROSUVASTATIN CALCIUM 10 MG: 10 TABLET, FILM COATED ORAL at 11:05

## 2022-12-14 RX ADMIN — INSULIN LISPRO 1 UNITS: 100 INJECTION, SOLUTION INTRAVENOUS; SUBCUTANEOUS at 08:21

## 2022-12-14 RX ADMIN — POLYETHYLENE GLYCOL 3350, SODIUM SULFATE ANHYDROUS, SODIUM BICARBONATE, SODIUM CHLORIDE, POTASSIUM CHLORIDE 4000 ML: 236; 22.74; 6.74; 5.86; 2.97 POWDER, FOR SOLUTION ORAL at 16:17

## 2022-12-14 RX ADMIN — SODIUM CHLORIDE: 9 INJECTION, SOLUTION INTRAVENOUS at 08:47

## 2022-12-14 RX ADMIN — SODIUM CHLORIDE, PRESERVATIVE FREE 40 MG: 5 INJECTION INTRAVENOUS at 20:10

## 2022-12-14 RX ADMIN — POTASSIUM BICARBONATE 40 MEQ: 782 TABLET, EFFERVESCENT ORAL at 20:10

## 2022-12-14 ASSESSMENT — LIFESTYLE VARIABLES: SMOKING_STATUS: 0

## 2022-12-14 ASSESSMENT — ENCOUNTER SYMPTOMS
DYSPNEA ACTIVITY LEVEL: NO INTERVAL CHANGE
SHORTNESS OF BREATH: 1

## 2022-12-14 ASSESSMENT — PAIN SCALES - WONG BAKER: WONGBAKER_NUMERICALRESPONSE: 0

## 2022-12-14 NOTE — CARE COORDINATION
Transition of Care-Several attempts to reach Torsten at [facility] made, discharge plan is to return to [facility] not need pre-cert to return. Plan for EGD today, Colonoscopy tomorrow. BARBARA, Transportation envelope in chart.     Berhane RODRIGUEZN, RN  [facility]
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
## 2022-12-14 NOTE — ANESTHESIA PRE PROCEDURE
Department of Anesthesiology  Preprocedure Note       Name:  Kitty Peterson   Age:  74 y.o.  :  1955                                          MRN:  [id number]         Date:  2022      Surgeon: Dariela Greenfield):  Laveta Barthel, MD    Procedure: Procedure(s):  COLONOSCOPY DIAGNOSTIC    Medications prior to admission:   Prior to Admission medications    Medication Sig Start Date End Date Taking? Authorizing Provider   calcium carbonate (TUMS EXTRA STRENGTH 750) 750 MG chewable tablet Take 750 mg by mouth every 12 hours as needed (GERD)   Yes Historical Provider, MD   insulin glargine (LANTUS) 100 UNIT/ML injection vial Inject 5 Units into the skin nightly   Yes Historical Provider, MD   metFORMIN (GLUCOPHAGE) 500 MG tablet Take 500 mg by mouth in the morning and 500 mg in the evening. Yes Historical Provider, MD   pantoprazole (PROTONIX) 40 MG tablet Take 40 mg by mouth daily   Yes Historical Provider, MD   senna-docusate (PERICOLACE) 8.6-50 MG per tablet Take 1 tablet by mouth in the morning and 1 tablet in the evening.    Yes Historical Provider, MD   sodium chloride 1 g tablet Take 1 g by mouth daily   Yes Historical Provider, MD   polyethylene glycol (GLYCOLAX) 17 g packet Take 17 g by mouth daily 22  John Gibson MD   aspirin 81 MG EC tablet Take 81 mg by mouth daily    Historical Provider, MD   OLANZapine (ZYPREXA) 7.5 MG tablet Take 7.5 mg by mouth nightly    Historical Provider, MD   rosuvastatin (CRESTOR) 10 MG tablet Take 10 mg by mouth daily    Historical Provider, MD   insulin lispro (HUMALOG) 100 UNIT/ML injection vial Inject 0-4 Units into the skin 4 times daily (before meals and nightly) PER SLIDING SCALE: 0-150=0U, 151-200=2U, 201-250=4U, 251-300=6U, 301-350=8U, 351-400=10U, 401-450=12U, 450+=CALL MD    Historical Provider, MD   tamsulosin (FLOMAX) 0.4 MG capsule Take 1 capsule by mouth daily 22  Nayeli Kline, APRN - CNP   glucose 4 g chewable tablet Take 4 g by mouth as needed for Low blood sugar     Historical Provider, MD       Current medications:    Current Facility-Administered Medications   Medication Dose Route Frequency Provider Last Rate Last Admin    potassium bicarb-citric acid (EFFER-K) effervescent tablet 40 mEq  40 mEq Oral BID Yelitza MIRA Flaherty - NP   40 mEq at 12/14/22 1105    medicated lip balm (BLISTEX/CARMEX) stick   Topical PRN [place] Plants, DO   Given at 12/11/22 1514    pantoprazole (PROTONIX) 40 mg in sodium chloride (PF) 0.9 % 10 mL injection  40 mg IntraVENous Q12H Ephriam Jimmie, APRN - CNP   40 mg at 12/14/22 1105    sodium chloride flush 0.9 % injection 5-40 mL  5-40 mL IntraVENous 2 times per day Amira Manan, APRN - CNP   10 mL at 12/13/22 2015    sodium chloride flush 0.9 % injection 5-40 mL  5-40 mL IntraVENous PRN Amira Manan, APRN - CNP   10 mL at 12/11/22 1320    0.9 % sodium chloride infusion   IntraVENous PRN Amira Manan, APRN - CNP        ondansetron (ZOFRAN-ODT) disintegrating tablet 4 mg  4 mg Oral Q8H PRN Amira Ellinger, APRN - CNP        Or    ondansetron (ZOFRAN) injection 4 mg  4 mg IntraVENous Q6H PRN Amira Ellinger, APRN - CNP        polyethylene glycol (GLYCOLAX) packet 17 g  17 g Oral Daily PRN Amira Ellinger, APRN - CNP        acetaminophen (TYLENOL) tablet 650 mg  650 mg Oral Q6H PRN Amira Ellinger, APRN - CNP        Or    acetaminophen (TYLENOL) suppository 650 mg  650 mg Rectal Q6H PRN Amira Ellinger, APRN - CNP        OLANZapine (ZYPREXA) tablet 7.5 mg  7.5 mg Oral Nightly Amira Manan, APRN - CNP   7.5 mg at 12/13/22 2012    rosuvastatin (CRESTOR) tablet 10 mg  10 mg Oral Daily Amira Ellinger, APRN - CNP   10 mg at 12/14/22 1105    senna (SENOKOT) tablet 17.2 mg  2 tablet Oral Nightly PRN Amira Manan, APRN - CNP        sodium chloride tablet 1 g  1 g Oral TID WC Amira Ellinger, APRN - CNP   1 g at 12/14/22 1525    tamsulosin (FLOMAX) capsule 0.4 mg 0.4 mg Oral Daily Alvester Bake, APRN - CNP   0.4 mg at 12/14/22 1105    glucose chewable tablet 16 g  4 tablet Oral PRN Alvester Bake, APRN - CNP        dextrose bolus 10% 125 mL  125 mL IntraVENous PRN Alvester Bake, APRN - CNP        Or    dextrose bolus 10% 250 mL  250 mL IntraVENous PRN Alvester Bake, APRN - CNP        glucagon (rDNA) injection 1 mg  1 mg SubCUTAneous PRN Alvester Bake, APRN - CNP        dextrose 10 % infusion   IntraVENous Continuous PRN Alvester Bake, APRN - CNP        insulin lispro (HUMALOG) injection vial 0-4 Units  0-4 Units SubCUTAneous TID WC Alvester Bake, APRN - CNP   2 Units at 12/14/22 1547    insulin lispro (HUMALOG) injection vial 0-4 Units  0-4 Units SubCUTAneous Nightly Alvester Bake, APRN - CNP        0.9 % sodium chloride infusion   IntraVENous Continuous Alvester Bake, APRN - CNP 75 mL/hr at 12/14/22 0402 New Bag at 12/14/22 0402       Allergies: Allergies   Allergen Reactions    Jardiance [Empagliflozin] Other (See Comments)     ACIDOSIS    Victoza [Liraglutide] Diarrhea       Problem List:    Patient Active Problem List   Diagnosis Code    Severe episode of recurrent major depressive disorder, without psychotic features ([facility] [address] F33.2    Suicidal ideations R45.851    SDH (subdural hematoma) S06. 5XAA    Traumatic subdural hemorrhage with loss of consciousness of 30 minutes or less ([facility] [address] S06. 8X1A    Diabetic acidosis without coma (HCC) E11.10    Acute kidney injury superimposed on CKD ([facility] [address] N17.9, N18.9    Hyponatremia E87.1    Right adrenal mass (HCC) E27.8    Hyperkalemia E87.5    Major depression, recurrent (HCC) F33.9    Type 2 diabetes mellitus with complication, with long-term current use of insulin (HCC) E11.8, Z79.4    HLD (hyperlipidemia) E78.5    HTN (hypertension) I10    Uncontrolled type 2 diabetes mellitus with hyperglycemia ([address] E11.65    SIRS (systemic inflammatory response syndrome) ([facility]) R65.10    Weight loss R63.4    Generalized abdominal pain R10.84    Lactic acidosis E87.20    Constipation K59.00    High anion gap metabolic acidosis N66.90    Non compliance w medication regimen Z91.14    Physical deconditioning R53.81    Dementia, vascular (HCC) F01.50    Moderate protein-calorie malnutrition (HCC) E44.0    TIA (transient ischemic attack) G45.9    DKA, type 1, not at goal [facility]) E10.10    Ileus (HCC) K56.7    Adrenal adenoma, right D35.01    Hypertensive urgency I16.0    Hiccups I84.9    Umbilical hernia without obstruction and without gangrene K42.9    GERD (gastroesophageal reflux disease) K21.9    Depression F32. A    Hypotension I95.9    Depression, major, recurrent (HCC) F33.9    Closed right hip fracture, initial encounter ([facility] [address] S72.001A    History of right hip hemiarthroplasty Z96.641    Closed left hip fracture, initial encounter ([facility] [address] S72.002A    Acute metabolic encephalopathy I22.43    History of left hip hemiarthroplasty F73.130    CLEMENTE (acute kidney injury) ([facility] [address] N17.9    Weakness R53.1    GI bleed K92.2    Leukocytosis D72.829    Shortness of breath R06.02    Acute anemia D64.9       Past Medical History:        Diagnosis Date    Anxiety     Bronchitis     Cellulitis     Chronic sinusitis     Depression     Diabetes mellitus ([facility] [address]     Diabetic retinopathy ([facility] [address]     Fracture of left foot     High cholesterol     Hypercholesteremia     Hypercholesterolemia     Hypertension     Lumbago     Moderate mood disorder ([facility] [address]     Third nerve palsy        Past Surgical History:        Procedure Laterality Date    EYE SURGERY      HIP SURGERY Right 11/19/2020    HIP HEMIARTHROPLASTY performed by Isamar Patel MD at [address] [facility] Left 12/10/2020    HIP HEMIARTHROPLASTY -- HERB -- DROPLET +     PT. COMING FROM [place] performed by Chhaya Pak MD at [place] [address]. 12/14/2022    EGD BIOPSY performed by Anna Arcos Value Date/Time    PROTIME 16.1 11/11/2022 08:42 PM    INR 1.5 11/11/2022 08:42 PM    APTT 25.8 11/11/2022 08:42 PM       HCG (If Applicable): No results found for: PREGTESTUR, PREGSERUM, HCG, HCGQUANT     ABGs: No results found for: PHART, PO2ART, KIK2DTK, QAL9MYG, BEART, H5LYSNUH     Type & Screen (If Applicable):  No results found for: LABABO, LABRH    Drug/Infectious Status (If Applicable):  No results found for: HIV, HEPCAB    COVID-19 Screening (If Applicable):   Lab Results   Component Value Date/Time    COVID19 Not Detected 12/08/2022 10:47 PM    COVID19 Not Detected 12/08/2022 10:47 PM         CT:  Narrative   EXAMINATION:   CTA OF THE CHEST 12/9/2022 12:41 am       TECHNIQUE:   CTA of the chest was performed after the administration of intravenous   contrast.  Multiplanar reformatted images are provided for review.  MIP   images are provided for review. Automated exposure control, iterative   reconstruction, and/or weight based adjustment of the mA/kV was utilized to   reduce the radiation dose to as low as reasonably achievable.       COMPARISON:   None.       HISTORY:   ORDERING SYSTEM PROVIDED HISTORY: leukocytosis, sob, cough was tachycardic   TECHNOLOGIST PROVIDED HISTORY:   Reason for exam:->leukocytosis, sob, cough was tachycardic   Decision Support Exception - unselect if not a suspected or confirmed   emergency medical condition->Emergency Medical Condition (MA)       FINDINGS:   Pulmonary Arteries: Pulmonary arteries are adequately opacified for   evaluation.  No evidence of intraluminal filling defect to suggest pulmonary   embolism.  Main pulmonary artery is normal in caliber.       Mediastinum: No evidence of mediastinal lymphadenopathy.  The heart is normal   in size.  There is a small pericardial effusion. Nanetta Knock is no acute   abnormality of the thoracic aorta.  Small hiatal hernia.  Patulous esophagus.    Layering debris/fluid within the distal esophagus.  Layering debris/fluid   within the distal trachea proximal bilateral main bronchi.       Lungs/pleura: The lungs are without acute process.  Minimal bilateral   dependent atelectasis.  No focal consolidation or pulmonary edema.  No   evidence of pleural effusion or pneumothorax.       Upper Abdomen:  Limited images of the upper abdomen demonstrate no acute   abnormality.  There is a complex right adrenal mass containing focal   calcifications and macroscopic fat, unchanged from prior examination and   compatible with a myelolipoma.       Soft Tissues/Bones: No acute bone or soft tissue abnormality. Redemonstration of L1 superior endplate fracture.           Anesthesia Evaluation  Patient summary reviewed and Nursing notes reviewed no history of anesthetic complications:   Airway: Mallampati: III  TM distance: <3 FB   Neck ROM: full  Mouth opening: > = 3 FB   Dental:    (+) edentulous      Pulmonary:   (+) shortness of breath: no interval change,      (-) not a current smoker                          ROS comment: Chronic sinusitis   Cardiovascular:  Exercise tolerance: poor (<4 METS),   (+) hypertension: moderate and no interval change, SKINNER: no interval change, hyperlipidemia      ECG reviewed               Beta Blocker:  Not on Beta Blocker      ROS comment: EKG:  Normal sinus rhythm  Normal ECG  When compared with ECG of 21-NOV-2022 19:28,  Nonspecific T wave abnormality now evident in Inferior leads     Neuro/Psych:   (+) neuromuscular disease:, TIA, psychiatric history (Suicidal ideations):depression/anxiety dementia             ROS comment: Traumatic SDH  Chronic fatigue  Acute metabolic encephalopathy  Third nerve palsy  Diabetic retinopathy    On 12/9 around 1 AM patient was rapid responsed after he was found somnolent and difficult to arouse. Patient had stable vitals at the time, alert and oriented x 3 and able to move all extremities at the time of RRT. Patient noted to have no focal deficits.   Per report from staff during RRT, patient was unresponsive but still able to breathe. GI/Hepatic/Renal:   (+) hiatal hernia, GERD: no interval change, renal disease (GFR currently >60): ARF,          ROS comment: BPH  Hiccups  GI bleed. Endo/Other:    (+) Diabetes (History of diabetic acidosis with an A1C of 8.3%)Type II DM, using insulin, blood dyscrasia (8.0/23. 4): anemia, arthritis (Chronic back pain): OA., electrolyte abnormalities (Hypokalemia (K+ 3.3 mmol/L)), . Pt had no PAT visit        ROS comment: Right adrenal mass  Medical noncompliance Abdominal:             Vascular: negative vascular ROS. Other Findings:           Anesthesia Plan      MAC     ASA 4     (EGD performed on 12/14/22)  Induction: intravenous. MIPS: Postoperative opioids intended and Prophylactic antiemetics administered. Anesthetic plan and risks discussed with patient. Plan discussed with CRNA. Jovan Begum DO   12/14/2022    History, data, and pertinent studies from chart review. Above represents information available via the shared medical record including previous anesthetic, medication, and allergy history.   Confirmation of above and final disposition per DOS anesthesiologist.    Jovan Begum DO  Staff Anesthesiologist  December 14, 2022  7:15 PM

## 2022-12-14 NOTE — ANESTHESIA PRE PROCEDURE
Department of Anesthesiology  Preprocedure Note       Name:  Eliud Gavin   Age:  74 y.o.  :  1955                                          MRN:  [id number]         Date:  2022      Surgeon: Yue Oliver):  Alejandra Rossi MD    Procedure: Procedure(s):  EGD ESOPHAGOGASTRODUODENOSCOPY    Medications prior to admission:   Prior to Admission medications    Medication Sig Start Date End Date Taking? Authorizing Provider   calcium carbonate (TUMS EXTRA STRENGTH 750) 750 MG chewable tablet Take 750 mg by mouth every 12 hours as needed (GERD)    Historical Provider, MD   insulin glargine (LANTUS) 100 UNIT/ML injection vial Inject 5 Units into the skin nightly    Historical Provider, MD   metFORMIN (GLUCOPHAGE) 500 MG tablet Take 500 mg by mouth in the morning and 500 mg in the evening. Historical Provider, MD   pantoprazole (PROTONIX) 40 MG tablet Take 40 mg by mouth daily    Historical Provider, MD   senna-docusate (PERICOLACE) 8.6-50 MG per tablet Take 1 tablet by mouth in the morning and 1 tablet in the evening.     Historical Provider, MD   sodium chloride 1 g tablet Take 1 g by mouth daily    Historical Provider, MD   polyethylene glycol (GLYCOLAX) 17 g packet Take 17 g by mouth daily 22  Janice Nesbitt MD   aspirin 81 MG EC tablet Take 81 mg by mouth daily    Historical Provider, MD   OLANZapine (ZYPREXA) 7.5 MG tablet Take 7.5 mg by mouth nightly    Historical Provider, MD   rosuvastatin (CRESTOR) 10 MG tablet Take 10 mg by mouth daily    Historical Provider, MD   insulin lispro (HUMALOG) 100 UNIT/ML injection vial Inject 0-4 Units into the skin 4 times daily (before meals and nightly) PER SLIDING SCALE: 0-150=0U, 151-200=2U, 201-250=4U, 251-300=6U, 301-350=8U, 351-400=10U, 401-450=12U, 450+=CALL MD    Historical Provider, MD   tamsulosin (FLOMAX) 0.4 MG capsule Take 1 capsule by mouth daily 22  Nayeli Kline, APRN - CNP   glucose 4 g chewable tablet Take 4 g by mouth as needed for Low blood sugar     Historical Provider, MD       Current medications:    No current facility-administered medications for this visit. No current outpatient medications on file.      Facility-Administered Medications Ordered in Other Visits   Medication Dose Route Frequency Provider Last Rate Last Admin    medicated lip balm (BLISTEX/CARMEX) stick   Topical PRN Azucena Render, DO   Given at 12/11/22 1514    pantoprazole (PROTONIX) 40 mg in sodium chloride (PF) 0.9 % 10 mL injection  40 mg IntraVENous Q12H Mil Jolley, APRN - CNP   40 mg at 12/13/22 2012    sodium chloride flush 0.9 % injection 5-40 mL  5-40 mL IntraVENous 2 times per day [place] Inches, APRN - CNP   10 mL at 12/13/22 2015    sodium chloride flush 0.9 % injection 5-40 mL  5-40 mL IntraVENous PRN [place] Inches, APRN - CNP   10 mL at 12/11/22 1320    0.9 % sodium chloride infusion   Int[facility]VENous PRN [facility]        ondansetron (ZOFRAN-ODT) disintegrating tablet 4 mg  4 mg Oral Q8H PRN [place] Inches, APRN - CNP        Or    ondansetron (ZOFRAN) injection 4 mg  4 mg IntraVENous Q6H PRN [facility], APR[facility] - CNP        polyethylene glycol (GLYCOLAX) packet 17 g  17 g Oral Daily PRN [place] Inches, APRN - CNP        acetaminophen (TYLENOL) tablet 650 mg  650 mg Oral Q6H PRN [facility], APR[facility] - CNP        Or    acetaminophen (TYLENOL) suppository 650 mg  650 mg Rectal Q6H PRN [facility], APR[facility] - CNP        OLANZapine (ZYPREXA) tablet 7.5 mg  7.5 mg Oral Nightly [place] Inches, APRN - CNP   7.5 mg at 12/13/22 2012    rosuvastatin (CRESTOR) tablet 10 mg  10 mg Oral Daily [place] Inches, APRN - CNP   10 mg at 12/13/22 0949    senna (SENOKOT) tablet 17.2 mg  2 tablet Oral Nightly PRN [place] Inches, APRN - CNP        sodium chloride tablet 1 g  1 g Oral TID WC [place] Inches, APRN - CNP   1 g at 12/13/22 1858    tamsulosin (FLOMAX) capsule 0.4 mg  0.4 mg Oral Daily Carolee Outlaw, APRN - CNP   0.4 mg at 12/13/22 0932    glucose chewable tablet 16 g  4 tablet Oral PRN Carolee Outlaw, APRN - CNP        dextrose bolus 10% 125 mL  125 mL IntraVENous PRN Carolee Outlaw, APRN - CNP        Or    dextrose bolus 10% 250 mL  250 mL IntraVENous PRN Carolee Outlaw, APRN - CNP        glucagon (rDNA) injection 1 mg  1 mg SubCUTAneous PRN Carolee Outlaw, APRN - CNP        dextrose 10 % infusion   IntraVENous Continuous PRN Carolee Outlaw, APRN - CNP        insulin lispro (HUMALOG) injection vial 0-4 Units  0-4 Units SubCUTAneous TID WC Carolee Outlaw, APRN - CNP   3 Units at 12/13/22 1634    insulin lispro (HUMALOG) injection vial 0-4 Units  0-4 Units SubCUTAneous Nightly Carolee Outlaw, APRN - CNP        0.9 % sodium chloride infusion   IntraVENous Continuous Carolee Outlaw, APRN - CNP 75 mL/hr at 12/14/22 0402 New Bag at 12/14/22 0402       Allergies: Allergies   Allergen Reactions    Jardiance [Empagliflozin] Other (See Comments)     ACIDOSIS    Victoza [Liraglutide] Diarrhea       Problem List:    Patient Active Problem List   Diagnosis Code    Severe episode of recurrent major depressive disorder, without psychotic features ([facility] F33.2    Suicidal ideations R45.851    SDH (subdural hematoma) S06. 5XAA    Traumatic subdural hemorrhage with loss of consciousness of 30 minutes or less ([facility] S06. 8X1A    Diabetic acidosis without coma (HCC) E11.10    Acute kidney injury superimposed on CKD ([facility] N17.9, N18.9    Hyponatremia E87.1    Right adrenal mass (HCC) E27.8    Hyperkalemia E87.5    Major depression, recurrent (HCC) F33.9    Type 2 diabetes mellitus with complication, with long-term current use of insulin (HCC) E11.8, Z79.4    HLD (hyperlipidemia) E78.5    HTN (hypertension) I10    Uncontrolled type 2 diabetes mellitus with hyperglycemia (HCC) E11.65    SIRS (systemic inflammatory response syndrome) (HCC) R65.10    Weight loss R63.4    Generalized abdominal pain R10.84    Lactic acidosis E87.20    Constipation K59.00    High anion gap metabolic acidosis G18.15    Non compliance w medication regimen Z91.14    Physical deconditioning R53.81    Dementia, vascular (HCC) F01.50    Moderate protein-calorie malnutrition (HCC) E44.0    TIA (transient ischemic attack) G45.9    DKA, type 1, not at goal [facility]) E10.10    Ileus (HCC) K56.7    Adrenal adenoma, right D35.01    Hypertensive urgency I16.0    Hiccups M29.0    Umbilical hernia without obstruction and without gangrene K42.9    GERD (gastroesophageal reflux disease) K21.9    Depression F32. A    Hypotension I95.9    Depression, major, recurrent (HCC) F33.9    Closed right hip fracture, initial encounter ([facility] S72.001A    History of right hip hemiarthroplasty Z96.641    Closed left hip fracture, initial encounter ([facility] S72.002A    Acute metabolic encephalopathy X02.02    History of left hip hemiarthroplasty T79.406    CLEMENTE (acute kidney injury) ([facility] N17.9    Weakness R53.1    GI bleed K92.2    Leukocytosis D72.829    Shortness of breath R06.02    Acute anemia D64.9       Past Medical History:        Diagnosis Date    Anxiety     Bronchitis     Cellulitis     Chronic sinusitis     Depression     Diabetes mellitus ([facility]     Diabetic retinopathy ([facility]     Fracture of left foot     High cholesterol     Hypercholesteremia     Hypercholesterolemia     Hypertension     Lumbago     Moderate mood disorder (HCC)     Third nerve palsy        Past Surgical History:        Procedure Laterality Date    EYE SURGERY      HIP SURGERY Right 11/19/2020    HIP HEMIARTHROPLASTY performed by Karsten Briones MD at [address] [place] [address] Left 12/10/2020    HIP HEMIARTHROPLASTY -- HERB -- DROPLET +     PT. COMING FROM [place] performed by Kylee Irizarry MD at [facility] OR       Social History:    Social History     Tobacco Use    Smoking status: Never    Smokeless tobacco: Never   Substance Use Topics    Alcohol use: No                                Counseling given: Not Answered      Vital Signs (Current): There were no vitals filed for this visit. BP Readings from Last 3 Encounters:   12/14/22 133/75   12/01/22 107/62   11/23/22 (!) 142/81       NPO Status:                                                                                 BMI:   Wt Readings from Last 3 Encounters:   12/14/22 157 lb 10.1 oz (71.5 kg)   12/01/22 165 lb 9.1 oz (75.1 kg)   11/23/22 167 lb 4.8 oz (75.9 kg)     There is no height or weight on file to calculate BMI.    CBC:   Lab Results   Component Value Date/Time    WBC 10.5 12/14/2022 04:00 AM    RBC 3.11 12/14/2022 04:00 AM    HGB 8.0 12/14/2022 04:00 AM    HCT 23.4 12/14/2022 04:00 AM    MCV 75.2 12/14/2022 04:00 AM    RDW 14.6 12/14/2022 04:00 AM     12/14/2022 04:00 AM       CMP:   Lab Results   Component Value Date/Time     12/14/2022 04:00 AM    K 3.3 12/14/2022 04:00 AM    K 4.3 12/11/2022 05:30 AM     12/14/2022 04:00 AM    CO2 25 12/14/2022 04:00 AM    BUN 5 12/14/2022 04:00 AM    CREATININE 0.9 12/14/2022 04:00 AM    GFRAA >60 12/13/2020 05:22 AM    LABGLOM >60 12/14/2022 04:00 AM    GLUCOSE 191 12/14/2022 04:00 AM    PROT 4.5 12/14/2022 04:00 AM    CALCIUM 8.0 12/14/2022 04:00 AM    BILITOT 0.6 12/14/2022 04:00 AM    ALKPHOS 72 12/14/2022 04:00 AM    AST 8 12/14/2022 04:00 AM    ALT 8 12/14/2022 04:00 AM       POC Tests: No results for input(s): POCGLU, POCNA, POCK, POCCL, POCBUN, POCHEMO, POCHCT in the last 72 hours. Coags:   Lab Results   Component Value Date/Time    PROTIME 16.1 11/11/2022 08:42 PM    INR 1.5 11/11/2022 08:42 PM    APTT 25.8 11/11/2022 08:42 PM       HCG (If Applicable): No results found for: PREGTESTUR, PREGSERUM, HCG, HCGQUANT     ABGs: No results found for: PHART, PO2ART, DQI2UOA, HUC6ZNO, BEART, S9GZSDVY     Type & Screen (If Applicable):   No results found for: Rosio Ryder    Drug/Infectious Status (If Applicable):  No results found for: HIV, HEPCAB    COVID-19 Screening (If Applicable):   Lab Results   Component Value Date/Time    COVID19 Not Detected 12/08/2022 10:47 PM    COVID19 Not Detected 12/08/2022 10:47 PM     Xr Chest (2 Vw)    Result Date: 11/18/2020  EXAMINATION: TWO XRAY VIEWS OF THE CHEST 11/18/2020 4:07 pm COMPARISON: 08/21/2020 HISTORY: ORDERING SYSTEM PROVIDED HISTORY: pre op TECHNOLOGIST PROVIDED HISTORY: Reason for exam:->pre op What reading provider will be dictating this exam?->CRC FINDINGS: The lungs are without acute focal process. There is no effusion or pneumothorax. The cardiomediastinal silhouette is without acute process. The osseous structures are without acute process. No acute process. Xr Pelvis (1-2 Views)    Result Date: 11/19/2020  EXAMINATION: ONE XRAY VIEW OF THE PELVIS 11/19/2020 2:25 pm COMPARISON: November 18, 2020 HISTORY: ORDERING SYSTEM PROVIDED HISTORY: post op TECHNOLOGIST PROVIDED HISTORY: Reason for exam:->post op What reading provider will be dictating this exam?->CRC FINDINGS: Anatomic alignment status post right hip surgery. Prostatic right femoral head appears unremarkable. Soft tissues changes and subcutaneous pockets of gas noted, compatible with recent surgery. Anatomic alignment post right hip surgery. Xr Hip Right (2-3 Views)    Result Date: 11/19/2020  EXAMINATION: TWO XRAY VIEWS OF THE RIGHT HIP 11/19/2020 2:26 pm COMPARISON: None. HISTORY: ORDERING SYSTEM PROVIDED HISTORY: post op TECHNOLOGIST PROVIDED HISTORY: Of operative side while in recovery room. Reason for exam:->post op What reading provider will be dictating this exam?->CRC FINDINGS: There is anatomic alignment of right hip prosthesis. Soft tissue changes noted compatible with recent surgery. Surgical clips noted in the lateral hip. No evidence of prosthesis complications.     Anatomic alignment, status post right hip surgery. Prosthesis in anatomic alignment. Xr Hip Bilateral W Ap Pelvis (2 Views)    Result Date: 12/7/2020  EXAMINATION: ONE XRAY VIEW OF THE PELVIS AND TWO XRAY VIEWS OF EACH OF THE BILATERAL HIPS 12/7/2020 2:36 pm COMPARISON: Pelvis x-ray 03/02/2018 HISTORY: ORDERING SYSTEM PROVIDED HISTORY: fall, L hip fx per [facility] TECHNOLOGIST PROVIDED HISTORY: Reason for exam:->fall, L hip fx per [facility] FINDINGS: Displaced left femoral neck fracture. There is interval right hip replacement, which appears intact without evidence of dislocation or loosening. Skin staples overlie the lateral right hip with mild underlying gas, compatible with recent postoperative status. Mild degenerative changes are present. There is evidence of calcific atherosclerosis. 1. Displaced left femoral neck fracture. 2. The right hip replacement appears intact without evidence of dislocation or loosening. Xr Femur Left (min 2 Views)    Result Date: 12/7/2020  EXAMINATION: 2 XRAY VIEWS OF THE LEFT FEMUR 12/7/2020 2:36 pm COMPARISON: None. HISTORY: ORDERING SYSTEM PROVIDED HISTORY: hip fx per nursing home TECHNOLOGIST PROVIDED HISTORY: Reason for exam:->hip fx per nursing home FINDINGS: There is a displaced left femoral neck fracture. The bones otherwise appear intact. No evidence of dislocation. There is evidence of calcific atherosclerosis. Mild degenerative changes are present. Displaced left femoral neck fracture. Xr Femur Right (min 2 Views)    Result Date: 11/18/2020  EXAMINATION: 4 XRAY VIEWS OF THE RIGHT FEMUR; ONE XRAY VIEW OF THE PELVIS AND TWO XRAY VIEWS RIGHT HIP 11/18/2020 4:07 pm COMPARISON: None. HISTORY: ORDERING SYSTEM PROVIDED HISTORY: Pain TECHNOLOGIST PROVIDED HISTORY: Reason for exam:->Pain What reading provider will be dictating this exam?->CRC FINDINGS: There is a transcervical fracture through the right femoral neck. The femoral head maintains articulation with the acetabulum.   The remainder of the right femur is intact. The pelvic bones are intact, without fracture or focal lesion. The sacroiliac joints and the pubic symphysis are unremarkable. Severe degenerative changes are seen in the right knee. Right femoral neck fracture. Ct Head Wo Contrast    Result Date: 12/9/2020  EXAMINATION: CT OF THE HEAD WITHOUT CONTRAST  12/9/2020 12:46 am TECHNIQUE: CT of the head was performed without the administration of intravenous contrast. Dose modulation, iterative reconstruction, and/or weight based adjustment of the mA/kV was utilized to reduce the radiation dose to as low as reasonably achievable. COMPARISON: 11/18/2020 HISTORY: ORDERING SYSTEM PROVIDED HISTORY: Recent fall; AMS TECHNOLOGIST PROVIDED HISTORY: Reason for exam:->Recent fall; AMS Has a \"code stroke\" or \"stroke alert\" been called? ->No What reading provider will be dictating this exam?->CRC FINDINGS: BRAIN/VENTRICLES: There is no acute intracranial hemorrhage, mass effect or midline shift. No abnormal extra-axial fluid collection. The gray-white differentiation is maintained without evidence of an acute infarct. There is no evidence of hydrocephalus. White matter disease appears unchanged. ORBITS: The visualized portion of the orbits demonstrate no acute abnormality. SINUSES: Mucosal thickening is noted in the posterior left maxillary sinus. A small polyp or retention cyst is again seen anteriorly in the left maxillary sinus. Large polyp or retention cyst is again seen in the right sphenoid sinus. The mastoid air cells are clear. SOFT TISSUES/SKULL:  No acute abnormality of the visualized skull or soft tissues. No acute intracranial abnormality.     Ct Head Wo Contrast    Result Date: 11/18/2020  EXAMINATION: CT OF THE HEAD WITHOUT CONTRAST  11/18/2020 4:08 pm TECHNIQUE: CT of the head was performed without the administration of intravenous contrast. Dose modulation, iterative reconstruction, and/or weight based adjustment of the mA/kV was utilized to reduce the radiation dose to as low as reasonably achievable. COMPARISON: None. HISTORY: ORDERING SYSTEM PROVIDED HISTORY: Trauma TECHNOLOGIST PROVIDED HISTORY: Has a \"code stroke\" or \"stroke alert\" been called? ->No Reason for exam:->Trauma What reading provider will be dictating this exam?->CRC FINDINGS: BRAIN/VENTRICLES: There is no acute intracranial hemorrhage, mass effect or midline shift. No abnormal extra-axial fluid collection. The gray-white differentiation is maintained without evidence of an acute infarct. There is no evidence of hydrocephalus. Scattered foci of low attenuation involving the periventricular white matter compatible with microvascular ischemic changes. ORBITS: The visualized portion of the orbits demonstrate no acute abnormality. SINUSES: Note is made of a complex mucous retention cyst within the right sphenoid sinus measuring 1.8 x 1.8 cm. This was present on the patient's previous studies. Peng Parisian SOFT TISSUES/SKULL:  No acute abnormality of the visualized skull or soft tissues. 1. There is no acute intracranial abnormality. Specifically, there is no intracranial hemorrhage. 2. Atrophy and periventricular leukomalacia, 3. Chronic mucous retention cyst seen within the right sphenoid sinus. Ct Cervical Spine Wo Contrast    Result Date: 11/18/2020  EXAMINATION: CT OF THE CERVICAL SPINE WITHOUT CONTRAST 11/18/2020 4:08 pm TECHNIQUE: CT of the cervical spine was performed without the administration of intravenous contrast. Multiplanar reformatted images are provided for review. Dose modulation, iterative reconstruction, and/or weight based adjustment of the mA/kV was utilized to reduce the radiation dose to as low as reasonably achievable. COMPARISON: None. HISTORY: ORDERING SYSTEM PROVIDED HISTORY: Trauma TECHNOLOGIST PROVIDED HISTORY: Reason for exam:->Trauma What reading provider will be dictating this exam?->CRC FINDINGS: The ring of C1 is intact as is the dense.   There is no compression fracture of the cervical spine. No jumped or perched facet is noted. Multilevel degenerative disc and degenerative joint disease is noted. The prevertebral soft tissues are unremarkable. The airway is widely patent. Images through the lung apices are negative for a pneumothorax. 1. There is no acute compression fracture or subluxation of the cervical spine. 2. Multilevel degenerative disc and degenerative joint disease. Xr Chest Portable    Result Date: 12/8/2020  EXAMINATION: ONE XRAY VIEW OF THE CHEST 12/8/2020 10:03 pm COMPARISON: Chest series from December 7, 2020 HISTORY: ORDERING SYSTEM PROVIDED HISTORY: cough TECHNOLOGIST PROVIDED HISTORY: Reason for exam:->cough What reading provider will be dictating this exam?->CRC FINDINGS: Coarse interstitial markings bilaterally. Overall there are low lung volumes. No airspace disease, pleural effusions, or pneumothoraces. Atherosclerotic disease in the aortic arch. The remaining cardiomediastinal silhouette and pulmonary vascularity appear within normal limits. Osseous and thoracic soft tissue structures demonstrate no acute findings. No change from prior exam.  Atherosclerotic disease. No additional evidence of active cardiopulmonary pathology. Xr Chest Portable    Result Date: 12/7/2020  EXAMINATION: ONE XRAY VIEW OF THE CHEST 12/7/2020 8:00 pm COMPARISON: Chest series from November 18, 2020. HISTORY: ORDERING SYSTEM PROVIDED HISTORY: fever, eval for pneumonia TECHNOLOGIST PROVIDED HISTORY: Reason for exam:->fever, eval for pneumonia FINDINGS: Adequate and symmetric aeration of the lungs. There are no formed consolidations, pleural effusions, or pneumothoraces. Trachea and central mainstem bronchi appear clear. Minimal atherosclerotic disease in the aortic arch. The remaining cardiomediastinal silhouette and pulmonary vascularity appear within normal limits.  Osseous and thoracic soft tissue structures demonstrate no acute findings. Atherosclerotic disease. No additional evidence of active cardiopulmonary pathology. Xr Hip 2-3 Vw W Pelvis Right    Result Date: 2020  EXAMINATION: 4 XRAY VIEWS OF THE RIGHT FEMUR; ONE XRAY VIEW OF THE PELVIS AND TWO XRAY VIEWS RIGHT HIP 2020 4:07 pm COMPARISON: None. HISTORY: ORDERING SYSTEM PROVIDED HISTORY: Pain TECHNOLOGIST PROVIDED HISTORY: Reason for exam:->Pain What reading provider will be dictating this exam?->CRC FINDINGS: There is a transcervical fracture through the right femoral neck. The femoral head maintains articulation with the acetabulum. The remainder of the right femur is intact. The pelvic bones are intact, without fracture or focal lesion. The sacroiliac joints and the pubic symphysis are unremarkable. Severe degenerative changes are seen in the right knee. Right femoral neck fracture.     EK2020  8:26 AM - Desean, y Incoming Ekg Results From [facility]     Component  Value  Ref Range & Units  Status  Collected  Lab    Ventricular Rate  85  BPM  Final  2020  2:55 PM  [facility]    Atrial Rate  85  BPM  Final  2020  2:55 PM  [facility]    P-R Interval  144  ms  Final  2020  2:55 PM  [facility]    QRS Duration  76  ms  Final  2020  2:55 PM  [facility]    Q-T Interval  368  ms  Final  2020  2:55 PM  [facility]    QTc Calculation (Bazett)  437  ms  Final  2020  2:55 PM  [facility]    P Axis  75  degrees  Final  2020  2:55 PM  [facility]    R Axis  25  degrees  Final  2020  2:55 PM  [facility]    T Axis  43  degrees  Final  2020  2:55 PM  [facility]    Testing Performed By     Lab - [address]  Name  Director  Address  Valid Date Range    360-[facility]  [facility] MUSE  Unknown  Unknown  16 0721-Present    Narrative & Impression     Normal sinus rhythm  Normal ECG  When compared with ECG of 06-OCT-2020 18:35,  No significant change was found  Confirmed by Chrissy Tamez ([phone]) on 2020 8:26:28 AM             Anesthesia Evaluation  Patient summary reviewed and Nursing notes reviewed no history of anesthetic complications:   Airway: Mallampati: III  TM distance: >3 FB   Neck ROM: full  Mouth opening: < 3 FB   Dental:    (+) edentulous      Pulmonary:Negative Pulmonary ROS   (+) decreased breath sounds     Pneumonia: COVID 19. Cardiovascular:  Exercise tolerance: good (>4 METS),   (+) hypertension:, hyperlipidemia      ECG reviewed  Rhythm: regular  Rate: normal           Beta Blocker:  Not on Beta Blocker         Neuro/Psych:   (+) CVA:, neuromuscular disease:, TIA, psychiatric history:depression/anxiety dementia             ROS comment: Prior SDH    On 12/9 around 1 AM patient was rapid responsed after he was found somnolent and difficult to arouse. Patient had stable vitals at the time, alert and oriented x3 and able to move all extremities at the time of RRT. Patient noted to have no focal deficits. Per report from staff during RRT, patient was unresponsive but still able to breathe. GI/Hepatic/Renal:   (+) GERD: poorly controlled, renal disease (CLEMENTE):,          ROS comment: GI bleed. Endo/Other:    (+) Diabetes ( )Type II DM, poorly controlled, using insulin, blood dyscrasia: anemia:., .                 Abdominal:             Vascular:   + PVD, aortic or cerebral, . Other Findings:             Anesthesia Plan      MAC     ASA 4       Induction: intravenous. Anesthetic plan and risks discussed with healthcare power of  and child/children.                         Cristobal Escalona MD   12/14/2022

## 2022-12-14 NOTE — OP NOTE
EGD Op Note    DATE OF PROCEDURE: 12/14/2022     SURGEON: Steven Lima MD    PREOPERATIVE DIAGNOSIS: FOBT positive, anemia    POSTOPERATIVE DIAGNOSIS: Same, minimal gastritis, hiatal hernia, reflux esophagitis    OPERATION: Procedure(s):  EGD BIOPSY    ANESTHESIA: Local monitored anesthesia. ESTIMATED BLOOD LOSS: minimal    COMPLICATIONS: None. SPECIMENS:    ID Type Source Tests Collected by Time Destination   A : G E Junction bx Tissue Tissue SURGICAL PATHOLOGY Steven Lima MD 12/14/2022 9122    B : antral bx Tissue Stomach SURGICAL PATHOLOGY Steven Lima MD 12/14/2022 0854        HISTORY: The patient is a 74y.o. year old male with history of above preop diagnosis. I recommended esophagogastroduodenoscopy with possible biopsy and I explained the risk, benefits, expected outcome, and alternatives to the procedure. Risks included but are not limited to bleeding, infection, respiratory distress, hypotension, and perforation of the esophagus, stomach, or duodenum. Patient understands and is in agreement. PROCEDURE: The patient was given IV conscious sedation per anesthesia. The patient was given supplemental oxygen by nasal cannula. The gastroscope was inserted orally and advanced under direct vision through the esophagus, through the stomach, through the pylorus, and into the duodenum. Findings:  Duodenum: normal    Stomach: Minimal antral gastritis status post biopsies    Esophagus: 4 to 5 cm sliding hiatal hernia. There is some prominence of the GE junction likely reactive from reflux. Biopsies were obtained here as well as in the distal esophagus were there is reflux esophagitis noted. Larynx: not examined    The scope was removed and the patient tolerated the procedure well.      IMPRESSION/PLAN:   Follow-up pathology  Continue PPI  Will need colonoscopy      Steven Lima MD  12/14/22  9:19 AM

## 2022-12-14 NOTE — ANESTHESIA POSTPROCEDURE EVALUATION
Department of Anesthesiology  Postprocedure Note    Patient: Josiane Bustos  MRN: [id number]  YOB: 1955  Date of evaluation: 12/14/2022      Procedure Summary     Date: 12/14/22 Room / Location: [facility] 02 / [facility]    Anesthesia Start: 9118 Anesthesia Stop: 5888    Procedure: EGD BIOPSY Diagnosis:       Gastrointestinal hemorrhage, unspecified gastrointestinal hemorrhage type      (Gastrointestinal hemorrhage, unspecified gastrointestinal hemorrhage type [K92.2])    Surgeons: Lázaro Matthews MD Responsible Provider: Jailyn Bruno MD    Anesthesia Type: MAC ASA Status: 4          Anesthesia Type: No value filed.     [place] Phase I:      [place] Phase II:        Anesthesia Post Evaluation    Patient location during evaluation: bedside  Patient participation: complete - patient cannot participate  Post-procedure mental status: Non verbal and sleepy as same as pre op   Pain score: 0  Nausea & Vomiting: no nausea and no vomiting  Complications: no  Cardiovascular status: blood pressure returned to baseline  Respiratory status: acceptable  Hydration status: euvolemic    pricila madera, MIRA - CRNA

## 2022-12-15 ENCOUNTER — ANESTHESIA (OUTPATIENT)
Dept: ENDOSCOPY | Age: 67
End: 2022-12-15

## 2022-12-15 VITALS
RESPIRATION RATE: 16 BRPM | BODY MASS INDEX: 24.86 KG/M2 | SYSTOLIC BLOOD PRESSURE: 140 MMHG | DIASTOLIC BLOOD PRESSURE: 93 MMHG | WEIGHT: 164 LBS | HEART RATE: 96 BPM | HEIGHT: 68 IN | OXYGEN SATURATION: 96 % | TEMPERATURE: 98.7 F

## 2022-12-15 LAB
ALBUMIN SERPL-MCNC: 2.5 G/DL (ref 3.5–5.2)
ALP BLD-CCNC: 75 U/L (ref 40–129)
ALT SERPL-CCNC: 7 U/L (ref 0–40)
ANION GAP SERPL CALCULATED.3IONS-SCNC: 10 MMOL/L (ref 7–16)
AST SERPL-CCNC: 6 U/L (ref 0–39)
BASOPHILS ABSOLUTE: 0.05 E9/L (ref 0–0.2)
BASOPHILS RELATIVE PERCENT: 0.5 % (ref 0–2)
BILIRUB SERPL-MCNC: 0.7 MG/DL (ref 0–1.2)
BUN BLDV-MCNC: 4 MG/DL (ref 6–23)
CALCIUM SERPL-MCNC: 8 MG/DL (ref 8.6–10.2)
CHLORIDE BLD-SCNC: 108 MMOL/L (ref 98–107)
CO2: 23 MMOL/L (ref 22–29)
CREAT SERPL-MCNC: 0.9 MG/DL (ref 0.7–1.2)
EOSINOPHILS ABSOLUTE: 0.1 E9/L (ref 0.05–0.5)
EOSINOPHILS RELATIVE PERCENT: 0.9 % (ref 0–6)
GFR SERPL CREATININE-BSD FRML MDRD: >60 ML/MIN/1.73
GLUCOSE BLD-MCNC: 155 MG/DL (ref 74–99)
HCT VFR BLD CALC: 24.1 % (ref 37–54)
HEMOGLOBIN: 7.9 G/DL (ref 12.5–16.5)
IMMATURE GRANULOCYTES #: 0.06 E9/L
IMMATURE GRANULOCYTES %: 0.6 % (ref 0–5)
LYMPHOCYTES ABSOLUTE: 1.38 E9/L (ref 1.5–4)
LYMPHOCYTES RELATIVE PERCENT: 13 % (ref 20–42)
MCH RBC QN AUTO: 24.5 PG (ref 26–35)
MCHC RBC AUTO-ENTMCNC: 32.8 % (ref 32–34.5)
MCV RBC AUTO: 74.8 FL (ref 80–99.9)
METER GLUCOSE: 157 MG/DL (ref 74–99)
MONOCYTES ABSOLUTE: 0.83 E9/L (ref 0.1–0.95)
MONOCYTES RELATIVE PERCENT: 7.8 % (ref 2–12)
NEUTROPHILS ABSOLUTE: 8.16 E9/L (ref 1.8–7.3)
NEUTROPHILS RELATIVE PERCENT: 77.2 % (ref 43–80)
PDW BLD-RTO: 14.6 FL (ref 11.5–15)
PLATELET # BLD: 261 E9/L (ref 130–450)
PMV BLD AUTO: 9.7 FL (ref 7–12)
POTASSIUM SERPL-SCNC: 3.5 MMOL/L (ref 3.5–5)
RBC # BLD: 3.22 E12/L (ref 3.8–5.8)
SODIUM BLD-SCNC: 141 MMOL/L (ref 132–146)
TOTAL PROTEIN: 4.6 G/DL (ref 6.4–8.3)
WBC # BLD: 10.6 E9/L (ref 4.5–11.5)

## 2022-12-15 PROCEDURE — 36415 COLL VENOUS BLD VENIPUNCTURE: CPT

## 2022-12-15 PROCEDURE — 80053 COMPREHEN METABOLIC PANEL: CPT

## 2022-12-15 PROCEDURE — 6370000000 HC RX 637 (ALT 250 FOR IP): Performed by: INTERNAL MEDICINE

## 2022-12-15 PROCEDURE — 85025 COMPLETE CBC W/AUTO DIFF WBC: CPT

## 2022-12-15 PROCEDURE — A4216 STERILE WATER/SALINE, 10 ML: HCPCS | Performed by: NURSE PRACTITIONER

## 2022-12-15 PROCEDURE — 6370000000 HC RX 637 (ALT 250 FOR IP): Performed by: NURSE PRACTITIONER

## 2022-12-15 PROCEDURE — 2580000003 HC RX 258: Performed by: NURSE PRACTITIONER

## 2022-12-15 PROCEDURE — C9113 INJ PANTOPRAZOLE SODIUM, VIA: HCPCS | Performed by: NURSE PRACTITIONER

## 2022-12-15 PROCEDURE — 6360000002 HC RX W HCPCS: Performed by: NURSE PRACTITIONER

## 2022-12-15 PROCEDURE — 82962 GLUCOSE BLOOD TEST: CPT

## 2022-12-15 RX ADMIN — SODIUM CHLORIDE: 9 INJECTION, SOLUTION INTRAVENOUS at 09:48

## 2022-12-15 RX ADMIN — SODIUM CHLORIDE, PRESERVATIVE FREE 40 MG: 5 INJECTION INTRAVENOUS at 09:56

## 2022-12-15 RX ADMIN — POTASSIUM BICARBONATE 40 MEQ: 782 TABLET, EFFERVESCENT ORAL at 09:56

## 2022-12-15 RX ADMIN — SODIUM CHLORIDE 1 G: 1 TABLET ORAL at 11:20

## 2022-12-15 RX ADMIN — SODIUM CHLORIDE 1 G: 1 TABLET ORAL at 09:56

## 2022-12-15 RX ADMIN — SODIUM CHLORIDE, PRESERVATIVE FREE 10 ML: 5 INJECTION INTRAVENOUS at 11:20

## 2022-12-15 RX ADMIN — TAMSULOSIN HYDROCHLORIDE 0.4 MG: 0.4 CAPSULE ORAL at 09:52

## 2022-12-15 RX ADMIN — ROSUVASTATIN CALCIUM 10 MG: 10 TABLET, FILM COATED ORAL at 09:55

## 2022-12-15 ASSESSMENT — PAIN SCALES - GENERAL: PAINLEVEL_OUTOF10: 0

## 2022-12-15 NOTE — CARE COORDINATION
Transition of Care-Patient refused Colonoscopy prep, no further surgical interventions. Charge RN to check for discharge-plan is return to [facility], no pre-cert needed. Transportation form/envelope completed & BARBARA/destination.     Discharge order noted-    Cindi WALLACE, RN  [facility]

## 2022-12-15 NOTE — PROGRESS NOTES
Pt adamant that he is not drinking the go-lytely or taking anything else. This RN offered to call doctors to change medication, pt said no that it wouldn't matter, it all tastes the same and he is not doing it, nothing will change.

## 2022-12-15 NOTE — DISCHARGE SUMMARY
[facility] Physician Discharge Summary       Des Sutherland MD  [facility]  [address] [phone]    Schedule an appointment as soon as possible for a visit in 1 week(s)  hospital followup    Peri Medrano MD  [address] [phone]    Schedule an appointment as soon as possible for a visit  For follow up for colonoscopy      Activity level: As tolerated     Dispo: ECF      Condition on discharge: Stable     Patient ID:  Shweta Farmer  [id number]  74 y.o.  1955    Admit date: 12/8/2022    Discharge date and time:  12/15/2022  3:44 PM    Admission Diagnoses: Principal Problem:    GI bleed  Active Problems:    Leukocytosis    Shortness of breath    Acute anemia    Uncontrolled type 2 diabetes mellitus with hyperglycemia ([address]    Weight loss  Resolved Problems:    * No resolved hospital problems. *      Discharge Diagnoses: Principal Problem:    GI bleed  Active Problems:    Leukocytosis    Shortness of breath    Acute anemia    Uncontrolled type 2 diabetes mellitus with hyperglycemia (HCC)    Weight loss  Resolved Problems:    * No resolved hospital problems. *      Consults:  IP CONSULT TO SOCIAL WORK  IP CONSULT TO GENERAL SURGERY  IP CONSULT TO DIETITIAN  IP CONSULT TO IV TEAM    Procedures: EGD    Hospital Course:   Patient Shweta Farmer is a 74 y.o. presented with Shortness of breath [R06.02]  GI bleed [K92.2]  Gastrointestinal hemorrhage with melena [K92.1]  Leukocytosis, unspecified type [D72.829]  Acute anemia [D599]    74year old male presents with anemia. He was seen by surgery. Patient had EGD on 12/14 however no source of bleeding noted. He was to have colonoscopy however has refused. Hemoglobin has remained stable and so medically stable for discharge.      Discharge Exam:    General Appearance: alert and oriented to person, place and time and in no acute distress  Skin: warm and dry  Head: normocephalic and atraumatic  Eyes: pupils equal, round, and reactive to light, extraocular eye movements intact, conjunctivae normal  Neck: neck supple and non tender without mass   Pulmonary/Chest: clear to auscultation bilaterally- no wheezes, rales or rhonchi, normal air movement, no respiratory distress  Cardiovascular: normal rate, normal S1 and S2 and no carotid bruits  Abdomen: soft, non-tender, non-distended, normal bowel sounds, no masses or organomegaly  Extremities: no cyanosis, no clubbing and no edema  Neurologic: no cranial nerve deficit and speech normal    I/O last 3 completed shifts: In: 956 [I.V.:956]  Out: 1450 [Urine:1450]  No intake/output data recorded. LABS:  Recent Labs     12/13/22 0424 12/14/22 0400 12/15/22  0433    137 141   K 3.4* 3.3* 3.5   * 105 108*   CO2 24 25 23   BUN 6 5* 4*   CREATININE 0.8 0.9 0.9   GLUCOSE 179* 191* 155*   CALCIUM 8.3* 8.0* 8.0*       Recent Labs     12/13/22 0424 12/14/22 0400 12/15/22  0433   WBC 10.3 10.5 10.6   RBC 3.39* 3.11* 3.22*   HGB 8.6* 8.0* 7.9*   HCT 25.6* 23.4* 24.1*   MCV 75.5* 75.2* 74.8*   MCH 25.4* 25.7* 24.5*   MCHC 33.6 34.2 32.8   RDW 14.6 14.6 14.6    290 261   MPV 9.9 10.0 9.7       No results for input(s): POCGLU in the last 72 hours. Imaging:  XR CHEST PORTABLE    Result Date: 12/8/2022  EXAMINATION: ONE XRAY VIEW OF THE CHEST 12/8/2022 10:25 pm COMPARISON: 27 November 2022 HISTORY: ORDERING SYSTEM PROVIDED HISTORY: hypoxia cough sob TECHNOLOGIST PROVIDED HISTORY: Reason for exam:->hypoxia cough sob FINDINGS: Single AP erect portable chest demonstrate satisfactory expansion lungs with mild elevation of the right hemidiaphragm. There are no focal alveolar infiltrates or effusions. The cardiac silhouette appears unremarkable. There is no evidence of a pneumothorax. No acute cardiopulmonary process.      CTA PULMONARY W CONTRAST    Result Date: 12/9/2022  EXAMINATION: CTA OF THE CHEST 12/9/2022 12:41 am TECHNIQUE: CTA of the chest was performed after the administration of intravenous contrast.  Multiplanar reformatted images are provided for review. MIP images are provided for review. Automated exposure control, iterative reconstruction, and/or weight based adjustment of the mA/kV was utilized to reduce the radiation dose to as low as reasonably achievable. COMPARISON: None. HISTORY: ORDERING SYSTEM PROVIDED HISTORY: leukocytosis, sob, cough was tachycardic TECHNOLOGIST PROVIDED HISTORY: Reason for exam:->leukocytosis, sob, cough was tachycardic Decision Support Exception - unselect if not a suspected or confirmed emergency medical condition->Emergency Medical Condition (MA) FINDINGS: Pulmonary Arteries: Pulmonary arteries are adequately opacified for evaluation. No evidence of intraluminal filling defect to suggest pulmonary embolism. Main pulmonary artery is normal in caliber. Mediastinum: No evidence of mediastinal lymphadenopathy. The heart is normal in size. There is a small pericardial effusion. There is no acute abnormality of the thoracic aorta. Small hiatal hernia. Patulous esophagus. Layering debris/fluid within the distal esophagus. Layering debris/fluid within the distal trachea proximal bilateral main bronchi. Lungs/pleura: The lungs are without acute process. Minimal bilateral dependent atelectasis. No focal consolidation or pulmonary edema. No evidence of pleural effusion or pneumothorax. Upper Abdomen:  Limited images of the upper abdomen demonstrate no acute abnormality. There is a complex right adrenal mass containing focal calcifications and macroscopic fat, unchanged from prior examination and compatible with a myelolipoma. Soft Tissues/Bones: No acute bone or soft tissue abnormality. Redemonstration of L1 superior endplate fracture. No evidence of pulmonary embolism. Small hiatal hernia with fluid/debris in the patulous esophagus, placing patient increased risk for aspiration.   Fluid/debris within the distal trachea and bilateral main bronchi.        Patient Instructions:      Medication List        CONTINUE taking these medications      aspirin 81 MG EC tablet     glucose 4 g chewable tablet     insulin glargine 100 UNIT/ML injection vial  Commonly known as: LANTUS     insulin lispro 100 UNIT/ML injection vial  Commonly known as: HUMALOG     metFORMIN 500 MG tablet  Commonly known as: GLUCOPHAGE     OLANZapine 7.5 MG tablet  Commonly known as: ZYPREXA     pantoprazole 40 MG tablet  Commonly known as: PROTONIX     polyethylene glycol 17 g packet  Commonly known as: GLYCOLAX  Take 17 g by mouth daily     rosuvastatin 10 MG tablet  Commonly known as: CRESTOR     senna-docusate 8.6-50 MG per tablet  Commonly known as: PERICOLACE     sodium chloride 1 g tablet     tamsulosin 0.4 MG capsule  Commonly known as: FLOMAX  Take 1 capsule by mouth daily     Tums Extra Strength 750 750 MG chewable tablet  Generic drug: calcium carbonate                Note that 35 minutes was spent in preparing discharge papers, discussing discharge with patient, medication review, etc.    Signed:  Electronically signed by Ashish Grossman DO on 12/15/2022 at 3:44 PM

## 2022-12-15 NOTE — CARE COORDINATION
Transition of Care-Patient refused Colonoscopy prep, no further surgical interventions. Charge RN to check for discharge-plan is return to [facility], no pre-cert needed. Transportation form/envelope completed & BARBARA/destination.     Norma RODRIGUEZN, RN  [facility]

## 2022-12-15 NOTE — PROGRESS NOTES
Arthur Pollock MD  12/15/22  10:13 AM    NOTE: This report, in part or full, may have been transcribed using voice recognition software. Every effort was made to ensure accuracy; however, inadvertent computerized transcription errors may be present. Please excuse any transcriptional grammatical or spelling errors that may have escaped my editorial review.

## 2022-12-30 ENCOUNTER — OFFICE VISIT (OUTPATIENT)
Dept: SURGERY | Age: 67
End: 2022-12-30

## 2022-12-30 VITALS
TEMPERATURE: 97.2 F | OXYGEN SATURATION: 98 % | SYSTOLIC BLOOD PRESSURE: 132 MMHG | HEIGHT: 68 IN | RESPIRATION RATE: 18 BRPM | HEART RATE: 74 BPM | BODY MASS INDEX: 24.86 KG/M2 | WEIGHT: 164 LBS | DIASTOLIC BLOOD PRESSURE: 88 MMHG

## 2022-12-30 DIAGNOSIS — D64.9 ANEMIA, UNSPECIFIED TYPE: Primary | ICD-10-CM

## 2022-12-30 NOTE — PROGRESS NOTES
[facility] Surgery Clinic Note    Assessment/Plan:     Diagnosis Orders   1. Anemia, unspecified type      EGD without obvious source of bleeding. He has refused colonoscopy prep. Nothing further that I can evaluate him for. He is on iron per daughter. Return if symptoms worsen or fail to improve. Chief Complaint   Patient presents with    Post-Op Check     Egd biopsy       PCP: Oneil Sibley    HPI: Lakeisha Foy is a 74 y.o. male here for hospital follow-up of anemia evaluation. He had an EGD showing reflux esophagitis and gastritis. He also had a hiatal hernia noted. He was planned for colonoscopy but he refused the prep. He is still refusing the prep. There is no abdominal pain. There is no nausea or vomiting. He is here with his daughter and does not provide any history today. His daughter also does not know much as he resides at [facility]. To her knowledge there is no further bleeding. He had a CT while in the hospital which did show large stool burden but no obvious evidence of source of bleeding. Daughter knows that without full endoscopic evaluation cannot ensure there is no source in the colon. Review of Systems   All other systems reviewed and are negative.       Past Medical History:   Diagnosis Date    Anxiety     Bronchitis     Cellulitis     Chronic sinusitis     Depression     Diabetes mellitus ([address]     Diabetic retinopathy ([address]     Fracture of left foot     High cholesterol     Hypercholesteremia     Hypercholesterolemia     Hypertension     Lumbago     Moderate mood disorder ([address]     Third nerve palsy        Past Surgical History:   Procedure Laterality Date    EYE SURGERY      HIP SURGERY Right 11/19/2020    HIP HEMIARTHROPLASTY performed by August Mcduffie MD at [address] Left 12/10/2020    HIP HEMIARTHROPLASTY -- HERB -- DROPLET +     PT. COMING FROM [place] performed by Guillaume Zamarripa MD at [address] [place] [address] ENDOSCOPY N/A 12/14/2022    EGD BIOPSY performed by Daniela Street MD at [address]       No family history on file. Social History     Socioeconomic History    Marital status:      Spouse name: Not on file    Number of children: Not on file    Years of education: Not on file    Highest education level: Not on file   Occupational History    Not on file   Tobacco Use    Smoking status: Never    Smokeless tobacco: Never   Substance and Sexual Activity    Alcohol use: No    Drug use: No    Sexual activity: Not Currently   Other Topics Concern    Not on file   Social History Narrative    Not on file     Social Determinants of Health     Financial Resource Strain: Not on file   Food Insecurity: Not on file   Transportation Needs: Not on file   Physical Activity: Not on file   Stress: Not on file   Social Connections: Not on file   Intimate Partner Violence: Not on file   Housing Stability: Not on file       Allergies   Allergen Reactions    Jardiance [Empagliflozin] Other (See Comments)     ACIDOSIS    Victoza [Liraglutide] Diarrhea         Current Outpatient Medications   Medication Sig Dispense Refill    calcium carbonate (TUMS EXTRA STRENGTH 750) 750 MG chewable tablet Take 750 mg by mouth every 12 hours as needed (GERD)      insulin glargine (LANTUS) 100 UNIT/ML injection vial Inject 5 Units into the skin nightly      metFORMIN (GLUCOPHAGE) 500 MG tablet Take 500 mg by mouth in the morning and 500 mg in the evening.       pantoprazole (PROTONIX) 40 MG tablet Take 40 mg by mouth daily      senna-docusate (PERICOLACE) 8.6-50 MG per tablet Take 1 tablet by mouth in the morning and 1 tablet in the evening.      sodium chloride 1 g tablet Take 1 g by mouth daily      polyethylene glycol (GLYCOLAX) 17 g packet Take 17 g by mouth daily 527 g 1    aspirin 81 MG EC tablet Take 81 mg by mouth daily      OLANZapine (ZYPREXA) 7.5 MG tablet Take 7.5 mg by mouth nightly      rosuvastatin (CRESTOR) 10 MG tablet Take 10 mg by mouth daily      insulin lispro (HUMALOG) 100 UNIT/ML injection vial Inject 0-4 Units into the skin 4 times daily (before meals and nightly) PER SLIDING SCALE: 0-150=0U, 151-200=2U, 201-250=4U, 251-300=6U, 301-350=8U, 351-400=10U, 401-450=12U, 450+=CALL MD      glucose 4 g chewable tablet Take 4 g by mouth as needed for Low blood sugar       tamsulosin (FLOMAX) 0.4 MG capsule Take 1 capsule by mouth daily 30 capsule 2     No current facility-administered medications for this visit. The remainder of the past medical, past surgical, family, and psychosocial history, as well as medication and allergy review, were completed and are as documented elsewhere in the chart. Objective:  Vitals:    12/30/22 1035   BP: 132/88   Pulse: 74   Resp: 18   Temp: 97.2 °F (36.2 °C)   TempSrc: Temporal   SpO2: 98%   Weight: 164 lb (74.4 kg)   Height: 5' 8\" (1.727 m)          Physical Exam  Constitutional:       General: He is not in acute distress. Appearance: He is not diaphoretic. HENT:      Head: Normocephalic and atraumatic. Eyes:      General:         Right eye: No discharge. Left eye: No discharge. Neck:      Trachea: No tracheal deviation. Cardiovascular:      Rate and Rhythm: Normal rate. Pulmonary:      Effort: Pulmonary effort is normal. No respiratory distress. Abdominal:      General: There is no distension. Palpations: Abdomen is soft. Tenderness: There is no abdominal tenderness. There is no guarding or rebound. Skin:     General: Skin is warm and dry. Neurological:      Mental Status: He is alert and oriented to person, place, and time. MD DAVINA Rainey have examined the patient and performed the key aspects of physical exam, reviewed the record (including all pertinent and new radiology images and laboratory findings, referring provider notes and results), and discussed the case with the primary and referring provider where applicable. NOTE: This report, in part or full, may have been transcribed using voice recognition software. Every effort was made to ensure accuracy; however, inadvertent computerized transcription errors may be present. Please excuse any transcriptional grammatical or spelling errors that may have escaped my editorial review.       CC: Mahnaz Sanchez

## 2023-02-11 ENCOUNTER — HOSPITAL ENCOUNTER (INPATIENT)
Age: 68
LOS: 2 days | Discharge: NURSING, REHAB OR LONG-TERM CARE | DRG: 177 | End: 2023-02-13
Attending: EMERGENCY MEDICINE | Admitting: FAMILY MEDICINE
Payer: MEDICARE

## 2023-02-11 ENCOUNTER — APPOINTMENT (OUTPATIENT)
Dept: GENERAL RADIOLOGY | Age: 68
DRG: 177 | End: 2023-02-11
Payer: MEDICARE

## 2023-02-11 ENCOUNTER — APPOINTMENT (OUTPATIENT)
Dept: CT IMAGING | Age: 68
DRG: 177 | End: 2023-02-11
Payer: MEDICARE

## 2023-02-11 DIAGNOSIS — U07.1 COVID-19: ICD-10-CM

## 2023-02-11 DIAGNOSIS — R11.2 NAUSEA AND VOMITING, UNSPECIFIED VOMITING TYPE: Primary | ICD-10-CM

## 2023-02-11 PROBLEM — R41.82 ALTERED MENTAL STATE: Status: ACTIVE | Noted: 2023-01-01

## 2023-02-11 PROBLEM — R40.4 TRANSIENT ALTERATION OF AWARENESS: Status: ACTIVE | Noted: 2023-02-11

## 2023-02-11 LAB
ACANTHOCYTES: ABNORMAL
ALBUMIN SERPL-MCNC: 2.8 G/DL (ref 3.5–5.2)
ALP BLD-CCNC: 53 U/L (ref 40–129)
ALT SERPL-CCNC: 6 U/L (ref 0–40)
AMMONIA: 12.3 UMOL/L (ref 16–60)
ANION GAP SERPL CALCULATED.3IONS-SCNC: 12 MMOL/L (ref 7–16)
ANISOCYTOSIS: ABNORMAL
AST SERPL-CCNC: 6 U/L (ref 0–39)
BACTERIA: NORMAL /HPF
BASOPHILS ABSOLUTE: 0.04 E9/L (ref 0–0.2)
BASOPHILS RELATIVE PERCENT: 0.3 % (ref 0–2)
BETA-HYDROXYBUTYRATE: 0.21 MMOL/L (ref 0.02–0.27)
BILIRUB SERPL-MCNC: 0.4 MG/DL (ref 0–1.2)
BILIRUBIN DIRECT: <0.2 MG/DL (ref 0–0.3)
BILIRUBIN URINE: NEGATIVE
BILIRUBIN, INDIRECT: ABNORMAL MG/DL (ref 0–1)
BLOOD, URINE: NEGATIVE
BUN BLDV-MCNC: 32 MG/DL (ref 6–23)
BURR CELLS: ABNORMAL
CALCIUM SERPL-MCNC: 8.8 MG/DL (ref 8.6–10.2)
CHLORIDE BLD-SCNC: 100 MMOL/L (ref 98–107)
CHP ED QC CHECK: NORMAL
CLARITY: CLEAR
CO2: 20 MMOL/L (ref 22–29)
COLOR: YELLOW
CREAT SERPL-MCNC: 1.1 MG/DL (ref 0.7–1.2)
EKG ATRIAL RATE: 113 BPM
EKG P AXIS: 81 DEGREES
EKG P-R INTERVAL: 124 MS
EKG Q-T INTERVAL: 306 MS
EKG QRS DURATION: 62 MS
EKG QTC CALCULATION (BAZETT): 419 MS
EKG R AXIS: 29 DEGREES
EKG T AXIS: 64 DEGREES
EKG VENTRICULAR RATE: 113 BPM
EOSINOPHILS ABSOLUTE: 0 E9/L (ref 0.05–0.5)
EOSINOPHILS RELATIVE PERCENT: 0 % (ref 0–6)
GFR SERPL CREATININE-BSD FRML MDRD: >60 ML/MIN/1.73
GLUCOSE BLD-MCNC: 260 MG/DL
GLUCOSE BLD-MCNC: 330 MG/DL (ref 74–99)
GLUCOSE URINE: >=1000 MG/DL
HCT VFR BLD CALC: 23.5 % (ref 37–54)
HCT VFR BLD CALC: 24.2 % (ref 37–54)
HEMOGLOBIN: 7.7 G/DL (ref 12.5–16.5)
HEMOGLOBIN: 8 G/DL (ref 12.5–16.5)
HYPOCHROMIA: ABNORMAL
IMMATURE GRANULOCYTES #: 0.1 E9/L
IMMATURE GRANULOCYTES %: 0.7 % (ref 0–5)
INFLUENZA A BY PCR: NOT DETECTED
INFLUENZA B BY PCR: NOT DETECTED
KETONES, URINE: NEGATIVE MG/DL
LACTIC ACID: 1.8 MMOL/L (ref 0.5–2.2)
LACTIC ACID: 4.5 MMOL/L (ref 0.5–2.2)
LEUKOCYTE ESTERASE, URINE: NEGATIVE
LYMPHOCYTES ABSOLUTE: 0.5 E9/L (ref 1.5–4)
LYMPHOCYTES RELATIVE PERCENT: 3.7 % (ref 20–42)
MAGNESIUM: 1.3 MG/DL (ref 1.6–2.6)
MCH RBC QN AUTO: 20.7 PG (ref 26–35)
MCHC RBC AUTO-ENTMCNC: 33.1 % (ref 32–34.5)
MCV RBC AUTO: 62.5 FL (ref 80–99.9)
METER GLUCOSE: 117 MG/DL (ref 74–99)
METER GLUCOSE: 260 MG/DL (ref 74–99)
MONOCYTES ABSOLUTE: 0.6 E9/L (ref 0.1–0.95)
MONOCYTES RELATIVE PERCENT: 4.5 % (ref 2–12)
NEUTROPHILS ABSOLUTE: 12.1 E9/L (ref 1.8–7.3)
NEUTROPHILS RELATIVE PERCENT: 90.8 % (ref 43–80)
NITRITE, URINE: NEGATIVE
OVALOCYTES: ABNORMAL
PDW BLD-RTO: 18.7 FL (ref 11.5–15)
PH UA: 6 (ref 5–9)
PH VENOUS: 7.39 (ref 7.35–7.45)
PLATELET # BLD: 263 E9/L (ref 130–450)
PMV BLD AUTO: ABNORMAL FL (ref 7–12)
POIKILOCYTES: ABNORMAL
POLYCHROMASIA: ABNORMAL
POTASSIUM SERPL-SCNC: 4.3 MMOL/L (ref 3.5–5)
PROTEIN UA: ABNORMAL MG/DL
RBC # BLD: 3.87 E12/L (ref 3.8–5.8)
RBC UA: NORMAL /HPF (ref 0–2)
SARS-COV-2, NAAT: DETECTED
SCHISTOCYTES: ABNORMAL
SODIUM BLD-SCNC: 132 MMOL/L (ref 132–146)
SPECIFIC GRAVITY UA: 1.01 (ref 1–1.03)
TARGET CELLS: ABNORMAL
TEAR DROP CELLS: ABNORMAL
TOTAL PROTEIN: 5.2 G/DL (ref 6.4–8.3)
TROPONIN, HIGH SENSITIVITY: 46 NG/L (ref 0–11)
TROPONIN, HIGH SENSITIVITY: 50 NG/L (ref 0–11)
TROPONIN, HIGH SENSITIVITY: 66 NG/L (ref 0–11)
TSH SERPL DL<=0.05 MIU/L-ACNC: 0.8 UIU/ML (ref 0.27–4.2)
UROBILINOGEN, URINE: 1 E.U./DL
WBC # BLD: 13.3 E9/L (ref 4.5–11.5)
WBC UA: NORMAL /HPF (ref 0–5)

## 2023-02-11 PROCEDURE — 82800 BLOOD PH: CPT

## 2023-02-11 PROCEDURE — C9113 INJ PANTOPRAZOLE SODIUM, VIA: HCPCS | Performed by: EMERGENCY MEDICINE

## 2023-02-11 PROCEDURE — 87040 BLOOD CULTURE FOR BACTERIA: CPT

## 2023-02-11 PROCEDURE — 82010 KETONE BODYS QUAN: CPT

## 2023-02-11 PROCEDURE — 99223 1ST HOSP IP/OBS HIGH 75: CPT | Performed by: FAMILY MEDICINE

## 2023-02-11 PROCEDURE — 93005 ELECTROCARDIOGRAM TRACING: CPT

## 2023-02-11 PROCEDURE — 85018 HEMOGLOBIN: CPT

## 2023-02-11 PROCEDURE — 87150 DNA/RNA AMPLIFIED PROBE: CPT

## 2023-02-11 PROCEDURE — 1200000000 HC SEMI PRIVATE

## 2023-02-11 PROCEDURE — 82728 ASSAY OF FERRITIN: CPT

## 2023-02-11 PROCEDURE — 70450 CT HEAD/BRAIN W/O DYE: CPT

## 2023-02-11 PROCEDURE — 84443 ASSAY THYROID STIM HORMONE: CPT

## 2023-02-11 PROCEDURE — 87186 SC STD MICRODIL/AGAR DIL: CPT

## 2023-02-11 PROCEDURE — 2580000003 HC RX 258: Performed by: FAMILY MEDICINE

## 2023-02-11 PROCEDURE — 84484 ASSAY OF TROPONIN QUANT: CPT

## 2023-02-11 PROCEDURE — 82962 GLUCOSE BLOOD TEST: CPT

## 2023-02-11 PROCEDURE — 87077 CULTURE AEROBIC IDENTIFY: CPT

## 2023-02-11 PROCEDURE — 6360000002 HC RX W HCPCS

## 2023-02-11 PROCEDURE — 87502 INFLUENZA DNA AMP PROBE: CPT

## 2023-02-11 PROCEDURE — 2500000003 HC RX 250 WO HCPCS: Performed by: FAMILY MEDICINE

## 2023-02-11 PROCEDURE — 87635 SARS-COV-2 COVID-19 AMP PRB: CPT

## 2023-02-11 PROCEDURE — 82140 ASSAY OF AMMONIA: CPT

## 2023-02-11 PROCEDURE — 96375 TX/PRO/DX INJ NEW DRUG ADDON: CPT

## 2023-02-11 PROCEDURE — 6360000002 HC RX W HCPCS: Performed by: EMERGENCY MEDICINE

## 2023-02-11 PROCEDURE — 6370000000 HC RX 637 (ALT 250 FOR IP): Performed by: FAMILY MEDICINE

## 2023-02-11 PROCEDURE — 81001 URINALYSIS AUTO W/SCOPE: CPT

## 2023-02-11 PROCEDURE — 96366 THER/PROPH/DIAG IV INF ADDON: CPT

## 2023-02-11 PROCEDURE — 71260 CT THORAX DX C+: CPT

## 2023-02-11 PROCEDURE — 74177 CT ABD & PELVIS W/CONTRAST: CPT

## 2023-02-11 PROCEDURE — 93010 ELECTROCARDIOGRAM REPORT: CPT | Performed by: INTERNAL MEDICINE

## 2023-02-11 PROCEDURE — 96365 THER/PROPH/DIAG IV INF INIT: CPT

## 2023-02-11 PROCEDURE — 83735 ASSAY OF MAGNESIUM: CPT

## 2023-02-11 PROCEDURE — 96361 HYDRATE IV INFUSION ADD-ON: CPT

## 2023-02-11 PROCEDURE — 2580000003 HC RX 258

## 2023-02-11 PROCEDURE — 80076 HEPATIC FUNCTION PANEL: CPT

## 2023-02-11 PROCEDURE — 85025 COMPLETE CBC W/AUTO DIFF WBC: CPT

## 2023-02-11 PROCEDURE — 87449 NOS EACH ORGANISM AG IA: CPT

## 2023-02-11 PROCEDURE — 99285 EMERGENCY DEPT VISIT HI MDM: CPT

## 2023-02-11 PROCEDURE — 85014 HEMATOCRIT: CPT

## 2023-02-11 PROCEDURE — 83605 ASSAY OF LACTIC ACID: CPT

## 2023-02-11 PROCEDURE — 36415 COLL VENOUS BLD VENIPUNCTURE: CPT

## 2023-02-11 PROCEDURE — 71045 X-RAY EXAM CHEST 1 VIEW: CPT

## 2023-02-11 PROCEDURE — 6360000002 HC RX W HCPCS: Performed by: FAMILY MEDICINE

## 2023-02-11 PROCEDURE — 6360000004 HC RX CONTRAST MEDICATION: Performed by: RADIOLOGY

## 2023-02-11 PROCEDURE — 80048 BASIC METABOLIC PNL TOTAL CA: CPT

## 2023-02-11 RX ORDER — IPRATROPIUM BROMIDE AND ALBUTEROL SULFATE 2.5; .5 MG/3ML; MG/3ML
1 SOLUTION RESPIRATORY (INHALATION)
Status: DISCONTINUED | OUTPATIENT
Start: 2023-02-12 | End: 2023-02-13 | Stop reason: HOSPADM

## 2023-02-11 RX ORDER — DEXAMETHASONE SODIUM PHOSPHATE 4 MG/ML
6 INJECTION, SOLUTION INTRA-ARTICULAR; INTRALESIONAL; INTRAMUSCULAR; INTRAVENOUS; SOFT TISSUE EVERY 24 HOURS
Status: DISCONTINUED | OUTPATIENT
Start: 2023-02-11 | End: 2023-02-13 | Stop reason: HOSPADM

## 2023-02-11 RX ORDER — ACETAMINOPHEN 325 MG/1
650 TABLET ORAL EVERY 6 HOURS PRN
Status: DISCONTINUED | OUTPATIENT
Start: 2023-02-11 | End: 2023-02-13 | Stop reason: HOSPADM

## 2023-02-11 RX ORDER — INSULIN LISPRO 100 [IU]/ML
0-4 INJECTION, SOLUTION INTRAVENOUS; SUBCUTANEOUS
Status: DISCONTINUED | OUTPATIENT
Start: 2023-02-11 | End: 2023-02-13

## 2023-02-11 RX ORDER — PANTOPRAZOLE SODIUM 40 MG/1
40 TABLET, DELAYED RELEASE ORAL DAILY
Status: DISCONTINUED | OUTPATIENT
Start: 2023-02-11 | End: 2023-02-12

## 2023-02-11 RX ORDER — SODIUM CHLORIDE 0.9 % (FLUSH) 0.9 %
5-40 SYRINGE (ML) INJECTION EVERY 12 HOURS SCHEDULED
Status: DISCONTINUED | OUTPATIENT
Start: 2023-02-11 | End: 2023-02-13 | Stop reason: HOSPADM

## 2023-02-11 RX ORDER — 0.9 % SODIUM CHLORIDE 0.9 %
1000 INTRAVENOUS SOLUTION INTRAVENOUS ONCE
Status: COMPLETED | OUTPATIENT
Start: 2023-02-11 | End: 2023-02-11

## 2023-02-11 RX ORDER — CEFTRIAXONE 1 G/1
1000 INJECTION, POWDER, FOR SOLUTION INTRAMUSCULAR; INTRAVENOUS DAILY
Status: DISCONTINUED | OUTPATIENT
Start: 2023-02-11 | End: 2023-02-11 | Stop reason: SDUPTHER

## 2023-02-11 RX ORDER — ROSUVASTATIN CALCIUM 10 MG/1
10 TABLET, COATED ORAL DAILY
Status: DISCONTINUED | OUTPATIENT
Start: 2023-02-11 | End: 2023-02-13 | Stop reason: HOSPADM

## 2023-02-11 RX ORDER — ONDANSETRON 2 MG/ML
4 INJECTION INTRAMUSCULAR; INTRAVENOUS ONCE
Status: COMPLETED | OUTPATIENT
Start: 2023-02-11 | End: 2023-02-11

## 2023-02-11 RX ORDER — SODIUM CHLORIDE 9 MG/ML
INJECTION, SOLUTION INTRAVENOUS PRN
Status: DISCONTINUED | OUTPATIENT
Start: 2023-02-11 | End: 2023-02-13 | Stop reason: HOSPADM

## 2023-02-11 RX ORDER — ACETAMINOPHEN 650 MG/1
650 SUPPOSITORY RECTAL EVERY 6 HOURS PRN
Status: DISCONTINUED | OUTPATIENT
Start: 2023-02-11 | End: 2023-02-13 | Stop reason: HOSPADM

## 2023-02-11 RX ORDER — ONDANSETRON 2 MG/ML
4 INJECTION INTRAMUSCULAR; INTRAVENOUS EVERY 6 HOURS PRN
Status: DISCONTINUED | OUTPATIENT
Start: 2023-02-11 | End: 2023-02-13 | Stop reason: HOSPADM

## 2023-02-11 RX ORDER — INSULIN GLARGINE 100 [IU]/ML
5 INJECTION, SOLUTION SUBCUTANEOUS NIGHTLY
Status: DISCONTINUED | OUTPATIENT
Start: 2023-02-11 | End: 2023-02-13

## 2023-02-11 RX ORDER — INSULIN LISPRO 100 [IU]/ML
0-4 INJECTION, SOLUTION INTRAVENOUS; SUBCUTANEOUS NIGHTLY
Status: DISCONTINUED | OUTPATIENT
Start: 2023-02-11 | End: 2023-02-13

## 2023-02-11 RX ORDER — POLYETHYLENE GLYCOL 3350 17 G/17G
17 POWDER, FOR SOLUTION ORAL DAILY PRN
Status: DISCONTINUED | OUTPATIENT
Start: 2023-02-11 | End: 2023-02-13 | Stop reason: HOSPADM

## 2023-02-11 RX ORDER — GUAIFENESIN/DEXTROMETHORPHAN 100-10MG/5
5 SYRUP ORAL EVERY 4 HOURS PRN
Status: DISCONTINUED | OUTPATIENT
Start: 2023-02-11 | End: 2023-02-13 | Stop reason: HOSPADM

## 2023-02-11 RX ORDER — PANTOPRAZOLE SODIUM 40 MG/10ML
40 INJECTION, POWDER, LYOPHILIZED, FOR SOLUTION INTRAVENOUS ONCE
Status: COMPLETED | OUTPATIENT
Start: 2023-02-11 | End: 2023-02-11

## 2023-02-11 RX ORDER — MAGNESIUM SULFATE IN WATER 40 MG/ML
2000 INJECTION, SOLUTION INTRAVENOUS ONCE
Status: COMPLETED | OUTPATIENT
Start: 2023-02-11 | End: 2023-02-11

## 2023-02-11 RX ORDER — SODIUM CHLORIDE 0.9 % (FLUSH) 0.9 %
5-40 SYRINGE (ML) INJECTION PRN
Status: DISCONTINUED | OUTPATIENT
Start: 2023-02-11 | End: 2023-02-13 | Stop reason: HOSPADM

## 2023-02-11 RX ORDER — ONDANSETRON 4 MG/1
4 TABLET, ORALLY DISINTEGRATING ORAL EVERY 8 HOURS PRN
Status: DISCONTINUED | OUTPATIENT
Start: 2023-02-11 | End: 2023-02-13 | Stop reason: HOSPADM

## 2023-02-11 RX ADMIN — PANTOPRAZOLE SODIUM 40 MG: 40 INJECTION, POWDER, FOR SOLUTION INTRAVENOUS at 11:51

## 2023-02-11 RX ADMIN — SODIUM CHLORIDE 1000 ML: 9 INJECTION, SOLUTION INTRAVENOUS at 10:30

## 2023-02-11 RX ADMIN — PANTOPRAZOLE SODIUM 40 MG: 40 TABLET, DELAYED RELEASE ORAL at 22:43

## 2023-02-11 RX ADMIN — ROSUVASTATIN CALCIUM 10 MG: 10 TABLET, FILM COATED ORAL at 22:05

## 2023-02-11 RX ADMIN — SODIUM CHLORIDE 1000 ML: 9 INJECTION, SOLUTION INTRAVENOUS at 11:50

## 2023-02-11 RX ADMIN — SODIUM CHLORIDE, PRESERVATIVE FREE 10 ML: 5 INJECTION INTRAVENOUS at 22:44

## 2023-02-11 RX ADMIN — ONDANSETRON 4 MG: 2 INJECTION INTRAMUSCULAR; INTRAVENOUS at 11:50

## 2023-02-11 RX ADMIN — DOXYCYCLINE 100 MG: 100 INJECTION, POWDER, LYOPHILIZED, FOR SOLUTION INTRAVENOUS at 22:38

## 2023-02-11 RX ADMIN — IOPAMIDOL 75 ML: 755 INJECTION, SOLUTION INTRAVENOUS at 12:08

## 2023-02-11 RX ADMIN — OLANZAPINE 7.5 MG: 5 TABLET, FILM COATED ORAL at 22:06

## 2023-02-11 RX ADMIN — SODIUM CHLORIDE, PRESERVATIVE FREE 1000 MG: 5 INJECTION INTRAVENOUS at 22:10

## 2023-02-11 RX ADMIN — MAGNESIUM SULFATE HEPTAHYDRATE 2000 MG: 40 INJECTION, SOLUTION INTRAVENOUS at 11:50

## 2023-02-11 RX ADMIN — DEXAMETHASONE SODIUM PHOSPHATE 6 MG: 4 INJECTION, SOLUTION INTRAMUSCULAR; INTRAVENOUS at 22:09

## 2023-02-11 ASSESSMENT — PAIN - FUNCTIONAL ASSESSMENT
PAIN_FUNCTIONAL_ASSESSMENT: NONE - DENIES PAIN
PAIN_FUNCTIONAL_ASSESSMENT: NONE - DENIES PAIN

## 2023-02-11 NOTE — LETTER
[place] Department Medicaid  CERTIFICATION OF NECESSITY  FOR NON-EMERGENCY TRANSPORTATION   BY GROUND AMBULANCE      Individual Information   1. Name: Royer Weeks 2. [place] Medicaid Billing Number:    3. Address: [facility] [place], [place] [address]e [facility]      Transportation Provider Information   4. Provider Name:    5. [place] Medicaid Provider Number:  National Provider Identifier (NPI): ***     Certification  7. Criteria:  During transport, this individual requires:  [x] Medical treatment or continuous     supervision by an EMT. [] The administration or regulation of oxygen by another person. [] Supervised protective restraint. 8. Period Beginning Date:    5. Length  [x] Not more than 1 day(s)  [] One Year     Additional Information Relevant to Certification   10. Comments or Explanations, If Necessary or Appropriate   Altered mental state, Nausea and vomiting, unspecified vomiting type, COVID-19, COVID and DX codes: R41.82, R11.2, U07.1, U07.1  & Vascular Dementia     Certifying Practitioner Information   11. Name of Practitioner: Dr. Akilah Lara DO   12. [place] Medicaid Provider Number, If Applicable:  [address] Provider Identifier (NPI):      Signature Information   14. Date of Signature: 2/12/2023   15. Name of Person Signing: Electronically signed by Bethany Pena RN on 2/12/2023 at 1:34 PM     16.  Signature and Professional Designation: Electronically signed by Bethany Pena RN on 2/12/2023 at 1:34 PM  Discharge Planner     ROBERT [id number]  Rev. 7/2015

## 2023-02-11 NOTE — ED NOTES
Report given to [facility] VITALY BENJAMIN  02/11/23 9816

## 2023-02-11 NOTE — ED PROVIDER NOTES
[facility]  Department of Emergency Medicine     Written by: Malik Guidry MD  Patient Name: Elvia Willingham  Attending Provider: Jigar Miles MD  Admit Date: 2023 10:12 AM  MRN: [id number]                   : 1955        Chief Complaint   Patient presents with   Juju Lundy Altered Mental Status    Emesis     Coffee ground emesis this AM    - Chief complaint    This is a 60-year-old male with history of diabetes, hypertension, hypercholesterolemia, diabetic retinopathy the presents ED from [address] [place] [address] after they noticed him having coffee-ground emesis today morning. Patient is confused, not responsive to questions appropriately, therefore history is taken from the nursing home. They state that he is usually alert and oriented, and is able to walk and is able to converse. However, around 9:15 AM, he started vomiting dark vomit, with a put him on the chair he started shaking. They said he was less responsive at this time, and he was just staring, not answering how many fingers was being held up to his face. This is abnormal based off his baseline. This that his oxygen saturation levels were 80%. This is the first time this happens to him. They were concerned for a GI bleed, and prompted him to come to the ED. Review of Systems   Unable to perform ROS: Mental status change      Physical Exam  Constitutional:       General: He is not in acute distress. Appearance: Normal appearance. He is ill-appearing. Comments: Mental status change, slow to respond, however awake and alert   HENT:      Head: Normocephalic and atraumatic. Nose: Nose normal. No congestion. Mouth/Throat:      Mouth: Mucous membranes are moist.      Pharynx: No posterior oropharyngeal erythema. Eyes:      General: No scleral icterus. Extraocular Movements: Extraocular movements intact. Pupils: Pupils are equal, round, and reactive to light. Cardiovascular:      Rate and Rhythm: Regular rhythm. Tachycardia present. Pulses: Normal pulses. Heart sounds: Normal heart sounds. Pulmonary:      Effort: Pulmonary effort is normal. No respiratory distress. Breath sounds: Normal breath sounds. No stridor. No wheezing or rhonchi. Chest:      Chest wall: No tenderness. Abdominal:      General: Bowel sounds are normal. There is no distension. Palpations: Abdomen is soft. There is no mass. Tenderness: There is no abdominal tenderness. Musculoskeletal:         General: No swelling, tenderness or deformity. Normal range of motion. Cervical back: Normal range of motion. No rigidity or tenderness. Skin:     Capillary Refill: Capillary refill takes less than 2 seconds. Coloration: Skin is not jaundiced or pale. Findings: No erythema. Neurological:      General: No focal deficit present. Mental Status: He is alert. He is disoriented and confused. GCS: GCS eye subscore is 4. GCS verbal subscore is 5. GCS motor subscore is 6. Procedures       MDM  Number of Diagnoses or Management Options  COVID-19  Nausea and vomiting, unspecified vomiting type  Diagnosis management comments: This is a 53-year-old male with history of diabetes, hypertension, hypercholesterolemia, diabetic retinopathy the presents ED from [address] [place] [address] after they noticed him having acute moderate coffee-ground emesis today morning. Patient is acutely and severely confused, not responsive to questions appropriately, therefore history is taken from the nursing home. The patient here in the ED is tachycardic with a heart rate of 119, however otherwise stable, and does not appear to be in acute distress. They are calm, alert, however acutely disoriented. The patient has clear lungs to auscultation, no abnormal heart murmurs are heard.   On abdominal palpation, the patient has no significant abdominal tenderness to any quadrant. There are no signs of jaundice or scleral icterus. No visible hernias on exam.  The patient does have a hoarse cough that I heard while in the room. To evaluate for acute or chronic intracranial hemorrhage or masses, or any subsequent herniations, CT head without contrast was ordered. Remainder of MDM will be in the ED course below. Patient urinalysis reveals no concerns for UTI. Patient was COVID-positive, influenza negative. Patient does have lactic acid of 4.5, likely dehydration. Subsequently, patient also had low magnesium of 1.3, he was repleted. Initial troponin was 50 however repeat was 46 making delta negative. Unlikely to be ACS or cardiac related events. Patient has no metabolic abnormalities on BMP, CBC revealed slight elevated white count at 13.3, may be due to COVID-19. Patient beta-hydroxybutyrate 0.21 and pH venous 7.39, unlikely DKA in conjunction with the patient's hyperglycemia 300. After fluids, patient had a lactic acid 1.8 which is significant movement. Patient CT head revealed no concerning findings, however CT chest and CT abdomen revealed concerns for possible pneumonia. In addition, CT abdomen showed large amount of stool in the colon and the patient had moderate hiatal hernia. The patient was started on doxycycline and Rocephin, spoke to the hospitalist, who agreed to admit the patient for COVID-19 pneumonia with altered mental status. Patient remains hemodynamically stable but mild tachycardia at 104. Patient ready for admission. ED Course as of 02/11/23 1942   Sat Feb 11, 2023   1108 Patient presents from the nursing home. Patient per nursing home staff is normally ANO x3. Patient became more confused today and reportedly had a low-grade fever of 100.7. Patient is afebrile here. Patient apparently had 1 episode of vomiting they reported that it was coffee-ground's.   Patient is ANO x2 here he knows where he is he knows who he is but does not know the year. He denies any pain denies any nausea vomiting or abdominal pain right now denies any chest pain shortness of breath. Patient is a poor historian due to mild confusion but denies any headache at this time. Patient is denying abdominal pain shortness of breath. [EL]   2423 EKG shows sinus tachycardia at 113 bpm no signs of ST changes no ST elevation QTc 419. No significant change from prior EKG other than slightly increased rate. EKG interpreted by myself. [YV]   1936 Patient troponin elevated 50, repeat 46 mg negative. Patient has no UTI. Patient has mild elevated glucose at 330, however no evidence of DKA. Patient has mild hypomagnesemia at 1.3, repleted. Patient is COVID-positive. Patient james hemodynamically stable, however still appears ill. Spoke to hospitalist, patient will be admitted. [AH]   1747 CT abdomen IMPRESSION:  1. Large amount of stool throughout the colon notable level of the rectum. 2. No bowel obstruction, free air, or focal inflammatory changes. 3. Moderate hiatal hernia with thickened appearance of wall of distal  esophagus. Findings may represent esophagitis. 5. New interstitial and hazy opacities are present in right and left lower  lobes which could indicate atelectasis and or pneumonia. [AH]      ED Course User Index  [AH] MD Chalo Stroud MD       Chart review: Patient's previous charts and admissions were reviewed. Social determinants: Patient is from [address] [place] [address] and rehab, does not require replacement at this time.     Acute or chronic illness(es) affecting care: COVID-19 with altered mental status, patient cannot express his symptoms appropriately    --------------------------------------------- PAST HISTORY ---------------------------------------------  Past Medical History:  has a past medical history of Anxiety, Bronchitis, Cellulitis, Chronic sinusitis, COVID, Depression, Diabetes mellitus ([facility] [address] Diabetic retinopathy ([facility] [address] Fracture of left foot, High cholesterol, Hypercholesteremia, Hypercholesterolemia, Hypertension, Lumbago, Moderate mood disorder ([facility] [address] Third nerve palsy, and Transient alteration of awareness. Past Surgical History:  has a past surgical history that includes eye surgery; hip surgery (Right, 11/19/2020); hip surgery (Left, 12/10/2020); and Upper gastrointestinal endoscopy (N/A, 12/14/2022). Social History:  reports that he has never smoked. He has never used smokeless tobacco. He reports that he does not drink alcohol and does not use drugs. Family History: family history is not on file. The patients home medications have been reviewed.     Allergies: Jardiance [empagliflozin] and Victoza [liraglutide]    -------------------------------------------------- RESULTS -------------------------------------------------    LABS reviewed and interpreted by me:  Results for orders placed or performed during the hospital encounter of 02/11/23   COVID-19, Rapid    Specimen: Nasopharyngeal Swab   Result Value Ref Range    SARS-CoV-2, NAAT DETECTED (A) Not Detected   RAPID INFLUENZA A/B ANTIGENS    Specimen: Nasopharyngeal   Result Value Ref Range    Influenza A by PCR Not Detected Not Detected    Influenza B by PCR Not Detected Not Detected   CBC with Auto Differential   Result Value Ref Range    WBC 13.3 (H) 4.5 - 11.5 E9/L    RBC 3.87 3.80 - 5.80 E12/L    Hemoglobin 8.0 (L) 12.5 - 16.5 g/dL    Hematocrit 24.2 (L) 37.0 - 54.0 %    MCV 62.5 (L) 80.0 - 99.9 fL    MCH 20.7 (L) 26.0 - 35.0 pg    MCHC 33.1 32.0 - 34.5 %    RDW 18.7 (H) 11.5 - 15.0 fL    Platelets 839 534 - 477 E9/L    MPV NOT CALC 7.0 - 12.0 fL    Neutrophils % 90.8 (H) 43.0 - 80.0 %    Immature Granulocytes % 0.7 0.0 - 5.0 %    Lymphocytes % 3.7 (L) 20.0 - 42.0 %    Monocytes % 4.5 2.0 - 12.0 %    Eosinophils % 0.0 0.0 - 6.0 %    Basophils % 0.3 0.0 - 2.0 %    Neutrophils Absolute 12.10 (H) 1.80 - 7.30 E9/L    Immature Granulocytes # 0.10 E9/L    Lymphocytes Absolute 0.50 (L) 1.50 - 4.00 E9/L    Monocytes Absolute 0.60 0.10 - 0.95 E9/L    Eosinophils Absolute 0.00 (L) 0.05 - 0.50 E9/L    Basophils Absolute 0.04 0.00 - 0.20 E9/L    Anisocytosis 2+     Polychromasia 1+     Hypochromia 2+     Poikilocytes 2+     Schistocytes 1+     Acanthocytes 1+     Sweeden Cells 2+     Ovalocytes 1+     Target Cells 2+     Tear Drop Cells 1+    BMP   Result Value Ref Range    Sodium 132 132 - 146 mmol/L    Potassium 4.3 3.5 - 5.0 mmol/L    Chloride 100 98 - 107 mmol/L    CO2 20 (L) 22 - 29 mmol/L    Anion Gap 12 7 - 16 mmol/L    Glucose 330 (H) 74 - 99 mg/dL    BUN 32 (H) 6 - 23 mg/dL    Creatinine 1.1 0.7 - 1.2 mg/dL    Est, Glom Filt Rate >60 >=60 mL/min/1.73    Calcium 8.8 8.6 - 10.2 mg/dL   Urinalysis   Result Value Ref Range    Color, UA Yellow Straw/Yellow    Clarity, UA Clear Clear    Glucose, Ur >=1000 (A) Negative mg/dL    Bilirubin Urine Negative Negative    Ketones, Urine Negative Negative mg/dL    Specific Gravity, UA 1.010 1.005 - 1.030    Blood, Urine Negative Negative    pH, UA 6.0 5.0 - 9.0    Protein, UA TRACE Negative mg/dL    Urobilinogen, Urine 1.0 <2.0 E.U./dL    Nitrite, Urine Negative Negative    Leukocyte Esterase, Urine Negative Negative   Troponin   Result Value Ref Range    Troponin, High Sensitivity 50 (H) 0 - 11 ng/L   TSH   Result Value Ref Range    TSH 0.802 0.270 - 4.200 uIU/mL   Magnesium   Result Value Ref Range    Magnesium 1.3 (L) 1.6 - 2.6 mg/dL   Lactic Acid   Result Value Ref Range    Lactic Acid 4.5 (HH) 0.5 - 2.2 mmol/L   Hepatic Function Panel   Result Value Ref Range    Total Protein 5.2 (L) 6.4 - 8.3 g/dL    Albumin 2.8 (L) 3.5 - 5.2 g/dL    Alkaline Phosphatase 53 40 - 129 U/L    ALT 6 0 - 40 U/L    AST 6 0 - 39 U/L    Total Bilirubin 0.4 0.0 - 1.2 mg/dL    Bilirubin, Direct <0.2 0.0 - 0.3 mg/dL    Bilirubin, Indirect see below 0.0 - 1.0 mg/dL   Ammonia   Result Value Ref Range    Ammonia 12.3 (L) 16.0 - 60.0 umol/L Beta-Hydroxybutyrate   Result Value Ref Range    Beta-Hydroxybutyrate 0.21 0.02 - 0.27 mmol/L   PH, VENOUS   Result Value Ref Range    pH, Facundo 7.39 7.35 - 7.45   Troponin   Result Value Ref Range    Troponin, High Sensitivity 46 (H) 0 - 11 ng/L   Lactic Acid   Result Value Ref Range    Lactic Acid 1.8 0.5 - 2.2 mmol/L   Microscopic Urinalysis   Result Value Ref Range    WBC, UA NONE 0 - 5 /HPF    RBC, UA NONE 0 - 2 /HPF    Bacteria, UA NONE SEEN None Seen /HPF   POCT Glucose   Result Value Ref Range    Glucose 260 mg/dL    QC OK? ok    POCT Glucose   Result Value Ref Range    Meter Glucose 260 (H) 74 - 99 mg/dL   EKG 12 Lead   Result Value Ref Range    Ventricular Rate 113 BPM    Atrial Rate 113 BPM    P-R Interval 124 ms    QRS Duration 62 ms    Q-T Interval 306 ms    QTc Calculation (Bazett) 419 ms    P Axis 81 degrees    R Axis 29 degrees    T Axis 64 degrees       RADIOLOGY reviewed and interpreted by me:  CT Head W/O Contrast   Final Result   1. There is no acute intracranial abnormality. Specifically, there is no   intracranial hemorrhage. 2. Atrophy and periventricular leukomalacia,         CT ABDOMEN PELVIS W IV CONTRAST Additional Contrast? None   Final Result   1. Large amount of stool throughout the colon notable level of the rectum. 2. No bowel obstruction, free air, or focal inflammatory changes. 3. Redemonstration of heterogeneous nodule associated with right adrenal   gland appearing similar in size since prior as well as prior from September 27, 2018. Finding may represent a myelolipoma. Continued follow-up   recommended. 4. Moderate hiatal hernia with thickened appearance of wall of distal   esophagus. Findings may represent esophagitis. 5. New interstitial and hazy opacities are present in right and left lower   lobes which could indicate atelectasis and or pneumonia. 6. Small pericardial effusion appearing slightly larger.          CT CHEST W CONTRAST   Final Result   Bibasilar opacities most consistent of subsegmental atelectasis without   consolidative component or nodularity. No pleural effusion. No acute   findings of the thoracic spine. Small hiatal hernia      Please see separate concurrent but separate report for intra-abdominal   findings. XR CHEST PORTABLE   Final Result   1. Questionable airspace disease/pneumonia within the right infrahilar region   versus superimposed vascularity secondary to the suboptimal inspiration. Please clinically correlate. If clinically warranted repeat PA and lateral   views can be obtained. MEDICATIONS:  Medications   doxycycline (VIBRAMYCIN) 100 mg in sodium chloride 0.9 % 100 mL IVPB (has no administration in time range)   insulin lispro (HUMALOG) injection vial 0-4 Units (has no administration in time range)   insulin lispro (HUMALOG) injection vial 0-4 Units (has no administration in time range)   cefTRIAXone (ROCEPHIN) 1,000 mg in sodium chloride flush 10 mL IV syringe (has no administration in time range)   0.9 % sodium chloride bolus (0 mLs IntraVENous Stopped 2/11/23 1559)   pantoprazole (PROTONIX) injection 40 mg (40 mg IntraVENous Given 2/11/23 1151)   ondansetron (ZOFRAN) injection 4 mg (4 mg IntraVENous Given 2/11/23 1150)   magnesium sulfate 2000 mg in 50 mL IVPB premix (0 mg IntraVENous Stopped 2/11/23 1412)   0.9 % sodium chloride bolus (0 mLs IntraVENous Stopped 2/11/23 1412)   iopamidol (ISOVUE-370) 76 % injection 75 mL (75 mLs IntraVENous Given 2/11/23 1208)     ------------------------- NURSING NOTES AND VITALS REVIEWED ---------------------------  Date / Time Roomed:  2/11/2023 10:12 AM  ED Bed Assignment:  06/06    The nursing notes within the ED encounter and vital signs as below have been reviewed.      Patient Vitals for the past 24 hrs:   BP Temp Temp src Pulse Resp SpO2 Weight   02/11/23 1841 115/72 98.3 °F (36.8 °C) Oral (!) 104 16 95 % --   02/11/23 1600 114/67 -- -- (!) 102 15 95 % -- 02/11/23 1413 116/80 -- -- (!) 111 12 95 % --   02/11/23 1151 122/73 -- -- (!) 109 17 97 % --   02/11/23 1022 127/69 98.5 °F (36.9 °C) -- (!) 119 16 98 % 165 lb (74.8 kg)       ------------------------------------------ PROGRESS NOTES ------------------------------------------  Re-evaluation(s):  Time: Every 30 minutes. Patients symptoms show no change  Repeat physical examination is not changed    Counseling:  I have spoken with the patient and discussed todays results, in addition to providing specific details for the plan of care and counseling regarding the diagnosis and prognosis. Their questions are answered at this time and they are agreeable with the plan of admission.    --------------------------------- ADDITIONAL PROVIDER NOTES ---------------------------------  This patient's ED course included: a personal history and physicial examination, re-evaluation prior to disposition, multiple bedside re-evaluations, IV medications, cardiac monitoring, and continuous pulse oximetry    This patient has remained hemodynamically stable during their ED course. Diagnosis:  1. Nausea and vomiting, unspecified vomiting type    2. COVID-19        Disposition:  Patient's disposition: Admit to telemetry  Patient's condition is stable. Patient was seen and evaluated by myself and my attending Jaye Meadows MD. Assessment and Plan discussed with attending provider, please see attestation for final plan of care.      MD Danny Turner MD  Resident  02/11/23 3465

## 2023-02-12 PROBLEM — R11.2 NAUSEA AND VOMITING: Status: ACTIVE | Noted: 2023-02-12

## 2023-02-12 LAB
ACANTHOCYTES: ABNORMAL
ACINETOBACTER CALCOAC BAUMANNII COMPLEX BY PCR: NOT DETECTED
ALBUMIN SERPL-MCNC: 2.8 G/DL (ref 3.5–5.2)
ALP BLD-CCNC: 48 U/L (ref 40–129)
ALT SERPL-CCNC: 7 U/L (ref 0–40)
ANION GAP SERPL CALCULATED.3IONS-SCNC: 8 MMOL/L (ref 7–16)
ANISOCYTOSIS: ABNORMAL
AST SERPL-CCNC: 9 U/L (ref 0–39)
BACTEROIDES FRAGILIS BY PCR: NOT DETECTED
BASOPHILS ABSOLUTE: 0 E9/L (ref 0–0.2)
BASOPHILS RELATIVE PERCENT: 0 % (ref 0–2)
BILIRUB SERPL-MCNC: 0.3 MG/DL (ref 0–1.2)
BOTTLE TYPE: ABNORMAL
BUN BLDV-MCNC: 23 MG/DL (ref 6–23)
BURR CELLS: ABNORMAL
C-REACTIVE PROTEIN: 8.5 MG/DL (ref 0–0.4)
CALCIUM SERPL-MCNC: 8.6 MG/DL (ref 8.6–10.2)
CANDIDA ALBICANS BY PCR: NOT DETECTED
CANDIDA AURIS BY PCR: NOT DETECTED
CANDIDA GLABRATA BY PCR: NOT DETECTED
CANDIDA KRUSEI BY PCR: NOT DETECTED
CANDIDA PARAPSILOSIS BY PCR: NOT DETECTED
CANDIDA TROPICALIS BY PCR: NOT DETECTED
CHLORIDE BLD-SCNC: 104 MMOL/L (ref 98–107)
CO2: 21 MMOL/L (ref 22–29)
CREAT SERPL-MCNC: 0.9 MG/DL (ref 0.7–1.2)
CRYPTOCOCCUS NEOFORMANS/GATTII BY PCR: NOT DETECTED
ENTEROBACTER CLOACAE COMPLEX BY PCR: NOT DETECTED
ENTEROBACTERALES BY PCR: NOT DETECTED
ENTEROCOCCUS FAECALIS BY PCR: NOT DETECTED
ENTEROCOCCUS FAECIUM BY PCR: NOT DETECTED
EOSINOPHILS ABSOLUTE: 0 E9/L (ref 0.05–0.5)
EOSINOPHILS RELATIVE PERCENT: 0 % (ref 0–6)
ESCHERICHIA COLI BY PCR: NOT DETECTED
FERRITIN: 29 NG/ML
GFR SERPL CREATININE-BSD FRML MDRD: >60 ML/MIN/1.73
GLUCOSE BLD-MCNC: 295 MG/DL (ref 74–99)
HAEMOPHILUS INFLUENZAE BY PCR: NOT DETECTED
HCT VFR BLD CALC: 23.7 % (ref 37–54)
HCT VFR BLD CALC: 27.5 % (ref 37–54)
HEMOGLOBIN: 7.8 G/DL (ref 12.5–16.5)
HEMOGLOBIN: 8.4 G/DL (ref 12.5–16.5)
HYPOCHROMIA: ABNORMAL
KLEBSIELLA AEROGENES BY PCR: NOT DETECTED
KLEBSIELLA OXYTOCA BY PCR: NOT DETECTED
KLEBSIELLA PNEUMONIAE GROUP BY PCR: NOT DETECTED
L. PNEUMOPHILA SEROGP 1 UR AG: NORMAL
LISTERIA MONOCYTOGENES BY PCR: NOT DETECTED
LYMPHOCYTES ABSOLUTE: 0.08 E9/L (ref 1.5–4)
LYMPHOCYTES RELATIVE PERCENT: 1 % (ref 20–42)
MAGNESIUM: 1.6 MG/DL (ref 1.6–2.6)
MCH RBC QN AUTO: 20.5 PG (ref 26–35)
MCHC RBC AUTO-ENTMCNC: 32.9 % (ref 32–34.5)
MCV RBC AUTO: 62.4 FL (ref 80–99.9)
METER GLUCOSE: 334 MG/DL (ref 74–99)
METER GLUCOSE: 343 MG/DL (ref 74–99)
METER GLUCOSE: 367 MG/DL (ref 74–99)
METHICILLIN RESISTANCE MECA/C  BY PCR: DETECTED
MONOCYTES ABSOLUTE: 0.08 E9/L (ref 0.1–0.95)
MONOCYTES RELATIVE PERCENT: 1 % (ref 2–12)
NEISSERIA MENINGITIDIS BY PCR: NOT DETECTED
NEUTROPHILS ABSOLUTE: 7.84 E9/L (ref 1.8–7.3)
NEUTROPHILS RELATIVE PERCENT: 98 % (ref 43–80)
ORDER NUMBER: ABNORMAL
OVALOCYTES: ABNORMAL
PDW BLD-RTO: 18.8 FL (ref 11.5–15)
PLATELET # BLD: 239 E9/L (ref 130–450)
PMV BLD AUTO: ABNORMAL FL (ref 7–12)
POIKILOCYTES: ABNORMAL
POLYCHROMASIA: ABNORMAL
POTASSIUM REFLEX MAGNESIUM: 4.9 MMOL/L (ref 3.5–5)
PROCALCITONIN: 0.63 NG/ML (ref 0–0.08)
PROTEUS SPECIES BY PCR: NOT DETECTED
PSEUDOMONAS AERUGINOSA BY PCR: NOT DETECTED
RBC # BLD: 3.8 E12/L (ref 3.8–5.8)
SALMONELLA SPECIES BY PCR: NOT DETECTED
SERRATIA MARCESCENS BY PCR: NOT DETECTED
SODIUM BLD-SCNC: 133 MMOL/L (ref 132–146)
SOURCE OF BLOOD CULTURE: ABNORMAL
STAPHYLOCOCCUS AUREUS BY PCR: NOT DETECTED
STAPHYLOCOCCUS EPIDERMIDIS BY PCR: DETECTED
STAPHYLOCOCCUS LUGDUNENSIS BY PCR: NOT DETECTED
STAPHYLOCOCCUS SPECIES BY PCR: DETECTED
STENOTROPHOMONAS MALTOPHILIA BY PCR: NOT DETECTED
STREP PNEUMONIAE ANTIGEN, URINE: NORMAL
STREPTOCOCCUS AGALACTIAE BY PCR: NOT DETECTED
STREPTOCOCCUS PNEUMONIAE BY PCR: NOT DETECTED
STREPTOCOCCUS PYOGENES  BY PCR: NOT DETECTED
STREPTOCOCCUS SPECIES BY PCR: NOT DETECTED
TARGET CELLS: ABNORMAL
TOTAL PROTEIN: 5.2 G/DL (ref 6.4–8.3)
TROPONIN, HIGH SENSITIVITY: 47 NG/L (ref 0–11)
VITAMIN D 25-HYDROXY: 21 NG/ML (ref 30–100)
WBC # BLD: 8 E9/L (ref 4.5–11.5)

## 2023-02-12 PROCEDURE — 6360000002 HC RX W HCPCS: Performed by: FAMILY MEDICINE

## 2023-02-12 PROCEDURE — APPSS30 APP SPLIT SHARED TIME 16-30 MINUTES: Performed by: NURSE PRACTITIONER

## 2023-02-12 PROCEDURE — 6360000002 HC RX W HCPCS: Performed by: NURSE PRACTITIONER

## 2023-02-12 PROCEDURE — 1200000000 HC SEMI PRIVATE

## 2023-02-12 PROCEDURE — 94640 AIRWAY INHALATION TREATMENT: CPT

## 2023-02-12 PROCEDURE — 85018 HEMOGLOBIN: CPT

## 2023-02-12 PROCEDURE — 86140 C-REACTIVE PROTEIN: CPT

## 2023-02-12 PROCEDURE — 94664 DEMO&/EVAL PT USE INHALER: CPT

## 2023-02-12 PROCEDURE — 2500000003 HC RX 250 WO HCPCS: Performed by: FAMILY MEDICINE

## 2023-02-12 PROCEDURE — 84484 ASSAY OF TROPONIN QUANT: CPT

## 2023-02-12 PROCEDURE — 82962 GLUCOSE BLOOD TEST: CPT

## 2023-02-12 PROCEDURE — 85014 HEMATOCRIT: CPT

## 2023-02-12 PROCEDURE — C9113 INJ PANTOPRAZOLE SODIUM, VIA: HCPCS | Performed by: NURSE PRACTITIONER

## 2023-02-12 PROCEDURE — 36415 COLL VENOUS BLD VENIPUNCTURE: CPT

## 2023-02-12 PROCEDURE — 82306 VITAMIN D 25 HYDROXY: CPT

## 2023-02-12 PROCEDURE — 99233 SBSQ HOSP IP/OBS HIGH 50: CPT | Performed by: INTERNAL MEDICINE

## 2023-02-12 PROCEDURE — 2580000003 HC RX 258: Performed by: FAMILY MEDICINE

## 2023-02-12 PROCEDURE — 6370000000 HC RX 637 (ALT 250 FOR IP): Performed by: FAMILY MEDICINE

## 2023-02-12 PROCEDURE — 6370000000 HC RX 637 (ALT 250 FOR IP): Performed by: NURSE PRACTITIONER

## 2023-02-12 PROCEDURE — 80053 COMPREHEN METABOLIC PANEL: CPT

## 2023-02-12 PROCEDURE — 83735 ASSAY OF MAGNESIUM: CPT

## 2023-02-12 PROCEDURE — 85025 COMPLETE CBC W/AUTO DIFF WBC: CPT

## 2023-02-12 PROCEDURE — 84145 PROCALCITONIN (PCT): CPT

## 2023-02-12 RX ORDER — PANTOPRAZOLE SODIUM 40 MG/10ML
40 INJECTION, POWDER, LYOPHILIZED, FOR SOLUTION INTRAVENOUS 2 TIMES DAILY
Status: DISCONTINUED | OUTPATIENT
Start: 2023-02-12 | End: 2023-02-13 | Stop reason: HOSPADM

## 2023-02-12 RX ORDER — DEXTROSE MONOHYDRATE 100 MG/ML
INJECTION, SOLUTION INTRAVENOUS CONTINUOUS PRN
Status: DISCONTINUED | OUTPATIENT
Start: 2023-02-12 | End: 2023-02-13 | Stop reason: HOSPADM

## 2023-02-12 RX ORDER — BISACODYL 10 MG
10 SUPPOSITORY, RECTAL RECTAL ONCE
Status: COMPLETED | OUTPATIENT
Start: 2023-02-12 | End: 2023-02-12

## 2023-02-12 RX ORDER — DOCUSATE SODIUM 100 MG/1
100 CAPSULE, LIQUID FILLED ORAL 2 TIMES DAILY
Status: DISCONTINUED | OUTPATIENT
Start: 2023-02-12 | End: 2023-02-13 | Stop reason: HOSPADM

## 2023-02-12 RX ORDER — MAGNESIUM SULFATE IN WATER 40 MG/ML
2000 INJECTION, SOLUTION INTRAVENOUS ONCE
Status: COMPLETED | OUTPATIENT
Start: 2023-02-12 | End: 2023-02-12

## 2023-02-12 RX ADMIN — INSULIN LISPRO 4 UNITS: 100 INJECTION, SOLUTION INTRAVENOUS; SUBCUTANEOUS at 21:14

## 2023-02-12 RX ADMIN — DEXAMETHASONE SODIUM PHOSPHATE 6 MG: 4 INJECTION, SOLUTION INTRAMUSCULAR; INTRAVENOUS at 20:42

## 2023-02-12 RX ADMIN — IPRATROPIUM BROMIDE AND ALBUTEROL SULFATE 1 AMPULE: 2.5; .5 SOLUTION RESPIRATORY (INHALATION) at 17:02

## 2023-02-12 RX ADMIN — INSULIN LISPRO 3 UNITS: 100 INJECTION, SOLUTION INTRAVENOUS; SUBCUTANEOUS at 13:07

## 2023-02-12 RX ADMIN — SODIUM CHLORIDE, PRESERVATIVE FREE 10 ML: 5 INJECTION INTRAVENOUS at 20:43

## 2023-02-12 RX ADMIN — BISACODYL 10 MG: 10 SUPPOSITORY RECTAL at 16:34

## 2023-02-12 RX ADMIN — ROSUVASTATIN CALCIUM 10 MG: 10 TABLET, FILM COATED ORAL at 20:42

## 2023-02-12 RX ADMIN — INSULIN LISPRO 3 UNITS: 100 INJECTION, SOLUTION INTRAVENOUS; SUBCUTANEOUS at 16:41

## 2023-02-12 RX ADMIN — SODIUM CHLORIDE, PRESERVATIVE FREE 10 ML: 5 INJECTION INTRAVENOUS at 09:08

## 2023-02-12 RX ADMIN — DOCUSATE SODIUM 100 MG: 100 CAPSULE, LIQUID FILLED ORAL at 16:34

## 2023-02-12 RX ADMIN — INSULIN GLARGINE 5 UNITS: 100 INJECTION, SOLUTION SUBCUTANEOUS at 21:14

## 2023-02-12 RX ADMIN — DOCUSATE SODIUM 100 MG: 100 CAPSULE, LIQUID FILLED ORAL at 20:42

## 2023-02-12 RX ADMIN — SODIUM CHLORIDE, PRESERVATIVE FREE 1000 MG: 5 INJECTION INTRAVENOUS at 22:46

## 2023-02-12 RX ADMIN — IPRATROPIUM BROMIDE AND ALBUTEROL SULFATE 1 AMPULE: 2.5; .5 SOLUTION RESPIRATORY (INHALATION) at 19:26

## 2023-02-12 RX ADMIN — IPRATROPIUM BROMIDE AND ALBUTEROL SULFATE 1 AMPULE: 2.5; .5 SOLUTION RESPIRATORY (INHALATION) at 12:23

## 2023-02-12 RX ADMIN — DOXYCYCLINE 100 MG: 100 INJECTION, POWDER, LYOPHILIZED, FOR SOLUTION INTRAVENOUS at 15:33

## 2023-02-12 RX ADMIN — OLANZAPINE 7.5 MG: 5 TABLET, FILM COATED ORAL at 20:42

## 2023-02-12 RX ADMIN — PANTOPRAZOLE SODIUM 40 MG: 40 INJECTION, POWDER, FOR SOLUTION INTRAVENOUS at 20:42

## 2023-02-12 RX ADMIN — MAGNESIUM SULFATE HEPTAHYDRATE 2000 MG: 40 INJECTION, SOLUTION INTRAVENOUS at 13:03

## 2023-02-12 RX ADMIN — INSULIN LISPRO 2 UNITS: 100 INJECTION, SOLUTION INTRAVENOUS; SUBCUTANEOUS at 06:59

## 2023-02-12 NOTE — PLAN OF CARE
Problem: Discharge Planning  Goal: Discharge to home or other facility with appropriate resources  Outcome: Progressing  Flowsheets (Taken 2/11/2023 2100)  Discharge to home or other facility with appropriate resources: Identify barriers to discharge with patient and caregiver

## 2023-02-12 NOTE — PROGRESS NOTES
Pharmacy Consultation Note    Consult date: 2/11/2023  Physician/provider: [id number] has been consulted to evaluate criteria for Baricitinib therapy. The patient DOES NOT currently meet [facility] P&T approved Covid-19 treatment criteria for Baricitinib due to oxygen requirements. Please re-consult if the clinical condition changes and patient meets criteria for initiation based on [facility] P[facility] approved criteria for use.     Thank you for the consult,  devaughn berman RPH

## 2023-02-12 NOTE — PLAN OF CARE
Problem: Discharge Planning  Goal: Discharge to home or other facility with appropriate resources  2/12/2023 1742 by Wai Mccoy RN  Outcome: Progressing  2/12/2023 0453 by Bianka Ruelas RN  Outcome: Progressing  Flowsheets (Taken 2/11/2023 2100)  Discharge to home or other facility with appropriate resources: Identify barriers to discharge with patient and caregiver     Problem: Chronic Conditions and Co-morbidities  Goal: Patient's chronic conditions and co-morbidity symptoms are monitored and maintained or improved  Outcome: Progressing     Problem: Skin/Tissue Integrity  Goal: Absence of new skin breakdown  Description: 1. Monitor for areas of redness and/or skin breakdown  2. Assess vascular access sites hourly  3. Every 4-6 hours minimum:  Change oxygen saturation probe site  4. Every 4-6 hours:  If on nasal continuous positive airway pressure, respiratory therapy assess nares and determine need for appliance change or resting period.   Outcome: Progressing     Problem: Safety - Adult  Goal: Free from fall injury  Outcome: Progressing

## 2023-02-12 NOTE — PROGRESS NOTES
[facility] Progress Note    Admitting Date and Time: 2/11/2023 10:12 AM  Admit Dx: Altered mental state [R41.82]  Nausea and vomiting, unspecified vomiting type [R11.2]  COVID-19 [U07.1]  COVID [U07.1]    Subjective:  Patient is being followed for Altered mental state [R41.82]  Nausea and vomiting, unspecified vomiting type [R11.2]  COVID-19 [U07.1]  COVID [U07.1]     Pt awake and alert resting comfortably in no acute distress  Pleasantly confused  Denies pain/ nausea  Appears he drank coffee/ several bites of oatmeal  Does not remember any of events that brought him to ER       ROS: denies fever, chills, cp, sob, n/v, HA unless stated above.       insulin glargine  5 Units SubCUTAneous Nightly    OLANZapine  7.5 mg Oral Nightly    pantoprazole  40 mg Oral Daily    rosuvastatin  10 mg Oral Daily    sodium chloride flush  5-40 mL IntraVENous 2 times per day    dexamethasone  6 mg IntraVENous Q24H    ipratropium-albuterol  1 ampule Inhalation Q4H WA    doxycycline (VIBRAMYCIN) IV  100 mg IntraVENous Q12H    insulin lispro  0-4 Units SubCUTAneous TID WC    insulin lispro  0-4 Units SubCUTAneous Nightly    cefTRIAXone (ROCEPHIN) IV  1,000 mg IntraVENous Q24H     sodium chloride flush, 5-40 mL, PRN  sodium chloride, , PRN  ondansetron, 4 mg, Q8H PRN   Or  ondansetron, 4 mg, Q6H PRN  polyethylene glycol, 17 g, Daily PRN  acetaminophen, 650 mg, Q6H PRN   Or  acetaminophen, 650 mg, Q6H PRN  guaiFENesin-dextromethorphan, 5 mL, Q4H PRN         Objective:    /80   Pulse 100   Temp 97.9 °F (36.6 °C) (Axillary)   Resp 16   Ht 5' 8\" (1.727 m)   Wt 165 lb (74.8 kg)   SpO2 96%   BMI 25.09 kg/m²   General Appearance: alert and oriented to person, reporting he is in the ER- reporting month is January- year 2023  Skin: warm and dry  Head: normocephalic and atraumatic  Eyes: pupils equal, round, and reactive to light, extraocular eye movements intact, conjunctivae normal  Neck: neck supple and non tender without mass   Pulmonary/Chest: clear to auscultation bilaterally  Cardiovascular: normal rate, normal S1 and S2 and no carotid bruits  Abdomen: soft, non-tender, non-distended, normal bowel sounds, no masses   Extremities: no cyanosis, no clubbing and no edema  Neurologic:         Recent Labs     02/11/23  1024 02/11/23  1559 02/12/23  0535     --  133   K 4.3  --  4.9     --  104   CO2 20*  --  21*   BUN 32*  --  23   CREATININE 1.1  --  0.9   GLUCOSE 330* 260 295*   CALCIUM 8.8  --  8.6       Recent Labs     02/11/23  1024 02/11/23  2310 02/12/23  0535   WBC 13.3*  --  8.0   RBC 3.87  --  3.80   HGB 8.0* 7.7* 7.8*   HCT 24.2* 23.5* 23.7*   MCV 62.5*  --  62.4*   MCH 20.7*  --  20.5*   MCHC 33.1  --  32.9   RDW 18.7*  --  18.8*     --  239   MPV NOT CALC  --  NOT CALC           Assessment:    Principal Problem:    COVID  Active Problems:    GI bleed    Transient alteration of awareness    Altered mental state    Type 2 diabetes mellitus with complication, with long-term current use of insulin (HCC)    HTN (hypertension)    Constipation    Depression, major, recurrent (HCC)    Acute metabolic encephalopathy  Resolved Problems:    * No resolved hospital problems. *      Plan:  1. Acute encephalopathy likely metabolic from COVID 19 infection: pt was sent to ER from SNF after and episode of coffee ground emesis x 1. In ER noted to be confused and unable to answer questions. Per SNF report- pt had emesis that was very dark and then was less responsive than normal. SNF concerned for GI bleed and was sent by EMS Also ? Hypoxia into 80s at SNF. Song Portillo Pt did report feeling sick for the last several weeks. In ER he tested positive for COVID 19. Unsure of what patient's baseline mentation is. CT head in the ER- showed no acute findings- does report chronic atrophy and periventricular leukomalacia ? Underlying dementia. Monitor mentation. Pt resting in bed bed comfortably in no acute distress.  Denies complaints. Confused. 2. COVID 19 Infection with possible superimposed bacterial pneumonia; CT chest showing possible pneumonia. Droplet isolation. Currently not hypoxia- 95% on RA. There was a ? Of hypoxia at nursing home. Currently not qualifying for antivirals or steroids. Started on IV ceftriaxone/ doxycycline. Follow procal. Follow inflammatory markers. Vitamin supplementation. Supportive care. 3. Suspected acute GI bleed:coffee ground emesis prior to ER. In review of home meds- asa 81 mg daily- will hold. H/o admission for GI bleed in Dec. He had an EGD on 11/14 which showed no source of bleeding. He was to have a colonoscopy- however he refused. CT abdomen reviewed from ER- large amount of stool throughout colon/ rectum. Moderate hiatal hernia with thickened appearance of wall of distal esophagus findings may represent esophagitis. PPI. Monitor H &H. GI consulted. 4. Acute on chronic anemia- blood loss component: hg 8.0 on arrival. Hg 7.8 today. However does appear hg runs lower- 8-10 range. Check anemia panel. Transfuse for less than 7    5. H/o HTN; I do not see that he is on any BP meds- monitor BP     6. DM type II: check hga1c. Holding metformin. On lantus 5 units nightly. Insulin ss    7. Elevated troponin: no chest pain- EKG no ischemia./ trend     8. Hypomagnesemia: supplement and monitor     9. Possible esophagitis seen on CT abd    10. Incidental finding on CT scan of right adrenal gland nodule- noted that appears similar to 2018- ? Myelolipoma- outpt  follow up    11. Small pericardial effusion seen on CT abd    12. Lactic acidosis; rehydrate and trend     13. Depression: continue meds    14. H/o hyponatremia: on salt tablets              Time spent reviewing chart, clinical exam, discussing case and answering questions with staff/consultants/patient/family = 20 minutes       NOTE: This report was transcribed using voice recognition software.  Every effort was made to ensure accuracy; however, inadvertent computerized transcription errors may be present. Electronically signed by GLEN Camara on 2/12/2023 at 8:19 AM    Addendum: I have personally participated in the history, exam, medical decision making with Heidi Rosen on the date of service and I agree with all of the pertinent clinical information unless otherwise noted. I have also reviewed and agree with the past medical, family, and social history unless otherwise noted. Patient was admitted with altered mental status. PHYSICAL EXAM:  Vitals:  /71   Pulse 86   Temp 98.1 °F (36.7 °C) (Axillary)   Resp 18   Ht 5' 8\" (1.727 m)   Wt 165 lb (74.8 kg)   SpO2 97%   BMI 25.09 kg/m²   Gen: awake, alert, NAD  Lungs: clear to auscultation bilaterally no crackles no wheezing. Heart: RRR, no murmur   Abdomen: soft nontender nondistended positive bowel sounds. Extremities: full range of motion no peripheral edema. Impression:  Principal Problem:    COVID  Active Problems:    GI bleed    Transient alteration of awareness    Altered mental state    Nausea and vomiting    Type 2 diabetes mellitus with complication, with long-term current use of insulin (HCC)    HTN (hypertension)    Constipation    Depression, major, recurrent (HCC)    Acute metabolic encephalopathy  Resolved Problems:    * No resolved hospital problems. *      My findings/plan include:    Patient likely has metabolic encephalopathy due to covid 19 pneumonia. She is not hypoxic. Continue conservative care. F/U with PT/OT. GI consulted for coffee ground emesis as well as evaluation for esophagitis. NOTE: This report was transcribed using voice recognition software. Every effort was made to ensure accuracy; however, inadvertent computerized transcription errors may be present.   Electronically signed by Girish Chase DO on 2/12/2023 at 11:15 AM

## 2023-02-12 NOTE — CONSULTS
Gastroenterology Consult Note   Sydney MEYERS, NP-C with Chris Dejesus M.D. Consult Note        Date of Service: 2/12/2023  Reason for Consult: ? GIB  Requesting Physician: Tova Ojeda DO    CHIEF COMPLAINT:  AMS, and coffee ground emesis    History Obtained From:  electronic medical record    HISTORY OF PRESENT ILLNESS:       Kaveh Diaz is a 74 y.o. male with significant past medical history of anxiety, bronchitis, cellulitis, sinusitis, COVID, depression, DM, diabetic retinopathy, high cholesterol, HTN, moderate mood disorder, transient alteration of awareness, and lumbago admitted via ED for change in mental status, and coffee round emesis. Per EMR: patient had coffee ground emesis at SN[facility], as well as change in mental status. Patient is typically Alert & orientated, ambulates per self- per documentation. Currently patient is alert to self & place. Very delayed responses to questions if at all. No family present at this time. EGD 12/14/22 with Dr. Ivanna Heredia: minimal gastritis, hiatal hernia, reflux esophagitis. Biopsies: Esophagus, GE junction, biopsy: Squamocolumnar mucosa showing mild chronic active inflammation: Free-floating fibrinopurulent exudate and tissue of ulcer base; Negative for intestinal metaplasia, see comment. Stomach, antrum, biopsy: Antral mucosa showing mild chronic inactive inflammation (chronic gastritis), see comment. Admission labs CO2 20, BUN 32, lactic 4.5, , protein 5.2, ammonia 12.3, troponin 50, albumin 2.8, WBC 13.3, H&H 8.0 & 24.2, MCV 62.5, MCH 20.7, RDW 18.7. CXR: 1. Questionable airspace disease/pneumonia within the righ/t/ infrahilar region versus superimposed vascularity secondary to the suboptimal inspiration. Please clinically correlate. If clinically warranted repeat PA and lateral views can be obtained. CT abd/Pelvis: 1. Large amount of stool throughout the colon notable level of the rectum. 2. No bowel obstruction, free air, or focal inflammatory changes. 3. Redemonstration of heterogeneous nodule associated with right adrenal gland appearing similar in size since prior as well as prior from September 27, 2018. Finding may represent a myelolipoma. Continued follow-up recommended. 4. Moderate hiatal hernia with thickened appearance of wall of distal esophagus. Findings may represent esophagitis. 5. New interstitial and hazy opacities are present in right and left lower lobes which could indicate atelectasis and or pneumonia. 6. Small pericardial effusion appearing slightly larger. CT Chest: Bibasilar opacities most consistent of subsegmental atelectasis without consolidative component or nodularity. No pleural effusion. No acute findings of the thoracic spine. Small hiatal hernia Please see separate concurrent but separate report for intra-abdominal findings. CT head: 1. There is no acute intracranial abnormality. Specifically, there is no intracranial hemorrhage. 2. Atrophy and periventricular leukomalacia. Consultation for ? GIB. Currently, pt seen laying in bed. Quiet. No Family present. Breakfast tray at [facility], 25% eaten.   Labs today CO2 21, , protein 5.2, CRP 8.5, troponin 47, albumin 2.8, H&H 7.8 & 23.7, MCV 62.4, MCH 20.5, RDW 18.8    Past Medical History:        Diagnosis Date    Anxiety     Bronchitis     Cellulitis     Chronic sinusitis     COVID 2/11/2023    Depression     Diabetes mellitus ([address]     Diabetic retinopathy (HCC)     Fracture of left foot     High cholesterol     Hypercholesteremia     Hypercholesterolemia     Hypertension     Lumbago     Moderate mood disorder ([address]     Third nerve palsy     Transient alteration of awareness 2/11/2023     Past Surgical History:        Procedure Laterality Date    EYE SURGERY      HIP SURGERY Right 11/19/2020    HIP HEMIARTHROPLASTY performed by Sarah Tate MD at [facility] Left 12/10/2020    HIP HEMIARTHROPLASTY -- HERB -- DROPLET +     PT. COMING FROM [place] performed by Patience Alejandro MD at [address] N/A 12/14/2022    EGD BIOPSY performed by Dasha Cutler MD at [facility] ENDOSCOPY     Current Medications:    Current Facility-Administered Medications: magnesium sulfate 2000 mg in 50 mL IVPB premix, 2,000 mg, IntraVENous, Once  pantoprazole (PROTONIX) injection 40 mg, 40 mg, IntraVENous, BID  glucose chewable tablet 16 g, 4 tablet, Oral, PRN  dextrose bolus 10% 125 mL, 125 mL, IntraVENous, PRN **OR** dextrose bolus 10% 250 mL, 250 mL, IntraVENous, PRN  glucagon (rDNA) injection 1 mg, 1 mg, SubCUTAneous, PRN  dextrose 10 % infusion, , IntraVENous, Continuous PRN  insulin glargine (LANTUS) injection vial 5 Units, 5 Units, SubCUTAneous, Nightly  OLANZapine (ZYPREXA) tablet 7.5 mg, 7.5 mg, Oral, Nightly  rosuvastatin (CRESTOR) tablet 10 mg, 10 mg, Oral, Daily  sodium chloride flush 0.9 % injection 5-40 mL, 5-40 mL, IntraVENous, 2 times per day  sodium chloride flush 0.9 % injection 5-40 mL, 5-40 mL, IntraVENous, PRN  0.9 % sodium chloride infusion, , IntraVENous, PRN  ondansetron (ZOFRAN-ODT) disintegrating tablet 4 mg, 4 mg, Oral, Q8H PRN **OR** ondansetron (ZOFRAN) injection 4 mg, 4 mg, IntraVENous, Q6H PRN  polyethylene glycol (GLYCOLAX) packet 17 g, 17 g, Oral, Daily PRN  acetaminophen (TYLENOL) tablet 650 mg, 650 mg, Oral, Q6H PRN **OR** acetaminophen (TYLENOL) suppository 650 mg, 650 mg, Rectal, Q6H PRN  guaiFENesin-dextromethorphan (ROBITUSSIN DM) 100-10 MG/5ML syrup 5 mL, 5 mL, Oral, Q4H PRN  dexamethasone (DECADRON) injection 6 mg, 6 mg, IntraVENous, Q24H  ipratropium-albuterol (DUONEB) nebulizer solution 1 ampule, 1 ampule, Inhalation, Q4H WA  doxycycline (VIBRAMYCIN) 100 mg in sodium chloride 0.9 % 100 mL IVPB, 100 mg, IntraVENous, Q12H  insulin lispro (HUMALOG) injection vial 0-4 Units, 0-4 Units, SubCUTAneous, TID WC  insulin lispro (HUMALOG) injection vial 0-4 Units, 0-4 Units, SubCUTAneous, Nightly  cefTRIAXone (ROCEPHIN) 1,000 mg in sodium chloride flush 10 mL IV syringe, 1,000 mg, IntraVENous, Q24H    Allergies:  Jardiance [empagliflozin] and Victoza [liraglutide]    Social History: Unable to update at this time    Family History:   No family history on file. Unable to update at this time    REVIEW OF SYSTEMS:    Aside from what was mentioned in the PMH and HPI, essentially unremarkable, all others negative. PHYSICAL EXAM:      Vitals:    /71   Pulse 86   Temp 98.1 °F (36.7 °C) (Axillary)   Resp 18   Ht 5' 8\" (1.727 m)   Wt 165 lb (74.8 kg)   SpO2 97%   BMI 25.09 kg/m²       CONSTITUTIONAL:  awake, cooperative, no apparent distress, and appears older than stated age, quiet, slow to respond if at all  EYES:  pupils equal, round and reactive to light, sclera anicteric and conjunctiva normal  ENT:  normocephalic, oral pharynx with moist mucous membranes  LUNGS:   clear to auscultation bilaterally.   CARDIOVASCULAR: regular rate and rhythm, no murmur noted; 2+ pulses; no edema  ABDOMEN:  normal bowel sounds, soft, non-distended, non-tender, no masses palpated  NEUROLOGIC:  Mental Status Exam:  Level of Alertness:   awake  Orientation:   person, place, time  SKIN:  AA skin color, texture, turgor    DATA:    CBC with Differential:    Lab Results   Component Value Date/Time    WBC 8.0 02/12/2023 05:35 AM    RBC 3.80 02/12/2023 05:35 AM    HGB 7.8 02/12/2023 05:35 AM    HCT 23.7 02/12/2023 05:35 AM     02/12/2023 05:35 AM    MCV 62.4 02/12/2023 05:35 AM    MCH 20.5 02/12/2023 05:35 AM    MCHC 32.9 02/12/2023 05:35 AM    RDW 18.8 02/12/2023 05:35 AM    LYMPHOPCT 1.0 02/12/2023 05:35 AM    MONOPCT 1.0 02/12/2023 05:35 AM    BASOPCT 0.0 02/12/2023 05:35 AM    MONOSABS 0.08 02/12/2023 05:35 AM    LYMPHSABS 0.08 02/12/2023 05:35 AM    EOSABS 0.00 02/12/2023 05:35 AM    BASOSABS 0.00 02/12/2023 05:35 AM     CMP:    Lab Results   Component Value Date/Time     02/12/2023 05:35 AM    K 4.9 02/12/2023 05:35 AM     02/12/2023 05:35 AM    CO2 21 02/12/2023 05:35 AM    BUN 23 02/12/2023 05:35 AM    CREATININE 0.9 02/12/2023 05:35 AM    GFRAA >60 12/13/2020 05:22 AM    LABGLOM >60 02/12/2023 05:35 AM    GLUCOSE 295 02/12/2023 05:35 AM    PROT 5.2 02/12/2023 05:35 AM    LABALBU 2.8 02/12/2023 05:35 AM    CALCIUM 8.6 02/12/2023 05:35 AM    BILITOT 0.3 02/12/2023 05:35 AM    ALKPHOS 48 02/12/2023 05:35 AM    AST 9 02/12/2023 05:35 AM    ALT 7 02/12/2023 05:35 AM     Hepatic Function Panel:    Lab Results   Component Value Date/Time    ALKPHOS 48 02/12/2023 05:35 AM    ALT 7 02/12/2023 05:35 AM    AST 9 02/12/2023 05:35 AM    PROT 5.2 02/12/2023 05:35 AM    BILITOT 0.3 02/12/2023 05:35 AM    BILIDIR <0.2 02/11/2023 10:24 AM    IBILI see below 02/11/2023 10:24 AM    LABALBU 2.8 02/12/2023 05:35 AM     PT/INR:    Lab Results   Component Value Date/Time    PROTIME 16.1 11/11/2022 08:42 PM    INR 1.5 11/11/2022 08:42 PM     PTT:    Lab Results   Component Value Date/Time    APTT 25.8 11/11/2022 08:42 PM   [APTT}  Last 3 Troponin:    Lab Results   Component Value Date/Time    TROPONINI <0.01 12/09/2020 10:31 PM    TROPONINI <0.01 11/18/2020 02:43 PM    TROPONINI 0.02 08/21/2020 12:40 AM     TSH:    Lab Results   Component Value Date/Time    TSH 0.802 02/11/2023 10:24 AM     VITAMIN B12:   Lab Results   Component Value Date/Time    VUJMMTEK95 452 11/22/2022 09:25 AM     FOLATE:    Lab Results   Component Value Date/Time    FOLATE 15.3 11/15/2022 06:34 AM     IRON:    Lab Results   Component Value Date/Time    IRON 66 11/15/2022 06:34 AM     Iron Saturation:    Lab Results   Component Value Date/Time    LABIRON 37 11/15/2022 06:34 AM     TIBC:    Lab Results   Component Value Date/Time    TIBC 178 11/15/2022 06:34 AM     FERRITIN:    Lab Results   Component Value Date/Time    FERRITIN 29 02/11/2023 11:10 PM         Lab Results   Component Value Date    TRIG 71 10/08/2020    TRIG 87 01/06/2019       Lab Results   Component Value Date    HDL 41 10/08/2020    HDL 36 01/06/2019       Lab Results   Component Value Date    LDLCALC 59 10/08/2020    LDLCALC 110 (H) 01/06/2019       Lab Results   Component Value Date    LABVLDL 14 10/08/2020    LABVLDL 17 01/06/2019        CT Head W/O Contrast    Result Date: 2/11/2023  EXAMINATION: CT OF THE HEAD WITHOUT CONTRAST  2/11/2023 12:09 pm TECHNIQUE: CT of the head was performed without the administration of intravenous contrast. Automated exposure control, iterative reconstruction, and/or weight based adjustment of the mA/kV was utilized to reduce the radiation dose to as low as reasonably achievable. COMPARISON: None. HISTORY: ORDERING SYSTEM PROVIDED HISTORY: ams TECHNOLOGIST PROVIDED HISTORY: Reason for exam:->ams Has a \"code stroke\" or \"stroke alert\" been called? ->No Decision Support Exception - unselect if not a suspected or confirmed emergency medical condition->Emergency Medical Condition (MA) FINDINGS: BRAIN/VENTRICLES: There is no acute intracranial hemorrhage, mass effect or midline shift. No abnormal extra-axial fluid collection. The gray-white differentiation is maintained without evidence of an acute infarct. There is no evidence of hydrocephalus. The ventricles, cisterns and sulci are prominent consistent with atrophy. There is decreased attenuation within the periventricular white matter consistent with periventricular leukomalacia. ORBITS: The visualized portion of the orbits demonstrate no acute abnormality. SINUSES: The visualized paranasal sinuses and mastoid air cells demonstrate no acute abnormality. There is mucosal thickening seen within the right sphenoid sinus. SOFT TISSUES/SKULL:  No acute abnormality of the visualized skull or soft tissues. 1. There is no acute intracranial abnormality. Specifically, there is no intracranial hemorrhage.  2. Atrophy and periventricular leukomalacia,     CT CHEST W CONTRAST    Result Date: 2/11/2023  EXAMINATION: CT OF THE CHEST WITH CONTRAST 2/11/2023 12:09 pm TECHNIQUE: CT of the chest was performed with the administration of intravenous contrast. Multiplanar reformatted images are provided for review. Automated exposure control, iterative reconstruction, and/or weight based adjustment of the mA/kV was utilized to reduce the radiation dose to as low as reasonably achievable. COMPARISON: X-ray thoracic spine 01/18/2023 HISTORY: ORDERING SYSTEM PROVIDED HISTORY: hematemesis, fever TECHNOLOGIST PROVIDED HISTORY: Reason for exam:->hematemesis, fever Decision Support Exception - unselect if not a suspected or confirmed emergency medical condition->Emergency Medical Condition (MA) FINDINGS: Mediastinum: Thyroid is mildly heterogeneous without dominant nodule in attenuation. No bulky mediastinal adenopathy. Central airways are patent. Esophagus is normal course and caliber to the distal segment where there is a small hiatal hernia. .  Cardiac size borderline enlarged demonstrating Lungs/pleura: Lungs are clear without focal opacification or consolidation. Mild chronic changes mid lungs. No consolidation. Subsegmental atelectasis. No dominant nodule or mass lesion. No pleural effusion or pleural process. Upper Abdomen: Please see separate concurrent abdomen and pelvis dictation for intra-findings. Soft Tissues/Bones: No acute osseous or soft tissue findings. No aggressive osseous lesion with mild-to-moderate degenerative changes thoracic spine. Bibasilar opacities most consistent of subsegmental atelectasis without consolidative component or nodularity. No pleural effusion. No acute findings of the thoracic spine. Small hiatal hernia Please see separate concurrent but separate report for intra-abdominal findings.      CT ABDOMEN PELVIS W IV CONTRAST Additional Contrast? None    Result Date: 2/11/2023  EXAMINATION: CT OF THE ABDOMEN AND PELVIS WITH CONTRAST 2/11/2023 12:09 pm TECHNIQUE: CT of the abdomen and pelvis was performed with the administration of intravenous contrast. Multiplanar reformatted images are provided for review. Automated exposure control, iterative reconstruction, and/or weight based adjustment of the mA/kV was utilized to reduce the radiation dose to as low as reasonably achievable. COMPARISON: November 28, 2022 HISTORY: ORDERING SYSTEM PROVIDED HISTORY: vomiting, hematemesis TECHNOLOGIST PROVIDED HISTORY: Additional Contrast?->None Reason for exam:->vomiting, hematemesis Decision Support Exception - unselect if not a suspected or confirmed emergency medical condition->Emergency Medical Condition (MA) FINDINGS: Large amount of stool throughout the colon notable level of the rectum. No evidence of bowel obstruction. No free air or free fluid. The appendix is unremarkable. There is a stable fat containing umbilical hernia measuring 1.3 x 1.9 cm. Liver, gallbladder, pancreas, and spleen are unremarkable. No hydronephrosis. Few cysts associated with right kidney measure up to 1.9 cm. Exophytic cyst associated with the left kidney measures up to 2.2 cm. The cysts appear similar in size. Redemonstration of a heterogeneous nodule associated with the right adrenal gland measures 4.7 x 3.8 cm and appears similar compared to prior. Focal area of fat attenuation seen within this lesion. Moderate hiatal hernia. Small pericardial effusion appearing slightly larger compared to prior. New hazy and irregular opacities are present in right and left lower lobes. Stable compression fracture of L1 with mild retropulsion. Limited view of the pelvis due to metallic artifact from bilateral hip arthroplasties. 1. Large amount of stool throughout the colon notable level of the rectum. 2. No bowel obstruction, free air, or focal inflammatory changes. 3. Redemonstration of heterogeneous nodule associated with right adrenal gland appearing similar in size since prior as well as prior from September 27, 2018. Finding may represent a myelolipoma. Continued follow-up recommended. 4. Moderate hiatal hernia with thickened appearance of wall of distal esophagus. Findings may represent esophagitis. 5. New interstitial and hazy opacities are present in right and left lower lobes which could indicate atelectasis and or pneumonia. 6. Small pericardial effusion appearing slightly larger. XR CHEST PORTABLE    Result Date: 2/11/2023  EXAMINATION: ONE XRAY VIEW OF THE CHEST 2/11/2023 10:38 am COMPARISON: None. HISTORY: ORDERING SYSTEM PROVIDED HISTORY: ams TECHNOLOGIST PROVIDED HISTORY: Reason for exam:->ams FINDINGS: The cardiac silhouette is within normals. There is no mediastinal widening. No findings of failure Suboptimal inspiration was obtained for the chest x-ray. I cannot exclude airspace disease within the right infrahilar region. There is no right or left pleural effusion. 1. Questionable airspace disease/pneumonia within the right infrahilar region versus superimposed vascularity secondary to the suboptimal inspiration. Please clinically correlate. If clinically warranted repeat PA and lateral views can be obtained. IMPRESSION:    Anemia, microcytic  Coffee ground emesis- PTA  Constipation  AMS  Airspace disease  COVID +  Esophagitis   Electrolyte abnormalities- defer  DM  Depression   Blood cultures + for MRSA & staph  Hiatal hernia    RECOMMENDATIONS:      Occult next stool x1  Dulcolax suppository x1 now  Continue Protonix BID dosing  Diet as tolerated for today  Serial H&H, transfuse <7 per primary care  Supportive care  No endoscopy work up planned at this time. Patient had a EGD in December 2022 with Dr. Adam Pierre. No bleeding noted at that time. Colace 100 mg PO BID  Monitor stool color  Trend labs  Will follow    Thank you very much for your consultation. We will follow closely with you.     Discussed with Dr. Leonidas Rose developed by Dr. Nakita Reyes, NP-C 2/12/2023 10:00 AM for Dr. Todd Boyle

## 2023-02-12 NOTE — DISCHARGE INSTR - COC
Continuity of Care Form    Patient Name: Rodrigo Kate   :  1955  MRN:  [id number]    Admit date:  2023  Discharge date:  23    Code Status Order: Full Code   Advance Directives:     Admitting Physician:  Liam Escobar DO  PCP: Gold Schaefer    Discharging Nurse: Bharati Metzger Dr Unit/Room#: 7590/0289-H  Discharging Unit Phone Number: [phone]    Emergency Contact:   Extended Emergency Contact Information  Primary Emergency Contact: Maria Elena Dooley  Address: [address] [facility], [address] Phone: [phone]  Relation: Child  Preferred language: English  Secondary Emergency Contact: Francisca Booker  Address: [address] [facility]           [address] Phone: [phone]  Mobile Phone: [phone]  Relation: Child  Preferred language: English   needed? No    Past Surgical History:  Past Surgical History:   Procedure Laterality Date    EYE SURGERY      HIP SURGERY Right 2020    HIP HEMIARTHROPLASTY performed by Caleb Aranda MD at [address] [facility] Left 12/10/2020    HIP HEMIARTHROPLASTY -- HERB -- DROPLET +     PT. COMING FROM [place] performed by Reva George MD at [facility] [address] N/A 2022    EGD BIOPSY performed by Alycia Freed MD at [address]       Immunization History:   Immunization History   Administered Date(s) Administered    COVID-19, PFIZER PURPLE top, DILUTE for use, (age 15 y+), 30mcg/0.3mL 2020, 2021       Active Problems:  Patient Active Problem List   Diagnosis Code    Severe episode of recurrent major depressive disorder, without psychotic features ([address] F33.2    Suicidal ideations R45.851    SDH (subdural hematoma) S06. 5XAA    Traumatic subdural hemorrhage with loss of consciousness of 30 minutes or less ([address] S06. 5X1A    Diabetic acidosis without coma ([address] E11.10    Acute kidney injury superimposed on CKD (HCC) N17.9, N18.9    Hyponatremia E87.1    Right adrenal mass (HCC) E27.8    Hyperkalemia E87.5    Major depression, recurrent (HCC) F33.9    Type 2 diabetes mellitus with complication, with long-term current use of insulin (HCC) E11.8, Z79.4    HLD (hyperlipidemia) E78.5    HTN (hypertension) I10    Uncontrolled type 2 diabetes mellitus with hyperglycemia (HCC) E11.65    SIRS (systemic inflammatory response syndrome) (HCC) R65.10    Weight loss R63.4    Generalized abdominal pain R10.84    Lactic acidosis E87.20    Constipation K59.00    High anion gap metabolic acidosis P79.01    Non compliance w medication regimen Z91.14    Physical deconditioning R53.81    Dementia, vascular (HCC) F01.50    Moderate protein-calorie malnutrition (HCC) E44.0    TIA (transient ischemic attack) G45.9    DKA, type 1, not at goal [facility]) E10.10    Ileus ([facility] K56.7    Adrenal adenoma, right D35.01    Hypertensive urgency I16.0    Hiccups S00.3    Umbilical hernia without obstruction and without gangrene K42.9    GERD (gastroesophageal reflux disease) K21.9    Depression F32. A    Hypotension I95.9    Depression, major, recurrent (HCC) F33.9    Closed right hip fracture, initial encounter [facility]) S72.001A    History of right hip hemiarthroplasty Z96.641    Closed left hip fracture, initial encounter ([facility] S87.781H    Acute metabolic encephalopathy Y33.87    History of left hip hemiarthroplasty H57.289    CLEMENTE (acute kidney injury) ([facility] N17.9    Weakness R53.1    GI bleed K92.2    Leukocytosis D72.829    Shortness of breath R06.02    Acute anemia D64.9    Transient alteration of awareness R40.4    COVID U07.1    Altered mental state R41.82    Nausea and vomiting R11.2       Isolation/Infection:   Isolation            Droplet Plus          Patient Infection Status       Infection Onset Added Last Indicated Last Indicated By Review Planned Expiration Resolved Resolved By    COVID-19 02/11/23 23 COVID-19, Rapid 23      Resolved    COVID-19 (Rule Out) 23 COVID-19, Rapid (Ordered)   23 Rule-Out Test Resulted    COVID-19 (Rule Out) 22 Respiratory Panel, Molecular, with COVID-19 (Restricted: peds pts or suitable admitted adults) (Ordered)   22 Rule-Out Test Resulted    COVID-19 (Rule Out) 22 COVID-19, Rapid (Ordered)   22 Rule-Out Test Resulted    COVID-19 (Rule Out) 22 Respiratory Panel, Molecular, with COVID-19 (Restricted: peds pts or suitable admitted adults) (Ordered)   22 Rule-Out Test Resulted    C-diff Rule Out 22 CLOSTRIDIUM DIFFICILE EIA (Ordered)   22 Rule-Out Test Resulted    COVID-19 20 Respiratory Panel, Molecular, with COVID-19 (Restricted: peds pts or suitable admitted adults)   20     COVID-19 (Rule Out) 20 Respiratory Panel, Molecular, with COVID-19 (Restricted: peds pts or suitable admitted adults) (Ordered)   20 Rule-Out Test Resulted    COVID-19 (Rule Out) 20 Covid-19 Ambulatory (Ordered)   20 Rule-Out Test Resulted    COVID-19 (Rule Out) 10/06/20 10/06/20 10/06/20 COVID-19 (Ordered)   10/06/20 Rule-Out Test Resulted    C-diff Rule Out 20 Clostridium difficile EIA (Ordered)   20 Akash Horta RN            Nurse Assessment:  Last Vital Signs: /71   Pulse 86   Temp 98.1 °F (36.7 °C) (Axillary)   Resp 18   Ht 5' 8\" (1.727 m)   Wt 165 lb (74.8 kg)   SpO2 97%   BMI 25.09 kg/m²     Last documented pain score (0-10 scale):    Last Weight:   Wt Readings from Last 1 Encounters:   23 165 lb (74.8 kg)     Mental Status:  oriented, alert, and disoriented at times    IV Access:  - None    Nursing Mobility/ADLs:  Walking   Dependent  Transfer  Dependent  Bathing Assisted  Dressing  Assisted  Toileting  Assisted  Feeding  Assisted  Med Admin  Assisted  Med Delivery   none    Wound Care Documentation and Therapy:        Elimination:  Continence: Bowel: No  Bladder: No  Urinary Catheter: None   Colostomy/Ileostomy/Ileal Conduit: No       Date of Last BM: 02/12/23    Intake/Output Summary (Last 24 hours) at 2/12/2023 1138  Last data filed at 2/11/2023 1559  Gross per 24 hour   Intake 2719.19 ml   Output --   Net 2719.19 ml     I/O last 3 completed shifts: In: 2719.2 [IV Piggyback:2719.2]  Out: -     Safety Concerns: At Risk for Falls    Impairments/Disabilities:      None    Nutrition Therapy:  Current Nutrition Therapy:   - Oral Diet:  Carb Control 4 carbs/meal (1800kcals/day)    Routes of Feeding: Oral  Liquids: Thin Liquids  Daily Fluid Restriction: no  Last Modified Barium Swallow with Video (Video Swallowing Test): not done    Treatments at the Time of Hospital Discharge:   Respiratory Treatments: ***  Oxygen Therapy:  is on oxygen at *** L/min per nasal cannula. Ventilator:    - No ventilator support    Rehab Therapies: ***  Weight Bearing Status/Restrictions: No weight bearing restrictions  Other Medical Equipment (for information only, NOT a DME order):  ***  Other Treatments: ***    Patient's personal belongings (please select all that are sent with patient):  ***    RN SIGNATURE:  Electronically signed by Ce Luque RN on 2/13/23 at 12:19 PM EST    CASE MANAGEMENT/SOCIAL WORK SECTION    Inpatient Status Date: 2/11/20023    Readmission Risk Assessment Score:  Readmission Risk              Risk of Unplanned Readmission:  32           Discharging to Facility/ Agency   Name: [facility]  Address: [address] [place] [address].    Phone: [phone]  Fax:[phone]    Dialysis Facility (if applicable)   Name:  Address:  Dialysis Schedule:  Phone:  Fax:    / signature: Electronically signed by Virl Osgood, RN on 2/12/2023 at 11:43 AM      PHYSICIAN SECTION    Prognosis: {Prognosis:3965390784}    Condition at Discharge: Saranya Villafana Patient Condition:[id number]}    Rehab Potential (if transferring to Rehab): {Prognosis:9547529110}    Recommended Labs or Other Treatments After Discharge: ***    Physician Certification: I certify the above information and transfer of Dahlia Pena  is necessary for the continuing treatment of the diagnosis listed and that he requires {Admit to Appropriate Level of Care:86905} for {GREATER/LESS:145621702} 30 days.      Update Admission H&P: {CHP DME Changes in TQUGH:080591254}    PHYSICIAN SIGNATURE:  {Esignature:073265485}

## 2023-02-12 NOTE — CARE COORDINATION
Transition of Care-COVID+  Patient admitted from [facility], facility sent him in d/t change in mental status and coffee ground emesis. Patient is not alert, unable to participate in discharge planning. Call to son/POMJ Ledezma., he confirmed plan was to return to [facility]. Update sent to liaison, patient will not require a pre-cert to return, appreciate therapy evaluations, however can return once medically stable. GI consulted, patient remains on room air. BARBARA, ambulance form and envelope completed.     Sada RODRIGUEZN, RN  [facility] [place][note 2] [facility]

## 2023-02-13 VITALS
DIASTOLIC BLOOD PRESSURE: 86 MMHG | SYSTOLIC BLOOD PRESSURE: 145 MMHG | RESPIRATION RATE: 20 BRPM | HEIGHT: 68 IN | HEART RATE: 84 BPM | TEMPERATURE: 97.8 F | OXYGEN SATURATION: 95 % | BODY MASS INDEX: 25.01 KG/M2 | WEIGHT: 165 LBS

## 2023-02-13 LAB
ANION GAP SERPL CALCULATED.3IONS-SCNC: 7 MMOL/L (ref 7–16)
BUN BLDV-MCNC: 22 MG/DL (ref 6–23)
C-REACTIVE PROTEIN: 4.9 MG/DL (ref 0–0.4)
CALCIUM SERPL-MCNC: 9.2 MG/DL (ref 8.6–10.2)
CHLORIDE BLD-SCNC: 102 MMOL/L (ref 98–107)
CO2: 21 MMOL/L (ref 22–29)
CREAT SERPL-MCNC: 0.8 MG/DL (ref 0.7–1.2)
GFR SERPL CREATININE-BSD FRML MDRD: >60 ML/MIN/1.73
GLUCOSE BLD-MCNC: 378 MG/DL (ref 74–99)
HBA1C MFR BLD: 8.3 % (ref 4–5.6)
HCT VFR BLD CALC: 25.1 % (ref 37–54)
HCT VFR BLD CALC: 26.3 % (ref 37–54)
HEMOGLOBIN: 8.1 G/DL (ref 12.5–16.5)
HEMOGLOBIN: 8.5 G/DL (ref 12.5–16.5)
MCH RBC QN AUTO: 20.2 PG (ref 26–35)
MCHC RBC AUTO-ENTMCNC: 32.3 % (ref 32–34.5)
MCV RBC AUTO: 62.6 FL (ref 80–99.9)
METER GLUCOSE: 318 MG/DL (ref 74–99)
PDW BLD-RTO: 19 FL (ref 11.5–15)
PLATELET # BLD: 266 E9/L (ref 130–450)
PMV BLD AUTO: ABNORMAL FL (ref 7–12)
POTASSIUM SERPL-SCNC: 5 MMOL/L (ref 3.5–5)
PROCALCITONIN: 0.43 NG/ML (ref 0–0.08)
RBC # BLD: 4.01 E12/L (ref 3.8–5.8)
SODIUM BLD-SCNC: 130 MMOL/L (ref 132–146)
WBC # BLD: 10.5 E9/L (ref 4.5–11.5)

## 2023-02-13 PROCEDURE — 6360000002 HC RX W HCPCS: Performed by: NURSE PRACTITIONER

## 2023-02-13 PROCEDURE — 85018 HEMOGLOBIN: CPT

## 2023-02-13 PROCEDURE — 80048 BASIC METABOLIC PNL TOTAL CA: CPT

## 2023-02-13 PROCEDURE — 6370000000 HC RX 637 (ALT 250 FOR IP): Performed by: FAMILY MEDICINE

## 2023-02-13 PROCEDURE — 94640 AIRWAY INHALATION TREATMENT: CPT

## 2023-02-13 PROCEDURE — 6370000000 HC RX 637 (ALT 250 FOR IP)

## 2023-02-13 PROCEDURE — 2500000003 HC RX 250 WO HCPCS: Performed by: FAMILY MEDICINE

## 2023-02-13 PROCEDURE — 85014 HEMATOCRIT: CPT

## 2023-02-13 PROCEDURE — 84145 PROCALCITONIN (PCT): CPT

## 2023-02-13 PROCEDURE — 82962 GLUCOSE BLOOD TEST: CPT

## 2023-02-13 PROCEDURE — 99239 HOSP IP/OBS DSCHRG MGMT >30: CPT | Performed by: INTERNAL MEDICINE

## 2023-02-13 PROCEDURE — 2580000003 HC RX 258: Performed by: FAMILY MEDICINE

## 2023-02-13 PROCEDURE — 86140 C-REACTIVE PROTEIN: CPT

## 2023-02-13 PROCEDURE — C9113 INJ PANTOPRAZOLE SODIUM, VIA: HCPCS | Performed by: NURSE PRACTITIONER

## 2023-02-13 PROCEDURE — 6370000000 HC RX 637 (ALT 250 FOR IP): Performed by: NURSE PRACTITIONER

## 2023-02-13 PROCEDURE — 36415 COLL VENOUS BLD VENIPUNCTURE: CPT

## 2023-02-13 PROCEDURE — 83036 HEMOGLOBIN GLYCOSYLATED A1C: CPT

## 2023-02-13 PROCEDURE — 85027 COMPLETE CBC AUTOMATED: CPT

## 2023-02-13 RX ORDER — GUAIFENESIN/DEXTROMETHORPHAN 100-10MG/5
5 SYRUP ORAL EVERY 4 HOURS PRN
Qty: 120 ML | Refills: 0 | Status: ON HOLD | OUTPATIENT
Start: 2023-02-13 | End: 2023-02-23

## 2023-02-13 RX ORDER — INSULIN LISPRO 100 [IU]/ML
0-4 INJECTION, SOLUTION INTRAVENOUS; SUBCUTANEOUS NIGHTLY
Status: DISCONTINUED | OUTPATIENT
Start: 2023-02-13 | End: 2023-02-13 | Stop reason: HOSPADM

## 2023-02-13 RX ORDER — AMOXICILLIN AND CLAVULANATE POTASSIUM 875; 125 MG/1; MG/1
1 TABLET, FILM COATED ORAL 2 TIMES DAILY
Qty: 10 TABLET | Refills: 0 | Status: ON HOLD | OUTPATIENT
Start: 2023-02-13 | End: 2023-02-18

## 2023-02-13 RX ORDER — INSULIN LISPRO 100 [IU]/ML
0-8 INJECTION, SOLUTION INTRAVENOUS; SUBCUTANEOUS
Status: DISCONTINUED | OUTPATIENT
Start: 2023-02-13 | End: 2023-02-13 | Stop reason: HOSPADM

## 2023-02-13 RX ORDER — DOXYCYCLINE HYCLATE 100 MG
100 TABLET ORAL 2 TIMES DAILY
Qty: 10 TABLET | Refills: 0 | Status: ON HOLD | OUTPATIENT
Start: 2023-02-13 | End: 2023-02-18

## 2023-02-13 RX ORDER — INSULIN GLARGINE 100 [IU]/ML
10 INJECTION, SOLUTION SUBCUTANEOUS 2 TIMES DAILY
Status: DISCONTINUED | OUTPATIENT
Start: 2023-02-13 | End: 2023-02-13 | Stop reason: HOSPADM

## 2023-02-13 RX ADMIN — DOXYCYCLINE 100 MG: 100 INJECTION, POWDER, LYOPHILIZED, FOR SOLUTION INTRAVENOUS at 02:13

## 2023-02-13 RX ADMIN — PANTOPRAZOLE SODIUM 40 MG: 40 INJECTION, POWDER, FOR SOLUTION INTRAVENOUS at 08:23

## 2023-02-13 RX ADMIN — INSULIN LISPRO 8 UNITS: 100 INJECTION, SOLUTION INTRAVENOUS; SUBCUTANEOUS at 06:19

## 2023-02-13 RX ADMIN — INSULIN LISPRO 6 UNITS: 100 INJECTION, SOLUTION INTRAVENOUS; SUBCUTANEOUS at 11:20

## 2023-02-13 RX ADMIN — DOCUSATE SODIUM 100 MG: 100 CAPSULE, LIQUID FILLED ORAL at 08:23

## 2023-02-13 RX ADMIN — IPRATROPIUM BROMIDE AND ALBUTEROL SULFATE 1 AMPULE: 2.5; .5 SOLUTION RESPIRATORY (INHALATION) at 08:54

## 2023-02-13 RX ADMIN — SODIUM CHLORIDE, PRESERVATIVE FREE 10 ML: 5 INJECTION INTRAVENOUS at 08:23

## 2023-02-13 RX ADMIN — IPRATROPIUM BROMIDE AND ALBUTEROL SULFATE 1 AMPULE: 2.5; .5 SOLUTION RESPIRATORY (INHALATION) at 11:54

## 2023-02-13 NOTE — PROGRESS NOTES
PROGRESS NOTE        Patient Presents with/Seen in Consultation For      *Reason for Consult: ? GIB  CHIEF COMPLAINT:  AMS, and coffee ground emesis  Subjective:     Patient seen sitting up in bed in no apparent distress. Minimally interactive, flat affect. No complaints of abdominal pain. No BM. Review of Systems  Aside from what was mentioned in the PMH and HPI, essentially unremarkable, all others negative. Objective:     BP (!) 145/86   Pulse 89   Temp 97.8 °F (36.6 °C) (Oral)   Resp 20   Ht 5' 8\" (1.727 m)   Wt 165 lb (74.8 kg)   SpO2 94%   BMI 25.09 kg/m²     General appearance: alert, awake, laying in bed, flat affect, and cooperative  Eyes: conjunctiva pale, sclera anicteric. PERRL.   Lungs: clear to auscultation bilaterally  Heart: regular rate and rhythm, no murmur, 2+ pulses; no edema  Abdomen: soft, non-tender; bowel sounds normal; no masses,  no organomegaly  Extremities: extremities without edema  Pulses: 2+ and symmetric  Skin: Skin color, texture, turgor normal.   Neurologic: minimally interactive, flat affect RUDDY    insulin lispro (HUMALOG) injection vial 0-8 Units, TID WC  insulin lispro (HUMALOG) injection vial 0-4 Units, Nightly  insulin glargine (LANTUS) injection vial 10 Units, BID  pantoprazole (PROTONIX) injection 40 mg, BID  glucose chewable tablet 16 g, PRN  dextrose bolus 10% 125 mL, PRN   Or  dextrose bolus 10% 250 mL, PRN  glucagon (rDNA) injection 1 mg, PRN  dextrose 10 % infusion, Continuous PRN  docusate sodium (COLACE) capsule 100 mg, BID  OLANZapine (ZYPREXA) tablet 7.5 mg, Nightly  rosuvastatin (CRESTOR) tablet 10 mg, Daily  sodium chloride flush 0.9 % injection 5-40 mL, 2 times per day  sodium chloride flush 0.9 % injection 5-40 mL, PRN  0.9 % sodium chloride infusion, PRN  ondansetron (ZOFRAN-ODT) disintegrating tablet 4 mg, Q8H PRN   Or  ondansetron (ZOFRAN) injection 4 mg, Q6H PRN  polyethylene glycol (GLYCOLAX) packet 17 g, Daily PRN  acetaminophen (TYLENOL) tablet 650 mg, Q6H PRN   Or  acetaminophen (TYLENOL) suppository 650 mg, Q6H PRN  guaiFENesin-dextromethorphan (ROBITUSSIN DM) 100-10 MG/5ML syrup 5 mL, Q4H PRN  dexamethasone (DECADRON) injection 6 mg, Q24H  ipratropium-albuterol (DUONEB) nebulizer solution 1 ampule, Q4H WA  doxycycline (VIBRAMYCIN) 100 mg in sodium chloride 0.9 % 100 mL IVPB, Q12H  cefTRIAXone (ROCEPHIN) 1,000 mg in sodium chloride flush 10 mL IV syringe, Q24H       Data Review  CBC:   Lab Results   Component Value Date/Time    WBC 10.5 02/13/2023 02:15 AM    RBC 4.01 02/13/2023 02:15 AM    HGB 8.1 02/13/2023 02:15 AM    HCT 25.1 02/13/2023 02:15 AM    MCV 62.6 02/13/2023 02:15 AM    MCH 20.2 02/13/2023 02:15 AM    MCHC 32.3 02/13/2023 02:15 AM    RDW 19.0 02/13/2023 02:15 AM     02/13/2023 02:15 AM    MPV NOT CALC 02/13/2023 02:15 AM     CMP:    Lab Results   Component Value Date/Time     02/13/2023 02:15 AM    K 5.0 02/13/2023 02:15 AM    K 4.9 02/12/2023 05:35 AM     02/13/2023 02:15 AM    CO2 21 02/13/2023 02:15 AM    BUN 22 02/13/2023 02:15 AM    CREATININE 0.8 02/13/2023 02:15 AM    GFRAA >60 12/13/2020 05:22 AM    LABGLOM >60 02/13/2023 02:15 AM    GLUCOSE 378 02/13/2023 02:15 AM    PROT 5.2 02/12/2023 05:35 AM    LABALBU 2.8 02/12/2023 05:35 AM    CALCIUM 9.2 02/13/2023 02:15 AM    BILITOT 0.3 02/12/2023 05:35 AM    ALKPHOS 48 02/12/2023 05:35 AM    AST 9 02/12/2023 05:35 AM    ALT 7 02/12/2023 05:35 AM     Hepatic Function Panel:    Lab Results   Component Value Date/Time    ALKPHOS 48 02/12/2023 05:35 AM    ALT 7 02/12/2023 05:35 AM    AST 9 02/12/2023 05:35 AM    PROT 5.2 02/12/2023 05:35 AM    BILITOT 0.3 02/12/2023 05:35 AM    BILIDIR <0.2 02/11/2023 10:24 AM    IBILI see below 02/11/2023 10:24 AM    LABALBU 2.8 02/12/2023 05:35 AM     No components found for: CHLPL    Lab Results   Component Value Date    TRIG 71 10/08/2020    TRIG 87 01/06/2019       Lab Results   Component Value Date    HDL 41 10/08/2020    HDL 36 01/06/2019       Lab Results   Component Value Date    LDLCALC 59 10/08/2020    LDLCALC 110 (H) 01/06/2019       Lab Results   Component Value Date    LABVLDL 14 10/08/2020    LABVLDL 17 01/06/2019      PT/INR:    Lab Results   Component Value Date/Time    PROTIME 16.1 11/11/2022 08:42 PM    INR 1.5 11/11/2022 08:42 PM     IRON:    Lab Results   Component Value Date/Time    IRON 66 11/15/2022 06:34 AM     Iron Saturation:  No components found for: PERCENTFE  FERRITIN:    Lab Results   Component Value Date/Time    FERRITIN 29 02/11/2023 11:10 PM         Assessment:     Active Problems:  Anemia, microcytic  Coffee ground emesis, prior to arrival none currently   Constipation  AMS  Airspace disease  COVID +  Esophagitis   Electrolyte abnormalities- defer  DM  Depression   Blood cultures + for MRSA & staph  Hiatal hernia    Plan:   No gross bleeding reported, hgb stable  Continue Protonix BID dosing  Diet as tolerated  Supportive care  No endoscopy work up planned at this time. Patient had a EGD in December 2022 with Dr. Shireen Cates. No bleeding noted at that time. Colace 100 mg PO BID  Trend labs  Discharge per PCP, ok from GI POV      Note: This report was completed utilizing computer voice recognition software. Every effort has been made to ensure accuracy, however; inadvertent computerized transcription errors may be present.      Discussed with Dr. Sanjay Schmidt per Dr. Grace Rodríguez APRDEANDRA-NP-C 2/13/2023  10:47 AM For Dr. Adan Pak

## 2023-02-13 NOTE — PLAN OF CARE
Problem: Discharge Planning  Goal: Discharge to home or other facility with appropriate resources  Outcome: Completed     Problem: Chronic Conditions and Co-morbidities  Goal: Patient's chronic conditions and co-morbidity symptoms are monitored and maintained or improved  2/13/2023 1032 by Enma Finn RN  Outcome: Completed  2/13/2023 0124 by Jackie Velasco RN  Outcome: Progressing  4 H Emmett Farmer (Taken 2/12/2023 2000)  Care Plan - Patient's Chronic Conditions and Co-Morbidity Symptoms are Monitored and Maintained or Improved: Monitor and assess patient's chronic conditions and comorbid symptoms for stability, deterioration, or improvement     Problem: Skin/Tissue Integrity  Goal: Absence of new skin breakdown  Description: 1. Monitor for areas of redness and/or skin breakdown  2. Assess vascular access sites hourly  3. Every 4-6 hours minimum:  Change oxygen saturation probe site  4. Every 4-6 hours:  If on nasal continuous positive airway pressure, respiratory therapy assess nares and determine need for appliance change or resting period.   2/13/2023 1032 by Enma Finn RN  Outcome: Completed  2/13/2023 0124 by Jackie Velasco RN  Outcome: Progressing     Problem: Safety - Adult  Goal: Free from fall injury  2/13/2023 1032 by Enma Finn RN  Outcome: Completed  2/13/2023 0124 by Jackie Velasco RN  Outcome: Progressing [Full Body Skin Exam Performed] : performed [FreeTextEntry3] : Skin examination performed of the face, neck, trunk, arms, legs; \par The patient is well, alert and oriented, pleasant and cooperative.\par Eyelids, conjunctivae, oral mucosa, digits and nails all normal.  \par No cervical adenopathy.\par \par Normal findings include:\par \par Seborrheic keratoses- largest lesion L upper thigh, also L chest\par Angiomas\par Lentigines\par healed D&C sites L thigh, R forearm R jawline\par \par No lesions were suspicious for malignancy. \par \par

## 2023-02-13 NOTE — DISCHARGE SUMMARY
[facility] Physician Discharge Summary       [address] [address] [phone]          Activity level: As tolerated     Dispo:[facility]      Condition on discharge: Stable     Patient ID:  Love Song  [id number]  74 y.o.  1955    Admit date: 2/11/2023    Discharge date and time:  2/13/2023  10:39 AM    Admission Diagnoses: Principal Problem:    COVID  Active Problems:    GI bleed    Transient alteration of awareness    Altered mental state    Nausea and vomiting    Type 2 diabetes mellitus with complication, with long-term current use of insulin (HCC)    HTN (hypertension)    Constipation    Depression, major, recurrent ([facility]    Acute metabolic encephalopathy  Resolved Problems:    * No resolved hospital problems. *      Discharge Diagnoses: Principal Problem:    COVID  Active Problems:    GI bleed    Transient alteration of awareness    Altered mental state    Nausea and vomiting    Type 2 diabetes mellitus with complication, with long-term current use of insulin (HCC)    HTN (hypertension)    Constipation    Depression, major, recurrent (HCC)    Acute metabolic encephalopathy  Resolved Problems:    * No resolved hospital problems. *      Consults:  IP CONSULT TO PHARMACY  IP CONSULT TO GI  IP CONSULT TO IV TEAM    Procedures: None    Hospital Course: Patient presented to he ED from snf after having an episode of coffee-ground emesis x1. Patient was confused during the exam and was unable to answer any questions coherently. Per SNF that around [phone] he started vomiting dark vomit and became less responsive than usual.  His oxygen saturation levels then decreased to 80%. CT head negative for acute abnormality-did show atrophy and periventricular leukomalacia questionable for possible dementia. CT abdomen shows large amount of stool burden and possible lower lobe pneumonia bilaterally.  CT chest shows concern for possible bibasilar pneumonia. He was admitted for further management. Lactic acid was initially 4.5 decreased to 1.8 after IVF's. Patient was started on ceftriaxone and doxycycline in ED. Tested positive for COVID-19. Inflammatory markers initially elevated now trending down. Patient has received dexamethasone 6 mg every 24 hours as an inpatient. He was continued on doxycycline and Rocephin inpatient. GI saw him patient for possible GI bleed CT abdomen did not show thickened wall of distal esophagus possibly representing esophagitis. He was started on a PPI. Per GI no endoscopic work-up is needed at this time. Patient had recent EGD December 2022 with Dr. Bandar Fu that showed no bleeding at this time. Blood culture x1 drawn on 2/11 resulted 1/2 bottles positive for staph yeast species-discussed with attending- is likely contaminant -patient has no other S/S of infection (no leukocytosis or fever but vital signs) Patient's mentation appears to be improving. Respirations are unlabored on oxygen. Patient is stable for discharge at this point. He will be discharged to [facility]. Discharge Exam:    General Appearance: alert and oriented to person, place and time and in no acute distress  Skin: warm and dry  Head: normocephalic and atraumatic  Eyes: pupils equal, round, and reactive to light, extraocular eye movements intact, conjunctivae normal  Neck: neck supple and non tender without mass   Pulmonary/Chest: clear to auscultation bilaterally- no wheezes, rales or rhonchi, normal air movement, no respiratory distress  Cardiovascular: normal rate, normal S1 and S2 and no carotid bruits  Abdomen: soft, non-tender, non-distended, normal bowel sounds, no masses or organomegaly  Extremities: no cyanosis, no clubbing and no edema  Neurologic: no cranial nerve deficit and speech normal    No intake/output data recorded.   I/O this shift:  In: -   Out: 1150 [Urine:1150]      LABS:  Recent Labs     02/11/23  1024 02/11/23  1559 02/12/23  0535 02/13/23  0215     --  133 130*   K 4.3  --  4.9 5.0     --  104 102   CO2 20*  --  21* 21*   BUN 32*  --  23 22   CREATININE 1.1  --  0.9 0.8   GLUCOSE 330* 260 295* 378*   CALCIUM 8.8  --  8.6 9.2       Recent Labs     02/11/23  1024 02/11/23  2310 02/12/23  0535 02/12/23  1510 02/13/23  0215   WBC 13.3*  --  8.0  --  10.5   RBC 3.87  --  3.80  --  4.01   HGB 8.0*   < > 7.8* 8.4* 8.1*   HCT 24.2*   < > 23.7* 27.5* 25.1*   MCV 62.5*  --  62.4*  --  62.6*   MCH 20.7*  --  20.5*  --  20.2*   MCHC 33.1  --  32.9  --  32.3   RDW 18.7*  --  18.8*  --  19.0*     --  239  --  266   MPV NOT CALC  --  NOT CALC  --  NOT CALC    < > = values in this interval not displayed. No results for input(s): POCGLU in the last 72 hours. Imaging:  CT Head W/O Contrast    Result Date: 2/11/2023  EXAMINATION: CT OF THE HEAD WITHOUT CONTRAST  2/11/2023 12:09 pm TECHNIQUE: CT of the head was performed without the administration of intravenous contrast. Automated exposure control, iterative reconstruction, and/or weight based adjustment of the mA/kV was utilized to reduce the radiation dose to as low as reasonably achievable. COMPARISON: None. HISTORY: ORDERING SYSTEM PROVIDED HISTORY: ams TECHNOLOGIST PROVIDED HISTORY: Reason for exam:->ams Has a \"code stroke\" or \"stroke alert\" been called? ->No Decision Support Exception - unselect if not a suspected or confirmed emergency medical condition->Emergency Medical Condition (MA) FINDINGS: BRAIN/VENTRICLES: There is no acute intracranial hemorrhage, mass effect or midline shift. No abnormal extra-axial fluid collection. The gray-white differentiation is maintained without evidence of an acute infarct. There is no evidence of hydrocephalus. The ventricles, cisterns and sulci are prominent consistent with atrophy. There is decreased attenuation within the periventricular white matter consistent with periventricular leukomalacia.  ORBITS: The visualized portion of the orbits demonstrate no acute abnormality. SINUSES: The visualized paranasal sinuses and mastoid air cells demonstrate no acute abnormality. There is mucosal thickening seen within the right sphenoid sinus. SOFT TISSUES/SKULL:  No acute abnormality of the visualized skull or soft tissues. 1. There is no acute intracranial abnormality. Specifically, there is no intracranial hemorrhage. 2. Atrophy and periventricular leukomalacia,     CT CHEST W CONTRAST    Result Date: 2/11/2023  EXAMINATION: CT OF THE CHEST WITH CONTRAST 2/11/2023 12:09 pm TECHNIQUE: CT of the chest was performed with the administration of intravenous contrast. Multiplanar reformatted images are provided for review. Automated exposure control, iterative reconstruction, and/or weight based adjustment of the mA/kV was utilized to reduce the radiation dose to as low as reasonably achievable. COMPARISON: X-ray thoracic spine 01/18/2023 HISTORY: ORDERING SYSTEM PROVIDED HISTORY: hematemesis, fever TECHNOLOGIST PROVIDED HISTORY: Reason for exam:->hematemesis, fever Decision Support Exception - unselect if not a suspected or confirmed emergency medical condition->Emergency Medical Condition (MA) FINDINGS: Mediastinum: Thyroid is mildly heterogeneous without dominant nodule in attenuation. No bulky mediastinal adenopathy. Central airways are patent. Esophagus is normal course and caliber to the distal segment where there is a small hiatal hernia. .  Cardiac size borderline enlarged demonstrating Lungs/pleura: Lungs are clear without focal opacification or consolidation. Mild chronic changes mid lungs. No consolidation. Subsegmental atelectasis. No dominant nodule or mass lesion. No pleural effusion or pleural process. Upper Abdomen: Please see separate concurrent abdomen and pelvis dictation for intra-findings. Soft Tissues/Bones: No acute osseous or soft tissue findings.   No aggressive osseous lesion with mild-to-moderate degenerative changes thoracic spine. Bibasilar opacities most consistent of subsegmental atelectasis without consolidative component or nodularity. No pleural effusion. No acute findings of the thoracic spine. Small hiatal hernia Please see separate concurrent but separate report for intra-abdominal findings. CT ABDOMEN PELVIS W IV CONTRAST Additional Contrast? None    Result Date: 2/11/2023  EXAMINATION: CT OF THE ABDOMEN AND PELVIS WITH CONTRAST 2/11/2023 12:09 pm TECHNIQUE: CT of the abdomen and pelvis was performed with the administration of intravenous contrast. Multiplanar reformatted images are provided for review. Automated exposure control, iterative reconstruction, and/or weight based adjustment of the mA/kV was utilized to reduce the radiation dose to as low as reasonably achievable. COMPARISON: November 28, 2022 HISTORY: ORDERING SYSTEM PROVIDED HISTORY: vomiting, hematemesis TECHNOLOGIST PROVIDED HISTORY: Additional Contrast?->None Reason for exam:->vomiting, hematemesis Decision Support Exception - unselect if not a suspected or confirmed emergency medical condition->Emergency Medical Condition (MA) FINDINGS: Large amount of stool throughout the colon notable level of the rectum. No evidence of bowel obstruction. No free air or free fluid. The appendix is unremarkable. There is a stable fat containing umbilical hernia measuring 1.3 x 1.9 cm. Liver, gallbladder, pancreas, and spleen are unremarkable. No hydronephrosis. Few cysts associated with right kidney measure up to 1.9 cm. Exophytic cyst associated with the left kidney measures up to 2.2 cm. The cysts appear similar in size. Redemonstration of a heterogeneous nodule associated with the right adrenal gland measures 4.7 x 3.8 cm and appears similar compared to prior. Focal area of fat attenuation seen within this lesion. Moderate hiatal hernia. Small pericardial effusion appearing slightly larger compared to prior.   New hazy and irregular opacities are present in right and left lower lobes. Stable compression fracture of L1 with mild retropulsion. Limited view of the pelvis due to metallic artifact from bilateral hip arthroplasties. 1. Large amount of stool throughout the colon notable level of the rectum. 2. No bowel obstruction, free air, or focal inflammatory changes. 3. Redemonstration of heterogeneous nodule associated with right adrenal gland appearing similar in size since prior as well as prior from September 27, 2018. Finding may represent a myelolipoma. Continued follow-up recommended. 4. Moderate hiatal hernia with thickened appearance of wall of distal esophagus. Findings may represent esophagitis. 5. New interstitial and hazy opacities are present in right and left lower lobes which could indicate atelectasis and or pneumonia. 6. Small pericardial effusion appearing slightly larger. XR CHEST PORTABLE    Result Date: 2/11/2023  EXAMINATION: ONE XRAY VIEW OF THE CHEST 2/11/2023 10:38 am COMPARISON: None. HISTORY: ORDERING SYSTEM PROVIDED HISTORY: ams TECHNOLOGIST PROVIDED HISTORY: Reason for exam:->ams FINDINGS: The cardiac silhouette is within normals. There is no mediastinal widening. No findings of failure Suboptimal inspiration was obtained for the chest x-ray. I cannot exclude airspace disease within the right infrahilar region. There is no right or left pleural effusion. 1. Questionable airspace disease/pneumonia within the right infrahilar region versus superimposed vascularity secondary to the suboptimal inspiration. Please clinically correlate. If clinically warranted repeat PA and lateral views can be obtained.        Patient Instructions:      Medication List        START taking these medications      amoxicillin-clavulanate 875-125 MG per tablet  Commonly known as: AUGMENTIN  Take 1 tablet by mouth 2 times daily for 5 days     doxycycline hyclate 100 MG tablet  Commonly known as: VIBRA-TABS  Take 1 tablet by mouth 2 times daily for 5 days     guaiFENesin-dextromethorphan 100-10 MG/5ML syrup  Commonly known as: ROBITUSSIN DM  Take 5 mLs by mouth every 4 hours as needed for Cough            CONTINUE taking these medications      aspirin 81 MG EC tablet     glucose 4 g chewable tablet     insulin glargine 100 UNIT/ML injection vial  Commonly known as: LANTUS     insulin lispro 100 UNIT/ML injection vial  Commonly known as: HUMALOG     metFORMIN 500 MG tablet  Commonly known as: GLUCOPHAGE     OLANZapine 7.5 MG tablet  Commonly known as: ZYPREXA     pantoprazole 40 MG tablet  Commonly known as: PROTONIX     rosuvastatin 10 MG tablet  Commonly known as: CRESTOR     senna-docusate 8.6-50 MG per tablet  Commonly known as: PERICOLACE     sodium chloride 1 g tablet     tamsulosin 0.4 MG capsule  Commonly known as: FLOMAX  Take 1 capsule by mouth daily     Tums Extra Strength 750 750 MG chewable tablet  Generic drug: calcium carbonate               Where to Get Your Medications        You can get these medications from any pharmacy    Bring a paper prescription for each of these medications  amoxicillin-clavulanate 875-125 MG per tablet  doxycycline hyclate 100 MG tablet  guaiFENesin-dextromethorphan 100-10 MG/5ML syrup       In review of the EMR, evaluation, management, and diagnosis. Discharge plan has been discussed with attending. Time spent 45 mins. Signed:  Electronically signed by MIRA Dean CNP on 2/13/2023 at 10:39 AM    Addendum: I have personally participated in the history, exam, medical decision making with Suad Gianna on the date of service and I agree with all of the pertinent clinical information unless otherwise noted. I have also reviewed and agree with the past medical, family, and social history unless otherwise noted. Patient was admitted with altered mental status.      PHYSICAL EXAM:  Vitals:  BP (!) 145/86   Pulse 84   Temp 97.8 °F (36.6 °C) (Oral) Resp 20   Ht 5' 8\" (1.727 m)   Wt 165 lb (74.8 kg)   SpO2 95%   BMI 25.09 kg/m²   Gen: awake, alert, NAD  Lungs: clear to auscultation bilaterally no crackles no wheezing. Heart: RRR, no murmur   Abdomen: soft nontender nondistended positive bowel sounds. Extremities: full range of motion no peripheral edema. Impression:  Principal Problem:    COVID  Active Problems:    GI bleed    Transient alteration of awareness    Altered mental state    Nausea and vomiting    Type 2 diabetes mellitus with complication, with long-term current use of insulin (HCC)    HTN (hypertension)    Constipation    Depression, major, recurrent (HCC)    Acute metabolic encephalopathy  Resolved Problems:    * No resolved hospital problems. *      My findings/plan include: Addendum: I have personally participated in the history, exam, medical decision making with Mary Jo Lopez on the date of service and I agree with all of the pertinent clinical information unless otherwise noted. I have also reviewed and agree with the past medical, family, and social history unless otherwise noted. Patient was admitted with altered mental status. PHYSICAL EXAM:  Vitals:  BP (!) 145/86   Pulse 89   Temp 97.8 °F (36.6 °C) (Oral)   Resp 20   Ht 5' 8\" (1.727 m)   Wt 165 lb (74.8 kg)   SpO2 94%   BMI 25.09 kg/m²   Gen: awake, alert, NAD  Lungs: clear to auscultation bilaterally no crackles no wheezing. Heart: RRR, no murmur   Abdomen: soft nontender nondistended positive bowel sounds. Extremities: full range of motion no peripheral edema.         Impression:  Principal Problem:    COVID  Active Problems:    GI bleed    Transient alteration of awareness    Altered mental state    Nausea and vomiting    Type 2 diabetes mellitus with complication, with long-term current use of insulin (HCC)    HTN (hypertension)    Constipation    Depression, major, recurrent (HCC)    Acute metabolic encephalopathy  Resolved Problems:    * No resolved hospital problems. *        My findings/plan include:     Patient is being treated for altered mental status likely from covid. CT head, TSH, ammonia are unremarkable. Will check B12 levels. GI was consulted for possible bleed and esophagitis however no planned intervention at this time. She is medically stable for discharge. NOTE: This report was transcribed using voice recognition software. Every effort was made to ensure accuracy; however, inadvertent computerized transcription errors may be present. Electronically signed by Montrell Kovacs DO on 2/13/2023 at 10:09 AM    NOTE: This report was transcribed using voice recognition software. Every effort was made to ensure accuracy; however, inadvertent computerized transcription errors may be present.   Electronically signed by Montrell Kovacs DO on 2/13/2023 at 2:23 PM

## 2023-02-13 NOTE — PROGRESS NOTES
Physician Progress Note      PATIENTHardario Mayfield  CSN #:                  [id number]  :                       1955  ADMIT DATE:       2023 10:12 AM  DISCH DATE:        2023 3:44 PM  RESPONDING  PROVIDER #:        Flores Sims DO          QUERY TEXT:    Pt admitted with COVID-19 and noted to have pneumonia with , lactic   4.5-1.8, , procalcitonin 0.63, CRP 8.5. If possible, please document   in progress notes and discharge summary if you are evaluating and/or treating: The medical record reflects the following:  Risk Factors: COVID-19, pneumonia  Clinical Indicators: On admission , lactic 4.5-1.8, ,   procalcitonin 0.63, CRP 8.5. Treatment: IVF, abx, vitamin supplementation    Thank you,  Joanie Velazquez RN, CCDS  Clinical Documentation   [email or web address]  Options provided:  -- Sepsis present on admission due to COVID-19 pneumonia  -- Sepsis present on admission due to COVID-19 infection  -- Covid-19 infection without sepsis  -- Covid-19 pneumonia without sepsis  -- Other - I will add my own diagnosis  -- Disagree - Not applicable / Not valid  -- Disagree - Clinically unable to determine / Unknown  -- Refer to Clinical Documentation Reviewer    PROVIDER RESPONSE TEXT:    This patient has Covid-19 pneumonia without sepsis.     Query created by: Fabio Sanchez on 2023 2:50 PM      Electronically signed by:  Flores Sims DO 2023 5:02 PM

## 2023-02-13 NOTE — PLAN OF CARE
Problem: Chronic Conditions and Co-morbidities  Goal: Patient's chronic conditions and co-morbidity symptoms are monitored and maintained or improved  2/13/2023 0124 by Angeli Barajas RN  Outcome: Progressing  Flowsheets (Taken 2/12/2023 2000)  Care Plan - Patient's Chronic Conditions and Co-Morbidity Symptoms are Monitored and Maintained or Improved: Monitor and assess patient's chronic conditions and comorbid symptoms for stability, deterioration, or improvement  2/12/2023 1742 by Delilah Bethea RN  Outcome: Progressing     Problem: Skin/Tissue Integrity  Goal: Absence of new skin breakdown  Description: 1. Monitor for areas of redness and/or skin breakdown  2. Assess vascular access sites hourly  3. Every 4-6 hours minimum:  Change oxygen saturation probe site  4. Every 4-6 hours:  If on nasal continuous positive airway pressure, respiratory therapy assess nares and determine need for appliance change or resting period.   2/13/2023 0124 by Angeli Barajas RN  Outcome: Progressing  2/12/2023 1742 by Delilah Bethea RN  Outcome: Progressing     Problem: Safety - Adult  Goal: Free from fall injury  2/13/2023 0124 by Angeli Barajas RN  Outcome: Progressing  2/12/2023 1742 by Delilah Bethea RN  Outcome: Progressing

## 2023-02-13 NOTE — CARE COORDINATION
Social Work / Discharge Planning: Patient will return to [address] [place] [address] today at 2:00 via [facility]. Patient son Gilles Bess [phone] updated. Charge RN and RN updated. Message left for [facility]. SW to follow.  Electronically signed by INDY Henderson on 2/13/23 at 10:49 AM EST

## 2023-02-14 LAB
BLOOD CULTURE, ROUTINE: ABNORMAL
ORGANISM: ABNORMAL
ORGANISM: ABNORMAL

## 2023-02-18 ENCOUNTER — HOSPITAL ENCOUNTER (INPATIENT)
Age: 68
LOS: 3 days | Discharge: NURSING, REHAB OR LONG-TERM CARE | DRG: 380 | End: 2023-02-21
Attending: EMERGENCY MEDICINE | Admitting: INTERNAL MEDICINE
Payer: MEDICARE

## 2023-02-18 ENCOUNTER — APPOINTMENT (OUTPATIENT)
Dept: GENERAL RADIOLOGY | Age: 68
DRG: 380 | End: 2023-02-18
Payer: MEDICARE

## 2023-02-18 ENCOUNTER — APPOINTMENT (OUTPATIENT)
Dept: CT IMAGING | Age: 68
DRG: 380 | End: 2023-02-18
Payer: MEDICARE

## 2023-02-18 DIAGNOSIS — D64.9 ANEMIA, UNSPECIFIED TYPE: ICD-10-CM

## 2023-02-18 DIAGNOSIS — K92.2 UPPER GI BLEED: ICD-10-CM

## 2023-02-18 DIAGNOSIS — D64.9 ACUTE ON CHRONIC ANEMIA: Primary | ICD-10-CM

## 2023-02-18 PROBLEM — R77.8 ELEVATED TROPONIN: Status: ACTIVE | Noted: 2023-01-01

## 2023-02-18 PROBLEM — K52.89 STERCORAL COLITIS: Status: ACTIVE | Noted: 2023-02-18

## 2023-02-18 LAB
ABO/RH: NORMAL
ALBUMIN SERPL-MCNC: 3.1 G/DL (ref 3.5–5.2)
ALP BLD-CCNC: 62 U/L (ref 40–129)
ALT SERPL-CCNC: 10 U/L (ref 0–40)
AMMONIA: 12 UMOL/L (ref 16–60)
ANION GAP SERPL CALCULATED.3IONS-SCNC: 7 MMOL/L (ref 7–16)
ANISOCYTOSIS: ABNORMAL
ANTIBODY SCREEN: NORMAL
APTT: 26.5 SEC (ref 24.5–35.1)
AST SERPL-CCNC: 8 U/L (ref 0–39)
B.E.: -2.8 MMOL/L (ref -3–3)
BACTERIA: ABNORMAL /HPF
BASOPHILS ABSOLUTE: 0.03 E9/L (ref 0–0.2)
BASOPHILS RELATIVE PERCENT: 0.3 % (ref 0–2)
BILIRUB SERPL-MCNC: 0.6 MG/DL (ref 0–1.2)
BILIRUBIN URINE: NEGATIVE
BLOOD, URINE: NEGATIVE
BUN BLDV-MCNC: 15 MG/DL (ref 6–23)
BURR CELLS: ABNORMAL
C-REACTIVE PROTEIN: 0.9 MG/DL (ref 0–0.4)
CALCIUM SERPL-MCNC: 8.4 MG/DL (ref 8.6–10.2)
CHLORIDE BLD-SCNC: 107 MMOL/L (ref 98–107)
CLARITY: CLEAR
CO2: 23 MMOL/L (ref 22–29)
COHB: 0.4 % (ref 0–1.5)
COLOR: YELLOW
CREAT SERPL-MCNC: 0.8 MG/DL (ref 0.7–1.2)
CRITICAL: ABNORMAL
DATE ANALYZED: ABNORMAL
DATE OF COLLECTION: ABNORMAL
EOSINOPHILS ABSOLUTE: 0.09 E9/L (ref 0.05–0.5)
EOSINOPHILS RELATIVE PERCENT: 1 % (ref 0–6)
GFR SERPL CREATININE-BSD FRML MDRD: >60 ML/MIN/1.73
GLUCOSE BLD-MCNC: 223 MG/DL (ref 74–99)
GLUCOSE URINE: 250 MG/DL
HCO3: 19.6 MMOL/L (ref 22–26)
HCT VFR BLD CALC: 21.9 % (ref 37–54)
HCT VFR BLD CALC: 23.7 % (ref 37–54)
HCT VFR BLD CALC: 25 % (ref 37–54)
HEMOGLOBIN: 7.5 G/DL (ref 12.5–16.5)
HEMOGLOBIN: 7.8 G/DL (ref 12.5–16.5)
HEMOGLOBIN: 8.3 G/DL (ref 12.5–16.5)
HHB: 3.4 % (ref 0–5)
HYPOCHROMIA: ABNORMAL
IMMATURE GRANULOCYTES #: 0.04 E9/L
IMMATURE GRANULOCYTES %: 0.4 % (ref 0–5)
INFLUENZA A BY PCR: NOT DETECTED
INFLUENZA B BY PCR: NOT DETECTED
INR BLD: 1.2
KETONES, URINE: ABNORMAL MG/DL
LAB: ABNORMAL
LACTIC ACID: 1.7 MMOL/L (ref 0.5–2.2)
LEUKOCYTE ESTERASE, URINE: NEGATIVE
LIPASE: 137 U/L (ref 13–60)
LYMPHOCYTES ABSOLUTE: 2.21 E9/L (ref 1.5–4)
LYMPHOCYTES RELATIVE PERCENT: 23.5 % (ref 20–42)
Lab: ABNORMAL
MAGNESIUM: 1.6 MG/DL (ref 1.6–2.6)
MCH RBC QN AUTO: 19.6 PG (ref 26–35)
MCH RBC QN AUTO: 20.1 PG (ref 26–35)
MCHC RBC AUTO-ENTMCNC: 32.9 % (ref 32–34.5)
MCHC RBC AUTO-ENTMCNC: 33.2 % (ref 32–34.5)
MCV RBC AUTO: 59.1 FL (ref 80–99.9)
MCV RBC AUTO: 60.9 FL (ref 80–99.9)
METER GLUCOSE: 175 MG/DL (ref 74–99)
METHB: 0.2 % (ref 0–1.5)
MODE: ABNORMAL
MONOCYTES ABSOLUTE: 0.49 E9/L (ref 0.1–0.95)
MONOCYTES RELATIVE PERCENT: 5.2 % (ref 2–12)
NEUTROPHILS ABSOLUTE: 6.54 E9/L (ref 1.8–7.3)
NEUTROPHILS RELATIVE PERCENT: 69.6 % (ref 43–80)
NITRITE, URINE: NEGATIVE
O2 CONTENT: 12.5 ML/DL
O2 SATURATION: 96.6 % (ref 92–98.5)
O2HB: 96 % (ref 94–97)
OPERATOR ID: 1741
OVALOCYTES: ABNORMAL
PATIENT TEMP: 37 C
PCO2: 25.8 MMHG (ref 35–45)
PDW BLD-RTO: 18.7 FL (ref 11.5–15)
PDW BLD-RTO: 19.9 FL (ref 11.5–15)
PH BLOOD GAS: 7.5 (ref 7.35–7.45)
PH UA: 6 (ref 5–9)
PLATELET # BLD: 287 E9/L (ref 130–450)
PLATELET # BLD: 313 E9/L (ref 130–450)
PMV BLD AUTO: 10.1 FL (ref 7–12)
PMV BLD AUTO: ABNORMAL FL (ref 7–12)
PO2: 86.7 MMHG (ref 75–100)
POIKILOCYTES: ABNORMAL
POLYCHROMASIA: ABNORMAL
POTASSIUM SERPL-SCNC: 4 MMOL/L (ref 3.5–5)
PRO-BNP: 144 PG/ML (ref 0–125)
PROCALCITONIN: 0.11 NG/ML (ref 0–0.08)
PROTEIN UA: 100 MG/DL
PROTHROMBIN TIME: 13 SEC (ref 9.3–12.4)
RBC # BLD: 3.89 E12/L (ref 3.8–5.8)
RBC # BLD: 4.23 E12/L (ref 3.8–5.8)
RBC UA: ABNORMAL /HPF (ref 0–2)
SARS-COV-2, NAAT: DETECTED
SCHISTOCYTES: ABNORMAL
SEDIMENTATION RATE, ERYTHROCYTE: 20 MM/HR (ref 0–15)
SODIUM BLD-SCNC: 137 MMOL/L (ref 132–146)
SOURCE, BLOOD GAS: ABNORMAL
SPECIFIC GRAVITY UA: 1.02 (ref 1–1.03)
TARGET CELLS: ABNORMAL
TEAR DROP CELLS: ABNORMAL
THB: 9.2 G/DL (ref 11.5–16.5)
TIME ANALYZED: 1525
TOTAL PROTEIN: 5.9 G/DL (ref 6.4–8.3)
TROPONIN, HIGH SENSITIVITY: 45 NG/L (ref 0–11)
TROPONIN, HIGH SENSITIVITY: 48 NG/L (ref 0–11)
TROPONIN, HIGH SENSITIVITY: 50 NG/L (ref 0–11)
UROBILINOGEN, URINE: 0.2 E.U./DL
WBC # BLD: 9.2 E9/L (ref 4.5–11.5)
WBC # BLD: 9.4 E9/L (ref 4.5–11.5)
WBC UA: ABNORMAL /HPF (ref 0–5)
YEAST: PRESENT /HPF

## 2023-02-18 PROCEDURE — 85018 HEMOGLOBIN: CPT

## 2023-02-18 PROCEDURE — 85014 HEMATOCRIT: CPT

## 2023-02-18 PROCEDURE — 2580000003 HC RX 258: Performed by: EMERGENCY MEDICINE

## 2023-02-18 PROCEDURE — 85610 PROTHROMBIN TIME: CPT

## 2023-02-18 PROCEDURE — C9113 INJ PANTOPRAZOLE SODIUM, VIA: HCPCS | Performed by: EMERGENCY MEDICINE

## 2023-02-18 PROCEDURE — 83735 ASSAY OF MAGNESIUM: CPT

## 2023-02-18 PROCEDURE — 36415 COLL VENOUS BLD VENIPUNCTURE: CPT

## 2023-02-18 PROCEDURE — 84484 ASSAY OF TROPONIN QUANT: CPT

## 2023-02-18 PROCEDURE — 85651 RBC SED RATE NONAUTOMATED: CPT

## 2023-02-18 PROCEDURE — 82962 GLUCOSE BLOOD TEST: CPT

## 2023-02-18 PROCEDURE — 87502 INFLUENZA DNA AMP PROBE: CPT

## 2023-02-18 PROCEDURE — 87635 SARS-COV-2 COVID-19 AMP PRB: CPT

## 2023-02-18 PROCEDURE — 82805 BLOOD GASES W/O2 SATURATION: CPT

## 2023-02-18 PROCEDURE — 84145 PROCALCITONIN (PCT): CPT

## 2023-02-18 PROCEDURE — 83605 ASSAY OF LACTIC ACID: CPT

## 2023-02-18 PROCEDURE — 85027 COMPLETE CBC AUTOMATED: CPT

## 2023-02-18 PROCEDURE — 86850 RBC ANTIBODY SCREEN: CPT

## 2023-02-18 PROCEDURE — 85025 COMPLETE CBC W/AUTO DIFF WBC: CPT

## 2023-02-18 PROCEDURE — 6360000002 HC RX W HCPCS: Performed by: EMERGENCY MEDICINE

## 2023-02-18 PROCEDURE — 86900 BLOOD TYPING SEROLOGIC ABO: CPT

## 2023-02-18 PROCEDURE — 99223 1ST HOSP IP/OBS HIGH 75: CPT | Performed by: INTERNAL MEDICINE

## 2023-02-18 PROCEDURE — 86901 BLOOD TYPING SEROLOGIC RH(D): CPT

## 2023-02-18 PROCEDURE — 83690 ASSAY OF LIPASE: CPT

## 2023-02-18 PROCEDURE — 96374 THER/PROPH/DIAG INJ IV PUSH: CPT

## 2023-02-18 PROCEDURE — 71045 X-RAY EXAM CHEST 1 VIEW: CPT

## 2023-02-18 PROCEDURE — 99285 EMERGENCY DEPT VISIT HI MDM: CPT

## 2023-02-18 PROCEDURE — 96361 HYDRATE IV INFUSION ADD-ON: CPT

## 2023-02-18 PROCEDURE — 74177 CT ABD & PELVIS W/CONTRAST: CPT

## 2023-02-18 PROCEDURE — 6360000004 HC RX CONTRAST MEDICATION: Performed by: RADIOLOGY

## 2023-02-18 PROCEDURE — 2060000000 HC ICU INTERMEDIATE R&B

## 2023-02-18 PROCEDURE — 93005 ELECTROCARDIOGRAM TRACING: CPT | Performed by: EMERGENCY MEDICINE

## 2023-02-18 PROCEDURE — 82140 ASSAY OF AMMONIA: CPT

## 2023-02-18 PROCEDURE — 85730 THROMBOPLASTIN TIME PARTIAL: CPT

## 2023-02-18 PROCEDURE — 2580000003 HC RX 258: Performed by: INTERNAL MEDICINE

## 2023-02-18 PROCEDURE — 86140 C-REACTIVE PROTEIN: CPT

## 2023-02-18 PROCEDURE — 80053 COMPREHEN METABOLIC PANEL: CPT

## 2023-02-18 PROCEDURE — 81001 URINALYSIS AUTO W/SCOPE: CPT

## 2023-02-18 PROCEDURE — 83880 ASSAY OF NATRIURETIC PEPTIDE: CPT

## 2023-02-18 RX ORDER — SODIUM CHLORIDE 0.9 % (FLUSH) 0.9 %
5-40 SYRINGE (ML) INJECTION EVERY 12 HOURS SCHEDULED
Status: DISCONTINUED | OUTPATIENT
Start: 2023-02-18 | End: 2023-02-21 | Stop reason: HOSPADM

## 2023-02-18 RX ORDER — PANTOPRAZOLE SODIUM 40 MG/10ML
40 INJECTION, POWDER, LYOPHILIZED, FOR SOLUTION INTRAVENOUS ONCE
Status: COMPLETED | OUTPATIENT
Start: 2023-02-18 | End: 2023-02-18

## 2023-02-18 RX ORDER — INSULIN LISPRO 100 [IU]/ML
0-8 INJECTION, SOLUTION INTRAVENOUS; SUBCUTANEOUS EVERY 4 HOURS
Status: DISCONTINUED | OUTPATIENT
Start: 2023-02-18 | End: 2023-02-20

## 2023-02-18 RX ORDER — ONDANSETRON 2 MG/ML
4 INJECTION INTRAMUSCULAR; INTRAVENOUS EVERY 6 HOURS PRN
Status: DISCONTINUED | OUTPATIENT
Start: 2023-02-18 | End: 2023-02-21 | Stop reason: HOSPADM

## 2023-02-18 RX ORDER — SODIUM CHLORIDE 9 MG/ML
INJECTION, SOLUTION INTRAVENOUS CONTINUOUS
Status: DISCONTINUED | OUTPATIENT
Start: 2023-02-18 | End: 2023-02-20

## 2023-02-18 RX ORDER — OLANZAPINE 5 MG/1
7.5 TABLET ORAL NIGHTLY
Status: DISCONTINUED | OUTPATIENT
Start: 2023-02-18 | End: 2023-02-21 | Stop reason: HOSPADM

## 2023-02-18 RX ORDER — DEXTROSE MONOHYDRATE 100 MG/ML
INJECTION, SOLUTION INTRAVENOUS CONTINUOUS PRN
Status: DISCONTINUED | OUTPATIENT
Start: 2023-02-18 | End: 2023-02-21 | Stop reason: HOSPADM

## 2023-02-18 RX ORDER — SODIUM CHLORIDE 9 MG/ML
INJECTION, SOLUTION INTRAVENOUS PRN
Status: DISCONTINUED | OUTPATIENT
Start: 2023-02-18 | End: 2023-02-21 | Stop reason: HOSPADM

## 2023-02-18 RX ORDER — SODIUM CHLORIDE 0.9 % (FLUSH) 0.9 %
5-40 SYRINGE (ML) INJECTION PRN
Status: DISCONTINUED | OUTPATIENT
Start: 2023-02-18 | End: 2023-02-21 | Stop reason: HOSPADM

## 2023-02-18 RX ORDER — 0.9 % SODIUM CHLORIDE 0.9 %
500 INTRAVENOUS SOLUTION INTRAVENOUS ONCE
Status: COMPLETED | OUTPATIENT
Start: 2023-02-18 | End: 2023-02-18

## 2023-02-18 RX ORDER — ACETAMINOPHEN 650 MG/1
650 SUPPOSITORY RECTAL EVERY 6 HOURS PRN
Status: DISCONTINUED | OUTPATIENT
Start: 2023-02-18 | End: 2023-02-19

## 2023-02-18 RX ORDER — BISACODYL 10 MG
10 SUPPOSITORY, RECTAL RECTAL DAILY PRN
Status: DISCONTINUED | OUTPATIENT
Start: 2023-02-18 | End: 2023-02-21 | Stop reason: HOSPADM

## 2023-02-18 RX ORDER — ONDANSETRON 4 MG/1
4 TABLET, ORALLY DISINTEGRATING ORAL EVERY 8 HOURS PRN
Status: DISCONTINUED | OUTPATIENT
Start: 2023-02-18 | End: 2023-02-21 | Stop reason: HOSPADM

## 2023-02-18 RX ORDER — ACETAMINOPHEN 325 MG/1
650 TABLET ORAL EVERY 6 HOURS PRN
Status: DISCONTINUED | OUTPATIENT
Start: 2023-02-18 | End: 2023-02-19

## 2023-02-18 RX ADMIN — SODIUM CHLORIDE 8 MG/HR: 9 INJECTION, SOLUTION INTRAVENOUS at 20:27

## 2023-02-18 RX ADMIN — SODIUM CHLORIDE: 9 INJECTION, SOLUTION INTRAVENOUS at 20:31

## 2023-02-18 RX ADMIN — IOPAMIDOL 75 ML: 755 INJECTION, SOLUTION INTRAVENOUS at 16:04

## 2023-02-18 RX ADMIN — PANTOPRAZOLE SODIUM 40 MG: 40 INJECTION, POWDER, FOR SOLUTION INTRAVENOUS at 15:39

## 2023-02-18 RX ADMIN — SODIUM CHLORIDE 500 ML: 9 INJECTION, SOLUTION INTRAVENOUS at 15:39

## 2023-02-18 ASSESSMENT — PAIN - FUNCTIONAL ASSESSMENT: PAIN_FUNCTIONAL_ASSESSMENT: NONE - DENIES PAIN

## 2023-02-18 NOTE — LETTER
[place] Department Medicaid  CERTIFICATION OF NECESSITY  FOR NON-EMERGENCY TRANSPORTATION   BY GROUND AMBULANCE      Individual Information   1. Name: Mario Alberto Potts 2. [place] Medicaid Billing Number:    3. Address: [facility] [address] [place] [address]      Transportation Provider Information   4. Provider Name:    5. [place] Medicaid Provider Number:  National Provider Identifier (NPI):      Certification  7. Criteria:  During transport, this individual requires:  [x] Medical treatment or continuous     supervision by an EMT. [] The administration or regulation of oxygen by another person. [] Supervised protective restraint. 8. Period Beginning Date:    [place]. Length  [x] Not more than 1 day(s)  [] One Year     Additional Information Relevant to Certification   10. Comments or Explanations, If Necessary or Appropriate     GIB; A&Ox2 with h/o vascular dementia; deconditioned unable to sit up in chair. Certifying Practitioner Information   11. Name of Practitioner: Nory Elizabeth MD   12. [place] Medicaid Provider Number, If Applicable:  [address] Provider Identifier (NPI):     Signature Information   14. Date of Signature: 13. Name of Person Signin. Signature and Professional Designation:        [facility] [id number]  Rev. 2015    [facility] [address] Encounter Date/Time: 2023 [address] Account: [de-identified]    MRN: [id number]    Patient: Mario Alberto Potts    Contact Serial #: [id number]      ENCOUNTER          Patient Class: I Private Enc?   No Unit [facility] 3522/2519-[id number]   Hospital Service: MED   Encounter DX: Upper gastrointestinal b*   ADM Provider: Yuan Doan MD   Procedure:     ATT Provider: Nory Elizabeth MD   REF Provider:        Admission DX: Upper gastrointestinal bleed and DX codes: K92.2      PATIENT                 Name: Mario Alberto Potts : 1955 (67 yrs)   Address: [facility] [address] Sex: Male   [facility] [address]         Marital Status:    Employer: DISABLED         Zoroastrianism: Catholic   Primary Care Provider: Oswaldo Gonzales         Primary Phone: [phone]   EMERGENCY CONTACT   Contact Name Legal Guardian? Relationship to Patient Home Phone Work Phone   1. Esha Barriga  2. Cas Santana    No Child  Child [phone]              GUARANTOR            Guarantor: Melvi Davis     : 1955   Address: [address] [place] [address] Sex: Male   [place]ri [place] [id number]     Relation to Patient: Self       Home Phone: [phone]   Guarantor ID: [id number]       Work Phone:     Guarantor Employer: DISABLED         Status: DISABLED      COVERAGE        PRIMARY INSURANCE   Payor: MEDICARE Plan: MEDICARE PART A AND B   Payor Address: [facility],  [facility], [facility] [address]       Group Number:   Insurance Type: INDEMNITY   Subscriber Name: Larry Friend : 1955   Subscriber ID: [id number] Ean Martino. Rel. to Sub: Self   SECONDARY INSURANCE   Payor: [place]ICAID [place] Plan: [facility], [facility] DEPT OF*   Payor Address:  [facility] [address], [place], [address]          Group Number:   Insurance Type: INDEMNITY   Subscriber Name: Larry Friend : 1955   Subscriber ID: [id number] Pat.  Rel. to Sub: SELF      CSN: [id number]

## 2023-02-18 NOTE — ED PROVIDER NOTES
[facility]        Pt Name: Alok Potts  MRN: [id number]  [place] 1955  Date of evaluation: 2/18/2023  Provider: Gema Woodward DO  PCP: Geneva Cavanaugh  Note Started: 2:14 PM EST 2/18/23    CHIEF COMPLAINT       Chief Complaint   Patient presents with    Emesis     Sent from [facility] for coffee ground emesis x 2 today. Patient Covid Positive. HISTORY OF PRESENT ILLNESS: 1 or more Elements   History From:EMS, PMR, NH    Limitations to history : Altered Mental Status    Alok Potts is a 74 y.o. male who presents via EMS from [facility] rehabilitation for reported coffee-ground emesis. Per report patient had at least 2 episodes of dark bloody emesis. Happened today, on arrival patient will make eye contact nod yes yes and no but is unable to answer additional orientation questions. History significant for recent admission for COVID and GI bleeding. Records that accompany the patient do not reveal he is on anticoagulation. Nursing Notes were all reviewed and agreed with or any disagreements were addressed in the HPI. REVIEW OF EXTERNAL NOTE :       Discharge summary from 2/13/23 - [facility] -was admitted for GI bleeding altered mental status nausea vomiting COVID-19. Had evidence of lactic acidosis pneumonia hypoxic respiratory failure with pulse ox in the 80s. Discharged back to his facility on supplemental oxygen    REVIEW OF SYSTEMS :         ENCOUNTER LIMITATION:    Please note that the HPI, ROS, Past History, and Physical Examination are limited due to this patients due to condition and acuity of illness.         Review of Systems:   A complete review of systems was unable to be performed secondary to the limitations noted above      SURGICAL HISTORY     Past Surgical History:   Procedure Laterality Date    EYE SURGERY      HIP SURGERY Right 11/19/2020    HIP HEMIARTHROPLASTY performed by Nitesh Rose MD at [address] [place] [address] SURGERY Left 12/10/2020    HIP HEMIARTHROPLASTY -- HERB -- DROPLET +     PT. COMING FROM [facility] performed by Nam Demarco MD at [address] 12/14/2022    EGD BIOPSY performed by Yordan Bennett MD at [address]       Current Discharge Medication List        CONTINUE these medications which have NOT CHANGED    Details   guaiFENesin-dextromethorphan (ROBITUSSIN DM) 100-10 MG/5ML syrup Take 5 mLs by mouth every 4 hours as needed for Cough  Qty: 120 mL, Refills: 0      calcium carbonate (TUMS EXTRA STRENGTH 750) 750 MG chewable tablet Take 750 mg by mouth every 12 hours as needed (GERD)      insulin glargine (LANTUS) 100 UNIT/ML injection vial Inject 5 Units into the skin nightly      metFORMIN (GLUCOPHAGE) 500 MG tablet Take 500 mg by mouth in the morning and 500 mg in the evening. pantoprazole (PROTONIX) 40 MG tablet Take 40 mg by mouth daily      senna-docusate (PERICOLACE) 8.6-50 MG per tablet Take 1 tablet by mouth in the morning and 1 tablet in the evening.      sodium chloride 1 g tablet Take 1 g by mouth daily      aspirin 81 MG EC tablet Take 81 mg by mouth daily      OLANZapine (ZYPREXA) 7.5 MG tablet Take 7.5 mg by mouth nightly      rosuvastatin (CRESTOR) 10 MG tablet Take 10 mg by mouth daily      insulin lispro (HUMALOG) 100 UNIT/ML injection vial Inject 0-4 Units into the skin 4 times daily (before meals and nightly) PER SLIDING SCALE: 0-150=0U, 151-200=2U, 201-250=4U, 251-300=6U, 301-350=8U, 351-400=10U, 401-450=12U, 450+=CALL MD      tamsulosin (FLOMAX) 0.4 MG capsule Take 1 capsule by mouth daily  Qty: 30 capsule, Refills: 2      glucose 4 g chewable tablet Take 4 g by mouth as needed for Low blood sugar              ALLERGIES     Jardiance [empagliflozin] and Victoza [liraglutide]    FAMILYHISTORY     No family history on file.      SOCIAL HISTORY       Social History     Tobacco Use    Smoking status: Never    Smokeless tobacco: Never   Substance Use Topics    Alcohol use: No    Drug use: No       SCREENINGS        Neal Coma Scale  Eye Opening: Spontaneous  Best Verbal Response: Oriented  Best Motor Response: Obeys commands  Neal Coma Scale Score: 15                CIWA Assessment  BP: (!) 134/90  Heart Rate: 77           PHYSICAL EXAM  1 or more Elements     ED Triage Vitals [02/18/23 1339]   BP Temp Temp src Heart Rate Resp SpO2 Height Weight   (!) 141/96 98 °F (36.7 °C) -- (!) 109 18 95 % -- --                 Constitutional/General: Alert nods his head yes and no but is not speaking  Head: Normocephalic and atraumatic lactic  Eyes: PERRL, EOMI, conjunctiva normal, sclera non icteric  ENT:  Oropharynx clear, handling secretions, no trismus, no asymmetry of the posterior oropharynx or uvular edema  Neck: Supple, full ROM, no stridor, no meningeal signs  Respiratory: Lungs clear but diminished throughout not in respiratory distress  Cardiovascular:  tachycardic and regular. 2+ distal pulses. Equal extremity pulses. Chest: No chest wall tenderness  GI:  Abdomen Soft, Non tender, Non distended. No rebound, guarding, or rigidity. No pulsatile masses. Does not grimace on exam of palpation of the abdomen  Musculoskeletal: Has long bone deformities. No pretibial edema nor calf tenderness capillary refill <3 seconds  Integument: skin warm and dry. No rashes.    Neurologic: Glascow Coma Scale  Best Eye Response 4 - Opens eyes on own   Best Verbal Response 2 - Moans, makes unintelligible sounds   Best Motor Response 6 - Follows simple motor commands   Total 12                 DIAGNOSTIC RESULTS   LABS:    Labs Reviewed   COVID-19, RAPID - Abnormal; Notable for the following components:       Result Value    SARS-CoV-2, NAAT DETECTED (*)     All other components within normal limits   TROPONIN - Abnormal; Notable for the following components:    Troponin, High Sensitivity 50 (*)     All other components within normal limits   CBC WITH AUTO DIFFERENTIAL - Abnormal; Notable for the following components:    Hemoglobin 8.3 (*)     Hematocrit 25.0 (*)     MCV 59.1 (*)     MCH 19.6 (*)     RDW 19.9 (*)     All other components within normal limits   COMPREHENSIVE METABOLIC PANEL - Abnormal; Notable for the following components:    Glucose 223 (*)     Calcium 8.4 (*)     Total Protein 5.9 (*)     Albumin 3.1 (*)     All other components within normal limits   PROTIME-INR - Abnormal; Notable for the following components:    Protime 13.0 (*)     All other components within normal limits   BRAIN NATRIURETIC PEPTIDE - Abnormal; Notable for the following components:    Pro- (*)     All other components within normal limits   LIPASE - Abnormal; Notable for the following components:    Lipase 137 (*)     All other components within normal limits   AMMONIA - Abnormal; Notable for the following components:    Ammonia 12.0 (*)     All other components within normal limits   URINALYSIS - Abnormal; Notable for the following components:    Glucose, Ur 250 (*)     Ketones, Urine TRACE (*)     Protein,  (*)     All other components within normal limits   SEDIMENTATION RATE - Abnormal; Notable for the following components:    Sed Rate 20 (*)     All other components within normal limits   C-REACTIVE PROTEIN - Abnormal; Notable for the following components:    CRP 0.9 (*)     All other components within normal limits   PROCALCITONIN - Abnormal; Notable for the following components:    Procalcitonin 0.11 (*)     All other components within normal limits   BLOOD GAS, ARTERIAL - Abnormal; Notable for the following components:    pH, Blood Gas 7.498 (*)     PCO2 25.8 (*)     HCO3 19.6 (*)     tHb (est) 9.2 (*)     All other components within normal limits   TROPONIN - Abnormal; Notable for the following components:    Troponin, High Sensitivity 45 (*)     All other components within normal limits   HEMOGLOBIN AND HEMATOCRIT - Abnormal; Notable for the following components:    Hemoglobin 7.5 (*)     Hematocrit 21.9 (*)     All other components within normal limits   MICROSCOPIC URINALYSIS - Abnormal; Notable for the following components:    Bacteria, UA RARE (*)     Yeast, UA Present (*)     All other components within normal limits   CBC - Abnormal; Notable for the following components:    Hemoglobin 7.8 (*)     Hematocrit 23.7 (*)     MCV 60.9 (*)     MCH 20.1 (*)     RDW 18.7 (*)     All other components within normal limits    Narrative:     Collection has been rescheduled by ERIKA at 02/18/2023 22:19 Reason: No   suitable vein for venipuncture   CBC - Abnormal; Notable for the following components:    Hemoglobin 7.9 (*)     Hematocrit 24.7 (*)     MCV 62.5 (*)     MCH 20.0 (*)     RDW 18.9 (*)     All other components within normal limits   COMPREHENSIVE METABOLIC PANEL W/ REFLEX TO MG FOR LOW K - Abnormal; Notable for the following components:    Chloride 108 (*)     CO2 20 (*)     Glucose 154 (*)     Calcium 8.3 (*)     Total Protein 5.5 (*)     Albumin 3.1 (*)     All other components within normal limits   TROPONIN - Abnormal; Notable for the following components:    Troponin, High Sensitivity 48 (*)     All other components within normal limits    Narrative:     Collection has been rescheduled by ERIKA at 02/18/2023 22:19 Reason: No   suitable vein for venipuncture   TROPONIN - Abnormal; Notable for the following components:    Troponin, High Sensitivity 43 (*)     All other components within normal limits   PROTIME-INR - Abnormal; Notable for the following components:    Protime 13.1 (*)     All other components within normal limits   IRON AND TIBC - Abnormal; Notable for the following components:    Iron 14 (*)     Iron Saturation 6 (*)     All other components within normal limits   TROPONIN - Abnormal; Notable for the following components:    Troponin, High Sensitivity 39 (*)     All other components within normal limits   POCT GLUCOSE - Abnormal; Notable for the following components:    Meter Glucose 175 (*)     All other components within normal limits   POCT GLUCOSE - Abnormal; Notable for the following components:    Meter Glucose 184 (*)     All other components within normal limits   POCT GLUCOSE - Abnormal; Notable for the following components:    Meter Glucose 182 (*)     All other components within normal limits   POCT GLUCOSE - Abnormal; Notable for the following components:    Meter Glucose 150 (*)     All other components within normal limits   POCT GLUCOSE - Abnormal; Notable for the following components:    Meter Glucose 141 (*)     All other components within normal limits   POCT GLUCOSE - Abnormal; Notable for the following components:    Meter Glucose 151 (*)     All other components within normal limits   POCT GLUCOSE - Abnormal; Notable for the following components:    Meter Glucose 141 (*)     All other components within normal limits   RAPID INFLUENZA A/B ANTIGENS   APTT   MAGNESIUM   LACTIC ACID   LIPASE   LACTIC ACID   MAGNESIUM   APTT   ARTERIAL BLOOD GAS, RESPIRATORY ONLY   CBC WITH AUTO DIFFERENTIAL   COMPREHENSIVE METABOLIC PANEL   MAGNESIUM   VITAMIN B12 & FOLATE   VITAMIN D 25 HYDROXY   POCT GLUCOSE   POCT GLUCOSE   POCT GLUCOSE   POCT GLUCOSE   POCT GLUCOSE   POCT GLUCOSE   POCT GLUCOSE   POCT GLUCOSE   POCT GLUCOSE   POCT GLUCOSE   POCT GLUCOSE   TYPE AND SCREEN       As interpreted by me, the above displayed labs are abnormal. All other labs obtained during this visit were within normal range or not returned as of this dictation. EKG Interpretation  Interpreted by emergency department physician, Marjorie Mahan, DO      EKG @ 6176: This EKG is signed and interpreted by Dr Wai Madrid.     Rate: 95  Rhythm: Sinus  Interpretation: Sinus rhythm, significant noise artifact, normal axis, CA is 132, QRS is 6 4, QTc is 439, when compared to prior EKG from 2/11/2023 less probably to be waves laterally, however this may be obscured secondary to noise artifact  Comparison: changes compared to previous EKG          RADIOLOGY:   Non-plain film images such as CT, Ultrasound and MRI are read by the radiologist. Plain radiographic images are visualized and preliminarily interpreted by the ED Provider with the below findings: This X-Ray is independently viewed and interpreted by me:   - Study: Chest X-Ray   - Number of Views: 1  - Findings: No infiltrate and No pneumothorax      Interpretation per the Radiologist below, if available at the time of this note:    CT ABDOMEN PELVIS W IV CONTRAST Additional Contrast? None   Final Result   There is fluid in the stomach with moderate size hiatal hernia. Some wall   thickening identified of the distal esophagus to suggest possibly an   esophagitis. Clinical correlation is needed. Increased stool burden seen   diffusely throughout the colon with large stool volume seen in the rectal   vault with wall thickening to suggest a proctitis or stercoral colitis. No   obvious obstruction. Nodularity of the adrenal glands bilaterally larger on the right than left   with macroscopic fat on the right to suggest a myelolipoma. XR CHEST PORTABLE   Final Result   No acute process. No results found. No results found. PROCEDURES   Unless otherwise noted below, none             PAST MEDICAL HISTORY/Chronic Conditions Affecting Care      has a past medical history of Anxiety, Bronchitis, Cellulitis, Chronic sinusitis, COVID (2/11/2023), Depression, Diabetes mellitus ([address] Diabetic retinopathy ([address] Fracture of left foot, High cholesterol, Hypercholesteremia, Hypercholesterolemia, Hypertension, Lumbago, Moderate mood disorder ([address] Third nerve palsy, and Transient alteration of awareness (2/11/2023).      EMERGENCY DEPARTMENT COURSE    Vitals:    Vitals:    02/19/23 0009 02/19/23 0810 02/19/23 1630 02/19/23 1924   BP: 137/87 (!) 153/87 120/83 (!) 134/90   Pulse: 97 80 (!) 101 77   Resp: 18 20 20 18   Temp: 98 °F (36.7 °C) 97.1 °F (36.2 °C) 97.8 °F (36.6 °C) 97.1 °F (36.2 °C)   TempSrc: Temporal Temporal Temporal Temporal   SpO2: 97% 99% 98% 100%   Weight:           Patient was given the following medications:  Medications   0.9 % sodium chloride infusion ( IntraVENous New Bag 2/19/23 1926)   OLANZapine (ZYPREXA) tablet 7.5 mg (7.5 mg Oral Given 2/1955)   sodium chloride flush 0.9 % injection 5-40 mL (5 mLs IntraVENous Not Given 2/19/23 1958)   sodium chloride flush 0.9 % injection 5-40 mL (has no administration in time range)   0.9 % sodium chloride infusion (has no administration in time range)   ondansetron (ZOFRAN-ODT) disintegrating tablet 4 mg (has no administration in time range)     Or   ondansetron (ZOFRAN) injection 4 mg (has no administration in time range)   bisacodyl (DULCOLAX) suppository 10 mg (has no administration in time range)   glucose chewable tablet 16 g (has no administration in time range)   dextrose bolus 10% 125 mL (has no administration in time range)     Or   dextrose bolus 10% 250 mL (has no administration in time range)   glucagon injection 1 mg (has no administration in time range)   dextrose 10 % infusion (has no administration in time range)   insulin lispro (HUMALOG) injection vial 0-8 Units (0 Units SubCUTAneous Not Given 2/19/23 2004)   ferrous sulfate (IRON 325) tablet 325 mg ( Oral Automatically Held 3/1/23 0900)   insulin lispro (HUMALOG) injection vial 0-4 Units ( SubCUTAneous Automatically Held 2/22/23 1700)   insulin lispro (HUMALOG) injection vial 0-4 Units ( SubCUTAneous Automatically Held 2/22/23 2100)   tamsulosin (FLOMAX) capsule 0.4 mg (0.4 mg Oral Given 2/19/23 0943)   sennosides-docusate sodium (SENOKOT-S) 8.6-50 MG tablet 2 tablet (2 tablets Oral Given 2/19/23 1958)   lactobacillus (CULTURELLE) capsule 1 capsule (1 capsule Oral Given 2/19/23 1958)   ferric gluconate (FERRLECIT) 125 mg in sodium chloride 0.9 % 100 mL IVPB (0 mg IntraVENous Stopped 2/19/23 1122)   rosuvastatin (CRESTOR) tablet 10 mg (10 mg Oral Given 2/19/23 1958)   melatonin disintegrating tablet 5 mg (5 mg Oral Given 2/1955)   melatonin disintegrating tablet 5 mg (has no administration in time range)   acetaminophen (TYLENOL) tablet 1,000 mg (has no administration in time range)     Or   acetaminophen (TYLENOL) suppository 650 mg (has no administration in time range)   pantoprazole (PROTONIX) 40 mg in sodium chloride (PF) 0.9 % 10 mL injection (40 mg IntraVENous Not Given 2/19/23 1030)   bisacodyl (DULCOLAX) suppository 10 mg (10 mg Rectal Given 2/19/23 1456)   0.9 % sodium chloride bolus (0 mLs IntraVENous Stopped 2/18/23 1619)   pantoprazole (PROTONIX) injection 40 mg (40 mg IntraVENous Given 2/18/23 1539)   iopamidol (ISOVUE-370) 76 % injection 75 mL (75 mLs IntraVENous Given 2/18/23 1604)   polyethylene glycol (GoLYTELY) solution 4,000 mL (4,000 mLs Oral Given 2/19/23 1501)           Is this patient to be included in the SEP-1 Core Measure due to severe sepsis or septic shock? No   Exclusion criteria - the patient is NOT to be included for SEP-1 Core Measure due to:  May have criteria for sepsis, but does not meet criteria for severe sepsis or septic shock        Medical Decision Making/Differential Diagnosis:    CC/HPI Summary, Social Determinants of health, Records Reviewed, DDx, testing done/not done, ED Course, Reassessment, disposition considerations/shared decision making with patient, consults, disposition:            Medical Decision Making    Patient presents with reported hematemesis. No vomiting on arrival here, no gross blood on rectal exam however occult stool test was positive, he is on supplemental iron, initial H&H. Stable. Baseline, repeat was sent which does show drop. He was initiated on Protonix.   Spoke with medicine patient kept for further care      EKG is ordered to have documentation of patient's current rhythm, and to rule out any obvious acute cardiac illnesses such as ACS. Additionally, QT interval may be of use in decision making regarding any medications administered here in the ED. Patient is placed on cardiac monitor and continuous pulse ox for monitoring. CBC is ordered to evaluate for any signs of infection or inflammation by obtaining a WBC count, or any signs of acute anemia by interpreting hemoglobin. CMP was ordered to evaluate for any electrolyte imbalances, kidney function, or any elevations in anion gap. Troponin ordered to evaluate for possible cardiac etiology of symptoms including but not limited to STEMI or NSTEMI. Urinalysis ordered to evaluate for UTI as the possible cause of symptoms, and may also evaluate hematuria as well. Lipase levels were ordered to evaluate for possible elevations which suggest pancreatic etiology of symptoms. Lactic acid levels were ordered to evaluate for signs of ischemia or decrease perfusion to organ systems. Protime-INR ordered in case hemorrhages are found on imaging that requires anticoagulation reversal or surgical intervention. BNP ordered to evaluate for possible cardiac failure or worsening cardiac function. D-dimer was ordered to evaluate for elevations due to patient's presentation, and if elevated, may suggest possible pulmonary embolism as etiology of symptoms. A CT abdomen with IV contrast was ordered to evaluate for, but without limitation, constipation, small bowel obstruction, bowel ischemia, pneumoperitoneum, diverticulitis, cholecystitis, appendicitis, perforation. Amount and/or Complexity of Data Reviewed  External Data Reviewed: notes. Labs: ordered. Decision-making details documented in ED Course. Radiology: ordered and independent interpretation performed. Decision-making details documented in ED Course. ECG/medicine tests: ordered and independent interpretation performed. Decision-making details documented in ED Course.     Risk  Prescription drug management. Decision regarding hospitalization. Critical Care  Total time providing critical care: 30-74 minutes (Please note that the withdrawal or failure to initiate urgent interventions for this patient would likely result in a life threatening deterioration or permanent disability. Accordingly this patient received 33 minutes of critical care time, excluding separately billable procedures.  )           CONSULTS: (Who and What was discussed)  IP CONSULT TO INTERNAL MEDICINE  IP CONSULT TO GENERAL SURGERY  IP CONSULT TO DIABETES EDUCATOR  IP CONSULT TO CASE MANAGEMENT  IP CONSULT TO DIETITIAN         I am the Primary Clinician of Record. FINAL IMPRESSION      1. Acute on chronic anemia    2. Nursing home resident    3.  Upper GI bleed          DISPOSITION/PLAN     DISPOSITION Admitted 02/18/2023 07:21:24 PM                (Please note that portions of this note were completed with a voice recognition program.  Efforts were made to edit the dictations but occasionally words are mis-transcribed.)    Diane Roy DO (electronically signed)           Diane Roy DO  02/19/23 0688

## 2023-02-19 PROBLEM — K20.90 ESOPHAGITIS: Status: ACTIVE | Noted: 2023-02-19

## 2023-02-19 PROBLEM — Z78.9 FREQUENT HOSPITAL ADMISSIONS: Status: ACTIVE | Noted: 2023-02-19

## 2023-02-19 PROBLEM — K62.89 PROCTITIS: Status: ACTIVE | Noted: 2023-01-01

## 2023-02-19 LAB
ALBUMIN SERPL-MCNC: 3.1 G/DL (ref 3.5–5.2)
ALP BLD-CCNC: 62 U/L (ref 40–129)
ALT SERPL-CCNC: 8 U/L (ref 0–40)
ANION GAP SERPL CALCULATED.3IONS-SCNC: 11 MMOL/L (ref 7–16)
APTT: 26.7 SEC (ref 24.5–35.1)
AST SERPL-CCNC: 9 U/L (ref 0–39)
BILIRUB SERPL-MCNC: 0.7 MG/DL (ref 0–1.2)
BUN BLDV-MCNC: 11 MG/DL (ref 6–23)
CALCIUM SERPL-MCNC: 8.3 MG/DL (ref 8.6–10.2)
CHLORIDE BLD-SCNC: 108 MMOL/L (ref 98–107)
CO2: 20 MMOL/L (ref 22–29)
CREAT SERPL-MCNC: 0.7 MG/DL (ref 0.7–1.2)
GFR SERPL CREATININE-BSD FRML MDRD: >60 ML/MIN/1.73
GLUCOSE BLD-MCNC: 154 MG/DL (ref 74–99)
HCT VFR BLD CALC: 24.7 % (ref 37–54)
HEMOGLOBIN: 7.9 G/DL (ref 12.5–16.5)
INR BLD: 1.2
IRON SATURATION: 6 % (ref 20–55)
IRON: 14 MCG/DL (ref 59–158)
LACTIC ACID: 1.1 MMOL/L (ref 0.5–2.2)
LIPASE: 60 U/L (ref 13–60)
MAGNESIUM: 1.6 MG/DL (ref 1.6–2.6)
MCH RBC QN AUTO: 20 PG (ref 26–35)
MCHC RBC AUTO-ENTMCNC: 32 % (ref 32–34.5)
MCV RBC AUTO: 62.5 FL (ref 80–99.9)
METER GLUCOSE: 118 MG/DL (ref 74–99)
METER GLUCOSE: 141 MG/DL (ref 74–99)
METER GLUCOSE: 141 MG/DL (ref 74–99)
METER GLUCOSE: 150 MG/DL (ref 74–99)
METER GLUCOSE: 151 MG/DL (ref 74–99)
METER GLUCOSE: 182 MG/DL (ref 74–99)
METER GLUCOSE: 184 MG/DL (ref 74–99)
PDW BLD-RTO: 18.9 FL (ref 11.5–15)
PLATELET # BLD: 300 E9/L (ref 130–450)
PMV BLD AUTO: 10.5 FL (ref 7–12)
POTASSIUM REFLEX MAGNESIUM: 3.9 MMOL/L (ref 3.5–5)
PROTHROMBIN TIME: 13.1 SEC (ref 9.3–12.4)
RBC # BLD: 3.95 E12/L (ref 3.8–5.8)
SODIUM BLD-SCNC: 139 MMOL/L (ref 132–146)
TOTAL IRON BINDING CAPACITY: 252 MCG/DL (ref 250–450)
TOTAL PROTEIN: 5.5 G/DL (ref 6.4–8.3)
TROPONIN, HIGH SENSITIVITY: 39 NG/L (ref 0–11)
TROPONIN, HIGH SENSITIVITY: 43 NG/L (ref 0–11)
WBC # BLD: 8.1 E9/L (ref 4.5–11.5)

## 2023-02-19 PROCEDURE — 2580000003 HC RX 258

## 2023-02-19 PROCEDURE — 83690 ASSAY OF LIPASE: CPT

## 2023-02-19 PROCEDURE — 82962 GLUCOSE BLOOD TEST: CPT

## 2023-02-19 PROCEDURE — 6360000002 HC RX W HCPCS

## 2023-02-19 PROCEDURE — 36415 COLL VENOUS BLD VENIPUNCTURE: CPT

## 2023-02-19 PROCEDURE — 85027 COMPLETE CBC AUTOMATED: CPT

## 2023-02-19 PROCEDURE — A4216 STERILE WATER/SALINE, 10 ML: HCPCS

## 2023-02-19 PROCEDURE — 6370000000 HC RX 637 (ALT 250 FOR IP)

## 2023-02-19 PROCEDURE — C9113 INJ PANTOPRAZOLE SODIUM, VIA: HCPCS | Performed by: INTERNAL MEDICINE

## 2023-02-19 PROCEDURE — 83605 ASSAY OF LACTIC ACID: CPT

## 2023-02-19 PROCEDURE — C9113 INJ PANTOPRAZOLE SODIUM, VIA: HCPCS

## 2023-02-19 PROCEDURE — 80053 COMPREHEN METABOLIC PANEL: CPT

## 2023-02-19 PROCEDURE — 6370000000 HC RX 637 (ALT 250 FOR IP): Performed by: INTERNAL MEDICINE

## 2023-02-19 PROCEDURE — 93005 ELECTROCARDIOGRAM TRACING: CPT | Performed by: INTERNAL MEDICINE

## 2023-02-19 PROCEDURE — 2580000003 HC RX 258: Performed by: INTERNAL MEDICINE

## 2023-02-19 PROCEDURE — 83735 ASSAY OF MAGNESIUM: CPT

## 2023-02-19 PROCEDURE — 85610 PROTHROMBIN TIME: CPT

## 2023-02-19 PROCEDURE — 6360000002 HC RX W HCPCS: Performed by: INTERNAL MEDICINE

## 2023-02-19 PROCEDURE — 84484 ASSAY OF TROPONIN QUANT: CPT

## 2023-02-19 PROCEDURE — 83550 IRON BINDING TEST: CPT

## 2023-02-19 PROCEDURE — 83540 ASSAY OF IRON: CPT

## 2023-02-19 PROCEDURE — 85730 THROMBOPLASTIN TIME PARTIAL: CPT

## 2023-02-19 PROCEDURE — 2060000000 HC ICU INTERMEDIATE R&B

## 2023-02-19 RX ORDER — POLYETHYLENE GLYCOL 3350 17 G/17G
17 POWDER, FOR SOLUTION ORAL 2 TIMES DAILY
Status: DISCONTINUED | OUTPATIENT
Start: 2023-02-19 | End: 2023-02-19

## 2023-02-19 RX ORDER — BISACODYL 10 MG
10 SUPPOSITORY, RECTAL RECTAL DAILY
Status: DISCONTINUED | OUTPATIENT
Start: 2023-02-19 | End: 2023-02-21 | Stop reason: HOSPADM

## 2023-02-19 RX ORDER — INSULIN LISPRO 100 [IU]/ML
0-4 INJECTION, SOLUTION INTRAVENOUS; SUBCUTANEOUS
Status: DISCONTINUED | OUTPATIENT
Start: 2023-02-19 | End: 2023-02-21 | Stop reason: HOSPADM

## 2023-02-19 RX ORDER — MECOBALAMIN 5000 MCG
5 TABLET,DISINTEGRATING ORAL NIGHTLY
Status: DISCONTINUED | OUTPATIENT
Start: 2023-02-19 | End: 2023-02-21 | Stop reason: HOSPADM

## 2023-02-19 RX ORDER — INSULIN LISPRO 100 [IU]/ML
0-4 INJECTION, SOLUTION INTRAVENOUS; SUBCUTANEOUS NIGHTLY
Status: DISCONTINUED | OUTPATIENT
Start: 2023-02-19 | End: 2023-02-21 | Stop reason: HOSPADM

## 2023-02-19 RX ORDER — SENNA AND DOCUSATE SODIUM 50; 8.6 MG/1; MG/1
2 TABLET, FILM COATED ORAL 2 TIMES DAILY
Status: DISCONTINUED | OUTPATIENT
Start: 2023-02-19 | End: 2023-02-21 | Stop reason: HOSPADM

## 2023-02-19 RX ORDER — MECOBALAMIN 5000 MCG
5 TABLET,DISINTEGRATING ORAL NIGHTLY PRN
Status: DISCONTINUED | OUTPATIENT
Start: 2023-02-19 | End: 2023-02-21 | Stop reason: HOSPADM

## 2023-02-19 RX ORDER — ACETAMINOPHEN 650 MG/1
650 SUPPOSITORY RECTAL EVERY 6 HOURS PRN
Status: DISCONTINUED | OUTPATIENT
Start: 2023-02-19 | End: 2023-02-21 | Stop reason: HOSPADM

## 2023-02-19 RX ORDER — ROSUVASTATIN CALCIUM 10 MG/1
10 TABLET, COATED ORAL NIGHTLY
Status: DISCONTINUED | OUTPATIENT
Start: 2023-02-19 | End: 2023-02-21 | Stop reason: HOSPADM

## 2023-02-19 RX ORDER — LACTOBACILLUS RHAMNOSUS GG 10B CELL
1 CAPSULE ORAL 2 TIMES DAILY
Status: DISCONTINUED | OUTPATIENT
Start: 2023-02-19 | End: 2023-02-21 | Stop reason: HOSPADM

## 2023-02-19 RX ORDER — FERROUS SULFATE 325(65) MG
325 TABLET ORAL EVERY OTHER DAY
Status: DISCONTINUED | OUTPATIENT
Start: 2023-02-25 | End: 2023-02-21 | Stop reason: HOSPADM

## 2023-02-19 RX ORDER — TAMSULOSIN HYDROCHLORIDE 0.4 MG/1
0.4 CAPSULE ORAL DAILY
Status: DISCONTINUED | OUTPATIENT
Start: 2023-02-19 | End: 2023-02-21 | Stop reason: HOSPADM

## 2023-02-19 RX ORDER — ACETAMINOPHEN 500 MG
1000 TABLET ORAL EVERY 6 HOURS PRN
Status: DISCONTINUED | OUTPATIENT
Start: 2023-02-19 | End: 2023-02-21 | Stop reason: HOSPADM

## 2023-02-19 RX ADMIN — Medication 10 MG: at 14:56

## 2023-02-19 RX ADMIN — SODIUM CHLORIDE 125 MG: 9 INJECTION, SOLUTION INTRAVENOUS at 10:22

## 2023-02-19 RX ADMIN — ROSUVASTATIN 10 MG: 10 TABLET, FILM COATED ORAL at 19:58

## 2023-02-19 RX ADMIN — OLANZAPINE 7.5 MG: 5 TABLET, FILM COATED ORAL at 19:57

## 2023-02-19 RX ADMIN — POLYETHYLENE GLYCOL-3350 AND ELECTROLYTES 4000 ML: 236; 6.74; 5.86; 2.97; 22.74 POWDER, FOR SOLUTION ORAL at 15:01

## 2023-02-19 RX ADMIN — Medication 5 MG: at 19:57

## 2023-02-19 RX ADMIN — TAMSULOSIN HYDROCHLORIDE 0.4 MG: 0.4 CAPSULE ORAL at 09:43

## 2023-02-19 RX ADMIN — Medication 10 ML: at 09:47

## 2023-02-19 RX ADMIN — SODIUM CHLORIDE 8 MG/HR: 9 INJECTION, SOLUTION INTRAVENOUS at 05:38

## 2023-02-19 RX ADMIN — SENNOSIDES AND DOCUSATE SODIUM 2 TABLET: 50; 8.6 TABLET ORAL at 09:42

## 2023-02-19 RX ADMIN — SODIUM CHLORIDE 40 MG: 9 INJECTION, SOLUTION INTRAMUSCULAR; INTRAVENOUS; SUBCUTANEOUS at 22:13

## 2023-02-19 RX ADMIN — Medication 1 CAPSULE: at 19:58

## 2023-02-19 RX ADMIN — POLYETHYLENE GLYCOL 3350 17 G: 17 POWDER, FOR SOLUTION ORAL at 09:43

## 2023-02-19 RX ADMIN — Medication 1 CAPSULE: at 09:43

## 2023-02-19 RX ADMIN — SENNOSIDES AND DOCUSATE SODIUM 2 TABLET: 50; 8.6 TABLET ORAL at 19:58

## 2023-02-19 RX ADMIN — SODIUM CHLORIDE: 9 INJECTION, SOLUTION INTRAVENOUS at 19:26

## 2023-02-19 NOTE — H&P
[facility]  - ADMISSION HISTORY AND PHYSICAL      Patient Name: Eliud Gavin  Unit/Bed: 08/08  YOB: 1955  Medical Record Number: [id number]  Current Hospital Day: Hospital Day: 1  Admit Date: 2/18/2023  Primary Care Provider: Imtiaz Jacobs      Chief Complaint: coffee ground emesis, anemia, upper GI bleed    History of Present Illness: Patient seen and examined at bedside. Eliud Gavin is a 74 y.o. male who presented to the emergency department from an extended care facility on February 18. He was noted at the facility to have coffee-ground emesis. He had 2 episodes of this. Patient was a limited historian on arrival to the ED. He was noted to make eye contact and nod yes and no but not able to answer detailed questions. On my interview, he nods no to pain or trouble breathing. He just repeats that he wants some apple juice. He was recently hospitalized at [facility] from February 11 to February 13. At that time, he was diagnosed with COVID infection. He also had a CT of the chest obtained which showed a possible bibasilar pneumonia. He was initially given Rocephin and doxycycline for antibiotic coverage. He also received dexamethasone due to his COVID infection. CT of the abdomen was obtained and showed distal esophageal thickening suggestive of esophagitis. He was started on a PPI at that time. GI deferred work-up as he had an EGD in December 2022 that showed no bleeding. He was discharged on Augmentin and doxycycline without incident until the day of presentation. Laboratory studies in the emergency department were notable for an initial hemoglobin of 8.3, similar to his discharge hemoglobin on the 13th of 8.5. However, on recheck in the emergency department 4 hours later after 500 mL of IV fluid, his hemoglobin dropped to 7.5. His CBC showed significant microcytosis at 59.1.   Other notable laboratory findings included a glucose of 223, high sensitivity troponin of 45, BNP of 144, CRP of 0.9. CT of the abdomen and pelvis with contrast was obtained and showed distal esophageal thickening, moderate size hiatal hernia, and large stool volume in the rectal vault with obstipation. Therapeutic interventions in the emergency department included a 500 mL bolus of 0.9% normal saline as noted above, pantoprazole 40 mg IV x1 followed by a Protonix drip. He was admitted for further evaluation and treatment.     Patient Active Problem List   Diagnosis    Severe episode of recurrent major depressive disorder, without psychotic features ([address]    Suicidal ideations    SDH (subdural hematoma)    Traumatic subdural hemorrhage with loss of consciousness of 30 minutes or less (HCC)    Diabetic acidosis without coma (HCC)    Acute kidney injury superimposed on CKD (HCC)    Hyponatremia    Right adrenal mass (HCC)    Hyperkalemia    Major depression, recurrent (HCC)    Type 2 diabetes mellitus with complication, with long-term current use of insulin (HCC)    HLD (hyperlipidemia)    HTN (hypertension)    Uncontrolled type 2 diabetes mellitus with hyperglycemia (HCC)    SIRS (systemic inflammatory response syndrome) (HCC)    Weight loss    Generalized abdominal pain    Lactic acidosis    Constipation    High anion gap metabolic acidosis    Non compliance w medication regimen    Physical deconditioning    Dementia, vascular (HCC)    Moderate protein-calorie malnutrition (HCC)    TIA (transient ischemic attack)    DKA, type 1, not at goal [facility])    Ileus ([address]    Adrenal adenoma, right    Hypertensive urgency    Hiccups    Umbilical hernia without obstruction and without gangrene    GERD (gastroesophageal reflux disease)    Depression    Hypotension    Depression, major, recurrent ([address]    Closed right hip fracture, initial encounter [facility]    History of right hip hemiarthroplasty    Closed left hip fracture, initial encounter ([address]    Acute metabolic encephalopathy History of left hip hemiarthroplasty    CLEMENTE (acute kidney injury) ([facility]    Weakness    GI bleed    Leukocytosis    Shortness of breath    Acute anemia    Transient alteration of awareness    COVID    Altered mental state    Nausea and vomiting    Upper gastrointestinal bleed    Elevated troponin    Stercoral colitis       Past Medical History:      Diagnosis Date    Anxiety     Bronchitis     Cellulitis     Chronic sinusitis     COVID 2/11/2023    Depression     Diabetes mellitus ([facility]     Diabetic retinopathy ([facility]     Fracture of left foot     High cholesterol     Hypercholesteremia     Hypercholesterolemia     Hypertension     Lumbago     Moderate mood disorder ([facility]     Third nerve palsy     Transient alteration of awareness 2/11/2023       Past Surgical History:      Procedure Laterality Date    EYE SURGERY      HIP SURGERY Right 11/19/2020    HIP HEMIARTHROPLASTY performed by August Mcduffie MD at [address] [facility] Left 12/10/2020    HIP HEMIARTHROPLASTY -- HERB -- DROPLET +     PT. COMING FROM [place] performed by Guillaume Zamarripa MD at [address] [place] [address] N/A 12/14/2022    EGD BIOPSY performed by Steven Lima MD at [facility] ENDOSCOPY       Family History:  No family history on file. Social History     Socioeconomic History    Marital status:    Tobacco Use    Smoking status: Never    Smokeless tobacco: Never   Substance and Sexual Activity    Alcohol use: No    Drug use: No    Sexual activity: Not Currently         Home Medications:    Prior to Admission medications    Medication Sig Start Date End Date Taking?  Authorizing Provider   guaiFENesin-dextromethorphan (ROBITUSSIN DM) 100-10 MG/5ML syrup Take 5 mLs by mouth every 4 hours as needed for Cough 2/13/23 2/23/23  Rena Santana,    doxycycline hyclate (VIBRA-TABS) 100 MG tablet Take 1 tablet by mouth 2 times daily for 5 days 2/13/23 2/18/23  Rena Santana,    amoxicillin-clavulanate (AUGMENTIN) 875-125 MG per tablet Take 1 tablet by mouth 2 times daily for 5 days 2/13/23 2/18/23  Rena Santana DO   calcium carbonate (TUMS EXTRA STRENGTH 750) 750 MG chewable tablet Take 750 mg by mouth every 12 hours as needed (GERD)    Historical Provider, MD   insulin glargine (LANTUS) 100 UNIT/ML injection vial Inject 5 Units into the skin nightly    Historical Provider, MD   metFORMIN (GLUCOPHAGE) 500 MG tablet Take 500 mg by mouth in the morning and 500 mg in the evening. Historical Provider, MD   pantoprazole (PROTONIX) 40 MG tablet Take 40 mg by mouth daily    Historical Provider, MD   senna-docusate (PERICOLACE) 8.6-50 MG per tablet Take 1 tablet by mouth in the morning and 1 tablet in the evening. Historical Provider, MD   sodium chloride 1 g tablet Take 1 g by mouth daily    Historical Provider, MD   aspirin 81 MG EC tablet Take 81 mg by mouth daily    Historical Provider, MD   OLANZapine (ZYPREXA) 7.5 MG tablet Take 7.5 mg by mouth nightly    Historical Provider, MD   rosuvastatin (CRESTOR) 10 MG tablet Take 10 mg by mouth daily    Historical Provider, MD   insulin lispro (HUMALOG) 100 UNIT/ML injection vial Inject 0-4 Units into the skin 4 times daily (before meals and nightly) PER SLIDING SCALE: 0-150=0U, 151-200=2U, 201-250=4U, 251-300=6U, 301-350=8U, 351-400=10U, 401-450=12U, 450+=CALL MD    Historical Provider, MD   tamsulosin (FLOMAX) 0.4 MG capsule Take 1 capsule by mouth daily 11/23/22 12/23/22  Nayeli Kline, APRN - CNP   glucose 4 g chewable tablet Take 4 g by mouth as needed for Low blood sugar     Historical Provider, MD       Hospital Medications:    Scheduled Meds:   insulin lispro  0-8 Units SubCUTAneous Q4H      Continuous Infusions:   pantoprozole (PROTONIX) infusion      sodium chloride      dextrose       PRN Meds:    Allergies:    Allergies   Allergen Reactions    Jardiance [Empagliflozin] Other (See Comments)     ACIDOSIS    Victoza [Liraglutide] Diarrhea       Review of Systems:  Review of Systems   Unable to perform ROS: Mental status change        Objective:   Vitals: BP (!) 142/89   Pulse (!) 108   Temp 98 °F (36.7 °C)   Resp 18   SpO2 95%     CURRENT TEMPERATURE:  Temp: 98 °F (36.7 °C)  MAXIMUM TEMPERATURE OVER 24HRS:  Temp (24hrs), Av °F (36.7 °C), Min:98 °F (36.7 °C), Max:98 °F (36.7 °C)      Physical Exam  Vitals and nursing note reviewed.   Constitutional:       General: He is not in acute distress.     Appearance: He is ill-appearing. He is not toxic-appearing or diaphoretic.   HENT:      Head: Normocephalic and atraumatic.      Right Ear: External ear normal.      Left Ear: External ear normal.      Nose: Nose normal. No congestion or rhinorrhea.      Mouth/Throat:      Mouth: Mucous membranes are moist.      Pharynx: Oropharynx is clear. No oropharyngeal exudate.   Eyes:      General: No scleral icterus.        Right eye: No discharge.         Left eye: No discharge.      Conjunctiva/sclera: Conjunctivae normal.   Cardiovascular:      Rate and Rhythm: Normal rate and regular rhythm.      Pulses: Normal pulses.      Heart sounds: Normal heart sounds. No murmur heard.    No friction rub. No gallop.   Pulmonary:      Effort: Pulmonary effort is normal. No respiratory distress.      Breath sounds: Normal breath sounds.   Abdominal:      General: There is no distension.      Palpations: Abdomen is soft.      Tenderness: There is no abdominal tenderness.   Musculoskeletal:      Cervical back: Normal range of motion and neck supple. No rigidity. No muscular tenderness.      Right lower leg: No edema.      Left lower leg: No edema.   Lymphadenopathy:      Cervical: No cervical adenopathy.   Skin:     General: Skin is warm.      Findings: No bruising or rash.   Neurological:      General: No focal deficit present.      Mental Status: He is alert and oriented to person, place, and time. Mental status is at baseline.   Psychiatric:         Mood and Affect: Mood normal.     Behavior: Behavior normal.         Thought Content: Thought content normal.         Judgment: Judgment normal.       Diet: No diet orders on file    Data:     Scheduled Meds: Reviewed  Continuous Infusions:   pantoprozole (PROTONIX) infusion      sodium chloride      dextrose         Labs  CBC:   Recent Labs     02/18/23  1432 02/18/23  1819   WBC 9.4  --    HGB 8.3* 7.5*   HCT 25.0* 21.9*     --        BMP:   Recent Labs     02/18/23  1432      K 4.0      CO2 23   BUN 15   CREATININE 0.8   GLUCOSE 223*     LFT's:   Recent Labs     02/18/23  1432   AST 8   ALT 10   BILITOT 0.6   ALKPHOS 62     Troponin: No results for input(s): TROPONINI in the last 72 hours. BNP: No results for input(s): BNP in the last 72 hours. INR:   Recent Labs     02/18/23  1432   INR 1.2     Lipids: No results for input(s): CHOL, HDL in the last 72 hours. Invalid input(s): LDLCALCU  Urinalysis:   Lab Results   Component Value Date/Time    NITRU Negative 02/18/2023 06:16 PM    WBCUA NONE 02/18/2023 06:16 PM    BACTERIA RARE 02/18/2023 06:16 PM    RBCUA NONE 02/18/2023 06:16 PM    BLOODU Negative 02/18/2023 06:16 PM    SPECGRAV 1.025 02/18/2023 06:16 PM    GLUCOSEU 250 02/18/2023 06:16 PM         I/O:  No intake/output data recorded. Radiology:  CT ABDOMEN PELVIS W IV CONTRAST Additional Contrast? None   Final Result   There is fluid in the stomach with moderate size hiatal hernia. Some wall   thickening identified of the distal esophagus to suggest possibly an   esophagitis. Clinical correlation is needed. Increased stool burden seen   diffusely throughout the colon with large stool volume seen in the rectal   vault with wall thickening to suggest a proctitis or stercoral colitis. No   obvious obstruction. Nodularity of the adrenal glands bilaterally larger on the right than left   with macroscopic fat on the right to suggest a myelolipoma. XR CHEST PORTABLE   Final Result   No acute process. Most Recent EKG: tracing from the ED personally reviewed and interpreted. This shows poor baseline. Axis is normal with no obvious ST changes. No evidence of acute ischemia or arrhythmia. Assessment/Plan    Tommy Ba is a 74 y.o. male, who was admitted with Upper gastrointestinal bleed. Active Hospital Problems    Diagnosis Date Noted    Upper gastrointestinal bleed [K92.2] 02/18/2023     Priority: Medium    Elevated troponin [R77.8] 02/18/2023     Priority: Medium    Stercoral colitis [K52.89] 02/18/2023     Priority: Medium    COVID [U07.1] 02/11/2023     Priority: Medium    Type 2 diabetes mellitus with complication, with long-term current use of insulin ([facility] [E11.8, Z79.4] 01/05/2019       Plan:    Upper GI Bleed  -With multiple episodes of coffee-ground emesis. Hemoglobin decreased from 8.3-7.5 in the ED after 4 hours. No recurrent episodes of emesis in the ED.  -Plan to keep n.p.o. for now and consult general surgery for possible endoscopy. -Continue Protonix drip initiated in the ED. Monitor CBC every 6 hours and transfuse for any hemoglobin less than 7.  -Recheck PT and PTT in a.m. tomorrow.  -Hydrate with 0.9% normal saline at 75 mL/h overnight while n.p.o. Blood pressures are preserved. Plan to hydrate cautiously given recent diagnosis of COVID infection  -Hold aspirin and check iron studies in a.m. tomorrow. Elevated troponin  -Initial troponin in the ER was 50 with repeat 45. Review of records show patient has a chronic history of mildly elevated troponins that are adynamic in nature. -Monitor on telemetry with continuous pulse oximetry.   Trend troponin until a.m. tomorrow to ensure no rise.  -Hold off on cardiology consult for now and repeat EKG in a.m. tomorrow    COVID-19 infection/recent pneumonia  -Patient currently has normal oxygen saturation on room air  -Gently hydrate only as discussed above  -patient has essentially completed a 7-day course of IV and oral antibiotics for diagnosis of pneumonia on prior hospitalization with that CT questionable for true bacterial pneumonia findings. We will hold off on further antibiotic therapy at this time. Stercoral colitis  -Consult surgery for this issue as well  -We will order a Dulcolax suppository for patient    Diabetes Mellitus  -baseline hemoglobin A1c of 8.3% on February 13 indicates suboptimal control  -Hold oral agents for now and hold home Lantus of 5 units nightly due to n.p.o. status  -Add sliding scale Humalog n.p.o. medium dosing and adjust as necessary to optimize control. Prophylaxis:  1) Stress Ulcer Protocol Active: IV PPI  2) DVT Protocol Active: SCDs only for now    Code Status:Prior  FEN: No diet orders on file  PT/OT Eval Status: deferred    Outside Medical Record Attestation  I reviewed outside records as part of my evaluation, including records from [facility] hospital from 2/11-2/13. In brief summary, this review showed patient hospitalized for coffee ground emesis and found to have COVID. More details of this review are outlined in both the history of present illness and the assessment/plan.     General Attestation  Evaluation of the patient today involved thorough review of all progress notes, laboratory tests, consults, radiology, and other diagnostic studies as appropriate.    -----------------------------  Dora Maynard MD  Internal Medicine Hospitalist

## 2023-02-19 NOTE — PROGRESS NOTES
Message sent to Dr. Roshan Damico to clarify active problem of suicidal ideations, per bedside nurse the patient is not having any suicidal ideations. Dr. Michael Moore to clarify if further orders were needed. Received message that no further orders were needed.

## 2023-02-19 NOTE — PROGRESS NOTES
Hospitalist Progress Note            Patient: Melvi Davis Age: 74 y.o.   DOA: 2/18/2023 Admit Dx / CC: Upper gastrointestinal bleed [K92.2]   LOS:  LOS: 1 day      Assessment/ Plan:     Acute blood loss anemia on chronic anemia with iron deficiency 2/2 hematemesis from Upper GI Bleed ?pill esophagitis   Pt had multiple episodes of coffee-ground emesis. Pt was on doxycyline and Augmentin prior to admission  Hold ASA  keep n.p.o. for now   Continue Protonix change drip to BID dosing  IVF  Start IV iron  H/H stablized  general surgery consulted for possible endoscopy. Chronically Elevated troponin 2/2 demand  Trend flat  Review of records show patient has a chronic history of mildly elevated troponins that are adynamic in nature. Pt denies CP or SOB     Recent COVID-19 infection/pneumonia  Patient currently has normal oxygen saturation on room air  patient has essentially completed a 7-day course of IV and oral antibiotics for diagnosis of pneumonia on prior hospitalization with that CT questionable for true bacterial pneumonia findings. We will hold off on further antibiotic therapy at this time. Stercoral colitis 2/2 chronic constipation  Tap water enema x1  BM regimen  G. Surgery consulted     Uncontrolled Diabetes Mellitus 2  A1c of 8.3% on February 13 indicates suboptimal control  Holding metformin  holding home Lantus of 5 units nightly due to n.p.o. status  C/w sliding scale Humalog n.p.o. medium dosing and adjust as necessary to optimize control. Vascular dementia with depression    BPH  Resume flomax    HLD  Resume statin    Frequent hospital admissions    Chronic malnutrition  Nutritionist consult    DVT ppx:  SCDs  Code status: Full code    Plan discharge pend plans from [facility]  of care discussed with: patient and bedside RN    CT;  Impression   There is fluid in the stomach with moderate size hiatal hernia.   Some wall   thickening identified of the distal esophagus to suggest possibly an   esophagitis. Clinical correlation is needed. Increased stool burden seen   diffusely throughout the colon with large stool volume seen in the rectal   vault with wall thickening to suggest a proctitis or stercoral colitis. No   obvious obstruction. Nodularity of the adrenal glands bilaterally larger on the right than left   with macroscopic fat on the right to suggest a myelolipoma.      Patient Active Problem List   Diagnosis    Severe episode of recurrent major depressive disorder, without psychotic features ([facility])    Suicidal ideations    SDH (subdural hematoma)    Traumatic subdural hemorrhage with loss of consciousness of 30 minutes or less ([facility])    Diabetic acidosis without coma (HCC)    Acute kidney injury superimposed on CKD ([facility])    Hyponatremia    Right adrenal mass ([facility])    Hyperkalemia    Major depression, recurrent ([facility])    Type 2 diabetes mellitus with complication, with long-term current use of insulin ([facility])    HLD (hyperlipidemia)    HTN (hypertension)    Uncontrolled type 2 diabetes mellitus with hyperglycemia ([facility])    SIRS (systemic inflammatory response syndrome) ([facility])    Weight loss    Generalized abdominal pain    Lactic acidosis    Constipation    High anion gap metabolic acidosis    Non compliance w medication regimen    Physical deconditioning    Dementia, vascular ([facility])    Moderate protein-calorie malnutrition ([facility])    TIA (transient ischemic attack)    DKA, type 1, not at goal [facility])    Ileus ([facility])    Adrenal adenoma, right    Hypertensive urgency    Hiccups    Umbilical hernia without obstruction and without gangrene    GERD (gastroesophageal reflux disease)    Depression    Hypotension    Depression, major, recurrent ([facility])    Closed right hip fracture, initial encounter ([facility])    History of right hip hemiarthroplasty    Closed left hip fracture, initial encounter ([facility])    Acute metabolic encephalopathy    History of left hip hemiarthroplasty    CLEMENTE (acute kidney injury) ([facility]    Weakness    GI bleed    Leukocytosis    Shortness of breath    Acute anemia    Transient alteration of awareness    COVID    Altered mental state    Nausea and vomiting    Upper gastrointestinal bleed    Chronically Elevated troponin    Stercoral colitis    Esophagitis    Frequent hospital admissions    Proctitis        Medications:  Reviewed    Infusion Medications    sodium chloride 100 mL/hr at 02/18/23 2031    sodium chloride      dextrose       Scheduled Medications    [Held by provider] ferrous sulfate  325 mg Oral Every Other Day    [Held by provider] insulin lispro  0-4 Units SubCUTAneous TID WC    [Held by provider] insulin lispro  0-4 Units SubCUTAneous Nightly    tamsulosin  0.4 mg Oral Daily    polyethylene glycol  17 g Oral BID    sennosides-docusate sodium  2 tablet Oral BID    lactobacillus  1 capsule Oral BID    ferric gluconate (FERRLECIT) IVPB  125 mg IntraVENous Daily    rosuvastatin  10 mg Oral Nightly    melatonin  5 mg Oral Nightly    pantoprazole (PROTONIX) 40 mg injection  40 mg IntraVENous Q12H    OLANZapine  7.5 mg Oral Nightly    sodium chloride flush  5-40 mL IntraVENous 2 times per day    insulin lispro  0-8 Units SubCUTAneous Q4H     PRN Meds: melatonin, acetaminophen **OR** acetaminophen, sodium chloride flush, sodium chloride, ondansetron **OR** ondansetron, bisacodyl, glucose, dextrose bolus **OR** dextrose bolus, glucagon (rDNA), dextrose    I/O  No intake or output data in the 24 hours ending 02/19/23 1037    Labs:   Recent Labs     02/18/23  1432 02/18/23  1819 02/18/23  2312 02/19/23  0555   WBC 9.4  --  9.2 8.1   HGB 8.3* 7.5* 7.8* 7.9*   HCT 25.0* 21.9* 23.7* 24.7*     --  287 300       Recent Labs     02/18/23  1432 02/19/23  0555    139   K 4.0 3.9    108*   CO2 23 20*   BUN 15 11   CREATININE 0.8 0.7   CALCIUM 8.4* 8.3*       Recent Labs     02/18/23  1432 02/19/23  0555   PROT 5.9* 5.5*   ALKPHOS 62 62   ALT 10 8   AST 8 9   BILITOT 0.6 0. 7   LIPASE 137* 60       Recent Labs     02/18/23  1432 02/19/23  0555   INR 1.2 1.2       No results for input(s): CKTOTAL, TROPONINI in the last 72 hours. Chronic labs:  Lab Results   Component Value Date    CHOL 114 10/08/2020    TRIG 71 10/08/2020    HDL 41 10/08/2020    LDLCALC 59 10/08/2020    TSH 0.802 02/11/2023    PSA 0.86 09/28/2018    INR 1.2 02/19/2023    LABA1C 8.3 (H) 02/13/2023       Radiology:  Imaging studies reviewed today. Subjective:     Eliud Gavin is doing well, no CP, sob or ABDOMINAL PAIN    Objective:     Physical Exam:   BP (!) 153/87   Pulse 80   Temp 97.1 °F (36.2 °C) (Temporal)   Resp 20   Wt 135 lb (61.2 kg)   SpO2 99%   BMI 20.53 kg/m²     General appearance:in no distress, up in bed, dry lips and mucous membranes  Lungs: Clear to auscultation bilaterally but diminished, no wheezing or crackles   Heart: Regular rate and rhythm, S1, S2 normal   Abdomen: Soft, non-tender and not-distended.  Bowel sounds normal.   Extremities: no edema   Skin: Skin color, texture, turgor normal. No rashes or lesions   Neurologic: Grossly normal and non focal, CN II-XII intact       Angélica Cleveland MD   Hospitalist Service   02/19/23 10:37 AM

## 2023-02-19 NOTE — CONSULTS
GENERAL SURGERY  CONSULT NOTE  2/19/2023    Physician Consulted: Dr. Tillie Lanes   Reason for Consult: GIB, stercoral colitis   Referring Physician: Dr. Jackqulyn Libman     CHASE Marion is a 74 y.o. male hx of DM, depression, non-ambulatory who presents form his facility for reported coffee ground emesis. General surgery consulted for r/o GIB and stercoral colitis. The patient reports no episodes of emesis however was hold an emesis bag during evaluation. He denies any hematemesis or coffee ground emesis. Denies any abdominal pain but does constipation x 4 weeks. Denies any prior abdominal surgeries. Denies any prior colonoscopy. EGD in 12/22 negative for acute GIB. Hgb 8.3 at presentation in ED. Repeat hgb 7.5 -> 7.8, stable at 7.9. Vitals stable. CTAP showing chronic diffuse constipation with large stool burden and some rectal wall thickening. Past Medical History:   Diagnosis Date    Anxiety     Bronchitis     Cellulitis     Chronic sinusitis     COVID 2/11/2023    Depression     Diabetes mellitus ([address]     Diabetic retinopathy ([address]     Fracture of left foot     High cholesterol     Hypercholesteremia     Hypercholesterolemia     Hypertension     Lumbago     Moderate mood disorder ([address]     Third nerve palsy     Transient alteration of awareness 2/11/2023       Past Surgical History:   Procedure Laterality Date    EYE SURGERY      HIP SURGERY Right 11/19/2020    HIP HEMIARTHROPLASTY performed by Mabel Sheets MD at [address] [facility] Left 12/10/2020    HIP HEMIARTHROPLASTY -- HERB -- DROPLET +     PT. COMING FROM [facility] performed by Nancie Rivera MD at [address] [place] [address] N/A 12/14/2022    EGD BIOPSY performed by Pastora Anne MD at [facility] ENDOSCOPY       Medications Prior to Admission:    Prior to Admission medications    Medication Sig Start Date End Date Taking?  Authorizing Provider   guaiFENesin-dextromethorphan (ROBITUSSIN DM) 100-10 MG/5ML syrup Take 5 mLs by mouth every 4 hours as needed for Cough 2/13/23 2/23/23  Rena Santana DO   calcium carbonate (TUMS EXTRA STRENGTH 750) 750 MG chewable tablet Take 750 mg by mouth every 12 hours as needed (GERD)    Historical Provider, MD   insulin glargine (LANTUS) 100 UNIT/ML injection vial Inject 5 Units into the skin nightly    Historical Provider, MD   metFORMIN (GLUCOPHAGE) 500 MG tablet Take 500 mg by mouth in the morning and 500 mg in the evening. Historical Provider, MD   pantoprazole (PROTONIX) 40 MG tablet Take 40 mg by mouth daily    Historical Provider, MD   senna-docusate (PERICOLACE) 8.6-50 MG per tablet Take 1 tablet by mouth in the morning and 1 tablet in the evening. Historical Provider, MD   sodium chloride 1 g tablet Take 1 g by mouth daily    Historical Provider, MD   aspirin 81 MG EC tablet Take 81 mg by mouth daily    Historical Provider, MD   OLANZapine (ZYPREXA) 7.5 MG tablet Take 7.5 mg by mouth nightly    Historical Provider, MD   rosuvastatin (CRESTOR) 10 MG tablet Take 10 mg by mouth daily    Historical Provider, MD   insulin lispro (HUMALOG) 100 UNIT/ML injection vial Inject 0-4 Units into the skin 4 times daily (before meals and nightly) PER SLIDING SCALE: 0-150=0U, 151-200=2U, 201-250=4U, 251-300=6U, 301-350=8U, 351-400=10U, 401-450=12U, 450+=CALL MD    Historical Provider, MD   tamsulosin (FLOMAX) 0.4 MG capsule Take 1 capsule by mouth daily 11/23/22 12/23/22  Nayeli Kline, APRN - CNP   glucose 4 g chewable tablet Take 4 g by mouth as needed for Low blood sugar     Historical Provider, MD       Allergies   Allergen Reactions    Jardiance [Empagliflozin] Other (See Comments)     ACIDOSIS    Victoza [Liraglutide] Diarrhea       No family history on file.     Social History     Tobacco Use    Smoking status: Never    Smokeless tobacco: Never   Substance Use Topics    Alcohol use: No    Drug use: No         Review of Systems   General ROS: negative  Hematological and Lymphatic ROS: negative  Respiratory ROS: no cough, shortness of breath, or wheezing  Cardiovascular ROS: no chest pain or dyspnea on exertion  Gastrointestinal ROS: constipation x 4 weeks   Genito-Urinary ROS: no dysuria, trouble voiding, or hematuria  Musculoskeletal ROS: non-ambulatory      PHYSICAL EXAM:    Vitals:    02/19/23 0810   BP: (!) 153/87   Pulse: 80   Resp: 20   Temp: 97.1 °F (36.2 °C)   SpO2: 99%       General Appearance:  awake, alert, oriented, in no acute distress  Skin:  Skin color, texture, turgor normal. No rashes or lesions. Head/face:  NCAT  Eyes:  No gross abnormalities. , PERRL, and EOMI  Lungs:  no increased work of breathing  Heart:  Heart regular rate and rhythm  Abdomen:  soft, non tender, non distended. Extremities: pulses present in all extremities  Male Rectal:  no hemorrhoids, no mass. Dark brown stool. FOBT +     LABS:  CBC  Recent Labs     02/19/23  0555   WBC 8.1   HGB 7.9*   HCT 24.7*        BMP  Recent Labs     02/19/23  0555      K 3.9   *   CO2 20*   BUN 11   CREATININE 0.7   CALCIUM 8.3*     Liver Function  Recent Labs     02/19/23  0555   LIPASE 60   BILITOT 0.7   AST 9   ALT 8   ALKPHOS 62   PROT 5.5*   LABALBU 3.1*     No results for input(s): LACTATE in the last 72 hours. Recent Labs     02/19/23  0555   INR 1.2       RADIOLOGY    CT ABDOMEN PELVIS W IV CONTRAST Additional Contrast? None    Result Date: 2/18/2023  EXAMINATION: CT OF THE ABDOMEN AND PELVIS WITH CONTRAST 2/18/2023 3:50 pm TECHNIQUE: CT of the abdomen and pelvis was performed with the administration of intravenous contrast. Multiplanar reformatted images are provided for review. Automated exposure control, iterative reconstruction, and/or weight based adjustment of the mA/kV was utilized to reduce the radiation dose to as low as reasonably achievable. COMPARISON: None.  HISTORY: ORDERING SYSTEM PROVIDED HISTORY: upper and pain, for ground emesis, concern for obstruction TECHNOLOGIST PROVIDED HISTORY: Reason for exam:->upper and pain, for ground emesis, concern for obstruction Additional Contrast?->None Decision Support Exception - unselect if not a suspected or confirmed emergency medical condition->Emergency Medical Condition (MA) What reading provider will be dictating this exam?->CRC FINDINGS: Lower Chest: Atelectatic changes seen lung bases bilaterally. Diffuse wall thickening of the distal esophagus. Esophagitis cannot be excluded. Clinical correlation is needed. Organs: The liver is homogeneous in appearance. No stones identified in the gallbladder. Tiny cyst identified in the left lobe of the liver. The spleen is unremarkable. The pancreas is homogeneous in appearance. There is heterogeneous mass identified on the right adrenal gland containing macroscopic fat to suggest a myelolipoma measuring 4.7 cm in its largest dimension. There is a smaller nodule identified on the left adrenal gland measuring 1.8 cm. Symmetric enhancement of the renal parenchyma bilaterally with multiple small cyst.  No stones or distension seen in the renal collecting system. The spleen is unremarkable. GI/Bowel: There is a moderate size hiatal hernia. The stomach is unremarkable. No wall thickening. Small bowel is within normal limits. Increased stool burden seen diffusely throughout the colon to suggest clinical presentation of constipation. No signs of obvious obstruction. Large stool volume to suggest fecal impaction. There is wall thickening of the rectum to suggest a proctitis. Stercoral colitis cannot be completely excluded. No obstructing lesion. Pelvis: The bladder is mildly distended. Large stool volume seen within the rectal vault to suggest fecal impaction. The prostate is unremarkable. Peritoneum/Retroperitoneum: No abdominal retroperitoneal lymphadenopathy. No free fluid or free air. No abnormal mass or fluid collections identified. Small periumbilical hernia containing fat only.  Bones/Soft Tissues: Small umbilical hernia containing fat only. Multilevel degenerative changes seen within the spine. Bilateral hip prosthesis with streak artifact obscuring some image detail within the pelvis. There is fluid in the stomach with moderate size hiatal hernia. Some wall thickening identified of the distal esophagus to suggest possibly an esophagitis. Clinical correlation is needed. Increased stool burden seen diffusely throughout the colon with large stool volume seen in the rectal vault with wall thickening to suggest a proctitis or stercoral colitis. No obvious obstruction. Nodularity of the adrenal glands bilaterally larger on the right than left with macroscopic fat on the right to suggest a myelolipoma. XR CHEST PORTABLE    Result Date: 2/18/2023  EXAMINATION: ONE XRAY VIEW OF THE CHEST 2/18/2023 2:32 pm COMPARISON: None. HISTORY: ORDERING SYSTEM PROVIDED HISTORY: abd pain, n/v TECHNOLOGIST PROVIDED HISTORY: Reason for exam:->abd pain, n/v What reading provider will be dictating this exam?->CRC FINDINGS: The lungs are without acute focal process. There is no effusion or pneumothorax. The cardiomediastinal silhouette is without acute process. The osseous structures are without acute process. No acute process.          ASSESSMENT:  74 y.o. male with microcytic anemia and FOBT + stool, no clinical signs of acute GI hemorrhage, chronic constipation w stercoral colitis     PLAN:  - no acute plans or endoscopy   - aggressive bowel regimen: golytely, suppository and enema   - trend h/h - stable and at his baseline  - protonix for prophylaxis     Discussed with Dr. Saw Caba     Electronically signed by De Spicer MD on 2/19/23 at 11:55 AM EST

## 2023-02-20 ENCOUNTER — ANESTHESIA EVENT (OUTPATIENT)
Dept: ENDOSCOPY | Age: 68
DRG: 368 | End: 2023-02-20
Payer: MEDICARE

## 2023-02-20 ENCOUNTER — APPOINTMENT (OUTPATIENT)
Dept: GENERAL RADIOLOGY | Age: 68
DRG: 380 | End: 2023-02-20
Payer: MEDICARE

## 2023-02-20 PROBLEM — E87.6 HYPOKALEMIA: Status: ACTIVE | Noted: 2023-02-20

## 2023-02-20 PROBLEM — E55.9 VITAMIN D DEFICIENCY: Status: ACTIVE | Noted: 2023-01-01

## 2023-02-20 PROBLEM — Z78.9 POOR PROGNOSIS: Status: ACTIVE | Noted: 2023-02-20

## 2023-02-20 PROBLEM — E83.42 HYPOMAGNESEMIA: Status: ACTIVE | Noted: 2023-01-01

## 2023-02-20 PROBLEM — E44.0 MODERATE PROTEIN-CALORIE MALNUTRITION (HCC): Chronic | Status: ACTIVE | Noted: 2023-01-01

## 2023-02-20 LAB
ALBUMIN SERPL-MCNC: 2.7 G/DL (ref 3.5–5.2)
ALP BLD-CCNC: 59 U/L (ref 40–129)
ALT SERPL-CCNC: 9 U/L (ref 0–40)
ANION GAP SERPL CALCULATED.3IONS-SCNC: 13 MMOL/L (ref 7–16)
ANISOCYTOSIS: ABNORMAL
AST SERPL-CCNC: 10 U/L (ref 0–39)
BASOPHILS ABSOLUTE: 0.05 E9/L (ref 0–0.2)
BASOPHILS RELATIVE PERCENT: 0.6 % (ref 0–2)
BILIRUB SERPL-MCNC: 0.6 MG/DL (ref 0–1.2)
BUN BLDV-MCNC: 9 MG/DL (ref 6–23)
BURR CELLS: ABNORMAL
CALCIUM SERPL-MCNC: 8 MG/DL (ref 8.6–10.2)
CHLORIDE BLD-SCNC: 108 MMOL/L (ref 98–107)
CO2: 20 MMOL/L (ref 22–29)
CREAT SERPL-MCNC: 0.6 MG/DL (ref 0.7–1.2)
EKG ATRIAL RATE: 93 BPM
EKG ATRIAL RATE: 95 BPM
EKG P AXIS: 66 DEGREES
EKG P AXIS: 79 DEGREES
EKG P-R INTERVAL: 124 MS
EKG P-R INTERVAL: 132 MS
EKG Q-T INTERVAL: 350 MS
EKG Q-T INTERVAL: 354 MS
EKG QRS DURATION: 64 MS
EKG QRS DURATION: 68 MS
EKG QTC CALCULATION (BAZETT): 439 MS
EKG QTC CALCULATION (BAZETT): 440 MS
EKG R AXIS: 44 DEGREES
EKG R AXIS: 67 DEGREES
EKG T AXIS: 55 DEGREES
EKG T AXIS: 79 DEGREES
EKG VENTRICULAR RATE: 93 BPM
EKG VENTRICULAR RATE: 95 BPM
EOSINOPHILS ABSOLUTE: 0.15 E9/L (ref 0.05–0.5)
EOSINOPHILS RELATIVE PERCENT: 1.9 % (ref 0–6)
FOLATE: 13.6 NG/ML (ref 4.8–24.2)
GFR SERPL CREATININE-BSD FRML MDRD: >60 ML/MIN/1.73
GLUCOSE BLD-MCNC: 99 MG/DL (ref 74–99)
HCT VFR BLD CALC: 21.8 % (ref 37–54)
HEMOGLOBIN: 7.3 G/DL (ref 12.5–16.5)
HYPOCHROMIA: ABNORMAL
IMMATURE GRANULOCYTES #: 0.04 E9/L
IMMATURE GRANULOCYTES %: 0.5 % (ref 0–5)
LYMPHOCYTES ABSOLUTE: 2.04 E9/L (ref 1.5–4)
LYMPHOCYTES RELATIVE PERCENT: 26.2 % (ref 20–42)
MAGNESIUM: 1.4 MG/DL (ref 1.6–2.6)
MCH RBC QN AUTO: 20.6 PG (ref 26–35)
MCHC RBC AUTO-ENTMCNC: 33.5 % (ref 32–34.5)
MCV RBC AUTO: 61.4 FL (ref 80–99.9)
METER GLUCOSE: 185 MG/DL (ref 74–99)
METER GLUCOSE: 236 MG/DL (ref 74–99)
METER GLUCOSE: 246 MG/DL (ref 74–99)
METER GLUCOSE: 277 MG/DL (ref 74–99)
METER GLUCOSE: 93 MG/DL (ref 74–99)
MONOCYTES ABSOLUTE: 0.38 E9/L (ref 0.1–0.95)
MONOCYTES RELATIVE PERCENT: 4.9 % (ref 2–12)
NEUTROPHILS ABSOLUTE: 5.13 E9/L (ref 1.8–7.3)
NEUTROPHILS RELATIVE PERCENT: 65.9 % (ref 43–80)
PDW BLD-RTO: 18.7 FL (ref 11.5–15)
PLATELET # BLD: 271 E9/L (ref 130–450)
PMV BLD AUTO: 10.8 FL (ref 7–12)
POIKILOCYTES: ABNORMAL
POLYCHROMASIA: ABNORMAL
POTASSIUM SERPL-SCNC: 3.5 MMOL/L (ref 3.5–5)
RBC # BLD: 3.55 E12/L (ref 3.8–5.8)
SCHISTOCYTES: ABNORMAL
SODIUM BLD-SCNC: 141 MMOL/L (ref 132–146)
TARGET CELLS: ABNORMAL
TOTAL PROTEIN: 4.9 G/DL (ref 6.4–8.3)
VITAMIN B-12: 917 PG/ML (ref 211–946)
VITAMIN D 25-HYDROXY: 19 NG/ML (ref 30–100)
WBC # BLD: 7.8 E9/L (ref 4.5–11.5)

## 2023-02-20 PROCEDURE — 36415 COLL VENOUS BLD VENIPUNCTURE: CPT

## 2023-02-20 PROCEDURE — 80053 COMPREHEN METABOLIC PANEL: CPT

## 2023-02-20 PROCEDURE — 82607 VITAMIN B-12: CPT

## 2023-02-20 PROCEDURE — 6370000000 HC RX 637 (ALT 250 FOR IP): Performed by: INTERNAL MEDICINE

## 2023-02-20 PROCEDURE — 99233 SBSQ HOSP IP/OBS HIGH 50: CPT | Performed by: SURGERY

## 2023-02-20 PROCEDURE — 82962 GLUCOSE BLOOD TEST: CPT

## 2023-02-20 PROCEDURE — 97535 SELF CARE MNGMENT TRAINING: CPT

## 2023-02-20 PROCEDURE — 97165 OT EVAL LOW COMPLEX 30 MIN: CPT

## 2023-02-20 PROCEDURE — 6360000002 HC RX W HCPCS

## 2023-02-20 PROCEDURE — 2580000003 HC RX 258: Performed by: INTERNAL MEDICINE

## 2023-02-20 PROCEDURE — 85025 COMPLETE CBC W/AUTO DIFF WBC: CPT

## 2023-02-20 PROCEDURE — 93010 ELECTROCARDIOGRAM REPORT: CPT | Performed by: INTERNAL MEDICINE

## 2023-02-20 PROCEDURE — 82306 VITAMIN D 25 HYDROXY: CPT

## 2023-02-20 PROCEDURE — 6370000000 HC RX 637 (ALT 250 FOR IP): Performed by: SURGERY

## 2023-02-20 PROCEDURE — 97530 THERAPEUTIC ACTIVITIES: CPT

## 2023-02-20 PROCEDURE — C9113 INJ PANTOPRAZOLE SODIUM, VIA: HCPCS

## 2023-02-20 PROCEDURE — 2580000003 HC RX 258

## 2023-02-20 PROCEDURE — 82746 ASSAY OF FOLIC ACID SERUM: CPT

## 2023-02-20 PROCEDURE — A4216 STERILE WATER/SALINE, 10 ML: HCPCS

## 2023-02-20 PROCEDURE — 83735 ASSAY OF MAGNESIUM: CPT

## 2023-02-20 PROCEDURE — 2060000000 HC ICU INTERMEDIATE R&B

## 2023-02-20 PROCEDURE — 6360000002 HC RX W HCPCS: Performed by: INTERNAL MEDICINE

## 2023-02-20 RX ORDER — ERGOCALCIFEROL 1.25 MG/1
50000 CAPSULE ORAL WEEKLY
Qty: 12 CAPSULE | Refills: 0
Start: 2023-02-20

## 2023-02-20 RX ORDER — ERGOCALCIFEROL 1.25 MG/1
50000 CAPSULE ORAL WEEKLY
Status: DISCONTINUED | OUTPATIENT
Start: 2023-02-20 | End: 2023-02-21 | Stop reason: HOSPADM

## 2023-02-20 RX ORDER — FERROUS SULFATE 325(65) MG
325 TABLET ORAL EVERY OTHER DAY
Qty: 30 TABLET | Refills: 3
Start: 2023-02-25

## 2023-02-20 RX ORDER — SODIUM CHLORIDE, SODIUM LACTATE, POTASSIUM CHLORIDE, CALCIUM CHLORIDE 600; 310; 30; 20 MG/100ML; MG/100ML; MG/100ML; MG/100ML
INJECTION, SOLUTION INTRAVENOUS CONTINUOUS
Status: DISCONTINUED | OUTPATIENT
Start: 2023-02-21 | End: 2023-02-21

## 2023-02-20 RX ORDER — CHOLECALCIFEROL (VITAMIN D3) 50 MCG
2000 TABLET ORAL DAILY
Qty: 30 TABLET | Refills: 0 | Status: SHIPPED | OUTPATIENT
Start: 2023-02-20

## 2023-02-20 RX ORDER — PANTOPRAZOLE SODIUM 40 MG/1
40 TABLET, DELAYED RELEASE ORAL 2 TIMES DAILY
Qty: 30 TABLET | Refills: 3
Start: 2023-02-20 | End: 2023-03-22

## 2023-02-20 RX ORDER — BISACODYL 10 MG
10 SUPPOSITORY, RECTAL RECTAL DAILY
Qty: 30 SUPPOSITORY | Refills: 0
Start: 2023-02-20 | End: 2023-03-22

## 2023-02-20 RX ORDER — MAGNESIUM SULFATE IN WATER 40 MG/ML
4000 INJECTION, SOLUTION INTRAVENOUS ONCE
Status: COMPLETED | OUTPATIENT
Start: 2023-02-20 | End: 2023-02-20

## 2023-02-20 RX ORDER — LACTOBACILLUS RHAMNOSUS GG 10B CELL
1 CAPSULE ORAL 2 TIMES DAILY
Qty: 1 CAPSULE | Refills: 0
Start: 2023-02-20

## 2023-02-20 RX ORDER — CHOLECALCIFEROL (VITAMIN D3) 50 MCG
2000 TABLET ORAL DAILY
Status: DISCONTINUED | OUTPATIENT
Start: 2023-02-20 | End: 2023-02-21 | Stop reason: HOSPADM

## 2023-02-20 RX ORDER — BISACODYL 10 MG
10 SUPPOSITORY, RECTAL RECTAL DAILY PRN
Qty: 1 SUPPOSITORY | Refills: 0
Start: 2023-02-20 | End: 2023-03-22

## 2023-02-20 RX ADMIN — Medication 10 ML: at 23:42

## 2023-02-20 RX ADMIN — ERGOCALCIFEROL 50000 UNITS: 1.25 CAPSULE ORAL at 09:40

## 2023-02-20 RX ADMIN — SODIUM CHLORIDE 125 MG: 9 INJECTION, SOLUTION INTRAVENOUS at 09:30

## 2023-02-20 RX ADMIN — Medication 5 MG: at 20:58

## 2023-02-20 RX ADMIN — TAMSULOSIN HYDROCHLORIDE 0.4 MG: 0.4 CAPSULE ORAL at 09:31

## 2023-02-20 RX ADMIN — SODIUM CHLORIDE, POTASSIUM CHLORIDE, SODIUM LACTATE AND CALCIUM CHLORIDE: 600; 310; 30; 20 INJECTION, SOLUTION INTRAVENOUS at 23:42

## 2023-02-20 RX ADMIN — SENNOSIDES AND DOCUSATE SODIUM 2 TABLET: 50; 8.6 TABLET ORAL at 09:30

## 2023-02-20 RX ADMIN — POTASSIUM BICARBONATE 40 MEQ: 782 TABLET, EFFERVESCENT ORAL at 15:43

## 2023-02-20 RX ADMIN — MAGNESIUM SULFATE HEPTAHYDRATE 4000 MG: 40 INJECTION, SOLUTION INTRAVENOUS at 09:49

## 2023-02-20 RX ADMIN — SENNOSIDES AND DOCUSATE SODIUM 2 TABLET: 50; 8.6 TABLET ORAL at 20:58

## 2023-02-20 RX ADMIN — Medication 1 CAPSULE: at 20:58

## 2023-02-20 RX ADMIN — OLANZAPINE 7.5 MG: 5 TABLET, FILM COATED ORAL at 20:58

## 2023-02-20 RX ADMIN — POTASSIUM BICARBONATE 40 MEQ: 782 TABLET, EFFERVESCENT ORAL at 09:36

## 2023-02-20 RX ADMIN — Medication 1 CAPSULE: at 09:31

## 2023-02-20 RX ADMIN — Medication 2000 UNITS: at 09:40

## 2023-02-20 RX ADMIN — ROSUVASTATIN 10 MG: 10 TABLET, FILM COATED ORAL at 20:58

## 2023-02-20 RX ADMIN — Medication 10 ML: at 09:35

## 2023-02-20 RX ADMIN — SODIUM CHLORIDE 40 MG: 9 INJECTION, SOLUTION INTRAMUSCULAR; INTRAVENOUS; SUBCUTANEOUS at 23:42

## 2023-02-20 RX ADMIN — SODIUM CHLORIDE 40 MG: 9 INJECTION, SOLUTION INTRAMUSCULAR; INTRAVENOUS; SUBCUTANEOUS at 09:42

## 2023-02-20 RX ADMIN — POLYETHYLENE GLYCOL-3350 AND ELECTROLYTES 4000 ML: 236; 6.74; 5.86; 2.97; 22.74 POWDER, FOR SOLUTION ORAL at 15:55

## 2023-02-20 RX ADMIN — INSULIN LISPRO 2 UNITS: 100 INJECTION, SOLUTION INTRAVENOUS; SUBCUTANEOUS at 17:52

## 2023-02-20 NOTE — PROGRESS NOTES
Message sent to Dr Preston Martin via perfect serve regarding patient's difficulty finishing the go-lytely.

## 2023-02-20 NOTE — CARE COORDINATION
Pt from [facility]; this CM messaged liaison Melva Devendratho to check what pt needs to return; covid positive however, looks as though pt was prior; awaiting response at time of review; envelope and ambulance form completed in soft-chart; this CM spoke with pt's son Christy Samuel (d/t pt A&O status alert however, disoriented) discharge plan is for pt to return once medically stable; Hg today is 7.3; per General surgery's most recent note, consider O/P colonoscopy (pt refusing at this time however, has never had one); no plans for EGD; daily bowel regimen continued; IV ferrlecit until 2/22, and [email or web address] ml/hr continuous. Case Management Assessment  Initial Evaluation    Date/Time of Evaluation: 2/20/2023 9:36 AM  Assessment Completed by: Will Lozada RN    If patient is discharged prior to next notation, then this note serves as note for discharge by case management. Patient Name: Amada Jon                   YOB: 1955  Diagnosis: Upper gastrointestinal bleed [K92.2]                   Date / Time: 2/18/2023  1:36 PM    Patient Admission Status: Inpatient   Readmission Risk (Low < 19, Mod (19-27), High > 27): Readmission Risk Score: 31.9    Current PCP: Freddy Dudley  PCP verified by CM? Yes    Chart Reviewed: Yes      History Provided by: Other (see comment) (Epic)  Patient Orientation: Person    Patient Cognition: Dementia / Early Alzheimer's    Hospitalization in the last 30 days (Readmission):  Yes    If yes, Readmission Assessment in  Navigator will be completed. Advance Directives:      Code Status: Full Code   Patient's Primary Decision Maker is:      Primary Decision Maker: Ruth Randolph - Child - [phone]    Discharge Planning:    Patient lives with:   Type of Home:    Primary Care Giver:  Other (Comment) ( facility)  Patient Support Systems include:     Current Financial resources:    Current community resources:    Current services prior to admission:              Current DME:              Type of Home Care services:       ADLS  Prior functional level: Assistance with the following:, Bathing, Dressing, Toileting, Cooking, Mobility, Shopping, Housework  Current functional level: Assistance with the following:, Bathing, Dressing, Toileting, Cooking, Housework, Shopping, Mobility    PT AM-PAC:   /24  OT AM-PAC:   /24    Family can provide assistance at DC: No  Would you like Case Management to discuss the discharge plan with any other family members/significant others, and if so, who? No (Pt A&Ox1 plans discussed with pt's son Lisbeth Willard)  Plans to Return to Present Housing: Yes  Other Identified Issues/Barriers to RETURNING to current housing: chronic constipation  Potential Assistance needed at discharge:              Potential DME:    Patient expects to discharge to:    Plan for transportation at discharge:      Financial    Payor: Janeen [place] / Plan: MEDICARE PART A AND B / Product Type: *No Product type* /     Does insurance require precert for SNF: No    Potential assistance Purchasing Medications:    Meds-to-Beds request:        [facility], [address] [place] [address] [address] [place] [address]  Phone: [phone] Fax: [phone]      Notes:    Factors facilitating achievement of predicted outcomes: Family support    Barriers to discharge: Confusion, Impulsivity, Limited safety awareness, Decreased motivation, Limited participation, and Limited insight into deficits    Additional Case Management Notes: The Plan for Transition of Care is related to the following treatment goals of Upper gastrointestinal bleed [E99.2]    IF APPLICABLE: The Patient and/or patient representative Louie Gordon and his family were provided with a choice of provider and agrees with the discharge plan.  Freedom of choice list with basic dialogue that supports the patient's individualized plan of care/goals and shares the quality data associated with the providers was provided to:     Patient Representative Name:       The Patient and/or Patient Representative Agree with the Discharge Plan?       Gurdeep Skinner RN  Case Management Department

## 2023-02-20 NOTE — PLAN OF CARE
Problem: Discharge Planning  Goal: Discharge to home or other facility with appropriate resources  2/19/2023 2054 by Cee Orantes RN  Outcome: Progressing  2/19/2023 1606 by Andrea Gupta RN  Outcome: Progressing     Problem: Skin/Tissue Integrity  Goal: Absence of new skin breakdown  Description: 1. Monitor for areas of redness and/or skin breakdown  2. Assess vascular access sites hourly  3. Every 4-6 hours minimum:  Change oxygen saturation probe site  4. Every 4-6 hours:  If on nasal continuous positive airway pressure, respiratory therapy assess nares and determine need for appliance change or resting period.   2/19/2023 2054 by Cee Orantes RN  Outcome: Progressing  2/19/2023 1606 by Andrea Gupta RN  Outcome: Progressing     Problem: ABCDS Injury Assessment  Goal: Absence of physical injury  2/19/2023 2054 by Cee Orantes RN  Outcome: Progressing  2/19/2023 1606 by Andrea Gupta RN  Outcome: Progressing

## 2023-02-20 NOTE — PROGRESS NOTES
Comprehensive Nutrition Assessment    Type and Reason for Visit:  Initial, Consult    Nutrition Recommendations/Plan:   Recommend and start Ensure Clear supplement daily and Gelatein supplement BID to help meet increased nutritional needs and d/t decreased po intake of meals. Advance diet when medically appropriate. When diet advances, will advance supplement to Glucerna to better help meet nutritional needs. Malnutrition Assessment:  Malnutrition Status: Moderate malnutrition (02/20/23 1257)    Context:  Chronic Illness     Findings of the 6 clinical characteristics of malnutrition:  Energy Intake:  75% or less estimated energy requirements for 1 month or longer  Weight Loss:  Unable to assess (d/t no actual weights available since admission)     Body Fat Loss:   (moderate) Orbital, Triceps   Muscle Mass Loss:   (moderate) Temples (temporalis), Clavicles (pectoralis & deltoids)  Fluid Accumulation:  No significant fluid accumulation     Strength:  Not Performed    Nutrition Assessment:    Patient currently on clear liquid diet ; planned colonoscopy and possible EGD ; adm from nursing home w/ coffee ground emesis x 2 PTA ; possible upper GI bleed ; noted stercoral colitis ; pt also COVID-19 positive ; hx of DM and dementia ; hx of moderate malnutrition ;  pt does meet criteria for moderate malnutrition ; will start ONS    Nutrition Related Findings:    I&Os WNL, no edema, A&O x 2, edentulous, active BS, constipation, muscle/fat wasting ; Wound Type: None       Current Nutrition Intake & Therapies:    Average Meal Intake: 51-75%     ADULT DIET; Clear Liquid  Diet NPO Exceptions are: Sips of Water with Meds    Anthropometric Measures:  Height: 5' 8\" (172.7 cm)  Ideal Body Weight (IBW): 154 lbs (70 kg)    Admission Body Weight: 160 lb (72.6 kg) (2/18, no method)  Current Body Weight: 135 lb (61.2 kg) (2/18, no method), 87.7 % IBW.     Current BMI (kg/m2): 20.5  Usual Body Weight:  (UTO ; EMR shows past weights of 164# bedscale on 12/15/22 and 172# bedscale on 11/12/22)                       BMI Categories: Underweight (BMI less than 22) age over 72    Estimated Daily Nutrient Needs:  Energy Requirements Based On: Formula  Weight Used for Energy Requirements: Current  Energy (kcal/day): 8128-7669 (REE 1373 x 1.2 SF)  Weight Used for Protein Requirements: Current  Protein (g/day): 80-95 (1.3-1.5g/kg CBW)  Method Used for Fluid Requirements: 1 ml/kcal  Fluid (ml/day): 0136-3409    Nutrition Diagnosis:   Moderate malnutrition, In context of chronic illness related to cognitive or neurological impairment (hx of dementia) as evidenced by poor intake prior to admission, moderate loss of subcutaneous fat, moderate muscle loss    Nutrition Interventions:   Food and/or Nutrient Delivery: Continue Current Diet, Start Oral Nutrition Supplement  Nutrition Education/Counseling: Education not indicated  Coordination of Nutrition Care: Continue to monitor while inpatient       Goals:  Previous Goal Met: Progressing toward Goal(s)  Goals: PO intake 75% or greater, by next RD assessment       Nutrition Monitoring and Evaluation:   Behavioral-Environmental Outcomes: None Identified  Food/Nutrient Intake Outcomes: Food and Nutrient Intake, Supplement Intake, Diet Advancement/Tolerance  Physical Signs/Symptoms Outcomes: Biochemical Data, Chewing or Swallowing, GI Status, Constipation, Fluid Status or Edema, Meal Time Behavior, Hemodynamic Status, Nutrition Focused Physical Findings, Skin, Weight    Discharge Planning:     Too soon to determine     Richard Gomez RD, LD  Contact: [phone]

## 2023-02-20 NOTE — CARE COORDINATION
Per Dr Jam Owen, checking with attending would like to hold discharge for colonoscopy; awaiting attending to verify and this CM will cancel transport.

## 2023-02-20 NOTE — CARE COORDINATION
Transport cancelled; pt for colonoscopy tomorrow 2/21; this CM updated liaison Avelino Donis, and pt's son Brown Camacho; discharge plan remains return to [facility] once medically stable; envelope, and ambulance form completed in soft-chart; no rapid covid required at discharge.

## 2023-02-20 NOTE — DISCHARGE INSTR - COC
Continuity of Care Form    Patient Name: Calvin Moritz   :  1955  MRN:  [id number]    Admit date:  2023  Discharge date:  23      Code Status Order: Full Code   Advance Directives:     Admitting Physician:  Domitila Cuevas MD  PCP: [address]    Discharging Nurse: [facility] Unit/Room#: 3226/2099-U  Discharging Unit Phone Number: [phone]    Emergency Contact:   Extended Emergency Contact Information  Primary Emergency Contact: Obdulia Mcmanus  Address: [address], [address] [facility] [place] of [address] Phone: [phone]  Relation: Child  Preferred language: English  Secondary Emergency Contact: [facility]  Address:  [facility],[address]           [address] [facility], [facility] of [address] Phone: [phone]  Mobile Phone: [phone]  Relation: Child  Preferred language: English   needed? No    Past Surgical History:  Past Surgical History:   Procedure Laterality Date    EYE SURGERY      HIP SURGERY Right 2020    HIP HEMIARTHROPLASTY performed by Isamar Patel MD at [address] [facility] Left 12/10/2020    HIP HEMIARTHROPLASTY -- HERB -- DROPLET +     PT. COMING FROM [place] performed by Chhaya Pak MD at [address] N/A 2022    EGD BIOPSY performed by Ramo Gómez MD at [address]       Immunization History:   Immunization History   Administered Date(s) Administered    COVID-19, PFIZER PURPLE top, DILUTE for use, (age 15 y+), 30mcg/0.3mL 2020, 2021       Active Problems:  Patient Active Problem List   Diagnosis Code    Severe episode of recurrent major depressive disorder, without psychotic features ([address] F33.2    Suicidal ideations R45.851    SDH (subdural hematoma) S06. 5XAA    Traumatic subdural hemorrhage with loss of consciousness of 30 minutes or less ([address] S06. 5X1A    Diabetic acidosis without coma (HCC) E11.10 Acute kidney injury superimposed on CKD (HCC) N17.9, N18.9    Hyponatremia E87.1    Right adrenal mass (HCC) E27.8    Hyperkalemia E87.5    Major depression, recurrent (HCC) F33.9    Type 2 diabetes mellitus with complication, with long-term current use of insulin (HCC) E11.8, Z79.4    HLD (hyperlipidemia) E78.5    HTN (hypertension) I10    Uncontrolled type 2 diabetes mellitus with hyperglycemia ([facility]) E11.65    SIRS (systemic inflammatory response syndrome) ([facility]) R65.10    Weight loss R63.4    Generalized abdominal pain R10.84    Lactic acidosis E87.20    Constipation K59.00    High anion gap metabolic acidosis N10.19    Non compliance w medication regimen Z91.14    Physical deconditioning R53.81    Dementia, vascular ([facility]) F01.50    Moderate protein-calorie malnutrition ([facility]) E44.0    TIA (transient ischemic attack) G45.9    DKA, type 1, not at goal [facility]) E10.10    Ileus ([facility] K56.7    Adrenal adenoma, right D35.01    Hypertensive urgency I16.0    Hiccups M51.7    Umbilical hernia without obstruction and without gangrene K42.9    GERD (gastroesophageal reflux disease) K21.9    Depression F32. A    Hypotension I95.9    Depression, major, recurrent (HCC) F33.9    Closed right hip fracture, initial encounter [facility]) S72.001A    History of right hip hemiarthroplasty Z96.641    Closed left hip fracture, initial encounter [facility]) Z27.942L    Acute metabolic encephalopathy U42.54    History of left hip hemiarthroplasty B04.668    CLEMENTE (acute kidney injury) ([facility] N17.9    Weakness R53.1    GI bleed K92.2    Leukocytosis D72.829    Shortness of breath R06.02    Acute anemia D64.9    Transient alteration of awareness R40.4    COVID U07.1    Altered mental state R41.82    Nausea and vomiting R11.2    Upper gastrointestinal bleed K92.2    Chronically Elevated troponin R77.8    Stercoral colitis K52.89    Esophagitis K20.90    Frequent hospital admissions Z78.9    Proctitis K62.89       Isolation/Infection:   Isolation Droplet Plus          Patient Infection Status       Infection Onset Added Last Indicated Last Indicated By Review Planned Expiration Resolved Resolved By    COVID-19 23 COVID-19, Rapid 23      Resolved    COVID-19 (Rule Out) 23 COVID-19, Rapid (Ordered)   23 Rule-Out Test Resulted    COVID-19 (Rule Out) 22 Respiratory Panel, Molecular, with COVID-19 (Restricted: peds pts or suitable admitted adults) (Ordered)   22 Rule-Out Test Resulted    COVID-19 (Rule Out) 22 COVID-19, Rapid (Ordered)   22 Rule-Out Test Resulted    COVID-19 (Rule Out) 22 Respiratory Panel, Molecular, with COVID-19 (Restricted: peds pts or suitable admitted adults) (Ordered)   22 Rule-Out Test Resulted    C-diff Rule Out 22 CLOSTRIDIUM DIFFICILE EIA (Ordered)   22 Rule-Out Test Resulted    COVID-19 20 Respiratory Panel, Molecular, with COVID-19 (Restricted: peds pts or suitable admitted adults)   20     COVID-19 (Rule Out) 20 Respiratory Panel, Molecular, with COVID-19 (Restricted: peds pts or suitable admitted adults) (Ordered)   20 Rule-Out Test Resulted    COVID-19 (Rule Out) 20 Covid-19 Ambulatory (Ordered)   20 Rule-Out Test Resulted    COVID-19 (Rule Out) 10/06/20 10/06/20 10/06/20 COVID-19 (Ordered)   10/06/20 Rule-Out Test Resulted    C-diff Rule Out 20 Clostridium difficile EIA (Ordered)   20 Sunday Murphy, RN            Nurse Assessment:  Last Vital Signs: BP (!) 145/90   Pulse 67   Temp 97.7 °F (36.5 °C) (Temporal)   Resp 18   Wt 135 lb (61.2 kg)   SpO2 96%   BMI 20.53 kg/m²     Last documented pain score (0-10 scale):    Last Weight:   Wt Readings from Last 1 Encounters:   23 135 lb (61.2 kg)     Mental Status: disoriented    IV Access:  - None    Nursing Mobility/ADLs:  Walking   Dependent  Transfer  Dependent  Bathing  Dependent  Dressing  Dependent  Toileting  Dependent  Feeding  Assisted  Med Admin  Assisted  Med Delivery   whole    Wound Care Documentation and Therapy:        Elimination:  Continence: Bowel: No  Bladder: No  Urinary Catheter: None   Colostomy/Ileostomy/Ileal Conduit: No       Date of Last BM: 2/21/23  No intake or output data in the 24 hours ending 02/20/23 0808  No intake/output data recorded. Safety Concerns: At Risk for Falls    Impairments/Disabilities:      Speech and delayed responses    Nutrition Therapy:  Current Nutrition Therapy:   - Oral Diet:  General    Routes of Feeding: Oral  Liquids:  Thin Liquids  Daily Fluid Restriction: no  Last Modified Barium Swallow with Video (Video Swallowing Test): not done    Treatments at the Time of Hospital Discharge:   Respiratory Treatments: No    Oxygen Therapy:  {Therapy; copd oxygen:98028}  Ventilator:    - No ventilator support    Rehab Therapies: Physical Therapy and Occupational Therapy  Weight Bearing Status/Restrictions: No weight bearing restrictions  Other Medical Equipment (for information only, NOT a DME order):  hospital bed  Other Treatments: No    Patient's personal belongings (please select all that are sent with patient):  None    RN SIGNATURE:  Electronically signed by Kamilah Hope RN on 2/21/23 at 3:05 PM EST    CASE MANAGEMENT/SOCIAL WORK SECTION    Inpatient Status Date: ***    Readmission Risk Assessment Score:  Readmission Risk              Risk of Unplanned Readmission:  40           Discharging to Facility/ Agency   Name: [facility]  [facility] [place] [address]  [address]  Fax:[phone]    Dialysis Facility (if applicable)   Name  Address:  Dialysis Schedule:  Phone:  Fax:    / signature: Electronically signed by Caitlyn Chapa RN on 2/20/23 at 8:09 AM EST    PHYSICIAN SECTION    Prognosis: {Prognosis:5240002647}    Condition at Discharge: [address] Patient Condition:179871212}    Rehab Potential (if transferring to Rehab): {Prognosis:1436437232}    Recommended Labs or Other Treatments After Discharge: ***    Physician Certification: I certify the above information and transfer of Mario Alberto Potts  is necessary for the continuing treatment of the diagnosis listed and that he requires {Admit to Appropriate Level of Care:04992} for {GREATER/LESS:448349352} 30 days.      Update Admission H&P: {CHP DME Changes in DFXTH:369235336}    PHYSICIAN SIGNATURE:  {Esignature:622025558}

## 2023-02-20 NOTE — CARE COORDINATION
Discharge order noted; per Estefania Patel no OR to return; no need for rapid covid (pt + from 2/11); transportation set-up for 1400 pick-up; this CM notified charge VITALY Miller, pt's son Tk Samayoa p) [phone], and Joanna liaison. Envelope and ambulance form completed in soft-chart.

## 2023-02-20 NOTE — PROGRESS NOTES
Patient is also refusing an enema at this time. Patient states, \"I don't want you to do anything else. \"

## 2023-02-20 NOTE — PROGRESS NOTES
Diabetes education seen patient today. He was alert and able to answer questions appropriately. States he has been diabetic most of his life. Lives with daughter who helps care for him, but when she is not home he is able to give himself an injection. States he has very few low blood sugars, but was able to verbalize back signs, symptoms,and treatments for low blood sugars. Drinks mainly water at home and states he eats 3 meals daily. Stated he has had diabetes for a long time and is declining education at this time. Contact information and Diabetes Survival book left at bedside.

## 2023-02-20 NOTE — PROGRESS NOTES
Occupational Therapy  OCCUPATIONAL THERAPY INITIAL EVALUATION    [address] [address] [address]      Date:2023                                                Patient Name: Roger Yan  MRN: [id number]  : 1955  Room: [facility]    Evaluating [address] [place] [address] [id number]    Referring Provider: Ella Yuen MD  Specific Provider Orders/Date: OT eval and treat 23    Diagnosis: Upper gastrointestinal bleed [K92.2]   Pt admitted to hospital on 23 for coffee ground emesis    Pertinent Medical History:  has a past medical history of Anxiety, Bronchitis, Cellulitis, Chronic sinusitis, COVID, Depression, Diabetes mellitus ([address] Diabetic retinopathy ([address] Fracture of left foot, High cholesterol, Hypercholesteremia, Hypercholesterolemia, Hypertension, Lumbago, Moderate mood disorder ([address] Third nerve palsy, and Transient alteration of awareness.        Precautions:  Fall Risk, cognition, incontinent, droplet+ (covid+), TAPS, bed alarm    Assessment of current deficits    [x] Functional mobility  [x]ADLs  [x] Strength               [x]Cognition    [x] Functional transfers   [x] IADLs         [x] Safety Awareness   [x]Endurance    [] Fine Coordination              [x] Balance      [] Vision/perception   []Sensation     []Gross Motor Coordination  [] ROM  [] Delirium                   [] Motor Control     OT PLAN OF CARE   OT POC based on physician orders, patient diagnosis and results of clinical assessment    Frequency/Duration 1-3 days/wk for 2 weeks PRN   Specific OT Treatment Interventions to include:   * Instruction/training on adapted ADL techniques and AE recommendations to increase functional independence within precautions       * Training on energy conservation strategies, correct breathing pattern and techniques to improve independence/tolerance for self-care routine  * Functional transfer/mobility training/DME recommendations for increased independence, safety, and fall prevention  * Patient/Family education to increase follow through with safety techniques and functional independence  * Recommendation of environmental modifications for increased safety with functional transfers/mobility and ADLs  * Cognitive retraining/development of therapeutic activities to improve problem solving, judgement, memory, and attention for increased safety/participation in ADL/IADL tasks  * Therapeutic exercise to improve motor endurance, ROM, and functional strength for ADLs/functional transfers  * Therapeutic activities to facilitate/challenge dynamic balance, stand tolerance for increased safety and independence with ADLs  * Therapeutic activities to facilitate gross/fine motor skills for increased independence with ADLs    Recommended Adaptive Equipment: TBD     Home Living: Pt admitted from NH (per chart)    Prior Level of Function: unable to obtain PLOF from pt. Family/caregiver not present    Pain Level: No c/o or observed pain this session    Cognition: A&O: 1/4 (to self);  Follows 1 step, basic directions inconsistently (~75% of session)  Delayed processing speed  Minimal verbal communication - pt does nod head 'yes,no' and said full name during orientation assessment   Memory:  poor   Sequencing:  fair -   Problem solving:  poor   Judgement/safety:  poor     Functional Assessment:  AM-PAC Daily Activity Raw Score: 11/24   Initial Eval Status  Date: 2/20/23 Treatment Status  Date: STGs = LTGs  Time frame: 10-14 days   Feeding Minimal Assist   Supervision    Grooming Moderate Assist   To wash hands and face  Stand by Assist    UB Dressing Moderate Assist   To don/doff gown while seated EOB  Stand by Assist    LB Dressing Dependent   Moderate Assist    Bathing Maximal Assist  Moderate Assist    Toileting Dependent   Including hygiene task  Incontinent of large BM  Moderate Assist    Bed Mobility  Supine to sit: Maximal Assist   Sit to supine: Moderate Assist   Supine to sit: Minimal Assist   Sit to supine: Minimal Assist    Functional Transfers Attempted; unable to complete sit>stand elevated EOB w/ max A x1 and max cues  Moderate Assist    Functional Mobility NT  -  Will continue to assess   Balance Sitting:     Static:  SBA    Dynamic:Min A  Standing: NT     Activity Tolerance Fair-  Fair+   Visual/  Perceptual Glasses: no, not present                  Hand Dominance R   AROM (PROM) Strength Additional Info:    RUE  WFL Unable to follow commands/cues for formal MMT      B UE appear WFL - noted during ADL's and mobility good  and wfl FMC/dexterity noted during ADL tasks       LUE WFL Unable to follow commands/cues for MMT good  and wfl FMC/dexterity noted during ADL tasks       Hearing: [place]/[employer] [place]  Sensation:  unable to assess d/t cognition  Tone: WFL   Edema: none noted    Comments: Upon arrival patient lying in bed. Therapist educated pt on role of OT. RN clearance. At end of session, patient semi-supine in bed (bed alarm on) with call light and phone within reach, all lines and tubes intact. Overall patient demonstrated decreased independence and safety during completion of ADL/functional transfer/mobility tasks. Pt would benefit from continued skilled OT to increase safety and independence with completion of ADL/IADL tasks for functional independence and quality of life. Treatment: OT treatment provided this date includes: Facilitation of bed mobility (rolled L/ R w/ education/cues for body mechanics, sequencing and safety) and toileting task (pt incontinent of extensive BM at room entry. Increased time for hygiene task). Facilitated supine>Sit EOB (w/ education/cues for safety and sequencing) and unsupported sitting balance (addressing posture, weight shifting, dynamic reaching to prep for ADL's. Pt tolerated ~10 minutes EOB for static and dynamic tasks).  Therapist facilitated self-care retraining: UB self-care tasks and seated grooming tasks while educating pt on modified techniques, posture, safety and energy conservation techniques. Attempted sit>stand EOB 2x however unable to complete w/ max A x1 and increased cues. Will continue to assess. Facilitated sit>supine and repositioning for comfort (onto L side w/ TAPS) - reinforced benefits for skin and joint integrity. Skilled monitoring of HR, O2 sats and pts response to treatment. Pt on room air. O2 sat=^93% at rest and during/post activity. Rehab Potential: Good for established goals     Patient / Family Goal: not stated      Patient and/or family were instructed on functional diagnosis, prognosis/goals and OT plan of care. Demonstrated poor understanding. Eval Complexity: Low    Time In: 13:40  Time Out: 14:20  Total Treatment Time: 24 minutes    Min Units   OT Eval Low 97165  X  1   OT Eval Medium 96925      OT Eval High 87285      OT Re-Eval [id number]       Therapeutic Ex 36860       Therapeutic Activities 21880  11  1   ADL/Self Care 97907  13  1   Orthotic Management 13866       Manual [id number]     Neuro Re-Ed 78702       Non-Billable Time          Evaluation Time additionally includes thorough review of current medical information, gathering information on past medical history/social history and prior level of function, interpretation of standardized testing/informal observation of tasks, assessment of data and development of plan of care and goals.         LATISHA PlummerR/L #[id number]

## 2023-02-20 NOTE — PROGRESS NOTES
[facility]  PROGRESS NOTE  ATTENDING NOTE    Chief Complaint   Patient presents with    Emesis     Sent from [facility] for coffee ground emesis x 2 today. Patient Covid Positive. S: 29-year-old male who was sent in for sleep facility for coffee-ground emesis. He also has a significant constipation and rectal thickening on his CAT scan. There is no record of any prior colonoscopies. He states he does not have a history of having had a colonoscopy. He is agreeable to colonoscopy today for tomorrow. He did drink about 80% of his GoLytely last night however I do not believe he is fully clear. BP (!) 145/90   Pulse 67   Temp 97.7 °F (36.5 °C) (Temporal)   Resp 18   Wt 135 lb (61.2 kg)   SpO2 96%   BMI 20.53 kg/m²   Gen:  NAD  ABd:  soft, ND, NT, palpable stool in colon     ASSESSMENT/PLAN:  Chronic anemia  Coffee-ground emesis  Proctitis    Plan for EGD and colonoscopy tomorrow. I called his daughter per his request, but had to leave VM    I recommended esophagogastroduodenoscopy with possible biopsy and explained the risk, benefits, expected outcome, and alternatives to the procedure. Risks included but are not limited to bleeding, infection, respiratory distress, hypotension, and perforation of the esophagus, stomach, or duodenum. Patient understands and is in agreement. Colonoscopy. The patient was explained the risks/benefits/alternatives/expected outcomes of the procedure. The patient was explained the risks of the procedure, including, but not limited to, the risk of reaction to the anesthesia medicine and the risk of perforation requiring further surgery. The patient was informed that they may require biopsy or polypectomy. These procedures may increase the risk of complication. All questions were answered. The patient verbalized understanding and agreed to proceed.       Elizabeth Hay MD, MSc, FACS  2/20/2023  10:43 AM

## 2023-02-20 NOTE — PROGRESS NOTES
Pt having multiple large liquid bms frequently. Pt denies any discomfort pt drank 80% of go lytely. Will continue to monitor at this time.

## 2023-02-20 NOTE — PROGRESS NOTES
Patient is refusing NG placement. RN asked patient three times, explaining reasoning for the NG-patient shakes his head no with each request to place. Patient again refuses drinking any of the Go-lytely at this time. Surgical resident notified via perfect serve of patient's continued refusals.

## 2023-02-20 NOTE — PROGRESS NOTES
GENERAL SURGERY  DAILY PROGRESS NOTE  2/20/2023    CHIEF COMPLAINT:  Chief Complaint   Patient presents with    Emesis     Sent from [facility] for coffee ground emesis x 2 today. Patient Covid Positive. SUBJECTIVE:  Drank nearly all of the GoLytely yesterday and had multiple large bowel movements. Feeling much less distended today, did not respond to any my questions except for review having abdominal pain? And he shook his head no. OBJECTIVE:  BP (!) 151/94   Pulse 64   Temp 97.8 °F (36.6 °C) (Temporal)   Resp 18   Wt 135 lb (61.2 kg)   SpO2 96%   BMI 20.53 kg/m²     GENERAL:  NAD. LUNGS: Nonlabored breathing on room air  CARDIOVASCULAR: RR, hypertensive  ABDOMEN:  Soft, non-distended, minimally-tender. No guarding, rigidity, rebound.     ASSESSMENT/PLAN:  74 y.o. male with stercoral colitis, microcytic anemia    -Continue daily bowel regimen: Dulcolax suppository and oral Senokot  -Monitor hemoglobin and transfuse blood products as needed  -Continue Protonix  -No plans for inpatient endoscopy  -Consider outpatient colonoscopy as patient has never had 1, although he is refusing    Peter Rodriguez DO  Surgery Resident PGY-1  2/20/2023  4:49 AM

## 2023-02-20 NOTE — PROGRESS NOTES
Hospitalist Progress Note            Patient: Grace Marx Age: 74 y.o.   DOA: 2/18/2023 Admit Dx / CC: Upper gastrointestinal bleed [K92.2]   LOS:  LOS: 2 days      Assessment/ Plan:     Acute blood loss anemia on chronic anemia with iron deficiency 2/2 hematemesis from Upper GI Bleed ?pill esophagitis   Pt had multiple episodes of coffee-ground emesis. Pt was on doxycyline and Augmentin prior to admission  Hold ASA  Clear liquid diet today and NPO after MN  Continue Protonix BID dosing until discharge  D/c IVF  C/w IV iron  H/H stablized  general surgery/ Dr Jam Owen today recommended EGD and colonoscopy tomorrow     Chronically Elevated troponin 2/2 demand  Trend flat  Review of records show patient has a chronic history of mildly elevated troponins that are adynamic in nature. Pt denies CP or SOB     Recent COVID-19 infection/pneumonia  Patient currently has normal oxygen saturation on room air  patient has essentially completed a 7-day course of IV and oral antibiotics for diagnosis of pneumonia on prior hospitalization with that CT questionable for true bacterial pneumonia findings. We will hold off on further antibiotic therapy at this time. Stercoral colitis 2/2 chronic constipation  C/w BM regimen  GS recommend OP colonoscopy as pt never had one but currently pt refuses     Uncontrolled Diabetes Mellitus 2  A1c of 8.3% on February 13 indicates suboptimal control  Holding metformin  holding home Lantus of 5 units nightly   ISS    Vascular dementia with depression    BPH  Resumed flomax    HLD  Resumed statin    Frequent hospital admissions    Chronic malnutrition  Nutritionist consulted    DVT ppx:  SCDs  Code status: Full code    Plan discharge back to SNF tomorrow pend EGD/colonoscopy    Plan of care discussed with: patient and bedside RN, Dr Jam Owen, CM    CT;  Impression   There is fluid in the stomach with moderate size hiatal hernia.   Some wall   thickening identified of the distal esophagus to suggest possibly an   esophagitis.  Clinical correlation is needed.  Increased stool burden seen   diffusely throughout the colon with large stool volume seen in the rectal   vault with wall thickening to suggest a proctitis or stercoral colitis.  No   obvious obstruction.       Nodularity of the adrenal glands bilaterally larger on the right than left   with macroscopic fat on the right to suggest a myelolipoma.     Patient Active Problem List   Diagnosis    Severe episode of recurrent major depressive disorder, without psychotic features ([facility])    Suicidal ideations    SDH (subdural hematoma)    Traumatic subdural hemorrhage with loss of consciousness of 30 minutes or less (HCC)    Diabetic acidosis without coma (HCC)    Acute kidney injury superimposed on CKD (HCC)    Hyponatremia    Right adrenal mass (HCC)    Hyperkalemia    Major depression, recurrent (HCC)    Type 2 diabetes mellitus with complication, with long-term current use of insulin (HCC)    HLD (hyperlipidemia)    HTN (hypertension)    Uncontrolled type 2 diabetes mellitus with hyperglycemia (HCC)    SIRS (systemic inflammatory response syndrome) ([facility])    Weight loss    Generalized abdominal pain    Lactic acidosis    Constipation    High anion gap metabolic acidosis    Non compliance w medication regimen    Physical deconditioning    Dementia, vascular (HCC)    Moderate protein-calorie malnutrition (HCC)    TIA (transient ischemic attack)    DKA, type 1, not at goal ([facility])    Ileus (HCC)    Adrenal adenoma, right    Hypertensive urgency    Hiccups    Umbilical hernia without obstruction and without gangrene    GERD (gastroesophageal reflux disease)    Depression    Hypotension    Depression, major, recurrent (HCC)    Closed right hip fracture, initial encounter ([facility])    History of right hip hemiarthroplasty    Closed left hip fracture, initial encounter ([facility])    Acute metabolic encephalopathy    History of left hip hemiarthroplasty  CLEMENTE (acute kidney injury) ([facility] [address]    Weakness    GI bleed    Leukocytosis    Shortness of breath    Acute anemia    Transient alteration of awareness    COVID    Altered mental state    Nausea and vomiting    Upper gastrointestinal bleed    Chronically Elevated troponin    Stercoral colitis    Esophagitis    Frequent hospital admissions    Proctitis    Hypokalemia    Hypomagnesemia    Vitamin D deficiency    Poor prognosis        Medications:  Reviewed    Infusion Medications    [START ON 2/21/2023] lactated ringers IV soln      sodium chloride      dextrose       Scheduled Medications    magnesium sulfate  4,000 mg IntraVENous Once    potassium bicarb-citric acid  40 mEq Oral Q4H    vitamin D  50,000 Units Oral Weekly    vitamin D  2,000 Units Oral Daily    polyethylene glycol  4,000 mL Oral Once    [Held by provider] ferrous sulfate  325 mg Oral Every Other Day    insulin lispro  0-4 Units SubCUTAneous TID WC    insulin lispro  0-4 Units SubCUTAneous Nightly    tamsulosin  0.4 mg Oral Daily    sennosides-docusate sodium  2 tablet Oral BID    lactobacillus  1 capsule Oral BID    ferric gluconate (FERRLECIT) IVPB  125 mg IntraVENous Daily    rosuvastatin  10 mg Oral Nightly    melatonin  5 mg Oral Nightly    pantoprazole (PROTONIX) 40 mg injection  40 mg IntraVENous Q12H    bisacodyl  10 mg Rectal Daily    OLANZapine  7.5 mg Oral Nightly    sodium chloride flush  5-40 mL IntraVENous 2 times per day    insulin lispro  0-8 Units SubCUTAneous Q4H     PRN Meds: white petrolatum, melatonin, acetaminophen **OR** acetaminophen, sodium chloride flush, sodium chloride, ondansetron **OR** ondansetron, bisacodyl, glucose, dextrose bolus **OR** dextrose bolus, glucagon (rDNA), dextrose    I/O  No intake or output data in the 24 hours ending 02/20/23 1106    Labs:   Recent Labs     02/18/23  2312 02/19/23  0555 02/20/23  0700   WBC 9.2 8.1 7.8   HGB 7.8* 7.9* 7.3*   HCT 23.7* 24.7* 21.8*    300 271       Recent Labs 02/18/23  1432 02/19/23  0555 02/20/23  0700    139 141   K 4.0 3.9 3.5    108* 108*   CO2 23 20* 20*   BUN 15 11 9   CREATININE 0.8 0.7 0.6*   CALCIUM 8.4* 8.3* 8.0*       Recent Labs     02/18/23  1432 02/19/23  0555 02/20/23  0700   PROT 5.9* 5.5* 4.9*   ALKPHOS 62 62 59   ALT 10 8 9   AST 8 9 10   BILITOT 0.6 0.7 0.6   LIPASE 137* 60  --        Recent Labs     02/18/23  1432 02/19/23  0555   INR 1.2 1.2       No results for input(s): Rachelle Shaikh in the last 72 hours. Chronic labs:  Lab Results   Component Value Date    CHOL 114 10/08/2020    TRIG 71 10/08/2020    HDL 41 10/08/2020    LDLCALC 59 10/08/2020    TSH 0.802 02/11/2023    PSA 0.86 09/28/2018    INR 1.2 02/19/2023    LABA1C 8.3 (H) 02/13/2023       Radiology:  Imaging studies reviewed today. Subjective:     Adolfo Mir is doing well, no new complaints. Asked for orange juice    Objective:     Physical Exam:   BP (!) 145/90   Pulse 67   Temp 97.7 °F (36.5 °C) (Temporal)   Resp 18   Wt 135 lb (61.2 kg)   SpO2 96%   BMI 20.53 kg/m²     General appearance:in no distress, up in bed. Lungs: Clear to auscultation bilaterally but diminished, no wheezing or crackles   Heart: Regular rate and rhythm, S1, S2 normal   Abdomen: Soft, non-tender and not-distended.  Bowel sounds normal.   Extremities: no edema   Neurologic: Grossly normal and non focal, CN II-XII intact       Kristian Garcia MD   Hospitalist Service   02/20/23 11:06 AM

## 2023-02-21 ENCOUNTER — ANESTHESIA (OUTPATIENT)
Dept: ENDOSCOPY | Age: 68
DRG: 368 | End: 2023-02-21
Payer: MEDICARE

## 2023-02-21 VITALS
TEMPERATURE: 97.2 F | BODY MASS INDEX: 20.46 KG/M2 | SYSTOLIC BLOOD PRESSURE: 140 MMHG | WEIGHT: 135 LBS | HEIGHT: 68 IN | RESPIRATION RATE: 16 BRPM | OXYGEN SATURATION: 98 % | DIASTOLIC BLOOD PRESSURE: 87 MMHG | HEART RATE: 78 BPM

## 2023-02-21 PROBLEM — K44.9 HIATAL HERNIA: Status: ACTIVE | Noted: 2023-01-01

## 2023-02-21 PROBLEM — K62.3 RECTAL PROLAPSE: Status: ACTIVE | Noted: 2023-02-21

## 2023-02-21 LAB
METER GLUCOSE: 132 MG/DL (ref 74–99)
METER GLUCOSE: 150 MG/DL (ref 74–99)
METER GLUCOSE: 189 MG/DL (ref 74–99)

## 2023-02-21 PROCEDURE — 6360000002 HC RX W HCPCS

## 2023-02-21 PROCEDURE — 7100000000 HC PACU RECOVERY - FIRST 15 MIN: Performed by: SURGERY

## 2023-02-21 PROCEDURE — 82962 GLUCOSE BLOOD TEST: CPT

## 2023-02-21 PROCEDURE — 2580000003 HC RX 258

## 2023-02-21 PROCEDURE — C9113 INJ PANTOPRAZOLE SODIUM, VIA: HCPCS

## 2023-02-21 PROCEDURE — 7100000001 HC PACU RECOVERY - ADDTL 15 MIN: Performed by: SURGERY

## 2023-02-21 PROCEDURE — 45378 DIAGNOSTIC COLONOSCOPY: CPT | Performed by: SURGERY

## 2023-02-21 PROCEDURE — 43239 EGD BIOPSY SINGLE/MULTIPLE: CPT | Performed by: SURGERY

## 2023-02-21 PROCEDURE — 3700000000 HC ANESTHESIA ATTENDED CARE: Performed by: SURGERY

## 2023-02-21 PROCEDURE — 88305 TISSUE EXAM BY PATHOLOGIST: CPT

## 2023-02-21 PROCEDURE — 2580000003 HC RX 258: Performed by: INTERNAL MEDICINE

## 2023-02-21 PROCEDURE — 3609012400 HC EGD TRANSORAL BIOPSY SINGLE/MULTIPLE: Performed by: SURGERY

## 2023-02-21 PROCEDURE — 3609027000 HC COLONOSCOPY: Performed by: SURGERY

## 2023-02-21 PROCEDURE — A4216 STERILE WATER/SALINE, 10 ML: HCPCS

## 2023-02-21 PROCEDURE — 0DB38ZX EXCISION OF LOWER ESOPHAGUS, VIA NATURAL OR ARTIFICIAL OPENING ENDOSCOPIC, DIAGNOSTIC: ICD-10-PCS | Performed by: SURGERY

## 2023-02-21 PROCEDURE — 88342 IMHCHEM/IMCYTCHM 1ST ANTB: CPT

## 2023-02-21 PROCEDURE — 0DJD8ZZ INSPECTION OF LOWER INTESTINAL TRACT, VIA NATURAL OR ARTIFICIAL OPENING ENDOSCOPIC: ICD-10-PCS | Performed by: SURGERY

## 2023-02-21 PROCEDURE — 6360000002 HC RX W HCPCS: Performed by: INTERNAL MEDICINE

## 2023-02-21 PROCEDURE — 88312 SPECIAL STAINS GROUP 1: CPT

## 2023-02-21 PROCEDURE — 2709999900 HC NON-CHARGEABLE SUPPLY: Performed by: SURGERY

## 2023-02-21 PROCEDURE — 3700000001 HC ADD 15 MINUTES (ANESTHESIA): Performed by: SURGERY

## 2023-02-21 RX ORDER — AMOXICILLIN 250 MG
2 CAPSULE ORAL 2 TIMES DAILY
Qty: 1 TABLET | Refills: 0
Start: 2023-02-21

## 2023-02-21 RX ORDER — SODIUM CHLORIDE 9 MG/ML
INJECTION, SOLUTION INTRAVENOUS CONTINUOUS PRN
Status: DISCONTINUED | OUTPATIENT
Start: 2023-02-21 | End: 2023-02-21 | Stop reason: SDUPTHER

## 2023-02-21 RX ORDER — POLYETHYLENE GLYCOL 3350 17 G/17G
17 POWDER, FOR SOLUTION ORAL 2 TIMES DAILY
Qty: 527 G | Refills: 1
Start: 2023-02-21 | End: 2023-03-23

## 2023-02-21 RX ORDER — POLYETHYLENE GLYCOL 3350 17 G/17G
17 POWDER, FOR SOLUTION ORAL 2 TIMES DAILY
Status: DISCONTINUED | OUTPATIENT
Start: 2023-02-21 | End: 2023-02-21 | Stop reason: HOSPADM

## 2023-02-21 RX ORDER — PROPOFOL 10 MG/ML
INJECTION, EMULSION INTRAVENOUS PRN
Status: DISCONTINUED | OUTPATIENT
Start: 2023-02-21 | End: 2023-02-21 | Stop reason: SDUPTHER

## 2023-02-21 RX ADMIN — SODIUM CHLORIDE: 9 INJECTION, SOLUTION INTRAVENOUS at 14:05

## 2023-02-21 RX ADMIN — Medication 10 ML: at 09:53

## 2023-02-21 RX ADMIN — SODIUM CHLORIDE 125 MG: 9 INJECTION, SOLUTION INTRAVENOUS at 09:53

## 2023-02-21 RX ADMIN — SODIUM CHLORIDE 40 MG: 9 INJECTION, SOLUTION INTRAMUSCULAR; INTRAVENOUS; SUBCUTANEOUS at 10:54

## 2023-02-21 RX ADMIN — PROPOFOL 260 MG: 10 INJECTION, EMULSION INTRAVENOUS at 14:13

## 2023-02-21 ASSESSMENT — LIFESTYLE VARIABLES: SMOKING_STATUS: 0

## 2023-02-21 ASSESSMENT — ENCOUNTER SYMPTOMS: SHORTNESS OF BREATH: 1

## 2023-02-21 NOTE — PROGRESS NOTES
Discharge info reviewed with patient. IV's and heart monitor removed at this time.  Patient eating dinner while awaiting transports arrival.

## 2023-02-21 NOTE — PROGRESS NOTES
Pt refusing to drink Golytely. Pt agreed to drink some juice at this time. Will continue to monitor pt at this time.

## 2023-02-21 NOTE — OP NOTE
[facility]  ENDOSCOPY LAB  UPPER ENDOSCOPY REPORT      DATE OF PROCEDURE: 2/21/2023     PREOPERATIVE DIAGNOSIS: Anemia    POSTOPERATIVE DIAGNOSIS/FINDINGS: LA grade D esophagitis, large hiatal hernia, mild gastritis    SURGEON: Gurdeep Gray MD    ASSISTANT: None    OPERATION: Esophagogastroduodenoscopy with biopsies    ANESTHESIA: Local monitored anesthesia. COMPLICATIONS: None. EBL: 5 cc    SPECIMENS: Esophageal biopsies    PRIOR TO EXAM: Gen: comfortable, no distress, awake and alert; Lungs: Clear;  Heart:regular rate and rhythm, normal S1S2     BRIEF HISTORY:  This is a 74 y.o. male who presents with the complaint of anemia. My recommendation is to proceed with esophagogastroduodenoscopy. The patient was advised of the risks, benefits, complications and options including the risk of bleeding and perforation. The patient understood and agreed to proceed. PROCEDURE:  Under [facility] anesthesia, the patient was positioned in the left side down decubitus position. A bite block was inserted. Under direct visualization the scope was passed through the oral cavity, into the esophagus and then into the stomach. The scope was then passed  into the duodenum.  Findings are as follows:    Duodenum: Normal to D3    Antrum/pylorus: Mild gastritis    Gastric body: Normal    Gastric fundus/cardia: Hill grade 4 hiatus    Esophagus: LA grade D esophagitis of the distal esophagus, sloughing seen, biopsies taken with cold forceps    GEJ: At 35 cm    THE PATIENT TOLERATED THE PROCEDURE WELL      PLAN:  Continue PPI  F/u biopsies      Gurdeep Gray MD  2/21/2023  3:03 PM esophagitis  _____  No, I do not concur with the pathologist's findings of ulcerative esophagitis  _____  Other, (please specify)  _____  Unable to clinically determine     Electronically signed by Rainer Madden on 2/28/2023 at 11:11 PM

## 2023-02-21 NOTE — ANESTHESIA PRE PROCEDURE
Department of Anesthesiology  Preprocedure Note       Name:  Vicki Walden   Age:  74 y.o.  :  1955                                          MRN:  [id number]         Date:  2023      Surgeon: Alejandrina Slater):  Sadi Everett MD    Procedure: Procedure(s):  EGD ESOPHAGOGASTRODUODENOSCOPY  COLONOSCOPY DIAGNOSTIC    Medications prior to admission:   Prior to Admission medications    Medication Sig Start Date End Date Taking?  Authorizing Provider   lactobacillus (CULTURELLE) CAPS capsule Take 1 capsule by mouth 2 times daily 23  Yes Teresa Cristina MD   ferrous sulfate (IRON 325) 325 (65 Fe) MG tablet Take 1 tablet by mouth every other day 23  Yes Teresa Cristina MD   bisacodyl (DULCOLAX) 10 MG suppository Place 1 suppository rectally daily 2/20/23 3/22/23 Yes Teresa Cristina MD   bisacodyl (DULCOLAX) 10 MG suppository Place 1 suppository rectally daily as needed for Constipation 2/20/23 3/22/23 Yes Teresa Cristina MD   white petrolatum OINT ointment Apply topically 2 times daily as needed (dry skin) 23  Yes Teresa Cristina MD   vitamin D (CHOLECALCIFEROL) 50 MCG (2000 UT) TABS tablet Take 1 tablet by mouth daily 23  Yes Teresa Cristina MD   Ergocalciferol (VITAMIN D) 25652 units CAPS Take 50,000 Units by mouth once a week 23  Yes Teresa Cristina MD   pantoprazole (PROTONIX) 40 MG tablet Take 1 tablet by mouth in the morning and at bedtime 2/20/23 3/22/23 Yes Teresa Cristina MD   guaiFENesin-dextromethorphan [facility] DM) 100-10 MG/5ML syrup Take 5 mLs by mouth every 4 hours as needed for Cough 23  Rena Satnana DO   calcium carbonate (TUMS EXTRA STRENGTH 750) 750 MG chewable tablet Take 750 mg by mouth every 12 hours as needed (GERD)    Historical Provider, MD   insulin glargine (LANTUS) 100 UNIT/ML injection vial Inject 5 Units into the skin nightly    Historical Provider, MD   metFORMIN (GLUCOPHAGE) 500 MG tablet Take 500 mg by mouth in the morning and 500 mg in the evening. Historical Provider, MD   senna-docusate (PERICOLACE) 8.6-50 MG per tablet Take 1 tablet by mouth in the morning and 1 tablet in the evening.     Historical Provider, MD   aspirin 81 MG EC tablet Take 81 mg by mouth daily    Historical Provider, MD   OLANZapine (ZYPREXA) 7.5 MG tablet Take 7.5 mg by mouth nightly    Historical Provider, MD   rosuvastatin (CRESTOR) 10 MG tablet Take 10 mg by mouth daily    Historical Provider, MD   insulin lispro (HUMALOG) 100 UNIT/ML injection vial Inject 0-4 Units into the skin 4 times daily (before meals and nightly) PER SLIDING SCALE: 0-150=0U, 151-200=2U, 201-250=4U, 251-300=6U, 301-350=8U, 351-400=10U, 401-450=12U, 450+=CALL MD    Historical Provider, MD   tamsulosin (FLOMAX) 0.4 MG capsule Take 1 capsule by mouth daily 11/23/22 12/23/22  MIRA Glass - CNP   glucose 4 g chewable tablet Take 4 g by mouth as needed for Low blood sugar     Historical Provider, MD       Current medications:    Current Facility-Administered Medications   Medication Dose Route Frequency Provider Last Rate Last Admin    white petrolatum ointment   Topical BID PRN Ramos Johnson MD        vitamin D (ERGOCALCIFEROL) capsule 50,000 Units  50,000 Units Oral Weekly Ramos Johnson MD   50,000 Units at 02/20/23 0940    vitamin D (CHOLECALCIFEROL) tablet 2,000 Units  2,000 Units Oral Daily Ramos Johnson MD   2,000 Units at 02/20/23 0940    lactated ringers IV soln infusion   IntraVENous Continuous Yt Bagent,  mL/hr at 02/20/23 2342 New Bag at 02/20/23 2342    [Held by provider] ferrous sulfate (IRON 325) tablet 325 mg  325 mg Oral Every Other Day Ramos Johnson MD        insulin lispro (HUMALOG) injection vial 0-4 Units  0-4 Units SubCUTAneous TID [facility] Ramos Johnson MD   2 Units at 02/20/23 1752    insulin lispro (HUMALOG) injection vial 0-4 Units  0-4 Units SubCUTAneous Nightly Ramos Johnson MD        tamsulosin (FLOMAX) capsule 0.4 mg  0.4 mg Oral Daily Love Fitzpatrick MD   0.4 mg at 02/20/23 0931    sennosides-docusate sodium (SENOKOT-S) 8.6-50 MG tablet 2 tablet  2 tablet Oral BID Love Fitzpatrick MD   2 tablet at 02/20/23 2058    lactobacillus (CULTURELLE) capsule 1 capsule  1 capsule Oral BID Love Fitzpatrick MD   1 capsule at 02/20/23 2058    rosuvastatin (CRESTOR) tablet 10 mg  10 mg Oral Nightly Love Fitzpatrick MD   10 mg at 02/20/23 2058    melatonin disintegrating tablet 5 mg  5 mg Oral Nightly Love Fitzpatrick MD   5 mg at 02/20/23 2058    melatonin disintegrating tablet 5 mg  5 mg Oral Nightly PRN Love Fitzpatrick MD        acetaminophen (TYLENOL) tablet 1,000 mg  1,000 mg Oral Q6H PRN Love Fitzpatrick MD        Or    acetaminophen (TYLENOL) suppository 650 mg  650 mg Rectal Q6H PRN Love Fitzpatrick MD        pantoprazole (PROTONIX) 40 mg in sodium chloride (PF) 0.9 % 10 mL injection  40 mg IntraVENous Q12H Lexi Sullivan MD   40 mg at 02/21/23 1054    bisacodyl (DULCOLAX) suppository 10 mg  10 mg Rectal Daily Luis Maher MD   10 mg at 02/19/23 1456    OLANZapine (ZYPREXA) tablet 7.5 mg  7.5 mg Oral Nightly Domitila Cuevas MD   7.5 mg at 02/20/23 2058    sodium chloride flush 0.9 % injection 5-40 mL  5-40 mL IntraVENous 2 times per day Domitila Cuevas MD   10 mL at 02/21/23 0953    sodium chloride flush 0.9 % injection 5-40 mL  5-40 mL IntraVENous PRN Domitila Cuevas MD        0.9 % sodium chloride infusion   IntraVENous PRN Domitila Cuevas MD        ondansetron (ZOFRAN-ODT) disintegrating tablet 4 mg  4 mg Oral Q8H PRN Domitila Cuevas MD        Or    ondansetron [facility]) injection 4 mg  4 mg IntraVENous Q6H PRN Domitila Cuevas MD        bisacodyl (DULCOLAX) suppository 10 mg  10 mg Rectal Daily PRN Domitila Cuevas MD        glucose chewable tablet 16 g  4 tablet Oral PRN Domitila Cuevas MD        dextrose bolus 10% 125 mL  125 mL IntraVENous PRN Domitila Cuevas MD        Or    dextrose bolus 10% 250 mL  250 mL IntraVENous PRN Davion Carl MD        glucagon injection 1 mg  1 mg SubCUTAneous PRN Davion Carl MD        dextrose 10 % infusion   IntraVENous Continuous PRN Davion Carl MD           Allergies: Allergies   Allergen Reactions    Jardiance [Empagliflozin] Other (See Comments)     ACIDOSIS    Victoza [Liraglutide] Diarrhea       Problem List:    Patient Active Problem List   Diagnosis Code    Severe episode of recurrent major depressive disorder, without psychotic features ([email or web address] F33.2    Suicidal ideations R45.851    SDH (subdural hematoma) S06. 5XAA    Traumatic subdural hemorrhage with loss of consciousness of 30 minutes or less ([email or web address] S06. 8X1A    Diabetic acidosis without coma (HCC) E11.10    Acute kidney injury superimposed on CKD (HCC) N17.9, N18.9    Hyponatremia E87.1    Right adrenal mass (HCC) E27.8    Hyperkalemia E87.5    Major depression, recurrent (HCC) F33.9    Type 2 diabetes mellitus with complication, with long-term current use of insulin (HCC) E11.8, Z79.4    HLD (hyperlipidemia) E78.5    HTN (hypertension) I10    Uncontrolled type 2 diabetes mellitus with hyperglycemia (HCC) E11.65    SIRS (systemic inflammatory response syndrome) ([facility]) R65.10    Weight loss R63.4    Generalized abdominal pain R10.84    Lactic acidosis E87.20    Constipation K59.00    High anion gap metabolic acidosis D12.08    Non compliance w medication regimen Z91.14    Physical deconditioning R53.81    Dementia, vascular (HCC) F01.50    Moderate protein-calorie malnutrition (HCC) E44.0    TIA (transient ischemic attack) G45.9    DKA, type 1, not at goal ([facility] [address] E10.10    Ileus (HCC) K56.7    Adrenal adenoma, right D35.01    Hypertensive urgency I16.0    Hiccups G44.2    Umbilical hernia without obstruction and without gangrene K42.9    GERD (gastroesophageal reflux disease) K21.9    Depression F32. A    Hypotension I95.9    Depression, major, recurrent (HCC) F33.9    Closed right hip fracture, initial encounter ([facility] S72.001A    History of right hip hemiarthroplasty Z96.641    Closed left hip fracture, initial encounter ([facility] S72.002A    Acute metabolic encephalopathy A12.03    History of left hip hemiarthroplasty X35.237    CLEMENTE (acute kidney injury) ([facility] N17.9    Weakness R53.1    GI bleed K92.2    Leukocytosis D72.829    Shortness of breath R06.02    Acute anemia D64.9    Transient alteration of awareness R40.4    COVID U07.1    Altered mental state R41.82    Nausea and vomiting R11.2    Upper gastrointestinal bleed K92.2    Chronically Elevated troponin R77.8    Stercoral colitis K52.89    Esophagitis K20.90    Frequent hospital admissions Z78.9    Proctitis K62.89    Hypokalemia E87.6    Hypomagnesemia E83.42    Vitamin D deficiency E55.9    Poor prognosis Z78.9       Past Medical History:        Diagnosis Date    Anxiety     Bronchitis     Cellulitis     Chronic sinusitis     COVID 2/11/2023    Depression     Diabetes mellitus ([facility]     Diabetic retinopathy (HCC)     Fracture of left foot     High cholesterol     Hypercholesteremia     Hypercholesterolemia     Hypertension     Lumbago     Moderate mood disorder (HCC)     Third nerve palsy     Transient alteration of awareness 2/11/2023       Past Surgical History:        Procedure Laterality Date    EYE SURGERY      HIP SURGERY Right 11/19/2020    HIP HEMIARTHROPLASTY performed by Carol Seals MD at [address] Left 12/10/2020    HIP HEMIARTHROPLASTY -- HERB -- DROPLET +     PT. COMING FROM [place] performed by Peter Barbosa MD at [address] N/A 12/14/2022    EGD BIOPSY performed by Carolyn Hwang MD at [facility] ENDOSCOPY       Social History:    Social History     Tobacco Use    Smoking status: Never    Smokeless tobacco: Never   Substance Use Topics    Alcohol use:  No                                Counseling given: Not Answered      Vital Signs (Current):   Vitals:    02/20/23 1441 02/20/23 1945 02/20/23 2346 02/21/23 0837   BP: (!) 142/90 (!) 132/91 124/73 (!) 150/79   Pulse: 68 87 82 86   Resp: 18 18 16 17   Temp: 36.6 °C (97.8 °F) 36.7 °C (98 °F) 36.1 °C (97 °F) 36.7 °C (98 °F)   TempSrc: Temporal Temporal Temporal Temporal   SpO2: 96% 98% 100% 100%   Weight:       Height:                                                  BP Readings from Last 3 Encounters:   02/21/23 (!) 150/79   02/13/23 (!) 145/86   12/30/22 132/88       NPO Status:                                                                                 BMI:   Wt Readings from Last 3 Encounters:   02/18/23 135 lb (61.2 kg)   02/11/23 165 lb (74.8 kg)   12/30/22 164 lb (74.4 kg)     Body mass index is 20.53 kg/m². CBC:   Lab Results   Component Value Date/Time    WBC 7.8 02/20/2023 07:00 AM    RBC 3.55 02/20/2023 07:00 AM    HGB 7.3 02/20/2023 07:00 AM    HCT 21.8 02/20/2023 07:00 AM    MCV 61.4 02/20/2023 07:00 AM    RDW 18.7 02/20/2023 07:00 AM     02/20/2023 07:00 AM       CMP:   Lab Results   Component Value Date/Time     02/20/2023 07:00 AM    K 3.5 02/20/2023 07:00 AM    K 3.9 02/19/2023 05:55 AM     02/20/2023 07:00 AM    CO2 20 02/20/2023 07:00 AM    BUN 9 02/20/2023 07:00 AM    CREATININE 0.6 02/20/2023 07:00 AM    GFRAA >60 12/13/2020 05:22 AM    LABGLOM >60 02/20/2023 07:00 AM    GLUCOSE 99 02/20/2023 07:00 AM    PROT 4.9 02/20/2023 07:00 AM    CALCIUM 8.0 02/20/2023 07:00 AM    BILITOT 0.6 02/20/2023 07:00 AM    ALKPHOS 59 02/20/2023 07:00 AM    AST 10 02/20/2023 07:00 AM    ALT 9 02/20/2023 07:00 AM       POC Tests: No results for input(s): POCGLU, POCNA, POCK, POCCL, POCBUN, POCHEMO, POCHCT in the last 72 hours.     Coags:   Lab Results   Component Value Date/Time    PROTIME 13.1 02/19/2023 05:55 AM    INR 1.2 02/19/2023 05:55 AM    APTT 26.7 02/19/2023 05:55 AM       HCG (If Applicable): No results found for: PREGTESTUR, PREGSERUM, HCG, HCGQUANT     ABGs: No results found for: PHART, PO2ART, SWD9DQI, IYB3ZEL, BEART, O2NITZSS     Type & Screen (If Applicable):  No results found for: LABABO, LABRH    Drug/Infectious Status (If Applicable):  No results found for: HIV, HEPCAB    COVID-19 Screening (If Applicable):   Lab Results   Component Value Date/Time    COVID19 DETECTED 02/18/2023 02:32 PM    COVID19 Not Detected 12/08/2022 10:47 PM           Anesthesia Evaluation  Patient summary reviewed and Nursing notes reviewed no history of anesthetic complications:   Airway: Mallampati: III  TM distance: <3 FB   Neck ROM: full  Mouth opening: > = 3 FB   Dental:    (+) edentulous      Pulmonary: breath sounds clear to auscultation  (+) shortness of breath:      (-) not a current smoker                           Cardiovascular:    (+) hypertension:, hyperlipidemia      ECG reviewed  Rhythm: regular  Rate: normal                    Neuro/Psych:   (+) neuromuscular disease:, TIA, psychiatric history:depression/anxiety             GI/Hepatic/Renal:   (+) GERD:,           Endo/Other:    (+) DiabetesType II DM, , .                 Abdominal:             Vascular: Other Findings:           Anesthesia Plan      MAC     ASA 4       Induction: intravenous. Anesthetic plan and risks discussed with patient. Plan discussed with CRNA and attending. Mee Morales RN   2/21/2023      Patient seen and examined, chart reviewed, agree with above findings. Anesthetic plan, risks, benefits, alternatives, and personnel involved discussed with patient. Patient verbalized an understanding and agreed to proceed. NPO status confirmed. Anesthetic plan discussed with care team members and agreed upon.     Ayana Rodarte DO   2/21/2023  2:03 PM

## 2023-02-21 NOTE — PROGRESS NOTES
[facility]  PROGRESS NOTE  ATTENDING NOTE    Jim Díaz is okay to return his to his facility from my standpoint. I put in Dr. Rock Prim follow-up information to follow-up as an outpatient. I sent Dr. Liliana Alcantar a message regarding this. Patient does have a large hiatal hernia and is having significant esophagitis. He may benefit from a hiatal hernia repair with wrap. However he may not be a candidate due to his comorbidities and frailty. I contacted patient's son after the procedure and went over all this information with him and answered all questions.     Ami Carbajal MD, MSc, FACS  2/21/2023  3:12 PM

## 2023-02-21 NOTE — PROGRESS NOTES
GENERAL SURGERY  DAILY PROGRESS NOTE  2/21/2023    CHIEF COMPLAINT:  Chief Complaint   Patient presents with    Emesis     Sent from [facility] for coffee ground emesis x 2 today. Patient Covid Positive. SUBJECTIVE:  Refused his enemas yesterday, per nursing he had no further bowel movments overnight. Last BM was yesterday afternoon, liquid but still dark. Patient unwilling to cooperate in history this morning. Hb yesterday was 7.9 to 7.3     OBJECTIVE:  /73   Pulse 82   Temp 97 °F (36.1 °C) (Temporal)   Resp 16   Ht 5' 8\" (1.727 m)   Wt 135 lb (61.2 kg)   SpO2 100%   BMI 20.53 kg/m²     GENERAL:  NAD. LUNGS: Nonlabored breathing on room air  CARDIOVASCULAR: RR, hypertensive  ABDOMEN:  Soft, non-distended, minimally-tender. No guarding, rigidity, rebound.     ASSESSMENT/PLAN:  74 y.o. male with stercoral colitis, microcytic anemia    -Continue daily bowel regimen: Dulcolax suppository and oral Senokot  -Monitor hemoglobin and transfuse blood products as needed  -Continue Protonix  -Will reattempt enema this am if patient agrees   - EGD/Colonoscopy scheduled for today   - Shi Casiano MD  Surgery Resident PGY-2  2/21/2023  6:09 AM

## 2023-02-21 NOTE — PROGRESS NOTES
Hospitalist Progress Note            Patient: Calvin Moritz Age: 74 y.o.   DOA: 2/18/2023 Admit Dx / CC: Upper gastrointestinal bleed [K92.2]   LOS:  LOS: 3 days      Assessment/ Plan:     Acute blood loss anemia on chronic anemia with iron deficiency 2/2 hematemesis from Upper GI Bleed ?pill esophagitis   Pt had multiple episodes of coffee-ground emesis. Pt was on doxycyline and Augmentin prior to admission  Hold ASA  NPO   Continue Protonix BID dosing until discharge  C/w IVF  C/w IV iron  H/H stablized  general surgery following recommended EGD +/-colonoscopy today     Chronically Elevated troponin 2/2 demand  Trend flat  Review of records show patient has a chronic history of mildly elevated troponins that are adynamic in nature. Pt denies CP or SOB     Recent COVID-19 infection/pneumonia  Patient currently has normal oxygen saturation on room air  patient has essentially completed a 7-day course of IV and oral antibiotics for diagnosis of pneumonia on prior hospitalization with that CT questionable for true bacterial pneumonia findings. We will hold off on further antibiotic therapy at this time. Stercoral colitis 2/2 chronic constipation  C/w BM regimen  GS recommend OP colonoscopy as pt never had one but currently pt refuses     Uncontrolled Diabetes Mellitus 2  A1c of 8.3% on February 13 indicates suboptimal control  Holding metformin  holding home Lantus of 5 units nightly   ISS    At least moderate Vascular dementia with depression    BPH  Resumed flomax    HLD  Resumed statin    Frequent hospital admissions    Chronic malnutrition  Nutritionist consulted    Hypokalemia and hypomagnesemia  replaced    DVT ppx:  SCDs  Code status: Full code    Plan discharge back to SNF ? later today pend EGD/colonoscopy    Plan of care discussed with: patient and bedside RN, CM, surgery team    CT; Impression   There is fluid in the stomach with moderate size hiatal hernia.   Some wall   thickening identified of the distal esophagus to suggest possibly an   esophagitis. Clinical correlation is needed. Increased stool burden seen   diffusely throughout the colon with large stool volume seen in the rectal   vault with wall thickening to suggest a proctitis or stercoral colitis. No   obvious obstruction. Nodularity of the adrenal glands bilaterally larger on the right than left   with macroscopic fat on the right to suggest a myelolipoma.      Patient Active Problem List   Diagnosis    Severe episode of recurrent major depressive disorder, without psychotic features ([address]    Suicidal ideations    SDH (subdural hematoma)    Traumatic subdural hemorrhage with loss of consciousness of 30 minutes or less (HCC)    Diabetic acidosis without coma (HCC)    Acute kidney injury superimposed on CKD (HCC)    Hyponatremia    Right adrenal mass (HCC)    Hyperkalemia    Major depression, recurrent (HCC)    Type 2 diabetes mellitus with complication, with long-term current use of insulin (HCC)    HLD (hyperlipidemia)    HTN (hypertension)    Uncontrolled type 2 diabetes mellitus with hyperglycemia (HCC)    SIRS (systemic inflammatory response syndrome) (HCC)    Weight loss    Generalized abdominal pain    Lactic acidosis    Constipation    High anion gap metabolic acidosis    Non compliance w medication regimen    Physical deconditioning    Dementia, vascular (HCC)    Moderate protein-calorie malnutrition (HCC)    TIA (transient ischemic attack)    DKA, type 1, not at goal [facility])    Ileus ([address]    Adrenal adenoma, right    Hypertensive urgency    Hiccups    Umbilical hernia without obstruction and without gangrene    GERD (gastroesophageal reflux disease)    Depression    Hypotension    Depression, major, recurrent ([address]    Closed right hip fracture, initial encounter ([address]    History of right hip hemiarthroplasty    Closed left hip fracture, initial encounter ([address]    Acute metabolic encephalopathy    History of left hip hemiarthroplasty    CLEMENTE (acute kidney injury) [facility]    Weakness    GI bleed    Leukocytosis    Shortness of breath    Acute anemia    Transient alteration of awareness    COVID    Altered mental state    Nausea and vomiting    Upper gastrointestinal bleed    Chronically Elevated troponin    Stercoral colitis    Esophagitis    Frequent hospital admissions    Proctitis    Hypokalemia    Hypomagnesemia    Vitamin D deficiency    Poor prognosis        Medications:  Reviewed    Infusion Medications    lactated ringers IV soln 100 mL/hr at 02/20/23 2342    sodium chloride      dextrose       Scheduled Medications    vitamin D  50,000 Units Oral Weekly    vitamin D  2,000 Units Oral Daily    [Held by provider] ferrous sulfate  325 mg Oral Every Other Day    insulin lispro  0-4 Units SubCUTAneous TID WC    insulin lispro  0-4 Units SubCUTAneous Nightly    tamsulosin  0.4 mg Oral Daily    sennosides-docusate sodium  2 tablet Oral BID    lactobacillus  1 capsule Oral BID    ferric gluconate (FERRLECIT) IVPB  125 mg IntraVENous Daily    rosuvastatin  10 mg Oral Nightly    melatonin  5 mg Oral Nightly    pantoprazole (PROTONIX) 40 mg injection  40 mg IntraVENous Q12H    bisacodyl  10 mg Rectal Daily    OLANZapine  7.5 mg Oral Nightly    sodium chloride flush  5-40 mL IntraVENous 2 times per day     PRN Meds: white petrolatum, melatonin, acetaminophen **OR** acetaminophen, sodium chloride flush, sodium chloride, ondansetron **OR** ondansetron, bisacodyl, glucose, dextrose bolus **OR** dextrose bolus, glucagon (rDNA), dextrose    I/O    Intake/Output Summary (Last 24 hours) at 2/21/2023 0838  Last data filed at 2/20/2023 1643  Gross per 24 hour   Intake 360 ml   Output --   Net 360 ml       Labs:   Recent Labs     02/18/23  2312 02/19/23  0555 02/20/23  0700   WBC 9.2 8.1 7.8   HGB 7.8* 7.9* 7.3*   HCT 23.7* 24.7* 21.8*    300 271         Recent Labs     02/18/23  1432 02/19/23  0555 02/20/23  0700    139 141   K 4.0 3.9 3.5    108* 108*   CO2 23 20* 20*   BUN 15 11 9   CREATININE 0.8 0.7 0.6*   CALCIUM 8.4* 8.3* 8.0*         Recent Labs     02/18/23  1432 02/19/23  0555 02/20/23  0700   PROT 5.9* 5.5* 4.9*   ALKPHOS 62 62 59   ALT 10 8 9   AST 8 9 10   BILITOT 0.6 0.7 0.6   LIPASE 137* 60  --          Recent Labs     02/18/23  1432 02/19/23  0555   INR 1.2 1.2         No results for input(s): Niecy Cortez in the last 72 hours. Chronic labs:  Lab Results   Component Value Date    CHOL 114 10/08/2020    TRIG 71 10/08/2020    HDL 41 10/08/2020    LDLCALC 59 10/08/2020    TSH 0.802 02/11/2023    PSA 0.86 09/28/2018    INR 1.2 02/19/2023    LABA1C 8.3 (H) 02/13/2023       Radiology:  Imaging studies reviewed today. Subjective:     Isabel Fowler has no new complaints, except wants to eat    Objective:     Physical Exam:   /73   Pulse 82   Temp 97 °F (36.1 °C) (Temporal)   Resp 16   Ht 5' 8\" (1.727 m)   Wt 135 lb (61.2 kg)   SpO2 100%   BMI 20.53 kg/m²     General appearance:in no distress, flat in bed. Lungs: Clear to auscultation bilaterally but diminished, no wheezing or crackles   Heart: Regular rate and rhythm, S1, S2 normal   Abdomen: Soft, non-tender and not-distended.  Bowel sounds normal.   Extremities: no edema   Neurologic: Grossly normal and non focal, CN II-XII intact       Delfina Junior MD   Hospitalist Service   02/21/23 8:38 AM

## 2023-02-21 NOTE — PLAN OF CARE
Problem: Discharge Planning  Goal: Discharge to home or other facility with appropriate resources  Outcome: Progressing     Problem: Skin/Tissue Integrity  Goal: Absence of new skin breakdown  Description: 1. Monitor for areas of redness and/or skin breakdown  2. Assess vascular access sites hourly  3. Every 4-6 hours minimum:  Change oxygen saturation probe site  4. Every 4-6 hours:  If on nasal continuous positive airway pressure, respiratory therapy assess nares and determine need for appliance change or resting period.   Outcome: Progressing     Problem: ABCDS Injury Assessment  Goal: Absence of physical injury  Outcome: Progressing  Flowsheets  Taken 2/21/2023 1250 by Stefano Sainz RN  Absence of Physical Injury: Implement safety measures based on patient assessment  Taken 2/21/2023 0526 by Nasir Villarreal RN  Absence of Physical Injury: Implement safety measures based on patient assessment     Problem: Pain  Goal: Verbalizes/displays adequate comfort level or baseline comfort level  Outcome: Progressing     Problem: Nutrition Deficit:  Goal: Optimize nutritional status  Outcome: Progressing

## 2023-02-21 NOTE — PROGRESS NOTES
Nurse to Nurse report called  Transport requested  Telemetry pack placed back on patient   And verified capture with [facility]

## 2023-02-21 NOTE — OP NOTE
[facility]  PROCEDURE NOTE    DATE OF PROCEDURE: 2/21/2023    SURGEON: Sumit Kaiser MD    ASSISTANT: None    PREOPERATIVE DIAGNOSIS: Diagnostic colonoscopy for anemia    POSTOPERATIVE DIAGNOSIS: Incomplete colonoscopy due to incomplete prep. Patulous anus. Incomplete rectal prolapse, redundant colon    OPERATION: Colonoscopy to right colon    ANESTHESIA: Local monitored anesthesia. ESTIMATED BLOOD LOSS (ml): None    COMPLICATIONS: None    SPECIMENS:  Was Not Obtained    HISTORY: The patient is a 74y.o. year old male with history of above preop diagnosis. I recommended colonoscopy with possible biopsy or polypectomy and I explained the risk, benefits, expected outcome, and alternatives to the procedure. Risks included but are not limited to bleeding, infection, respiratory distress, hypotension, and perforation of the colon. The patient understands and is in agreement. PROCEDURE: The patient was given IV conscious sedation per anesthesia. The patient was given supplemental oxygen by nasal cannula. The colonoscope was inserted per rectum and advanced under direct vision to the cecum with difficulty due to incomplete prep. The prep was poor so exam was suboptimal and a small mucosal lesion could have been missed. The bowel prep scale: 1+1+1 = 3    FINDINGS:  Cecum/Ascending colon: Able to reach right colon as the liver he was identified however unable to reach cecum due to incomplete prep and unable to visualize any further. Transverse colon: Normal from what I can see however there was still a copious amount of stool and liquid in the colon    Descending/Sigmoid colon: Normal for what I can see however there is a copious amount of stool and liquid stool in the colon    Rectum/Anus: examined in normal and retroflexed positions and was positive for patulous anus and incomplete rectal prolapse    The colon was decompressed and the scope was removed.   The withdraw time was approximately 6 minutes. The patient tolerated the procedure well.      ASSESSMENT/PLAN:   Incomplete rectal prolapse--May benefit from occupational therapy pelvic training  Patulous anus--Occupational Therapy pelvic training  Incomplete colonoscopy technically patient needs another colonoscopy within the year however given his mental status, and comorbidities, living at facility he is unlikely to complete this  Recommend follow up colonoscopy in  1 years    Electronically signed by Mable Carreon MD on 2/21/23 at 2:59 PM EST

## 2023-02-21 NOTE — PROGRESS NOTES
Taking over care of this patient  he remains on appropriate monitors. Cart low, locked with siderails up.

## 2023-02-21 NOTE — CARE COORDINATION
Discussed with attending; intent is to discharge today with request to set-up for 8PM transport however, ambulance service had to make it for 7PM d/t likelihood of anything later getting pushed back into until tomorrow; this CM notified attending of the aforementioned; envelope and ambulance form completed in soft-chart. This CM updated charge RN Ann Santana (to please have bedside RN notify family closer to discharge time), notified Letitia Like liaison.

## 2023-02-21 NOTE — DISCHARGE SUMMARY
Patient: Qian Barragan Sex: male DOA: 2/18/2023   YOB: 1955 Age: 74 y.o. LOS:  LOS: 3 days    Admit Date: 2/18/2023   Discharge Date: 02/21/23   Primary Care Physician: Ry LIRIANO   Discharge to: skilled nursing  Readmission risk: [facility] admission:  Per HPI:\" Patient seen and examined at bedside. Qian Barragan is a 74 y.o. male who presented to the emergency department from an extended care facility on February 18. He was noted at the facility to have coffee-ground emesis. He had 2 episodes of this. Patient was a limited historian on arrival to the ED. He was noted to make eye contact and nod yes and no but not able to answer detailed questions. On my interview, he nods no to pain or trouble breathing. He just repeats that he wants some apple juice. He was recently hospitalized at [facility] from February 11 to February 13. At that time, he was diagnosed with COVID infection. He also had a CT of the chest obtained which showed a possible bibasilar pneumonia. He was initially given Rocephin and doxycycline for antibiotic coverage. He also received dexamethasone due to his COVID infection. CT of the abdomen was obtained and showed distal esophageal thickening suggestive of esophagitis. He was started on a PPI at that time. GI deferred work-up as he had an EGD in December 2022 that showed no bleeding. He was discharged on Augmentin and doxycycline without incident until the day of presentation. Laboratory studies in the emergency department were notable for an initial hemoglobin of 8.3, similar to his discharge hemoglobin on the 13th of 8.5. However, on recheck in the emergency department 4 hours later after 500 mL of IV fluid, his hemoglobin dropped to 7.5. His CBC showed significant microcytosis at 59.1. Other notable laboratory findings included a glucose of 223, high sensitivity troponin of 45, BNP of 144, CRP of 0.9.   CT of the abdomen and pelvis with contrast was obtained and showed distal esophageal thickening, moderate size hiatal hernia, and large stool volume in the rectal vault with obstipation. Therapeutic interventions in the emergency department included a 500 mL bolus of 0.9% normal saline as noted above, pantoprazole 40 mg IV x1 followed by a Protonix drip. He was admitted for further evaluation and treatment. [facility] Course and discharge assessment with plan:     Acute blood loss anemia on chronic anemia with iron deficiency 2/2 hematemesis from Upper GI Bleed likely 2/2 pill esophagitis   Pt was on doxycyline and Augmentin prior to admission  Resume ASA  Resume diet  Continue Protonix changed to BID dose for 30 days then once daily thereafter  S/p IV iron  H/H stablized  S/p EGD 2/21 by GS; large hiatal hernia and is having significant esophagitis. He may benefit from a hiatal hernia repair with wrap. However he may not be a candidate due to his comorbidities and frailty  S/p incomplete colonoscopy 2/21:Incomplete rectal prolapse & Patulous anus    Chronically Elevated troponin 2/2 demand  Trend flat  Review of records show patient has a chronic history of mildly elevated troponins that are adynamic in nature. Pt denied CP or SOB     Recent COVID-19 infection/pneumonia  Patient currently has normal oxygen saturation on room air  patient has essentially completed a 7-day course of IV and oral antibiotics for diagnosis of pneumonia on prior hospitalization with that CT questionable for true bacterial pneumonia findings. No antibiotic therapy at this time.      Stercoral colitis 2/2 chronic constipation  C/w BM regimen  GS recommend OP colonoscopy as pt never had one but currently pt refuses     Uncontrolled Diabetes Mellitus 2  A1c of 8.3% on February 13 indicates suboptimal control  resume metformin  Resume home Lantus of 5 units nightly   ISS per [facility] protocol  Avoid tight BG control    At least moderate Vascular dementia with depression    BPH  C/w flomax    HLD  C/w statin    Frequent hospital admissions    Chronic malnutrition    Short term prognosis guarded, long term poor    Discharge plan of care was discussed with: pt, RN, CM    CT;  Impression   There is fluid in the stomach with moderate size hiatal hernia. Some wall   thickening identified of the distal esophagus to suggest possibly an   esophagitis. Clinical correlation is needed. Increased stool burden seen   diffusely throughout the colon with large stool volume seen in the rectal   vault with wall thickening to suggest a proctitis or stercoral colitis. No   obvious obstruction. Nodularity of the adrenal glands bilaterally larger on the right than left   with macroscopic fat on the right to suggest a myelolipoma.        Discharge Diagnoses:   Patient Active Problem List   Diagnosis    Severe episode of recurrent major depressive disorder, without psychotic features ([facility]    Suicidal ideations    SDH (subdural hematoma)    Traumatic subdural hemorrhage with loss of consciousness of 30 minutes or less (HCC)    Diabetic acidosis without coma (HCC)    Acute kidney injury superimposed on CKD (HCC)    Hyponatremia    Right adrenal mass (HCC)    Hyperkalemia    Major depression, recurrent (HCC)    Type 2 diabetes mellitus with complication, with long-term current use of insulin (HCC)    HLD (hyperlipidemia)    HTN (hypertension)    Uncontrolled type 2 diabetes mellitus with hyperglycemia (HCC)    SIRS (systemic inflammatory response syndrome) (HCC)    Weight loss    Generalized abdominal pain    Lactic acidosis    Constipation    High anion gap metabolic acidosis    Non compliance w medication regimen    Physical deconditioning    Dementia, vascular (HCC)    Moderate protein-calorie malnutrition (HCC)    TIA (transient ischemic attack)    DKA, type 1, not at goal [facility])    Ileus ([facility]    Adrenal adenoma, right    Hypertensive urgency    Hiccups    Umbilical hernia without obstruction and without gangrene    GERD (gastroesophageal reflux disease)    Depression    Hypotension    Depression, major, recurrent (HCC)    Closed right hip fracture, initial encounter [facility] [address]    History of right hip hemiarthroplasty    [facility] left [facility], [facility] encounter [facility]    Acute metabolic encephalopathy    History of left hip hemiarthroplasty    CLEMENTE (acute kidney injury) ([facility]    Weakness    GI bleed    Leukocytosis    Shortness of breath    Acute anemia    Transient alteration of awareness    COVID    Altered mental state    Nausea and vomiting    Upper gastrointestinal bleed    Chronically Elevated troponin    Stercoral colitis    Esophagitis    Frequent hospital admissions    Proctitis    Hypokalemia    Hypomagnesemia    Vitamin D deficiency    Poor prognosis    Hiatal hernia    Rectal prolapse       Discharge Medications:   Current Discharge Medication List             Details   polyethylene glycol (GLYCOLAX) 17 g packet Take 17 g by mouth 2 times daily  Qty: 527 g, Refills: 1      lactobacillus (CULTURELLE) CAPS capsule Take 1 capsule by mouth 2 times daily  Qty: 1 capsule, Refills: 0      ferrous sulfate (IRON 325) 325 (65 Fe) MG tablet Take 1 tablet by mouth every other day  Qty: 30 tablet, Refills: 3      !! bisacodyl (DULCOLAX) 10 MG suppository Place 1 suppository rectally daily  Qty: 30 suppository, Refills: 0      !! bisacodyl (DULCOLAX) 10 MG suppository Place 1 suppository rectally daily as needed for Constipation  Qty: 1 suppository, Refills: 0      white petrolatum OINT ointment Apply topically 2 times daily as needed (dry skin)  Qty: 1 g, Refills: 0      vitamin D (CHOLECALCIFEROL) 50 MCG (2000 UT) TABS tablet Take 1 tablet by mouth daily  Qty: 30 tablet, Refills: 0      Ergocalciferol (VITAMIN D) 78175 units CAPS Take 50,000 Units by mouth once a week  Qty: 12 capsule, Refills: 0       !! - Potential duplicate medications found. Please discuss with provider. Details   senna-docusate (PERICOLACE) 8.6-50 MG per tablet Take 2 tablets by mouth in the morning and 2 tablets in the evening. Qty: 1 tablet, Refills: 0      pantoprazole (PROTONIX) 40 MG tablet Take 1 tablet by mouth in the morning and at bedtime  Qty: 30 tablet, Refills: 3                Details   guaiFENesin-dextromethorphan (ROBITUSSIN DM) 100-10 MG/5ML syrup Take 5 mLs by mouth every 4 hours as needed for Cough  Qty: 120 mL, Refills: 0      calcium carbonate (TUMS EXTRA STRENGTH 750) 750 MG chewable tablet Take 750 mg by mouth every 12 hours as needed (GERD)      insulin glargine (LANTUS) 100 UNIT/ML injection vial Inject 5 Units into the skin nightly      metFORMIN (GLUCOPHAGE) 500 MG tablet Take 500 mg by mouth in the morning and 500 mg in the evening. aspirin 81 MG EC tablet Take 81 mg by mouth daily      OLANZapine (ZYPREXA) 7.5 MG tablet Take 7.5 mg by mouth nightly      rosuvastatin (CRESTOR) 10 MG tablet Take 10 mg by mouth daily      insulin lispro (HUMALOG) 100 UNIT/ML injection vial Inject 0-4 Units into the skin 4 times daily (before meals and nightly) PER SLIDING SCALE: 0-150=0U, 151-200=2U, 201-250=4U, 251-300=6U, 301-350=8U, 351-400=10U, 401-450=12U, 450+=CALL MD      tamsulosin (FLOMAX) 0.4 MG capsule Take 1 capsule by mouth daily  Qty: 30 capsule, Refills: 2      glucose 4 g chewable tablet Take 4 g by mouth as needed for Low blood sugar               Follow-up: PCP and GS/Aiden Montejo    Discharge Condition: stable    Discharge instruction:   Patient instructed to return to the emergency department if: Chest pain, shortness of breath, altered mental status, fever, chills, nausea, vomiting, abdominal pain or any other concerns. Activity: progressive as tolerated.     Diet: diabetic diet     Wound Care: none needed     Consults: general surgery       Discharge Physical Exam:   See progress note from earlier today      Shantal Mejia MD   02/21/23 3:57 PM    [facility] Medicine   Time Spent: 55 min

## 2023-02-21 NOTE — PLAN OF CARE
Problem: Discharge Planning  Goal: Discharge to home or other facility with appropriate resources  2/21/2023 1852 by Arnold Lacey RN  Outcome: Completed  2/21/2023 1251 by Arnold Lacey RN  Outcome: Progressing     Problem: Skin/Tissue Integrity  Goal: Absence of new skin breakdown  Description: 1. Monitor for areas of redness and/or skin breakdown  2. Assess vascular access sites hourly  3. Every 4-6 hours minimum:  Change oxygen saturation probe site  4. Every 4-6 hours:  If on nasal continuous positive airway pressure, respiratory therapy assess nares and determine need for appliance change or resting period.   2/21/2023 1852 by Arnold Lacey RN  Outcome: Completed  2/21/2023 1251 by Arnold Lacey RN  Outcome: Progressing     Problem: ABCDS Injury Assessment  Goal: Absence of physical injury  2/21/2023 1852 by Arnold Lacey RN  Outcome: Completed  2/21/2023 1251 by Arnold Lacey RN  Outcome: Progressing  Flowsheets  Taken 2/21/2023 1250 by Arnold Lacey RN  Absence of Physical Injury: Implement safety measures based on patient assessment  Taken 2/21/2023 0555 by River Shen RN  Absence of Physical Injury: Implement safety measures based on patient assessment     Problem: Pain  Goal: Verbalizes/displays adequate comfort level or baseline comfort level  2/21/2023 1852 by Arnold Laecy RN  Outcome: Completed  2/21/2023 1251 by Arnold Lacey RN  Outcome: Progressing     Problem: Nutrition Deficit:  Goal: Optimize nutritional status  2/21/2023 1852 by Arnold Lacey RN  Outcome: Completed  2/21/2023 1251 by Arnold Lacey RN  Outcome: Progressing

## 2023-02-22 NOTE — ANESTHESIA POSTPROCEDURE EVALUATION
Department of Anesthesiology  Postprocedure Note    Patient: Mario Alberto Potts  MRN: [id number]  YOB: 1955  Date of evaluation: 2/21/2023      Procedure Summary     Date: 02/21/23 Room / Location: [facility] 01 / [facility]    Anesthesia Start: 6207 Anesthesia Stop: 1459    Procedures:       EGD BIOPSY      COLONOSCOPY DIAGNOSTIC Diagnosis:       Anemia, unspecified type      (anemia)    Surgeons: Minh Kaiser MD Responsible Provider: Alexei Roche DO    Anesthesia Type: MAC ASA Status: 4          Anesthesia Type: No value filed.     Orlando Phase I: Orlando Score: 10    Orlando Phase II:        Anesthesia Post Evaluation    Patient location during evaluation: PACU  Patient participation: complete - patient participated  Level of consciousness: awake and alert  Pain score: 1  Airway patency: patent  Nausea & Vomiting: no nausea and no vomiting  Complications: no  Cardiovascular status: hemodynamically stable  Respiratory status: acceptable  Hydration status: euvolemic

## 2023-02-23 ENCOUNTER — HOSPITAL ENCOUNTER (EMERGENCY)
Age: 68
Discharge: HOME | End: 2023-02-23
Attending: EMERGENCY MEDICINE
Payer: MEDICARE

## 2023-02-23 ENCOUNTER — TELEPHONE (OUTPATIENT)
Dept: OTHER | Facility: CLINIC | Age: 68
End: 2023-02-23

## 2023-02-23 VITALS
HEIGHT: 68 IN | TEMPERATURE: 97.9 F | RESPIRATION RATE: 16 BRPM | OXYGEN SATURATION: 100 % | BODY MASS INDEX: 20.46 KG/M2 | HEART RATE: 90 BPM | DIASTOLIC BLOOD PRESSURE: 77 MMHG | SYSTOLIC BLOOD PRESSURE: 108 MMHG | WEIGHT: 135 LBS

## 2023-02-23 DIAGNOSIS — D64.9 ANEMIA, UNSPECIFIED TYPE: Primary | ICD-10-CM

## 2023-02-23 LAB
ABO/RH: NORMAL
ANTIBODY SCREEN: NORMAL
HCT VFR BLD CALC: 25.1 % (ref 37–54)
HEMOGLOBIN: 8.3 G/DL (ref 12.5–16.5)

## 2023-02-23 PROCEDURE — 86850 RBC ANTIBODY SCREEN: CPT

## 2023-02-23 PROCEDURE — 86900 BLOOD TYPING SEROLOGIC ABO: CPT

## 2023-02-23 PROCEDURE — 86901 BLOOD TYPING SEROLOGIC RH(D): CPT

## 2023-02-23 PROCEDURE — 85018 HEMOGLOBIN: CPT

## 2023-02-23 PROCEDURE — 85014 HEMATOCRIT: CPT

## 2023-02-23 PROCEDURE — 36415 COLL VENOUS BLD VENIPUNCTURE: CPT

## 2023-02-23 PROCEDURE — 99283 EMERGENCY DEPT VISIT LOW MDM: CPT

## 2023-02-23 RX ORDER — SODIUM CHLORIDE 9 MG/ML
INJECTION, SOLUTION INTRAVENOUS PRN
Status: DISCONTINUED | OUTPATIENT
Start: 2023-02-23 | End: 2023-02-23

## 2023-02-23 ASSESSMENT — PAIN - FUNCTIONAL ASSESSMENT: PAIN_FUNCTIONAL_ASSESSMENT: NONE - DENIES PAIN

## 2023-02-23 NOTE — ED NOTES
PAS in facility to transport patient back to [facility].       aIin Miguel, RN  02/23/23 1390

## 2023-02-23 NOTE — TELEPHONE ENCOUNTER
Writer contacted ED provider to inform of 30 day readmission risk. ED provider informed writer of intention to discharge and follow up recommended.     Call Back: If you need to call back to inform of disposition you can contact me at [phone]

## 2023-02-23 NOTE — ED PROVIDER NOTES
44-year-old male history of dementia altered mental status with chronic comorbidities and chronic altered mental status presents to the emergency department after her labs were checked at nursing facility and found have a low hemoglobin. Patient was recently admitted received transfusion at [facility]. Hemoglobin at discharge was 7.3 patient does not provide any history due to his dementia history was obtained from EMS    The history is provided by the EMS personnel. The history is limited by the condition of the patient. Review of Systems   Unable to perform ROS: Dementia      Physical Exam  Constitutional:       Comments: Chronically ill-appearing male   HENT:      Head: Normocephalic and atraumatic. Nose: Nose normal.   Eyes:      Extraocular Movements: Extraocular movements intact. Pupils: Pupils are equal, round, and reactive to light. Cardiovascular:      Rate and Rhythm: Normal rate and regular rhythm. Pulses: Normal pulses. Heart sounds: Normal heart sounds. Pulmonary:      Effort: Pulmonary effort is normal.      Breath sounds: Normal breath sounds. Abdominal:      General: Abdomen is flat. Bowel sounds are normal. There is no distension. Palpations: Abdomen is soft. Tenderness: There is no abdominal tenderness. There is no guarding. Musculoskeletal:         General: Normal range of motion. Right lower leg: No edema. Left lower leg: No edema. Skin:     General: Skin is warm. Capillary Refill: Capillary refill takes less than 2 seconds. Neurological:      General: No focal deficit present. Mental Status: He is oriented to person, place, and time. Procedures     [facility]       ED Course as of 02/23/23 1843   u Feb 23, 2023   [age]-year-old male known to be chronically ill presents from nursing home for evaluation of low hemoglobin.   Patient had recently been discharged from [facility] after receiving transfusions with the last hemoglobin on 2/20 of 7.3 he presents today for further evaluation. Patient cannot provide any history due to his chronic dementia and altered mental status. Plan will be to evaluate H&H with plan for type and screen and transfusion of 1 unit of blood [CF]   1215 Hemoglobin Quant(!): 8.3  Patient's hemoglobin is actually up one-point from 3 days ago. Is not felt patient needs further work-up or evaluation at this time and he will be discharged back to nursing facility. [CF]      ED Course User Index  [CF] Benji Louise, DO     --------------------------------------------- PAST HISTORY ---------------------------------------------  Past Medical History:  has a past medical history of Anxiety, Bronchitis, Cellulitis, Chronic sinusitis, COVID, Depression, Diabetes mellitus ([address] Diabetic retinopathy ([address] Fracture of left foot, High cholesterol, Hypercholesteremia, Hypercholesterolemia, Hypertension, Lumbago, Moderate mood disorder ([address] Third nerve palsy, and Transient alteration of awareness. Past Surgical History:  has a past surgical history that includes eye surgery; hip surgery (Right, 11/19/2020); hip surgery (Left, 12/10/2020); Upper gastrointestinal endoscopy (N/A, 12/14/2022); Upper gastrointestinal endoscopy (N/A, 2/21/2023); and Colonoscopy (N/A, 2/21/2023). Social History:  reports that he has never smoked. He has never used smokeless tobacco. He reports that he does not drink alcohol and does not use drugs. Family History: family history is not on file. The patients home medications have been reviewed.     Allergies: Jardiance [empagliflozin] and Victoza [liraglutide]    -------------------------------------------------- RESULTS -------------------------------------------------  Labs:  Results for orders placed or performed during the hospital encounter of 02/23/23   Hemoglobin and Hematocrit   Result Value Ref Range    Hemoglobin 8.3 (L) 12.5 - 16.5 g/dL Hematocrit 25.1 (L) 37.0 - 54.0 %   TYPE AND SCREEN   Result Value Ref Range    ABO/Rh A POS     Antibody Screen NEG        Radiology:  No orders to display       ------------------------- NURSING NOTES AND VITALS REVIEWED ---------------------------  Date / Time Roomed:  2/23/2023 10:54 AM  ED Bed Assignment:  14/14    The nursing notes within the ED encounter and vital signs as below have been reviewed. /77   Pulse 90   Temp 97.9 °F (36.6 °C) (Oral)   Resp 16   Ht 5' 8\" (1.727 m)   Wt 135 lb (61.2 kg)   SpO2 100%   BMI 20.53 kg/m²   Oxygen Saturation Interpretation: Normal      ------------------------------------------ PROGRESS NOTES ------------------------------------------  I have spoken with the patient and discussed todays results, in addition to providing specific details for the plan of care and counseling regarding the diagnosis and prognosis. Their questions are answered at this time and they are agreeable with the plan. I discussed at length with them reasons for immediate return here for re evaluation. They will followup with their primary care physician by calling their office tomorrow. --------------------------------- ADDITIONAL PROVIDER NOTES ---------------------------------  At this time the patient is without objective evidence of an acute process requiring hospitalization or inpatient management. They have remained hemodynamically stable throughout their entire ED visit and are stable for discharge with outpatient follow-up. The plan has been discussed in detail and they are aware of the specific conditions for emergent return, as well as the importance of follow-up. Discharge Medication List as of 2/23/2023  4:12 PM          Diagnosis:  1. Anemia, unspecified type        Disposition:  Patient's disposition: Discharge to home  Patient's condition is stable.        Noralee Frankel, DO  02/23/23 1843

## 2023-02-23 NOTE — ED NOTES
Nurse to nurse called and given to Noni Myers at [facility].       Shai Miguel RN  02/23/23 5939

## 2023-03-14 DIAGNOSIS — F41.9 ANXIETY: ICD-10-CM

## 2023-03-14 DIAGNOSIS — Z51.5 HOSPICE CARE PATIENT: Primary | ICD-10-CM

## 2023-03-14 DIAGNOSIS — R52 PAIN: ICD-10-CM

## 2023-03-14 DIAGNOSIS — R09.89 AIR HUNGER: ICD-10-CM

## 2023-03-14 RX ORDER — MORPHINE SULFATE 100 MG/5ML
5 SOLUTION ORAL EVERY 4 HOURS PRN
Qty: 30 ML | Refills: 0 | Status: SHIPPED | OUTPATIENT
Start: 2023-03-14 | End: 2023-04-13

## 2023-03-14 RX ORDER — LORAZEPAM 0.5 MG/1
0.5 TABLET ORAL EVERY 4 HOURS PRN
Qty: 60 TABLET | Refills: 1 | Status: SHIPPED | OUTPATIENT
Start: 2023-03-14 | End: 2023-04-13

## 2024-06-20 NOTE — PLAN OF CARE
Following this pt. Was seen, examined after midnight by admitting physician and has not been physically seen by me on this calendar date. Received call, pt daughter insistent on transfer to [facility] / [place]. Unfortunately I have found their transfer services only operate during standard business hours and it is now 1500 on a Friday. I gave the options to wait until Monday and I could call and see or they could sign out AMA. Received return call that daughter is unfortunately angry and planning on signing pt out AMA and going to [facility] via ambulance. I do question why she did not seek admission there when the pt was being admitted just 12 hours ago. Additionally, [facility] have been [facility] if he was an established [place] patient. There are no comments in ED note or H&P regarding pt / family desire for transfer to [place] prior to these phone calls.       Electronically signed by Navin Parham DO on 12/9/2022 at 3:04 PM
Problem: Safety - Adult  Goal: Free from fall injury  Outcome: Progressing     Problem: Nutrition Deficit:  Goal: Optimize nutritional status  Outcome: Progressing
Problem: Skin/Tissue Integrity  Goal: Absence of new skin breakdown  Description: 1. Monitor for areas of redness and/or skin breakdown  2. Assess vascular access sites hourly  3. Every 4-6 hours minimum:  Change oxygen saturation probe site  4. Every 4-6 hours:  If on nasal continuous positive airway pressure, respiratory therapy assess nares and determine need for appliance change or resting period.   Outcome: Completed     Problem: Discharge Planning  Goal: Discharge to home or other facility with appropriate resources  Outcome: Completed  Flowsheets (Taken 12/15/2022 0820)  Discharge to home or other facility with appropriate resources: Identify barriers to discharge with patient and caregiver     Problem: Safety - Adult  Goal: Free from fall injury  Outcome: Completed     Problem: Pain  Goal: Verbalizes/displays adequate comfort level or baseline comfort level  Outcome: Completed  Flowsheets (Taken 12/15/2022 0820)  Verbalizes/displays adequate comfort level or baseline comfort level: Encourage patient to monitor pain and request assistance     Problem: Chronic Conditions and Co-morbidities  Goal: Patient's chronic conditions and co-morbidity symptoms are monitored and maintained or improved  Outcome: Completed  Flowsheets (Taken 12/15/2022 0820)  Care Plan - Patient's Chronic Conditions and Co-Morbidity Symptoms are Monitored and Maintained or Improved: Monitor and assess patient's chronic conditions and comorbid symptoms for stability, deterioration, or improvement     Problem: Nutrition Deficit:  Goal: Optimize nutritional status  Outcome: Completed
3

## (undated) DEVICE — BLADE CLIPPER GEN PURP NS

## (undated) DEVICE — PATIENT RETURN ELECTRODE, SINGLE-USE, CONTACT QUALITY MONITORING, ADULT, WITH 9FT CORD, FOR PATIENTS WEIGING OVER 33LBS. (15KG): Brand: MEGADYNE

## (undated) DEVICE — DRIP REDUCTION MANIFOLD

## (undated) DEVICE — GOWN,BREATHABLE SLV,AURORA,XLG,STRL: Brand: MEDLINE

## (undated) DEVICE — 3M™ STERI-DRAPE™ U-DRAPE 1015: Brand: STERI-DRAPE™

## (undated) DEVICE — GOWN,BREATHABLE,IMP SLV 3XL AURORA: Brand: MEDLINE

## (undated) DEVICE — APPLICATOR PREP 26ML 0.7% IOD POVACRYLEX 74% ISO ALC ST

## (undated) DEVICE — INTENDED FOR TISSUE SEPARATION, AND OTHER PROCEDURES THAT REQUIRE A SHARP SURGICAL BLADE TO PUNCTURE OR CUT.: Brand: BARD-PARKER ® STAINLESS STEEL BLADES

## (undated) DEVICE — CLOTH SURG PREP PREOPERATIVE CHLORHEXIDINE GLUC 2% READYPREP

## (undated) DEVICE — EXTRAS HIP

## (undated) DEVICE — Z DISCONTINUED PER MEDLINE USE 2741943 DRESSING AQUACEL 10 IN ALG W9XL25CM SIL CVR WTRPRF VIR BACT BARR ANTIMIC

## (undated) DEVICE — SET ORTHO ACCOLADE TOTAL HIP BROACH

## (undated) DEVICE — GRADUATE TRIANG MEASURE 1000ML BLK PRNT

## (undated) DEVICE — CONNECTOR IRRIGATION AUXILIARY H2O JET W/ PRT MTL THRD HYDR

## (undated) DEVICE — SHEET,DRAPE,53X77,STERILE: Brand: MEDLINE

## (undated) DEVICE — 3M™ COBAN™ NL STERILE NON-LATEX SELF-ADHERENT WRAP, 2084S, 4 IN X 5 YD (10 CM X 4,5 M), 18 ROLLS/CASE: Brand: 3M™ COBAN™

## (undated) DEVICE — SET ORTHO STD STORTSTD2

## (undated) DEVICE — SET ORTHO STD STORTSTD1

## (undated) DEVICE — Device

## (undated) DEVICE — Z DUP USE 2257490 ADHESIVE SKIN CLSRE 036ML TPCL 2CTL CNCRLTE HIGH VSCSTY DRMB

## (undated) DEVICE — Z DISCONTINUED USE 2275686 GLOVE SURG SZ 8 L12IN FNGR THK13MIL WHT ISOLEX POLYISOPRENE

## (undated) DEVICE — NEEDLE HYPO 21GA L1.5IN GRN POLYPR HUB S STL REG BVL STR

## (undated) DEVICE — DUP USE 154357BIT DRILL TWIST 5INX3/32IN STRL

## (undated) DEVICE — READY WET SKIN SCRUB TRAY-LF: Brand: MEDLINE INDUSTRIES, INC.

## (undated) DEVICE — TOWEL,OR,DSP,ST,BLUE,DLX,10/PK,8PK/CS: Brand: MEDLINE

## (undated) DEVICE — 3M™ IOBAN™ 2 ANTIMICROBIAL INCISE DRAPE 6650EZ: Brand: IOBAN™ 2

## (undated) DEVICE — GLOVE SURG SZ 8 L12IN FNGR THK79MIL GRN LTX FREE

## (undated) DEVICE — BIT DRL L5IN DIA2.8MM STD ST S STL TWST BUSA

## (undated) DEVICE — SYRINGE 20ML LL S/C 50

## (undated) DEVICE — STRIP,CLOSURE,WOUND,MEDI-STRIP,1/2X4: Brand: MEDLINE

## (undated) DEVICE — ANTISEPTIC 16OZ H PEROX 1ST AID ORAL DEBRIDING AGNT

## (undated) DEVICE — BOWL AND CEMENT CARTRIDGE WITH BREAKAWAY FEMORAL NOZZLE: Brand: ACM

## (undated) DEVICE — HANDPIECE SET WITH BONE CLEANING TIP AND SUCTION TUBE: Brand: INTERPULSE

## (undated) DEVICE — SET ORTHO CENTRAX CUPS

## (undated) DEVICE — FORCEPS BX OVL CUP FEN DISPOSABLE CAP L 160CM RAD JAW 4

## (undated) DEVICE — Z DISCONTINUED PER MEDLINE USE 2741944 DRESSING AQUACEL 12 IN SURG W9XL30CM SIL CVR WTRPRF VIR BACT BARR ANTIMIC

## (undated) DEVICE — 1000 S-DRAPE TOWEL DRAPE 10/BX: Brand: STERI-DRAPE™

## (undated) DEVICE — APPLICATOR MEDICATED 26 CC SOLUTION HI LT ORNG CHLORAPREP

## (undated) DEVICE — STRYKER PERFORMANCE SERIES SAGITTAL BLADE: Brand: STRYKER PERFORMANCE SERIES

## (undated) DEVICE — Z INACTIVE USE 2660664 SOLUTION IRRIG 3000ML 0.9% SOD CHL USP UROMATIC PLAS CONT

## (undated) DEVICE — RACK TUBE CURETTE

## (undated) DEVICE — FORCEPS BX L160CM JAW DIA2.4MM YEL L CAP W/ NDL DISP RAD

## (undated) DEVICE — BITEBLOCK 54FR W/ DENT RIM BLOX

## (undated) DEVICE — SET LAMBOTTE

## (undated) DEVICE — TUBING SUCT 12FR MAL ALUM SHFT FN CAP VENT UNIV CONN W/ OBT

## (undated) DEVICE — DRESSING,GAUZE,XEROFORM,CURAD,5"X9",ST: Brand: CURAD

## (undated) DEVICE — SET ORTHO ACCOLADE HEMI HIP INSRT

## (undated) DEVICE — 1.5L THIN WALL CAN: Brand: CRD

## (undated) DEVICE — PACKING,VAGINAL,XR,ST,4"X36",100EA: Brand: MEDLINE

## (undated) DEVICE — SURGICAL PROCEDURE PACK BASIC

## (undated) DEVICE — BLOCK BITE 60FR RUBBER ADLT DENTAL

## (undated) DEVICE — GAUZE,SPONGE,4"X4",8PLY,STRL,LF,10/TRAY: Brand: MEDLINE

## (undated) DEVICE — PILLOW POS W15XH6XL22IN RASPBERRY FOAM ABD W/ STRP DISP FOR

## (undated) DEVICE — SOLUTION IV IRRIG WATER 1000ML POUR BRL 2F7114

## (undated) DEVICE — DEFENDO AIR WATER SUCTION AND BIOPSY VALVE KIT FOR  OLYMPUS: Brand: DEFENDO AIR/WATER/SUCTION AND BIOPSY VALVE

## (undated) DEVICE — GLOVE ORANGE PI 8   MSG9080

## (undated) DEVICE — E-Z CLEAN, NON-STICK, PTFE COATED, ELECTROSURGICAL BLADE ELECTRODE, 6.5 INCH (16.5 CM): Brand: MEGADYNE

## (undated) DEVICE — HEWSON SUTURE RETRIEVER: Brand: HEWSON SUTURE RETRIEVER

## (undated) DEVICE — SYRINGE MED 50ML LUERSLIP TIP

## (undated) DEVICE — COAXIAL FEMORAL CANAL TIP

## (undated) DEVICE — SET ORTHO ACCOLADE HEMI HIP BROACH

## (undated) DEVICE — CONTAINER SPEC 60ML PH 7NEUTRAL BUFF FRMLN RDY TO USE

## (undated) DEVICE — SPONGE GZ W4XL4IN RAYON POLY FILL CVR W/ NONWOVEN FAB

## (undated) DEVICE — NEEDLE HYPO 18GA L1.5IN PNK POLYPR HUB S STL REG BVL STR

## (undated) DEVICE — TOTAL HIP PK

## (undated) DEVICE — Z DISCONTINUED NO SUB IDED TUBING ETCO2 AD L6.5FT NSL ORAL CVD PRNG NONFLARED TIP OVR

## (undated) DEVICE — SYRINGE MED 50ML LUERLOCK TIP

## (undated) DEVICE — COVER BOOT L REG BLU SMS POLYPR KNEE HI E FLD RESIST

## (undated) DEVICE — DRILL SYSTEM 7

## (undated) DEVICE — GOWN,AURORA,BRTHSLV,2XL,18/CS: Brand: MEDLINE

## (undated) DEVICE — SET ORTHO ACCOLADE TOTAL HIP INSRTION